# Patient Record
Sex: FEMALE | Race: WHITE | Employment: OTHER | URBAN - METROPOLITAN AREA
[De-identification: names, ages, dates, MRNs, and addresses within clinical notes are randomized per-mention and may not be internally consistent; named-entity substitution may affect disease eponyms.]

---

## 2017-01-12 DIAGNOSIS — G89.29 CHRONIC PAIN OF RIGHT ANKLE: ICD-10-CM

## 2017-01-12 DIAGNOSIS — M25.571 CHRONIC PAIN OF RIGHT ANKLE: ICD-10-CM

## 2017-01-12 DIAGNOSIS — M54.2 NECK PAIN: ICD-10-CM

## 2017-01-12 DIAGNOSIS — Z79.899 HIGH RISK MEDICATION USE: Chronic | ICD-10-CM

## 2017-01-12 RX ORDER — HYDROCODONE BITARTRATE AND ACETAMINOPHEN 10; 325 MG/1; MG/1
TABLET ORAL
Qty: 60 TABLET | Refills: 0 | Status: SHIPPED | OUTPATIENT
Start: 2017-01-12 | End: 2017-02-02 | Stop reason: SDUPTHER

## 2017-02-02 ENCOUNTER — OFFICE VISIT (OUTPATIENT)
Dept: PHYSICAL MEDICINE AND REHAB | Age: 62
End: 2017-02-02

## 2017-02-02 VITALS
SYSTOLIC BLOOD PRESSURE: 138 MMHG | DIASTOLIC BLOOD PRESSURE: 70 MMHG | HEIGHT: 68 IN | WEIGHT: 225 LBS | BODY MASS INDEX: 34.1 KG/M2

## 2017-02-02 DIAGNOSIS — M53.3 SI (SACROILIAC) PAIN: Primary | ICD-10-CM

## 2017-02-02 DIAGNOSIS — M54.2 NECK PAIN: ICD-10-CM

## 2017-02-02 DIAGNOSIS — G89.29 CHRONIC PAIN OF RIGHT ANKLE: ICD-10-CM

## 2017-02-02 DIAGNOSIS — S39.011A ABDOMINAL MUSCLE STRAIN, INITIAL ENCOUNTER: ICD-10-CM

## 2017-02-02 DIAGNOSIS — M25.571 CHRONIC PAIN OF RIGHT ANKLE: ICD-10-CM

## 2017-02-02 DIAGNOSIS — M15.8 OTHER OSTEOARTHRITIS INVOLVING MULTIPLE JOINTS: ICD-10-CM

## 2017-02-02 DIAGNOSIS — Z79.899 HIGH RISK MEDICATION USE: Chronic | ICD-10-CM

## 2017-02-02 PROBLEM — M15.9 OSTEOARTHRITIS OF MULTIPLE JOINTS: Status: ACTIVE | Noted: 2017-02-02

## 2017-02-02 PROCEDURE — 1036F TOBACCO NON-USER: CPT | Performed by: PHYSICAL MEDICINE & REHABILITATION

## 2017-02-02 PROCEDURE — 3017F COLORECTAL CA SCREEN DOC REV: CPT | Performed by: PHYSICAL MEDICINE & REHABILITATION

## 2017-02-02 PROCEDURE — G8599 NO ASA/ANTIPLAT THER USE RNG: HCPCS | Performed by: PHYSICAL MEDICINE & REHABILITATION

## 2017-02-02 PROCEDURE — G8428 CUR MEDS NOT DOCUMENT: HCPCS | Performed by: PHYSICAL MEDICINE & REHABILITATION

## 2017-02-02 PROCEDURE — 3014F SCREEN MAMMO DOC REV: CPT | Performed by: PHYSICAL MEDICINE & REHABILITATION

## 2017-02-02 PROCEDURE — G8419 CALC BMI OUT NRM PARAM NOF/U: HCPCS | Performed by: PHYSICAL MEDICINE & REHABILITATION

## 2017-02-02 PROCEDURE — G8484 FLU IMMUNIZE NO ADMIN: HCPCS | Performed by: PHYSICAL MEDICINE & REHABILITATION

## 2017-02-02 PROCEDURE — 99213 OFFICE O/P EST LOW 20 MIN: CPT | Performed by: PHYSICAL MEDICINE & REHABILITATION

## 2017-02-02 RX ORDER — HYDROCODONE BITARTRATE AND ACETAMINOPHEN 10; 325 MG/1; MG/1
TABLET ORAL
Qty: 60 TABLET | Refills: 0 | Status: SHIPPED | OUTPATIENT
Start: 2017-02-02 | End: 2017-02-02 | Stop reason: SDUPTHER

## 2017-02-02 RX ORDER — HYDROCODONE BITARTRATE AND ACETAMINOPHEN 10; 325 MG/1; MG/1
TABLET ORAL
Qty: 60 TABLET | Refills: 0 | Status: SHIPPED | OUTPATIENT
Start: 2017-02-02 | End: 2017-03-03 | Stop reason: SDUPTHER

## 2017-02-02 ASSESSMENT — ENCOUNTER SYMPTOMS
NAUSEA: 0
EYES NEGATIVE: 1
SHORTNESS OF BREATH: 0
EYE PAIN: 0
ALLERGIC/IMMUNOLOGIC NEGATIVE: 1
DIARRHEA: 0
BACK PAIN: 1
VOMITING: 0
GASTROINTESTINAL NEGATIVE: 1
RESPIRATORY NEGATIVE: 1
WHEEZING: 0
CONSTIPATION: 0

## 2017-03-03 ENCOUNTER — PROCEDURE VISIT (OUTPATIENT)
Dept: PHYSICAL MEDICINE AND REHAB | Age: 62
End: 2017-03-03

## 2017-03-03 DIAGNOSIS — Z79.899 HIGH RISK MEDICATION USE: Chronic | ICD-10-CM

## 2017-03-03 DIAGNOSIS — G89.29 CHRONIC PAIN OF RIGHT ANKLE: ICD-10-CM

## 2017-03-03 DIAGNOSIS — M25.571 CHRONIC PAIN OF RIGHT ANKLE: ICD-10-CM

## 2017-03-03 DIAGNOSIS — M54.41 ACUTE BILATERAL LOW BACK PAIN WITH RIGHT-SIDED SCIATICA: Primary | ICD-10-CM

## 2017-03-03 DIAGNOSIS — M54.2 NECK PAIN: ICD-10-CM

## 2017-03-03 DIAGNOSIS — M54.31 RIGHT SIDED SCIATICA: ICD-10-CM

## 2017-03-03 PROCEDURE — 64445 NJX AA&/STRD SCIATIC NRV IMG: CPT | Performed by: PHYSICAL MEDICINE & REHABILITATION

## 2017-03-03 PROCEDURE — 96372 THER/PROPH/DIAG INJ SC/IM: CPT | Performed by: PHYSICAL MEDICINE & REHABILITATION

## 2017-03-03 RX ORDER — METHYLPREDNISOLONE 4 MG/1
TABLET ORAL
Qty: 1 KIT | Refills: 1 | Status: SHIPPED | OUTPATIENT
Start: 2017-03-03 | End: 2017-03-09

## 2017-03-03 RX ORDER — HYDROCODONE BITARTRATE AND ACETAMINOPHEN 10; 325 MG/1; MG/1
TABLET ORAL
Qty: 90 TABLET | Refills: 0 | Status: SHIPPED | OUTPATIENT
Start: 2017-03-03 | End: 2017-04-03 | Stop reason: SDUPTHER

## 2017-03-03 RX ORDER — KETOROLAC TROMETHAMINE 30 MG/ML
30 INJECTION, SOLUTION INTRAMUSCULAR; INTRAVENOUS ONCE
Status: COMPLETED | OUTPATIENT
Start: 2017-03-03 | End: 2017-03-03

## 2017-03-03 RX ADMIN — KETOROLAC TROMETHAMINE 30 MG: 30 INJECTION, SOLUTION INTRAMUSCULAR; INTRAVENOUS at 11:46

## 2017-03-07 ENCOUNTER — HOSPITAL ENCOUNTER (OUTPATIENT)
Dept: GENERAL RADIOLOGY | Age: 62
Discharge: HOME | End: 2017-03-07
Payer: COMMERCIAL

## 2017-03-07 ENCOUNTER — TELEPHONE (OUTPATIENT)
Dept: PHYSICAL MEDICINE AND REHAB | Age: 62
End: 2017-03-07

## 2017-03-07 DIAGNOSIS — M25.551 RIGHT HIP PAIN: ICD-10-CM

## 2017-03-07 DIAGNOSIS — M25.551 RIGHT HIP PAIN: Primary | ICD-10-CM

## 2017-03-07 PROCEDURE — 73502 X-RAY EXAM HIP UNI 2-3 VIEWS: CPT

## 2017-03-08 PROBLEM — M25.551 RIGHT HIP PAIN: Status: ACTIVE | Noted: 2017-03-08

## 2017-04-03 ENCOUNTER — OFFICE VISIT (OUTPATIENT)
Dept: PHYSICAL MEDICINE AND REHAB | Age: 62
End: 2017-04-03

## 2017-04-03 VITALS
WEIGHT: 225 LBS | DIASTOLIC BLOOD PRESSURE: 56 MMHG | BODY MASS INDEX: 34.1 KG/M2 | SYSTOLIC BLOOD PRESSURE: 120 MMHG | HEIGHT: 68 IN

## 2017-04-03 DIAGNOSIS — M25.571 CHRONIC PAIN OF RIGHT ANKLE: ICD-10-CM

## 2017-04-03 DIAGNOSIS — Z79.899 HIGH RISK MEDICATION USE: ICD-10-CM

## 2017-04-03 DIAGNOSIS — M54.41 ACUTE BILATERAL LOW BACK PAIN WITH RIGHT-SIDED SCIATICA: ICD-10-CM

## 2017-04-03 DIAGNOSIS — E55.9 VITAMIN D DEFICIENCY: ICD-10-CM

## 2017-04-03 DIAGNOSIS — K63.5 COLON POLYP: ICD-10-CM

## 2017-04-03 DIAGNOSIS — M54.2 NECK PAIN: Primary | ICD-10-CM

## 2017-04-03 DIAGNOSIS — G89.29 CHRONIC PAIN OF RIGHT ANKLE: ICD-10-CM

## 2017-04-03 DIAGNOSIS — Z79.899 HIGH RISK MEDICATION USE: Chronic | ICD-10-CM

## 2017-04-03 DIAGNOSIS — M48.062 SPINAL STENOSIS OF LUMBAR REGION WITH NEUROGENIC CLAUDICATION: ICD-10-CM

## 2017-04-03 DIAGNOSIS — M53.3 SI (SACROILIAC) PAIN: ICD-10-CM

## 2017-04-03 DIAGNOSIS — M16.10 HIP ARTHRITIS: ICD-10-CM

## 2017-04-03 PROCEDURE — 1036F TOBACCO NON-USER: CPT | Performed by: PHYSICAL MEDICINE & REHABILITATION

## 2017-04-03 PROCEDURE — 99214 OFFICE O/P EST MOD 30 MIN: CPT | Performed by: PHYSICAL MEDICINE & REHABILITATION

## 2017-04-03 PROCEDURE — 3014F SCREEN MAMMO DOC REV: CPT | Performed by: PHYSICAL MEDICINE & REHABILITATION

## 2017-04-03 PROCEDURE — G8599 NO ASA/ANTIPLAT THER USE RNG: HCPCS | Performed by: PHYSICAL MEDICINE & REHABILITATION

## 2017-04-03 PROCEDURE — G8417 CALC BMI ABV UP PARAM F/U: HCPCS | Performed by: PHYSICAL MEDICINE & REHABILITATION

## 2017-04-03 PROCEDURE — 3017F COLORECTAL CA SCREEN DOC REV: CPT | Performed by: PHYSICAL MEDICINE & REHABILITATION

## 2017-04-03 PROCEDURE — G8427 DOCREV CUR MEDS BY ELIG CLIN: HCPCS | Performed by: PHYSICAL MEDICINE & REHABILITATION

## 2017-04-03 RX ORDER — HYDROCODONE BITARTRATE AND ACETAMINOPHEN 10; 325 MG/1; MG/1
TABLET ORAL
Qty: 90 TABLET | Refills: 0 | Status: SHIPPED | OUTPATIENT
Start: 2017-04-03 | End: 2017-05-08 | Stop reason: SDUPTHER

## 2017-04-03 RX ORDER — GABAPENTIN 100 MG/1
300 CAPSULE ORAL NIGHTLY
Qty: 270 CAPSULE | Refills: 1 | Status: SHIPPED | OUTPATIENT
Start: 2017-04-03 | End: 2017-11-03 | Stop reason: SDUPTHER

## 2017-04-03 ASSESSMENT — ENCOUNTER SYMPTOMS
EYES NEGATIVE: 1
BACK PAIN: 1
ALLERGIC/IMMUNOLOGIC NEGATIVE: 1
CONSTIPATION: 0
CHEST TIGHTNESS: 0
VOMITING: 0
DIARRHEA: 0
ABDOMINAL PAIN: 1
NAUSEA: 0
SHORTNESS OF BREATH: 0
WHEEZING: 0

## 2017-04-04 ENCOUNTER — OFFICE VISIT (OUTPATIENT)
Dept: GASTROENTEROLOGY | Age: 62
End: 2017-04-04

## 2017-04-04 VITALS
WEIGHT: 224 LBS | SYSTOLIC BLOOD PRESSURE: 151 MMHG | DIASTOLIC BLOOD PRESSURE: 83 MMHG | HEART RATE: 76 BPM | BODY MASS INDEX: 34.06 KG/M2

## 2017-04-04 DIAGNOSIS — Z12.11 SPECIAL SCREENING FOR MALIGNANT NEOPLASMS, COLON: ICD-10-CM

## 2017-04-04 DIAGNOSIS — Z79.82 ENCOUNTER FOR LONG-TERM (CURRENT) USE OF ASPIRIN: ICD-10-CM

## 2017-04-04 DIAGNOSIS — R10.11 RUQ ABDOMINAL PAIN: Primary | ICD-10-CM

## 2017-04-04 PROCEDURE — 3017F COLORECTAL CA SCREEN DOC REV: CPT | Performed by: INTERNAL MEDICINE

## 2017-04-04 PROCEDURE — G8599 NO ASA/ANTIPLAT THER USE RNG: HCPCS | Performed by: INTERNAL MEDICINE

## 2017-04-04 PROCEDURE — G8417 CALC BMI ABV UP PARAM F/U: HCPCS | Performed by: INTERNAL MEDICINE

## 2017-04-04 PROCEDURE — 1036F TOBACCO NON-USER: CPT | Performed by: INTERNAL MEDICINE

## 2017-04-04 PROCEDURE — 99203 OFFICE O/P NEW LOW 30 MIN: CPT | Performed by: INTERNAL MEDICINE

## 2017-04-04 PROCEDURE — G8427 DOCREV CUR MEDS BY ELIG CLIN: HCPCS | Performed by: INTERNAL MEDICINE

## 2017-04-04 PROCEDURE — 3014F SCREEN MAMMO DOC REV: CPT | Performed by: INTERNAL MEDICINE

## 2017-04-12 ENCOUNTER — HOSPITAL ENCOUNTER (OUTPATIENT)
Age: 62
Setting detail: OUTPATIENT SURGERY
Discharge: HOME | End: 2017-04-12
Attending: INTERNAL MEDICINE | Admitting: INTERNAL MEDICINE
Payer: COMMERCIAL

## 2017-04-12 ENCOUNTER — ANESTHESIA (OUTPATIENT)
Dept: ENDOSCOPY | Age: 62
End: 2017-04-12
Payer: COMMERCIAL

## 2017-04-12 ENCOUNTER — ANESTHESIA EVENT (OUTPATIENT)
Dept: ENDOSCOPY | Age: 62
End: 2017-04-12
Payer: COMMERCIAL

## 2017-04-12 VITALS
TEMPERATURE: 98.2 F | SYSTOLIC BLOOD PRESSURE: 138 MMHG | WEIGHT: 218 LBS | BODY MASS INDEX: 33.04 KG/M2 | HEIGHT: 68 IN | OXYGEN SATURATION: 96 % | RESPIRATION RATE: 18 BRPM | DIASTOLIC BLOOD PRESSURE: 64 MMHG | HEART RATE: 76 BPM

## 2017-04-12 VITALS
DIASTOLIC BLOOD PRESSURE: 59 MMHG | SYSTOLIC BLOOD PRESSURE: 126 MMHG | RESPIRATION RATE: 16 BRPM | OXYGEN SATURATION: 93 %

## 2017-04-12 PROCEDURE — 2580000003 HC RX 258: Performed by: STUDENT IN AN ORGANIZED HEALTH CARE EDUCATION/TRAINING PROGRAM

## 2017-04-12 PROCEDURE — 88305 TISSUE EXAM BY PATHOLOGIST: CPT

## 2017-04-12 PROCEDURE — 3700000001 HC ADD 15 MINUTES (ANESTHESIA): Performed by: INTERNAL MEDICINE

## 2017-04-12 PROCEDURE — 7100000010 HC PHASE II RECOVERY - FIRST 15 MIN: Performed by: INTERNAL MEDICINE

## 2017-04-12 PROCEDURE — 3700000000 HC ANESTHESIA ATTENDED CARE: Performed by: INTERNAL MEDICINE

## 2017-04-12 PROCEDURE — 3609027000 HC COLONOSCOPY: Performed by: INTERNAL MEDICINE

## 2017-04-12 PROCEDURE — 88342 IMHCHEM/IMCYTCHM 1ST ANTB: CPT

## 2017-04-12 PROCEDURE — 6360000002 HC RX W HCPCS: Performed by: NURSE ANESTHETIST, CERTIFIED REGISTERED

## 2017-04-12 PROCEDURE — 2500000003 HC RX 250 WO HCPCS: Performed by: NURSE ANESTHETIST, CERTIFIED REGISTERED

## 2017-04-12 PROCEDURE — 3609017100 HC EGD: Performed by: INTERNAL MEDICINE

## 2017-04-12 RX ORDER — LABETALOL HYDROCHLORIDE 5 MG/ML
INJECTION, SOLUTION INTRAVENOUS PRN
Status: DISCONTINUED | OUTPATIENT
Start: 2017-04-12 | End: 2017-04-12 | Stop reason: SDUPTHER

## 2017-04-12 RX ORDER — GLYCOPYRROLATE 0.2 MG/ML
INJECTION INTRAMUSCULAR; INTRAVENOUS PRN
Status: DISCONTINUED | OUTPATIENT
Start: 2017-04-12 | End: 2017-04-12 | Stop reason: SDUPTHER

## 2017-04-12 RX ORDER — PROPOFOL 10 MG/ML
INJECTION, EMULSION INTRAVENOUS PRN
Status: DISCONTINUED | OUTPATIENT
Start: 2017-04-12 | End: 2017-04-12 | Stop reason: SDUPTHER

## 2017-04-12 RX ORDER — LIDOCAINE HYDROCHLORIDE 20 MG/ML
INJECTION, SOLUTION INFILTRATION; PERINEURAL PRN
Status: DISCONTINUED | OUTPATIENT
Start: 2017-04-12 | End: 2017-04-12 | Stop reason: SDUPTHER

## 2017-04-12 RX ORDER — ONDANSETRON 2 MG/ML
4 INJECTION INTRAMUSCULAR; INTRAVENOUS
Status: DISCONTINUED | OUTPATIENT
Start: 2017-04-12 | End: 2017-04-12 | Stop reason: HOSPADM

## 2017-04-12 RX ORDER — SODIUM CHLORIDE 9 MG/ML
INJECTION, SOLUTION INTRAVENOUS CONTINUOUS
Status: DISCONTINUED | OUTPATIENT
Start: 2017-04-12 | End: 2017-04-12 | Stop reason: HOSPADM

## 2017-04-12 RX ADMIN — PROPOFOL 20 MG: 10 INJECTION, EMULSION INTRAVENOUS at 11:41

## 2017-04-12 RX ADMIN — PROPOFOL 20 MG: 10 INJECTION, EMULSION INTRAVENOUS at 11:39

## 2017-04-12 RX ADMIN — PROPOFOL 20 MG: 10 INJECTION, EMULSION INTRAVENOUS at 11:47

## 2017-04-12 RX ADMIN — PROPOFOL 20 MG: 10 INJECTION, EMULSION INTRAVENOUS at 11:46

## 2017-04-12 RX ADMIN — LIDOCAINE HYDROCHLORIDE 40 MG: 20 INJECTION, SOLUTION INFILTRATION; PERINEURAL at 11:29

## 2017-04-12 RX ADMIN — PROPOFOL 50 MG: 10 INJECTION, EMULSION INTRAVENOUS at 11:29

## 2017-04-12 RX ADMIN — PROPOFOL 20 MG: 10 INJECTION, EMULSION INTRAVENOUS at 11:48

## 2017-04-12 RX ADMIN — PROPOFOL 20 MG: 10 INJECTION, EMULSION INTRAVENOUS at 11:49

## 2017-04-12 RX ADMIN — PROPOFOL 20 MG: 10 INJECTION, EMULSION INTRAVENOUS at 11:34

## 2017-04-12 RX ADMIN — PROPOFOL 20 MG: 10 INJECTION, EMULSION INTRAVENOUS at 11:35

## 2017-04-12 RX ADMIN — PROPOFOL 20 MG: 10 INJECTION, EMULSION INTRAVENOUS at 11:33

## 2017-04-12 RX ADMIN — PROPOFOL 20 MG: 10 INJECTION, EMULSION INTRAVENOUS at 11:45

## 2017-04-12 RX ADMIN — PROPOFOL 20 MG: 10 INJECTION, EMULSION INTRAVENOUS at 11:30

## 2017-04-12 RX ADMIN — GLYCOPYRROLATE 0.4 MG: 0.2 INJECTION INTRAMUSCULAR; INTRAVENOUS at 11:46

## 2017-04-12 RX ADMIN — LABETALOL HYDROCHLORIDE 5 MG: 5 INJECTION, SOLUTION INTRAVENOUS at 11:33

## 2017-04-12 RX ADMIN — LABETALOL HYDROCHLORIDE 5 MG: 5 INJECTION, SOLUTION INTRAVENOUS at 11:28

## 2017-04-12 RX ADMIN — PROPOFOL 20 MG: 10 INJECTION, EMULSION INTRAVENOUS at 11:44

## 2017-04-12 RX ADMIN — PROPOFOL 20 MG: 10 INJECTION, EMULSION INTRAVENOUS at 11:32

## 2017-04-12 RX ADMIN — PROPOFOL 20 MG: 10 INJECTION, EMULSION INTRAVENOUS at 11:31

## 2017-04-12 RX ADMIN — PROPOFOL 20 MG: 10 INJECTION, EMULSION INTRAVENOUS at 11:40

## 2017-04-12 RX ADMIN — SODIUM CHLORIDE: 9 INJECTION, SOLUTION INTRAVENOUS at 10:33

## 2017-04-12 RX ADMIN — PROPOFOL 20 MG: 10 INJECTION, EMULSION INTRAVENOUS at 11:42

## 2017-04-12 RX ADMIN — PROPOFOL 20 MG: 10 INJECTION, EMULSION INTRAVENOUS at 11:43

## 2017-04-12 RX ADMIN — PROPOFOL 20 MG: 10 INJECTION, EMULSION INTRAVENOUS at 11:38

## 2017-04-12 RX ADMIN — PROPOFOL 20 MG: 10 INJECTION, EMULSION INTRAVENOUS at 11:37

## 2017-04-12 ASSESSMENT — PAIN SCALES - GENERAL: PAINLEVEL_OUTOF10: 0

## 2017-04-27 ENCOUNTER — OFFICE VISIT (OUTPATIENT)
Dept: GASTROENTEROLOGY | Age: 62
End: 2017-04-27

## 2017-04-27 VITALS
DIASTOLIC BLOOD PRESSURE: 85 MMHG | SYSTOLIC BLOOD PRESSURE: 153 MMHG | HEART RATE: 76 BPM | WEIGHT: 223 LBS | BODY MASS INDEX: 33.91 KG/M2

## 2017-04-27 DIAGNOSIS — R10.11 RUQ ABDOMINAL PAIN: Primary | ICD-10-CM

## 2017-04-27 DIAGNOSIS — R10.9 TRIGGER POINT OF ABDOMEN: ICD-10-CM

## 2017-04-27 PROCEDURE — G8427 DOCREV CUR MEDS BY ELIG CLIN: HCPCS | Performed by: INTERNAL MEDICINE

## 2017-04-27 PROCEDURE — G8599 NO ASA/ANTIPLAT THER USE RNG: HCPCS | Performed by: INTERNAL MEDICINE

## 2017-04-27 PROCEDURE — 3017F COLORECTAL CA SCREEN DOC REV: CPT | Performed by: INTERNAL MEDICINE

## 2017-04-27 PROCEDURE — 99213 OFFICE O/P EST LOW 20 MIN: CPT | Performed by: INTERNAL MEDICINE

## 2017-04-27 PROCEDURE — 1036F TOBACCO NON-USER: CPT | Performed by: INTERNAL MEDICINE

## 2017-04-27 PROCEDURE — 20552 NJX 1/MLT TRIGGER POINT 1/2: CPT | Performed by: INTERNAL MEDICINE

## 2017-04-27 PROCEDURE — 3014F SCREEN MAMMO DOC REV: CPT | Performed by: INTERNAL MEDICINE

## 2017-04-27 PROCEDURE — G8417 CALC BMI ABV UP PARAM F/U: HCPCS | Performed by: INTERNAL MEDICINE

## 2017-04-27 RX ORDER — LIDOCAINE HYDROCHLORIDE 10 MG/ML
5 INJECTION, SOLUTION INFILTRATION; PERINEURAL ONCE
Status: COMPLETED | OUTPATIENT
Start: 2017-04-27 | End: 2017-04-27

## 2017-04-27 RX ORDER — TRIAMCINOLONE ACETONIDE 40 MG/ML
40 INJECTION, SUSPENSION INTRA-ARTICULAR; INTRAMUSCULAR ONCE
Status: COMPLETED | OUTPATIENT
Start: 2017-04-27 | End: 2017-04-27

## 2017-04-27 RX ADMIN — LIDOCAINE HYDROCHLORIDE 5 ML: 10 INJECTION, SOLUTION INFILTRATION; PERINEURAL at 15:36

## 2017-04-27 RX ADMIN — TRIAMCINOLONE ACETONIDE 40 MG: 40 INJECTION, SUSPENSION INTRA-ARTICULAR; INTRAMUSCULAR at 15:36

## 2017-05-01 ENCOUNTER — HOSPITAL ENCOUNTER (OUTPATIENT)
Dept: CT IMAGING | Age: 62
Discharge: HOME | End: 2017-05-01
Payer: COMMERCIAL

## 2017-05-01 VITALS
RESPIRATION RATE: 16 BRPM | BODY MASS INDEX: 33.34 KG/M2 | SYSTOLIC BLOOD PRESSURE: 170 MMHG | HEIGHT: 68 IN | WEIGHT: 220 LBS | DIASTOLIC BLOOD PRESSURE: 91 MMHG | HEART RATE: 73 BPM

## 2017-05-01 DIAGNOSIS — R10.11 RUQ ABDOMINAL PAIN: ICD-10-CM

## 2017-05-01 LAB
GFR AFRICAN AMERICAN: 50
GFR NON-AFRICAN AMERICAN: 41
PERFORMED ON: ABNORMAL
POC CREATININE: 1.3 MG/DL (ref 0.6–1.2)
POC SAMPLE TYPE: ABNORMAL

## 2017-05-01 PROCEDURE — 2500000003 HC RX 250 WO HCPCS: Performed by: RADIOLOGY

## 2017-05-01 PROCEDURE — 74177 CT ABD & PELVIS W/CONTRAST: CPT

## 2017-05-01 PROCEDURE — 2580000003 HC RX 258: Performed by: RADIOLOGY

## 2017-05-01 PROCEDURE — 6360000004 HC RX CONTRAST MEDICATION: Performed by: RADIOLOGY

## 2017-05-01 RX ORDER — SODIUM CHLORIDE 0.9 % (FLUSH) 0.9 %
10 SYRINGE (ML) INJECTION
Status: COMPLETED | OUTPATIENT
Start: 2017-05-01 | End: 2017-05-01

## 2017-05-01 RX ADMIN — IOPAMIDOL 100 ML: 755 INJECTION, SOLUTION INTRAVENOUS at 09:38

## 2017-05-01 RX ADMIN — SODIUM CHLORIDE, PRESERVATIVE FREE 10 ML: 5 INJECTION INTRAVENOUS at 09:38

## 2017-05-01 RX ADMIN — BARIUM SULFATE 450 ML: 21 SUSPENSION ORAL at 09:38

## 2017-05-08 DIAGNOSIS — M54.2 NECK PAIN: ICD-10-CM

## 2017-05-08 DIAGNOSIS — M54.41 ACUTE BILATERAL LOW BACK PAIN WITH RIGHT-SIDED SCIATICA: ICD-10-CM

## 2017-05-08 DIAGNOSIS — Z79.899 HIGH RISK MEDICATION USE: Chronic | ICD-10-CM

## 2017-05-08 DIAGNOSIS — G89.29 CHRONIC PAIN OF RIGHT ANKLE: ICD-10-CM

## 2017-05-08 DIAGNOSIS — M25.571 CHRONIC PAIN OF RIGHT ANKLE: ICD-10-CM

## 2017-05-08 RX ORDER — HYDROCODONE BITARTRATE AND ACETAMINOPHEN 10; 325 MG/1; MG/1
TABLET ORAL
Qty: 90 TABLET | Refills: 0 | Status: SHIPPED | OUTPATIENT
Start: 2017-05-08 | End: 2017-06-05 | Stop reason: SDUPTHER

## 2017-05-09 ENCOUNTER — TELEPHONE (OUTPATIENT)
Dept: PHYSICAL MEDICINE AND REHAB | Age: 62
End: 2017-05-09

## 2017-05-11 ENCOUNTER — OFFICE VISIT (OUTPATIENT)
Dept: GASTROENTEROLOGY | Age: 62
End: 2017-05-11

## 2017-05-11 DIAGNOSIS — R10.11 ABDOMINAL PAIN, RIGHT UPPER QUADRANT: Primary | ICD-10-CM

## 2017-05-11 PROCEDURE — G8417 CALC BMI ABV UP PARAM F/U: HCPCS | Performed by: INTERNAL MEDICINE

## 2017-05-11 PROCEDURE — 1036F TOBACCO NON-USER: CPT | Performed by: INTERNAL MEDICINE

## 2017-05-11 PROCEDURE — 99213 OFFICE O/P EST LOW 20 MIN: CPT | Performed by: INTERNAL MEDICINE

## 2017-05-11 PROCEDURE — 3014F SCREEN MAMMO DOC REV: CPT | Performed by: INTERNAL MEDICINE

## 2017-05-11 PROCEDURE — G8599 NO ASA/ANTIPLAT THER USE RNG: HCPCS | Performed by: INTERNAL MEDICINE

## 2017-05-11 PROCEDURE — G8427 DOCREV CUR MEDS BY ELIG CLIN: HCPCS | Performed by: INTERNAL MEDICINE

## 2017-05-11 PROCEDURE — 3017F COLORECTAL CA SCREEN DOC REV: CPT | Performed by: INTERNAL MEDICINE

## 2017-05-17 VITALS
DIASTOLIC BLOOD PRESSURE: 87 MMHG | HEART RATE: 64 BPM | BODY MASS INDEX: 34.06 KG/M2 | WEIGHT: 224 LBS | SYSTOLIC BLOOD PRESSURE: 166 MMHG

## 2017-05-19 DIAGNOSIS — R10.11 RUQ ABDOMINAL PAIN: Primary | ICD-10-CM

## 2017-05-30 ENCOUNTER — HOSPITAL ENCOUNTER (OUTPATIENT)
Dept: MRI IMAGING | Age: 62
Discharge: HOME | End: 2017-05-30
Payer: COMMERCIAL

## 2017-05-30 VITALS — WEIGHT: 224 LBS | HEIGHT: 70 IN | BODY MASS INDEX: 32.07 KG/M2

## 2017-05-30 DIAGNOSIS — R10.11 RUQ ABDOMINAL PAIN: ICD-10-CM

## 2017-05-30 PROCEDURE — 74183 MRI ABD W/O CNTR FLWD CNTR: CPT

## 2017-05-30 PROCEDURE — A9579 GAD-BASE MR CONTRAST NOS,1ML: HCPCS | Performed by: RADIOLOGY

## 2017-05-30 PROCEDURE — 6360000004 HC RX CONTRAST MEDICATION: Performed by: RADIOLOGY

## 2017-05-30 RX ORDER — 0.9 % SODIUM CHLORIDE 0.9 %
30 VIAL (ML) INJECTION ONCE
Status: DISCONTINUED | OUTPATIENT
Start: 2017-05-30 | End: 2017-06-02 | Stop reason: HOSPADM

## 2017-05-30 RX ADMIN — GADOVERSETAMIDE 30 ML: 0.5 INJECTION, SOLUTION INTRAVENOUS at 09:15

## 2017-06-05 ENCOUNTER — OFFICE VISIT (OUTPATIENT)
Dept: PHYSICAL MEDICINE AND REHAB | Age: 62
End: 2017-06-05

## 2017-06-05 VITALS
BODY MASS INDEX: 33.72 KG/M2 | SYSTOLIC BLOOD PRESSURE: 126 MMHG | DIASTOLIC BLOOD PRESSURE: 60 MMHG | HEIGHT: 68 IN | WEIGHT: 222.5 LBS

## 2017-06-05 DIAGNOSIS — M25.571 CHRONIC PAIN OF RIGHT ANKLE: ICD-10-CM

## 2017-06-05 DIAGNOSIS — M15.9 PRIMARY OSTEOARTHRITIS INVOLVING MULTIPLE JOINTS: ICD-10-CM

## 2017-06-05 DIAGNOSIS — M53.3 SI (SACROILIAC) PAIN: Primary | ICD-10-CM

## 2017-06-05 DIAGNOSIS — G89.29 CHRONIC PAIN OF RIGHT ANKLE: ICD-10-CM

## 2017-06-05 DIAGNOSIS — Z79.899 HIGH RISK MEDICATION USE: Chronic | ICD-10-CM

## 2017-06-05 DIAGNOSIS — M54.2 NECK PAIN: ICD-10-CM

## 2017-06-05 DIAGNOSIS — G89.29 CHRONIC LOW BACK PAIN WITH SCIATICA, SCIATICA LATERALITY UNSPECIFIED, UNSPECIFIED BACK PAIN LATERALITY: ICD-10-CM

## 2017-06-05 DIAGNOSIS — M54.40 CHRONIC LOW BACK PAIN WITH SCIATICA, SCIATICA LATERALITY UNSPECIFIED, UNSPECIFIED BACK PAIN LATERALITY: ICD-10-CM

## 2017-06-05 DIAGNOSIS — M48.062 SPINAL STENOSIS OF LUMBAR REGION WITH NEUROGENIC CLAUDICATION: ICD-10-CM

## 2017-06-05 DIAGNOSIS — M54.41 ACUTE BILATERAL LOW BACK PAIN WITH RIGHT-SIDED SCIATICA: ICD-10-CM

## 2017-06-05 PROCEDURE — G8599 NO ASA/ANTIPLAT THER USE RNG: HCPCS | Performed by: PHYSICAL MEDICINE & REHABILITATION

## 2017-06-05 PROCEDURE — G8427 DOCREV CUR MEDS BY ELIG CLIN: HCPCS | Performed by: PHYSICAL MEDICINE & REHABILITATION

## 2017-06-05 PROCEDURE — G8417 CALC BMI ABV UP PARAM F/U: HCPCS | Performed by: PHYSICAL MEDICINE & REHABILITATION

## 2017-06-05 PROCEDURE — 1036F TOBACCO NON-USER: CPT | Performed by: PHYSICAL MEDICINE & REHABILITATION

## 2017-06-05 PROCEDURE — 3014F SCREEN MAMMO DOC REV: CPT | Performed by: PHYSICAL MEDICINE & REHABILITATION

## 2017-06-05 PROCEDURE — 3017F COLORECTAL CA SCREEN DOC REV: CPT | Performed by: PHYSICAL MEDICINE & REHABILITATION

## 2017-06-05 PROCEDURE — 99213 OFFICE O/P EST LOW 20 MIN: CPT | Performed by: PHYSICAL MEDICINE & REHABILITATION

## 2017-06-05 RX ORDER — CIPROFLOXACIN 250 MG/1
250 TABLET, FILM COATED ORAL
COMMUNITY
Start: 2017-06-01 | End: 2017-06-08

## 2017-06-05 RX ORDER — HYDROCODONE BITARTRATE AND ACETAMINOPHEN 10; 325 MG/1; MG/1
TABLET ORAL
Qty: 90 TABLET | Refills: 0 | Status: SHIPPED | OUTPATIENT
Start: 2017-06-05 | End: 2017-08-07 | Stop reason: SDUPTHER

## 2017-06-05 ASSESSMENT — ENCOUNTER SYMPTOMS
ALLERGIC/IMMUNOLOGIC NEGATIVE: 1
DIARRHEA: 0
EYES NEGATIVE: 1
NAUSEA: 0
SHORTNESS OF BREATH: 0
ABDOMINAL PAIN: 1
CHEST TIGHTNESS: 0
WHEEZING: 0
CONSTIPATION: 0
BACK PAIN: 1
VOMITING: 0

## 2017-06-06 ENCOUNTER — OFFICE VISIT (OUTPATIENT)
Dept: GASTROENTEROLOGY | Age: 62
End: 2017-06-06

## 2017-06-06 VITALS
HEART RATE: 83 BPM | BODY MASS INDEX: 33.54 KG/M2 | HEIGHT: 68 IN | DIASTOLIC BLOOD PRESSURE: 78 MMHG | SYSTOLIC BLOOD PRESSURE: 148 MMHG | WEIGHT: 221.31 LBS

## 2017-06-06 DIAGNOSIS — R93.5 ABNORMAL CT OF THE ABDOMEN: Primary | ICD-10-CM

## 2017-06-06 DIAGNOSIS — R10.11: ICD-10-CM

## 2017-06-06 DIAGNOSIS — R10.9 TRIGGER POINT OF ABDOMEN: ICD-10-CM

## 2017-06-06 PROCEDURE — 99212 OFFICE O/P EST SF 10 MIN: CPT | Performed by: INTERNAL MEDICINE

## 2017-06-06 PROCEDURE — G8417 CALC BMI ABV UP PARAM F/U: HCPCS | Performed by: INTERNAL MEDICINE

## 2017-06-06 PROCEDURE — G8598 ASA/ANTIPLAT THER USED: HCPCS | Performed by: INTERNAL MEDICINE

## 2017-06-06 PROCEDURE — 20552 NJX 1/MLT TRIGGER POINT 1/2: CPT | Performed by: INTERNAL MEDICINE

## 2017-06-06 PROCEDURE — G8427 DOCREV CUR MEDS BY ELIG CLIN: HCPCS | Performed by: INTERNAL MEDICINE

## 2017-06-06 PROCEDURE — 3014F SCREEN MAMMO DOC REV: CPT | Performed by: INTERNAL MEDICINE

## 2017-06-06 PROCEDURE — 1036F TOBACCO NON-USER: CPT | Performed by: INTERNAL MEDICINE

## 2017-06-06 PROCEDURE — 3017F COLORECTAL CA SCREEN DOC REV: CPT | Performed by: INTERNAL MEDICINE

## 2017-06-06 RX ORDER — LIDOCAINE HYDROCHLORIDE 10 MG/ML
5 INJECTION, SOLUTION INFILTRATION; PERINEURAL ONCE
Status: COMPLETED | OUTPATIENT
Start: 2017-06-06 | End: 2017-06-06

## 2017-06-06 RX ORDER — TRIAMCINOLONE ACETONIDE 40 MG/ML
40 INJECTION, SUSPENSION INTRA-ARTICULAR; INTRAMUSCULAR ONCE
Status: COMPLETED | OUTPATIENT
Start: 2017-06-06 | End: 2017-06-06

## 2017-06-06 RX ADMIN — TRIAMCINOLONE ACETONIDE 40 MG: 40 INJECTION, SUSPENSION INTRA-ARTICULAR; INTRAMUSCULAR at 15:43

## 2017-06-06 RX ADMIN — LIDOCAINE HYDROCHLORIDE 5 ML: 10 INJECTION, SOLUTION INFILTRATION; PERINEURAL at 15:42

## 2017-06-28 DIAGNOSIS — M54.2 NECK PAIN: ICD-10-CM

## 2017-06-28 DIAGNOSIS — Z79.899 HIGH RISK MEDICATION USE: Chronic | ICD-10-CM

## 2017-06-28 DIAGNOSIS — M25.571 CHRONIC PAIN OF RIGHT ANKLE: ICD-10-CM

## 2017-06-28 DIAGNOSIS — M54.41 ACUTE BILATERAL LOW BACK PAIN WITH RIGHT-SIDED SCIATICA: ICD-10-CM

## 2017-06-28 DIAGNOSIS — G89.29 CHRONIC PAIN OF RIGHT ANKLE: ICD-10-CM

## 2017-06-29 ENCOUNTER — PROCEDURE VISIT (OUTPATIENT)
Dept: PHYSICAL MEDICINE AND REHAB | Age: 62
End: 2017-06-29

## 2017-06-29 DIAGNOSIS — M79.10 MYALGIA: ICD-10-CM

## 2017-06-29 DIAGNOSIS — M79.7 FIBROMYALGIA: Primary | ICD-10-CM

## 2017-06-29 PROCEDURE — 1036F TOBACCO NON-USER: CPT | Performed by: PHYSICAL MEDICINE & REHABILITATION

## 2017-06-29 PROCEDURE — 20553 NJX 1/MLT TRIGGER POINTS 3/>: CPT | Performed by: PHYSICAL MEDICINE & REHABILITATION

## 2017-06-29 RX ORDER — HYDROCODONE BITARTRATE AND ACETAMINOPHEN 10; 325 MG/1; MG/1
TABLET ORAL
Qty: 90 TABLET | Refills: 0 | Status: SHIPPED | OUTPATIENT
Start: 2017-06-29 | End: 2017-08-07 | Stop reason: SDUPTHER

## 2017-08-07 ENCOUNTER — OFFICE VISIT (OUTPATIENT)
Dept: PHYSICAL MEDICINE AND REHAB | Age: 62
End: 2017-08-07

## 2017-08-07 VITALS
DIASTOLIC BLOOD PRESSURE: 50 MMHG | HEIGHT: 68 IN | BODY MASS INDEX: 32.13 KG/M2 | SYSTOLIC BLOOD PRESSURE: 130 MMHG | WEIGHT: 212 LBS

## 2017-08-07 DIAGNOSIS — M54.2 NECK PAIN: ICD-10-CM

## 2017-08-07 DIAGNOSIS — M79.10 MYALGIA: ICD-10-CM

## 2017-08-07 DIAGNOSIS — Z79.899 HIGH RISK MEDICATION USE: Chronic | ICD-10-CM

## 2017-08-07 DIAGNOSIS — M25.571 CHRONIC PAIN OF RIGHT ANKLE: ICD-10-CM

## 2017-08-07 DIAGNOSIS — M54.50 LOW BACK PAIN RADIATING TO RIGHT LOWER EXTREMITY: ICD-10-CM

## 2017-08-07 DIAGNOSIS — M54.41 ACUTE BILATERAL LOW BACK PAIN WITH RIGHT-SIDED SCIATICA: ICD-10-CM

## 2017-08-07 DIAGNOSIS — G89.29 CHRONIC PAIN OF RIGHT ANKLE: ICD-10-CM

## 2017-08-07 DIAGNOSIS — M16.10 HIP ARTHRITIS: Primary | ICD-10-CM

## 2017-08-07 DIAGNOSIS — Z79.899 HIGH RISK MEDICATION USE: ICD-10-CM

## 2017-08-07 DIAGNOSIS — M79.604 LOW BACK PAIN RADIATING TO RIGHT LOWER EXTREMITY: ICD-10-CM

## 2017-08-07 DIAGNOSIS — M15.9 PRIMARY OSTEOARTHRITIS INVOLVING MULTIPLE JOINTS: ICD-10-CM

## 2017-08-07 PROBLEM — M15.3 POST-TRAUMATIC OSTEOARTHRITIS OF MULTIPLE JOINTS: Status: ACTIVE | Noted: 2017-08-07

## 2017-08-07 PROBLEM — M15.3 POST-TRAUMATIC OSTEOARTHRITIS OF MULTIPLE JOINTS: Status: RESOLVED | Noted: 2017-08-07 | Resolved: 2017-08-07

## 2017-08-07 PROCEDURE — 3014F SCREEN MAMMO DOC REV: CPT | Performed by: PHYSICAL MEDICINE & REHABILITATION

## 2017-08-07 PROCEDURE — 3017F COLORECTAL CA SCREEN DOC REV: CPT | Performed by: PHYSICAL MEDICINE & REHABILITATION

## 2017-08-07 PROCEDURE — 1036F TOBACCO NON-USER: CPT | Performed by: PHYSICAL MEDICINE & REHABILITATION

## 2017-08-07 PROCEDURE — G8598 ASA/ANTIPLAT THER USED: HCPCS | Performed by: PHYSICAL MEDICINE & REHABILITATION

## 2017-08-07 PROCEDURE — G8427 DOCREV CUR MEDS BY ELIG CLIN: HCPCS | Performed by: PHYSICAL MEDICINE & REHABILITATION

## 2017-08-07 PROCEDURE — G8417 CALC BMI ABV UP PARAM F/U: HCPCS | Performed by: PHYSICAL MEDICINE & REHABILITATION

## 2017-08-07 PROCEDURE — 99214 OFFICE O/P EST MOD 30 MIN: CPT | Performed by: PHYSICAL MEDICINE & REHABILITATION

## 2017-08-07 RX ORDER — HYDROCODONE BITARTRATE AND ACETAMINOPHEN 10; 325 MG/1; MG/1
TABLET ORAL
Qty: 90 TABLET | Refills: 0 | Status: SHIPPED | OUTPATIENT
Start: 2017-08-07 | End: 2017-09-08 | Stop reason: SDUPTHER

## 2017-08-07 ASSESSMENT — ENCOUNTER SYMPTOMS
ALLERGIC/IMMUNOLOGIC NEGATIVE: 1
CONSTIPATION: 0
CHEST TIGHTNESS: 0
DIARRHEA: 0
SHORTNESS OF BREATH: 0
EYES NEGATIVE: 1
ABDOMINAL PAIN: 0
WHEEZING: 0
BACK PAIN: 1
VOMITING: 0
NAUSEA: 0

## 2017-08-08 ENCOUNTER — PROCEDURE VISIT (OUTPATIENT)
Dept: PHYSICAL MEDICINE AND REHAB | Age: 62
End: 2017-08-08

## 2017-08-08 DIAGNOSIS — M79.10 MYALGIA: ICD-10-CM

## 2017-08-08 DIAGNOSIS — M79.7 FIBROMYALGIA: Primary | ICD-10-CM

## 2017-08-08 PROCEDURE — 20553 NJX 1/MLT TRIGGER POINTS 3/>: CPT | Performed by: PHYSICAL MEDICINE & REHABILITATION

## 2017-08-08 PROCEDURE — 1036F TOBACCO NON-USER: CPT | Performed by: PHYSICAL MEDICINE & REHABILITATION

## 2017-09-08 DIAGNOSIS — G89.29 CHRONIC PAIN OF RIGHT ANKLE: ICD-10-CM

## 2017-09-08 DIAGNOSIS — M25.571 CHRONIC PAIN OF RIGHT ANKLE: ICD-10-CM

## 2017-09-08 DIAGNOSIS — M54.2 NECK PAIN: ICD-10-CM

## 2017-09-08 DIAGNOSIS — Z79.899 HIGH RISK MEDICATION USE: Chronic | ICD-10-CM

## 2017-09-08 DIAGNOSIS — M54.41 ACUTE BILATERAL LOW BACK PAIN WITH RIGHT-SIDED SCIATICA: ICD-10-CM

## 2017-09-08 RX ORDER — HYDROCODONE BITARTRATE AND ACETAMINOPHEN 10; 325 MG/1; MG/1
TABLET ORAL
Qty: 90 TABLET | Refills: 0 | Status: SHIPPED | OUTPATIENT
Start: 2017-09-08 | End: 2017-12-20 | Stop reason: SDUPTHER

## 2017-10-04 DIAGNOSIS — R93.5 ABNORMAL CT OF THE ABDOMEN: Primary | ICD-10-CM

## 2017-10-06 ENCOUNTER — OFFICE VISIT (OUTPATIENT)
Dept: PHYSICAL MEDICINE AND REHAB | Age: 62
End: 2017-10-06

## 2017-10-06 VITALS
HEIGHT: 68 IN | SYSTOLIC BLOOD PRESSURE: 130 MMHG | BODY MASS INDEX: 32.13 KG/M2 | WEIGHT: 212 LBS | DIASTOLIC BLOOD PRESSURE: 86 MMHG

## 2017-10-06 DIAGNOSIS — M79.10 MYALGIA: ICD-10-CM

## 2017-10-06 DIAGNOSIS — M53.3 SI (SACROILIAC) JOINT DYSFUNCTION: ICD-10-CM

## 2017-10-06 DIAGNOSIS — M54.41 ACUTE BILATERAL LOW BACK PAIN WITH RIGHT-SIDED SCIATICA: ICD-10-CM

## 2017-10-06 DIAGNOSIS — M54.2 NECK PAIN: ICD-10-CM

## 2017-10-06 DIAGNOSIS — G89.29 CHRONIC LOW BACK PAIN WITH SCIATICA, SCIATICA LATERALITY UNSPECIFIED, UNSPECIFIED BACK PAIN LATERALITY: ICD-10-CM

## 2017-10-06 DIAGNOSIS — M54.40 CHRONIC LOW BACK PAIN WITH SCIATICA, SCIATICA LATERALITY UNSPECIFIED, UNSPECIFIED BACK PAIN LATERALITY: ICD-10-CM

## 2017-10-06 DIAGNOSIS — M15.9 PRIMARY OSTEOARTHRITIS INVOLVING MULTIPLE JOINTS: Primary | ICD-10-CM

## 2017-10-06 DIAGNOSIS — Z79.899 HIGH RISK MEDICATION USE: Chronic | ICD-10-CM

## 2017-10-06 DIAGNOSIS — G89.29 CHRONIC PAIN OF RIGHT ANKLE: ICD-10-CM

## 2017-10-06 DIAGNOSIS — Z79.899 HIGH RISK MEDICATION USE: ICD-10-CM

## 2017-10-06 DIAGNOSIS — E55.9 VITAMIN D DEFICIENCY: ICD-10-CM

## 2017-10-06 DIAGNOSIS — M25.571 CHRONIC PAIN OF RIGHT ANKLE: ICD-10-CM

## 2017-10-06 DIAGNOSIS — M48.062 SPINAL STENOSIS OF LUMBAR REGION WITH NEUROGENIC CLAUDICATION: ICD-10-CM

## 2017-10-06 DIAGNOSIS — M19.079 OSTEOARTHRITIS OF ANKLE OR FOOT, UNSPECIFIED LATERALITY: ICD-10-CM

## 2017-10-06 PROCEDURE — G8598 ASA/ANTIPLAT THER USED: HCPCS | Performed by: PHYSICAL MEDICINE & REHABILITATION

## 2017-10-06 PROCEDURE — 3014F SCREEN MAMMO DOC REV: CPT | Performed by: PHYSICAL MEDICINE & REHABILITATION

## 2017-10-06 PROCEDURE — G8484 FLU IMMUNIZE NO ADMIN: HCPCS | Performed by: PHYSICAL MEDICINE & REHABILITATION

## 2017-10-06 PROCEDURE — G8427 DOCREV CUR MEDS BY ELIG CLIN: HCPCS | Performed by: PHYSICAL MEDICINE & REHABILITATION

## 2017-10-06 PROCEDURE — 99214 OFFICE O/P EST MOD 30 MIN: CPT | Performed by: PHYSICAL MEDICINE & REHABILITATION

## 2017-10-06 PROCEDURE — 1036F TOBACCO NON-USER: CPT | Performed by: PHYSICAL MEDICINE & REHABILITATION

## 2017-10-06 PROCEDURE — G8417 CALC BMI ABV UP PARAM F/U: HCPCS | Performed by: PHYSICAL MEDICINE & REHABILITATION

## 2017-10-06 PROCEDURE — 3017F COLORECTAL CA SCREEN DOC REV: CPT | Performed by: PHYSICAL MEDICINE & REHABILITATION

## 2017-10-06 RX ORDER — LIDOCAINE 50 MG/G
PATCH TOPICAL
Qty: 90 PATCH | Refills: 3 | Status: SHIPPED | OUTPATIENT
Start: 2017-10-06 | End: 2018-02-28

## 2017-10-06 RX ORDER — LIDOCAINE 50 MG/G
OINTMENT TOPICAL
Qty: 1 TUBE | Refills: 11 | Status: ON HOLD | OUTPATIENT
Start: 2017-10-06 | End: 2018-08-17 | Stop reason: HOSPADM

## 2017-10-06 RX ORDER — HYDROCODONE BITARTRATE AND ACETAMINOPHEN 10; 325 MG/1; MG/1
TABLET ORAL
Qty: 90 TABLET | Refills: 0 | Status: SHIPPED | OUTPATIENT
Start: 2017-10-06 | End: 2017-11-03 | Stop reason: SDUPTHER

## 2017-10-06 ASSESSMENT — ENCOUNTER SYMPTOMS
DIARRHEA: 0
BACK PAIN: 1
EYES NEGATIVE: 1
ALLERGIC/IMMUNOLOGIC NEGATIVE: 1
ABDOMINAL PAIN: 0
CONSTIPATION: 0
VOMITING: 0
NAUSEA: 0
WHEEZING: 0
SHORTNESS OF BREATH: 0
CHEST TIGHTNESS: 0

## 2017-10-06 NOTE — PROGRESS NOTES
Subjective  Yusef Green, 57 y.o. female presents today with:    Back Pain TP injection 08/08/17 gave 75% reduction in pain lasting 4-6 weeks and would like to schedule for more. Hip Pain bilateral    Leg Pain left leg    Shoulder Pain bilateral    Foot Pain left foot    Medication Refill [place], pill count today-compliant       HPI Comments:    She is still doing well with her current medications and shows no signs of abuse or overuse. Her lab test and pill counts are all okay. Back Pain   This is a chronic problem. The current episode started more than 1 year ago. The problem occurs daily. The problem is unchanged. The pain is present in the lumbar spine and gluteal. The quality of the pain is described as aching. Radiates to: bilateral legs, left leg worse, left hip. The pain is at a severity of 4/10. The pain is moderate. The pain is worse during the day. The symptoms are aggravated by bending, standing and position (Walking). Stiffness is present in the morning. Associated symptoms include leg pain, numbness and weakness. Pertinent negatives include no abdominal pain, bladder incontinence, chest pain, fever, headaches, paresis or perianal numbness. Risk factors include obesity, poor posture, menopause and lack of exercise. She has tried analgesics, walking, home exercises and heat (Sciatica block, trigger point injections, mobic, Gabapentin, Norco) for the symptoms. The treatment provided moderate relief.        Past Medical History:   Diagnosis Date    Abnormal electromyogram (EMG) 8/19/09    SENSORIMOTOR NEUROPATHY OF BLE, RIGHT WORSE THAN LEFT, SUSPICIOUS FOR RIGHT S1 RADIC    Ankle pain     Colon polyp 4/3/2017    Dermatitis     Fibromyalgia     Foot pain     Hyperlipidemia     Hypertension     Low back pain     MRSA (methicillin resistant staph aureus) culture positive 2011    Neck pain     Neuropathy ([address]     Obesity     Osteoarthritis     Shingles outbreak 2012    SI exhibits normal muscle tone. Coordination and gait normal. She displays no Babinski's sign on the right side. She displays no Babinski's sign on the left side. Skin: Skin is warm, dry and intact. No abrasion, no bruising, no ecchymosis, no laceration, no petechiae and no rash noted. Rash is not macular, not pustular and not urticarial. She is not diaphoretic. No cyanosis or erythema. No pallor. Nails show no clubbing. Psychiatric: She has a normal mood and affect. Her behavior is normal. Judgment and thought content normal. Her mood appears not anxious. Her affect is not angry, not blunt, not labile and not inappropriate. Her speech is not rapid and/or pressured, not delayed, not tangential and not slurred. She is not agitated, not aggressive, not hyperactive, not slowed, not withdrawn, not actively hallucinating and not combative. Thought content is not paranoid and not delusional. Cognition and memory are normal. Cognition and memory are not impaired. She does not express impulsivity or inappropriate judgment. She does not exhibit a depressed mood. She expresses no homicidal and no suicidal ideation. She expresses no suicidal plans and no homicidal plans. She is communicative. She exhibits normal recent memory and normal remote memory. She is attentive. Vitals reviewed. Ortho Exam   Neurologic Exam     Mental Status   Oriented to person, place, and time. Speech: not slurred   Level of consciousness: alert  Knowledge: good. Able to name object. Able to read. Able to repeat. Able to write. Normal comprehension. Cranial Nerves     CN III, IV, VI   Pupils are equal, round, and reactive to light.   Extraocular motions are normal.     Motor Exam   Muscle bulk: normal  Overall muscle tone: normal        After a thorough review and discussion of the previous medical records, patient comprehensive medical, surgical, and family and social history, Review of Systems, their OARRS, their Screener and Opioid Neuropathy ([address]     Obesity     Osteoarthritis     Shingles outbreak 2012    SI (sacroiliac) pain     Stenosis     Vitamin D deficiency            Given their medication, chronic pain and lifestyle and medications they are at risk for :    Falls, constipation, addiction  Loss of livelyhood due to severe pain, debility, weight gain and  vitamin D deficiency    The patient was educated regarding proper diet, fitness routine, and regulatory restrictions concerning pain medications. Previous notes, comprehensive past medical, surgical, family history, and diagnostics were reviewed. Patient education and councelling were provided regarding off label use,treatment options and medication and injection risks. Current and old OARRS ([place]ated Prescription Reporting System) records reviewed, all refills reviewed since last visit,  Behavioral agreement/[place]ATH[place] regulations   and Toxicology screen was reviewed with patient and is up to date. There are no current red flags. They are making good progress regarding pain relief, they are performing at a functional level regarding activities of daily living and psychological functioning, they're not having any adverse effects or side effects from the current medications, and I see no findings of aberrant drug taking her addiction related behaviors. Attestation: The Prescription Monitoring Report for this patient was reviewed today. (Susan Luna, DO)  Documentation: Possible medication side effects, risk of tolerance and/or dependence, and alternative treatments discussed., Obtaining appropriate analgesic effect of treatment., No signs of potential drug abuse or diversion identified. (Susan Luna, DO)       Patient is currently taking:       I have changed Ms. Hdez's lidocaine and HYDROcodone-acetaminophen. I am also having her start on lidocaine.  Additionally, I am having her maintain her Vitamin D3, Coenzyme Q10 (COQ10 PO), simvastatin, Date    LABAMPH Neg 07/28/2014    BARBSCNU Neg 07/28/2014    LABBENZ Neg 07/28/2014    CANSU Neg 07/28/2014    COCAIMETSCRU Neg 07/28/2014    PHENCYCLIDINESCREENURINE Neg 50/99/9747    TRICYCLIC Neg 61/56/2821    DSCOMMENT see below 07/28/2014       Lab Results   Component Value Date    CODEINE Not Detected 12/02/2016    MORPHINE Not Detected 12/02/2016    Abdirahman [place] Not Detected 12/02/2016    OXYCODONE Not Detected 12/02/2016    NOROXYCODONE Not Detected 12/02/2016    NOROXYMU Not Detected 12/02/2016    HYDRCO Present 12/02/2016    NORHYDU Present 12/02/2016    HYDROMO Not Detected 12/02/2016    Elizabeth Ye Not Detected 12/02/2016    Kathy Quiet Not Detected 12/02/2016    FENTA Not Detected 12/02/2016    NORFENT Not Detected 12/02/2016    MEPERIDINE Not Detected 12/02/2016    TAPENU Not Detected 12/02/2016    TAPOSULFUR Not Detected 12/02/2016    METHADONE Not Detected 12/02/2016    LABPROP Not Detected 12/02/2016    TRAM Not Detected 12/02/2016    AMPH Not Detected 12/02/2016    METHAMP Not Detected 12/02/2016    MDMA Not Detected 12/02/2016    ECMDA Not Detected 12/02/2016       Lab Results   Component Value Date    METPHEN Not Detected 12/02/2016    PHENTERMINE Not Detected 12/02/2016    BENZOYL Not Detected 12/02/2016    Serjio Rachna Not Detected 12/02/2016    ALPHAOHALPRA Not Detected 12/02/2016    CLONAZEPAM Not Detected 12/02/2016    Henrry Wilber Not Detected 12/02/2016    DIAZEP Not Detected 12/02/2016    ROMAIN Not Detected 12/02/2016    OXAZ Not Detected 12/02/2016    Kelli Rosa Not Detected 12/02/2016    LORAZEPAM Not Detected 12/02/2016    MIDAZOLAM Not Detected 12/02/2016    ZOLPIDEM Not Detected 12/02/2016    NAI Not Detected 12/02/2016    ETG Not Detected 12/02/2016    MARIJMET Not Detected 12/02/2016    PCP Not Detected 12/02/2016    PAINMGTDRUGP See Below 12/02/2016    EERPAINMGTPA See Note 12/02/2016    LABCREA 66.5 12/02/2016         , I discussed results with patient.   See follow-up plans for new studies. Therapies:  HEP-gentle stretching and relaxation techniques-demonstrated with patient-they are to do them twice a day. They are also advised to make the following lifestyle changes:  Goals      Exercise 3x per week (30 min per time)      SOAPP-R GOAL LESS THAN 9            09/02/16 SCORE: 8 LOW RISK   02/02/17 score 7-low risk   04/03/17 score: 6-low risk  06/05/17 score: 7-low risk  08/07/17 score: 8-low risk  10/06/17 score: 6-low risk       Weight < 150 lb (68.04 kg)           Injections or Epidurals:  Injection options were discussed-SI and TPs. Patient gave verbal consent to ordered injections. See follow-up plans for planned injections. Supplements:  Vitamin D,   Education was given on:   Dietary and Fitness             Follow up with Primary Care Physician regarding their general medical needs. They are to follow up in 2 months to review medication, efficacy of injections, pill counts, OARRS check, SOAPPR assessment, review diagnostics, to review previous and future treatment plans and assess appropriateness for continued therapy.         New Diagnostics  Orders Placed This Encounter   Procedures    [place] INJECT TRIGGER POINTS, 3 OR GREATER    [place] INJECT TRIGGER POINTS, 3 OR GREATER       Rosaline Peterson,

## 2017-10-06 NOTE — MR AVS SNAPSHOT
can cause tolerance and overdose, physical dependence, addiction, or death. Examples  · Fentanyl patch (Duragesic)  · Methadone (Dolophine)  · Morphine (Beatris)  · Oxycodone controlled-release (OxyContin)  Safety tips  To avoid taking too much (overdose): · Be safe with medicines. Take your medicines exactly as prescribed. Do not take extra doses. Even one extra dose can be dangerous. Taking too much of these medicines can cause death. · Call your doctor if you miss a dose of your medicine and aren't sure what to do. Do not double your dose. · Do not break, crush, or chew a pill. Do not cut or tear a patch. To use these medicines safely:  · Call your doctor if you think you are having a problem with your medicine. You will get more details on the specific medicines your doctor prescribes. · Do not drink alcohol or take illegal drugs. · Do not drive or operate machinery until you can think clearly. Opioids may affect your judgment and decision making. Talk with your doctor about when it is safe to drive. · Keep your medicine in a safe and secure place. Keep it away from children and pets. · Check with your doctor or pharmacist before you use any other medicines. This includes over-the-counter medicines. ¨ Make sure your doctor knows all of the medicines, vitamins, herbal products, and supplements you take. ¨ Do not take opioids with other medicines that make you sleepy or relaxed. Taking both can be dangerous. · Talk to your doctor about a naloxone rescue kit. A kit can help you, and even save your life, if you take too much of an opioid. Possible side effects  All medicines have side effects. But many people don't feel the side effects, or they are able to deal with them. You may:  · Feel confused or have a hard time thinking clearly. · Be constipated. · Feel faint, dizzy, or lightheaded. · Feel drowsy. · Feel sick to your stomach or vomit. · Have an allergic reaction. What to know about taking this medicine  · When you take an opioid regularly, your body gets used to it. This can lead to tolerance and physical dependence. These are not the same as addiction. ¨ Tolerance means that, over time, you may need to take more of the drug to keep getting the same amount of pain relief. The danger is that tolerance greatly increases your risk of overdose, breathing emergencies, and death. ¨ If you are physically dependent on an opioid, you may have withdrawal symptoms when you stop taking it. These include nausea, sweating, chills, diarrhea, and shaking. You can avoid these symptoms if you gradually stop taking the opioid over a set period of time. Your doctor can help you. ¨ Addiction is a chronic illness that makes you crave a substance, such as a drug or alcohol. When you are addicted to a substance, you have a hard time stopping yourself from using it even when you can see it causes harm. · You have a small risk of addiction when you take opioids. Your risk is greater if you have a history of substance use problems. Some people have more problems with opioids. Teenagers, older adults, people who have depression, people who have sleep apnea, and those who take high doses of medicine may have more problems with opioids. · Ask for written instructions from your doctor or pharmacist about how to safely get rid of any medicine that's left over. · Call your doctor if the dose you are taking doesn't control your pain. Where can you learn more? Go to https://ITM [email or web address].[email or web address].com. org and sign in to your Cl[employer] App account. Enter O105 in the [facility] box to learn more about \"Learning About Safe Use of Long-Acting Opioids. \"     If you do not have an account, please click on the \"Sign Up Now\" link. Current as of: February 7, 2017  Content Version: 11.3  © 4827-6673 [employer].  Care instructions adapted under Calcium Carbonate-Vit D-Min (CALCIUM 1200) 7704-7988 MG-UNIT CHEW Take by mouth    Multiple Vitamins-Minerals (MULTIVITAMIN & MINERAL PO) Take by mouth    meloxicam (MOBIC) 7.5 MG tablet Take 7.5 mg by mouth 2 times daily. nitroGLYCERIN (NITROSTAT) 0.4 MG SL tablet Place 0.4 mg under the tongue every 5 minutes as needed for Chest pain. Coenzyme Q10 (COQ10 PO) Take  by mouth daily. simvastatin (ZOCOR) 20 MG tablet Take 20 mg by mouth nightly. Cholecalciferol (VITAMIN D3) 5000 UNIT TABS Take  by mouth three times a week.         Allergies              Latex     Cephalexin     Keflex [Cephalexin] Rash         Additional Information        Basic Information     Date Of Birth Sex Race Ethnicity Preferred Language    1955 Female White Non-/Non  English      Problem List as of 10/6/2017  Date Reviewed: 6/6/2017                High risk medication use - OARRS PM&R 6/2/17, 08/04/17 OARRS PM&R, Tox screen 12/2/16 WNL for [place] PM&R, MED CONTRACT 2/2/17    Chronic low back pain with sciatica    Colon polyp    Hip arthritis    Right hip pain    Acute bilateral low back pain with right-sided sciatica    Osteoarthritis of multiple joints    Abdominal muscle strain    Pain in both feet    Primary osteoarthritis involving multiple joints    Chronic bilateral low back pain with sciatica    Snapping thumb syndrome    Osteoarthritis of carpometacarpal (CMC) joint of thumb    Low back pain with left-sided sciatica    Normal cardiac function test    Angina pectoris (HCC)    Acquired spondylolisthesis    Arthritic-like pain    Mitral valve prolapse    Mitral valve disease    Billowing mitral valve    Arthritis, degenerative    Disorder of mitral valve    Drusen of optic disc    Floater, vitreous    Cataract, nuclear sclerotic senile    Diaphragmatic hernia    Mixed hyperlipidemia    Mitral valve disorder    Screening for cardiovascular condition    Osteoarthrosis Paroxysmal supraventricular tachycardia (HCC)    Plantar fascial fibromatosis    Low back pain with sciatica    Obesity (BMI 30.0-34. 9)    SI (sacroiliac) joint dysfunction    Osteoarthrosis, ankle and foot    Arthritis of ankle or foot, degenerative    Osteoarthritis of ankle or foot    SI (sacroiliac) pain    Vitamin D deficiency    Neck pain    Ankle pain    Spinal stenosis of lumbar region with neurogenic claudication    Sensorineural hearing loss, asymmetrical    Hypertension, benign    Benign essential HTN    Benign essential hypertension      Your Goals as of 10/6/2017 at 11:02 AM              Today    8/7/17 6/5/17       Exercise    Exercise 3x per week (30 min per time)   Not on track  Not on track  Not on track       Lifestyle    SOAPP-R GOAL LESS THAN 9   On track  On track  On track    Notes    09/02/16 SCORE: 8 LOW RISK   02/02/17 score 7-low risk   04/03/17 score: 6-low risk  06/05/17 score: 7-low risk  08/07/17 score: 8-low risk  10/06/17 score: 6-low risk         Weight    Weight < 150 lb (68.04 kg)             Preventive Care        Date Due    Hepatitis C screening is recommended for all adults regardless of risk factors [place] [facility] at least once (lifetime) who have never been tested. 1955    HIV screening is recommended for all people regardless of risk factors  aged 15-65 years at least once (lifetime) who have never been HIV tested. 7/3/1968    Tetanus Combination Vaccine (1 - Tdap) 7/3/1972    Pap Smear 7/3/1974    Mammograms are recommended every 2 years for low/average risk patients aged 48 - 69, and every year for high risk patients per updated national guidelines. However these guidelines can be individualized by your provider. 7/3/2003    Zoster Vaccine 7/3/2013    Yearly Flu Vaccine (1) 9/1/2017    Cholesterol Screening 3/20/2021    Colonoscopy 4/12/2027            MyChart Signup           Our records indicate that you have declined MyChart signup.

## 2017-10-06 NOTE — PATIENT INSTRUCTIONS
Learning About Prescribed Opioid Medicine for Chronic Pain  Introduction  Opioids are medicines used to relieve moderate to severe pain. They may be used for pain that may go on for a long time, such as for cancer or arthritis. Opioids relieve pain by changing the way your body feels pain and the way you feel about pain. They don't cure a health problem. But they do help you manage the pain. Your doctor will talk with you about how they fit into your overall treatment plan. Doctors follow a strict set of rules for giving opioids to their patients who have long-term (chronic) pain. This is because these are powerful medicines. They can cause problems if they are not used correctly. They can also be misused. Getting a prescription for an opioid may seem hard to do. You may feel like your doctor doesn't trust you. Your doctor understands this. But the rules are the same for everyone. They help make sure that the medicine will be used safely. What happens before you get a prescription? Your doctor may talk with you about your pain history. He or she may ask about your family history. You may have a physical exam. You may also see a pain specialist. This helps your doctor decide if an opioid is right for you. Your doctor will also review your history of opioid use. Most states have a program that tracks prescriptions. If your state does, your doctor will check it to see if you've had prescriptions for opioids before. You may have a urine test to check for opioids in your system. If you're a woman, you may have a pregnancy test. Opioids are not safe to take if you are pregnant. When your doctor prescribes the medicine, he or she will discuss your treatment plan with you. That includes the risks and benefits of opioid medicines. Your doctor will tell you how much pain relief and improved ability to function you can expect.  The goal is to take the lowest dose that improves your pain for the shortest amount of who take high doses of medicine may have more problems with opioids. · Ask for written instructions from your doctor or pharmacist about how to safely get rid of any medicine that's left over. · Call your doctor if the dose you are taking doesn't control your pain. Where can you learn more? Go to https://chpepiceweb.[employer]. org and sign in to your [employer] account. Enter O105 in the [employer] box to learn more about \"Learning About Safe Use of Long-Acting Opioids. \"     If you do not have an account, please click on the \"Sign Up Now\" link. Current as of: February 7, 2017  Content Version: 11.3  © 7496-3371 [employer]. Care instructions adapted under license by [facility] ([facility]). If you have questions about a medical condition or this instruction, always ask your healthcare professional. [address] any warranty or liability for your use of this information.

## 2017-10-12 ENCOUNTER — PROCEDURE VISIT (OUTPATIENT)
Dept: PHYSICAL MEDICINE AND REHAB | Age: 62
End: 2017-10-12

## 2017-10-12 DIAGNOSIS — M79.10 MYALGIA: ICD-10-CM

## 2017-10-12 DIAGNOSIS — M79.7 FIBROMYALGIA: Primary | ICD-10-CM

## 2017-10-12 PROCEDURE — 20553 NJX 1/MLT TRIGGER POINTS 3/>: CPT | Performed by: PHYSICAL MEDICINE & REHABILITATION

## 2017-10-18 ENCOUNTER — OFFICE VISIT (OUTPATIENT)
Dept: PHYSICAL MEDICINE AND REHAB | Age: 62
End: 2017-10-18

## 2017-10-18 VITALS
BODY MASS INDEX: 32.37 KG/M2 | WEIGHT: 213.6 LBS | DIASTOLIC BLOOD PRESSURE: 62 MMHG | SYSTOLIC BLOOD PRESSURE: 126 MMHG | HEIGHT: 68 IN

## 2017-10-18 DIAGNOSIS — M51.36 DDD (DEGENERATIVE DISC DISEASE), LUMBAR: ICD-10-CM

## 2017-10-18 DIAGNOSIS — M15.9 PRIMARY OSTEOARTHRITIS INVOLVING MULTIPLE JOINTS: ICD-10-CM

## 2017-10-18 DIAGNOSIS — M79.604 LOW BACK PAIN RADIATING TO RIGHT LOWER EXTREMITY: ICD-10-CM

## 2017-10-18 DIAGNOSIS — Z79.899 HIGH RISK MEDICATION USE: ICD-10-CM

## 2017-10-18 DIAGNOSIS — M54.2 NECK PAIN: ICD-10-CM

## 2017-10-18 DIAGNOSIS — M54.50 LOW BACK PAIN RADIATING TO RIGHT LOWER EXTREMITY: ICD-10-CM

## 2017-10-18 DIAGNOSIS — M53.3 SI (SACROILIAC) PAIN: Primary | ICD-10-CM

## 2017-10-18 DIAGNOSIS — M54.42 ACUTE MIDLINE LOW BACK PAIN WITH LEFT-SIDED SCIATICA: ICD-10-CM

## 2017-10-18 PROCEDURE — G8598 ASA/ANTIPLAT THER USED: HCPCS | Performed by: PHYSICAL MEDICINE & REHABILITATION

## 2017-10-18 PROCEDURE — G8484 FLU IMMUNIZE NO ADMIN: HCPCS | Performed by: PHYSICAL MEDICINE & REHABILITATION

## 2017-10-18 PROCEDURE — G8428 CUR MEDS NOT DOCUMENT: HCPCS | Performed by: PHYSICAL MEDICINE & REHABILITATION

## 2017-10-18 PROCEDURE — 1036F TOBACCO NON-USER: CPT | Performed by: PHYSICAL MEDICINE & REHABILITATION

## 2017-10-18 PROCEDURE — 3017F COLORECTAL CA SCREEN DOC REV: CPT | Performed by: PHYSICAL MEDICINE & REHABILITATION

## 2017-10-18 PROCEDURE — 99213 OFFICE O/P EST LOW 20 MIN: CPT | Performed by: PHYSICAL MEDICINE & REHABILITATION

## 2017-10-18 PROCEDURE — G8417 CALC BMI ABV UP PARAM F/U: HCPCS | Performed by: PHYSICAL MEDICINE & REHABILITATION

## 2017-10-18 PROCEDURE — 3014F SCREEN MAMMO DOC REV: CPT | Performed by: PHYSICAL MEDICINE & REHABILITATION

## 2017-10-18 RX ORDER — CLOBETASOL PROPIONATE 0.05 G/100ML
SHAMPOO TOPICAL
COMMUNITY
Start: 2017-10-11 | End: 2017-10-18 | Stop reason: SDUPTHER

## 2017-10-18 RX ORDER — CLOBETASOL PROPIONATE 0.5 MG/G
AEROSOL, FOAM TOPICAL
COMMUNITY
Start: 2017-10-12 | End: 2018-07-03 | Stop reason: ALTCHOICE

## 2017-10-18 ASSESSMENT — ENCOUNTER SYMPTOMS
ABDOMINAL PAIN: 0
CONSTIPATION: 0
EYES NEGATIVE: 1
DIARRHEA: 0
VOMITING: 0
WHEEZING: 0
NAUSEA: 0
ALLERGIC/IMMUNOLOGIC NEGATIVE: 1
CHEST TIGHTNESS: 0
SHORTNESS OF BREATH: 0
BACK PAIN: 1

## 2017-10-18 NOTE — PROGRESS NOTES
Positive for dizziness, weakness and numbness. Negative for light-headedness and headaches. Hematological: Negative. Psychiatric/Behavioral: Positive for sleep disturbance. Negative for dysphoric mood. The patient is nervous/anxious. Objective    Vitals:    10/18/17 1310   BP: 126/62   Weight: 213 lb 9.6 oz (96.9 kg)   Height: 5' 8\" (1.727 m)     Pain Score: Four (with medication)     Physical Exam   Constitutional: She is oriented to person, place, and time. Vital signs are normal. She appears well-developed. Non-toxic appearance. She does not have a sickly appearance. She does not appear ill. No distress. HENT:   Head: Normocephalic and atraumatic. Right Ear: Hearing normal.   Left Ear: Hearing normal.   Nose: Nose normal.   Mouth/Throat: Oropharynx is clear and moist and mucous membranes are normal. No oral lesions. Normal dentition. No oropharyngeal exudate. No lesion   Eyes: Conjunctivae and EOM are normal. Pupils are equal, round, and reactive to light. Right eye exhibits no chemosis, no discharge and no exudate. Left eye exhibits no chemosis, no discharge and no exudate. No scleral icterus. Neck: Normal range of motion. Neck supple. No JVD present. No neck rigidity. No tracheal deviation and no edema present. No thyromegaly present. Cardiovascular: Intact distal pulses. Exam reveals no decreased pulses. Pulmonary/Chest: Effort normal. No accessory muscle usage. No apnea, no tachypnea and no bradypnea. No respiratory distress. She has no wheezes. She exhibits no tenderness. Abdominal: Soft. Bowel sounds are normal. She exhibits no distension and no mass. There is no tenderness. There is no rebound and no guarding. Pressure RUQ   Musculoskeletal: She exhibits tenderness. She exhibits no edema. Right shoulder: She exhibits decreased range of motion, tenderness and bony tenderness.         Left shoulder: She exhibits decreased range of motion, tenderness and bony tenderness. Right elbow: Normal.       Left elbow: Normal.        Right wrist: Normal.        Left wrist: Normal.        Right hip: She exhibits decreased range of motion, decreased strength and tenderness. Left hip: She exhibits decreased range of motion, decreased strength and tenderness. Right knee: Normal.        Left knee: Normal.        Right ankle: Normal. Achilles tendon normal.        Left ankle: Normal. Achilles tendon normal.        Cervical back: She exhibits decreased range of motion, tenderness and bony tenderness. Thoracic back: She exhibits decreased range of motion, tenderness and bony tenderness. Lumbar back: She exhibits decreased range of motion, tenderness, bony tenderness and pain. She exhibits no swelling, no edema, no deformity, no laceration and normal pulse. Back:         Right upper arm: Normal.        Left upper arm: Normal.        Right forearm: Normal.        Left forearm: Normal.        Arms:       Right hand: She exhibits decreased range of motion, tenderness, bony tenderness and deformity. Left hand: She exhibits decreased range of motion, tenderness, bony tenderness and deformity. Right upper leg: Normal.        Left upper leg: Normal.        Right lower leg: Normal.        Left lower leg: Normal.        Legs:       Right foot: There is decreased range of motion, tenderness and bony tenderness. Left foot: There is decreased range of motion and tenderness. Tender areas are indicated by numbered spot         Lymphadenopathy:     She has no cervical adenopathy. Neurological: She is alert and oriented to person, place, and time. She displays abnormal reflex. She displays no atrophy and no tremor. No cranial nerve deficit or sensory deficit. She exhibits normal muscle tone. Coordination and gait normal. She displays no Babinski's sign on the right side. She displays no Babinski's sign on the left side.    Skin: Skin is right lower extremity     3. Acute midline low back pain with left-sided sciatica     4. Neck pain     5. High risk medication use - 08/04/17 OARRS PM&R, 10/17/17 OARRS PM&R, Tox screen 12/2/16 WNL for [place] PM&R, MED CONTRACT 2/2/17     6. Primary osteoarthritis involving multiple joints     7. DDD (degenerative disc disease), lumbar  OR NJX DX/THER SBST INTRLMNR LMBR/SAC W/IMG GDN       I am also concerned by lifestyle and mood issues including:    Past Medical History:   Diagnosis Date    Abnormal electromyogram (EMG) 8/19/09    SENSORIMOTOR NEUROPATHY OF BLE, RIGHT WORSE THAN LEFT, SUSPICIOUS FOR RIGHT S1 RADIC    Ankle pain     Colon polyp 4/3/2017    Dermatitis     Fibromyalgia     Foot pain     Hyperlipidemia     Hypertension     Low back pain     MRSA (methicillin resistant staph aureus) culture positive 2011    Neck pain     Neuropathy ([address]     Obesity     Osteoarthritis     Shingles outbreak 2012    SI (sacroiliac) pain     Stenosis     Vitamin D deficiency            Given their medication, chronic pain and lifestyle and medications they are at risk for :    Falls, constipation, addiction  Loss of livelyhood due to severe pain, debility, weight gain and  vitamin D deficiency    The patient was educated regarding proper diet, fitness routine, and regulatory restrictions concerning pain medications. Previous notes, comprehensive past medical, surgical, family history, and diagnostics were reviewed. Patient education and councelling were provided regarding off label use,treatment options and medication and injection risks. Current and old OARRS ([place] Automated Prescription Reporting System) records reviewed, all refills reviewed since last visit,  Behavioral agreement/[place] regulations   and Toxicology screen was reviewed with patient and is up to date. There are no current red flags.    They are making good progress regarding pain relief, they are performing at a functional on:   Dietary and Fitness             Follow up with Primary Care Physician regarding their general medical needs. They are to follow up in 2 months to review medication, efficacy of injections, pill counts, OARRS check, SOAPPR assessment, review diagnostics, to review previous and future treatment plans and assess appropriateness for continued therapy.         New Diagnostics  Orders Placed This Encounter   Procedures    MS NJX DX/THER SBST INTRLMNR LMBR/SAC W/IMG GDTHAD Peterson, DO

## 2017-11-03 DIAGNOSIS — G89.29 CHRONIC PAIN OF RIGHT ANKLE: ICD-10-CM

## 2017-11-03 DIAGNOSIS — Z79.899 HIGH RISK MEDICATION USE: Chronic | ICD-10-CM

## 2017-11-03 DIAGNOSIS — M54.2 NECK PAIN: ICD-10-CM

## 2017-11-03 DIAGNOSIS — M25.571 CHRONIC PAIN OF RIGHT ANKLE: ICD-10-CM

## 2017-11-03 DIAGNOSIS — M54.41 ACUTE BILATERAL LOW BACK PAIN WITH RIGHT-SIDED SCIATICA: ICD-10-CM

## 2017-11-03 RX ORDER — GABAPENTIN 100 MG/1
300 CAPSULE ORAL NIGHTLY
Qty: 270 CAPSULE | Refills: 1 | Status: SHIPPED | OUTPATIENT
Start: 2017-11-03 | End: 2018-02-19 | Stop reason: SDUPTHER

## 2017-11-03 RX ORDER — HYDROCODONE BITARTRATE AND ACETAMINOPHEN 10; 325 MG/1; MG/1
TABLET ORAL
Qty: 90 TABLET | Refills: 0 | Status: SHIPPED | OUTPATIENT
Start: 2017-11-09 | End: 2017-11-28 | Stop reason: SDUPTHER

## 2017-11-07 ENCOUNTER — TELEPHONE (OUTPATIENT)
Dept: GASTROENTEROLOGY | Age: 62
End: 2017-11-07

## 2017-11-07 ENCOUNTER — HOSPITAL ENCOUNTER (OUTPATIENT)
Dept: INTERVENTIONAL RADIOLOGY/VASCULAR | Age: 62
Discharge: HOME | End: 2017-11-07
Payer: COMMERCIAL

## 2017-11-07 VITALS
SYSTOLIC BLOOD PRESSURE: 166 MMHG | HEART RATE: 92 BPM | DIASTOLIC BLOOD PRESSURE: 74 MMHG | OXYGEN SATURATION: 100 % | RESPIRATION RATE: 16 BRPM

## 2017-11-07 DIAGNOSIS — M51.36 DDD (DEGENERATIVE DISC DISEASE), LUMBAR: ICD-10-CM

## 2017-11-07 PROCEDURE — 6360000002 HC RX W HCPCS: Performed by: PHYSICAL MEDICINE & REHABILITATION

## 2017-11-07 PROCEDURE — 6360000004 HC RX CONTRAST MEDICATION: Performed by: PHYSICAL MEDICINE & REHABILITATION

## 2017-11-07 PROCEDURE — 62323 NJX INTERLAMINAR LMBR/SAC: CPT | Performed by: PHYSICAL MEDICINE & REHABILITATION

## 2017-11-07 PROCEDURE — 2500000003 HC RX 250 WO HCPCS: Performed by: PHYSICAL MEDICINE & REHABILITATION

## 2017-11-07 RX ORDER — METHYLPREDNISOLONE ACETATE 40 MG/ML
40 INJECTION, SUSPENSION INTRA-ARTICULAR; INTRALESIONAL; INTRAMUSCULAR; SOFT TISSUE ONCE
Status: COMPLETED | OUTPATIENT
Start: 2017-11-07 | End: 2017-11-07

## 2017-11-07 RX ORDER — LIDOCAINE HYDROCHLORIDE 5 MG/ML
50 INJECTION, SOLUTION INFILTRATION; INTRAVENOUS ONCE
Status: COMPLETED | OUTPATIENT
Start: 2017-11-07 | End: 2017-11-07

## 2017-11-07 RX ADMIN — IOPAMIDOL: 408 INJECTION, SOLUTION INTRATHECAL at 12:05

## 2017-11-07 RX ADMIN — LIDOCAINE HYDROCHLORIDE 50 ML: 5 INJECTION, SOLUTION INFILTRATION at 12:03

## 2017-11-07 RX ADMIN — METHYLPREDNISOLONE ACETATE 40 MG: 40 INJECTION, SUSPENSION INTRA-ARTICULAR; INTRALESIONAL; INTRAMUSCULAR; SOFT TISSUE at 12:05

## 2017-11-07 ASSESSMENT — PAIN DESCRIPTION - DESCRIPTORS: DESCRIPTORS: ACHING;BURNING;NUMBNESS;TINGLING

## 2017-11-07 ASSESSMENT — PAIN - FUNCTIONAL ASSESSMENT: PAIN_FUNCTIONAL_ASSESSMENT: 0-10

## 2017-11-07 ASSESSMENT — PAIN SCALES - GENERAL: PAINLEVEL_OUTOF10: 0

## 2017-11-07 NOTE — PROGRESS NOTES
Patient has history of difficulty with her balance. Plan to discharge via wheelchair for her safety. Discharge instructions reviewed with good understanding verbalized. Patient encouraged to take it easy today. Informed to exaggerate, \"march\" when climbing steps due to the heaviness that is expected in her lower legs for up to four hours. Patient was able to stated the signs of infection to watch for and informed to contact  or report to the ER if needed.

## 2017-11-07 NOTE — TELEPHONE ENCOUNTER
Patient called regarding MRI she is scheduled for next week. Patient states she is very anxious and would like something to calm her down prior to the x-ray. Per Dr. Jorge Mccurdy 1 mg to be taken 1 hour prior to the x-ray. One tablet called to pharmacy with no refills. Patient notified.

## 2017-11-07 NOTE — PROCEDURES
OPERATIVE REPORT      DATE OF PROCEDURE: 11/7/2017    PREOPERATIVE DIAGNOSIS:   lumbar DDD    POSTOPERATIVE DIAGNOSIS:   Same. OPERATIVE PROCEDURE:  Caudal epidural steroid injection with fluoroscopy and epidurogram.    PROCEDURE:  The patient taken to the special procedures department and placed in a prone position. The sacral hiatus was identified and marked and the sacral region was then prepped and draped in the usual fashion. Local anesthetic of 0.5% Xylocaine was injected to form a skin wheal and then was injected to the depth of the sacrococcygeal membrane. At this point a C-arm  film was taken to verify for correct approach of the needle. The needle was then withdrawn. A #22 gauge 1-1/2 inch needle was inserted through the coccysacrogeal membrane into the epidural space using loss of resistance technique with air. After negative aspiration was confirmed, approximately 3ml of Isovue 200 was used to verify needle tip location in the caudal epidural space. Distribution of the dye was observed and a normal appearing epidurogram was noted. The dye syringe was then removed. A mixture of 40mg of Depo-Medrol and 0.5% Lidocaine preservative free totaling 12cc was injected into the epidural space. The needle was aspirated prior to and during this injection several times to verify again that the needle was outside the intravascular or subdural regions. The needle was then withdrawn. The patient tolerated the procedure well and was taken to the post-anesthesia care unit in stable condition. The patient was instructed to call our office the following business day for follow-up instructions and to notify us immediately if they were having any difficulties after the the injection.       Ana Vaz D.O.

## 2017-11-07 NOTE — SEDATION DOCUMENTATION
Patient was brought to Special Procedures suite via wheelchair. Patient onto procedure table prone with minimal assistance. Placed on vitals monitor. Caudal site prepped with Iodine and Hibiclense, draped and numbed with lidocaine. Needle placement verifed with fluoro guidance and contrast injection. All prep and medications given by Dr Delonte Arita. Patient tolerated well. Patient will return to the CT holding room.

## 2017-11-08 RX ORDER — LORAZEPAM 1 MG/1
1 TABLET ORAL SEE ADMIN INSTRUCTIONS
Qty: 1 TABLET | Refills: 0 | Status: SHIPPED | OUTPATIENT
Start: 2017-11-08 | End: 2017-11-28 | Stop reason: ALTCHOICE

## 2017-11-14 ENCOUNTER — HOSPITAL ENCOUNTER (OUTPATIENT)
Dept: MRI IMAGING | Age: 62
Discharge: HOME | End: 2017-11-14
Payer: COMMERCIAL

## 2017-11-14 DIAGNOSIS — R93.5 ABNORMAL CT OF THE ABDOMEN: ICD-10-CM

## 2017-11-14 PROCEDURE — A9577 INJ MULTIHANCE: HCPCS | Performed by: INTERNAL MEDICINE

## 2017-11-14 PROCEDURE — 74183 MRI ABD W/O CNTR FLWD CNTR: CPT

## 2017-11-14 PROCEDURE — 6360000004 HC RX CONTRAST MEDICATION: Performed by: INTERNAL MEDICINE

## 2017-11-14 RX ORDER — SODIUM CHLORIDE 0.9 % (FLUSH) 0.9 %
30 SYRINGE (ML) INJECTION PRN
Status: DISCONTINUED | OUTPATIENT
Start: 2017-11-14 | End: 2017-11-17 | Stop reason: HOSPADM

## 2017-11-14 RX ADMIN — GADOBENATE DIMEGLUMINE 20 ML: 529 INJECTION, SOLUTION INTRAVENOUS at 10:38

## 2017-11-16 ENCOUNTER — OFFICE VISIT (OUTPATIENT)
Dept: GASTROENTEROLOGY | Age: 62
End: 2017-11-16

## 2017-11-16 VITALS
SYSTOLIC BLOOD PRESSURE: 145 MMHG | BODY MASS INDEX: 33.15 KG/M2 | DIASTOLIC BLOOD PRESSURE: 74 MMHG | WEIGHT: 218 LBS | HEART RATE: 67 BPM

## 2017-11-16 DIAGNOSIS — R10.9 TRIGGER POINT OF ABDOMEN: Primary | ICD-10-CM

## 2017-11-16 DIAGNOSIS — R10.11 RUQ PAIN: ICD-10-CM

## 2017-11-16 PROCEDURE — G8598 ASA/ANTIPLAT THER USED: HCPCS | Performed by: INTERNAL MEDICINE

## 2017-11-16 PROCEDURE — 3014F SCREEN MAMMO DOC REV: CPT | Performed by: INTERNAL MEDICINE

## 2017-11-16 PROCEDURE — 3017F COLORECTAL CA SCREEN DOC REV: CPT | Performed by: INTERNAL MEDICINE

## 2017-11-16 PROCEDURE — G8427 DOCREV CUR MEDS BY ELIG CLIN: HCPCS | Performed by: INTERNAL MEDICINE

## 2017-11-16 PROCEDURE — 20552 NJX 1/MLT TRIGGER POINT 1/2: CPT | Performed by: INTERNAL MEDICINE

## 2017-11-16 PROCEDURE — 99213 OFFICE O/P EST LOW 20 MIN: CPT | Performed by: INTERNAL MEDICINE

## 2017-11-16 PROCEDURE — G8417 CALC BMI ABV UP PARAM F/U: HCPCS | Performed by: INTERNAL MEDICINE

## 2017-11-16 PROCEDURE — 1036F TOBACCO NON-USER: CPT | Performed by: INTERNAL MEDICINE

## 2017-11-16 PROCEDURE — G8484 FLU IMMUNIZE NO ADMIN: HCPCS | Performed by: INTERNAL MEDICINE

## 2017-11-16 RX ORDER — DIAZEPAM 5 MG/1
TABLET ORAL
Qty: 2 TABLET | Refills: 0 | OUTPATIENT
Start: 2017-11-16 | End: 2017-11-28 | Stop reason: ALTCHOICE

## 2017-11-16 RX ORDER — TRIAMCINOLONE ACETONIDE 40 MG/ML
40 INJECTION, SUSPENSION INTRA-ARTICULAR; INTRAMUSCULAR ONCE
Status: COMPLETED | OUTPATIENT
Start: 2017-11-16 | End: 2017-11-16

## 2017-11-16 RX ORDER — LIDOCAINE HYDROCHLORIDE 10 MG/ML
5 INJECTION, SOLUTION INFILTRATION; PERINEURAL ONCE
Status: COMPLETED | OUTPATIENT
Start: 2017-11-16 | End: 2017-11-16

## 2017-11-16 RX ADMIN — LIDOCAINE HYDROCHLORIDE 5 ML: 10 INJECTION, SOLUTION INFILTRATION; PERINEURAL at 11:10

## 2017-11-16 RX ADMIN — TRIAMCINOLONE ACETONIDE 40 MG: 40 INJECTION, SUSPENSION INTRA-ARTICULAR; INTRAMUSCULAR at 11:11

## 2017-11-17 RX ORDER — METHYLPREDNISOLONE ACETATE 40 MG/ML
40 INJECTION, SUSPENSION INTRA-ARTICULAR; INTRALESIONAL; INTRAMUSCULAR; SOFT TISSUE ONCE
Status: CANCELLED | OUTPATIENT
Start: 2017-11-17 | End: 2017-11-17

## 2017-11-17 RX ORDER — LIDOCAINE HYDROCHLORIDE 5 MG/ML
50 INJECTION, SOLUTION INFILTRATION; INTRAVENOUS ONCE
Status: CANCELLED | OUTPATIENT
Start: 2017-11-17 | End: 2017-11-17

## 2017-11-21 ENCOUNTER — HOSPITAL ENCOUNTER (OUTPATIENT)
Dept: INTERVENTIONAL RADIOLOGY/VASCULAR | Age: 62
Discharge: HOME | End: 2017-11-21
Payer: COMMERCIAL

## 2017-11-21 VITALS
DIASTOLIC BLOOD PRESSURE: 87 MMHG | OXYGEN SATURATION: 98 % | SYSTOLIC BLOOD PRESSURE: 186 MMHG | HEART RATE: 69 BPM | RESPIRATION RATE: 16 BRPM

## 2017-11-21 DIAGNOSIS — M51.36 DDD (DEGENERATIVE DISC DISEASE), LUMBAR: ICD-10-CM

## 2017-11-21 PROCEDURE — 2500000003 HC RX 250 WO HCPCS: Performed by: PHYSICAL MEDICINE & REHABILITATION

## 2017-11-21 PROCEDURE — 62323 NJX INTERLAMINAR LMBR/SAC: CPT | Performed by: PHYSICAL MEDICINE & REHABILITATION

## 2017-11-21 PROCEDURE — 6360000004 HC RX CONTRAST MEDICATION: Performed by: PHYSICAL MEDICINE & REHABILITATION

## 2017-11-21 PROCEDURE — 6360000002 HC RX W HCPCS: Performed by: PHYSICAL MEDICINE & REHABILITATION

## 2017-11-21 RX ORDER — METHYLPREDNISOLONE ACETATE 40 MG/ML
40 INJECTION, SUSPENSION INTRA-ARTICULAR; INTRALESIONAL; INTRAMUSCULAR; SOFT TISSUE ONCE
Status: COMPLETED | OUTPATIENT
Start: 2017-11-21 | End: 2017-11-21

## 2017-11-21 RX ORDER — LIDOCAINE HYDROCHLORIDE 5 MG/ML
50 INJECTION, SOLUTION INFILTRATION; INTRAVENOUS ONCE
Status: COMPLETED | OUTPATIENT
Start: 2017-11-21 | End: 2017-11-21

## 2017-11-21 RX ADMIN — IOPAMIDOL 3 ML: 408 INJECTION, SOLUTION INTRATHECAL at 10:26

## 2017-11-21 RX ADMIN — LIDOCAINE HYDROCHLORIDE 18 ML: 5 INJECTION, SOLUTION INFILTRATION at 10:24

## 2017-11-21 RX ADMIN — METHYLPREDNISOLONE ACETATE 40 MG: 40 INJECTION, SUSPENSION INTRA-ARTICULAR; INTRALESIONAL; INTRAMUSCULAR; SOFT TISSUE at 10:26

## 2017-11-21 ASSESSMENT — PAIN SCALES - GENERAL: PAINLEVEL_OUTOF10: 4

## 2017-11-21 ASSESSMENT — PAIN DESCRIPTION - ONSET: ONSET: ON-GOING

## 2017-11-21 ASSESSMENT — PAIN DESCRIPTION - PROGRESSION: CLINICAL_PROGRESSION: NOT CHANGED

## 2017-11-21 ASSESSMENT — PAIN DESCRIPTION - PAIN TYPE: TYPE: CHRONIC PAIN

## 2017-11-21 ASSESSMENT — PAIN DESCRIPTION - LOCATION: LOCATION: BACK

## 2017-11-21 ASSESSMENT — PAIN DESCRIPTION - ORIENTATION: ORIENTATION: LOWER

## 2017-11-21 ASSESSMENT — PAIN DESCRIPTION - DESCRIPTORS: DESCRIPTORS: ACHING;DISCOMFORT;PENETRATING

## 2017-11-21 ASSESSMENT — PAIN DESCRIPTION - FREQUENCY: FREQUENCY: CONTINUOUS

## 2017-11-21 NOTE — SEDATION DOCUMENTATION
Patient was brought to Special Procedures suite via wheelchair. Patient onto procedure table prone with minimal assistance. Placed on vitals monitor. Caudal site prepped with Iodine and Hibiclense, draped and numbed with lidocaine. Needle placement verifed with fluoro guidance and contrast injection. All prep and medications given by Dr Pa Ty. Patient tolerated well. Patient will return to the CT holding room.

## 2017-11-21 NOTE — PROGRESS NOTES
Patient now in the CT holding room for observation and discharge instructions. Patient able to dress herself with minimal assistance. Complains of numbness in her coccyx region, denies pain a the caudal site. Movement and sensation intact to lower extremities. Patient reminded to take it easy today and to expect her legs to be heavy for up to four hours. Patient encouraged to watch the site for the next 48 hours for signs of infection.

## 2017-11-28 ENCOUNTER — OFFICE VISIT (OUTPATIENT)
Dept: INTERNAL MEDICINE | Age: 62
End: 2017-11-28

## 2017-11-28 VITALS
HEART RATE: 73 BPM | SYSTOLIC BLOOD PRESSURE: 136 MMHG | BODY MASS INDEX: 33.22 KG/M2 | WEIGHT: 219.2 LBS | TEMPERATURE: 97.9 F | DIASTOLIC BLOOD PRESSURE: 68 MMHG | RESPIRATION RATE: 16 BRPM | OXYGEN SATURATION: 95 % | HEIGHT: 68 IN

## 2017-11-28 DIAGNOSIS — R10.11 RIGHT UPPER QUADRANT ABDOMINAL PAIN: ICD-10-CM

## 2017-11-28 DIAGNOSIS — Z11.4 ENCOUNTER FOR SCREENING FOR HIV: ICD-10-CM

## 2017-11-28 DIAGNOSIS — M43.10 ACQUIRED SPONDYLOLISTHESIS: ICD-10-CM

## 2017-11-28 DIAGNOSIS — M15.9 PRIMARY OSTEOARTHRITIS INVOLVING MULTIPLE JOINTS: Primary | ICD-10-CM

## 2017-11-28 DIAGNOSIS — E78.2 MIXED HYPERLIPIDEMIA: ICD-10-CM

## 2017-11-28 DIAGNOSIS — Z11.59 NEED FOR HEPATITIS C SCREENING TEST: ICD-10-CM

## 2017-11-28 PROCEDURE — G8427 DOCREV CUR MEDS BY ELIG CLIN: HCPCS | Performed by: FAMILY MEDICINE

## 2017-11-28 PROCEDURE — G8598 ASA/ANTIPLAT THER USED: HCPCS | Performed by: FAMILY MEDICINE

## 2017-11-28 PROCEDURE — 3014F SCREEN MAMMO DOC REV: CPT | Performed by: FAMILY MEDICINE

## 2017-11-28 PROCEDURE — 1036F TOBACCO NON-USER: CPT | Performed by: FAMILY MEDICINE

## 2017-11-28 PROCEDURE — G8482 FLU IMMUNIZE ORDER/ADMIN: HCPCS | Performed by: FAMILY MEDICINE

## 2017-11-28 PROCEDURE — G8417 CALC BMI ABV UP PARAM F/U: HCPCS | Performed by: FAMILY MEDICINE

## 2017-11-28 PROCEDURE — 3017F COLORECTAL CA SCREEN DOC REV: CPT | Performed by: FAMILY MEDICINE

## 2017-11-28 PROCEDURE — 99204 OFFICE O/P NEW MOD 45 MIN: CPT | Performed by: FAMILY MEDICINE

## 2017-11-28 RX ORDER — ATORVASTATIN CALCIUM 10 MG/1
10 TABLET, FILM COATED ORAL NIGHTLY
Qty: 30 TABLET | Refills: 5 | Status: SHIPPED | OUTPATIENT
Start: 2017-11-28 | End: 2018-02-28 | Stop reason: SDUPTHER

## 2017-11-28 ASSESSMENT — PATIENT HEALTH QUESTIONNAIRE - PHQ9
2. FEELING DOWN, DEPRESSED OR HOPELESS: 0
1. LITTLE INTEREST OR PLEASURE IN DOING THINGS: 0
SUM OF ALL RESPONSES TO PHQ QUESTIONS 1-9: 0
SUM OF ALL RESPONSES TO PHQ9 QUESTIONS 1 & 2: 0

## 2017-11-28 NOTE — PROGRESS NOTES
Patient: Larry Catalan    YOB: 1955    Date: 11/28/17    Chief Complaint   Patient presents with   [address]     Former patient of Dr. Rafi Park. She is here today to establish care. She is scheduled to have a mammogram tomorrow at [facility].  Cerumen Impaction     Bilateral ears. Patient Active Problem List    Diagnosis Date Noted    High risk medication use - 08/04/17 OARRS PM&R, 10/17/17 OARRS PM&R, Tox screen 12/2/16 WNL for [place] PM&R, MED CONTRACT 2/2/17 04/17/2013     Priority: High    Chronic low back pain with sciatica 10/06/2017    Colon polyp 04/03/2017    Hip arthritis 04/03/2017    Right hip pain 03/08/2017     Overview Note:     03/08/17 X-ray Right Hip:  BONES INTACT.  DIFFUSE MODERATE DEGENERATIVE CHANGES NOTED      Acute bilateral low back pain with right-sided sciatica 03/03/2017    Osteoarthritis of multiple joints 02/02/2017    Abdominal muscle strain 02/02/2017    Pain in both feet 09/02/2016    Primary osteoarthritis involving multiple joints 09/02/2016    Chronic bilateral low back pain with sciatica 09/02/2016    Snapping thumb syndrome 06/27/2016    Osteoarthritis of carpometacarpal (CMC) joint of thumb 06/27/2016    Low back pain with left-sided sciatica 06/01/2016    Normal cardiac function test 03/02/2016     Overview Note:     Overview:   4/2009 Normal lexiscan perfusion with 78% EF      Angina pectoris ([address] 03/02/2016    Acquired spondylolisthesis 08/25/2015    Arthritic-like pain 02/23/2015    Mitral valve prolapse 02/23/2015     Overview Note:     Overview:   4/2009 Trivial prolapse with no significant regurgitation on echo 4/09  Overview:   4/2009 Trivial prolapse with no significant regurgitation on echo 4/09  Overview:   4/2009 Trivial prolapse with no significant regurgitation on echo 4/09      Mitral valve disease 02/23/2015     Overview Note:     Overview:   prolapse, needs abx per pt  Overview:   prolapse, needs abx per pt Low back pain with sciatica 07/28/2014    Obesity (BMI 30.0-34.9) 10/25/2013    SI (sacroiliac) joint dysfunction 08/26/2013    Osteoarthrosis, ankle and foot 06/05/2013    Arthritis of ankle or foot, degenerative 06/05/2013    Osteoarthritis of ankle or foot 06/05/2013    Shingles 12/12/2012     Overview Note:     right T 12 and r1ght L1      Low back pain radiating to right lower extremity 12/12/2012     Overview Note:     replace inactive diagnosis      SI (sacroiliac) pain     Vitamin D deficiency     Neck pain     Ankle pain     Spinal stenosis of lumbar region with neurogenic claudication     Sensorineural hearing loss, asymmetrical 02/08/2011    Hypertension, benign 04/01/2009    Benign essential HTN 04/01/2009    Benign essential hypertension 04/01/2009       Allergies   Allergen Reactions    Latex     Cephalexin     Keflex [Cephalexin] Rash       Vitals:    11/28/17 1004   BP: 136/68   Site: Left Arm   Position: Sitting   Cuff Size: Large Adult   Pulse: 73   Resp: 16   Temp: 97.9 °F (36.6 °C)   TempSrc: Oral   SpO2: 95%   Weight: 219 lb 3.2 oz (99.4 kg)   Height: 5' 8\" (1.727 m)      Body mass index is 33.33 kg/m². HPI This is a new patient to me. I have reviewed all of the past medical history, social history and family history. I have reviewed all of the information on medication, surgeries and previous testing and working diagnoses. I have reviewed the allergies and health maintenance information and correlated it into my decision making for this patient for care, diagnostics, consultations and treatment for today's visit. Patient has been seen by Dr. Kiara Turner a rheumatologist in the past.  Is been many years since she's had that and I recommended she consider getting a second opinion as the technology and help for people with these aggressive arthritides have improved. Significantly. She's been battling right upper abdominal pain and occasionally she gets some left rib pain.   We cooperative with the exam in no acute distress  HEENT: She does have a mild head tremor her eyes are clear nonicteric ears TMs intact with some cerumen but not occlusive oropharynx clear  Neck: Soft nontender no adenopathy or carotid bruits  Lungs: Clear to auscultation no wheezes rhonchi or rales  Heart: Regular rate and rhythm without murmurs rubs or gallops  Extremities: She has no rashes or edema but she has profound joint deformities of her hands. Assessment:  1. Primary osteoarthritis involving multiple joints  She didn't consider getting a second opinion on her arthritis to see if any of the newer immune modifying medications might help her. 2. Acquired spondylolisthesis     3. Mixed hyperlipidemia  Trial of Lipitor and she's to call if she can or cannot tolerate it in the next week or 2  We'll check her blood work at follow-up   4. Encounter for screening for HIV  Hiv-1,-2 W/Reflex To Hiv-1 Western Blot   5. Need for hepatitis C screening test  Hepatitis C Antibody   6. Right upper quadrant abdominal pain  Per Dr. Ho Sharma:  Current Outpatient Prescriptions   Medication Sig Dispense Refill    atorvastatin (LIPITOR) 10 MG tablet Take 1 tablet by mouth nightly 30 tablet 5    gabapentin (NEURONTIN) 100 MG capsule Take 3 capsules by mouth nightly 270 capsule 1    clobetasol (OLUX) 0.05 % foam       lidocaine (LIDODERM) 5 % Apply 1, 2 or 3 patches to Shingles painful areas; Change daily. Generic equivalent acceptable, unless otherwise noted. 90 patch 3    lidocaine (XYLOCAINE) 5 % ointment Apply topically as needed. 1 Tube 11    HYDROcodone-acetaminophen (NORCO)  MG per tablet Tke one or two every 4 -6 hours as needed for pain. MAX 3 per day. DO NOT GET NARCOTIC MEDICATIONS FROM ANY OTHER PROVIDER. [facility] Offer Date: 9/8/17 90 tablet 0    aspirin 81 MG tablet Take 81 mg by mouth daily      CRANBERRY EXTRACT PO Take by mouth      amLODIPine (NORVASC) 10 MG tablet Take

## 2017-12-05 ENCOUNTER — OFFICE VISIT (OUTPATIENT)
Dept: GASTROENTEROLOGY | Age: 62
End: 2017-12-05

## 2017-12-05 VITALS
HEART RATE: 65 BPM | DIASTOLIC BLOOD PRESSURE: 85 MMHG | WEIGHT: 213 LBS | BODY MASS INDEX: 32.39 KG/M2 | SYSTOLIC BLOOD PRESSURE: 157 MMHG

## 2017-12-05 DIAGNOSIS — R10.9 TRIGGER POINT OF ABDOMEN: Primary | ICD-10-CM

## 2017-12-05 PROCEDURE — G8598 ASA/ANTIPLAT THER USED: HCPCS | Performed by: INTERNAL MEDICINE

## 2017-12-05 PROCEDURE — 3017F COLORECTAL CA SCREEN DOC REV: CPT | Performed by: INTERNAL MEDICINE

## 2017-12-05 PROCEDURE — G8417 CALC BMI ABV UP PARAM F/U: HCPCS | Performed by: INTERNAL MEDICINE

## 2017-12-05 PROCEDURE — 99213 OFFICE O/P EST LOW 20 MIN: CPT | Performed by: INTERNAL MEDICINE

## 2017-12-05 PROCEDURE — 20552 NJX 1/MLT TRIGGER POINT 1/2: CPT | Performed by: INTERNAL MEDICINE

## 2017-12-05 PROCEDURE — G8482 FLU IMMUNIZE ORDER/ADMIN: HCPCS | Performed by: INTERNAL MEDICINE

## 2017-12-05 PROCEDURE — 1036F TOBACCO NON-USER: CPT | Performed by: INTERNAL MEDICINE

## 2017-12-05 PROCEDURE — 3014F SCREEN MAMMO DOC REV: CPT | Performed by: INTERNAL MEDICINE

## 2017-12-05 PROCEDURE — G8427 DOCREV CUR MEDS BY ELIG CLIN: HCPCS | Performed by: INTERNAL MEDICINE

## 2017-12-05 RX ORDER — LIDOCAINE HYDROCHLORIDE 10 MG/ML
5 INJECTION, SOLUTION INFILTRATION; PERINEURAL ONCE
Status: COMPLETED | OUTPATIENT
Start: 2017-12-05 | End: 2017-12-05

## 2017-12-05 RX ORDER — TRIAMCINOLONE ACETONIDE 40 MG/ML
40 INJECTION, SUSPENSION INTRA-ARTICULAR; INTRAMUSCULAR ONCE
Status: COMPLETED | OUTPATIENT
Start: 2017-12-05 | End: 2017-12-05

## 2017-12-05 RX ADMIN — TRIAMCINOLONE ACETONIDE 40 MG: 40 INJECTION, SUSPENSION INTRA-ARTICULAR; INTRAMUSCULAR at 16:26

## 2017-12-05 RX ADMIN — LIDOCAINE HYDROCHLORIDE 5 ML: 10 INJECTION, SOLUTION INFILTRATION; PERINEURAL at 16:25

## 2017-12-08 DIAGNOSIS — G89.29 CHRONIC PAIN OF RIGHT ANKLE: ICD-10-CM

## 2017-12-08 DIAGNOSIS — M54.41 ACUTE BILATERAL LOW BACK PAIN WITH RIGHT-SIDED SCIATICA: ICD-10-CM

## 2017-12-08 DIAGNOSIS — Z79.899 HIGH RISK MEDICATION USE: Chronic | ICD-10-CM

## 2017-12-08 DIAGNOSIS — M25.571 CHRONIC PAIN OF RIGHT ANKLE: ICD-10-CM

## 2017-12-08 DIAGNOSIS — M54.2 NECK PAIN: ICD-10-CM

## 2017-12-08 RX ORDER — HYDROCODONE BITARTRATE AND ACETAMINOPHEN 10; 325 MG/1; MG/1
TABLET ORAL
Qty: 90 TABLET | Refills: 0 | Status: SHIPPED | OUTPATIENT
Start: 2017-12-08 | End: 2018-01-08 | Stop reason: SDUPTHER

## 2017-12-19 ENCOUNTER — TELEPHONE (OUTPATIENT)
Dept: INTERNAL MEDICINE | Age: 62
End: 2017-12-19

## 2017-12-19 NOTE — TELEPHONE ENCOUNTER
Her recent mammogram report from November of this year is normal.  She will need a another mammogram in a year for appropriate cancer screening.

## 2017-12-20 ENCOUNTER — OFFICE VISIT (OUTPATIENT)
Dept: PHYSICAL MEDICINE AND REHAB | Age: 62
End: 2017-12-20

## 2017-12-20 VITALS
WEIGHT: 215 LBS | HEIGHT: 68 IN | DIASTOLIC BLOOD PRESSURE: 62 MMHG | SYSTOLIC BLOOD PRESSURE: 130 MMHG | BODY MASS INDEX: 32.58 KG/M2

## 2017-12-20 DIAGNOSIS — M54.2 NECK PAIN: Primary | ICD-10-CM

## 2017-12-20 DIAGNOSIS — M25.571 ACUTE RIGHT ANKLE PAIN: ICD-10-CM

## 2017-12-20 DIAGNOSIS — M48.062 SPINAL STENOSIS OF LUMBAR REGION WITH NEUROGENIC CLAUDICATION: ICD-10-CM

## 2017-12-20 DIAGNOSIS — Z79.899 HIGH RISK MEDICATION USE: ICD-10-CM

## 2017-12-20 PROCEDURE — G8598 ASA/ANTIPLAT THER USED: HCPCS | Performed by: PHYSICAL MEDICINE & REHABILITATION

## 2017-12-20 PROCEDURE — 3017F COLORECTAL CA SCREEN DOC REV: CPT | Performed by: PHYSICAL MEDICINE & REHABILITATION

## 2017-12-20 PROCEDURE — 1036F TOBACCO NON-USER: CPT | Performed by: PHYSICAL MEDICINE & REHABILITATION

## 2017-12-20 PROCEDURE — G8417 CALC BMI ABV UP PARAM F/U: HCPCS | Performed by: PHYSICAL MEDICINE & REHABILITATION

## 2017-12-20 PROCEDURE — 3014F SCREEN MAMMO DOC REV: CPT | Performed by: PHYSICAL MEDICINE & REHABILITATION

## 2017-12-20 PROCEDURE — G8482 FLU IMMUNIZE ORDER/ADMIN: HCPCS | Performed by: PHYSICAL MEDICINE & REHABILITATION

## 2017-12-20 PROCEDURE — G8427 DOCREV CUR MEDS BY ELIG CLIN: HCPCS | Performed by: PHYSICAL MEDICINE & REHABILITATION

## 2017-12-20 PROCEDURE — 99214 OFFICE O/P EST MOD 30 MIN: CPT | Performed by: PHYSICAL MEDICINE & REHABILITATION

## 2017-12-20 RX ORDER — CLOBETASOL PROPIONATE 0.05 G/100ML
SHAMPOO TOPICAL
COMMUNITY
Start: 2017-12-04 | End: 2018-07-03 | Stop reason: ALTCHOICE

## 2017-12-20 ASSESSMENT — ENCOUNTER SYMPTOMS
ABDOMINAL PAIN: 0
VOMITING: 0
ALLERGIC/IMMUNOLOGIC NEGATIVE: 1
CONSTIPATION: 0
DIARRHEA: 0
NAUSEA: 0
BACK PAIN: 1
WHEEZING: 0
EYES NEGATIVE: 1
CHEST TIGHTNESS: 0
SHORTNESS OF BREATH: 0

## 2017-12-20 NOTE — PROGRESS NOTES
Subjective  Sindy Nilsa, 57 y.o. female presents today with:       Back Pain caudal blocks 11/07/17 and 11/21/17 gave 80% reduction in pain lasting 4 weeks and still ongoing. TP injections 10/12/17 helpful and will call as needed. Neck Pain     Hip Pain bilateral    Leg Pain left leg    Shoulder Pain bilateral    Foot Pain left foot, had increasing pain 12/19/17. Has improved very little today. Discuss Medications Norco, pill count today-compliant    Blood Work tox screen needed          She is still doing well with her current medications and shows no signs of abuse or overuse. She is hoping to get trigger point prior to the end of the year. They seem to help a lot. Her foot pain is flared up quite a bit over the past 24 hours but she is icing it and lidocaine gel on adenosine feeling somewhat better. Back Pain   This is a chronic problem. The current episode started more than 1 year ago. The problem occurs daily. The problem is unchanged. The pain is present in the lumbar spine and gluteal. The quality of the pain is described as aching. Radiates to: bilateral legs, left leg worse, left hip. The pain is at a severity of 4/10. The pain is moderate. The pain is worse during the day. The symptoms are aggravated by bending, standing and position (Walking). Stiffness is present in the morning. Associated symptoms include leg pain, numbness and weakness. Pertinent negatives include no abdominal pain, bladder incontinence, chest pain, fever, headaches, paresis or perianal numbness. Risk factors include obesity, poor posture, menopause and lack of exercise. She has tried analgesics, walking, home exercises and heat (Sciatica block, trigger point injections, mobic, Gabapentin, Norco) for the symptoms. The treatment provided moderate relief. Foot Pain   This is a recurrent problem. The current episode started yesterday.  Associated symptoms include arthralgias, fatigue, joint swelling, numbness and weakness. Pertinent negatives include no abdominal pain, chest pain, fever, headaches, nausea or vomiting. The symptoms are aggravated by bending, exertion, standing and walking. She has tried oral narcotics, position changes, relaxation, ice, acetaminophen and NSAIDs for the symptoms. The treatment provided mild relief.        Past Medical History:   Diagnosis Date    Abnormal electromyogram (EMG) 8/19/09    SENSORIMOTOR NEUROPATHY OF BLE, RIGHT WORSE THAN LEFT, SUSPICIOUS FOR RIGHT S1 RADIC    Angina pectoris (HCC)     Ankle pain     Colon polyp 4/3/2017    Dermatitis     Fibromyalgia     Foot pain     Hyperlipidemia     Hypertension     Low back pain     MRSA (methicillin resistant staph aureus) culture positive 2011    Neck pain     Neuropathy ([address]     Obesity     Osteoarthritis     PSVT (paroxysmal supraventricular tachycardia) [facility] [address]     Shingles outbreak 2012    SI (sacroiliac) pain     Stenosis     Vitamin D deficiency      Past Surgical History:   Procedure Laterality Date    CARDIAC CATHETERIZATION  10/2016    CERVICAL FUSION  12/14/11    Dr Eloy Campbell with bone graft and plate    COLONOSCOPY      HAND FUSION  7/05    HYSTERECTOMY  2001    HYSTERECTOMY, TOTAL ABDOMINAL      JOINT REPLACEMENT Left     tka    MENISCECTOMY  10/31/11    left knee    OTHER SURGICAL HISTORY  10/26/09    CAUDALS BY DR Nikki Leija    [place] COLON CA SCRN NOT  [address] [place] [address] IND N/A 4/12/2017    COLONOSCOPY performed by Barrett Spencer MD at . [address] ESOPHAGOGASTRODUODENOSCOPY TRANSORAL DIAGNOSTIC N/A 4/12/2017    EGD ESOPHAGOGASTRODUODENOSCOPY performed by Barrett Spencer MD at [facility]  12/2011    Dr Eloy Campbell Lumbar and c spine    TOTAL KNEE ARTHROPLASTY  7/30/12    LEFT-DR AVELAR    UPPER GASTROINTESTINAL ENDOSCOPY  1995     Social History     Social History    Marital status:      Spouse name: N/A    Number of children: N/A    Years of education: N/A Occupational History    Retired       Social History Main Topics    Smoking status: Never Smoker    Smokeless tobacco: Never Used    Alcohol use No    Drug use: No    Sexual activity: No     Other Topics Concern    None     Social History Narrative    None     Family History   Problem Relation Age of Onset    Heart Disease Father     High Cholesterol Father     High Blood Pressure Father     Stroke Mother     High Blood Pressure Mother        Allergies   Allergen Reactions    Latex     Cephalexin     Keflex [Cephalexin] Rash       Review of Systems   Constitutional: Positive for fatigue. Negative for activity change and fever. Eyes: Negative. Respiratory: Negative for chest tightness, shortness of breath and wheezing. Cardiovascular: Positive for palpitations. Negative for chest pain and leg swelling. Gastrointestinal: Negative for abdominal pain, constipation, diarrhea, nausea and vomiting. Endocrine: Negative. Genitourinary: Negative. Negative for bladder incontinence. Musculoskeletal: Positive for arthralgias, back pain, gait problem, joint swelling and neck stiffness. Skin: Negative. Allergic/Immunologic: Negative. Neurological: Positive for dizziness, weakness and numbness. Negative for light-headedness and headaches. Hematological: Negative. Psychiatric/Behavioral: Positive for sleep disturbance. Negative for dysphoric mood. The patient is not nervous/anxious. Objective    Vitals:    12/20/17 0954   BP: 130/62   Weight: 215 lb (97.5 kg)   Height: 5' 8\" (1.727 m)     Pain Score: Five (and left foot)     Physical Exam   Constitutional: She is oriented to person, place, and time. Vital signs are normal. She appears well-developed. Non-toxic appearance. She does not have a sickly appearance. She does not appear ill. No distress. HENT:   Head: Normocephalic and atraumatic.    Right Ear: Hearing normal.   Left Ear: Hearing normal.   Nose: Nose normal.   Mouth/Throat: Oropharynx is clear and moist and mucous membranes are normal. No oral lesions. Normal dentition. No oropharyngeal exudate. No lesion   Eyes: Conjunctivae and EOM are normal. Pupils are equal, round, and reactive to light. Right eye exhibits no chemosis, no discharge and no exudate. Left eye exhibits no chemosis, no discharge and no exudate. No scleral icterus. Neck: Normal range of motion. Neck supple. No JVD present. No neck rigidity. No tracheal deviation and no edema present. No thyromegaly present. Cardiovascular: Intact distal pulses. Exam reveals no decreased pulses. Pulmonary/Chest: Effort normal. No accessory muscle usage. No apnea, no tachypnea and no bradypnea. No respiratory distress. She has no wheezes. She exhibits no tenderness. Abdominal: Soft. Bowel sounds are normal. She exhibits no distension and no mass. There is no tenderness. There is no rebound and no guarding. Pressure RUQ   Musculoskeletal: She exhibits tenderness. She exhibits no edema. Right shoulder: She exhibits decreased range of motion, tenderness and bony tenderness. Left shoulder: She exhibits decreased range of motion, tenderness and bony tenderness. Right elbow: Normal.       Left elbow: Normal.        Right wrist: Normal.        Left wrist: Normal.        Right hip: She exhibits decreased range of motion, decreased strength and tenderness. Left hip: She exhibits decreased range of motion, decreased strength and tenderness. Right knee: Normal.        Left knee: Normal.        Right ankle: Normal. Achilles tendon normal.        Left ankle: She exhibits decreased range of motion. Tenderness. Lateral malleolus and medial malleolus tenderness found. Achilles tendon normal.        Cervical back: She exhibits decreased range of motion, tenderness and bony tenderness. Thoracic back: She exhibits decreased range of motion, tenderness and bony tenderness. Lumbar back: She exhibits decreased range of motion, tenderness, bony tenderness and pain. She exhibits no swelling, no edema, no deformity, no laceration and normal pulse. Back:         Right upper arm: Normal.        Left upper arm: Normal.        Right forearm: Normal.        Left forearm: Normal.        Arms:       Right hand: She exhibits decreased range of motion, tenderness, bony tenderness and deformity. Left hand: She exhibits decreased range of motion, tenderness, bony tenderness and deformity. Right upper leg: Normal.        Left upper leg: Normal.        Right lower leg: Normal.        Left lower leg: Normal.        Legs:       Right foot: There is decreased range of motion, tenderness and bony tenderness. Left foot: There is decreased range of motion and tenderness. Tender areas are indicated by numbered spot         Lymphadenopathy:     She has no cervical adenopathy. Neurological: She is alert and oriented to person, place, and time. She displays abnormal reflex. She displays no atrophy and no tremor. No cranial nerve deficit or sensory deficit. She exhibits normal muscle tone. Coordination and gait normal. She displays no Babinski's sign on the right side. She displays no Babinski's sign on the left side. Skin: Skin is warm, dry and intact. No abrasion, no bruising, no ecchymosis, no laceration, no petechiae and no rash noted. Rash is not macular, not pustular and not urticarial. She is not diaphoretic. No cyanosis or erythema. No pallor. Nails show no clubbing. Psychiatric: Her speech is normal and behavior is normal. Judgment and thought content normal. Her mood appears not anxious. Her affect is not angry, not blunt, not labile and not inappropriate. She is not agitated, not aggressive, not hyperactive, not slowed, not withdrawn, not actively hallucinating and not combative.  Thought content is not paranoid and not delusional. Cognition and memory are normal. Cognition and memory are not impaired. She does not express impulsivity or inappropriate judgment. She expresses no homicidal and no suicidal ideation. She expresses no suicidal plans and no homicidal plans. She exhibits normal recent memory and normal remote memory. She is attentive. Vitals reviewed. Ortho Exam  Neurologic Exam     Mental Status   Oriented to person, place, and time. Speech: speech is normal     Cranial Nerves     CN III, IV, VI   Pupils are equal, round, and reactive to light. Extraocular motions are normal.         After a thorough review and discussion of the previous medical records, patient comprehensive medical, surgical, and family and social history, Review of Systems, their OARRS, their Screener and Opioid Assessment for Patients with Pain (SOAPP®-R), recent diagnostics, and symptomatic results to previous treatment, it is my impression that the patients is suffering with progressive and severe:    1. Neck pain     2. Acute right ankle pain     3. High risk medication use - 08/04/17 OARRS PM&R, 10/17/17 OARRS PM&R, Tox screen 12/2/16 WNL for [place] PM&R, MED CONTRACT 2/2/17  Urine Drug Screen   4.  Spinal stenosis of lumbar region with neurogenic claudication         I am also concerned by lifestyle and mood issues including:    Past Medical History:   Diagnosis Date    Abnormal electromyogram (EMG) 8/19/09    SENSORIMOTOR NEUROPATHY OF BLE, RIGHT WORSE THAN LEFT, SUSPICIOUS FOR RIGHT S1 RADIC    Angina pectoris (HCC)     Ankle pain     Colon polyp 4/3/2017    Dermatitis     Fibromyalgia     Foot pain     Hyperlipidemia     Hypertension     Low back pain     MRSA (methicillin resistant staph aureus) culture positive 2011    Neck pain     Neuropathy (HCC)     Obesity     Osteoarthritis     PSVT (paroxysmal supraventricular tachycardia) ([address]     Shingles outbreak 2012    SI (sacroiliac) pain     Stenosis     Vitamin D deficiency            Given their medication, medications.     -which helps with pain and function. Otherwise, continue the current pain medications that I have prescibed. Radiologic:   Old films reviewed  ,     I discussed results with patients. see Follow up plans below  For any new studies. Care Everywhere Updates:  requested and reviewed. No new issues noted.            Labs:  Previous labs reviewed     Lab Results   Component Value Date     04/20/2016    K 4.4 04/20/2016     04/20/2016    CO2 25 04/20/2016    BUN 24 04/20/2016    CREATININE 1.3 05/01/2017    CREATININE 0.85 04/20/2016    CALCIUM 9.4 03/25/2016    LABALBU 4.3 03/20/2016    BILITOT 0.4 03/20/2016    ALKPHOS 87 03/20/2016    AST 22 03/20/2016    ALT 23 03/20/2016     Lab Results   Component Value Date    WBC 7.7 03/20/2016    RBC 4.37 03/20/2016    RBC 3.80 12/07/2011    HGB 13.8 03/20/2016    HCT 42.0 03/20/2016    MCV 96.2 03/20/2016    MCH 31.6 03/20/2016    MCHC 32.9 03/20/2016    RDW 13.9 03/20/2016     03/20/2016    MPV 9.1 10/13/2014       Lab Results   Component Value Date    LABAMPH Neg 07/28/2014    BARBSCNU Neg 07/28/2014    LABBENZ Neg 07/28/2014    CANSU Neg 07/28/2014    COCAIMETSCRU Neg 07/28/2014    PHENCYCLIDINESCREENURINE Neg 51/76/1053    TRICYCLIC Neg 15/07/9884    DSCOMMENT see below 07/28/2014       Lab Results   Component Value Date    CODEINE Not Detected 12/02/2016    MORPHINE Not Detected 12/02/2016    ACETYLMORPHI Not Detected 12/02/2016    OXYCODONE Not Detected 12/02/2016    NOROXYCODONE Not Detected 12/02/2016    NOROXYMU Not Detected 12/02/2016    HYDRCO Present 12/02/2016    NORHYDU Present 12/02/2016    HYDROMO Not Detected 12/02/2016    Milas Isma Not Detected 12/02/2016    Charol Riis Not Detected 12/02/2016    FENTA Not Detected 12/02/2016    NORFENT Not Detected 12/02/2016    MEPERIDINE Not Detected 12/02/2016    TAPENU Not Detected 12/02/2016    TAPOSULFUR Not Detected 12/02/2016    METHADONE Not Detected 12/02/2016 LABPROP Not Detected 12/02/2016    TRAM Not Detected 12/02/2016    AMPH Not Detected 12/02/2016    METHAMP Not Detected 12/02/2016    MDMA Not Detected 12/02/2016    ECMDA Not Detected 12/02/2016       Lab Results   Component Value Date    METPHEN Not Detected 12/02/2016    PHENTERMINE Not Detected 12/02/2016    BENZOYL Not Detected 12/02/2016    Miracle [place]ot Not Detected 12/02/2016    ALPHAOHALPRA Not Detected 12/02/2016    CLONAZEPAM Not Detected 12/02/2016    Elmon Gauze Not Detected 12/02/2016    DIAZEP Not Detected 12/02/2016    ROMAIN Not Detected 12/02/2016    OXAZ Not Detected 12/02/2016    Caye Villagomez Not Detected 12/02/2016    LORAZEPAM Not Detected 12/02/2016    MIDAZOLAM Not Detected 12/02/2016    ZOLPIDEM Not Detected 12/02/2016    NAI Not Detected 12/02/2016    ETG Not Detected 12/02/2016    MARIJMET Not Detected 12/02/2016    PCP Not Detected 12/02/2016    PAINMGTDRUGP See Below 12/02/2016    EERPAINMGTPA See Note 12/02/2016    LABCREA 66.5 12/02/2016         , I discussed results with patient. See follow-up plans for new studies. Therapies:  HEP-gentle stretching and relaxation techniques-demonstrated with patient-they are to do them twice a day. They are also advised to make the following lifestyle changes:  Goals      Exercise 3x per week (30 min per time)      SOAPP-R GOAL LESS THAN 9            09/02/16 SCORE: 8 LOW RISK   02/02/17 score 7-low risk   04/03/17 score: 6-low risk  06/05/17 score: 7-low risk  08/07/17 score: 8-low risk  10/06/17 score: 6-low risk  12/20/17 score: 5-low risk       Weight < 150 lb (68.04 kg)           Injections or Epidurals:  Injection options were discussed. Patient gave verbal consent to ordered injections. See follow-up plans for planned injections. Supplements:  Vitamin D,   Education was given on:   Dietary and Fitness             Follow up with Primary Care Physician regarding their general medical needs.            They are to follow up in 2 months to review medication, efficacy of injections, pill counts, OARRS check, SOAPPR assessment, review diagnostics, to review previous and future treatment plans and assess appropriateness for continued therapy.         New Diagnostics  Orders Placed This Encounter   Procedures    Urine Drug Screen       Jose Mahmood, DO

## 2017-12-26 ENCOUNTER — PROCEDURE VISIT (OUTPATIENT)
Dept: PHYSICAL MEDICINE AND REHAB | Age: 62
End: 2017-12-26

## 2017-12-26 DIAGNOSIS — M79.7 FIBROMYALGIA: Primary | ICD-10-CM

## 2017-12-26 DIAGNOSIS — M79.10 MYALGIA: ICD-10-CM

## 2017-12-26 PROCEDURE — 20553 NJX 1/MLT TRIGGER POINTS 3/>: CPT | Performed by: PHYSICAL MEDICINE & REHABILITATION

## 2018-01-08 DIAGNOSIS — M54.2 NECK PAIN: ICD-10-CM

## 2018-01-08 DIAGNOSIS — M25.571 CHRONIC PAIN OF RIGHT ANKLE: ICD-10-CM

## 2018-01-08 DIAGNOSIS — Z79.899 HIGH RISK MEDICATION USE: Chronic | ICD-10-CM

## 2018-01-08 DIAGNOSIS — G89.29 CHRONIC PAIN OF RIGHT ANKLE: ICD-10-CM

## 2018-01-08 DIAGNOSIS — M54.41 ACUTE BILATERAL LOW BACK PAIN WITH RIGHT-SIDED SCIATICA: ICD-10-CM

## 2018-01-08 RX ORDER — HYDROCODONE BITARTRATE AND ACETAMINOPHEN 10; 325 MG/1; MG/1
TABLET ORAL
Qty: 90 TABLET | Refills: 0 | Status: SHIPPED | OUTPATIENT
Start: 2018-01-16 | End: 2018-02-12 | Stop reason: SDUPTHER

## 2018-02-12 DIAGNOSIS — Z79.899 HIGH RISK MEDICATION USE: Chronic | ICD-10-CM

## 2018-02-12 DIAGNOSIS — M25.571 CHRONIC PAIN OF RIGHT ANKLE: ICD-10-CM

## 2018-02-12 DIAGNOSIS — M54.41 ACUTE BILATERAL LOW BACK PAIN WITH RIGHT-SIDED SCIATICA: ICD-10-CM

## 2018-02-12 DIAGNOSIS — M54.2 NECK PAIN: ICD-10-CM

## 2018-02-12 DIAGNOSIS — G89.29 CHRONIC PAIN OF RIGHT ANKLE: ICD-10-CM

## 2018-02-12 RX ORDER — HYDROCODONE BITARTRATE AND ACETAMINOPHEN 10; 325 MG/1; MG/1
TABLET ORAL
Qty: 90 TABLET | Refills: 0 | Status: SHIPPED | OUTPATIENT
Start: 2018-02-15 | End: 2018-03-07 | Stop reason: SDUPTHER

## 2018-02-19 ENCOUNTER — OFFICE VISIT (OUTPATIENT)
Dept: PHYSICAL MEDICINE AND REHAB | Age: 63
End: 2018-02-19

## 2018-02-19 VITALS
WEIGHT: 218 LBS | SYSTOLIC BLOOD PRESSURE: 138 MMHG | BODY MASS INDEX: 33.04 KG/M2 | HEIGHT: 68 IN | DIASTOLIC BLOOD PRESSURE: 60 MMHG

## 2018-02-19 DIAGNOSIS — M79.671 PAIN IN BOTH FEET: ICD-10-CM

## 2018-02-19 DIAGNOSIS — Z79.899 HIGH RISK MEDICATION USE: ICD-10-CM

## 2018-02-19 DIAGNOSIS — M54.2 NECK PAIN: ICD-10-CM

## 2018-02-19 DIAGNOSIS — E55.9 VITAMIN D DEFICIENCY: ICD-10-CM

## 2018-02-19 DIAGNOSIS — M53.3 SI (SACROILIAC) PAIN: ICD-10-CM

## 2018-02-19 DIAGNOSIS — M79.672 PAIN IN BOTH FEET: ICD-10-CM

## 2018-02-19 DIAGNOSIS — M48.062 SPINAL STENOSIS OF LUMBAR REGION WITH NEUROGENIC CLAUDICATION: Primary | ICD-10-CM

## 2018-02-19 DIAGNOSIS — Z79.899 HIGH RISK MEDICATION USE: Chronic | ICD-10-CM

## 2018-02-19 PROCEDURE — 99213 OFFICE O/P EST LOW 20 MIN: CPT | Performed by: PHYSICAL MEDICINE & REHABILITATION

## 2018-02-19 RX ORDER — GABAPENTIN 100 MG/1
300 CAPSULE ORAL NIGHTLY
Qty: 270 CAPSULE | Refills: 1 | Status: SHIPPED | OUTPATIENT
Start: 2018-02-19 | End: 2018-04-25

## 2018-02-19 ASSESSMENT — ENCOUNTER SYMPTOMS
VOMITING: 0
ALLERGIC/IMMUNOLOGIC NEGATIVE: 1
WHEEZING: 0
EYES NEGATIVE: 1
CHEST TIGHTNESS: 0
DIARRHEA: 0
NAUSEA: 0
BACK PAIN: 1
ABDOMINAL PAIN: 0
SHORTNESS OF BREATH: 0
CONSTIPATION: 1

## 2018-02-19 NOTE — PROGRESS NOTES
Past Medical History:   Diagnosis Date    Abnormal electromyogram (EMG) 8/19/09    SENSORIMOTOR NEUROPATHY OF BLE, RIGHT WORSE THAN LEFT, SUSPICIOUS FOR RIGHT S1 RADIC    Angina pectoris (HCC)     Ankle pain     Colon polyp 4/3/2017    Dermatitis     Fibromyalgia     Foot pain     Hyperlipidemia     Hypertension     Low back pain     MRSA (methicillin resistant staph aureus) culture positive 2011    Neck pain     Neuropathy ([facility] [place] [address]     Obesity     Osteoarthritis     PSVT (paroxysmal supraventricular tachycardia) ([facility] [address]     Shingles outbreak 2012    SI (sacroiliac) pain     Stenosis     Vitamin D deficiency      Past Surgical History:   Procedure Laterality Date    CARDIAC CATHETERIZATION  10/2016    CERVICAL FUSION  12/14/11    Dr Louis Montiel with bone graft and plate    COLONOSCOPY      HAND FUSION  7/05    HYSTERECTOMY  2001    HYSTERECTOMY, TOTAL ABDOMINAL      JOINT REPLACEMENT Left     tka    MENISCECTOMY  10/31/11    left knee    OTHER SURGICAL HISTORY  10/26/09    CAUDALS BY DR Rubén Melissa    NC COLON CA SCRN NOT  [address] [place] [address] N/A 4/12/2017    COLONOSCOPY performed by Vicky Narvaez MD at . [facility] 61 ESOPHAGOGASTRODUODENOSCOPY TRANSORAL DIAGNOSTIC N/A 4/12/2017    EGD ESOPHAGOGASTRODUODENOSCOPY performed by Vicky Narvaez MD at [address] [facility]  12/2011    Dr Louis Montiel Lumbar and c spine    TOTAL KNEE ARTHROPLASTY  7/30/12    LEFT-DR AVELAR    UPPER GASTROINTESTINAL ENDOSCOPY  1995     Social History     Social History    Marital status:      Spouse name: N/A    Number of children: N/A    Years of education: N/A     Occupational History    Retired       Social History Main Topics    Smoking status: Never Smoker    Smokeless tobacco: Never Used    Alcohol use No    Drug use: No    Sexual activity: No     Other Topics Concern    None     Social History Narrative    None     Family History   Problem Relation Age of Onset    Heart Disease Father     High Cholesterol Father     High Blood Pressure Father     Stroke Mother     High Blood Pressure Mother        Allergies   Allergen Reactions    Latex     Cephalexin     Keflex [Cephalexin] Rash       Review of Systems   Constitutional: Positive for fatigue. Negative for activity change and fever. Eyes: Negative. Respiratory: Negative for chest tightness, shortness of breath and wheezing. Cardiovascular: Negative for chest pain, palpitations and leg swelling. Gastrointestinal: Positive for constipation. Negative for abdominal pain, diarrhea, nausea and vomiting. Endocrine: Negative. Genitourinary: Negative. Negative for bladder incontinence. Musculoskeletal: Positive for arthralgias, back pain, gait problem, joint swelling and neck stiffness. Skin: Negative. Allergic/Immunologic: Negative. Neurological: Positive for tremors, weakness and numbness. Negative for dizziness, light-headedness and headaches. Hematological: Negative. Psychiatric/Behavioral: Positive for sleep disturbance. Negative for dysphoric mood. The patient is not nervous/anxious. Objective    Vitals:    02/19/18 1014   BP: 138/60   Weight: 218 lb (98.9 kg)   Height: 5' 8\" (1.727 m)     Pain Score: Three     Physical Exam   Constitutional: She is oriented to person, place, and time. Vital signs are normal. She appears well-developed. Non-toxic appearance. She does not have a sickly appearance. She does not appear ill. No distress. HENT:   Head: Normocephalic and atraumatic. Right Ear: Hearing normal.   Left Ear: Hearing normal.   Nose: Nose normal.   Mouth/Throat: Oropharynx is clear and moist and mucous membranes are normal. No oral lesions. Normal dentition. No oropharyngeal exudate. No lesion   Eyes: Conjunctivae and EOM are normal. Pupils are equal, round, and reactive to light. Right eye exhibits no chemosis, no discharge and no exudate.  Left eye exhibits no supraventricular tachycardia) ([facility] [address]     Shingles outbreak 2012    SI (sacroiliac) pain     Stenosis     Vitamin D deficiency            Given their medication, chronic pain and lifestyle and medications they are at risk for :    Falls, constipation, addiction  Loss of livelyhood due to severe pain, debility, weight gain and  vitamin D deficiency    The patient was educated regarding proper diet, fitness routine, and regulatory restrictions concerning pain medications. Previous notes, comprehensive past medical, surgical, family history, and diagnostics were reviewed. Patient education and councelling were provided regarding off label use,treatment options and medication and injection risks. Current and old OARRS ([place] Automated Prescription Reporting System) records reviewed, all refills reviewed since last visit,  Behavioral agreement/KATHLEEN regulations   and Toxicology screen was reviewed with patient and is up to date. There are no current red flags. They are making good progress regarding pain relief, they are performing at a functional level regarding activities of daily living and psychological functioning, they're not having any adverse effects or side effects from the current medications, and I see no findings of aberrant drug taking her addiction related behaviors. Attestation: The Prescription Monitoring Report for this patient was reviewed today. (Roland Leonard, )  Documentation: Obtaining appropriate analgesic effect of treatment., No signs of potential drug abuse or diversion identified. (Roland Leonard, )       Patient is currently taking:       I have changed Ms. Hdez's gabapentin.  I am also having her maintain her Vitamin D3, Coenzyme Q10 (COQ10 PO), nitroGLYCERIN, meloxicam, Multiple Vitamins-Minerals (MULTIVITAMIN & MINERAL PO), amLODIPine, triamterene-hydrochlorothiazide, aspirin, CRANBERRY EXTRACT PO, lidocaine, lidocaine, clobetasol, atorvastatin, Clobetasol MEPERIDINE Not Detected 12/02/2016    TAPENU Not Detected 12/02/2016    TAPOSULFUR Not Detected 12/02/2016    METHADONE Not Detected 12/02/2016    LABPROP Not Detected 12/02/2016    TRAM Not Detected 12/02/2016    AMPH Not Detected 12/02/2016    METHAMP Not Detected 12/02/2016    MDMA Not Detected 12/02/2016    ECMDA Not Detected 12/02/2016       Lab Results   Component Value Date    METPHEN Not Detected 12/02/2016    PHENTERMINE Not Detected 12/02/2016    BENZOYL Not Detected 12/02/2016    Isaac Kohut Not Detected 12/02/2016    ALPHAOHALPRA Not Detected 12/02/2016    CLONAZEPAM Not Detected 12/02/2016    Ferrera Chihuahua Not Detected 12/02/2016    DIAZEP Not Detected 12/02/2016    ROMAIN Not Detected 12/02/2016    OXAZ Not Detected 12/02/2016    Keira Baljeet Not Detected 12/02/2016    LORAZEPAM Not Detected 12/02/2016    MIDAZOLAM Not Detected 12/02/2016    ZOLPIDEM Not Detected 12/02/2016    NAI Not Detected 12/02/2016    ETG Not Detected 12/02/2016    MARIJMET Not Detected 12/02/2016    PCP Not Detected 12/02/2016    PAINMGTDRUGP See Below 12/02/2016    EERPAINMGTPA See Note 12/02/2016    LABCREA 66.5 12/02/2016         , I discussed results with patient. See follow-up plans for new studies. Therapies:  HEP-gentle stretching and relaxation techniques-demonstrated with patient-they are to do them twice a day. They are also advised to make the following lifestyle changes:  Goals      Exercise 3x per week (30 min per time)      SOAPP-R GOAL LESS THAN 9            09/02/16 SCORE: 8 LOW RISK   02/02/17 score 7-low risk   04/03/17 score: 6-low risk  06/05/17 score: 7-low risk  08/07/17 score: 8-low risk  10/06/17 score: 6-low risk  12/20/17 score: 5-low risk  02/18/18 score: 6-low risk       Weight < 150 lb (68.04 kg)           Injections or Epidurals:  Injection options were discussed-TPs. Patient gave verbal consent to ordered injections. See follow-up plans for planned injections.     Supplements:  Vitamin D, CoQ10  Education was given on:   Dietary and Fitness             Follow up with Primary Care Physician regarding their general medical needs. They are to follow up in 2 months to review medication, efficacy of injections, pill counts, OARRS check, SOAPPR assessment, review diagnostics, to review previous and future treatment plans and assess appropriateness for continued therapy. New Diagnostics  No orders of the defined types were placed in this encounter.       Mazin Barrientos, DO

## 2018-02-28 ENCOUNTER — OFFICE VISIT (OUTPATIENT)
Dept: INTERNAL MEDICINE CLINIC | Age: 63
End: 2018-02-28
Payer: COMMERCIAL

## 2018-02-28 VITALS
TEMPERATURE: 97.7 F | DIASTOLIC BLOOD PRESSURE: 60 MMHG | WEIGHT: 220.8 LBS | SYSTOLIC BLOOD PRESSURE: 112 MMHG | HEIGHT: 68 IN | OXYGEN SATURATION: 97 % | RESPIRATION RATE: 16 BRPM | HEART RATE: 65 BPM | BODY MASS INDEX: 33.46 KG/M2

## 2018-02-28 DIAGNOSIS — M25.471 ANKLE EDEMA, BILATERAL: ICD-10-CM

## 2018-02-28 DIAGNOSIS — I10 BENIGN ESSENTIAL HYPERTENSION: Primary | ICD-10-CM

## 2018-02-28 DIAGNOSIS — E78.2 MIXED HYPERLIPIDEMIA: ICD-10-CM

## 2018-02-28 DIAGNOSIS — M25.472 ANKLE EDEMA, BILATERAL: ICD-10-CM

## 2018-02-28 PROBLEM — S39.011A ABDOMINAL MUSCLE STRAIN: Status: RESOLVED | Noted: 2017-02-02 | Resolved: 2018-02-28

## 2018-02-28 PROBLEM — M15.9 OSTEOARTHRITIS OF MULTIPLE JOINTS: Status: RESOLVED | Noted: 2017-02-02 | Resolved: 2018-02-28

## 2018-02-28 PROBLEM — M54.40 CHRONIC LOW BACK PAIN WITH SCIATICA: Status: RESOLVED | Noted: 2017-10-06 | Resolved: 2018-02-28

## 2018-02-28 PROBLEM — M54.41 ACUTE BILATERAL LOW BACK PAIN WITH RIGHT-SIDED SCIATICA: Status: RESOLVED | Noted: 2017-03-03 | Resolved: 2018-02-28

## 2018-02-28 PROBLEM — M16.10 HIP ARTHRITIS: Status: RESOLVED | Noted: 2017-04-03 | Resolved: 2018-02-28

## 2018-02-28 PROBLEM — G89.29 CHRONIC LOW BACK PAIN WITH SCIATICA: Status: RESOLVED | Noted: 2017-10-06 | Resolved: 2018-02-28

## 2018-02-28 PROBLEM — M25.551 RIGHT HIP PAIN: Status: RESOLVED | Noted: 2017-03-08 | Resolved: 2018-02-28

## 2018-02-28 PROBLEM — K63.5 COLON POLYP: Status: RESOLVED | Noted: 2017-04-03 | Resolved: 2018-02-28

## 2018-02-28 PROCEDURE — 99214 OFFICE O/P EST MOD 30 MIN: CPT | Performed by: FAMILY MEDICINE

## 2018-02-28 RX ORDER — ATORVASTATIN CALCIUM 10 MG/1
10 TABLET, FILM COATED ORAL NIGHTLY
Qty: 90 TABLET | Refills: 3 | Status: SHIPPED | OUTPATIENT
Start: 2018-02-28 | End: 2018-10-15 | Stop reason: SDUPTHER

## 2018-02-28 RX ORDER — LISINOPRIL 40 MG/1
40 TABLET ORAL DAILY
Qty: 90 TABLET | Refills: 3 | Status: SHIPPED | OUTPATIENT
Start: 2018-02-28 | End: 2018-10-10 | Stop reason: DRUGHIGH

## 2018-02-28 RX ORDER — FUROSEMIDE 20 MG/1
20 TABLET ORAL DAILY
Qty: 10 TABLET | Refills: 0 | Status: SHIPPED | OUTPATIENT
Start: 2018-02-28 | End: 2018-07-03 | Stop reason: ALTCHOICE

## 2018-02-28 NOTE — PROGRESS NOTES
Patient: Milton Shine    YOB: 1955    Date: 2/28/18    Chief Complaint   Patient presents with    Hypertension     3 month follow up    Hyperlipidemia     3 month follow up    Swelling     She complains of swelling in her feet and legs.  Medication Refill     pending       Patient Active Problem List    Diagnosis Date Noted    High risk medication use - 10/17/17 OARRS PM&R, 12/19/17 OARRS PM&R, 12/20/17 Tox screen positive for opiates, Hydrocodone PM&R, MED CONTRACT 2/2/17 04/17/2013     Priority: High    BMI 33.0-33.9,adult 02/28/2018    Primary osteoarthritis involving multiple joints 09/02/2016    Acquired spondylolisthesis 08/25/2015    Mixed hyperlipidemia 09/29/2014     Overview Note:     Overview:   Hyperlipidemia  Overview:   Hyperlipidemia  Overview:   Hyperlipidemia  Overview:   Hyperlipidemia  Overview:   Hyperlipidemia      Spinal stenosis of lumbar region with neurogenic claudication     Benign essential hypertension 04/01/2009       Allergies   Allergen Reactions    Latex     Cephalexin     Norvasc [Amlodipine Besylate] Other (See Comments)     swelling    Keflex [Cephalexin] Rash       Vitals:    02/28/18 0855   BP: 112/60   Site: Left Arm   Position: Sitting   Cuff Size: Large Adult   Pulse: 65   Resp: 16   Temp: 97.7 °F (36.5 °C)   TempSrc: Oral   SpO2: 97%   Weight: 220 lb 12.8 oz (100.2 kg)   Height: 5' 8\" (1.727 m)      Body mass index is 33.57 kg/m². HPI    She is here to follow-up on her blood pressure and her hyperlipidemia. She's actually feeling well but her amlodipine was increased and she now has profound swelling in her ankles. She has no chest pain chest pressure nausea vomiting diarrhea or blood pressures excellent. No cough or shortness of breath. Review of Systems    Constitutional: Negative for fatigue, fever and sweats. HEENT: Negative for eye discharge and vision loss. Negative for ear drainage, hearing loss and nasal drainage. Respiratory: Negative for cough, dyspnea and wheezing. Cardiovascular:  Negative for chest pain, claudication and irregular heartbeat/palpitations. Gastrointestinal: Negative for abdominal pain, nausea, constipation and diarrhea. Genitourinary: Negative for dysuria,patient had hysterectomy. Metabolic/Endocrine: Negative for cold intolerance, heat intolerance, polydipsia and polyphagia. No unintended weight loss or weight gain. Neuro/Psychiatric: Negative for gait disturbance. Negative for psychiatric symptoms. Dermatologic: Negative for pruritus and rash. Musculoskeletal: positive for bone/joint symptoms. No numbness or tingling. No loss of function. Hematology: Negative for bleeding and easy bruising. Immunology:  Negative for environmental allergies and food allergies. Physical Exam    Patient's medication, allergies, past medical, surgical, social and family histories were reviewed and updated as appropriate. PHYSICAL EXAM   General Appearance: Alert oriented pleasant cooperative in no acute distress. HEENT: Eyes clear nonicteric facial muscles symmetrical.  Hearing normal  Neck: Soft nontender no adenopathy no carotid bruits  Lungs: Clear to auscultation no wheezes rhonchi or rales  Heart: Regular rate and rhythm no murmurs rubs or gallops slight extremities: She has 1-2+ pitting edema retirement up her shins bilaterally. No calf tenderness no erythema. Assessment:  1. Benign essential hypertension  Comprehensive Metabolic Panel  Under good control however the amlodipine is causing ankle swelling so we did discontinue that and put her back on her 40 mg of lisinopril a day. She actually has no side effects from this. She's been a check her own blood pressure at home and call me if it doesn't get better, she is also getting her blood work done at the [facility] and for that information to me.   I put her on 10 days of Lasix to get rid for ankle swelling and that persists or worsens she will need to come in to be re-seen. 2. Mixed hyperlipidemia  Lipid Panel    Comprehensive Metabolic Panel   3. Ankle edema, bilateral     4. BMI 33.0-33.9,adult                 Plan:  Current Outpatient Prescriptions   Medication Sig Dispense Refill    atorvastatin (LIPITOR) 10 MG tablet Take 1 tablet by mouth nightly 90 tablet 3    lisinopril (PRINIVIL;ZESTRIL) 40 MG tablet Take 1 tablet by mouth daily 90 tablet 3    furosemide (LASIX) 20 MG tablet Take 1 tablet by mouth daily 10 tablet 0    gabapentin (NEURONTIN) 100 MG capsule Take 3 capsules by mouth nightly for 180 days. 270 capsule 1    HYDROcodone-acetaminophen (NORCO)  MG per tablet Tke one or two every 4 -6 hours as needed for pain. MAX 3 per day. DO NOT GET NARCOTIC MEDICATIONS FROM ANY OTHER PROVIDER. Jesus Mratin Date: 2/15/18 90 tablet 0    Clobetasol Propionate 0.05 % SHAM Use daily as needed      clobetasol (OLUX) 0.05 % foam       lidocaine (XYLOCAINE) 5 % ointment Apply topically as needed. 1 Tube 11    aspirin 81 MG tablet Take 81 mg by mouth daily      CRANBERRY EXTRACT PO Take by mouth      triamterene-hydrochlorothiazide (MAXZIDE-25) 37.5-25 MG per tablet Take 1 tablet by mouth daily      Multiple Vitamins-Minerals (MULTIVITAMIN & MINERAL PO) Take by mouth      meloxicam (MOBIC) 7.5 MG tablet Take 7.5 mg by mouth 2 times daily.  nitroGLYCERIN (NITROSTAT) 0.4 MG SL tablet Place 0.4 mg under the tongue every 5 minutes as needed for Chest pain.  Coenzyme Q10 (COQ10 PO) Take  by mouth daily.  Cholecalciferol (VITAMIN D3) 2000 units TABS Take  by mouth three times a week. No current facility-administered medications for this visit.       Orders Placed This Encounter   Procedures    Lipid Panel     Standing Status:   Future     Standing Expiration Date:   2/28/2019     Order Specific Question:   Is Patient Fasting?/# of Hours     Answer:   y    Comprehensive Metabolic Panel     Standing Status: Future     Standing Expiration Date:   2/28/2019       Orders Placed This Encounter   Medications    atorvastatin (LIPITOR) 10 MG tablet     Sig: Take 1 tablet by mouth nightly     Dispense:  90 tablet     Refill:  3    lisinopril (PRINIVIL;ZESTRIL) 40 MG tablet     Sig: Take 1 tablet by mouth daily     Dispense:  90 tablet     Refill:  3    furosemide (LASIX) 20 MG tablet     Sig: Take 1 tablet by mouth daily     Dispense:  10 tablet     Refill:  0             Return in about 1 year (around 2/28/2019).     Dr. John Romero      2/28/18  9:48 AM

## 2018-03-07 DIAGNOSIS — G89.29 CHRONIC PAIN OF RIGHT ANKLE: ICD-10-CM

## 2018-03-07 DIAGNOSIS — Z79.899 HIGH RISK MEDICATION USE: Chronic | ICD-10-CM

## 2018-03-07 DIAGNOSIS — M25.571 CHRONIC PAIN OF RIGHT ANKLE: ICD-10-CM

## 2018-03-07 DIAGNOSIS — M54.41 ACUTE BILATERAL LOW BACK PAIN WITH RIGHT-SIDED SCIATICA: ICD-10-CM

## 2018-03-07 DIAGNOSIS — M54.2 NECK PAIN: ICD-10-CM

## 2018-03-07 RX ORDER — HYDROCODONE BITARTRATE AND ACETAMINOPHEN 10; 325 MG/1; MG/1
TABLET ORAL
Qty: 90 TABLET | Refills: 0 | Status: SHIPPED | OUTPATIENT
Start: 2018-03-14 | End: 2018-04-13 | Stop reason: SDUPTHER

## 2018-04-13 DIAGNOSIS — M25.571 CHRONIC PAIN OF RIGHT ANKLE: ICD-10-CM

## 2018-04-13 DIAGNOSIS — M54.2 NECK PAIN: ICD-10-CM

## 2018-04-13 DIAGNOSIS — G89.29 CHRONIC PAIN OF RIGHT ANKLE: ICD-10-CM

## 2018-04-13 DIAGNOSIS — Z79.899 HIGH RISK MEDICATION USE: Chronic | ICD-10-CM

## 2018-04-13 DIAGNOSIS — M54.41 ACUTE BILATERAL LOW BACK PAIN WITH RIGHT-SIDED SCIATICA: ICD-10-CM

## 2018-04-13 RX ORDER — HYDROCODONE BITARTRATE AND ACETAMINOPHEN 10; 325 MG/1; MG/1
TABLET ORAL
Qty: 90 TABLET | Refills: 0 | Status: SHIPPED | OUTPATIENT
Start: 2018-04-13 | End: 2018-04-25 | Stop reason: DRUGHIGH

## 2018-04-13 RX ORDER — HYDROCODONE BITARTRATE AND ACETAMINOPHEN 7.5; 325 MG/1; MG/1
1 TABLET ORAL EVERY 6 HOURS PRN
Qty: 90 TABLET | Refills: 0 | Status: CANCELLED | OUTPATIENT
Start: 2018-04-13 | End: 2018-05-13

## 2018-04-25 ENCOUNTER — OFFICE VISIT (OUTPATIENT)
Dept: PHYSICAL MEDICINE AND REHAB | Age: 63
End: 2018-04-25

## 2018-04-25 VITALS
HEIGHT: 68 IN | HEART RATE: 95 BPM | DIASTOLIC BLOOD PRESSURE: 62 MMHG | BODY MASS INDEX: 32.43 KG/M2 | SYSTOLIC BLOOD PRESSURE: 138 MMHG | WEIGHT: 214 LBS

## 2018-04-25 DIAGNOSIS — Z79.899 HIGH RISK MEDICATION USE: ICD-10-CM

## 2018-04-25 DIAGNOSIS — M43.10 ACQUIRED SPONDYLOLISTHESIS: ICD-10-CM

## 2018-04-25 DIAGNOSIS — G57.93 NEUROPATHY INVOLVING BOTH LOWER EXTREMITIES: ICD-10-CM

## 2018-04-25 DIAGNOSIS — M15.9 PRIMARY OSTEOARTHRITIS INVOLVING MULTIPLE JOINTS: Primary | ICD-10-CM

## 2018-04-25 DIAGNOSIS — M48.062 SPINAL STENOSIS OF LUMBAR REGION WITH NEUROGENIC CLAUDICATION: ICD-10-CM

## 2018-04-25 PROCEDURE — 99213 OFFICE O/P EST LOW 20 MIN: CPT | Performed by: PHYSICAL MEDICINE & REHABILITATION

## 2018-04-25 RX ORDER — GABAPENTIN 300 MG/1
CAPSULE ORAL
Qty: 90 CAPSULE | Refills: 0 | Status: SHIPPED | OUTPATIENT
Start: 2018-04-25 | End: 2018-06-11 | Stop reason: SDUPTHER

## 2018-04-25 RX ORDER — HYDROCODONE BITARTRATE AND ACETAMINOPHEN 7.5; 325 MG/1; MG/1
1 TABLET ORAL EVERY 6 HOURS PRN
COMMUNITY
End: 2018-05-08 | Stop reason: SDUPTHER

## 2018-04-25 ASSESSMENT — ENCOUNTER SYMPTOMS
CONSTIPATION: 1
BACK PAIN: 1
PHOTOPHOBIA: 1
DIARRHEA: 0
NAUSEA: 0
VOMITING: 0
ALLERGIC/IMMUNOLOGIC NEGATIVE: 1
WHEEZING: 0
SHORTNESS OF BREATH: 0
CHEST TIGHTNESS: 0
ABDOMINAL PAIN: 0

## 2018-05-08 ENCOUNTER — OFFICE VISIT (OUTPATIENT)
Dept: INTERNAL MEDICINE CLINIC | Age: 63
End: 2018-05-08

## 2018-05-08 VITALS
RESPIRATION RATE: 20 BRPM | WEIGHT: 220 LBS | OXYGEN SATURATION: 98 % | HEIGHT: 68 IN | DIASTOLIC BLOOD PRESSURE: 62 MMHG | TEMPERATURE: 97.5 F | HEART RATE: 76 BPM | SYSTOLIC BLOOD PRESSURE: 102 MMHG | BODY MASS INDEX: 33.34 KG/M2

## 2018-05-08 DIAGNOSIS — Z79.899 HIGH RISK MEDICATION USE: Primary | ICD-10-CM

## 2018-05-08 DIAGNOSIS — M15.9 PRIMARY OSTEOARTHRITIS INVOLVING MULTIPLE JOINTS: ICD-10-CM

## 2018-05-08 DIAGNOSIS — R30.0 DYSURIA: ICD-10-CM

## 2018-05-08 DIAGNOSIS — M48.062 SPINAL STENOSIS OF LUMBAR REGION WITH NEUROGENIC CLAUDICATION: ICD-10-CM

## 2018-05-08 DIAGNOSIS — N30.00 ACUTE CYSTITIS WITHOUT HEMATURIA: Primary | ICD-10-CM

## 2018-05-08 DIAGNOSIS — G57.93 NEUROPATHY INVOLVING BOTH LOWER EXTREMITIES: ICD-10-CM

## 2018-05-08 LAB
BILIRUBIN, POC: NORMAL
BLOOD URINE, POC: NORMAL
CLARITY, POC: NORMAL
COLOR, POC: YELLOW
GLUCOSE URINE, POC: NORMAL
KETONES, POC: NORMAL
LEUKOCYTE EST, POC: NORMAL
NITRITE, POC: NORMAL
PH, POC: 6
PROTEIN, POC: NORMAL
SPECIFIC GRAVITY, POC: 1.01
UROBILINOGEN, POC: NORMAL

## 2018-05-08 PROCEDURE — 81002 URINALYSIS NONAUTO W/O SCOPE: CPT | Performed by: NURSE PRACTITIONER

## 2018-05-08 PROCEDURE — 99213 OFFICE O/P EST LOW 20 MIN: CPT | Performed by: NURSE PRACTITIONER

## 2018-05-08 RX ORDER — CIPROFLOXACIN 500 MG/1
500 TABLET, FILM COATED ORAL 2 TIMES DAILY
Qty: 14 TABLET | Refills: 0 | Status: SHIPPED | OUTPATIENT
Start: 2018-05-08 | End: 2018-05-15

## 2018-05-08 ASSESSMENT — ENCOUNTER SYMPTOMS
NAUSEA: 0
SHORTNESS OF BREATH: 0
WHEEZING: 0
COUGH: 0
DIARRHEA: 0
ABDOMINAL PAIN: 0
VOMITING: 0

## 2018-05-09 RX ORDER — HYDROCODONE BITARTRATE AND ACETAMINOPHEN 7.5; 325 MG/1; MG/1
1 TABLET ORAL EVERY 6 HOURS PRN
Qty: 90 TABLET | Refills: 0 | Status: SHIPPED | OUTPATIENT
Start: 2018-05-09 | End: 2018-06-05 | Stop reason: SDUPTHER

## 2018-06-05 DIAGNOSIS — G57.93 NEUROPATHY INVOLVING BOTH LOWER EXTREMITIES: ICD-10-CM

## 2018-06-05 DIAGNOSIS — M48.062 SPINAL STENOSIS OF LUMBAR REGION WITH NEUROGENIC CLAUDICATION: ICD-10-CM

## 2018-06-05 DIAGNOSIS — M15.9 PRIMARY OSTEOARTHRITIS INVOLVING MULTIPLE JOINTS: ICD-10-CM

## 2018-06-05 DIAGNOSIS — Z79.899 HIGH RISK MEDICATION USE: ICD-10-CM

## 2018-06-08 RX ORDER — HYDROCODONE BITARTRATE AND ACETAMINOPHEN 7.5; 325 MG/1; MG/1
1 TABLET ORAL
Qty: 80 TABLET | Refills: 0 | Status: SHIPPED | OUTPATIENT
Start: 2018-06-08 | End: 2018-07-10 | Stop reason: SDUPTHER

## 2018-06-11 DIAGNOSIS — M48.062 SPINAL STENOSIS OF LUMBAR REGION WITH NEUROGENIC CLAUDICATION: ICD-10-CM

## 2018-06-11 DIAGNOSIS — G57.93 NEUROPATHY INVOLVING BOTH LOWER EXTREMITIES: ICD-10-CM

## 2018-06-11 DIAGNOSIS — M43.10 ACQUIRED SPONDYLOLISTHESIS: ICD-10-CM

## 2018-06-11 RX ORDER — GABAPENTIN 300 MG/1
CAPSULE ORAL
Qty: 90 CAPSULE | Refills: 1 | Status: SHIPPED | OUTPATIENT
Start: 2018-06-11 | End: 2018-07-10 | Stop reason: SDUPTHER

## 2018-07-03 ENCOUNTER — OFFICE VISIT (OUTPATIENT)
Dept: INTERNAL MEDICINE CLINIC | Age: 63
End: 2018-07-03
Payer: MEDICARE

## 2018-07-03 VITALS
RESPIRATION RATE: 16 BRPM | HEART RATE: 78 BPM | WEIGHT: 219 LBS | BODY MASS INDEX: 33.19 KG/M2 | SYSTOLIC BLOOD PRESSURE: 108 MMHG | DIASTOLIC BLOOD PRESSURE: 50 MMHG | OXYGEN SATURATION: 98 % | HEIGHT: 68 IN | TEMPERATURE: 98.1 F

## 2018-07-03 DIAGNOSIS — N95.1 POSTMENOPAUSAL DISORDER: ICD-10-CM

## 2018-07-03 DIAGNOSIS — Z23 NEED FOR VACCINATION AGAINST STREPTOCOCCUS PNEUMONIAE: ICD-10-CM

## 2018-07-03 DIAGNOSIS — J06.9 URI, ACUTE: ICD-10-CM

## 2018-07-03 DIAGNOSIS — Z23 NEED FOR SHINGLES VACCINE: Primary | ICD-10-CM

## 2018-07-03 DIAGNOSIS — H10.33 ACUTE CONJUNCTIVITIS OF BOTH EYES, UNSPECIFIED ACUTE CONJUNCTIVITIS TYPE: ICD-10-CM

## 2018-07-03 PROCEDURE — 99213 OFFICE O/P EST LOW 20 MIN: CPT | Performed by: PHYSICIAN ASSISTANT

## 2018-07-03 PROCEDURE — 90670 PCV13 VACCINE IM: CPT | Performed by: PHYSICIAN ASSISTANT

## 2018-07-03 PROCEDURE — G8417 CALC BMI ABV UP PARAM F/U: HCPCS | Performed by: PHYSICIAN ASSISTANT

## 2018-07-03 PROCEDURE — 3017F COLORECTAL CA SCREEN DOC REV: CPT | Performed by: PHYSICIAN ASSISTANT

## 2018-07-03 PROCEDURE — 4040F PNEUMOC VAC/ADMIN/RCVD: CPT | Performed by: PHYSICIAN ASSISTANT

## 2018-07-03 PROCEDURE — 1123F ACP DISCUSS/DSCN MKR DOCD: CPT | Performed by: PHYSICIAN ASSISTANT

## 2018-07-03 PROCEDURE — G0009 ADMIN PNEUMOCOCCAL VACCINE: HCPCS | Performed by: PHYSICIAN ASSISTANT

## 2018-07-03 PROCEDURE — 1036F TOBACCO NON-USER: CPT | Performed by: PHYSICIAN ASSISTANT

## 2018-07-03 PROCEDURE — G8400 PT W/DXA NO RESULTS DOC: HCPCS | Performed by: PHYSICIAN ASSISTANT

## 2018-07-03 PROCEDURE — G8427 DOCREV CUR MEDS BY ELIG CLIN: HCPCS | Performed by: PHYSICIAN ASSISTANT

## 2018-07-03 PROCEDURE — 1090F PRES/ABSN URINE INCON ASSESS: CPT | Performed by: PHYSICIAN ASSISTANT

## 2018-07-03 PROCEDURE — G8598 ASA/ANTIPLAT THER USED: HCPCS | Performed by: PHYSICIAN ASSISTANT

## 2018-07-03 RX ORDER — TOBRAMYCIN 3 MG/ML
1 SOLUTION/ DROPS OPHTHALMIC EVERY 4 HOURS
Qty: 1 BOTTLE | Refills: 1 | Status: SHIPPED | OUTPATIENT
Start: 2018-07-03 | End: 2018-07-11 | Stop reason: ALTCHOICE

## 2018-07-03 RX ORDER — TOBRAMYCIN 3 MG/ML
1 SOLUTION/ DROPS OPHTHALMIC EVERY 4 HOURS
Qty: 1 BOTTLE | Refills: 1 | Status: SHIPPED | OUTPATIENT
Start: 2018-07-03 | End: 2018-07-03 | Stop reason: SDUPTHER

## 2018-07-03 ASSESSMENT — ENCOUNTER SYMPTOMS
COUGH: 1
SPUTUM PRODUCTION: 1
CONSTIPATION: 0
NAUSEA: 0
ABDOMINAL PAIN: 0
EYE DISCHARGE: 1
HEARTBURN: 0
BLOOD IN STOOL: 0
EYE REDNESS: 1
WHEEZING: 0
SHORTNESS OF BREATH: 0
DIARRHEA: 0
EYE PAIN: 0
BACK PAIN: 1
VOMITING: 0

## 2018-07-03 NOTE — PROGRESS NOTES
outbreak 2012    SI (sacroiliac) pain     Stenosis     Vitamin D deficiency      Past Surgical History:   Procedure Laterality Date    CARDIAC CATHETERIZATION  10/2016    CERVICAL FUSION  12/14/11    Dr Tremaine Mays with bone graft and plate    COLONOSCOPY      HAND FUSION  7/05    HYSTERECTOMY  2001    HYSTERECTOMY, TOTAL ABDOMINAL      JOINT REPLACEMENT Left     tka    MENISCECTOMY  10/31/11    left knee    OTHER SURGICAL HISTORY  10/26/09    CAUDALS BY DR Leno Weaver    [place] COLON CA SCRN NOT  [facility] N/A 4/12/2017    COLONOSCOPY performed by Nora Garrett MD at . [facility] 61 ESOPHAGOGASTRODUODENOSCOPY TRANSORAL DIAGNOSTIC N/A 4/12/2017    EGD ESOPHAGOGASTRODUODENOSCOPY performed by Nora Garrett MD at [address] [facility]  12/2011    Dr Tremaine Mays Lumbar and c spine    TOTAL KNEE ARTHROPLASTY  7/30/12    LEFT-DR AVELAR    UPPER GASTROINTESTINAL ENDOSCOPY  1995     Social History     Social History    Marital status:      Spouse name: N/A    Number of children: N/A    Years of education: N/A     Occupational History    Retired       Social History Main Topics    Smoking status: Never Smoker    Smokeless tobacco: Never Used    Alcohol use No    Drug use: No    Sexual activity: No     Other Topics Concern    Not on file     Social History Narrative    No narrative on file     Family History   Problem Relation Age of Onset    Heart Disease Father     High Cholesterol Father     High Blood Pressure Father     Stroke Mother     High Blood Pressure Mother      Allergies   Allergen Reactions    Latex     Cephalexin     Norvasc [Amlodipine Besylate] Other (See Comments)     swelling    Keflex [Cephalexin] Rash     Current Outpatient Prescriptions   Medication Sig Dispense Refill    gabapentin (NEURONTIN) 300 MG capsule Take one pill in the daytime as tolerated and two pills at night. Generic equivalent acceptable, unless otherwise noted. . 90 capsule

## 2018-07-10 DIAGNOSIS — Z79.899 HIGH RISK MEDICATION USE: ICD-10-CM

## 2018-07-10 DIAGNOSIS — G57.93 NEUROPATHY INVOLVING BOTH LOWER EXTREMITIES: ICD-10-CM

## 2018-07-10 DIAGNOSIS — M43.10 ACQUIRED SPONDYLOLISTHESIS: ICD-10-CM

## 2018-07-10 DIAGNOSIS — M48.062 SPINAL STENOSIS OF LUMBAR REGION WITH NEUROGENIC CLAUDICATION: ICD-10-CM

## 2018-07-10 DIAGNOSIS — M15.9 PRIMARY OSTEOARTHRITIS INVOLVING MULTIPLE JOINTS: ICD-10-CM

## 2018-07-10 RX ORDER — GABAPENTIN 300 MG/1
CAPSULE ORAL
Qty: 90 CAPSULE | Refills: 1 | Status: SHIPPED | OUTPATIENT
Start: 2018-07-10 | End: 2018-08-01 | Stop reason: SDUPTHER

## 2018-07-10 RX ORDER — HYDROCODONE BITARTRATE AND ACETAMINOPHEN 7.5; 325 MG/1; MG/1
1 TABLET ORAL
Qty: 80 TABLET | Refills: 0 | Status: SHIPPED | OUTPATIENT
Start: 2018-07-10 | End: 2018-07-11 | Stop reason: DRUGHIGH

## 2018-07-11 ENCOUNTER — OFFICE VISIT (OUTPATIENT)
Dept: PHYSICAL MEDICINE AND REHAB | Age: 63
End: 2018-07-11
Payer: MEDICARE

## 2018-07-11 VITALS
WEIGHT: 220 LBS | HEIGHT: 70 IN | SYSTOLIC BLOOD PRESSURE: 122 MMHG | BODY MASS INDEX: 31.5 KG/M2 | DIASTOLIC BLOOD PRESSURE: 82 MMHG

## 2018-07-11 DIAGNOSIS — Z79.899 HIGH RISK MEDICATION USE: ICD-10-CM

## 2018-07-11 DIAGNOSIS — G57.93 NEUROPATHY INVOLVING BOTH LOWER EXTREMITIES: ICD-10-CM

## 2018-07-11 DIAGNOSIS — M48.062 SPINAL STENOSIS OF LUMBAR REGION WITH NEUROGENIC CLAUDICATION: ICD-10-CM

## 2018-07-11 DIAGNOSIS — M15.9 PRIMARY OSTEOARTHRITIS INVOLVING MULTIPLE JOINTS: ICD-10-CM

## 2018-07-11 PROCEDURE — G8417 CALC BMI ABV UP PARAM F/U: HCPCS | Performed by: PHYSICAL MEDICINE & REHABILITATION

## 2018-07-11 PROCEDURE — 1101F PT FALLS ASSESS-DOCD LE1/YR: CPT | Performed by: PHYSICAL MEDICINE & REHABILITATION

## 2018-07-11 PROCEDURE — G8598 ASA/ANTIPLAT THER USED: HCPCS | Performed by: PHYSICAL MEDICINE & REHABILITATION

## 2018-07-11 PROCEDURE — 99213 OFFICE O/P EST LOW 20 MIN: CPT | Performed by: PHYSICAL MEDICINE & REHABILITATION

## 2018-07-11 PROCEDURE — 4040F PNEUMOC VAC/ADMIN/RCVD: CPT | Performed by: PHYSICAL MEDICINE & REHABILITATION

## 2018-07-11 PROCEDURE — 1123F ACP DISCUSS/DSCN MKR DOCD: CPT | Performed by: PHYSICAL MEDICINE & REHABILITATION

## 2018-07-11 PROCEDURE — G8427 DOCREV CUR MEDS BY ELIG CLIN: HCPCS | Performed by: PHYSICAL MEDICINE & REHABILITATION

## 2018-07-11 PROCEDURE — 3017F COLORECTAL CA SCREEN DOC REV: CPT | Performed by: PHYSICAL MEDICINE & REHABILITATION

## 2018-07-11 PROCEDURE — G8400 PT W/DXA NO RESULTS DOC: HCPCS | Performed by: PHYSICAL MEDICINE & REHABILITATION

## 2018-07-11 PROCEDURE — 1090F PRES/ABSN URINE INCON ASSESS: CPT | Performed by: PHYSICAL MEDICINE & REHABILITATION

## 2018-07-11 PROCEDURE — 1036F TOBACCO NON-USER: CPT | Performed by: PHYSICAL MEDICINE & REHABILITATION

## 2018-07-11 RX ORDER — HYDROCODONE BITARTRATE AND ACETAMINOPHEN 7.5; 325 MG/1; MG/1
TABLET ORAL
Qty: 75 TABLET | Refills: 0 | Status: ON HOLD | OUTPATIENT
Start: 2018-07-11 | End: 2018-08-09 | Stop reason: SDUPTHER

## 2018-07-11 RX ORDER — TOPIRAMATE 25 MG/1
TABLET ORAL
Qty: 60 TABLET | Refills: 3 | Status: ON HOLD | OUTPATIENT
Start: 2018-07-11 | End: 2018-08-17 | Stop reason: HOSPADM

## 2018-07-11 ASSESSMENT — ENCOUNTER SYMPTOMS
VOMITING: 0
DIARRHEA: 0
WHEEZING: 0
NAUSEA: 0
ABDOMINAL PAIN: 0
CHEST TIGHTNESS: 0
ALLERGIC/IMMUNOLOGIC NEGATIVE: 1
SHORTNESS OF BREATH: 0
PHOTOPHOBIA: 1
BACK PAIN: 1

## 2018-07-11 NOTE — PROGRESS NOTES
Smoking status: Never Smoker    Smokeless tobacco: Never Used    Alcohol use No    Drug use: No    Sexual activity: No     Other Topics Concern    None     Social History Narrative    None     Family History   Problem Relation Age of Onset    Heart Disease Father     High Cholesterol Father     High Blood Pressure Father     Stroke Mother     High Blood Pressure Mother        Allergies   Allergen Reactions    Latex     Cephalexin     Norvasc [Amlodipine Besylate] Other (See Comments)     swelling    Keflex [Cephalexin] Rash       Review of Systems   Constitutional: Positive for fatigue. Negative for activity change and fever. Eyes: Positive for photophobia and visual disturbance. Several occurances   Respiratory: Negative for chest tightness, shortness of breath and wheezing. Cardiovascular: Positive for leg swelling. Negative for chest pain and palpitations. Relieved at HS with lasix   Gastrointestinal: Negative for abdominal pain, diarrhea, nausea and vomiting. Endocrine: Negative. Genitourinary: Negative. Negative for bladder incontinence. Musculoskeletal: Positive for arthralgias, back pain, gait problem and joint swelling. Negative for neck stiffness. Skin: Negative. Allergic/Immunologic: Negative. Neurological: Positive for tremors. Negative for dizziness, syncope, weakness, light-headedness, numbness and headaches. Hematological: Negative. Psychiatric/Behavioral: Positive for sleep disturbance. Negative for dysphoric mood. The patient is nervous/anxious. Stress       Objective    Vitals:    07/11/18 1141   BP: 122/82   Site: Left Arm   Position: Sitting   Cuff Size: Large Adult   Weight: 220 lb (99.8 kg)   Height: 5' 9.6\" (1.768 m)     Pain Score: Four     Physical Exam   Constitutional: She is oriented to person, place, and time. Vital signs are normal. She appears well-developed. Non-toxic appearance. She does not have a sickly appearance.  She  Abnormal electromyogram (EMG) 8/19/09    SENSORIMOTOR NEUROPATHY OF BLE, RIGHT WORSE THAN LEFT, SUSPICIOUS FOR RIGHT S1 RADIC    Angina pectoris (HCC)     Ankle pain     Colon polyp 4/3/2017    Dermatitis     Fibromyalgia     Foot pain     Hyperlipidemia     Hypertension     Low back pain     MRSA (methicillin resistant staph aureus) culture positive 2011    Neck pain     Neuropathy (HCC)     Obesity     Osteoarthritis     PSVT (paroxysmal supraventricular tachycardia) ([address]     Shingles outbreak 2012    SI (sacroiliac) pain     Stenosis     Vitamin D deficiency            Given their medication, chronic pain and lifestyle and medications they are at risk for :    Falls, constipation, addiction  Loss of livelyhood due to severe pain, debility, weight gain and  vitamin D deficiency    The patient was educated regarding proper diet, fitness routine, and regulatory restrictions concerning pain medications. Previous notes, comprehensive past medical, surgical, family history, and diagnostics were reviewed. Patient education and councelling were provided regarding off label use,treatment options and medication and injection risks. Current and old [place]ARRS ([place] Automated Prescription Reporting System) records reviewed, all refills reviewed since last visit,  Behavioral agreement/KATHLEEN regulations   and Toxicology screen was reviewed with patient and is up to date. There are no current red flags. They are making good progress regarding pain relief, they are performing at a functional level regarding activities of daily living and psychological functioning, they're not having any adverse effects or side effects from the current medications, and I see no findings of aberrant drug taking her addiction related behaviors. Patient is currently taking:       I have discontinued Ms. Hdez's tobramycin. I am also having her start on HYDROcodone-acetaminophen and topiramate.

## 2018-07-16 ENCOUNTER — PROCEDURE VISIT (OUTPATIENT)
Dept: PHYSICAL MEDICINE AND REHAB | Age: 63
End: 2018-07-16
Payer: MEDICARE

## 2018-07-16 DIAGNOSIS — M79.7 FIBROMYALGIA: ICD-10-CM

## 2018-07-16 DIAGNOSIS — M79.10 MYALGIA: Primary | ICD-10-CM

## 2018-07-16 PROCEDURE — 20553 NJX 1/MLT TRIGGER POINTS 3/>: CPT | Performed by: PHYSICAL MEDICINE & REHABILITATION

## 2018-07-23 ENCOUNTER — HOSPITAL ENCOUNTER (OUTPATIENT)
Dept: WOMENS IMAGING | Age: 63
Discharge: HOME | End: 2018-07-25
Payer: MEDICARE

## 2018-07-23 DIAGNOSIS — N95.1 POSTMENOPAUSAL DISORDER: ICD-10-CM

## 2018-07-23 PROCEDURE — 77080 DXA BONE DENSITY AXIAL: CPT

## 2018-08-01 DIAGNOSIS — M43.10 ACQUIRED SPONDYLOLISTHESIS: ICD-10-CM

## 2018-08-01 DIAGNOSIS — M48.062 SPINAL STENOSIS OF LUMBAR REGION WITH NEUROGENIC CLAUDICATION: ICD-10-CM

## 2018-08-01 DIAGNOSIS — Z79.899 HIGH RISK MEDICATION USE: ICD-10-CM

## 2018-08-01 DIAGNOSIS — M15.9 PRIMARY OSTEOARTHRITIS INVOLVING MULTIPLE JOINTS: ICD-10-CM

## 2018-08-01 DIAGNOSIS — G57.93 NEUROPATHY INVOLVING BOTH LOWER EXTREMITIES: ICD-10-CM

## 2018-08-01 RX ORDER — HYDROCODONE BITARTRATE AND ACETAMINOPHEN 7.5; 325 MG/1; MG/1
TABLET ORAL
Qty: 75 TABLET | Refills: 0 | Status: CANCELLED | OUTPATIENT
Start: 2018-08-10 | End: 2018-09-09

## 2018-08-02 RX ORDER — GABAPENTIN 300 MG/1
CAPSULE ORAL
Qty: 90 CAPSULE | Refills: 1 | Status: ON HOLD | OUTPATIENT
Start: 2018-08-02 | End: 2018-08-17 | Stop reason: HOSPADM

## 2018-08-06 ENCOUNTER — OFFICE VISIT (OUTPATIENT)
Dept: FAMILY MEDICINE CLINIC | Age: 63
End: 2018-08-06
Payer: MEDICARE

## 2018-08-06 VITALS
DIASTOLIC BLOOD PRESSURE: 80 MMHG | OXYGEN SATURATION: 94 % | SYSTOLIC BLOOD PRESSURE: 122 MMHG | TEMPERATURE: 99.6 F | HEART RATE: 78 BPM | HEIGHT: 68 IN | RESPIRATION RATE: 16 BRPM

## 2018-08-06 DIAGNOSIS — M19.072 ARTHRITIS OF LEFT FOOT: ICD-10-CM

## 2018-08-06 DIAGNOSIS — M79.672 CHRONIC FOOT PAIN, LEFT: Primary | ICD-10-CM

## 2018-08-06 DIAGNOSIS — G89.29 CHRONIC FOOT PAIN, LEFT: Primary | ICD-10-CM

## 2018-08-06 PROCEDURE — 1123F ACP DISCUSS/DSCN MKR DOCD: CPT | Performed by: NURSE PRACTITIONER

## 2018-08-06 PROCEDURE — G8399 PT W/DXA RESULTS DOCUMENT: HCPCS | Performed by: NURSE PRACTITIONER

## 2018-08-06 PROCEDURE — 4040F PNEUMOC VAC/ADMIN/RCVD: CPT | Performed by: NURSE PRACTITIONER

## 2018-08-06 PROCEDURE — 1036F TOBACCO NON-USER: CPT | Performed by: NURSE PRACTITIONER

## 2018-08-06 PROCEDURE — G8427 DOCREV CUR MEDS BY ELIG CLIN: HCPCS | Performed by: NURSE PRACTITIONER

## 2018-08-06 PROCEDURE — 1101F PT FALLS ASSESS-DOCD LE1/YR: CPT | Performed by: NURSE PRACTITIONER

## 2018-08-06 PROCEDURE — G8417 CALC BMI ABV UP PARAM F/U: HCPCS | Performed by: NURSE PRACTITIONER

## 2018-08-06 PROCEDURE — 99213 OFFICE O/P EST LOW 20 MIN: CPT | Performed by: NURSE PRACTITIONER

## 2018-08-06 PROCEDURE — 3017F COLORECTAL CA SCREEN DOC REV: CPT | Performed by: NURSE PRACTITIONER

## 2018-08-06 PROCEDURE — G8598 ASA/ANTIPLAT THER USED: HCPCS | Performed by: NURSE PRACTITIONER

## 2018-08-06 PROCEDURE — 1090F PRES/ABSN URINE INCON ASSESS: CPT | Performed by: NURSE PRACTITIONER

## 2018-08-06 ASSESSMENT — ENCOUNTER SYMPTOMS
CHANGE IN BOWEL HABIT: 0
NAUSEA: 0
TROUBLE SWALLOWING: 0
SWOLLEN GLANDS: 0
COUGH: 0
SORE THROAT: 0
RHINORRHEA: 0
DIARRHEA: 0
SHORTNESS OF BREATH: 0
SINUS PRESSURE: 0
VISUAL CHANGE: 0
VOMITING: 0
ABDOMINAL PAIN: 0

## 2018-08-06 NOTE — PROGRESS NOTES
Subjective     Danielle Albany 70 y.o. female presents 8/6/18 with   Chief Complaint   Patient presents with    Foot Pain     Pt presents with left foot pain. states she did not have an injury or fall to the foot. States she does have arthritis, noticed the pain this morning and was having a hard time walking. Foot Pain   This is a chronic (Has seen Dr. Alan Dubois in the past for foot injections; Sees Dr. Erica Barron with pain management) problem. Episode onset: worsened last night into today. The problem occurs constantly. The problem has been gradually worsening. Associated symptoms include arthralgias and myalgias. Pertinent negatives include no abdominal pain, anorexia, change in bowel habit, chest pain, chills, congestion, coughing, diaphoresis, fatigue, fever, headaches, joint swelling, nausea, neck pain, numbness, rash, sore throat, swollen glands, urinary symptoms, vertigo, visual change, vomiting or weakness. The symptoms are aggravated by twisting, walking, standing and exertion. Treatments tried: Regular medications, tylenol.      Patient has a history of foot pain and neuropathy  Patient has a signed pain management contract with Dr. Justin Adams the following history:    Past Medical History:   Diagnosis Date    Abnormal electromyogram (EMG) 8/19/09    SENSORIMOTOR NEUROPATHY OF BLE, RIGHT WORSE THAN LEFT, SUSPICIOUS FOR RIGHT S1 RADIC    Angina pectoris ([address]     Ankle pain     Colon polyp 4/3/2017    Dermatitis     Fibromyalgia     Foot pain     Hyperlipidemia     Hypertension     Low back pain     MRSA (methicillin resistant staph aureus) culture positive 2011    Neck pain     Neuropathy     Obesity     Osteoarthritis     PSVT (paroxysmal supraventricular tachycardia) ([address]     Shingles outbreak 2012    SI (sacroiliac) pain     Stenosis     Vitamin D deficiency      Past Surgical History:   Procedure Laterality Date    CARDIAC CATHETERIZATION  10/2016    CERVICAL FUSION  12/14/11    Dr Denice Mitchell with bone graft and plate    COLONOSCOPY      HAND FUSION  7/05    HYSTERECTOMY  2001    HYSTERECTOMY, TOTAL ABDOMINAL      JOINT REPLACEMENT Left     tka    MENISCECTOMY  10/31/11    left knee    OTHER SURGICAL HISTORY  10/26/09    CAUDALS BY DR Ora Venegas    [place] COLON CA SCRN NOT  [address] IND N/A 4/12/2017    COLONOSCOPY performed by Eneida Brooke MD at [address] ESOPHAGOGASTRODUODENOSCOPY TRANSORAL DIAGNOSTIC N/A 4/12/2017    EGD ESOPHAGOGASTRODUODENOSCOPY performed by Eneida Brooke MD at [address]  12/2011    Dr Denice Mitchell Lumbar and c spine    TOTAL KNEE ARTHROPLASTY  7/30/12    LEFT-DR AVELAR    UPPER GASTROINTESTINAL ENDOSCOPY  1995     Family History   Problem Relation Age of Onset    Heart Disease Father     High Cholesterol Father     High Blood Pressure Father     Stroke Mother     High Blood Pressure Mother        Allergies   Allergen Reactions    Latex     Cephalexin     Norvasc [Amlodipine Besylate] Other (See Comments)     swelling    Keflex [Cephalexin] Rash       Current Outpatient Prescriptions   Medication Sig Dispense Refill    gabapentin (NEURONTIN) 300 MG capsule Take one pill in the daytime as tolerated and two pills at night. Generic equivalent acceptable, unless otherwise noted. . 90 capsule 1    HYDROcodone-acetaminophen (NORCO) 7.5-325 MG per tablet Take 1 tablet by mouth every 4-6 hours as needed for Pain (Max of 3 per day.  Do not get narcotic medications from any other provider.  Try to wean to 2 per day.) for up to 30 days. .. 75 tablet 0    topiramate (TOPAMAX) 25 MG tablet Take one or two nightly as tolerated for pain 60 tablet 3    atorvastatin (LIPITOR) 10 MG tablet Take 1 tablet by mouth nightly 90 tablet 3    lisinopril (PRINIVIL;ZESTRIL) 40 MG tablet Take 1 tablet by mouth daily 90 tablet 3    lidocaine (XYLOCAINE) 5 % ointment Apply topically as needed.  1 Tube 11    aspirin 81 exhibits no discharge. Left eye exhibits no discharge. Neck: Normal range of motion. Neck supple. No tracheal deviation present. No thyromegaly present. Cardiovascular: Normal rate, regular rhythm and normal heart sounds. Exam reveals no gallop and no friction rub. No murmur heard. Pulmonary/Chest: Effort normal and breath sounds normal. No respiratory distress. She has no wheezes. She has no rales. Musculoskeletal: She exhibits no edema. Left foot: There is decreased range of motion, bony tenderness and swelling. There is normal capillary refill. Feet:    Lymphadenopathy:     She has no cervical adenopathy. Neurological: She is alert and oriented to person, place, and time. Skin: Skin is warm and dry. No rash noted. She is not diaphoretic. Psychiatric: She has a normal mood and affect. Her behavior is normal.   Vitals reviewed. Assessment and Plan      ICD-10-CM ICD-9-CM    1. Chronic foot pain, left M79.672 729.5 Referral To Podiatry - (FELIBERTO) Kelly Nguyễn DPM    U31.86 338.29    2. Arthritis of left foot M19.072 716.97 Referral To Podiatry - (FELIBERTO) Kelly Nguyễn DPM       Orders Placed This Encounter   Procedures    Referral To Podiatry - (FELIBERTO) Kelly Nguyễn DPM     Referral Priority:   Routine     Referral Type:   Eval and Treat     Referral Reason:   Specialty Services Required     Referred to Provider:   Kelly Nguyễn DPM     Requested Specialty:   Podiatry     Number of Visits Requested:   1       Health Maintenance  None for this visit    Reviewed with the patient: current clinical status, medications, activities and diet. Patient advised to practice proper hand hygiene, rest as tolerated, and increase hydration. Patient advised to take medication as directed. Advised to eat a well balanced diet. Advised to use Tylenol/NSAIDs as needed for symptom management. Continue with prescribed pain management plan.  Patient established with [facility]

## 2018-08-06 NOTE — PATIENT INSTRUCTIONS
Patient Education        Arthritis: Care Instructions  Your Care Instructions  Arthritis, also called osteoarthritis, is a breakdown of the cartilage that cushions your joints. When the cartilage wears down, your bones rub against each other. This causes pain and stiffness. Many people have some arthritis as they age. Arthritis most often affects the joints of the spine, hands, hips, knees, or feet. You can take simple measures to protect your joints, ease your pain, and help you stay active. Follow-up care is a key part of your treatment and safety. Be sure to make and go to all appointments, and call your doctor if you are having problems. It's also a good idea to know your test results and keep a list of the medicines you take. How can you care for yourself at home? · Stay at a healthy weight. Being overweight puts extra strain on your joints. · Talk to your doctor or physical therapist about exercises that will help ease joint pain. ¨ Stretch. You may enjoy gentle forms of yoga to help keep your joints and muscles flexible. ¨ Walk instead of jog. Other types of exercise that are less stressful on the joints include riding a bicycle, swimming, chapito chi, or water exercise. ¨ Lift weights. Strong muscles help reduce stress on your joints. Stronger thigh muscles, for example, take some of the stress off of the knees and hips. Learn the right way to lift weights so you do not make joint pain worse. · Take your medicines exactly as prescribed. Call your doctor if you think you are having a problem with your medicine. · Take pain medicines exactly as directed. ¨ If the doctor gave you a prescription medicine for pain, take it as prescribed. ¨ If you are not taking a prescription pain medicine, ask your doctor if you can take an over-the-counter medicine. · Use a cane, crutch, walker, or another device if you need help to get around. These can help rest your joints.  You also can use other things to make life easier, such as a higher toilet seat and padded handles on kitchen utensils. · Do not sit in low chairs, which can make it hard to get up. · Put heat or cold on your sore joints as needed. Use whichever helps you most. You also can take turns with hot and cold packs. ¨ Apply heat 2 or 3 times a day for 20 to 30 minutes-using a heating pad, hot shower, or hot pack-to relieve pain and stiffness. ¨ Put ice or a cold pack on your sore joint for 10 to 20 minutes at a time. Put a thin cloth between the ice and your skin. When should you call for help? Call your doctor now or seek immediate medical care if:    · You have sudden swelling, warmth, or pain in any joint.     · You have joint pain and a fever or rash.     · You have such bad pain that you cannot use a joint.    Watch closely for changes in your health, and be sure to contact your doctor if:    · You have mild joint symptoms that continue even with more than 6 weeks of care at home.     · You have stomach pain or other problems with your medicine. Where can you learn more? Go to [email or web address] org and sign in to your [employer] account. Enter L803 in the Motionbox box to learn more about \"Arthritis: Care Instructions. \"     If you do not have an account, please click on the \"Sign Up Now\" link. Current as of: October 10, 2017  Content Version: 11.6  © 4304-6208 [employer]. Care instructions adapted under license by [facility] [employer] [facility] ([facility]). If you have questions about a medical condition or this instruction, always ask your healthcare professional. [place] [address] any warranty or liability for your use of this information. Patient Education        Foot Pain: Care Instructions  Your Care Instructions  Foot injuries that cause pain and swelling are fairly common. Almost all sports or home repair projects can cause a misstep that ends up as foot pain.  Normal wear and tear, especially as you get older, also can cause foot pain. Most minor foot injuries will heal on their own, and home treatment is usually all you need to do. If you have a severe injury, you may need tests and treatment. Follow-up care is a key part of your treatment and safety. Be sure to make and go to all appointments, and call your doctor if you are having problems. It's also a good idea to know your test results and keep a list of the medicines you take. How can you care for yourself at home? · Take pain medicines exactly as directed. ¨ If the doctor gave you a prescription medicine for pain, take it as prescribed. ¨ If you are not taking a prescription pain medicine, ask your doctor if you can take an over-the-counter medicine. · Rest and protect your foot. Take a break from any activity that may cause pain. · Put ice or a cold pack on your foot for 10 to 20 minutes at a time. Put a thin cloth between the ice and your skin. · Prop up the sore foot on a pillow when you ice it or anytime you sit or lie down during the next 3 days. Try to keep it above the level of your heart. This will help reduce swelling. · Your doctor may recommend that you wrap your foot with an elastic bandage. Keep your foot wrapped for as long as your doctor advises. · If your doctor recommends crutches, use them as directed. · Wear roomy footwear. · As soon as pain and swelling end, begin gentle exercises of your foot. Your doctor can tell you which exercises will help. When should you call for help? Call 911 anytime you think you may need emergency care. For example, call if:    · Your foot turns pale, white, blue, or cold.    Call your doctor now or seek immediate medical care if:    · You cannot move or stand on your foot.     · Your foot looks twisted or out of its normal position.     · Your foot is not stable when you step down.     · You have signs of infection, such as:  ¨ Increased pain, swelling, warmth, or redness.   ¨ Red streaks

## 2018-08-08 ENCOUNTER — APPOINTMENT (OUTPATIENT)
Dept: GENERAL RADIOLOGY | Age: 63
DRG: 853 | End: 2018-08-08
Payer: MEDICARE

## 2018-08-08 ENCOUNTER — HOSPITAL ENCOUNTER (INPATIENT)
Age: 63
LOS: 9 days | Discharge: NURSING, REHAB OR LONG-TERM CARE | DRG: 853 | End: 2018-08-17
Attending: INTERNAL MEDICINE | Admitting: INTERNAL MEDICINE
Payer: MEDICARE

## 2018-08-08 DIAGNOSIS — M46.46 LUMBAR DISCITIS: ICD-10-CM

## 2018-08-08 DIAGNOSIS — M79.672 LEFT FOOT PAIN: ICD-10-CM

## 2018-08-08 DIAGNOSIS — R53.1 GENERAL WEAKNESS: ICD-10-CM

## 2018-08-08 DIAGNOSIS — L03.116 CELLULITIS OF LEFT FOOT: Primary | ICD-10-CM

## 2018-08-08 PROBLEM — E87.1 HYPONATREMIA: Status: ACTIVE | Noted: 2018-08-08

## 2018-08-08 LAB
ALBUMIN SERPL-MCNC: 3.7 G/DL (ref 3.9–4.9)
ALP BLD-CCNC: 109 U/L (ref 40–130)
ALT SERPL-CCNC: 19 U/L (ref 0–33)
AMORPHOUS: NORMAL
ANION GAP SERPL CALCULATED.3IONS-SCNC: 17 MEQ/L (ref 7–13)
AST SERPL-CCNC: 30 U/L (ref 0–35)
BACTERIA: NORMAL /HPF
BASOPHILS ABSOLUTE: 0 K/UL (ref 0–0.2)
BASOPHILS ABSOLUTE: 0 K/UL (ref 0–0.2)
BASOPHILS RELATIVE PERCENT: 0.1 %
BASOPHILS RELATIVE PERCENT: 0.1 %
BILIRUB SERPL-MCNC: 0.7 MG/DL (ref 0–1.2)
BILIRUBIN URINE: NEGATIVE
BLOOD, URINE: NEGATIVE
BUN BLDV-MCNC: 33 MG/DL (ref 8–23)
CALCIUM SERPL-MCNC: 9.3 MG/DL (ref 8.6–10.2)
CASTS 2: NORMAL /LPF
CASTS: NORMAL /LPF
CHLORIDE BLD-SCNC: 96 MEQ/L (ref 98–107)
CK MB: 7.7 NG/ML (ref 0–3.8)
CLARITY: ABNORMAL
CO2: 19 MEQ/L (ref 22–29)
COLOR: YELLOW
CREAT SERPL-MCNC: 1.66 MG/DL (ref 0.5–0.9)
CREATINE KINASE-MB INDEX: 2.2 % (ref 0–3.5)
EOSINOPHILS ABSOLUTE: 0 K/UL (ref 0–0.7)
EOSINOPHILS ABSOLUTE: 0 K/UL (ref 0–0.7)
EOSINOPHILS RELATIVE PERCENT: 0 %
EOSINOPHILS RELATIVE PERCENT: 0.1 %
GFR AFRICAN AMERICAN: 37.5
GFR NON-AFRICAN AMERICAN: 31
GLOBULIN: 3.3 G/DL (ref 2.3–3.5)
GLUCOSE BLD-MCNC: 134 MG/DL (ref 74–109)
GLUCOSE URINE: NEGATIVE MG/DL
HCT VFR BLD CALC: 33 % (ref 37–47)
HCT VFR BLD CALC: 36.4 % (ref 37–47)
HEMOGLOBIN: 11 G/DL (ref 12–16)
HEMOGLOBIN: 12.3 G/DL (ref 12–16)
KETONES, URINE: NEGATIVE MG/DL
LACTIC ACID: 1.3 MMOL/L (ref 0.5–2.2)
LEUKOCYTE ESTERASE, URINE: NEGATIVE
LYMPHOCYTES ABSOLUTE: 0.3 K/UL (ref 1–4.8)
LYMPHOCYTES ABSOLUTE: 0.7 K/UL (ref 1–4.8)
LYMPHOCYTES RELATIVE PERCENT: 1.8 %
LYMPHOCYTES RELATIVE PERCENT: 4.5 %
MCH RBC QN AUTO: 32 PG (ref 27–31.3)
MCH RBC QN AUTO: 32.3 PG (ref 27–31.3)
MCHC RBC AUTO-ENTMCNC: 33.2 % (ref 33–37)
MCHC RBC AUTO-ENTMCNC: 33.8 % (ref 33–37)
MCV RBC AUTO: 95.8 FL (ref 82–100)
MCV RBC AUTO: 96.3 FL (ref 82–100)
MONOCYTES ABSOLUTE: 0.9 K/UL (ref 0.2–0.8)
MONOCYTES ABSOLUTE: 1.2 K/UL (ref 0.2–0.8)
MONOCYTES RELATIVE PERCENT: 5.4 %
MONOCYTES RELATIVE PERCENT: 6.6 %
NEUTROPHILS ABSOLUTE: 14.3 K/UL (ref 1.4–6.5)
NEUTROPHILS ABSOLUTE: 17.1 K/UL (ref 1.4–6.5)
NEUTROPHILS RELATIVE PERCENT: 90 %
NEUTROPHILS RELATIVE PERCENT: 91.4 %
NITRITE, URINE: NEGATIVE
PDW BLD-RTO: 13.5 % (ref 11.5–14.5)
PDW BLD-RTO: 13.7 % (ref 11.5–14.5)
PH UA: 5.5 (ref 5–9)
PLATELET # BLD: 123 K/UL (ref 130–400)
PLATELET # BLD: 137 K/UL (ref 130–400)
POTASSIUM SERPL-SCNC: 4.4 MEQ/L (ref 3.5–5.1)
PROTEIN UA: 30 MG/DL
RBC # BLD: 3.43 M/UL (ref 4.2–5.4)
RBC # BLD: 3.8 M/UL (ref 4.2–5.4)
RBC UA: NORMAL /HPF (ref 0–2)
SODIUM BLD-SCNC: 132 MEQ/L (ref 132–144)
SPECIFIC GRAVITY UA: 1.01 (ref 1–1.03)
TOTAL CK: 349 U/L (ref 0–170)
TOTAL PROTEIN: 7 G/DL (ref 6.4–8.1)
TROPONIN: <0.01 NG/ML (ref 0–0.01)
URIC ACID, SERUM: 8.1 MG/DL (ref 2.4–5.7)
URINE REFLEX TO CULTURE: YES
UROBILINOGEN, URINE: 1 E.U./DL
WBC # BLD: 15.9 K/UL (ref 4.8–10.8)
WBC # BLD: 18.7 K/UL (ref 4.8–10.8)
WBC UA: NORMAL /HPF (ref 0–5)

## 2018-08-08 PROCEDURE — 2580000003 HC RX 258: Performed by: INTERNAL MEDICINE

## 2018-08-08 PROCEDURE — 82550 ASSAY OF CK (CPK): CPT

## 2018-08-08 PROCEDURE — 87186 SC STD MICRODIL/AGAR DIL: CPT

## 2018-08-08 PROCEDURE — 87086 URINE CULTURE/COLONY COUNT: CPT

## 2018-08-08 PROCEDURE — 99284 EMERGENCY DEPT VISIT MOD MDM: CPT

## 2018-08-08 PROCEDURE — 73630 X-RAY EXAM OF FOOT: CPT

## 2018-08-08 PROCEDURE — 82553 CREATINE MB FRACTION: CPT

## 2018-08-08 PROCEDURE — 80053 COMPREHEN METABOLIC PANEL: CPT

## 2018-08-08 PROCEDURE — 87040 BLOOD CULTURE FOR BACTERIA: CPT

## 2018-08-08 PROCEDURE — 85025 COMPLETE CBC W/AUTO DIFF WBC: CPT

## 2018-08-08 PROCEDURE — 87147 CULTURE TYPE IMMUNOLOGIC: CPT

## 2018-08-08 PROCEDURE — 6360000002 HC RX W HCPCS: Performed by: NURSE PRACTITIONER

## 2018-08-08 PROCEDURE — 6360000002 HC RX W HCPCS: Performed by: PHYSICIAN ASSISTANT

## 2018-08-08 PROCEDURE — 87077 CULTURE AEROBIC IDENTIFY: CPT

## 2018-08-08 PROCEDURE — 6370000000 HC RX 637 (ALT 250 FOR IP): Performed by: INTERNAL MEDICINE

## 2018-08-08 PROCEDURE — 87149 DNA/RNA DIRECT PROBE: CPT

## 2018-08-08 PROCEDURE — 2580000003 HC RX 258: Performed by: NURSE PRACTITIONER

## 2018-08-08 PROCEDURE — 2580000003 HC RX 258: Performed by: PHYSICIAN ASSISTANT

## 2018-08-08 PROCEDURE — 96374 THER/PROPH/DIAG INJ IV PUSH: CPT

## 2018-08-08 PROCEDURE — 1210000000 HC MED SURG R&B

## 2018-08-08 PROCEDURE — 36415 COLL VENOUS BLD VENIPUNCTURE: CPT

## 2018-08-08 PROCEDURE — 84550 ASSAY OF BLOOD/URIC ACID: CPT

## 2018-08-08 PROCEDURE — 81001 URINALYSIS AUTO W/SCOPE: CPT

## 2018-08-08 PROCEDURE — 6370000000 HC RX 637 (ALT 250 FOR IP): Performed by: PHYSICIAN ASSISTANT

## 2018-08-08 PROCEDURE — P9612 CATHETERIZE FOR URINE SPEC: HCPCS

## 2018-08-08 PROCEDURE — 84484 ASSAY OF TROPONIN QUANT: CPT

## 2018-08-08 PROCEDURE — 83605 ASSAY OF LACTIC ACID: CPT

## 2018-08-08 RX ORDER — VANCOMYCIN HYDROCHLORIDE 1 G/200ML
1000 INJECTION, SOLUTION INTRAVENOUS EVERY 12 HOURS
Status: DISCONTINUED | OUTPATIENT
Start: 2018-08-08 | End: 2018-08-08

## 2018-08-08 RX ORDER — TOPIRAMATE 25 MG/1
25 TABLET ORAL DAILY
Status: DISCONTINUED | OUTPATIENT
Start: 2018-08-09 | End: 2018-08-13

## 2018-08-08 RX ORDER — SODIUM CHLORIDE 0.9 % (FLUSH) 0.9 %
10 SYRINGE (ML) INJECTION EVERY 12 HOURS SCHEDULED
Status: DISCONTINUED | OUTPATIENT
Start: 2018-08-08 | End: 2018-08-18 | Stop reason: HOSPADM

## 2018-08-08 RX ORDER — HYDROCODONE BITARTRATE AND ACETAMINOPHEN 7.5; 325 MG/1; MG/1
1 TABLET ORAL EVERY 6 HOURS PRN
Status: DISCONTINUED | OUTPATIENT
Start: 2018-08-08 | End: 2018-08-13

## 2018-08-08 RX ORDER — GABAPENTIN 100 MG/1
100 CAPSULE ORAL 2 TIMES DAILY
Status: DISCONTINUED | OUTPATIENT
Start: 2018-08-08 | End: 2018-08-10

## 2018-08-08 RX ORDER — KETOROLAC TROMETHAMINE 30 MG/ML
30 INJECTION, SOLUTION INTRAMUSCULAR; INTRAVENOUS ONCE
Status: COMPLETED | OUTPATIENT
Start: 2018-08-08 | End: 2018-08-08

## 2018-08-08 RX ORDER — HEPARIN SODIUM 5000 [USP'U]/ML
5000 INJECTION, SOLUTION INTRAVENOUS; SUBCUTANEOUS EVERY 12 HOURS
Status: DISCONTINUED | OUTPATIENT
Start: 2018-08-08 | End: 2018-08-18 | Stop reason: HOSPADM

## 2018-08-08 RX ORDER — SODIUM CHLORIDE 9 MG/ML
INJECTION, SOLUTION INTRAVENOUS CONTINUOUS
Status: DISCONTINUED | OUTPATIENT
Start: 2018-08-08 | End: 2018-08-10

## 2018-08-08 RX ORDER — ONDANSETRON 2 MG/ML
4 INJECTION INTRAMUSCULAR; INTRAVENOUS EVERY 6 HOURS PRN
Status: DISCONTINUED | OUTPATIENT
Start: 2018-08-08 | End: 2018-08-18 | Stop reason: HOSPADM

## 2018-08-08 RX ORDER — ATORVASTATIN CALCIUM 10 MG/1
10 TABLET, FILM COATED ORAL NIGHTLY
Status: DISCONTINUED | OUTPATIENT
Start: 2018-08-08 | End: 2018-08-18 | Stop reason: HOSPADM

## 2018-08-08 RX ORDER — ACETAMINOPHEN 500 MG
1000 TABLET ORAL ONCE
Status: COMPLETED | OUTPATIENT
Start: 2018-08-08 | End: 2018-08-08

## 2018-08-08 RX ORDER — SODIUM CHLORIDE 0.9 % (FLUSH) 0.9 %
10 SYRINGE (ML) INJECTION PRN
Status: DISCONTINUED | OUTPATIENT
Start: 2018-08-08 | End: 2018-08-18 | Stop reason: HOSPADM

## 2018-08-08 RX ORDER — ASPIRIN 81 MG/1
81 TABLET ORAL DAILY
Status: DISCONTINUED | OUTPATIENT
Start: 2018-08-09 | End: 2018-08-18 | Stop reason: HOSPADM

## 2018-08-08 RX ORDER — 0.9 % SODIUM CHLORIDE 0.9 %
1000 INTRAVENOUS SOLUTION INTRAVENOUS ONCE
Status: COMPLETED | OUTPATIENT
Start: 2018-08-08 | End: 2018-08-08

## 2018-08-08 RX ADMIN — KETOROLAC TROMETHAMINE 30 MG: 30 INJECTION, SOLUTION INTRAMUSCULAR at 15:05

## 2018-08-08 RX ADMIN — HEPARIN SODIUM 5000 UNITS: 5000 INJECTION, SOLUTION INTRAVENOUS; SUBCUTANEOUS at 22:29

## 2018-08-08 RX ADMIN — Medication 10 ML: at 22:29

## 2018-08-08 RX ADMIN — SODIUM CHLORIDE: 9 INJECTION, SOLUTION INTRAVENOUS at 22:28

## 2018-08-08 RX ADMIN — VANCOMYCIN HYDROCHLORIDE 1000 MG: 1 INJECTION, POWDER, LYOPHILIZED, FOR SOLUTION INTRAVENOUS at 17:54

## 2018-08-08 RX ADMIN — ATORVASTATIN CALCIUM 10 MG: 10 TABLET, FILM COATED ORAL at 23:14

## 2018-08-08 RX ADMIN — GABAPENTIN 100 MG: 100 CAPSULE ORAL at 23:14

## 2018-08-08 RX ADMIN — ACETAMINOPHEN 1000 MG: 500 TABLET ORAL at 17:41

## 2018-08-08 RX ADMIN — HYDROCODONE BITARTRATE AND ACETAMINOPHEN 1 TABLET: 7.5; 325 TABLET ORAL at 23:14

## 2018-08-08 RX ADMIN — SODIUM CHLORIDE 1000 ML: 9 INJECTION, SOLUTION INTRAVENOUS at 15:05

## 2018-08-08 ASSESSMENT — ENCOUNTER SYMPTOMS
SHORTNESS OF BREATH: 0
ABDOMINAL PAIN: 0
APNEA: 0
EYE PAIN: 0
COLOR CHANGE: 0
ALLERGIC/IMMUNOLOGIC NEGATIVE: 1
TROUBLE SWALLOWING: 0

## 2018-08-08 ASSESSMENT — PAIN SCALES - GENERAL
PAINLEVEL_OUTOF10: 4
PAINLEVEL_OUTOF10: 10
PAINLEVEL_OUTOF10: 6
PAINLEVEL_OUTOF10: 5

## 2018-08-08 ASSESSMENT — PAIN DESCRIPTION - LOCATION: LOCATION: FOOT

## 2018-08-08 ASSESSMENT — PAIN DESCRIPTION - ORIENTATION: ORIENTATION: RIGHT;LEFT

## 2018-08-08 NOTE — H&P
Hospital Medicine History & Physical      PCP: Cruz Ames MD    Date of Admission: 8/8/2018    Date of Service: Pt seen/examined on 8/8/2018 with c/o     Chief Complaint: bilateral  foot pain and swelling( L>R)        History Of Present Illness:      70 y.o. female who presented to [facility] ED with c/o worsening bilateral  foot pain which started about a week ago. Pt states she was unable to walk on leg and now with increased swelling and redness. Xray of the foot showed degenerative changes with advanced changes in the left foot with possibility of joint neuropathy. Uric acid also elevated @ 8.1.  Pt has a hx arthritis and gets steroid injection to feet in the past.     Past Medical History:          Diagnosis Date    Abnormal electromyogram (EMG) 8/19/09    SENSORIMOTOR NEUROPATHY OF BLE, RIGHT WORSE THAN LEFT, SUSPICIOUS FOR RIGHT S1 RADIC    Angina pectoris ([facility])     Ankle pain     Colon polyp 4/3/2017    Dermatitis     Fibromyalgia     Foot pain     Hyperlipidemia     Hypertension     Low back pain     MRSA (methicillin resistant staph aureus) culture positive 2011    Neck pain     Neuropathy     Obesity     Osteoarthritis     PSVT (paroxysmal supraventricular tachycardia) ([address]     Shingles outbreak 2012    SI (sacroiliac) pain     Stenosis     Vitamin D deficiency        Past Surgical History:          Procedure Laterality Date    CARDIAC CATHETERIZATION  10/2016    CERVICAL FUSION  12/14/11    Dr Gomez Workman with bone graft and plate    COLONOSCOPY      HAND FUSION  7/05    HYSTERECTOMY  2001    HYSTERECTOMY, TOTAL ABDOMINAL      JOINT REPLACEMENT Left     tka    MENISCECTOMY  10/31/11    left knee    OTHER SURGICAL HISTORY  10/26/09    CAUDALS BY DR Heydi Collazo    [place] COLON CA SCRN NOT  [address] IND N/A 4/12/2017    COLONOSCOPY performed by Jey Ramirez MD at [address] ESOPHAGOGASTRODUODENOSCOPY TRANSORAL DIAGNOSTIC N/A 4/12/2017    EGD ESOPHAGOGASTRODUODENOSCOPY performed by Vernetta Goldberg, MD at [address] [facility]  12/2011    Dr Des Rollins Lumbar and c spine    TOTAL KNEE ARTHROPLASTY  7/30/12    LEFT-DR AVELAR    UPPER GASTROINTESTINAL ENDOSCOPY  1995       Medications Prior to Admission:      Prior to Admission medications    Medication Sig Start Date End Date Taking? Authorizing Provider   gabapentin (NEURONTIN) 300 MG capsule Take one pill in the daytime as tolerated and two pills at night. Generic equivalent acceptable, unless otherwise noted. . 8/2/18 8/31/18 Yes Rosaline Peterson DO   HYDROcodone-acetaminophen (NORCO) 7.5-325 MG per tablet Take 1 tablet by mouth every 4-6 hours as needed for Pain (Max of 3 per day.  Do not get narcotic medications from any other provider.  Try to wean to 2 per day.) for up to 30 days. .. 7/11/18 8/11/18 Yes Rosaline Peterson DO   topiramate (TOPAMAX) 25 MG tablet Take one or two nightly as tolerated for pain 7/11/18  Yes Rosaline Peterson DO   atorvastatin (LIPITOR) 10 MG tablet Take 1 tablet by mouth nightly 2/28/18  Yes Alvina March MD   lisinopril (PRINIVIL;ZESTRIL) 40 MG tablet Take 1 tablet by mouth daily 2/28/18  Yes Alvina March MD   aspirin 81 MG tablet Take 81 mg by mouth daily   Yes Historical Provider, MD   CRANBERRY EXTRACT PO Take by mouth   Yes Historical Provider, MD   triamterene-hydrochlorothiazide (MAXZIDE-25) 37.5-25 MG per tablet Take 1 tablet by mouth daily   Yes Historical Provider, MD   Multiple Vitamins-Minerals (MULTIVITAMIN & MINERAL PO) Take by mouth   Yes Historical Provider, MD   meloxicam (MOBIC) 7.5 MG tablet Take 7.5 mg by mouth 2 times daily. Yes Historical Provider, MD   Coenzyme Q10 (COQ10 PO) Take  by mouth daily. Yes Historical Provider, MD   lidocaine (XYLOCAINE) 5 % ointment Apply topically as needed.  10/6/17   Rosaline Peterson DO   nitroGLYCERIN (NITROSTAT) 0.4 MG SL tablet Place 0.4 mg under the tongue every 5 minutes as needed for Chest Attestation Statement  I saw and evaluated the patient.   I discussed the findings and plans with nurse practitioner and agree as documented in her note     Electronically signed by Rhoda Dash MD on 8/8/18 at 9:46 PM

## 2018-08-08 NOTE — ED PROVIDER NOTES
[address] [facility] ED  eMERGENCY dEPARTMENT eNCOUnter      Pt Name: Alisia Cedeno  MRN: [id number]  [place] 1955  Date of evaluation: 8/8/2018  Provider: Lou Ramirez PA-C    CHIEF COMPLAINT       Chief Complaint   Patient presents with    Foot Pain     Bilateral foot pain with no injury. Pt has chronic pain/arthritis sees Pt Annie for 15 years. HISTORY OF PRESENT ILLNESS  (Location/Symptom, Timing/Onset, Context/Setting, Quality, Duration, Modifying Factors, Severity.)   Alisia Cedeno is a 70 y.o. female who presents to the emergency department with complaints of bilateral foot pain. Patient does admit that this is a chronic issue, and patient is in pain management for this. Patient however states that pain had worsened in severity yesterday,  Beginning with left foot and now bilaterally. Patient denies any new injuries to the area. No numbness or tingling. Patient also states generalized weakness for the past day, with difficulty ambulating due to the pain and weakness. Weakness is generalized all over and patient states she slept \"for 6 hours and I never sleep that long\"     HPI    Nursing Notes were reviewed and I agree. REVIEW OF SYSTEMS    (2-9 systems for level 4, 10 or more for level 5)     Review of Systems   Constitutional: Negative for diaphoresis and fever. HENT: Negative for hearing loss and trouble swallowing. Eyes: Negative for pain. Respiratory: Negative for apnea and shortness of breath. Cardiovascular: Negative for chest pain. Gastrointestinal: Negative for abdominal pain. Endocrine: Negative. Genitourinary: Negative for hematuria. Musculoskeletal: Positive for arthralgias and gait problem. Negative for neck pain and neck stiffness. Skin: Negative for color change. Allergic/Immunologic: Negative. Neurological: Positive for weakness. Negative for dizziness and numbness. Hematological: Negative. Psychiatric/Behavioral: Negative.     All (COQ10 PO)    Take  by mouth daily. CRANBERRY EXTRACT PO    Take by mouth    GABAPENTIN (NEURONTIN) 300 MG CAPSULE    Take one pill in the daytime as tolerated and two pills at night. Generic equivalent acceptable, unless otherwise noted. Yani Szymanskiuro HYDROCODONE-ACETAMINOPHEN (NORCO) 7.5-325 MG PER TABLET    Take 1 tablet by mouth every 4-6 hours as needed for Pain (Max of 3 per day.  Do not get narcotic medications from any other provider.  Try to wean to 2 per day.) for up to 30 days. .. LIDOCAINE (XYLOCAINE) 5 % OINTMENT    Apply topically as needed. LISINOPRIL (PRINIVIL;ZESTRIL) 40 MG TABLET    Take 1 tablet by mouth daily    MELOXICAM (MOBIC) 7.5 MG TABLET    Take 7.5 mg by mouth 2 times daily. MULTIPLE VITAMINS-MINERALS (MULTIVITAMIN & MINERAL PO)    Take by mouth    NITROGLYCERIN (NITROSTAT) 0.4 MG SL TABLET    Place 0.4 mg under the tongue every 5 minutes as needed for Chest pain.     TOPIRAMATE (TOPAMAX) 25 MG TABLET    Take one or two nightly as tolerated for pain    TRIAMTERENE-HYDROCHLOROTHIAZIDE (MAXZIDE-25) 37.5-25 MG PER TABLET    Take 1 tablet by mouth daily       ALLERGIES     Latex; Cephalexin; Norvasc [amlodipine besylate]; and Keflex [cephalexin]    FAMILY HISTORY       Family History   Problem Relation Age of Onset    Heart Disease Father     High Cholesterol Father     High Blood Pressure Father     Stroke Mother     High Blood Pressure Mother           SOCIAL HISTORY       Social History     Social History    Marital status:      Spouse name: N/A    Number of children: N/A    Years of education: N/A     Occupational History    Retired       Social History Main Topics    Smoking status: Never Smoker    Smokeless tobacco: Never Used    Alcohol use No    Drug use: No    Sexual activity: No     Other Topics Concern    None     Social History Narrative    None       SCREENINGS    Shiela Coma Scale  Eye Opening: Spontaneous  Best Verbal Response: Oriented  Best Motor Response: Obeys commands  Shiela Coma Scale Score: 15      PHYSICAL EXAM    (up to 7 for level 4, 8 or more for level 5)     ED Triage Vitals   BP Temp Temp src Pulse Resp SpO2 Height Weight   08/08/18 1419 08/08/18 1419 -- 08/08/18 1419 08/08/18 1419 08/08/18 1419 08/08/18 1429 08/08/18 1429   (!) 161/74 98.5 °F (36.9 °C)  102 18 100 % 5' 8\" (1.727 m) 216 lb (98 kg)       Physical Exam   Constitutional: She is oriented to person, place, and time. She appears well-developed and well-nourished. No distress. HENT:   Head: Normocephalic and atraumatic. Mouth/Throat: No oropharyngeal exudate. Eyes: Conjunctivae and EOM are normal. Pupils are equal, round, and reactive to light. No scleral icterus. Neck: Normal range of motion. Neck supple. No tracheal deviation present. Cardiovascular: Normal rate, normal heart sounds and intact distal pulses. Pulmonary/Chest: Effort normal and breath sounds normal. No respiratory distress. Abdominal: Soft. Bowel sounds are normal. She exhibits no distension. Musculoskeletal: Normal range of motion. Neurological: She is alert and oriented to person, place, and time. No cranial nerve deficit. She exhibits normal muscle tone. Skin: Skin is warm and dry. No rash noted. She is not diaphoretic. There is erythema. Psychiatric: She has a normal mood and affect. Her behavior is normal. Judgment and thought content normal.   Nursing note and vitals reviewed. DIAGNOSTIC RESULTS     RADIOLOGY:   Non-plain film images such as CT, Ultrasound and MRI are read by the radiologist. Plain radiographic images are visualized and preliminarily interpreted by Jorje Bose PA-C with the below findings:    No FXs    Interpretation per the Radiologist below, if available at the time of this note:    XR FOOT RIGHT (MIN 3 VIEWS)   Preliminary Result   SEVERE DEGENERATIVE CHANGES.             XR FOOT LEFT (MIN 3 VIEWS)   Preliminary Result   ADVANCED DEGENERATIVE CHANGE INVOLVING THE TARSAL BONES. POSSIBILITY OF CHANGES RELATED TO JOINT NEUROPATHY IS RAISED. LABS:  Labs Reviewed   COMPREHENSIVE METABOLIC PANEL - Abnormal; Notable for the following:        Result Value    Chloride 96 (*)     CO2 19 (*)     Anion Gap 17 (*)     Glucose 134 (*)     BUN 33 (*)     CREATININE 1.66 (*)     GFR Non- 31.0 (*)     GFR  37.5 (*)     Alb 3.7 (*)     All other components within normal limits   CBC WITH AUTO DIFFERENTIAL - Abnormal; Notable for the following:     WBC 18.7 (*)     RBC 3.80 (*)     Hematocrit 36.4 (*)     MCH 32.3 (*)     Neutrophils # 17.1 (*)     Lymphocytes # 0.3 (*)     Monocytes # 1.2 (*)     All other components within normal limits   CK - Abnormal; Notable for the following: Total  (*)     All other components within normal limits   URINE RT REFLEX TO CULTURE - Abnormal; Notable for the following:     Clarity, UA CLOUDY (*)     Protein, UA 30 (*)     All other components within normal limits   CKMB & RELATIVE PERCENT - Abnormal; Notable for the following:     CK-MB 7.7 (*)     All other components within normal limits   URIC ACID - Abnormal; Notable for the following:     Uric Acid, Serum 8.1 (*)     All other components within normal limits   CULTURE BLOOD #1   CULTURE BLOOD #2   URINE CULTURE   TROPONIN   LACTIC ACID, PLASMA   MICROSCOPIC URINALYSIS       All other labs were within normal range or not returned as of this dictation. EMERGENCY DEPARTMENT COURSE and DIFFERENTIAL DIAGNOSIS/MDM:   Vitals:    Vitals:    08/08/18 1419 08/08/18 1429 08/08/18 1555 08/08/18 1744   BP: (!) 161/74  (!) 132/46 (!) 136/59   Pulse: 102  97 89   Resp: 18 16 18   Temp: 98.5 °F (36.9 °C)      SpO2: 100%  93% 96%   Weight:  216 lb (98 kg)     Height:  5' 8\" (1.727 m)         REASSESSMENT        Patient presented to ER with foot pain bilaterally, weakness and fatigue.  Patient has a WBC of 18k as qwell as erythema circumferentially to

## 2018-08-08 NOTE — ED NOTES
Bed: 24  Expected date: 8/8/18  Expected time: 2:10 PM  Means of arrival: [facility]  Comments:  70 F - back pain     Nadya Cunha RN  08/08/18 [id number]

## 2018-08-08 NOTE — ED NOTES
Call placed to lab to follow up on urine that was sent to lab that has not resulted.       Richy Ellington RN  08/08/18 [id number]

## 2018-08-09 ENCOUNTER — APPOINTMENT (OUTPATIENT)
Dept: ULTRASOUND IMAGING | Age: 63
DRG: 853 | End: 2018-08-09
Payer: MEDICARE

## 2018-08-09 DIAGNOSIS — M15.9 PRIMARY OSTEOARTHRITIS INVOLVING MULTIPLE JOINTS: ICD-10-CM

## 2018-08-09 DIAGNOSIS — G57.93 NEUROPATHY INVOLVING BOTH LOWER EXTREMITIES: ICD-10-CM

## 2018-08-09 DIAGNOSIS — Z79.899 HIGH RISK MEDICATION USE: ICD-10-CM

## 2018-08-09 DIAGNOSIS — M48.062 SPINAL STENOSIS OF LUMBAR REGION WITH NEUROGENIC CLAUDICATION: ICD-10-CM

## 2018-08-09 LAB
ANION GAP SERPL CALCULATED.3IONS-SCNC: 13 MEQ/L (ref 7–13)
BUN BLDV-MCNC: 38 MG/DL (ref 8–23)
C-REACTIVE PROTEIN, HIGH SENSITIVITY: >300 MG/L (ref 0–5)
CALCIUM SERPL-MCNC: 8.2 MG/DL (ref 8.6–10.2)
CHLORIDE BLD-SCNC: 100 MEQ/L (ref 98–107)
CO2: 19 MEQ/L (ref 22–29)
CREAT SERPL-MCNC: 1.53 MG/DL (ref 0.5–0.9)
CREATININE URINE: 129.7 MG/DL
GFR AFRICAN AMERICAN: 41.2
GFR NON-AFRICAN AMERICAN: 34.1
GLUCOSE BLD-MCNC: 106 MG/DL (ref 74–109)
POTASSIUM REFLEX MAGNESIUM: 4.4 MEQ/L (ref 3.5–5.1)
SODIUM BLD-SCNC: 132 MEQ/L (ref 132–144)
SODIUM URINE: 23 MEQ/L

## 2018-08-09 PROCEDURE — 6370000000 HC RX 637 (ALT 250 FOR IP): Performed by: INTERNAL MEDICINE

## 2018-08-09 PROCEDURE — 1210000000 HC MED SURG R&B

## 2018-08-09 PROCEDURE — 86141 C-REACTIVE PROTEIN HS: CPT

## 2018-08-09 PROCEDURE — 36415 COLL VENOUS BLD VENIPUNCTURE: CPT

## 2018-08-09 PROCEDURE — 99222 1ST HOSP IP/OBS MODERATE 55: CPT | Performed by: INTERNAL MEDICINE

## 2018-08-09 PROCEDURE — 84145 PROCALCITONIN (PCT): CPT

## 2018-08-09 PROCEDURE — 76775 US EXAM ABDO BACK WALL LIM: CPT

## 2018-08-09 PROCEDURE — 6360000002 HC RX W HCPCS: Performed by: NURSE PRACTITIONER

## 2018-08-09 PROCEDURE — 93308 TTE F-UP OR LMTD: CPT

## 2018-08-09 PROCEDURE — 80048 BASIC METABOLIC PNL TOTAL CA: CPT

## 2018-08-09 PROCEDURE — 84300 ASSAY OF URINE SODIUM: CPT

## 2018-08-09 PROCEDURE — 2580000003 HC RX 258: Performed by: INTERNAL MEDICINE

## 2018-08-09 PROCEDURE — 2580000003 HC RX 258: Performed by: NURSE PRACTITIONER

## 2018-08-09 PROCEDURE — 82570 ASSAY OF URINE CREATININE: CPT

## 2018-08-09 RX ORDER — ACETAMINOPHEN 325 MG/1
650 TABLET ORAL EVERY 4 HOURS PRN
Status: DISCONTINUED | OUTPATIENT
Start: 2018-08-09 | End: 2018-08-13

## 2018-08-09 RX ORDER — HYDROCODONE BITARTRATE AND ACETAMINOPHEN 7.5; 325 MG/1; MG/1
TABLET ORAL
Qty: 75 TABLET | Refills: 0 | Status: SHIPPED | OUTPATIENT
Start: 2018-08-10 | End: 2018-08-17 | Stop reason: HOSPADM

## 2018-08-09 RX ORDER — ACETAMINOPHEN 650 MG/1
650 SUPPOSITORY RECTAL EVERY 4 HOURS PRN
Status: DISCONTINUED | OUTPATIENT
Start: 2018-08-09 | End: 2018-08-09

## 2018-08-09 RX ADMIN — HYDROCODONE BITARTRATE AND ACETAMINOPHEN 1 TABLET: 7.5; 325 TABLET ORAL at 18:17

## 2018-08-09 RX ADMIN — HYDROCODONE BITARTRATE AND ACETAMINOPHEN 1 TABLET: 7.5; 325 TABLET ORAL at 11:21

## 2018-08-09 RX ADMIN — ACETAMINOPHEN 650 MG: 325 TABLET ORAL at 01:20

## 2018-08-09 RX ADMIN — SODIUM CHLORIDE: 9 INJECTION, SOLUTION INTRAVENOUS at 19:29

## 2018-08-09 RX ADMIN — ATORVASTATIN CALCIUM 10 MG: 10 TABLET, FILM COATED ORAL at 20:39

## 2018-08-09 RX ADMIN — TOPIRAMATE 25 MG: 25 TABLET, FILM COATED ORAL at 09:38

## 2018-08-09 RX ADMIN — ACETAMINOPHEN 650 MG: 325 TABLET ORAL at 16:56

## 2018-08-09 RX ADMIN — GABAPENTIN 100 MG: 100 CAPSULE ORAL at 20:39

## 2018-08-09 RX ADMIN — VANCOMYCIN HYDROCHLORIDE 1500 MG: 5 INJECTION, POWDER, LYOPHILIZED, FOR SOLUTION INTRAVENOUS at 20:39

## 2018-08-09 RX ADMIN — HYDROCODONE BITARTRATE AND ACETAMINOPHEN 1 TABLET: 7.5; 325 TABLET ORAL at 05:29

## 2018-08-09 RX ADMIN — HEPARIN SODIUM 5000 UNITS: 5000 INJECTION, SOLUTION INTRAVENOUS; SUBCUTANEOUS at 20:38

## 2018-08-09 RX ADMIN — ASPIRIN 81 MG: 81 TABLET, COATED ORAL at 09:39

## 2018-08-09 RX ADMIN — Medication 10 ML: at 09:39

## 2018-08-09 RX ADMIN — HEPARIN SODIUM 5000 UNITS: 5000 INJECTION, SOLUTION INTRAVENOUS; SUBCUTANEOUS at 09:39

## 2018-08-09 RX ADMIN — GABAPENTIN 100 MG: 100 CAPSULE ORAL at 09:38

## 2018-08-09 RX ADMIN — ACETAMINOPHEN 325 MG: 325 TABLET ORAL at 11:46

## 2018-08-09 RX ADMIN — SODIUM CHLORIDE: 9 INJECTION, SOLUTION INTRAVENOUS at 09:39

## 2018-08-09 ASSESSMENT — PAIN SCALES - GENERAL
PAINLEVEL_OUTOF10: 8
PAINLEVEL_OUTOF10: 4
PAINLEVEL_OUTOF10: 9
PAINLEVEL_OUTOF10: 7
PAINLEVEL_OUTOF10: 9
PAINLEVEL_OUTOF10: 7
PAINLEVEL_OUTOF10: 9
PAINLEVEL_OUTOF10: 8
PAINLEVEL_OUTOF10: 8
PAINLEVEL_OUTOF10: 7

## 2018-08-09 ASSESSMENT — PAIN DESCRIPTION - LOCATION
LOCATION: FOOT
LOCATION: HEAD;FOOT
LOCATION: FOOT

## 2018-08-09 ASSESSMENT — PAIN DESCRIPTION - PAIN TYPE
TYPE: ACUTE PAIN

## 2018-08-09 ASSESSMENT — ENCOUNTER SYMPTOMS
VOMITING: 0
NAUSEA: 0
ABDOMINAL PAIN: 0
FACIAL SWELLING: 0
RHINORRHEA: 0
BACK PAIN: 1
PHOTOPHOBIA: 0
COUGH: 0
SHORTNESS OF BREATH: 0

## 2018-08-09 ASSESSMENT — PAIN DESCRIPTION - ORIENTATION
ORIENTATION: RIGHT;LEFT
ORIENTATION: RIGHT;LEFT

## 2018-08-09 NOTE — CONSULTS
History    Retired       Social History Main Topics    Smoking status: Never Smoker    Smokeless tobacco: Never Used    Alcohol use No    Drug use: No    Sexual activity: No     Other Topics Concern    Not on file     Social History Narrative    No narrative on file       Review of Systems  CONSTITUTIONAL: + fever, chills. HEENT: No epistaxis or tinnitus  EYES: No diplopia or blurry vision. CARDIOVASCULAR: No chest pain, dyspnea, palpitations. PULM: No dyspnea, tachypnea, wheezing  GI: + nausea. No vomiting, constipation, diarrhea. : No new urinary complaints, including dysuria, gross hematuria or pyuria. NEURO: No new balance problems, peripheral weakness/paresthesias. MUSC-SKEL: admits to pain in bilateral feet. PSY: No concerns regarding depression, anxiety or panic. INTEGUMENTARY: denies open skin lesion. OBJECTIVE:  BP (!) 185/62   Pulse 92   Temp 101.3 °F (38.5 °C)   Resp 18   Ht 5' 8\" (1.727 m)   Wt 216 lb (98 kg)   LMP  (Approximate)   SpO2 95%   BMI 32.84 kg/m²   Patient is alert and oriented x 3 in NAD. Vascular:   Palpable Dorsalis Pedis and Palpable Posterior Tibial Pulses B/L   Capillary Fill time < 3 seconds to B/L digits  Skin temperature warm to cool tibial tuberosity to the digits B/L  Hair growth absent to digits  Mild edema, no varicosities     Neurological:   Epicritic sensation intact B/L  Protective sensation via monofilament testing impaired B/L    Musculoskeletal/Orthopaedic:   Muscle strength and ROM deferred due to severity of pain at rest.  Able to actively dorsiflex and plantarflex the toes B/L  Severe tenderness to light touch B/L    Dermatological:   Skin appears well hydrated and supple with good temperature, texture, turgor. No open lesions, ulcerations, verruca appreciated B/L. Erythema to B/L feet and ankles, L > R.     LABS:   Lab Results   Component Value Date    WBC 15.9 (H) 08/08/2018    HGB 11.0 (L) 08/08/2018    HCT 33.0 (L) 08/08/2018    MCV 96.3 08/08/2018     (L) 08/08/2018     Lab Results   Component Value Date     08/09/2018    K 4.4 08/09/2018     08/09/2018    CO2 19 08/09/2018    BUN 38 08/09/2018    CREATININE 1.53 08/09/2018    GLUCOSE 106 08/09/2018    GLUCOSE 83 12/07/2011    CALCIUM 8.2 08/09/2018      Lab Results   Component Value Date    LABALBU 3.7 (L) 08/08/2018     Lab Results   Component Value Date    SEDRATE 9 02/23/2015     No results found for: CRP  Lab Results   Component Value Date    LABA1C 5.8 01/17/2012     No results found for: EAG    MICROBIOLOGY:   Type   Date  Results  Blood Culture:  8/9/18  pending      IMAGING:   XR L FOOT 8/8/18:  Radiologist impression: Advanced degenerative changes involving the tarsal bones most striking at the navicular cuneiform articulation. There is some disorganization and subchondral cystic variation across the mid tarsal row. Remaining visualized bones intact. No acute fracture. XR R FOOT 8/8/18:  Radiologist impression: Severe advanced degenerative change involving the mid foot. Findings show advancement of the change when compared to the previous radiographs. No acute fracture or dislocation. Remaining visualized bones intact.         Magali Edmonds, PGY-2  Please first page [facility] On [facility], [phone]  August 9, 2018  1:10 PM

## 2018-08-09 NOTE — PROGRESS NOTES
Admission assessment documented. A + O x 4, able to state needs. Denies any SOB/N/V/D, respirations even and unlabored. C/O 9/10 scale, medicated with prn medication with (+) results. Oriented to room, call light system, hourly rounding and bedside nursing report. Safety maintained, call light within reach.

## 2018-08-09 NOTE — PROGRESS NOTES
Hospitalist Progress Note      PCP: Binta Faria MD    Date of Admission: 8/8/2018    Chief Complaint:  Still febrile-38.5C today,stable HD    Medications:  Reviewed    Infusion Medications    sodium chloride 100 mL/hr at 08/09/18 0939     Scheduled Medications    sodium chloride flush  10 mL Intravenous 2 times per day    heparin (porcine)  5,000 Units Subcutaneous Q12H    vancomycin (VANCOCIN) intermittent dosing (placeholder)   Other RX Placeholder    vancomycin  1,500 mg Intravenous Q24H    aspirin  81 mg Oral Daily    atorvastatin  10 mg Oral Nightly    gabapentin  100 mg Oral BID    topiramate  25 mg Oral Daily     PRN Meds: acetaminophen, sodium chloride flush, magnesium hydroxide, ondansetron, HYDROcodone-acetaminophen      Intake/Output Summary (Last 24 hours) at 08/09/18 1144  Last data filed at 08/09/18 0532   Gross per 24 hour   Intake          1196.67 ml   Output             1400 ml   Net          -203.33 ml       Exam:    BP (!) 185/62   Pulse 92   Temp 101.3 °F (38.5 °C)   Resp 18   Ht 5' 8\" (1.727 m)   Wt 216 lb (98 kg)   LMP  (Approximate)   SpO2 95%   BMI 32.84 kg/m²     General appearance: No apparent distress, appears stated age and cooperative. Respiratory:  Clear to auscultation, bilaterally without Rales/Wheezes/Rhonchi. Cardiovascular: Regular rate and rhythm with normal S1/S2 without murmurs, rubs or gallops. Abdomen: Soft, non-tender, non-distended with normal bowel sounds.   Musculoskeletal: significant edema,erythema and tenderness of the left foot    Labs:   Recent Labs      08/08/18   1430  08/08/18   2113   WBC  18.7*  15.9*   HGB  12.3  11.0*   HCT  36.4*  33.0*   PLT  137  123*     Recent Labs      08/08/18   1430  08/09/18   0641   NA  132  132   K  4.4  4.4   CL  96*  100   CO2  19*  19*   BUN  33*  38*   CREATININE  1.66*  1.53*   CALCIUM  9.3  8.2*     Recent Labs      08/08/18   1430   AST  30   ALT  19   BILITOT  0.7   ALKPHOS  109     No results for input(s): INR in the last 72 hours. Recent Labs      08/08/18   1430   CKTOTAL  349*   TROPONINI  <0.010       Urinalysis:    Lab Results   Component Value Date    NITRU Negative 08/08/2018    WBCUA 0-2 08/08/2018    BACTERIA None 08/08/2018    RBCUA None seen 08/08/2018    BLOODU Negative 08/08/2018    SPECGRAV 1.014 08/08/2018    GLUCOSEU Negative 08/08/2018       Radiology:  XR FOOT RIGHT (MIN 3 VIEWS)   Final Result   SEVERE DEGENERATIVE CHANGES. XR FOOT LEFT (MIN 3 VIEWS)   Final Result   ADVANCED DEGENERATIVE CHANGE INVOLVING THE TARSAL BONES. POSSIBILITY OF CHANGES RELATED TO JOINT NEUROPATHY IS RAISED. Assessment/Plan:    Staph. Aureus bacteremia/sepsis  - in the setting of cellulitis of the left foot  - still febrile, continue IV vancomycin per ID   - repeat blood cultures and TTE ordered     Hyperuricemia  - possible acute gout   - no available options at this time due to ARI and bacteremia    Chronic back pain/opiate dependence  - continue Norco    ARI  - continue IVFs  - avoid nephrotoxins and monitor renal function   - consult nephrology     HTN  - resume antihypertensive with b/p monitoring     HLD  - resume statin              Electronically signed by Harriet Carrillo MD on 8/9/2018 at 11:44 AM

## 2018-08-09 NOTE — CONSULTS
ESOPHAGOGASTRODUODENOSCOPY performed by Hailey Farrell MD at [address]  12/2011    Dr Kita Durham Lumbar and c spine    TOTAL KNEE ARTHROPLASTY  7/30/12    LEFT-DR AVELAR    UPPER GASTROINTESTINAL ENDOSCOPY  1995       Home Medications:    No current facility-administered medications on file prior to encounter. Current Outpatient Prescriptions on File Prior to Encounter   Medication Sig Dispense Refill    gabapentin (NEURONTIN) 300 MG capsule Take one pill in the daytime as tolerated and two pills at night. Generic equivalent acceptable, unless otherwise noted. . 90 capsule 1    HYDROcodone-acetaminophen (NORCO) 7.5-325 MG per tablet Take 1 tablet by mouth every 4-6 hours as needed for Pain (Max of 3 per day.  Do not get narcotic medications from any other provider.  Try to wean to 2 per day.) for up to 30 days. .. 75 tablet 0    topiramate (TOPAMAX) 25 MG tablet Take one or two nightly as tolerated for pain 60 tablet 3    atorvastatin (LIPITOR) 10 MG tablet Take 1 tablet by mouth nightly 90 tablet 3    lisinopril (PRINIVIL;ZESTRIL) 40 MG tablet Take 1 tablet by mouth daily 90 tablet 3    lidocaine (XYLOCAINE) 5 % ointment Apply topically as needed. 1 Tube 11    aspirin 81 MG tablet Take 81 mg by mouth daily      CRANBERRY EXTRACT PO Take by mouth      triamterene-hydrochlorothiazide (MAXZIDE-25) 37.5-25 MG per tablet Take 1 tablet by mouth daily      Multiple Vitamins-Minerals (MULTIVITAMIN & MINERAL PO) Take by mouth      meloxicam (MOBIC) 7.5 MG tablet Take 7.5 mg by mouth 2 times daily.  Coenzyme Q10 (COQ10 PO) Take  by mouth daily.  Cholecalciferol (VITAMIN D3) 2000 units TABS Take by mouth daily       nitroGLYCERIN (NITROSTAT) 0.4 MG SL tablet Place 0.4 mg under the tongue every 5 minutes as needed for Chest pain.          Allergies:  Latex; Cephalexin; Norvasc [amlodipine besylate]; and Keflex [cephalexin]    Social History:    Social History

## 2018-08-09 NOTE — PLAN OF CARE
Problem: Pain:  Intervention: Opioid analgesia side-effects  Ongoing. Intervention: Assess barriers to pain control  Ongoing. Intervention: Promote participation in pain management plan  Ongoing.     Goal: Pain level will decrease  Pain level will decrease   Outcome: Ongoing    Goal: Control of acute pain  Control of acute pain   Outcome: Ongoing

## 2018-08-10 LAB
ALBUMIN SERPL-MCNC: 2.9 G/DL (ref 3.9–4.9)
ANION GAP SERPL CALCULATED.3IONS-SCNC: 14 MEQ/L (ref 7–13)
BASOPHILS ABSOLUTE: 0 K/UL (ref 0–0.2)
BASOPHILS RELATIVE PERCENT: 0.1 %
BUN BLDV-MCNC: 27 MG/DL (ref 8–23)
CALCIUM SERPL-MCNC: 8.6 MG/DL (ref 8.6–10.2)
CHLORIDE BLD-SCNC: 100 MEQ/L (ref 98–107)
CO2: 20 MEQ/L (ref 22–29)
CREAT SERPL-MCNC: 1.1 MG/DL (ref 0.5–0.9)
EOSINOPHILS ABSOLUTE: 0 K/UL (ref 0–0.7)
EOSINOPHILS RELATIVE PERCENT: 0.2 %
GFR AFRICAN AMERICAN: >60
GFR NON-AFRICAN AMERICAN: 49.8
GLUCOSE BLD-MCNC: 111 MG/DL (ref 74–109)
HCT VFR BLD CALC: 31.2 % (ref 37–47)
HEMOGLOBIN: 10.5 G/DL (ref 12–16)
LYMPHOCYTES ABSOLUTE: 0.4 K/UL (ref 1–4.8)
LYMPHOCYTES RELATIVE PERCENT: 3.2 %
MAGNESIUM: 1.7 MG/DL (ref 1.7–2.3)
MCH RBC QN AUTO: 32.3 PG (ref 27–31.3)
MCHC RBC AUTO-ENTMCNC: 33.7 % (ref 33–37)
MCV RBC AUTO: 95.8 FL (ref 82–100)
MONOCYTES ABSOLUTE: 1.1 K/UL (ref 0.2–0.8)
MONOCYTES RELATIVE PERCENT: 9.1 %
NEUTROPHILS ABSOLUTE: 10.6 K/UL (ref 1.4–6.5)
NEUTROPHILS RELATIVE PERCENT: 87.4 %
PDW BLD-RTO: 13.6 % (ref 11.5–14.5)
PHOSPHORUS: 2.4 MG/DL (ref 2.5–4.5)
PLATELET # BLD: 150 K/UL (ref 130–400)
POTASSIUM SERPL-SCNC: 4.1 MEQ/L (ref 3.5–5.1)
PROCALCITONIN: 7.7 NG/ML
RBC # BLD: 3.26 M/UL (ref 4.2–5.4)
RHEUMATOID FACTOR: 16.8 IU/ML (ref 0–14)
SODIUM BLD-SCNC: 134 MEQ/L (ref 132–144)
WBC # BLD: 12.1 K/UL (ref 4.8–10.8)

## 2018-08-10 PROCEDURE — 80069 RENAL FUNCTION PANEL: CPT

## 2018-08-10 PROCEDURE — 6360000002 HC RX W HCPCS: Performed by: NURSE PRACTITIONER

## 2018-08-10 PROCEDURE — 89060 EXAM SYNOVIAL FLUID CRYSTALS: CPT

## 2018-08-10 PROCEDURE — 86431 RHEUMATOID FACTOR QUANT: CPT

## 2018-08-10 PROCEDURE — 2580000003 HC RX 258: Performed by: NURSE PRACTITIONER

## 2018-08-10 PROCEDURE — 6370000000 HC RX 637 (ALT 250 FOR IP): Performed by: INTERNAL MEDICINE

## 2018-08-10 PROCEDURE — 99232 SBSQ HOSP IP/OBS MODERATE 35: CPT | Performed by: INTERNAL MEDICINE

## 2018-08-10 PROCEDURE — 2580000003 HC RX 258: Performed by: INTERNAL MEDICINE

## 2018-08-10 PROCEDURE — 87205 SMEAR GRAM STAIN: CPT

## 2018-08-10 PROCEDURE — 36415 COLL VENOUS BLD VENIPUNCTURE: CPT

## 2018-08-10 PROCEDURE — 87077 CULTURE AEROBIC IDENTIFY: CPT

## 2018-08-10 PROCEDURE — 1210000000 HC MED SURG R&B

## 2018-08-10 PROCEDURE — 87070 CULTURE OTHR SPECIMN AEROBIC: CPT

## 2018-08-10 PROCEDURE — 87040 BLOOD CULTURE FOR BACTERIA: CPT

## 2018-08-10 PROCEDURE — 2500000003 HC RX 250 WO HCPCS: Performed by: INTERNAL MEDICINE

## 2018-08-10 PROCEDURE — 85025 COMPLETE CBC W/AUTO DIFF WBC: CPT

## 2018-08-10 PROCEDURE — 83735 ASSAY OF MAGNESIUM: CPT

## 2018-08-10 PROCEDURE — 87186 SC STD MICRODIL/AGAR DIL: CPT

## 2018-08-10 PROCEDURE — 87147 CULTURE TYPE IMMUNOLOGIC: CPT

## 2018-08-10 RX ORDER — DIPHENHYDRAMINE HYDROCHLORIDE 50 MG/ML
25 INJECTION INTRAMUSCULAR; INTRAVENOUS EVERY 6 HOURS PRN
Status: DISCONTINUED | OUTPATIENT
Start: 2018-08-10 | End: 2018-08-18 | Stop reason: HOSPADM

## 2018-08-10 RX ORDER — GABAPENTIN 600 MG/1
600 TABLET ORAL DAILY
Status: ON HOLD | COMMUNITY
End: 2018-08-17 | Stop reason: HOSPADM

## 2018-08-10 RX ORDER — GABAPENTIN 100 MG/1
200 CAPSULE ORAL ONCE
Status: COMPLETED | OUTPATIENT
Start: 2018-08-10 | End: 2018-08-10

## 2018-08-10 RX ORDER — COLCHICINE 0.6 MG/1
0.6 TABLET ORAL ONCE
Status: COMPLETED | OUTPATIENT
Start: 2018-08-10 | End: 2018-08-10

## 2018-08-10 RX ORDER — LIDOCAINE HYDROCHLORIDE 20 MG/ML
5 INJECTION, SOLUTION INFILTRATION; PERINEURAL ONCE
Status: DISCONTINUED | OUTPATIENT
Start: 2018-08-10 | End: 2018-08-13

## 2018-08-10 RX ORDER — GABAPENTIN 300 MG/1
300 CAPSULE ORAL DAILY
Status: DISCONTINUED | OUTPATIENT
Start: 2018-08-11 | End: 2018-08-13

## 2018-08-10 RX ORDER — GABAPENTIN 300 MG/1
600 CAPSULE ORAL NIGHTLY
Status: DISCONTINUED | OUTPATIENT
Start: 2018-08-10 | End: 2018-08-18 | Stop reason: HOSPADM

## 2018-08-10 RX ADMIN — NAFCILLIN SODIUM 2 G: 2 INJECTION, POWDER, LYOPHILIZED, FOR SOLUTION INTRAMUSCULAR; INTRAVENOUS at 18:41

## 2018-08-10 RX ADMIN — GABAPENTIN 600 MG: 300 CAPSULE ORAL at 21:40

## 2018-08-10 RX ADMIN — ACETAMINOPHEN 650 MG: 325 TABLET ORAL at 10:09

## 2018-08-10 RX ADMIN — HYDROCODONE BITARTRATE AND ACETAMINOPHEN 1 TABLET: 7.5; 325 TABLET ORAL at 00:13

## 2018-08-10 RX ADMIN — HYDROCODONE BITARTRATE AND ACETAMINOPHEN 1 TABLET: 7.5; 325 TABLET ORAL at 19:55

## 2018-08-10 RX ADMIN — GABAPENTIN 200 MG: 100 CAPSULE ORAL at 15:29

## 2018-08-10 RX ADMIN — ATORVASTATIN CALCIUM 10 MG: 10 TABLET, FILM COATED ORAL at 21:40

## 2018-08-10 RX ADMIN — TOPIRAMATE 25 MG: 25 TABLET, FILM COATED ORAL at 10:09

## 2018-08-10 RX ADMIN — NAFCILLIN SODIUM 2 G: 2 INJECTION, POWDER, LYOPHILIZED, FOR SOLUTION INTRAMUSCULAR; INTRAVENOUS at 22:49

## 2018-08-10 RX ADMIN — GABAPENTIN 100 MG: 100 CAPSULE ORAL at 10:09

## 2018-08-10 RX ADMIN — Medication 10 ML: at 21:49

## 2018-08-10 RX ADMIN — HEPARIN SODIUM 5000 UNITS: 5000 INJECTION, SOLUTION INTRAVENOUS; SUBCUTANEOUS at 10:10

## 2018-08-10 RX ADMIN — HYDROCODONE BITARTRATE AND ACETAMINOPHEN 1 TABLET: 7.5; 325 TABLET ORAL at 13:19

## 2018-08-10 RX ADMIN — HEPARIN SODIUM 5000 UNITS: 5000 INJECTION, SOLUTION INTRAVENOUS; SUBCUTANEOUS at 21:40

## 2018-08-10 RX ADMIN — HYDROCODONE BITARTRATE AND ACETAMINOPHEN 1 TABLET: 7.5; 325 TABLET ORAL at 06:24

## 2018-08-10 RX ADMIN — SODIUM CHLORIDE: 9 INJECTION, SOLUTION INTRAVENOUS at 06:24

## 2018-08-10 RX ADMIN — ASPIRIN 81 MG: 81 TABLET, COATED ORAL at 10:09

## 2018-08-10 RX ADMIN — COLCHICINE 0.6 MG: 0.6 TABLET, FILM COATED ORAL at 13:19

## 2018-08-10 ASSESSMENT — PAIN SCALES - GENERAL
PAINLEVEL_OUTOF10: 5
PAINLEVEL_OUTOF10: 9
PAINLEVEL_OUTOF10: 8
PAINLEVEL_OUTOF10: 10
PAINLEVEL_OUTOF10: 10
PAINLEVEL_OUTOF10: 9

## 2018-08-10 NOTE — PROGRESS NOTES
Nephrology Progress Note    Assessment:   70y.o. year old female with history s/f HTN, HLD, spinal stenosis who was admitted for cellulitis of the foot. Currently on IV vanco. Being followed by ID. Currently bcxs positive for staph aureus. Also found to have ARI w/ Scr 1.6, nephrology consulted     1. Non-oliguric ARI: Possibly prerenal w/ FeNa 0.2 w/ presence of hyaline casts. Pt was on lisinopril, meloxicam and maxide as outpatient. Baseline Scr unknown, ~1.2-1.3 in 2017. Renal U/S wnl, Scr now 1.1 w/ fluids  2. Hyponatremia: mild, Na 132, on diuretic and NSAIDs as outpatient, improving as well  3. Metabolic acidosis: possibly 2/2 ARI, improving  4.  Hyperuricemia: can be in the setting of volume depletion, renal failure and diuretic use prior to admission, may not necessarily represent gout      Plan:   - ok to d/c fluids, encourage PO intake  - hold lisinopril for now, may be able to resume soon  - diuretics PRN as she only uses it for occasional leg swelling     Patient Active Problem List:     Spinal stenosis of lumbar region with neurogenic claudication     High risk medication use - 10/17/17 [facility] PM&R, 12/19/17 [facility] PM&R, 12/20/17 Tox screen positive for opiates, Hydrocodone PM&R, MED CONTRACT 2/2/17     Mixed hyperlipidemia     Benign essential hypertension     Acquired spondylolisthesis     Primary osteoarthritis involving multiple joints     BMI 33.0-33.9,adult     Neuropathy involving both lower extremities     Hyponatremia     Staphylococcus aureus bacteremia with sepsis ([facility] [address]     Cellulitis of left foot     Pyogenic arthritis of multiple sites [facility])      Subjective:   Admit Date: 8/8/2018    Interval History: renal function improved w/ fluid administration, c/o foot pain      Medications:   Scheduled Meds:   gabapentin  600 mg Oral Nightly    [START ON 8/11/2018] gabapentin  300 mg Oral Daily    sodium chloride flush  10 mL Intravenous 2 times per day    heparin (porcine)  5,000 Units Subcutaneous Q12H    vancomycin (VANCOCIN) intermittent dosing (placeholder)   Other RX Placeholder    vancomycin  1,500 mg Intravenous Q24H    aspirin  81 mg Oral Daily    atorvastatin  10 mg Oral Nightly    topiramate  25 mg Oral Daily     Continuous Infusions:   sodium chloride 100 mL/hr at 08/10/18 0624       CBC:   Recent Labs      08/08/18   2113  08/10/18   0638   WBC  15.9*  12.1*   HGB  11.0*  10.5*   PLT  123*  150     CMP:  Recent Labs      08/08/18   1430  08/09/18   0641  08/10/18   0638   NA  132  132  134   K  4.4  4.4  4.1   CL  96*  100  100   CO2  19*  19*  20*   BUN  33*  38*  27*   CREATININE  1.66*  1.53*  1.10*   GLUCOSE  134*  106  111*   CALCIUM  9.3  8.2*  8.6   LABGLOM  31.0*  34.1*  49.8*     Troponin:   Recent Labs      08/08/18   1430   TROPONINI  <0.010     BNP: No results for input(s): BNP in the last 72 hours. INR: No results for input(s): INR in the last 72 hours. Lipids: No results for input(s): CHOL, LDLDIRECT, TRIG, HDL, AMYLASE, LIPASE in the last 72 hours. Liver: Recent Labs      08/08/18   1430  08/10/18   0638   AST  30   --    ALT  19   --    ALKPHOS  109   --    PROT  7.0   --    LABALBU  3.7*  2.9*   BILITOT  0.7   --      Iron:  No results for input(s): IRONS, FERRITIN in the last 72 hours. Invalid input(s): LABIRONS  Urinalysis: No results for input(s): UA in the last 72 hours.     Objective:   Vitals: BP (!) 190/69   Pulse 91   Temp 98.2 °F (36.8 °C)   Resp 16   Ht 5' 8\" (1.727 m)   Wt 216 lb (98 kg)   LMP  (Approximate)   SpO2 97%   BMI 32.84 kg/m²    Wt Readings from Last 3 Encounters:   08/08/18 216 lb (98 kg)   07/11/18 220 lb (99.8 kg)   07/03/18 219 lb (99.3 kg)      24HR INTAKE/OUTPUT:    Intake/Output Summary (Last 24 hours) at 08/10/18 1048  Last data filed at 08/10/18 0526   Gross per 24 hour   Intake             3781 ml   Output             1400 ml   Net             2381 ml     General: alert, in no apparent distress  HEENT: normocephalic, atraumatic, anicteric  Neck: supple, no mass  Lungs: non-labored respirations, clear to auscultation bilaterally  Heart: regular rate and rhythm, + murmur  Abdomen: soft, non-tender, non-distended  MSK: no joint swelling or tenderness  Ext: no cyanosis, trace peripheral edema, B/L feet TTP, improving  Neuro: alert and oriented, no gross abnormalities  Psych: normal mood and affect  Skin: LT foot erythema, improving        Electronically signed by Hitesh Dunn MD on 8/10/2018 at 10:48 AM

## 2018-08-10 NOTE — CARE COORDINATION
YOLIW met with Pt and spouse, spouse has done IVs at home for himself and Pt before so he stated she would be fine to return home. Pt stated no to a SNF. Marta Patino Electronically signed by ALEXIA Crabtree on 8/10/2018 at 11:56 AM

## 2018-08-10 NOTE — PROGRESS NOTES
PODIATRIC MEDICINE AND SURGERY  CONSULT PROGRESS NOTE    Follow Up: foot pain and swelling  Staff Doctor:  Nestor Bedolla DPM    ASSESSMENT:  70 y.o. female with PMH significant for Fibromyalgia, HLD, HTN, OA, lumbar and cervical spine surgery with neuropathy, whom podiatry was consulted for foot pain and swelling. On physical exam bilateral feet are edematous and erythematous, L > R, and hypersensitive to touch. She is febrile with elevated WBC and inflammatory markers. However, there are no open lesion or acute changes on XRs that would support acute infection in either feet. XRs do demonstrate degenerative changes to the midfoot bilaterally, possible gout vs Charcot like changes. Patient does have an elevated uric acid level and gout would be more consistent with the \"red, swollen, painful\" characterization of her feet than Charcot. However, today as new fluctuant mass was noted to the left dorsal lateral midfoot with concern for underlying infection given previously mentioned leukocytosis and fever. PLAN AND RECOMMENDATIONS[de-identified]  · Patient examined and evaluated with Dr. Mark Bee today. · Previous and today's labs reviewed. RF factor ordered. · MRI ordered of left foot to evaluate new fluctuant mass and any underlying infection given painful, erythematous, edematous, warm left foot in the setting of now new fluctuant mass and previous leukocytosis, fevers, and chills. · Discussed aspiration of new left dorsal lateral midfoot fluid collection with the patient. Reviewed details of prerisks and benefits of the procedure. Patient is agreeable to aspiration, consent signed. Area was cleansed and prepped with alcohol and betadine. Then 2.5 cc of 2% lidocaine plain was injected intradermally. Then an 18 gauge needle was inserted into the fluctuant mass and approximately 2 cc of blood tinged milky fluid was aspirated. This was sent for culture as well as for crystal analysis.  Area surrounding aspiration site was then compressed, with attempts to remove any remaining fluid. This was done to patient's pain tolerance. The area was then painted with betadine and dressing with a dry sterile dressing. · Weight bearing as tolerated weight bearing to bilateral feet  · Antibiotic coverage per primary team or ID recommendations   · Pain management per primary team   · Podiatry to continue to follow while in house. INTERVAL HISTORY:  Low grade fever. WBC down trending since starting IV abx. Right foot pain, edema, and erythema improved. New fluctuant mass noted to the dorsal lateral midfoot today that was not present yesterday. RF factor 16.8. New left elbow pain, warmth, and tenderness today as well. Also complains of back pain. TTE was negative. Uric Acid 8.1. Given single dose of colchicine today. HPI: 70y.o. year old female presented with pain and swelling in her feet that began a few days ago. The pain started in her right foot, but then progress into her left foot as well, which has become more painful than the right. Hypersensitivity to the feet. No h/o trauma. Follows with Dr. Neil Juarez for pain management. She has tried PO acetaminophen, NSAIDs, and opioids in the past for similar type of pain, which has provided some relief of the pain in the past. Recently she has been taking Mobic without relief during this most recent episode. She admited to associated nausea, chills, and headache. No current facility-administered medications on file prior to encounter. Current Outpatient Prescriptions on File Prior to Encounter   Medication Sig Dispense Refill    gabapentin (NEURONTIN) 300 MG capsule Take one pill in the daytime as tolerated and two pills at night. Generic equivalent acceptable, unless otherwise noted. . (Patient taking differently: daily. Take one pill in the daytime as tolerated and two pills at night. Generic equivalent acceptable, unless otherwise noted. Trudi Saucedo ) 90 capsule 1    topiramate (TOPAMAX) 25 MG tablet Take one or two nightly as tolerated for pain 60 tablet 3    atorvastatin (LIPITOR) 10 MG tablet Take 1 tablet by mouth nightly 90 tablet 3    lisinopril (PRINIVIL;ZESTRIL) 40 MG tablet Take 1 tablet by mouth daily 90 tablet 3    lidocaine (XYLOCAINE) 5 % ointment Apply topically as needed. 1 Tube 11    aspirin 81 MG tablet Take 81 mg by mouth daily      CRANBERRY EXTRACT PO Take by mouth      triamterene-hydrochlorothiazide (MAXZIDE-25) 37.5-25 MG per tablet Take 1 tablet by mouth daily      Multiple Vitamins-Minerals (MULTIVITAMIN & MINERAL PO) Take by mouth      meloxicam (MOBIC) 7.5 MG tablet Take 7.5 mg by mouth 2 times daily.  Coenzyme Q10 (COQ10 PO) Take  by mouth daily.  Cholecalciferol (VITAMIN D3) 2000 units TABS Take by mouth daily       nitroGLYCERIN (NITROSTAT) 0.4 MG SL tablet Place 0.4 mg under the tongue every 5 minutes as needed for Chest pain. REVIEW OF SYSTEMS:  CONSTITUTIONAL: + fever, chills. CARDIOVASCULAR: No chest pain, dyspnea, palpitations. PULM: No dyspnea, tachypnea, wheezing  GI: + nausea. No vomiting, constipation, diarrhea. : No new urinary complaints, including dysuria, gross hematuria or pyuria. NEURO: No new balance problems, peripheral weakness/paresthesias. MSK: admits to pain in bilateral feet. INTEGUMENTARY: denies open skin lesion. OBJECTIVE:  BP (!) 169/64   Pulse 92   Temp 97.9 °F (36.6 °C) (Oral)   Resp 16   Ht 5' 8\" (1.727 m)   Wt 216 lb (98 kg)   LMP  (Approximate)   SpO2 97%   BMI 32.84 kg/m²   Patient is alert and oriented x 3 in NAD. Vascular:   Palpable Dorsalis Pedis and Palpable Posterior Tibial Pulses B/L   Capillary Fill time < 3 seconds to B/L digits  Skin temperature warm to cool tibial tuberosity to the digits B/L  Hair growth absent to digits  Mild edema, no varicosities   New increased warmth to the left foot today.     Neurological:   Epicritic sensation intact B/L  Protective sensation via monofilament testing impaired B/L    Musculoskeletal/Orthopaedic:   Muscle strength and ROM deferred due to severity of pain at rest.  Able to actively dorsiflex and plantarflex the toes B/L  Severe tenderness to light touch to left foot. No tenderness to light touch of right foot today. Dermatological:   Skin appears well hydrated and supple with good temperature, texture, turgor. No open lesions, ulcerations, verruca appreciated B/L. Erythema to B/L feet and ankles appears to be improving with the right foot erythema almost completely resolved. New small fluctuant mass noted to the dorsal lateral midfoot of the left foot. Fluid aspirated from the area was blood tinged, but with a milky consistency. LABS:   Lab Results   Component Value Date    WBC 12.1 (H) 08/10/2018    HGB 10.5 (L) 08/10/2018    HCT 31.2 (L) 08/10/2018    MCV 95.8 08/10/2018     08/10/2018     Lab Results   Component Value Date     08/10/2018    K 4.1 08/10/2018    K 4.4 08/09/2018     08/10/2018    CO2 20 08/10/2018    BUN 27 08/10/2018    CREATININE 1.10 08/10/2018    GLUCOSE 111 08/10/2018    GLUCOSE 83 12/07/2011    CALCIUM 8.6 08/10/2018      Lab Results   Component Value Date    LABALBU 2.9 (L) 08/10/2018     Lab Results   Component Value Date    LABA1C 5.8 01/17/2012     Recent Labs      08/10/18   1601   RF  16.8*     Component Value Date   Uric Acid, Serum 8.1   08/08/2018  4:45 PM     MICROBIOLOGY:   Type   Date  Results  Fluid Culture  8/10/18 pending  Blood Culture  8/9/18  pending  Blood Culture:  8/8/18  MSSA    Fluid crystal analysis 8/10/18 pending    IMAGING:   MRI L FOOT pending    XR L FOOT 8/8/18:  Radiologist impression: Advanced degenerative changes involving the tarsal bones most striking at the navicular cuneiform articulation. There is some disorganization and subchondral cystic variation across the mid tarsal row. Remaining visualized bones intact. No acute fracture.     XR R FOOT

## 2018-08-10 NOTE — CARE COORDINATION
PER IV CHECK , PRICE QUOTE FROM [facility]ANCO [facility]ST WOULD .97/WK. WILL SPEAK WITH PATIENT  AND PROVIDE FREEDOM OF CHOICE IF LONG TERM IV VANCO NEEDED.

## 2018-08-10 NOTE — PROGRESS NOTES
Infectious Diseases Inpatient Progress Note          HISTORY OF PRESENT ILLNESS:  Follow up MSSA sepsis, LLE cellulitis, B feet arthritis on IV Vanco, well tolerated. Patient persists to be very sick with chills. Decreased fevers, B feet pain, swelling and redness. Few cc of murky fluid was aspirated from dorsal aspect of L foot  Current Medications:     gabapentin  600 mg Oral Nightly    [START ON 8/11/2018] gabapentin  300 mg Oral Daily    lidocaine  5 mL Intradermal Once    nafcillin  2 g Intravenous Q4H    sodium chloride flush  10 mL Intravenous 2 times per day    heparin (porcine)  5,000 Units Subcutaneous Q12H    aspirin  81 mg Oral Daily    atorvastatin  10 mg Oral Nightly    topiramate  25 mg Oral Daily       Allergies:  Latex; Cephalexin; Norvasc [amlodipine besylate]; and Keflex [cephalexin]      ROS  14 system review is negative other than HPI    Physical Exam  Vitals:    08/09/18 1644 08/09/18 2313 08/10/18 0815 08/10/18 1503   BP: (!) 168/85 (!) 156/60 (!) 190/69 (!) 169/64   Pulse: 115 94 91 92   Resp: 18 16  16   Temp: 101.7 °F (38.7 °C) 99.7 °F (37.6 °C) 98.2 °F (36.8 °C) 97.9 °F (36.6 °C)   TempSrc: Oral Oral  Oral   SpO2: 97% 100% 97% 97%   Weight:       Height:         General Appearance: alert and oriented to person, place and time, well-developed and well-nourished, in no acute distress  Skin: warm and dry, no rash. Head: normocephalic and atraumatic  Eyes: anicteric sclerae  ENT: oropharynx clear and moist with normal mucous membranes.  No oral thrush  Lungs: normal respiratory effort, clear   Heart: RRR, no murmur  Abdomen: soft, no tenderness  + B leg edema  + B feet erythema, warmth and  tenderness  Resolved L Leg erythema    DATA:    Lab Results   Component Value Date    WBC 12.1 (H) 08/10/2018    HGB 10.5 (L) 08/10/2018    HCT 31.2 (L) 08/10/2018    MCV 95.8 08/10/2018     08/10/2018     Lab Results   Component Value Date    CREATININE 1.10 (H) 08/10/2018    BUN 27 (H)

## 2018-08-11 ENCOUNTER — APPOINTMENT (OUTPATIENT)
Dept: MRI IMAGING | Age: 63
DRG: 853 | End: 2018-08-11
Payer: MEDICARE

## 2018-08-11 LAB
ALBUMIN SERPL-MCNC: 2.6 G/DL (ref 3.9–4.9)
ANION GAP SERPL CALCULATED.3IONS-SCNC: 16 MEQ/L (ref 7–13)
BASOPHILS ABSOLUTE: 0 K/UL (ref 0–0.2)
BASOPHILS RELATIVE PERCENT: 0.2 %
BLOOD CULTURE, ROUTINE: ABNORMAL
BUN BLDV-MCNC: 21 MG/DL (ref 8–23)
CALCIUM SERPL-MCNC: 8.9 MG/DL (ref 8.6–10.2)
CHLORIDE BLD-SCNC: 99 MEQ/L (ref 98–107)
CO2: 20 MEQ/L (ref 22–29)
CREAT SERPL-MCNC: 0.96 MG/DL (ref 0.5–0.9)
CRYSTALS, FLUID: NORMAL
CULTURE, BLOOD 2: ABNORMAL
CULTURE, BLOOD 2: ABNORMAL
EOSINOPHILS ABSOLUTE: 0.1 K/UL (ref 0–0.7)
EOSINOPHILS RELATIVE PERCENT: 0.6 %
GFR AFRICAN AMERICAN: >60
GFR NON-AFRICAN AMERICAN: 58.3
GLUCOSE BLD-MCNC: 110 MG/DL (ref 74–109)
HCT VFR BLD CALC: 35.4 % (ref 37–47)
HEMOGLOBIN: 11.8 G/DL (ref 12–16)
LYMPHOCYTES ABSOLUTE: 0.9 K/UL (ref 1–4.8)
LYMPHOCYTES RELATIVE PERCENT: 7.1 %
MAGNESIUM: 1.8 MG/DL (ref 1.7–2.3)
MCH RBC QN AUTO: 32.1 PG (ref 27–31.3)
MCHC RBC AUTO-ENTMCNC: 33.4 % (ref 33–37)
MCV RBC AUTO: 96.1 FL (ref 82–100)
MONOCYTES ABSOLUTE: 1.4 K/UL (ref 0.2–0.8)
MONOCYTES RELATIVE PERCENT: 11 %
NEUTROPHILS ABSOLUTE: 10.2 K/UL (ref 1.4–6.5)
NEUTROPHILS RELATIVE PERCENT: 81.1 %
ORGANISM: ABNORMAL
PDW BLD-RTO: 13.9 % (ref 11.5–14.5)
PHOSPHORUS: 3.5 MG/DL (ref 2.5–4.5)
PLATELET # BLD: 176 K/UL (ref 130–400)
POTASSIUM SERPL-SCNC: 4.1 MEQ/L (ref 3.5–5.1)
RBC # BLD: 3.68 M/UL (ref 4.2–5.4)
SODIUM BLD-SCNC: 135 MEQ/L (ref 132–144)
SOURCE BODY FLUID: NORMAL
URINE CULTURE, ROUTINE: NORMAL
WBC # BLD: 12.5 K/UL (ref 4.8–10.8)

## 2018-08-11 PROCEDURE — 85025 COMPLETE CBC W/AUTO DIFF WBC: CPT

## 2018-08-11 PROCEDURE — 6360000002 HC RX W HCPCS: Performed by: NURSE PRACTITIONER

## 2018-08-11 PROCEDURE — 36415 COLL VENOUS BLD VENIPUNCTURE: CPT

## 2018-08-11 PROCEDURE — 1210000000 HC MED SURG R&B

## 2018-08-11 PROCEDURE — 73718 MRI LOWER EXTREMITY W/O DYE: CPT

## 2018-08-11 PROCEDURE — 80069 RENAL FUNCTION PANEL: CPT

## 2018-08-11 PROCEDURE — 2500000003 HC RX 250 WO HCPCS: Performed by: INTERNAL MEDICINE

## 2018-08-11 PROCEDURE — 2580000003 HC RX 258: Performed by: INTERNAL MEDICINE

## 2018-08-11 PROCEDURE — 6370000000 HC RX 637 (ALT 250 FOR IP): Performed by: INTERNAL MEDICINE

## 2018-08-11 PROCEDURE — 87040 BLOOD CULTURE FOR BACTERIA: CPT

## 2018-08-11 PROCEDURE — 2580000003 HC RX 258: Performed by: NURSE PRACTITIONER

## 2018-08-11 PROCEDURE — 99232 SBSQ HOSP IP/OBS MODERATE 35: CPT | Performed by: INTERNAL MEDICINE

## 2018-08-11 PROCEDURE — 83735 ASSAY OF MAGNESIUM: CPT

## 2018-08-11 RX ORDER — COLCHICINE 0.6 MG/1
0.6 TABLET ORAL ONCE
Status: COMPLETED | OUTPATIENT
Start: 2018-08-11 | End: 2018-08-11

## 2018-08-11 RX ADMIN — NAFCILLIN SODIUM 2 G: 2 INJECTION, POWDER, LYOPHILIZED, FOR SOLUTION INTRAMUSCULAR; INTRAVENOUS at 06:41

## 2018-08-11 RX ADMIN — HYDROCODONE BITARTRATE AND ACETAMINOPHEN 1 TABLET: 7.5; 325 TABLET ORAL at 21:36

## 2018-08-11 RX ADMIN — Medication 10 ML: at 08:55

## 2018-08-11 RX ADMIN — NAFCILLIN SODIUM 2 G: 2 INJECTION, POWDER, LYOPHILIZED, FOR SOLUTION INTRAMUSCULAR; INTRAVENOUS at 23:05

## 2018-08-11 RX ADMIN — HEPARIN SODIUM 5000 UNITS: 5000 INJECTION, SOLUTION INTRAVENOUS; SUBCUTANEOUS at 08:58

## 2018-08-11 RX ADMIN — COLCHICINE 0.6 MG: 0.6 TABLET, FILM COATED ORAL at 18:23

## 2018-08-11 RX ADMIN — NAFCILLIN SODIUM 2 G: 2 INJECTION, POWDER, LYOPHILIZED, FOR SOLUTION INTRAMUSCULAR; INTRAVENOUS at 15:23

## 2018-08-11 RX ADMIN — HYDROCODONE BITARTRATE AND ACETAMINOPHEN 1 TABLET: 7.5; 325 TABLET ORAL at 15:23

## 2018-08-11 RX ADMIN — ASPIRIN 81 MG: 81 TABLET, COATED ORAL at 08:51

## 2018-08-11 RX ADMIN — HEPARIN SODIUM 5000 UNITS: 5000 INJECTION, SOLUTION INTRAVENOUS; SUBCUTANEOUS at 21:37

## 2018-08-11 RX ADMIN — TOPIRAMATE 25 MG: 25 TABLET, FILM COATED ORAL at 08:51

## 2018-08-11 RX ADMIN — Medication 10 ML: at 15:24

## 2018-08-11 RX ADMIN — ATORVASTATIN CALCIUM 10 MG: 10 TABLET, FILM COATED ORAL at 21:36

## 2018-08-11 RX ADMIN — Medication 10 ML: at 23:05

## 2018-08-11 RX ADMIN — HYDROCODONE BITARTRATE AND ACETAMINOPHEN 1 TABLET: 7.5; 325 TABLET ORAL at 08:51

## 2018-08-11 RX ADMIN — GABAPENTIN 600 MG: 300 CAPSULE ORAL at 21:36

## 2018-08-11 RX ADMIN — NAFCILLIN SODIUM 2 G: 2 INJECTION, POWDER, LYOPHILIZED, FOR SOLUTION INTRAMUSCULAR; INTRAVENOUS at 02:46

## 2018-08-11 RX ADMIN — HYDROCODONE BITARTRATE AND ACETAMINOPHEN 1 TABLET: 7.5; 325 TABLET ORAL at 01:54

## 2018-08-11 RX ADMIN — GABAPENTIN 300 MG: 300 CAPSULE ORAL at 08:51

## 2018-08-11 RX ADMIN — ACETAMINOPHEN 650 MG: 325 TABLET ORAL at 13:13

## 2018-08-11 ASSESSMENT — PAIN SCALES - GENERAL
PAINLEVEL_OUTOF10: 10
PAINLEVEL_OUTOF10: 8
PAINLEVEL_OUTOF10: 6
PAINLEVEL_OUTOF10: 7
PAINLEVEL_OUTOF10: 8
PAINLEVEL_OUTOF10: 8
PAINLEVEL_OUTOF10: 10

## 2018-08-11 ASSESSMENT — PAIN DESCRIPTION - PAIN TYPE: TYPE: ACUTE PAIN

## 2018-08-11 ASSESSMENT — PAIN DESCRIPTION - ORIENTATION: ORIENTATION: RIGHT;LEFT

## 2018-08-11 ASSESSMENT — ENCOUNTER SYMPTOMS: GASTROINTESTINAL NEGATIVE: 1

## 2018-08-11 NOTE — CONSULTS
Lab Results   Component Value Date    WBC 12.5 08/11/2018    RBC 3.68 08/11/2018    RBC 3.80 12/07/2011    HGB 11.8 08/11/2018    HCT 35.4 08/11/2018    MCV 96.1 08/11/2018    MCH 32.1 08/11/2018    MCHC 33.4 08/11/2018    RDW 13.9 08/11/2018     08/11/2018    MPV 9.1 10/13/2014     CBC:    Lab Results   Component Value Date    WBC 12.5 08/11/2018    RBC 3.68 08/11/2018    RBC 3.80 12/07/2011    HGB 11.8 08/11/2018    HCT 35.4 08/11/2018     08/11/2018     BMP:    Lab Results   Component Value Date     08/11/2018    K 4.1 08/11/2018    K 4.4 08/09/2018    CL 99 08/11/2018    CO2 20 08/11/2018    BUN 21 08/11/2018    CREATININE 0.96 08/11/2018    CALCIUM 8.9 08/11/2018    GFRAA >60.0 08/11/2018    LABGLOM 58.3 08/11/2018    GLUCOSE 110 08/11/2018    GLUCOSE 83 12/07/2011     Blood Culture:  No components found for: [address]      Radiology: L Foot midfoot arthritis

## 2018-08-11 NOTE — PROGRESS NOTES
PODIATRIC MEDICINE AND SURGERY  CONSULT PROGRESS NOTE    Follow Up: foot pain and swelling  Staff Doctor:  Zbigniew Page DPM    ASSESSMENT:  70 y.o. female with PMH significant for Fibromyalgia, HLD, HTN, OA, lumbar and cervical spine surgery with neuropathy, whom podiatry was consulted for foot pain and swelling. On physical exam bilateral feet are edematous and erythematous, L > R, and hypersensitive to touch. She is febrile with elevated WBC and inflammatory markers. However, there are no open lesion or acute changes on XRs that would support acute infection in either feet. XRs do demonstrate degenerative changes to the midfoot bilaterally, possible gout vs Charcot like changes. Patient does have an elevated uric acid level and gout would be more consistent with the \"red, swollen, painful\" characterization of her feet than Charcot. However, with new fluctuant mass to the left dorsal lateral midfoot there may be concern for underlying infection given previously mentioned leukocytosis and fever. PLAN AND RECOMMENDATIONS[de-identified]  · Patient examined and evaluated. Patient's case discussed with Dr. Harjeet Corey. · Previous and today's labs reviewed. · Will follow MRI and cultures. · Weight bearing as tolerated weight bearing to bilateral feet  · Antibiotic coverage per primary team or ID recommendations   · Pain management per primary team   · Podiatry to continue to follow while in house. INTERVAL HISTORY:  Low grade fever. WBC down trending since starting IV abx. Right foot pain, edema, and erythema improved. Fluctuant mass to the dorsal lateral midfoot today that was not present yesterday. RF factor 16.8. New left elbow pain, warmth, and tenderness today as well. Also complains of back pain. TTE was negative. Uric Acid 8.1. Given single dose of colchicine today. No crystals observed in fluid aspiration. GPCs noted to be growing from the aspirated fluid. Prior blood cultures + staph aureus.  Today's blood  08/11/2018    K 4.1 08/11/2018    K 4.4 08/09/2018    CL 99 08/11/2018    CO2 20 08/11/2018    BUN 21 08/11/2018    CREATININE 0.96 08/11/2018    GLUCOSE 110 08/11/2018    GLUCOSE 83 12/07/2011    CALCIUM 8.9 08/11/2018      Lab Results   Component Value Date    LABALBU 2.6 (L) 08/11/2018     Lab Results   Component Value Date    LABA1C 5.8 01/17/2012     Recent Labs      08/10/18   1601   RF  16.8*     Component Value Date   Uric Acid, Serum 8.1   08/08/2018  4:45 PM     MICROBIOLOGY:   Type   Date  Results  Blood Culture  8/11/18 pending  Fluid Culture  8/10/18 GPCs  Blood Culture  8/10/18 GPCs  Blood Culture:  8/8/18  MSSA x2    Fluid crystal analysis 8/10/18 No crystals    IMAGING:   MRI L FOOT 8/11/18 - pending    XR L FOOT 8/8/18:  Radiologist impression: Advanced degenerative changes involving the tarsal bones most striking at the navicular cuneiform articulation. There is some disorganization and subchondral cystic variation across the mid tarsal row. Remaining visualized bones intact. No acute fracture. XR R FOOT 8/8/18:  Radiologist impression: Severe advanced degenerative change involving the mid foot. Findings show advancement of the change when compared to the previous radiographs. No acute fracture or dislocation. Remaining visualized bones intact.         Ashwini Alexander, PGY-2  Please first page Podiatry On Call, [phone]  August 11, 2018  4:24 PM

## 2018-08-11 NOTE — PROGRESS NOTES
Tracy Mckeon is a 70 y.o. female patient. MSSA sepsis  Left foot abscess      And pus draining from the left foot yesterday    Culture Blood #2 [[id number]] (Abnormal) Collected: 08/08/18 1644   Order Status: Completed Specimen: Blood Updated: 08/11/18 1042    Culture, Blood 2 2 out of 2 blood cultures (A)    Organism Staph aureus MSSA (A)    Culture, Blood 2 --    POSITIVE for   PBP2= Negative   Refer to previous sensitivity    Narrative:     ORDER#: [id number]                          ORDERED BYAVIS Samuel  SOURCE: Blood                              COLLECTED:  08/08/18 16:44  ANTIBIOTICS AT [facility]. :                      RECEIVED :  08/08/18 16:51   Culture Blood #1 [[id number]] (Abnormal)  Collected: 08/08/18 1644   Order Status: Completed Specimen: Blood from Blood Updated: 08/11/18 1041    Blood Culture, Routine 2 out of 2 blood cultures (A)    Organism Staph aureus DNA Detected (A)    Blood Culture, Routine --    The following organisms and resistance markers have been   tested using nucleic acid testing technology. ORGANISMS:   Staphylococcus aureus, Staphylococcus epidermidis,   Staphylococcus lugdunensis,Staphylococcus coagulase-negative,   Enterococcus faecalis, Enterococcus faecium, Listeria species,   Streptococcus pneumoniae, Streptococcus pyogenes (Gp A),   Streptococcus agalactiae (Gp B), Streptococcus anginosus gp,   Streptococcus species.    METHICILLIN RESISTANCE MARKER:   mec-A   VANCOMYCIN RESISTANCE MARKERS:   van-A   van-B  :     Organism Staph aureus MSSA (A)    Blood Culture, Routine --    POSITIVE for   PBP2= Negative        Current Facility-Administered Medications   Medication Dose Route Frequency Provider Last Rate Last Dose    gabapentin (NEURONTIN) capsule 600 mg  600 mg Oral Nightly Otilia Garber MD   600 mg at 08/10/18 2140    gabapentin (NEURONTIN) capsule 300 mg  300 mg Oral Daily Otilia Garber MD   300 mg at 08/11/18 0851    lidocaine 2 % injection 5 mL  5 mL SpO2 96 %, not currently breastfeeding. Subjective     Review of Systems   Constitutional: Negative. Cardiovascular: Negative. Gastrointestinal: Negative. Genitourinary: Negative. Musculoskeletal: Negative. Neurological: Negative. Objective:  Vital signs: (most recent): Blood pressure (!) 158/80, pulse 88, temperature 98.4 °F (36.9 °C), temperature source Oral, resp. rate 16, height 5' 8\" (1.727 m), weight 216 lb (98 kg), SpO2 96 %, not currently breastfeeding.        Obese alert  Some pain both feet  Lungs are clear to auscultation percussion  Abdomen soft nontender without mass no organomegaly  No cervical axillary epitrochlear inguinal adenopathy  Heart S1-S2 no murmur  Both feet shows some warmth and redness    Assessment & Plan  MSSA sepsis   continues on nafcillin  Left foot abscess  MRI still pending      Repeat blood cultures awaiting reading on MRI may need debridement of foot      Vero Florian MD  8/11/2018

## 2018-08-11 NOTE — PROGRESS NOTES
Hospitalist Progress Note      PCP: Binta Faria MD    Date of Admission: 8/8/2018    Chief Complaint:  No fevers overnight, stable HD, fluid from left foot was aspirated yesterday by ID    Medications:  Reviewed    Infusion Medications     Scheduled Medications    gabapentin  600 mg Oral Nightly    gabapentin  300 mg Oral Daily    lidocaine  5 mL Intradermal Once    nafcillin  2 g Intravenous Q4H    sodium chloride flush  10 mL Intravenous 2 times per day    heparin (porcine)  5,000 Units Subcutaneous Q12H    aspirin  81 mg Oral Daily    atorvastatin  10 mg Oral Nightly    topiramate  25 mg Oral Daily     PRN Meds: diphenhydrAMINE, acetaminophen, sodium chloride flush, magnesium hydroxide, ondansetron, HYDROcodone-acetaminophen      Intake/Output Summary (Last 24 hours) at 08/11/18 1302  Last data filed at 08/11/18 1139   Gross per 24 hour   Intake             2422 ml   Output             1500 ml   Net              922 ml       Exam:    BP (!) 158/80   Pulse 88   Temp 98.4 °F (36.9 °C) (Oral)   Resp 16   Ht 5' 8\" (1.727 m)   Wt 216 lb (98 kg)   LMP  (Approximate)   SpO2 96%   BMI 32.84 kg/m²     General appearance: No apparent distress, appears stated age and cooperative. Respiratory:  Clear to auscultation, bilaterally without Rales/Wheezes/Rhonchi. Cardiovascular: Regular rate and rhythm with normal S1/S2 without murmurs, rubs or gallops. Abdomen: Soft, non-tender, non-distended with normal bowel sounds.   Musculoskeletal: significant edema,erythema and tenderness of the left foot,     Labs:   Recent Labs      08/08/18   2113  08/10/18   0638  08/11/18   0655   WBC  15.9*  12.1*  12.5*   HGB  11.0*  10.5*  11.8*   HCT  33.0*  31.2*  35.4*   PLT  123*  150  176     Recent Labs      08/09/18   0641  08/10/18   0638  08/11/18   0655   NA  132  134  135   K  4.4  4.1  4.1   CL  100  100  99   CO2  19*  20*  20*   BUN  38*  27*  21   CREATININE  1.53*  1.10*  0.96*   CALCIUM  8.2*  8.6  8.9

## 2018-08-11 NOTE — PROGRESS NOTES
Nephrology Progress Note    Assessment:   70y.o. year old female with history s/f HTN, HLD, spinal stenosis who was admitted for cellulitis of the foot. Currently on IV vanco. Being followed by ID. Currently bcxs positive for staph aureus. Also found to have ARI w/ Scr 1.6, nephrology consulted     1. Non-oliguric ARI: Possibly prerenal w/ FeNa 0.2 w/ presence of hyaline casts. Pt was on lisinopril, meloxicam and maxide as outpatient. Baseline Scr unknown, ~1.2-1.3 in 2017. Renal U/S wnl, Scr now 1.1 w/ fluids down to 0.9  2. Hyponatremia: mild, Na 132, on diuretic and NSAIDs as outpatient, corrected  3.  Metabolic acidosis: possibly 2/2 ARI      Plan:   - continue to monitor serial labs  - ok to give colchicine    Patient Active Problem List:     Spinal stenosis of lumbar region with neurogenic claudication     High risk medication use - 10/17/17 [facility] PM&R, 12/19/17 [facility] PM&R, 12/20/17 Tox screen positive for opiates, Hydrocodone PM&R, MED CONTRACT 2/2/17     Mixed hyperlipidemia     Benign essential hypertension     Acquired spondylolisthesis     Primary osteoarthritis involving multiple joints     BMI 33.0-33.9,adult     Neuropathy involving both lower extremities     Hyponatremia     Staphylococcus aureus bacteremia with sepsis (HCC)     Cellulitis of left foot     Pyogenic arthritis of multiple sites [facility])      Subjective:   Admit Date: 8/8/2018    Interval History: renal function improving      Medications:   Scheduled Meds:   gabapentin  600 mg Oral Nightly    gabapentin  300 mg Oral Daily    lidocaine  5 mL Intradermal Once    nafcillin  2 g Intravenous Q4H    sodium chloride flush  10 mL Intravenous 2 times per day    heparin (porcine)  5,000 Units Subcutaneous Q12H    aspirin  81 mg Oral Daily    atorvastatin  10 mg Oral Nightly    topiramate  25 mg Oral Daily     Continuous Infusions:      CBC:   Recent Labs      08/10/18   0638  08/11/18   0655   WBC  12.1*  12.5*   HGB  10.5*  11.8*   PLT oriented, no gross abnormalities  Psych: normal mood and affect  Skin: LT foot erythema, improving        Electronically signed by Hiral Hoyt MD on 8/11/2018 at 12:12 PM

## 2018-08-12 ENCOUNTER — APPOINTMENT (OUTPATIENT)
Dept: MRI IMAGING | Age: 63
DRG: 853 | End: 2018-08-12
Payer: MEDICARE

## 2018-08-12 LAB
ALBUMIN SERPL-MCNC: 2.6 G/DL (ref 3.9–4.9)
ANION GAP SERPL CALCULATED.3IONS-SCNC: 18 MEQ/L (ref 7–13)
BASOPHILS ABSOLUTE: 0 K/UL (ref 0–0.2)
BASOPHILS RELATIVE PERCENT: 0.4 %
BETA-HYDROXYBUTYRATE: 9.8 MG/DL (ref 0.2–2.8)
BLOOD CULTURE, ROUTINE: ABNORMAL
BLOOD CULTURE, ROUTINE: ABNORMAL
BUN BLDV-MCNC: 23 MG/DL (ref 8–23)
C-REACTIVE PROTEIN, HIGH SENSITIVITY: 277.4 MG/L (ref 0–5)
CALCIUM SERPL-MCNC: 8.8 MG/DL (ref 8.6–10.2)
CHLORIDE BLD-SCNC: 98 MEQ/L (ref 98–107)
CO2: 20 MEQ/L (ref 22–29)
CREAT SERPL-MCNC: 0.86 MG/DL (ref 0.5–0.9)
EOSINOPHILS ABSOLUTE: 0.1 K/UL (ref 0–0.7)
EOSINOPHILS RELATIVE PERCENT: 0.9 %
GFR AFRICAN AMERICAN: >60
GFR NON-AFRICAN AMERICAN: >60
GLUCOSE BLD-MCNC: 98 MG/DL (ref 74–109)
HCT VFR BLD CALC: 34.4 % (ref 37–47)
HEMOGLOBIN: 11.8 G/DL (ref 12–16)
LYMPHOCYTES ABSOLUTE: 0.7 K/UL (ref 1–4.8)
LYMPHOCYTES RELATIVE PERCENT: 6.6 %
MAGNESIUM: 1.7 MG/DL (ref 1.7–2.3)
MCH RBC QN AUTO: 32.3 PG (ref 27–31.3)
MCHC RBC AUTO-ENTMCNC: 34.4 % (ref 33–37)
MCV RBC AUTO: 94 FL (ref 82–100)
MONOCYTES ABSOLUTE: 1.1 K/UL (ref 0.2–0.8)
MONOCYTES RELATIVE PERCENT: 10.5 %
NEUTROPHILS ABSOLUTE: 8.7 K/UL (ref 1.4–6.5)
NEUTROPHILS RELATIVE PERCENT: 81.6 %
ORGANISM: ABNORMAL
PDW BLD-RTO: 13.7 % (ref 11.5–14.5)
PHOSPHORUS: 4 MG/DL (ref 2.5–4.5)
PLATELET # BLD: 218 K/UL (ref 130–400)
POTASSIUM SERPL-SCNC: 3.4 MEQ/L (ref 3.5–5.1)
RBC # BLD: 3.66 M/UL (ref 4.2–5.4)
SODIUM BLD-SCNC: 136 MEQ/L (ref 132–144)
WBC # BLD: 10.7 K/UL (ref 4.8–10.8)

## 2018-08-12 PROCEDURE — 6360000002 HC RX W HCPCS: Performed by: PHYSICIAN ASSISTANT

## 2018-08-12 PROCEDURE — 72158 MRI LUMBAR SPINE W/O & W/DYE: CPT

## 2018-08-12 PROCEDURE — 6370000000 HC RX 637 (ALT 250 FOR IP): Performed by: ORTHOPAEDIC SURGERY

## 2018-08-12 PROCEDURE — 82010 KETONE BODYS QUAN: CPT

## 2018-08-12 PROCEDURE — 99232 SBSQ HOSP IP/OBS MODERATE 35: CPT | Performed by: INTERNAL MEDICINE

## 2018-08-12 PROCEDURE — 87040 BLOOD CULTURE FOR BACTERIA: CPT

## 2018-08-12 PROCEDURE — 86038 ANTINUCLEAR ANTIBODIES: CPT

## 2018-08-12 PROCEDURE — 2580000003 HC RX 258: Performed by: NURSE PRACTITIONER

## 2018-08-12 PROCEDURE — 85025 COMPLETE CBC W/AUTO DIFF WBC: CPT

## 2018-08-12 PROCEDURE — 6360000002 HC RX W HCPCS: Performed by: NURSE PRACTITIONER

## 2018-08-12 PROCEDURE — 6370000000 HC RX 637 (ALT 250 FOR IP): Performed by: INTERNAL MEDICINE

## 2018-08-12 PROCEDURE — 80069 RENAL FUNCTION PANEL: CPT

## 2018-08-12 PROCEDURE — 6360000004 HC RX CONTRAST MEDICATION: Performed by: PODIATRIST

## 2018-08-12 PROCEDURE — 72157 MRI CHEST SPINE W/O & W/DYE: CPT

## 2018-08-12 PROCEDURE — 36415 COLL VENOUS BLD VENIPUNCTURE: CPT

## 2018-08-12 PROCEDURE — A9579 GAD-BASE MR CONTRAST NOS,1ML: HCPCS | Performed by: PODIATRIST

## 2018-08-12 PROCEDURE — 86141 C-REACTIVE PROTEIN HS: CPT

## 2018-08-12 PROCEDURE — 83735 ASSAY OF MAGNESIUM: CPT

## 2018-08-12 PROCEDURE — 84145 PROCALCITONIN (PCT): CPT

## 2018-08-12 PROCEDURE — 2580000003 HC RX 258: Performed by: INTERNAL MEDICINE

## 2018-08-12 PROCEDURE — 2500000003 HC RX 250 WO HCPCS: Performed by: INTERNAL MEDICINE

## 2018-08-12 PROCEDURE — 1210000000 HC MED SURG R&B

## 2018-08-12 RX ORDER — OXYCODONE HYDROCHLORIDE AND ACETAMINOPHEN 5; 325 MG/1; MG/1
1 TABLET ORAL EVERY 4 HOURS PRN
Status: DISCONTINUED | OUTPATIENT
Start: 2018-08-12 | End: 2018-08-13

## 2018-08-12 RX ORDER — COLCHICINE 0.6 MG/1
1.2 TABLET ORAL ONCE
Status: COMPLETED | OUTPATIENT
Start: 2018-08-12 | End: 2018-08-12

## 2018-08-12 RX ORDER — LORAZEPAM 2 MG/ML
1 INJECTION INTRAMUSCULAR ONCE
Status: COMPLETED | OUTPATIENT
Start: 2018-08-12 | End: 2018-08-12

## 2018-08-12 RX ORDER — POTASSIUM CHLORIDE 20 MEQ/1
40 TABLET, EXTENDED RELEASE ORAL ONCE
Status: DISCONTINUED | OUTPATIENT
Start: 2018-08-12 | End: 2018-08-13

## 2018-08-12 RX ORDER — OXYCODONE HYDROCHLORIDE AND ACETAMINOPHEN 5; 325 MG/1; MG/1
2 TABLET ORAL EVERY 4 HOURS PRN
Status: DISCONTINUED | OUTPATIENT
Start: 2018-08-12 | End: 2018-08-13

## 2018-08-12 RX ADMIN — LORAZEPAM 1 MG: 2 INJECTION INTRAMUSCULAR; INTRAVENOUS at 13:52

## 2018-08-12 RX ADMIN — HYDROCODONE BITARTRATE AND ACETAMINOPHEN 1 TABLET: 7.5; 325 TABLET ORAL at 04:15

## 2018-08-12 RX ADMIN — OXYCODONE HYDROCHLORIDE AND ACETAMINOPHEN 2 TABLET: 5; 325 TABLET ORAL at 22:04

## 2018-08-12 RX ADMIN — Medication 10 ML: at 09:08

## 2018-08-12 RX ADMIN — OXYCODONE HYDROCHLORIDE AND ACETAMINOPHEN 2 TABLET: 5; 325 TABLET ORAL at 10:33

## 2018-08-12 RX ADMIN — GADOTERIDOL 20 ML: 279.3 INJECTION, SOLUTION INTRAVENOUS at 16:09

## 2018-08-12 RX ADMIN — GABAPENTIN 300 MG: 300 CAPSULE ORAL at 09:07

## 2018-08-12 RX ADMIN — ASPIRIN 81 MG: 81 TABLET, COATED ORAL at 09:07

## 2018-08-12 RX ADMIN — ATORVASTATIN CALCIUM 10 MG: 10 TABLET, FILM COATED ORAL at 22:04

## 2018-08-12 RX ADMIN — COLCHICINE 1.2 MG: 0.6 TABLET, FILM COATED ORAL at 10:33

## 2018-08-12 RX ADMIN — HEPARIN SODIUM 5000 UNITS: 5000 INJECTION, SOLUTION INTRAVENOUS; SUBCUTANEOUS at 22:04

## 2018-08-12 RX ADMIN — NAFCILLIN SODIUM 2 G: 2 INJECTION, POWDER, LYOPHILIZED, FOR SOLUTION INTRAMUSCULAR; INTRAVENOUS at 23:24

## 2018-08-12 RX ADMIN — OXYCODONE HYDROCHLORIDE AND ACETAMINOPHEN 2 TABLET: 5; 325 TABLET ORAL at 16:26

## 2018-08-12 RX ADMIN — NAFCILLIN SODIUM 2 G: 2 INJECTION, POWDER, LYOPHILIZED, FOR SOLUTION INTRAMUSCULAR; INTRAVENOUS at 12:18

## 2018-08-12 RX ADMIN — NAFCILLIN SODIUM 2 G: 2 INJECTION, POWDER, LYOPHILIZED, FOR SOLUTION INTRAMUSCULAR; INTRAVENOUS at 09:02

## 2018-08-12 RX ADMIN — Medication 10 ML: at 22:05

## 2018-08-12 RX ADMIN — GABAPENTIN 600 MG: 300 CAPSULE ORAL at 22:04

## 2018-08-12 RX ADMIN — TOPIRAMATE 25 MG: 25 TABLET, FILM COATED ORAL at 09:07

## 2018-08-12 RX ADMIN — NAFCILLIN SODIUM 2 G: 2 INJECTION, POWDER, LYOPHILIZED, FOR SOLUTION INTRAMUSCULAR; INTRAVENOUS at 04:22

## 2018-08-12 RX ADMIN — HEPARIN SODIUM 5000 UNITS: 5000 INJECTION, SOLUTION INTRAVENOUS; SUBCUTANEOUS at 09:07

## 2018-08-12 ASSESSMENT — PAIN SCALES - GENERAL
PAINLEVEL_OUTOF10: 7
PAINLEVEL_OUTOF10: 10
PAINLEVEL_OUTOF10: 10
PAINLEVEL_OUTOF10: 9
PAINLEVEL_OUTOF10: 10

## 2018-08-12 NOTE — FLOWSHEET NOTE
Patient resting in bed throughout the day with repositioning side to side, with complaint of a lot of back pain. Dr Edson Negrete visited and ordered Percocet 1-2 tabs q4 hour PRN. Right foot looks more swollen and red today than yesterday. Both foot 1-2 + edema. Podiatry ordered an MRI of the Thoracic and lumbar spine. Remains on Naficillin q4hr. Blood culture ordered again per Dr Gay Crisostomo . K+3.4 Xtra K-dur ordered but did not take it due to not eating because of a lot of pain. Holiday also was consulted for poss. MACIE to R/O endocarditis. And Dr Dane Curtis for pain MM.

## 2018-08-12 NOTE — PROGRESS NOTES
still acidotic, has elevated BHB levels, pt not eating well       Medications:   Scheduled Meds:   potassium chloride  40 mEq Oral Once    gabapentin  600 mg Oral Nightly    gabapentin  300 mg Oral Daily    lidocaine  5 mL Intradermal Once    nafcillin  2 g Intravenous Q4H    sodium chloride flush  10 mL Intravenous 2 times per day    heparin (porcine)  5,000 Units Subcutaneous Q12H    aspirin  81 mg Oral Daily    atorvastatin  10 mg Oral Nightly    topiramate  25 mg Oral Daily     Continuous Infusions:      CBC:   Recent Labs      08/11/18   0655  08/12/18   0641   WBC  12.5*  10.7   HGB  11.8*  11.8*   PLT  176  218     CMP:    Recent Labs      08/10/18   0638  08/11/18   0655  08/12/18   0641   NA  134  135  136   K  4.1  4.1  3.4*   CL  100  99  98   CO2  20*  20*  20*   BUN  27*  21  23   CREATININE  1.10*  0.96*  0.86   GLUCOSE  111*  110*  98   CALCIUM  8.6  8.9  8.8   LABGLOM  49.8*  58.3*  >60.0     Troponin:   No results for input(s): TROPONINI in the last 72 hours. BNP: No results for input(s): BNP in the last 72 hours. INR: No results for input(s): INR in the last 72 hours. Lipids: No results for input(s): CHOL, LDLDIRECT, TRIG, HDL, AMYLASE, LIPASE in the last 72 hours. Liver:   Recent Labs      08/12/18   0641   LABALBU  2.6*     Iron:  No results for input(s): IRONS, FERRITIN in the last 72 hours. Invalid input(s): LABIRONS  Urinalysis: No results for input(s): UA in the last 72 hours.     Objective:   Vitals: BP (!) 155/72   Pulse 88   Temp 98.6 °F (37 °C) (Oral)   Resp 20   Ht 5' 8\" (1.727 m)   Wt 216 lb (98 kg)   LMP  (Approximate)   SpO2 95%   BMI 32.84 kg/m²    Wt Readings from Last 3 Encounters:   08/08/18 216 lb (98 kg)   07/11/18 220 lb (99.8 kg)   07/03/18 219 lb (99.3 kg)      24HR INTAKE/OUTPUT:      Intake/Output Summary (Last 24 hours) at 08/12/18 1155  Last data filed at 08/12/18 1045   Gross per 24 hour   Intake             1105 ml   Output             1850 ml

## 2018-08-12 NOTE — PROGRESS NOTES
PODIATRIC MEDICINE AND SURGERY  CONSULT PROGRESS NOTE    Follow Up: foot pain and swelling  Staff Doctor:  Grace Zaidi DPM    ASSESSMENT:  70 y.o. female with PMH significant for Fibromyalgia, HLD, HTN, OA, lumbar and cervical spine surgery with neuropathy, whom podiatry was consulted for foot pain and swelling. On physical exam bilateral feet are edematous and erythematous, L > R, and hypersensitive to touch. She is febrile with elevated WBC and inflammatory markers. However, there are no open lesion or acute changes on XRs that would support acute infection in either feet. XRs do demonstrate degenerative changes to the midfoot bilaterally, possible gout vs Charcot like changes. Patient does have an elevated uric acid level and gout would be more consistent with the \"red, swollen, painful\" characterization of her feet than Charcot. However, with new fluctuant mass to the left dorsal lateral midfoot there may be concern for underlying infection given previously mentioned leukocytosis and fever. PLAN AND RECOMMENDATIONS[de-identified]  · Patient examined and evaluated. Previous and today's labs reviewed. Patient seen with Dr. Tomi Araujo and her case was discussed with Dr. Franchesca Mcclelland. Podiatry will sign off, Dr. Franchesca Mcclelland will be taking over patient's case. · Ordered lumbar and thoracic spine MRI for Dr. Franchesca Mcclelland to r/o spinal abscess given back pain and h/o prior back surgery. · Weight bearing as tolerated weight bearing to bilateral feet  · Antibiotic coverage per primary team or ID recommendations   · Pain management per primary team         INTERVAL HISTORY:  Afebrile  WBC normalized  Right foot pain, edema, and erythema improved. Fluctuant mass to the dorsal lateral midfoot. RF factor 16.8. New left elbow pain, warmth, and tenderness today as well. Also complains of back pain. TTE was negative. Uric Acid 8.1. Given single dose of colchicine today. No crystals observed in fluid aspiration.  GPCs noted to be growing from the aspirated fluid. Prior blood cultures + staph aureus. Repeat blood cultures pending. HPI: 70y.o. year old female presented with pain and swelling in her feet that began a few days ago. The pain started in her right foot, but then progress into her left foot as well, which has become more painful than the right. Hypersensitivity to the feet. No h/o trauma. Follows with Dr. Dane Curtis for pain management. She has tried PO acetaminophen, NSAIDs, and opioids in the past for similar type of pain, which has provided some relief of the pain in the past. Recently she has been taking Mobic without relief during this most recent episode. She admited to associated nausea, chills, and headache. No current facility-administered medications on file prior to encounter. Current Outpatient Prescriptions on File Prior to Encounter   Medication Sig Dispense Refill    gabapentin (NEURONTIN) 300 MG capsule Take one pill in the daytime as tolerated and two pills at night. Generic equivalent acceptable, unless otherwise noted. . (Patient taking differently: daily. Take one pill in the daytime as tolerated and two pills at night. Generic equivalent acceptable, unless otherwise noted. [facility] [place] ) 90 capsule 1    topiramate (TOPAMAX) 25 MG tablet Take one or two nightly as tolerated for pain 60 tablet 3    atorvastatin (LIPITOR) 10 MG tablet Take 1 tablet by mouth nightly 90 tablet 3    lisinopril (PRINIVIL;ZESTRIL) 40 MG tablet Take 1 tablet by mouth daily 90 tablet 3    lidocaine (XYLOCAINE) 5 % ointment Apply topically as needed. 1 Tube 11    aspirin 81 MG tablet Take 81 mg by mouth daily      CRANBERRY EXTRACT PO Take by mouth      triamterene-hydrochlorothiazide (MAXZIDE-25) 37.5-25 MG per tablet Take 1 tablet by mouth daily      Multiple Vitamins-Minerals (MULTIVITAMIN & MINERAL PO) Take by mouth      meloxicam (MOBIC) 7.5 MG tablet Take 7.5 mg by mouth 2 times daily.  Coenzyme Q10 (COQ10 PO) Take  by mouth daily. Lab Results   Component Value Date     08/12/2018    K 3.4 08/12/2018    K 4.4 08/09/2018    CL 98 08/12/2018    CO2 20 08/12/2018    BUN 23 08/12/2018    CREATININE 0.86 08/12/2018    GLUCOSE 98 08/12/2018    GLUCOSE 83 12/07/2011    CALCIUM 8.8 08/12/2018      Lab Results   Component Value Date    LABALBU 2.6 (L) 08/12/2018     Lab Results   Component Value Date    LABA1C 5.8 01/17/2012     Recent Labs      08/10/18   1601   RF  16.8*     Component Value Date   Uric Acid, Serum 8.1   08/08/2018  4:45 PM     MICROBIOLOGY:   Type   Date  Results  Blood Culture  8/11/18 pending  Fluid Culture  8/10/18 GPCs  Blood Culture  8/10/18 GPCs  Blood Culture:  8/8/18  MSSA x2    Fluid crystal analysis 8/10/18 No crystals    IMAGING:   MRI L FOOT 8/11/18 - pending    XR L FOOT 8/8/18:  Radiologist impression: Advanced degenerative changes involving the tarsal bones most striking at the navicular cuneiform articulation. There is some disorganization and subchondral cystic variation across the mid tarsal row. Remaining visualized bones intact. No acute fracture. XR R FOOT 8/8/18:  Radiologist impression: Severe advanced degenerative change involving the mid foot. Findings show advancement of the change when compared to the previous radiographs. No acute fracture or dislocation. Remaining visualized bones intact.         Cady Knox, PGY-2  Please first page Podiatry On Call, [phone]  August 12, 2018  11:34 AM

## 2018-08-12 NOTE — PROGRESS NOTES
cultures (A)    Organism Staph aureus MSSA (A)    Blood Culture, Routine --    POSITIVE for   PBP2= Negative   Refer to previous sensitivity    Narrative:     ORDER#: [id number]                          ORDERED BY: PADILLA BROWN  SOURCE: Blood                              COLLECTED:  08/10/18 06:38  ANTIBIOTICS AT [facility]. :                      RECEIVED :  08/10/18 06:59  CALL  [place]  [facility] tel. [phone],  Blood culture results called to and read back by Gordon Knight,  08/11/2018 13:24, by Miracle Rangel 197 [[id number]] Collected: 08/10/18 1707   Order Status: Sent Specimen: Body Fluid from Aspirate Updated: 08/11/18 1447    Gram Stain Result Many WBC's   Many Gram positive cocci in clusters-resembling Staph    Narrative:     ORDER#: [id number]                          ORDERED BY: Jimmy Cobos  SOURCE: Aspirate Body Fluid                COLLECTED:  08/10/18 17:07  ANTIBIOTICS AT [facility]. :                      RECEIVED :  08/10/18 17:07  Concentrate (centrifuge) body fluid add SCNFL to order   Culture Blood #2 [[id number]] (Abnormal) Collected: 08/08/18 1644   Order Status: Completed Specimen: Blood Updated: 08/11/18 1042    Culture, Blood 2 2 out of 2 blood cultures (A)    Organism Staph aureus MSSA (A)    Culture, Blood 2 --    POSITIVE for   PBP2= Negative   Refer to previous sensitivity    Narrative:     ORDER#: [id number]                          ORDERED BYAVIS Ramos  SOURCE: Blood                              COLLECTED:  08/08/18 16:44  ANTIBIOTICS AT [facility]. :                      RECEIVED :  08/08/18 16:51   Culture Blood #1 [[id number]] (Abnormal)  Collected: 08/08/18 1644   Order Status: Completed Specimen: Blood from Blood Updated: 08/11/18 1041    Blood Culture, Routine 2 out of 2 blood cultures (A)    Organism Staph aureus DNA Detected (A)    Blood Culture, Routine --    The following organisms and resistance markers have been   tested using nucleic acid testing technology.    ORGANISMS:

## 2018-08-12 NOTE — PROGRESS NOTES
Pt pm assessment complete. Pt alert and oriented. Pt denies any needs at this time. Pt has pian 8/10 in her back. Norco given to help with the pain. Pt turns in bed with a one assist. Pt lungs sound clear. Pt has active bowel sounds. Pt uses bedpan when she has to go to the bathroom. Pt bilateral feet are red and swollen. Pt alert and oriented. Will continue to monitor pt.   Electronically signed by Fauzia Grimaldo RN on 8/11/2018 at 11:29 PM

## 2018-08-12 NOTE — PROGRESS NOTES
B foot swelling   L foot aspirate + for MSSA  MRI suggestive of chronic midfoot OA/charcot type changes  PLAN  Cont IV abx  Pt is improving on abx, but we discussed possibility of needing surgery if infection persists    + MSSA bacteremia of unknown source  Rec; MRI Lumbar/thoracic spine - pt c/o back pain

## 2018-08-12 NOTE — PROGRESS NOTES
Physical Therapy Missed Treatment   Facility/Department: [facility] MED SURG [id number]    NAME: Arleen Engle    : 1955 (70 y.o.)  MRN: [id number]    Account: [de-identified]  Gender: female      []  Pt cancelled by nursing for PT due to:  []  Pt refused PT this date due to:  []  Pt unavailable at this time due to:  Still awaiting MRI results, possible osteomyelitis     Will attempt later today or tomorrow, as time allows.      Electronically signed by Lesley Nicole PT on 2018 at 1:06 PM

## 2018-08-12 NOTE — CONSULTS
extremities with full passive  motion, no pain, no deformity. Bilateral foot x-ray show no acute fracture or dislocation. Her left foot  does have some minor midfoot breakdown, little bit of reactive arthritic  change in the midfoot. Right foot x-rays have similar clinical appearance to the lesser degree of  arthritic breakdown in the midfoot but no acute findings noted. ASSESSMENT AND PLAN:  Bilateral foot pain, suspect Charcot arthropathy  versus gouty arthritis flare. I discussed with the patient clinically it  appears to be either Charcot joint or some type of inflammatory  arthropathy. However, we discussed that she does have underlying  bacteremia of uncertain cause. We discussed remote possibility of local  infection in the left or right foot which could require surgery and _____  specific treatment. We will discussed her ongoing care with infectious  disease and podiatry. She had aspiration done on 08/10/2018, results of  which are pending. We will continue to clinically follow her progress and  make further recommendations based on imaging and laboratory results and  follow up. Please see electronic medical records for full documented past medical  history, surgical history, medications, allergies, social history, family  history, review of systems, updated laboratory results, and vital signs. I  agree with the findings as well as nursing assessment.       Yajaira Quintana MD    D: 08/12/2018 17:01:37       T: 08/12/2018 19:13:18     [email or web address]  Job#: [id number]     Doc#: [id number]    CC:

## 2018-08-13 ENCOUNTER — APPOINTMENT (OUTPATIENT)
Dept: CARDIAC CATH/INVASIVE PROCEDURES | Age: 63
DRG: 853 | End: 2018-08-13
Payer: MEDICARE

## 2018-08-13 PROBLEM — M54.16 LUMBAR BACK PAIN WITH RADICULOPATHY AFFECTING RIGHT LOWER EXTREMITY: Status: ACTIVE | Noted: 2018-08-13

## 2018-08-13 PROBLEM — K68.12 PSOAS ABSCESS, RIGHT (HCC): Status: ACTIVE | Noted: 2018-08-13

## 2018-08-13 PROBLEM — M79.672 LEFT FOOT PAIN: Status: ACTIVE | Noted: 2018-08-13

## 2018-08-13 PROBLEM — M46.36: Status: ACTIVE | Noted: 2018-08-13

## 2018-08-13 PROBLEM — M46.46 DISCITIS OF LUMBAR REGION: Status: ACTIVE | Noted: 2018-08-13

## 2018-08-13 PROBLEM — M46.26 OSTEOMYELITIS OF LUMBAR VERTEBRA (HCC): Status: ACTIVE | Noted: 2018-08-13

## 2018-08-13 PROBLEM — R26.9 GAIT ABNORMALITY: Status: ACTIVE | Noted: 2018-08-13

## 2018-08-13 PROBLEM — R10.9 RIGHT FLANK PAIN: Status: ACTIVE | Noted: 2018-08-13

## 2018-08-13 LAB
ALBUMIN SERPL-MCNC: 2.5 G/DL (ref 3.9–4.9)
ANION GAP SERPL CALCULATED.3IONS-SCNC: 16 MEQ/L (ref 7–13)
BASOPHILS ABSOLUTE: 0 K/UL (ref 0–0.2)
BASOPHILS RELATIVE PERCENT: 0.2 %
BLOOD CULTURE, ROUTINE: ABNORMAL
BLOOD CULTURE, ROUTINE: ABNORMAL
BODY FLUID CULTURE, STERILE: ABNORMAL
BODY FLUID CULTURE, STERILE: ABNORMAL
BUN BLDV-MCNC: 21 MG/DL (ref 8–23)
CALCIUM SERPL-MCNC: 8.5 MG/DL (ref 8.6–10.2)
CHLORIDE BLD-SCNC: 98 MEQ/L (ref 98–107)
CO2: 24 MEQ/L (ref 22–29)
CREAT SERPL-MCNC: 0.75 MG/DL (ref 0.5–0.9)
CULTURE, BLOOD 2: ABNORMAL
CULTURE, BLOOD 2: ABNORMAL
EOSINOPHILS ABSOLUTE: 0.2 K/UL (ref 0–0.7)
EOSINOPHILS RELATIVE PERCENT: 1.9 %
GFR AFRICAN AMERICAN: >60
GFR NON-AFRICAN AMERICAN: >60
GLUCOSE BLD-MCNC: 120 MG/DL (ref 74–109)
GRAM STAIN RESULT: ABNORMAL
HCT VFR BLD CALC: 33.5 % (ref 37–47)
HEMOGLOBIN: 11.3 G/DL (ref 12–16)
LV EF: 60 %
LVEF MODALITY: NORMAL
LYMPHOCYTES ABSOLUTE: 0.7 K/UL (ref 1–4.8)
LYMPHOCYTES RELATIVE PERCENT: 6.2 %
MAGNESIUM: 1.8 MG/DL (ref 1.7–2.3)
MCH RBC QN AUTO: 31.5 PG (ref 27–31.3)
MCHC RBC AUTO-ENTMCNC: 33.7 % (ref 33–37)
MCV RBC AUTO: 93.6 FL (ref 82–100)
MONOCYTES ABSOLUTE: 1.2 K/UL (ref 0.2–0.8)
MONOCYTES RELATIVE PERCENT: 9.5 %
NEUTROPHILS ABSOLUTE: 10 K/UL (ref 1.4–6.5)
NEUTROPHILS RELATIVE PERCENT: 82.2 %
ORGANISM: ABNORMAL
PDW BLD-RTO: 13.7 % (ref 11.5–14.5)
PHOSPHORUS: 3.2 MG/DL (ref 2.5–4.5)
PLATELET # BLD: 274 K/UL (ref 130–400)
POTASSIUM SERPL-SCNC: 3 MEQ/L (ref 3.5–5.1)
PROCALCITONIN: 2.42 NG/ML
RBC # BLD: 3.57 M/UL (ref 4.2–5.4)
SALICYLATE, SERUM: <0.3 MG/DL (ref 15–30)
SODIUM BLD-SCNC: 138 MEQ/L (ref 132–144)
WBC # BLD: 12.1 K/UL (ref 4.8–10.8)

## 2018-08-13 PROCEDURE — 6360000002 HC RX W HCPCS: Performed by: INTERNAL MEDICINE

## 2018-08-13 PROCEDURE — 80069 RENAL FUNCTION PANEL: CPT

## 2018-08-13 PROCEDURE — 6370000000 HC RX 637 (ALT 250 FOR IP): Performed by: INTERNAL MEDICINE

## 2018-08-13 PROCEDURE — G8988 SELF CARE GOAL STATUS: HCPCS

## 2018-08-13 PROCEDURE — 6370000000 HC RX 637 (ALT 250 FOR IP): Performed by: ORTHOPAEDIC SURGERY

## 2018-08-13 PROCEDURE — 6370000000 HC RX 637 (ALT 250 FOR IP): Performed by: ANESTHESIOLOGY

## 2018-08-13 PROCEDURE — 87040 BLOOD CULTURE FOR BACTERIA: CPT

## 2018-08-13 PROCEDURE — 85025 COMPLETE CBC W/AUTO DIFF WBC: CPT

## 2018-08-13 PROCEDURE — 2500000003 HC RX 250 WO HCPCS: Performed by: INTERNAL MEDICINE

## 2018-08-13 PROCEDURE — 97162 PT EVAL MOD COMPLEX 30 MIN: CPT

## 2018-08-13 PROCEDURE — 2580000003 HC RX 258: Performed by: INTERNAL MEDICINE

## 2018-08-13 PROCEDURE — G8987 SELF CARE CURRENT STATUS: HCPCS

## 2018-08-13 PROCEDURE — 2580000003 HC RX 258: Performed by: NURSE PRACTITIONER

## 2018-08-13 PROCEDURE — 6370000000 HC RX 637 (ALT 250 FOR IP): Performed by: PHYSICAL MEDICINE & REHABILITATION

## 2018-08-13 PROCEDURE — 6360000002 HC RX W HCPCS: Performed by: PHYSICAL MEDICINE & REHABILITATION

## 2018-08-13 PROCEDURE — 97167 OT EVAL HIGH COMPLEX 60 MIN: CPT

## 2018-08-13 PROCEDURE — 6360000002 HC RX W HCPCS: Performed by: ANESTHESIOLOGY

## 2018-08-13 PROCEDURE — 93325 DOPPLER ECHO COLOR FLOW MAPG: CPT

## 2018-08-13 PROCEDURE — 93321 DOPPLER ECHO F-UP/LMTD STD: CPT

## 2018-08-13 PROCEDURE — 36415 COLL VENOUS BLD VENIPUNCTURE: CPT

## 2018-08-13 PROCEDURE — 99232 SBSQ HOSP IP/OBS MODERATE 35: CPT | Performed by: INTERNAL MEDICINE

## 2018-08-13 PROCEDURE — 6360000002 HC RX W HCPCS

## 2018-08-13 PROCEDURE — G0480 DRUG TEST DEF 1-7 CLASSES: HCPCS

## 2018-08-13 PROCEDURE — 97110 THERAPEUTIC EXERCISES: CPT

## 2018-08-13 PROCEDURE — 93312 ECHO TRANSESOPHAGEAL: CPT

## 2018-08-13 PROCEDURE — G8979 MOBILITY GOAL STATUS: HCPCS

## 2018-08-13 PROCEDURE — 1210000000 HC MED SURG R&B

## 2018-08-13 PROCEDURE — 2580000003 HC RX 258

## 2018-08-13 PROCEDURE — 99221 1ST HOSP IP/OBS SF/LOW 40: CPT | Performed by: PHYSICAL MEDICINE & REHABILITATION

## 2018-08-13 PROCEDURE — G8978 MOBILITY CURRENT STATUS: HCPCS

## 2018-08-13 PROCEDURE — 6360000002 HC RX W HCPCS: Performed by: NURSE PRACTITIONER

## 2018-08-13 PROCEDURE — B246ZZ4 ULTRASONOGRAPHY OF RIGHT AND LEFT HEART, TRANSESOPHAGEAL: ICD-10-PCS | Performed by: INTERNAL MEDICINE

## 2018-08-13 PROCEDURE — 83735 ASSAY OF MAGNESIUM: CPT

## 2018-08-13 RX ORDER — HYDROCODONE BITARTRATE AND ACETAMINOPHEN 10; 325 MG/1; MG/1
1 TABLET ORAL EVERY 6 HOURS PRN
Status: DISCONTINUED | OUTPATIENT
Start: 2018-08-13 | End: 2018-08-14

## 2018-08-13 RX ORDER — BACTERIOSTATIC SODIUM CHLORIDE 0.9 %
10 VIAL (ML) INJECTION ONCE
Status: DISCONTINUED | OUTPATIENT
Start: 2018-08-13 | End: 2018-08-18 | Stop reason: HOSPADM

## 2018-08-13 RX ORDER — FENTANYL CITRATE 50 UG/ML
100 INJECTION, SOLUTION INTRAMUSCULAR; INTRAVENOUS ONCE
Status: COMPLETED | OUTPATIENT
Start: 2018-08-13 | End: 2018-08-13

## 2018-08-13 RX ORDER — GABAPENTIN 300 MG/1
300 CAPSULE ORAL 3 TIMES DAILY
Status: DISCONTINUED | OUTPATIENT
Start: 2018-08-13 | End: 2018-08-18 | Stop reason: HOSPADM

## 2018-08-13 RX ORDER — ERGOCALCIFEROL 1.25 MG/1
50000 CAPSULE ORAL DAILY
Status: COMPLETED | OUTPATIENT
Start: 2018-08-13 | End: 2018-08-15

## 2018-08-13 RX ORDER — SODIUM CHLORIDE 0.9 % (FLUSH) 0.9 %
10 SYRINGE (ML) INJECTION PRN
Status: CANCELLED | OUTPATIENT
Start: 2018-08-13

## 2018-08-13 RX ORDER — LIDOCAINE 50 MG/G
3 PATCH TOPICAL DAILY
Status: DISCONTINUED | OUTPATIENT
Start: 2018-08-13 | End: 2018-08-13

## 2018-08-13 RX ORDER — LIDOCAINE 50 MG/G
3 PATCH TOPICAL DAILY
Status: DISCONTINUED | OUTPATIENT
Start: 2018-08-13 | End: 2018-08-18 | Stop reason: HOSPADM

## 2018-08-13 RX ORDER — METAXALONE 800 MG/1
800 TABLET ORAL EVERY 6 HOURS PRN
Status: DISCONTINUED | OUTPATIENT
Start: 2018-08-13 | End: 2018-08-18 | Stop reason: HOSPADM

## 2018-08-13 RX ORDER — SODIUM CHLORIDE 9 MG/ML
INJECTION, SOLUTION INTRAVENOUS CONTINUOUS
Status: CANCELLED | OUTPATIENT
Start: 2018-08-13

## 2018-08-13 RX ORDER — ACETAMINOPHEN 500 MG
500 TABLET ORAL EVERY 6 HOURS SCHEDULED
Status: DISCONTINUED | OUTPATIENT
Start: 2018-08-13 | End: 2018-08-16

## 2018-08-13 RX ORDER — ALPRAZOLAM 0.25 MG/1
0.25 TABLET ORAL ONCE
Status: CANCELLED | OUTPATIENT
Start: 2018-08-13 | End: 2018-08-13

## 2018-08-13 RX ORDER — CYANOCOBALAMIN 1000 UG/ML
1000 INJECTION INTRAMUSCULAR; SUBCUTANEOUS WEEKLY
Status: DISCONTINUED | OUTPATIENT
Start: 2018-08-13 | End: 2018-08-18 | Stop reason: HOSPADM

## 2018-08-13 RX ORDER — POTASSIUM CHLORIDE 20 MEQ/1
60 TABLET, EXTENDED RELEASE ORAL 2 TIMES DAILY
Status: DISCONTINUED | OUTPATIENT
Start: 2018-08-13 | End: 2018-08-17

## 2018-08-13 RX ORDER — MIDAZOLAM HYDROCHLORIDE 1 MG/ML
5 INJECTION INTRAMUSCULAR; INTRAVENOUS ONCE
Status: COMPLETED | OUTPATIENT
Start: 2018-08-13 | End: 2018-08-13

## 2018-08-13 RX ORDER — ANALGESIC BALM 1.74; 4.06 G/29G; G/29G
OINTMENT TOPICAL 3 TIMES DAILY
Status: DISCONTINUED | OUTPATIENT
Start: 2018-08-13 | End: 2018-08-18 | Stop reason: HOSPADM

## 2018-08-13 RX ADMIN — NAFCILLIN SODIUM 2 G: 2 INJECTION, POWDER, LYOPHILIZED, FOR SOLUTION INTRAMUSCULAR; INTRAVENOUS at 04:19

## 2018-08-13 RX ADMIN — HYDROCODONE BITARTRATE AND ACETAMINOPHEN 1 TABLET: 10; 325 TABLET ORAL at 13:55

## 2018-08-13 RX ADMIN — FENTANYL CITRATE 100 MCG: 50 INJECTION, SOLUTION INTRAMUSCULAR; INTRAVENOUS at 15:31

## 2018-08-13 RX ADMIN — MIDAZOLAM HYDROCHLORIDE 3 MG: 1 INJECTION, SOLUTION INTRAMUSCULAR; INTRAVENOUS at 15:31

## 2018-08-13 RX ADMIN — HYDROMORPHONE HYDROCHLORIDE 1 MG: 1 INJECTION, SOLUTION INTRAMUSCULAR; INTRAVENOUS; SUBCUTANEOUS at 10:40

## 2018-08-13 RX ADMIN — METAXALONE 800 MG: 800 TABLET ORAL at 10:38

## 2018-08-13 RX ADMIN — Medication 10 ML: at 21:49

## 2018-08-13 RX ADMIN — POTASSIUM CHLORIDE 60 MEQ: 20 TABLET, EXTENDED RELEASE ORAL at 21:42

## 2018-08-13 RX ADMIN — ACETAMINOPHEN 500 MG: 500 TABLET ORAL at 23:52

## 2018-08-13 RX ADMIN — HYDROMORPHONE HYDROCHLORIDE 1 MG: 1 INJECTION, SOLUTION INTRAMUSCULAR; INTRAVENOUS; SUBCUTANEOUS at 17:31

## 2018-08-13 RX ADMIN — POTASSIUM CHLORIDE 60 MEQ: 20 TABLET, EXTENDED RELEASE ORAL at 17:30

## 2018-08-13 RX ADMIN — ASPIRIN 81 MG: 81 TABLET, COATED ORAL at 10:38

## 2018-08-13 RX ADMIN — GABAPENTIN 600 MG: 300 CAPSULE ORAL at 21:42

## 2018-08-13 RX ADMIN — HYDROCODONE BITARTRATE AND ACETAMINOPHEN 1 TABLET: 10; 325 TABLET ORAL at 23:52

## 2018-08-13 RX ADMIN — HEPARIN SODIUM 5000 UNITS: 5000 INJECTION, SOLUTION INTRAVENOUS; SUBCUTANEOUS at 21:43

## 2018-08-13 RX ADMIN — ACETAMINOPHEN 500 MG: 500 TABLET ORAL at 17:31

## 2018-08-13 RX ADMIN — NAFCILLIN SODIUM 2 G: 2 INJECTION, POWDER, LYOPHILIZED, FOR SOLUTION INTRAMUSCULAR; INTRAVENOUS at 21:53

## 2018-08-13 RX ADMIN — NAFCILLIN SODIUM 2 G: 2 INJECTION, POWDER, LYOPHILIZED, FOR SOLUTION INTRAMUSCULAR; INTRAVENOUS at 13:57

## 2018-08-13 RX ADMIN — GABAPENTIN 300 MG: 300 CAPSULE ORAL at 13:54

## 2018-08-13 RX ADMIN — HEPARIN SODIUM 5000 UNITS: 5000 INJECTION, SOLUTION INTRAVENOUS; SUBCUTANEOUS at 10:40

## 2018-08-13 RX ADMIN — OXYCODONE HYDROCHLORIDE AND ACETAMINOPHEN 2 TABLET: 5; 325 TABLET ORAL at 07:37

## 2018-08-13 RX ADMIN — ATORVASTATIN CALCIUM 10 MG: 10 TABLET, FILM COATED ORAL at 21:43

## 2018-08-13 RX ADMIN — ACETAMINOPHEN 500 MG: 500 TABLET ORAL at 10:38

## 2018-08-13 RX ADMIN — Medication 10 ML: at 11:10

## 2018-08-13 RX ADMIN — METAXALONE 800 MG: 800 TABLET ORAL at 17:31

## 2018-08-13 RX ADMIN — NAFCILLIN SODIUM 2 G: 2 INJECTION, POWDER, LYOPHILIZED, FOR SOLUTION INTRAMUSCULAR; INTRAVENOUS at 17:30

## 2018-08-13 RX ADMIN — CYANOCOBALAMIN 1000 MCG: 1000 INJECTION, SOLUTION INTRAMUSCULAR; SUBCUTANEOUS at 10:40

## 2018-08-13 RX ADMIN — ERGOCALCIFEROL 50000 UNITS: 1.25 CAPSULE ORAL at 10:38

## 2018-08-13 RX ADMIN — NAFCILLIN SODIUM 2 G: 2 INJECTION, POWDER, LYOPHILIZED, FOR SOLUTION INTRAMUSCULAR; INTRAVENOUS at 10:38

## 2018-08-13 ASSESSMENT — ENCOUNTER SYMPTOMS
COUGH: 0
FACIAL SWELLING: 0
TROUBLE SWALLOWING: 0
ANAL BLEEDING: 0
COLOR CHANGE: 0
ABDOMINAL DISTENTION: 0
WHEEZING: 0
VOMITING: 0
CHEST TIGHTNESS: 0
EYE PAIN: 0
PHOTOPHOBIA: 0
EYE REDNESS: 0
CHOKING: 0
BACK PAIN: 1
CONSTIPATION: 1
SHORTNESS OF BREATH: 0
NAUSEA: 0
ABDOMINAL PAIN: 0
BLOOD IN STOOL: 0

## 2018-08-13 ASSESSMENT — PAIN SCALES - GENERAL
PAINLEVEL_OUTOF10: 6
PAINLEVEL_OUTOF10: 7
PAINLEVEL_OUTOF10: 0
PAINLEVEL_OUTOF10: 8
PAINLEVEL_OUTOF10: 10
PAINLEVEL_OUTOF10: 8
PAINLEVEL_OUTOF10: 10
PAINLEVEL_OUTOF10: 8
PAINLEVEL_OUTOF10: 10

## 2018-08-13 ASSESSMENT — PAIN DESCRIPTION - LOCATION: LOCATION: BACK;ANKLE

## 2018-08-13 ASSESSMENT — PAIN DESCRIPTION - PAIN TYPE: TYPE: ACUTE PAIN

## 2018-08-13 ASSESSMENT — PAIN DESCRIPTION - ORIENTATION: ORIENTATION: RIGHT;LEFT;LOWER

## 2018-08-13 ASSESSMENT — PAIN DESCRIPTION - DESCRIPTORS: DESCRIPTORS: CONSTANT

## 2018-08-13 NOTE — PROGRESS NOTES
JOSE Chen CNP   10 mL at 08/11/18 1524    magnesium hydroxide (MILK OF MAGNESIA) 400 MG/5ML suspension 30 mL  30 mL Oral Daily PRN JOSE Chen CNP        ondansetron (ZOFRAN) injection 4 mg  4 mg Intravenous Q6H PRN JOSE Chen CNP        heparin (porcine) injection 5,000 Units  5,000 Units Subcutaneous Q12H JOSE Cam CNP   5,000 Units at 08/13/18 1040    aspirin EC tablet 81 mg  81 mg Oral Daily Torsten Brumfield MD   81 mg at 08/13/18 1038    atorvastatin (LIPITOR) tablet 10 mg  10 mg Oral Nightly Torsten Brumfield MD   10 mg at 08/12/18 2204         OBJECTIVE:  Vital Signs:  Vitals:    08/13/18 0845   BP: (!) 192/84   Pulse: 81   Resp:    Temp: 98.1 °F (36.7 °C)   SpO2: 96%       Focal exam:      General Exam (except as mentioned above):  CONSTITUTIONAL: Awake, alert, no apparent distress  EYES:  PERRL, conjunctiva normal  ENT:  Normocephalic, atraumatic  NECK:  Supple  BACK:  Symmetric  LUNGS:  CTAB except bilateral basilar crackles. CARDIOVASCULAR:  S1S2 present  ABDOMEN:  soft, non-distended, non-tender  MUSCULOSKELETAL:  There is no redness, warmth, or swelling of the joints. NEUROLOGIC:  Alert, awake, oriented x 3. No FND  EXTREMITIES: Warm and well perfused.      LABS  Recent Labs      08/11/18   0655  08/12/18   0641  08/13/18   0644   WBC  12.5*  10.7  12.1*   RBC  3.68*  3.66*  3.57*   HGB  11.8*  11.8*  11.3*   HCT  35.4*  34.4*  33.5*   MCV  96.1  94.0  93.6   MCH  32.1*  32.3*  31.5*   MCHC  33.4  34.4  33.7   RDW  13.9  13.7  13.7   PLT  176  218  274       Recent Labs      08/11/18   0655  08/12/18   0641  08/13/18   0644   NA  135  136  138   K  4.1  3.4*  3.0*   CL  99  98  98   CO2  20*  20*  24   BUN  21  23  21   CREATININE  0.96*  0.86  0.75   GLUCOSE  110*  98  120*   CALCIUM  8.9  8.8  8.5*       Recent Labs      08/11/18   0655  08/12/18   0641  08/13/18   0644   MG  1.8  1.7  1.8           ASSESSMENT AND PLAN    Active Hospital Problems Diagnosis Date Noted    Left foot pain [M79.672] 08/13/2018    Psoas abscess, right [facility]) [K68.12] 08/13/2018    Discitis of lumbar region [M46.46] 08/13/2018    Lumbar back pain with radiculopathy affecting right lower extremity [M54.17] 08/13/2018    Infection of intervertebral disc (pyogenic), lumbar region [facility]) [M46.36] 08/13/2018    Osteomyelitis of lumbar vertebra (HCC) [M46.26] 08/13/2018    Right flank pain [R10.9] 08/13/2018    Gait abnormality [R26.9] 08/13/2018    Abscess of left foot [L02.612]     Staphylococcus aureus bacteremia with sepsis ([address] [A41.01]     Cellulitis of left foot [L03.116]     Pyogenic arthritis of multiple sites ([facility] [address] [M00.9]     Hyponatremia [E87.1] 08/08/2018     - MSSA bacteremia: repeat blood cultures positive. Will do blood cultures daily for 3 occassions as discussed with ID  Continue nafcillin  Diskitis; L2.  No indication for surgical itnervention  Right psoas abscess: pain management  For MACIE today  ARI:resolving      DVT prophylaxis: Lovenox    Ce Stanley MD  Pager : [phone]

## 2018-08-13 NOTE — CARE COORDINATION
Met with pt and  at bedside. Discussed probable need for SNF. They would like DC to [facility] first. Possibly [facility], but will discuss and then notify.  Initial referral made to [place].    Per Janeth Fields at [facility], reviewing [phone]

## 2018-08-13 NOTE — PROCEDURES
Procedure type: Transesophageal echo  Indication: Multiple positive blood cultures  Sedation: 100 µg fentanyl.   3 mg Versed  Complications: None    Preliminary results  Normal LV/RV systolic function  Normal cardiac chamber dimensions  No pericardial effusion  No right to left shunting  Normal valves, all well visualized  No vegetations  No intracardiac masses or thrombi  Normal ascending aorta  Descending aorta with mild plaque    Caralee Dam

## 2018-08-13 NOTE — CONSULTS
 Drug use: No    Sexual activity: No     Other Topics Concern    Not on file     Social History Narrative    No narrative on file     PSYCHOLOGICAL HISTORY: None    MEDICATIONS:  Prescriptions Prior to Admission: gabapentin (NEURONTIN) 600 MG tablet, Take 600 mg by mouth daily. Jamal Counter gabapentin (NEURONTIN) 300 MG capsule, Take one pill in the daytime as tolerated and two pills at night. Generic equivalent acceptable, unless otherwise noted. . (Patient taking differently: daily. Take one pill in the daytime as tolerated and two pills at night. Generic equivalent acceptable, unless otherwise noted. Jamal Counter )  topiramate (TOPAMAX) 25 MG tablet, Take one or two nightly as tolerated for pain  atorvastatin (LIPITOR) 10 MG tablet, Take 1 tablet by mouth nightly  lisinopril (PRINIVIL;ZESTRIL) 40 MG tablet, Take 1 tablet by mouth daily  lidocaine (XYLOCAINE) 5 % ointment, Apply topically as needed. aspirin 81 MG tablet, Take 81 mg by mouth daily  CRANBERRY EXTRACT PO, Take by mouth  triamterene-hydrochlorothiazide (MAXZIDE-25) 37.5-25 MG per tablet, Take 1 tablet by mouth daily  Multiple Vitamins-Minerals (MULTIVITAMIN & MINERAL PO), Take by mouth  meloxicam (MOBIC) 7.5 MG tablet, Take 7.5 mg by mouth 2 times daily. Coenzyme Q10 (COQ10 PO), Take  by mouth daily. Cholecalciferol (VITAMIN D3) 2000 units TABS, Take by mouth daily   nitroGLYCERIN (NITROSTAT) 0.4 MG SL tablet, Place 0.4 mg under the tongue every 5 minutes as needed for Chest pain. [unfilled]    ALLERGIES:  Latex; Cephalexin; Norvasc [amlodipine besylate]; and Keflex [cephalexin]    COMPLETE REVIEW OF SYSTEMS:  As noted in HPI, 12 point ROS reviewed and otherwise negative. OBJECTIVE  PHYSICAL EXAM:  BP (!) 169/59   Pulse 81   Temp 98.2 °F (36.8 °C) (Oral)   Resp 16   Ht 5' 8\" (1.727 m)   Wt 216 lb (98 kg)   LMP  (Approximate)   SpO2 94%   BMI 32.84 kg/m²   Body mass index is 32.84 kg/m².   CONSTITUTIONAL:  Alert and oriented ×3 but mild to moderate distress due to the pain  EYES:  Intact  ENT:  normocepalic, without obvious abnormality, atraumatic  NECK:  supple, symmetrical, trachea midline, skin normal and no stridor  BACK:  Limited and very painful lumbar range of motion, tenderness along the right lumbar spinal and paraspinal area  LUNGS:  no increased work of breathing  CARDIOVASCULAR:  regular rate and rhythm  ABDOMEN:  Soft nontender nondistended  MUSCULOSKELETAL:  Small joint deformity involving the right hand with an ring finger, slight joint deformity involving also the small hands joints, swelling and tenderness involving the ankle and foot both sides with severe limited range of motion  Moderate to severe tenderness on the right lumbar paraspinal area, moderate to severe tenderness on the right psychologic lesion, difficulty with walking and weightbearing on the right and left foot due to foot swelling and pain as well as right lower back. NEUROLOGIC:  Motor and sensory slightly diminished possibly due to pain on the right hip region was weak hip flexion, reflexes are intact  Mental Status Exam:  Level of Alertness:   Awake, anxious   Cranial Nerves:  cranial nerves II-XII are grossly intact  SKIN:  Noted swelling and skin redness and erythema involving both foot was worse on the left foot    DATA:   Diagnostic tests reviewed for today's visit:    All labs and imaging results reviewed.         Lab Results   Component Value Date    WBC 12.1 08/13/2018    HGB 11.3 08/13/2018    HCT 33.5 08/13/2018    MCV 93.6 08/13/2018     08/13/2018     08/13/2018    K 3.0 08/13/2018    K 4.4 08/09/2018    CL 98 08/13/2018    CO2 24 08/13/2018    BUN 21 08/13/2018    CREATININE 0.75 08/13/2018    CALCIUM 8.5 08/13/2018    PHOS 3.2 08/13/2018    ALKPHOS 109 08/08/2018    ALT 19 08/08/2018    AST 30 08/08/2018    BILITOT 0.7 08/08/2018    BILIDIR 0.0 05/14/2014    LABALBU 2.5 08/13/2018     LABS  Recent Labs      08/11/18   0655  08/12/18   0641  08/13/18   [id number] [[id number]]      Order Status: Canceled      MRI LUMBAR SPINE W [address] [[id number]] Resulted: 08/13/18 0837     Order Status: No result Updated: 08/12/18 1609     MRI THORACIC SPINE W CONTRAST [[id number]]      Order Status: Canceled      MRI LUMBAR SPINE W CONTRAST [[id number]]      Order Status: Canceled      2011 [facility] [[id number]] Collected: 08/09/18 1657     Order Status: Completed Updated: 08/09/18 1702     Narrative:       EXAMINATION: US RETROPERITONEAL LIMITED    CLINICAL HISTORY: 42-year-old with renal insufficiency. COMPARISONS: Enhanced abdominal CT 5/1/2017    FINDINGS: Renal ultrasonography was performed. The study is compromised due to patient body habitus and sonographic window. Kidneys are at the lower limits of normal in size. Right kidney measures 9.8 x 5.2 x 4.9 cm and left kidney measures 10.2 x 6 x   5.7 cm. On limited images, renal echogenicity is within normal limits. No large cortical cysts or solid masses. There are no shadowing calcifications. No ultrasound signs of hydronephrosis or perinephric fluid.     Impression:       NO RENAL ULTRASOUND ABNORMALITIES.   XR FOOT RIGHT (MIN 3 VIEWS) [[id number]] Collected: 08/08/18 1557     Order Status: Completed Updated: 08/08/18 2036     Narrative:       EXAMINATION: 3 VIEWS OF THE RIGHT FOOT    DATE AND TIME:8/8/2018 2:30 PM    CLINICAL HISTORY: Acute right foot pain  pain      COMPARISON: March 3, 2011    FINDINGS: Severe advanced degenerative change involving the mid foot. Findings show advancement of the change when compared to the previous radiographs. No acute fracture or dislocation. Remaining visualized bones intact. Tiny heel spur.        Impression:       SEVERE DEGENERATIVE CHANGES.        XR FOOT LEFT (MIN 3 VIEWS) [[id number]] Collected: 08/08/18 1555     Order Status: Completed Updated: 08/08/18 2036     Narrative:       EXAMINATION: XR FOOT LEFT (MIN 3 VIEWS)     DATE AND TIME:8/8/2018 2:30 PM    CLINICAL HISTORY: region  Would add Lidoderm patch to the right lumbar region  Will add Dilaudid 1 mg for the breakthrough pain    Continue following up was ID\    Spine surgical consultation if okay with primary team to evaluate the MRI findings on the right L2 disc infection and abscess formation.     SIGNATURE: Sherlyn Centeno MD PATIENT NAME: Hiram Pineda   DATE: August 13, 2018 MRN: [id number]   TIME: 9:16 AM PAGER/CONTACT #: [phone]

## 2018-08-13 NOTE — PROGRESS NOTES
Decreased coordination  Prognosis: Good  Discharge Recommendations: Continue to assess pending progress  History: multi comorb  Exam: 11 defcits  Assistance / Modification: Max A/Dep      Barriers to Improvement:  none      Discharge Recommendations:  Therapy, (R/S)    Six Click Score  How much help for putting on and taking off regular lower body clothing?: Total  How much help for Bathing?: A Lot  How much help for Toileting?: Total  How much help for putting on and taking off regular upper body clothing?: A Lot  How much help for taking care of personal grooming?: A Lot  How much help for eating meals?: A Little  AM-[facility] Inpatient Daily Activity Raw Score: 11  AM-PAC Inpatient ADL T-Scale Score : 29.04  ADL Inpatient CMS 0-100% Score: 70.42    Recommended DME:  [x] W/W   [] Chitra Latch   [] Rollator   [] W/C   [x] Shanna Ohm  [x] Shower Chair   []Dressing AD []  BSC  [] Other:    Plan:Times per week: 1-3x,      G-Codes:  OT G-codes  Functional Limitation: Self care  Self Care Current Status (): At least 80 percent but less than 100 percent impaired, limited or restricted  Self Care Goal Status ():  At least 1 percent but less than 20 percent impaired, limited or restricted    Time in:  10:30  Time out:  11:30  Total minutes:  30  Timed treatment minutes:  10  Tx: Pt instructed in AROM ther ex for neck and LEs      Electronically signed by:    MYRA Gaston  8/13/2018, 11:04 AM

## 2018-08-13 NOTE — CONSULTS
Physical Medicine & Rehabilitation  Consult Note      Admitting Physician: Gina Scott MD    Primary Care Provider: Eva Fam MD     Reason for Consult:  Asses rehab needs, promote physical and mental function, and decrease likelihood of re-admit to the hospital after discharge. History of Present Illness:    Pepe Alvarez is a 70 y.o. female admitted to [facility] on 8/8/2018. Patient has long-standing patient of mine for posterior R neuroma chart arthritis pain. She does well with her usual dose medication however is immunosuppressed from her rheumatoid arthritis. Unfortunately he looks sick she's developed quite a few abscesses and cellulitis. Her feet were first diagnoses being cellulitic and now she is James Alstrom of L2 osteomyelitis as well as a psoas abscess. She is in quite a bit of pain. There is some confusion about who was doing the pain consult I have seen her several years in the past and in the received a specific consult to see her however Dr. Tino Benson saw her first that is fine with me he is quite competent to manage her on the inpatient site and I will defer to his judgment while she is here. However he asked me to follow along to see if she had any rehab needs. I discussed this with the  she agrees that at this point because of her severe foot pain he should be unlikely to be able to tolerate any acute level rehab however she may be so complex as she needs it given the fact that she discuss these abscesses. I will monitor for a distance and provide emotional support and advice regarding her rehab and pain management since I know her so well. Back Pain   This is a chronic problem. The current episode started more than 1 year ago. The problem occurs constantly. The problem has been rapidly worsening since onset. The pain is present in the lumbar spine, thoracic spine and gluteal. The quality of the pain is described as aching.  The pain is at a severity of 10/10. The pain is severe. Stiffness is present in the morning. Associated symptoms include headaches, numbness and weakness. Pertinent negatives include no abdominal pain, chest pain, dysuria or fever. Risk factors include menopause, obesity, lack of exercise and sedentary lifestyle. She has tried bed rest, analgesics, heat, NSAIDs and muscle relaxant for the symptoms. The treatment provided mild relief. Foot Pain   This is a chronic problem. The current episode started more than 1 year ago. The problem occurs constantly. The problem has been unchanged. Associated symptoms include anorexia, arthralgias, chills, fatigue, headaches, myalgias, neck pain, numbness and weakness. Pertinent negatives include no abdominal pain, chest pain, congestion, coughing, diaphoresis, fever, nausea, rash or vomiting. The symptoms are aggravated by walking. She has tried acetaminophen, rest, NSAIDs, heat, ice, immobilization, position changes and relaxation for the symptoms. The treatment provided mild relief. Their inpatient work up has included:    Imaging:    Imaging and other studies reviewed and discussed with patient and staff    Xr Foot Left (min 3 Views)    Result Date: 8/8/2018  EXAMINATION: XR FOOT LEFT (MIN 3 VIEWS) DATE AND TIME:8/8/2018 2:30 PM CLINICAL HISTORY: Acute left foot pain  pain  COMPARISON:None FINDINGS: Advanced degenerative changes involving the tarsal bones most striking at the navicular cuneiform articulation. There is some disorganization and subchondral cystic variation across the mid tarsal row. Remaining visualized bones intact. Prominent heel spurs. No acute fracture. ADVANCED DEGENERATIVE CHANGE INVOLVING THE TARSAL BONES. POSSIBILITY OF CHANGES RELATED TO JOINT NEUROPATHY IS RAISED.     Xr Foot Right (min 3 Views)    Result Date: 8/8/2018  EXAMINATION: 3 VIEWS OF THE RIGHT FOOT DATE AND TIME:8/8/2018 2:30 PM CLINICAL HISTORY: Acute right foot pain  pain  COMPARISON: March Results   Component Value Date    PROTIME 10.0 03/20/2016     Lab Results   Component Value Date    INR 0.9 03/20/2016         I discussed results with patient. Current Rehabilitation Assessments:    Rehabilitation:  Physical therapy: FIMS:  Bed Mobility:      Transfers: Sit to Stand:  (NT due to intense pain),  ,      FIMS:  ,  , Assessment: Patient demonstrates decline in functional mobility needing moderate assist x 2 for bed mobility. Further inpatient PT recommended to continue to work on strength and functional mobility.       Occupational therapy: FIMS:   ,  ,             LTG:                  Speech therapy: FIMS:           Past Medical History:          Diagnosis Date    Abnormal electromyogram (EMG) 8/19/09    SENSORIMOTOR NEUROPATHY OF BLE, RIGHT WORSE THAN LEFT, SUSPICIOUS FOR RIGHT S1 RADIC    Angina pectoris (HCC)     Ankle pain     Colon polyp 4/3/2017    Dermatitis     Fibromyalgia     Foot pain     Hyperlipidemia     Hypertension     Infection of intervertebral disc (pyogenic), lumbar region ([address] 8/13/2018    Low back pain     MRSA (methicillin resistant staph aureus) culture positive 2011    Neck pain     Neuropathy     Obesity     Osteoarthritis     PSVT (paroxysmal supraventricular tachycardia) ([address]     Shingles outbreak 2012    SI (sacroiliac) pain     Stenosis     Vitamin D deficiency          Past Surgical History:          Procedure Laterality Date    CARDIAC CATHETERIZATION  10/2016    CERVICAL FUSION  12/14/11    Dr Juan Arceo with bone graft and plate    COLONOSCOPY      HAND FUSION  7/05    HYSTERECTOMY  2001    HYSTERECTOMY, TOTAL ABDOMINAL      JOINT REPLACEMENT Left     tka    MENISCECTOMY  10/31/11    left knee    OTHER SURGICAL HISTORY  10/26/09    CAUDALS BY DR Oliver Walton    ME COLON CA SCRN NOT  [address] IND N/A 4/12/2017    COLONOSCOPY performed by Talon Beverly MD at . [address] ESOPHAGOGASTRODUODENOSCOPY TRANSORAL DIAGNOSTIC N/A History    Marital status:      Spouse name: N/A    Number of children: N/A    Years of education: N/A     Occupational History    Retired       Social History Main Topics    Smoking status: Never Smoker    Smokeless tobacco: Never Used    Alcohol use No    Drug use: No    Sexual activity: No     Other Topics Concern    Not on file     Social History Narrative    No narrative on file          Family History:     Family History   Problem Relation Age of Onset    Heart Disease Father     High Cholesterol Father     High Blood Pressure Father     Stroke Mother     High Blood Pressure Mother        Review of Systems:     Review of Systems   Constitutional: Positive for activity change, chills and fatigue. Negative for appetite change, diaphoresis, fever and unexpected weight change. HENT: Negative for congestion, ear discharge, ear pain, facial swelling, hearing loss and trouble swallowing. Eyes: Negative for photophobia, pain and redness. Respiratory: Negative for cough, choking, chest tightness, shortness of breath and wheezing. Cardiovascular: Negative for chest pain, palpitations and leg swelling. Gastrointestinal: Positive for anorexia and constipation. Negative for abdominal distention, abdominal pain, anal bleeding, blood in stool, nausea and vomiting. Endocrine: Positive for cold intolerance. Genitourinary: Negative for difficulty urinating, dysuria, flank pain, frequency and urgency. Musculoskeletal: Positive for arthralgias, back pain, gait problem, myalgias and neck pain. Negative for neck stiffness. Skin: Negative for color change, pallor, rash and wound. Allergic/Immunologic: Positive for immunocompromised state. Neurological: Positive for weakness, numbness and headaches. Negative for dizziness, tremors, syncope, facial asymmetry, speech difficulty and light-headedness. Hematological: Negative for adenopathy. Does not bruise/bleed easily. Psychiatric/Behavioral: Positive for sleep disturbance. Negative for agitation, behavioral problems, confusion, decreased concentration, dysphoric mood, hallucinations, self-injury and suicidal ideas. The patient is not nervous/anxious and is not hyperactive. All other systems reviewed and are negative. Physical Exam:    BP (!) 192/84   Pulse 81   Temp 98.1 °F (36.7 °C) (Oral)   Resp 16   Ht 5' 8\" (1.727 m)   Wt 216 lb (98 kg)   LMP  (Approximate)   SpO2 96%   BMI 32.84 kg/m²      Physical Exam   Constitutional: She is oriented to person, place, and time. Vital signs are normal. She appears well-developed and well-nourished. Non-toxic appearance. She does not have a sickly appearance. She does not appear ill. No distress. HENT:   Head: Normocephalic and atraumatic. Right Ear: Hearing normal.   Left Ear: Hearing normal.   Nose: Nose normal.   Mouth/Throat: Oropharynx is clear and moist and mucous membranes are normal. No oral lesions. Normal dentition. No oropharyngeal exudate. No lesions   Eyes: Pupils are equal, round, and reactive to light. Conjunctivae and EOM are normal. Right eye exhibits no chemosis, no discharge and no exudate. Left eye exhibits no chemosis, no discharge and no exudate. No scleral icterus. Neck: Normal range of motion. Neck supple. No JVD present. No neck rigidity. No tracheal deviation and no edema present. No thyromegaly present. Cardiovascular: Intact distal pulses. Exam reveals no decreased pulses. Pulmonary/Chest: Effort normal. No accessory muscle usage. No apnea, no tachypnea and no bradypnea. No respiratory distress. She has decreased breath sounds. She has no wheezes. She has no rales. She exhibits no tenderness. Abdominal: Soft. Bowel sounds are normal. She exhibits no distension and no mass. There is no tenderness. There is no rebound and no guarding. Musculoskeletal: She exhibits tenderness. She exhibits no edema.         Right shoulder: She exhibits decreased range of motion, tenderness and bony tenderness. Left shoulder: She exhibits decreased range of motion, tenderness and bony tenderness. Right elbow: Normal.       Left elbow: Normal.        Right wrist: Normal.        Left wrist: Normal.        Right hip: Normal.        Left hip: Normal.        Right knee: She exhibits decreased range of motion, swelling and effusion. Left knee: She exhibits decreased range of motion, swelling and effusion. Right ankle: She exhibits decreased range of motion, swelling and ecchymosis. Tenderness. Lateral malleolus tenderness found. Achilles tendon normal.        Left ankle: She exhibits decreased range of motion. Tenderness. Lateral malleolus and medial malleolus tenderness found. Achilles tendon normal.        Cervical back: She exhibits bony tenderness. Thoracic back: She exhibits decreased range of motion and bony tenderness. Lumbar back: She exhibits decreased range of motion, tenderness, bony tenderness and pain. She exhibits no swelling, no edema, no deformity, no laceration and normal pulse. Right upper arm: Normal.        Left upper arm: Normal.        Right forearm: Normal.        Left forearm: Normal.        Right hand: Normal.        Left hand: Normal.        Right upper leg: Normal.        Left upper leg: Normal.        Right lower leg: She exhibits tenderness, bony tenderness and swelling. Left lower leg: She exhibits tenderness, bony tenderness and swelling. Right foot: There is decreased range of motion and tenderness. Left foot: There is decreased range of motion and tenderness. Tender areas are indicated by numbered spot         Lymphadenopathy:     She has no cervical adenopathy. She has no axillary adenopathy. Right: No inguinal adenopathy present. Left: No inguinal adenopathy present.    Neurological: She is alert and oriented to person, place, III, IV, VI   Pupils are equal, round, and reactive to light.   Extraocular motions are normal.     Motor Exam   Muscle bulk: normal  Overall muscle tone: normal    Strength   Right neck flexion: 4/5  Left neck flexion: 4/5  Right neck extension: 4/5  Left neck extension: 4/5  Right deltoid: 4/5  Left deltoid: 4/5  Right biceps: 4/5  Left biceps: 4/5  Right triceps: 4/5  Left triceps: 4/5  Right wrist flexion: 4/5  Left wrist flexion: 4/5  Right wrist extension: 4/5  Left wrist extension: 4/5  Right interossei: 4/5  Left interossei: 4/5  Right abdominals: 4/5  Left abdominals: 4/5  Right iliopsoas: 4/5  Left iliopsoas: 4/5  Right quadriceps: 4/5  Left quadriceps: 4/5  Right hamstrin/5  Left hamstrin/5  Right glutei: 4/5  Left glutei: 4/5  Right anterior tibial: 4/5  Left anterior tibial: 4/5  Right posterior tibial: 4/5  Left posterior tibial: 4/5  Right peroneal: 4/5  Left peroneal: 4/5  Right gastroc: 4/5  Left gastroc: 4/5    Sensory Exam   Right arm light touch: decreased from fingers  Left arm light touch: decreased from fingers  Right leg light touch: decreased from knee  Left leg light touch: decreased from knee            Diagnostics:    Recent Results (from the past 24 hour(s))   CBC Auto Differential    Collection Time: 18  6:44 AM   Result Value Ref Range    WBC 12.1 (H) 4.8 - 10.8 K/uL    RBC 3.57 (L) 4.20 - 5.40 M/uL    Hemoglobin 11.3 (L) 12.0 - 16.0 g/dL    Hematocrit 33.5 (L) 37.0 - 47.0 %    MCV 93.6 82.0 - 100.0 fL    MCH 31.5 (H) 27.0 - 31.3 pg    MCHC 33.7 33.0 - 37.0 %    RDW 13.7 11.5 - 14.5 %    Platelets 605 547 - 669 K/uL    Neutrophils % 82.2 %    Lymphocytes % 6.2 %    Monocytes % 9.5 %    Eosinophils % 1.9 %    Basophils % 0.2 %    Neutrophils # 10.0 (H) 1.4 - 6.5 K/uL    Lymphocytes # 0.7 (L) 1.0 - 4.8 K/uL    Monocytes # 1.2 (H) 0.2 - 0.8 K/uL    Eosinophils # 0.2 0.0 - 0.7 K/uL    Basophils # 0.0 0.0 - 0.2 K/uL   RENAL FUNCTION PANEL    Collection Time: 18  6:44 AM

## 2018-08-13 NOTE — PROGRESS NOTES
oriented, normal speech, no focal findings or movement disorder noted  Neck: Large neck. No palpable thyroid or adenopathy. Pharynx unremarkable. Medications:    acetaminophen  500 mg Oral 4 times per day    gabapentin  300 mg Oral TID    lidocaine  3 patch Transdermal Daily    vitamin D  50,000 Units Oral Daily    cyanocobalamin  1,000 mcg Intramuscular Weekly    analgesic ointment   Topical TID    potassium chloride  60 mEq Oral BID    midazolam  5 mg Intravenous Once    fentanNYL  100 mcg Intravenous Once    sodium chloride bacteriostatic  10 mL Injection Once    gabapentin  600 mg Oral Nightly    nafcillin  2 g Intravenous Q4H    sodium chloride flush  10 mL Intravenous 2 times per day    heparin (porcine)  5,000 Units Subcutaneous Q12H    aspirin  81 mg Oral Daily    atorvastatin  10 mg Oral Nightly         HYDROcodone-acetaminophen 1 tablet Q6H PRN   metaxalone 800 mg Q6H PRN   HYDROmorphone 1 mg Q4H PRN   diphenhydrAMINE 25 mg Q6H PRN   sodium chloride flush 10 mL PRN   magnesium hydroxide 30 mL Daily PRN   ondansetron 4 mg Q6H PRN       Diagnostics:    Echo: Study resolution is such that small vegetations could not be excluded and   the pulmonary valve was not well visualized.   If endocarditis is clinically suspected, needs considered for MACIE   Normal left ventricle structure and function.   Left ventricular ejection fraction is visually estimated at 60%.   Normal right ventricle structure and function.   Normal right ventricle systolic pressure.   Normal mitral valve structure and function.   Thickening of anterior leaflet   Normal aortic valve structure and function.   Normal tricuspid valve structure and function.   Mild TR   The pulmonic valve was not well visualized . Lab Data:  Component      Latest Ref Rng & Units 8/11/2018 8/11/2018           1:23 PM  1:23 PM   Blood Culture, Routine       POSITIVE for .  . . 2 out of 2 blood cultures (A)   Organism        Staph aureus MSSA (A)   Culture, Blood 2             Component      Latest Ref Rng & Units 8/10/2018 8/10/2018           6:38 AM  6:38 AM   Blood Culture, Routine       POSITIVE for . . . 1 out of 1 blood cultures (A)   Organism        Staph aureus MSSA (A)   Culture, Blood 2             Component      Latest Ref Rng & Units 8/8/2018 8/8/2018           4:44 PM  4:44 PM   Blood Culture, Routine           Organism        Staph aureus MSSA (A)   Culture, Blood 2       POSITIVE for . . . 2 out of 2 blood cultures (A)     Component      Latest Ref Rng & Units 8/8/2018           4:44 PM   Blood Culture, Routine       POSITIVE for . . .   Organism          Culture, Blood 2            Component      Latest Ref Rng & Units 8/8/2018           4:44 PM   Blood Culture, Routine       The following organisms and resistance markers have been . . .   Organism       Staph aureus MSSA (A)   Culture, Blood 2            Component      Latest Ref Rng & Units 8/8/2018           4:44 PM   Blood Culture, Routine       2 out of 2 blood cultures (A)   Organism       Staph aureus DNA Detected (A)   Culture, Blood 2              Cardiac Enzymes:  No results for input(s): CKTOTAL, CKMB, CKMBINDEX, TROPONINI in the last 72 hours.   ProBNP:   Lab Results   Component Value Date    PROBNP 630 03/20/2016       CBC:   Lab Results   Component Value Date    WBC 12.1 08/13/2018    RBC 3.57 08/13/2018    RBC 3.80 12/07/2011    HGB 11.3 08/13/2018    HCT 33.5 08/13/2018     08/13/2018       CMP:  Lab Results   Component Value Date     08/13/2018    K 3.0 08/13/2018    K 4.4 08/09/2018    CL 98 08/13/2018    CO2 24 08/13/2018    BUN 21 08/13/2018    CREATININE 0.75 08/13/2018    GFRAA >60.0 08/13/2018    LABGLOM >60.0 08/13/2018    GLUCOSE 120 08/13/2018    GLUCOSE 83 12/07/2011    CALCIUM 8.5 08/13/2018       Hepatic Function Panel:  Lab Results   Component Value Date    ALKPHOS 109 08/08/2018    ALT 19 08/08/2018    AST 30 08/08/2018    PROT 7.0 08/08/2018

## 2018-08-13 NOTE — PROGRESS NOTES
Neuromuscular Re-education, Home Exercise Program, Safety Education & Training, Patient/Caregiver Education & Training, Equipment Evaluation, Education, & procurement  Safety Devices  Type of devices: All fall risk precautions in place, Bed alarm in place, Call light within reach    G-Code:  PT G-Codes  Functional Assessment Tool Used: clinical judgement  Functional Limitation: Mobility: Walking and moving around  Mobility: Walking and Moving Around Current Status (): At least 80 percent but less than 100 percent impaired, limited or restricted  Mobility: Walking and Moving Around Goal Status (): At least 60 percent but less than 80 percent impaired, limited or restricted    Goals:  Long term goals  Long term goal 1: Patient will be minimal assist with bed mobility. Long term goal 2: Patient will sit on EOB for >/= 5 minutes with supervision without LOB. Long term goal 3: Patient will be independent with HEP.     AMPAC (6 CLICK) BASIC MOBILITY  AM-PAC Inpatient Mobility Raw Score : 8     Therapy Time:   Individual   Time In 1030   Time Out 1100   Minutes 30   Timed Code Treatment Minutes: 0 Minutes       Alfred Lewis, PT, 08/13/18 at 11:05 AM

## 2018-08-13 NOTE — PROGRESS NOTES
murmur  Abdomen: soft, no tenderness  + tenderness over lumbar spine  + B leg edema  + B feet decreased erythema, warmth and  tenderness  Resolved L Leg erythema    DATA:    Lab Results   Component Value Date    WBC 12.1 (H) 08/13/2018    HGB 11.3 (L) 08/13/2018    HCT 33.5 (L) 08/13/2018    MCV 93.6 08/13/2018     08/13/2018     Lab Results   Component Value Date    CREATININE 0.75 08/13/2018    BUN 21 08/13/2018     08/13/2018    K 3.0 (LL) 08/13/2018    CL 98 08/13/2018    CO2 24 08/13/2018       Hepatic Function Panel:   Lab Results   Component Value Date    ALKPHOS 109 08/08/2018    ALT 19 08/08/2018    AST 30 08/08/2018    PROT 7.0 08/08/2018    BILITOT 0.7 08/08/2018    BILIDIR 0.0 05/14/2014    IBILI 0.5 05/14/2014    LABALBU 2.5 08/13/2018       Microbiology:   Recent Labs      08/11/18   1323   BC  2 out of 2 blood cultures*  POSITIVE for  PBP2= Negative  Refer to previous sensitivity       Recent Labs      08/11/18   1152   BLOODCULT2  2 out of 2 blood cultures*  POSITIVE for  PBP2= Negative  Refer to previous sensitivity       No results for input(s): Rebecca Grass in the last 72 hours. IMPRESSION:    · MSSA bacteremia and sepsis.  Probable bacterial endocarditis  · L foot and leg cellulitis with abscess  · Lumabr discitis  · Psoas abscess  · B feet gouty arthritis, possible septic arthritis    Patient Active Problem List   Diagnosis    Spinal stenosis of lumbar region with neurogenic claudication    High risk medication use - 10/17/17 [facility]AR[facility] PM&R, 12/19/17 [facility] PM&R, 12/20/17 Tox screen positive for opiates, Hydrocodone PM&R, MED CONTRACT 2/2/17    Mixed hyperlipidemia    Benign essential hypertension    Acquired spondylolisthesis    Primary osteoarthritis involving multiple joints    BMI 33.0-33.9,adult    Neuropathy involving both lower extremities    Hyponatremia    Staphylococcus aureus bacteremia with sepsis (HCC)    Cellulitis of left foot    Pyogenic arthritis of multiple sites [facility])    Abscess of left foot    Left foot pain    Psoas abscess, right ([address]    Discitis of lumbar region    Lumbar back pain with radiculopathy affecting right lower extremity    Infection of intervertebral disc (pyogenic), lumbar region ([address]    Osteomyelitis of lumbar vertebra ([address]    Right flank pain    Gait abnormality       PLAN:  · repeat Blood Cx, MACIE  · IV Nafcil  · PICC once Blood Cx sterilize  · SNF referral  ·     Discussed with patient,  and RN    Yosi Lazo MD

## 2018-08-14 LAB
ALBUMIN SERPL-MCNC: 2.4 G/DL (ref 3.9–4.9)
ANA INTERPRETATION: NORMAL
ANION GAP SERPL CALCULATED.3IONS-SCNC: 14 MEQ/L (ref 7–13)
ANTI-NUCLEAR ANTIBODY (ANA): NEGATIVE
BASOPHILS ABSOLUTE: 0.1 K/UL (ref 0–0.2)
BASOPHILS RELATIVE PERCENT: 0.5 %
BUN BLDV-MCNC: 18 MG/DL (ref 8–23)
CALCIUM SERPL-MCNC: 8.6 MG/DL (ref 8.6–10.2)
CHLORIDE BLD-SCNC: 99 MEQ/L (ref 98–107)
CO2: 23 MEQ/L (ref 22–29)
CREAT SERPL-MCNC: 0.77 MG/DL (ref 0.5–0.9)
EOSINOPHILS ABSOLUTE: 0.4 K/UL (ref 0–0.7)
EOSINOPHILS RELATIVE PERCENT: 3.3 %
GFR AFRICAN AMERICAN: >60
GFR NON-AFRICAN AMERICAN: >60
GLUCOSE BLD-MCNC: 122 MG/DL (ref 74–109)
HCT VFR BLD CALC: 32.1 % (ref 37–47)
HEMOGLOBIN: 10.8 G/DL (ref 12–16)
LYMPHOCYTES ABSOLUTE: 0.9 K/UL (ref 1–4.8)
LYMPHOCYTES RELATIVE PERCENT: 7.3 %
MAGNESIUM: 1.6 MG/DL (ref 1.7–2.3)
MCH RBC QN AUTO: 31.8 PG (ref 27–31.3)
MCHC RBC AUTO-ENTMCNC: 33.8 % (ref 33–37)
MCV RBC AUTO: 94.2 FL (ref 82–100)
MONOCYTES ABSOLUTE: 1 K/UL (ref 0.2–0.8)
MONOCYTES RELATIVE PERCENT: 8 %
NEUTROPHILS ABSOLUTE: 10.4 K/UL (ref 1.4–6.5)
NEUTROPHILS RELATIVE PERCENT: 80.9 %
PDW BLD-RTO: 14 % (ref 11.5–14.5)
PHOSPHORUS: 2.9 MG/DL (ref 2.5–4.5)
PLATELET # BLD: 320 K/UL (ref 130–400)
POTASSIUM SERPL-SCNC: 4 MEQ/L (ref 3.5–5.1)
RBC # BLD: 3.4 M/UL (ref 4.2–5.4)
SODIUM BLD-SCNC: 136 MEQ/L (ref 132–144)
WBC # BLD: 12.9 K/UL (ref 4.8–10.8)

## 2018-08-14 PROCEDURE — 36415 COLL VENOUS BLD VENIPUNCTURE: CPT

## 2018-08-14 PROCEDURE — 6360000002 HC RX W HCPCS: Performed by: NURSE PRACTITIONER

## 2018-08-14 PROCEDURE — 80069 RENAL FUNCTION PANEL: CPT

## 2018-08-14 PROCEDURE — 6370000000 HC RX 637 (ALT 250 FOR IP): Performed by: PHYSICAL MEDICINE & REHABILITATION

## 2018-08-14 PROCEDURE — 85025 COMPLETE CBC W/AUTO DIFF WBC: CPT

## 2018-08-14 PROCEDURE — 6370000000 HC RX 637 (ALT 250 FOR IP): Performed by: INTERNAL MEDICINE

## 2018-08-14 PROCEDURE — 0JBR0ZZ EXCISION OF LEFT FOOT SUBCUTANEOUS TISSUE AND FASCIA, OPEN APPROACH: ICD-10-PCS | Performed by: PLASTIC SURGERY

## 2018-08-14 PROCEDURE — 2580000003 HC RX 258: Performed by: INTERNAL MEDICINE

## 2018-08-14 PROCEDURE — 99232 SBSQ HOSP IP/OBS MODERATE 35: CPT | Performed by: INTERNAL MEDICINE

## 2018-08-14 PROCEDURE — 6360000002 HC RX W HCPCS: Performed by: ANESTHESIOLOGY

## 2018-08-14 PROCEDURE — 2580000003 HC RX 258: Performed by: NURSE PRACTITIONER

## 2018-08-14 PROCEDURE — 6370000000 HC RX 637 (ALT 250 FOR IP): Performed by: ANESTHESIOLOGY

## 2018-08-14 PROCEDURE — 2500000003 HC RX 250 WO HCPCS: Performed by: INTERNAL MEDICINE

## 2018-08-14 PROCEDURE — 87493 C DIFF AMPLIFIED PROBE: CPT

## 2018-08-14 PROCEDURE — 83735 ASSAY OF MAGNESIUM: CPT

## 2018-08-14 PROCEDURE — 6360000002 HC RX W HCPCS: Performed by: INTERNAL MEDICINE

## 2018-08-14 PROCEDURE — 87040 BLOOD CULTURE FOR BACTERIA: CPT

## 2018-08-14 PROCEDURE — 1210000000 HC MED SURG R&B

## 2018-08-14 RX ORDER — HYDROCODONE BITARTRATE AND ACETAMINOPHEN 10; 325 MG/1; MG/1
1 TABLET ORAL EVERY 4 HOURS PRN
Status: DISCONTINUED | OUTPATIENT
Start: 2018-08-14 | End: 2018-08-16

## 2018-08-14 RX ORDER — L. ACIDOPHILUS/L.BULGARICUS 1MM CELL
4 TABLET ORAL 3 TIMES DAILY
Status: DISCONTINUED | OUTPATIENT
Start: 2018-08-14 | End: 2018-08-18 | Stop reason: HOSPADM

## 2018-08-14 RX ADMIN — POTASSIUM CHLORIDE 60 MEQ: 20 TABLET, EXTENDED RELEASE ORAL at 10:19

## 2018-08-14 RX ADMIN — NAFCILLIN SODIUM 2 G: 2 INJECTION, POWDER, LYOPHILIZED, FOR SOLUTION INTRAMUSCULAR; INTRAVENOUS at 10:17

## 2018-08-14 RX ADMIN — HYDROMORPHONE HYDROCHLORIDE 1 MG: 1 INJECTION, SOLUTION INTRAMUSCULAR; INTRAVENOUS; SUBCUTANEOUS at 02:15

## 2018-08-14 RX ADMIN — HYDROMORPHONE HYDROCHLORIDE 1 MG: 1 INJECTION, SOLUTION INTRAMUSCULAR; INTRAVENOUS; SUBCUTANEOUS at 21:00

## 2018-08-14 RX ADMIN — ATORVASTATIN CALCIUM 10 MG: 10 TABLET, FILM COATED ORAL at 20:48

## 2018-08-14 RX ADMIN — ERGOCALCIFEROL 50000 UNITS: 1.25 CAPSULE ORAL at 10:19

## 2018-08-14 RX ADMIN — HYDROMORPHONE HYDROCHLORIDE 1 MG: 1 INJECTION, SOLUTION INTRAMUSCULAR; INTRAVENOUS; SUBCUTANEOUS at 06:10

## 2018-08-14 RX ADMIN — NAFCILLIN SODIUM 2 G: 2 INJECTION, POWDER, LYOPHILIZED, FOR SOLUTION INTRAMUSCULAR; INTRAVENOUS at 02:16

## 2018-08-14 RX ADMIN — NAFCILLIN SODIUM 2 G: 2 INJECTION, POWDER, LYOPHILIZED, FOR SOLUTION INTRAMUSCULAR; INTRAVENOUS at 15:46

## 2018-08-14 RX ADMIN — HYDROCODONE BITARTRATE AND ACETAMINOPHEN 1 TABLET: 10; 325 TABLET ORAL at 17:48

## 2018-08-14 RX ADMIN — HYDROCODONE BITARTRATE AND ACETAMINOPHEN 1 TABLET: 10; 325 TABLET ORAL at 10:32

## 2018-08-14 RX ADMIN — NAFCILLIN SODIUM 2 G: 2 INJECTION, POWDER, LYOPHILIZED, FOR SOLUTION INTRAMUSCULAR; INTRAVENOUS at 06:10

## 2018-08-14 RX ADMIN — ACETAMINOPHEN 500 MG: 500 TABLET ORAL at 23:06

## 2018-08-14 RX ADMIN — LACTOBACILLUS TAB 4 TABLET: TAB at 13:02

## 2018-08-14 RX ADMIN — Medication 10 ML: at 10:20

## 2018-08-14 RX ADMIN — HYDROMORPHONE HYDROCHLORIDE 1 MG: 1 INJECTION, SOLUTION INTRAMUSCULAR; INTRAVENOUS; SUBCUTANEOUS at 13:18

## 2018-08-14 RX ADMIN — Medication 10 ML: at 20:49

## 2018-08-14 RX ADMIN — POTASSIUM CHLORIDE 60 MEQ: 20 TABLET, EXTENDED RELEASE ORAL at 20:47

## 2018-08-14 RX ADMIN — ACETAMINOPHEN 500 MG: 500 TABLET ORAL at 06:10

## 2018-08-14 RX ADMIN — LACTOBACILLUS TAB 4 TABLET: TAB at 20:56

## 2018-08-14 RX ADMIN — ACETAMINOPHEN 500 MG: 500 TABLET ORAL at 12:58

## 2018-08-14 RX ADMIN — ASPIRIN 81 MG: 81 TABLET, COATED ORAL at 10:19

## 2018-08-14 RX ADMIN — NAFCILLIN SODIUM 2 G: 2 INJECTION, POWDER, LYOPHILIZED, FOR SOLUTION INTRAMUSCULAR; INTRAVENOUS at 19:18

## 2018-08-14 RX ADMIN — GABAPENTIN 300 MG: 300 CAPSULE ORAL at 13:23

## 2018-08-14 RX ADMIN — GABAPENTIN 600 MG: 300 CAPSULE ORAL at 20:49

## 2018-08-14 RX ADMIN — GABAPENTIN 300 MG: 300 CAPSULE ORAL at 10:19

## 2018-08-14 RX ADMIN — ACETAMINOPHEN 500 MG: 500 TABLET ORAL at 17:21

## 2018-08-14 RX ADMIN — HEPARIN SODIUM 5000 UNITS: 5000 INJECTION, SOLUTION INTRAVENOUS; SUBCUTANEOUS at 10:13

## 2018-08-14 RX ADMIN — NAFCILLIN SODIUM 2 G: 2 INJECTION, POWDER, LYOPHILIZED, FOR SOLUTION INTRAMUSCULAR; INTRAVENOUS at 23:07

## 2018-08-14 RX ADMIN — HEPARIN SODIUM 5000 UNITS: 5000 INJECTION, SOLUTION INTRAVENOUS; SUBCUTANEOUS at 20:45

## 2018-08-14 ASSESSMENT — PAIN SCALES - GENERAL
PAINLEVEL_OUTOF10: 9
PAINLEVEL_OUTOF10: 9
PAINLEVEL_OUTOF10: 8
PAINLEVEL_OUTOF10: 10
PAINLEVEL_OUTOF10: 8
PAINLEVEL_OUTOF10: 7
PAINLEVEL_OUTOF10: 10
PAINLEVEL_OUTOF10: 10
PAINLEVEL_OUTOF10: 9
PAINLEVEL_OUTOF10: 8

## 2018-08-14 NOTE — PROGRESS NOTES
Assumed care of patient tonight. Assessment complete and meds were given, VS stable. patient medicated with dilaudid per [facility] for pain. Patient in bed with eyes closed call bell within reach will continue to monitor.  Electronically signed by Tylor White RN on 8/14/2018 at 6:26 AM

## 2018-08-14 NOTE — PROGRESS NOTES
Department of Internal Medicine  Progress Note      SUBJECTIVE: Patient complains of severe intractable back pain  No acute events overnight.        ROS:  All 12 systems reviewed and negative except mentioned in HPI and Assessment and plan    MEDICATIONS:  Current Facility-Administered Medications   Medication Dose Route Frequency Provider Last Rate Last Dose    HYDROcodone-acetaminophen (NORCO)  MG per tablet 1 tablet  1 tablet Oral Q4H PRN Monroe Quinteros MD        HYDROmorphone (DILAUDID) injection 1 mg  1 mg Intravenous Q4H PRN Monroe Hinkle MD        lactobacillus acidophilus [facility]) 4 tablet  4 tablet Oral TID Monroe Hinkle MD        acetaminophen (TYLENOL) tablet 500 mg  500 mg Oral 4 times per day Torin Siddiqui MD   500 mg at 08/14/18 0610    metaxalone (SKELAXIN) tablet 800 mg  800 mg Oral Q6H PRN Torin Siddiqui MD   800 mg at 08/13/18 1731    gabapentin (NEURONTIN) capsule 300 mg  300 mg Oral TID Torin Siddiqui MD   300 mg at 08/14/18 1019    lidocaine (LIDODERM) 5 % 3 patch  3 patch Transdermal Daily Rosaline Scullin, DO   3 patch at 08/14/18 1015    vitamin D (ERGOCALCIFEROL) capsule 50,000 Units  50,000 Units Oral Daily Rosaline Scullin, DO   50,000 Units at 08/14/18 1019    cyanocobalamin injection 1,000 mcg  1,000 mcg Intramuscular Weekly Rosaline Scullin, DO   1,000 mcg at 08/13/18 1040    analgesic ointment ointment   Topical TID Rosaline Scullin, DO        potassium chloride (KLOR-CON M) extended release tablet 60 mEq  60 mEq Oral BID Monroe Hinkle MD   60 mEq at 08/14/18 1019    sodium chloride bacteriostatic 0.9 % injection 10 mL  10 mL Injection Once Kian MD Lacho        gabapentin (NEURONTIN) capsule 600 mg  600 mg Oral Nightly Hoa Brown MD   600 mg at 08/13/18 2142    nafcillin 2 g in dextrose 5 % 100 mL IVPB (mini-bag)  2 g Intravenous Q4H Claire De MD   Stopped at 08/14/18 1211    diphenhydrAMINE (BENADRYL) injection 25 mg  25 mg Intravenous Q6H PRN Shivam BRONSON

## 2018-08-15 ENCOUNTER — APPOINTMENT (OUTPATIENT)
Dept: MRI IMAGING | Age: 63
DRG: 853 | End: 2018-08-15
Payer: MEDICARE

## 2018-08-15 LAB
ALBUMIN SERPL-MCNC: 2.5 G/DL (ref 3.9–4.9)
ANION GAP SERPL CALCULATED.3IONS-SCNC: 16 MEQ/L (ref 7–13)
BASOPHILS ABSOLUTE: 0.1 K/UL (ref 0–0.2)
BASOPHILS RELATIVE PERCENT: 0.4 %
BUN BLDV-MCNC: 14 MG/DL (ref 8–23)
CALCIUM SERPL-MCNC: 9 MG/DL (ref 8.6–10.2)
CHLORIDE BLD-SCNC: 97 MEQ/L (ref 98–107)
CLOSTRIDIUM DIFFICILE DNA AMPLIFICATION: NORMAL
CO2: 22 MEQ/L (ref 22–29)
CREAT SERPL-MCNC: 0.69 MG/DL (ref 0.5–0.9)
EOSINOPHILS ABSOLUTE: 0.3 K/UL (ref 0–0.7)
EOSINOPHILS RELATIVE PERCENT: 2.4 %
GFR AFRICAN AMERICAN: >60
GFR NON-AFRICAN AMERICAN: >60
GLUCOSE BLD-MCNC: 100 MG/DL (ref 74–109)
HCT VFR BLD CALC: 34.6 % (ref 37–47)
HEMOGLOBIN: 11.8 G/DL (ref 12–16)
LYMPHOCYTES ABSOLUTE: 1.4 K/UL (ref 1–4.8)
LYMPHOCYTES RELATIVE PERCENT: 10.4 %
MAGNESIUM: 1.5 MG/DL (ref 1.7–2.3)
MCH RBC QN AUTO: 32.1 PG (ref 27–31.3)
MCHC RBC AUTO-ENTMCNC: 34 % (ref 33–37)
MCV RBC AUTO: 94.3 FL (ref 82–100)
MONOCYTES ABSOLUTE: 1.1 K/UL (ref 0.2–0.8)
MONOCYTES RELATIVE PERCENT: 8.1 %
NEUTROPHILS ABSOLUTE: 10.5 K/UL (ref 1.4–6.5)
NEUTROPHILS RELATIVE PERCENT: 78.7 %
PDW BLD-RTO: 14.1 % (ref 11.5–14.5)
PHOSPHORUS: 3 MG/DL (ref 2.5–4.5)
PLATELET # BLD: 407 K/UL (ref 130–400)
POTASSIUM SERPL-SCNC: 4.3 MEQ/L (ref 3.5–5.1)
RBC # BLD: 3.67 M/UL (ref 4.2–5.4)
SODIUM BLD-SCNC: 135 MEQ/L (ref 132–144)
WBC # BLD: 13.3 K/UL (ref 4.8–10.8)

## 2018-08-15 PROCEDURE — 87077 CULTURE AEROBIC IDENTIFY: CPT

## 2018-08-15 PROCEDURE — 6370000000 HC RX 637 (ALT 250 FOR IP): Performed by: ANESTHESIOLOGY

## 2018-08-15 PROCEDURE — 1210000000 HC MED SURG R&B

## 2018-08-15 PROCEDURE — 6360000002 HC RX W HCPCS: Performed by: NURSE PRACTITIONER

## 2018-08-15 PROCEDURE — 36415 COLL VENOUS BLD VENIPUNCTURE: CPT

## 2018-08-15 PROCEDURE — 6370000000 HC RX 637 (ALT 250 FOR IP): Performed by: INTERNAL MEDICINE

## 2018-08-15 PROCEDURE — 6370000000 HC RX 637 (ALT 250 FOR IP): Performed by: HOSPITALIST

## 2018-08-15 PROCEDURE — 6360000002 HC RX W HCPCS: Performed by: INTERNAL MEDICINE

## 2018-08-15 PROCEDURE — 6370000000 HC RX 637 (ALT 250 FOR IP): Performed by: PHYSICAL MEDICINE & REHABILITATION

## 2018-08-15 PROCEDURE — 73718 MRI LOWER EXTREMITY W/O DYE: CPT

## 2018-08-15 PROCEDURE — 80069 RENAL FUNCTION PANEL: CPT

## 2018-08-15 PROCEDURE — 87147 CULTURE TYPE IMMUNOLOGIC: CPT

## 2018-08-15 PROCEDURE — 2500000003 HC RX 250 WO HCPCS: Performed by: INTERNAL MEDICINE

## 2018-08-15 PROCEDURE — 87040 BLOOD CULTURE FOR BACTERIA: CPT

## 2018-08-15 PROCEDURE — 87149 DNA/RNA DIRECT PROBE: CPT

## 2018-08-15 PROCEDURE — 2580000003 HC RX 258: Performed by: NURSE PRACTITIONER

## 2018-08-15 PROCEDURE — 83735 ASSAY OF MAGNESIUM: CPT

## 2018-08-15 PROCEDURE — 85025 COMPLETE CBC W/AUTO DIFF WBC: CPT

## 2018-08-15 PROCEDURE — 99232 SBSQ HOSP IP/OBS MODERATE 35: CPT | Performed by: INTERNAL MEDICINE

## 2018-08-15 PROCEDURE — 2580000003 HC RX 258: Performed by: INTERNAL MEDICINE

## 2018-08-15 RX ORDER — LISINOPRIL 20 MG/1
40 TABLET ORAL DAILY
Status: DISCONTINUED | OUTPATIENT
Start: 2018-08-15 | End: 2018-08-15

## 2018-08-15 RX ORDER — LISINOPRIL 20 MG/1
40 TABLET ORAL DAILY
Status: DISCONTINUED | OUTPATIENT
Start: 2018-08-15 | End: 2018-08-18 | Stop reason: HOSPADM

## 2018-08-15 RX ORDER — HYDRALAZINE HYDROCHLORIDE 20 MG/ML
10 INJECTION INTRAMUSCULAR; INTRAVENOUS EVERY 4 HOURS PRN
Status: DISCONTINUED | OUTPATIENT
Start: 2018-08-15 | End: 2018-08-18 | Stop reason: HOSPADM

## 2018-08-15 RX ORDER — OXYCODONE HCL 10 MG/1
10 TABLET, FILM COATED, EXTENDED RELEASE ORAL EVERY 12 HOURS SCHEDULED
Status: DISCONTINUED | OUTPATIENT
Start: 2018-08-15 | End: 2018-08-16

## 2018-08-15 RX ORDER — TRIAMTERENE AND HYDROCHLOROTHIAZIDE 37.5; 25 MG/1; MG/1
1 TABLET ORAL DAILY
Status: DISCONTINUED | OUTPATIENT
Start: 2018-08-15 | End: 2018-08-18 | Stop reason: HOSPADM

## 2018-08-15 RX ADMIN — HYDROMORPHONE HYDROCHLORIDE 1 MG: 1 INJECTION, SOLUTION INTRAMUSCULAR; INTRAVENOUS; SUBCUTANEOUS at 09:52

## 2018-08-15 RX ADMIN — HEPARIN SODIUM 5000 UNITS: 5000 INJECTION, SOLUTION INTRAVENOUS; SUBCUTANEOUS at 20:22

## 2018-08-15 RX ADMIN — NAFCILLIN SODIUM 2 G: 2 INJECTION, POWDER, LYOPHILIZED, FOR SOLUTION INTRAMUSCULAR; INTRAVENOUS at 18:07

## 2018-08-15 RX ADMIN — NAFCILLIN SODIUM 2 G: 2 INJECTION, POWDER, LYOPHILIZED, FOR SOLUTION INTRAMUSCULAR; INTRAVENOUS at 20:12

## 2018-08-15 RX ADMIN — POTASSIUM CHLORIDE 60 MEQ: 20 TABLET, EXTENDED RELEASE ORAL at 20:11

## 2018-08-15 RX ADMIN — HYDROCODONE BITARTRATE AND ACETAMINOPHEN 1 TABLET: 10; 325 TABLET ORAL at 07:28

## 2018-08-15 RX ADMIN — ACETAMINOPHEN 500 MG: 500 TABLET ORAL at 11:33

## 2018-08-15 RX ADMIN — NAFCILLIN SODIUM 2 G: 2 INJECTION, POWDER, LYOPHILIZED, FOR SOLUTION INTRAMUSCULAR; INTRAVENOUS at 14:27

## 2018-08-15 RX ADMIN — HYDROMORPHONE HYDROCHLORIDE 1 MG: 1 INJECTION, SOLUTION INTRAMUSCULAR; INTRAVENOUS; SUBCUTANEOUS at 20:32

## 2018-08-15 RX ADMIN — ANALGESIC BALM: 1.74; 4.06 OINTMENT TOPICAL at 20:12

## 2018-08-15 RX ADMIN — HYDROCODONE BITARTRATE AND ACETAMINOPHEN 1 TABLET: 10; 325 TABLET ORAL at 11:33

## 2018-08-15 RX ADMIN — NAFCILLIN SODIUM 2 G: 2 INJECTION, POWDER, LYOPHILIZED, FOR SOLUTION INTRAMUSCULAR; INTRAVENOUS at 05:10

## 2018-08-15 RX ADMIN — HYDROCODONE BITARTRATE AND ACETAMINOPHEN 1 TABLET: 10; 325 TABLET ORAL at 00:09

## 2018-08-15 RX ADMIN — HYDRALAZINE HYDROCHLORIDE 10 MG: 20 INJECTION INTRAMUSCULAR; INTRAVENOUS at 15:14

## 2018-08-15 RX ADMIN — TRIAMTERENE AND HYDROCHLOROTHIAZIDE 1 TABLET: 37.5; 25 TABLET ORAL at 11:10

## 2018-08-15 RX ADMIN — HYDROMORPHONE HYDROCHLORIDE 1 MG: 1 INJECTION, SOLUTION INTRAMUSCULAR; INTRAVENOUS; SUBCUTANEOUS at 03:21

## 2018-08-15 RX ADMIN — ERGOCALCIFEROL 50000 UNITS: 1.25 CAPSULE ORAL at 09:43

## 2018-08-15 RX ADMIN — HYDROCODONE BITARTRATE AND ACETAMINOPHEN 1 TABLET: 10; 325 TABLET ORAL at 17:02

## 2018-08-15 RX ADMIN — POTASSIUM CHLORIDE 60 MEQ: 20 TABLET, EXTENDED RELEASE ORAL at 09:43

## 2018-08-15 RX ADMIN — LACTOBACILLUS TAB 4 TABLET: TAB at 14:27

## 2018-08-15 RX ADMIN — GABAPENTIN 300 MG: 300 CAPSULE ORAL at 09:42

## 2018-08-15 RX ADMIN — ACETAMINOPHEN 500 MG: 500 TABLET ORAL at 18:07

## 2018-08-15 RX ADMIN — HYDROMORPHONE HYDROCHLORIDE 1 MG: 1 INJECTION, SOLUTION INTRAMUSCULAR; INTRAVENOUS; SUBCUTANEOUS at 14:27

## 2018-08-15 RX ADMIN — OXYCODONE HYDROCHLORIDE 10 MG: 10 TABLET, FILM COATED, EXTENDED RELEASE ORAL at 18:50

## 2018-08-15 RX ADMIN — GABAPENTIN 300 MG: 300 CAPSULE ORAL at 15:14

## 2018-08-15 RX ADMIN — Medication 10 ML: at 20:13

## 2018-08-15 RX ADMIN — LACTOBACILLUS TAB 4 TABLET: TAB at 20:11

## 2018-08-15 RX ADMIN — LISINOPRIL 40 MG: 20 TABLET ORAL at 01:44

## 2018-08-15 RX ADMIN — ACETAMINOPHEN 500 MG: 500 TABLET ORAL at 05:10

## 2018-08-15 RX ADMIN — GABAPENTIN 600 MG: 300 CAPSULE ORAL at 20:11

## 2018-08-15 RX ADMIN — LACTOBACILLUS TAB 4 TABLET: TAB at 09:42

## 2018-08-15 RX ADMIN — HYDROCODONE BITARTRATE AND ACETAMINOPHEN 1 TABLET: 10; 325 TABLET ORAL at 22:59

## 2018-08-15 RX ADMIN — ATORVASTATIN CALCIUM 10 MG: 10 TABLET, FILM COATED ORAL at 20:12

## 2018-08-15 RX ADMIN — NAFCILLIN SODIUM 2 G: 2 INJECTION, POWDER, LYOPHILIZED, FOR SOLUTION INTRAMUSCULAR; INTRAVENOUS at 09:42

## 2018-08-15 RX ADMIN — HEPARIN SODIUM 5000 UNITS: 5000 INJECTION, SOLUTION INTRAVENOUS; SUBCUTANEOUS at 11:09

## 2018-08-15 RX ADMIN — LISINOPRIL 40 MG: 20 TABLET ORAL at 09:42

## 2018-08-15 RX ADMIN — ASPIRIN 81 MG: 81 TABLET, COATED ORAL at 09:43

## 2018-08-15 ASSESSMENT — PAIN SCALES - GENERAL
PAINLEVEL_OUTOF10: 7
PAINLEVEL_OUTOF10: 9
PAINLEVEL_OUTOF10: 10
PAINLEVEL_OUTOF10: 10
PAINLEVEL_OUTOF10: 9
PAINLEVEL_OUTOF10: 10
PAINLEVEL_OUTOF10: 9
PAINLEVEL_OUTOF10: 10
PAINLEVEL_OUTOF10: 8
PAINLEVEL_OUTOF10: 6

## 2018-08-15 NOTE — PROGRESS NOTES
Pt assessment completed. Pt complaining of 10/10 pain and is being medicated with PRN and breakthrough medications. Dressing to lower foot is clean dry and intact. Pt having frequent stools. BP elevated, pt states she takes lisinopril at home, this has not been reordered. Notified Jessy Alexandra NP, restarted med. Safety precautions in place. No further needs, will continue to monitor.      Electronically signed by Germán Anna RN on 8/15/2018 at 12:42 AM

## 2018-08-15 NOTE — PROGRESS NOTES
Spoke with Jamal Black NP, start dose of lisinopril tonight.      Electronically signed by Lizbet Leong RN on 8/15/2018 at 1:37 AM

## 2018-08-15 NOTE — PROGRESS NOTES
Ok per Dr Aide Salinas to give Oxycotin at 7pm d/t pt going down for MRI about 730/8pm. Electronically signed by Aron Trujillo RN on 8/15/2018 at 5:15 PM

## 2018-08-15 NOTE — PROGRESS NOTES
Department of Internal Medicine  Progress Note      SUBJECTIVE: Patient complains of severe intractable back pain and new pain in her right midfoot  No acute events overnight.        ROS:  All 12 systems reviewed and negative except mentioned in HPI and Assessment and plan    MEDICATIONS:  Current Facility-Administered Medications   Medication Dose Route Frequency Provider Last Rate Last Dose    lisinopril (PRINIVIL;ZESTRIL) tablet 40 mg  40 mg Oral Daily JOSE Lozada - CNP   40 mg at 08/15/18 0144    triamterene-hydrochlorothiazide (MAXZIDE-25) 37.5-25 MG per tablet 1 tablet  1 tablet Oral Daily Monroe Hinkle MD        HYDROcodone-acetaminophen (NORCO)  MG per tablet 1 tablet  1 tablet Oral Q4H PRN Monroe Simms MD   1 tablet at 08/15/18 0728    HYDROmorphone (DILAUDID) injection 1 mg  1 mg Intravenous Q4H PRN Monroe Simms MD   1 mg at 08/15/18 0321    lactobacillus acidophilus [facility]) 4 tablet  4 tablet Oral TID Monroe Simms MD   4 tablet at 08/14/18 2056    acetaminophen (TYLENOL) tablet 500 mg  500 mg Oral 4 times per day Radha Jack MD   500 mg at 08/15/18 0510    metaxalone (SKELAXIN) tablet 800 mg  800 mg Oral Q6H PRN Radha Jack MD   800 mg at 08/13/18 1731    gabapentin (NEURONTIN) capsule 300 mg  300 mg Oral TID Radha Jack MD   300 mg at 08/14/18 1323    lidocaine (LIDODERM) 5 % 3 patch  3 patch Transdermal Daily Rosaline Scullin, DO   3 patch at 08/14/18 1015    vitamin D (ERGOCALCIFEROL) capsule 50,000 Units  50,000 Units Oral Daily Rosaline Scullin, DO   50,000 Units at 08/14/18 1019    cyanocobalamin injection 1,000 mcg  1,000 mcg Intramuscular Weekly Rosaline Scullin, DO   1,000 mcg at 08/13/18 1040    analgesic ointment ointment   Topical TID Rosaline Scullin, DO        potassium chloride (KLOR-CON M) extended release tablet 60 mEq  60 mEq Oral BID Monroe Simms MD   60 mEq at 08/14/18 2047    sodium chloride bacteriostatic 0.9 % injection 10 mL  10 mL Injection Once Reinaldo Hutchinson MD        gabapentin (NEURONTIN) capsule 600 mg  600 mg Oral Nightly Jolynn Torre MD   600 mg at 08/14/18 2049    nafcillin 2 g in dextrose 5 % 100 mL IVPB (mini-bag)  2 g Intravenous Q4H Kyung Beal MD   Stopped at 08/15/18 0709    diphenhydrAMINE (BENADRYL) injection 25 mg  25 mg Intravenous Q6H PRN Aisha Dakins Abbud, MD        sodium chloride flush 0.9 % injection 10 mL  10 mL Intravenous 2 times per day Mortimer Deputy, APRN - CNP   10 mL at 08/14/18 2049    sodium chloride flush 0.9 % injection 10 mL  10 mL Intravenous PRN Mortimer Deputy, APRN - CNP   10 mL at 08/11/18 1524    magnesium hydroxide (MILK OF MAGNESIA) 400 MG/5ML suspension 30 mL  30 mL Oral Daily PRN Mortimer Deputy, APRN - CNP        ondansetron (ZOFRAN) injection 4 mg  4 mg Intravenous Q6H PRN Mortimer Deputy, APRN - CNP        heparin (porcine) injection 5,000 Units  5,000 Units Subcutaneous Q12H Mortimer Deputy, APRN - CNP   5,000 Units at 08/14/18 2045    aspirin EC tablet 81 mg  81 mg Oral Daily Jolynn Torre MD   81 mg at 08/14/18 1019    atorvastatin (LIPITOR) tablet 10 mg  10 mg Oral Nightly Jolynn Torre MD   10 mg at 08/14/18 2048         OBJECTIVE:  Vital Signs:  Vitals:    08/15/18 0740   BP: (!) 178/72   Pulse: 91   Resp: 18   Temp: 98.4 °F (36.9 °C)   SpO2: 97%       General Exam (except as mentioned above):  CONSTITUTIONAL: Awake, alert, no apparent distress  EYES:  PERRL, conjunctiva normal  ENT:  Normocephalic, atraumatic  NECK:  Supple  BACK:  Symmetric  LUNGS:  CTAB except bilateral basilar crackles. CARDIOVASCULAR:  S1S2 present  ABDOMEN:  soft, non-distended, non-tender  MUSCULOSKELETAL:  There is no redness, warmth, or swelling of the joints. NEUROLOGIC:  Alert, awake, oriented x 3. No FND  EXTREMITIES: Warm and well perfused. Severe tenderness over right foot.     LABS  Recent Labs      08/13/18   0644  08/14/18   0600  08/15/18   0726   WBC  12.1*  12.9*  13.3*   RBC  3.57*  3.40*  3.67*   HGB

## 2018-08-15 NOTE — PROGRESS NOTES
Physical Therapy Missed Treatment   Facility/Department: [facility] MED SURG L541/[id number]    NAME: Li Snow  Patient Status:   : 1955 (70 y.o.)  MRN: [id number]  Account: [de-identified]  Gender: female        [] Patient Declines PT Treatment            [x] Patient Unavailable: pt has order for MRI to right foot. Will hold tx til results in. Will attempt PT Treatment again at earliest convenience.         Electronically signed by Laurier Carrel, PTA on 8/15/18 at 2:27 PM

## 2018-08-15 NOTE — PROGRESS NOTES
Infectious Diseases Inpatient Progress Note          HISTORY OF PRESENT ILLNESS:  Follow up MSSA sepsis, LLE cellulitis with MSSA L foot abscess , B feet arthritis on IV Nafcillin, well tolerated. Patient has severe low back pain. Was seen by Dr Sia Ruiz for discitis. No surgery is planned. For MACIE for high grade bacteremia. Has resolved fevers and chills. Decreased fevers, B feet pain, swelling and redness. Severe low back pain, R thigh numbness, very weak, in tears. Current Medications:     lisinopril  40 mg Oral Daily    triamterene-hydrochlorothiazide  1 tablet Oral Daily    lactobacillus acidophilus  4 tablet Oral TID    acetaminophen  500 mg Oral 4 times per day    gabapentin  300 mg Oral TID    lidocaine  3 patch Transdermal Daily    cyanocobalamin  1,000 mcg Intramuscular Weekly    analgesic ointment   Topical TID    potassium chloride  60 mEq Oral BID    sodium chloride bacteriostatic  10 mL Injection Once    gabapentin  600 mg Oral Nightly    nafcillin  2 g Intravenous Q4H    sodium chloride flush  10 mL Intravenous 2 times per day    heparin (porcine)  5,000 Units Subcutaneous Q12H    aspirin  81 mg Oral Daily    atorvastatin  10 mg Oral Nightly       Allergies:  Latex; Cephalexin; Norvasc [amlodipine besylate]; and Keflex [cephalexin]      ROS  14 system review is negative other than HPI    Physical Exam  Vitals:    08/14/18 1532 08/14/18 2305 08/15/18 0339 08/15/18 0740   BP: (!) 192/81 (!) 186/65 (!) 165/70 (!) 178/72   Pulse: 90 87 87 91   Resp:  16  18   Temp: 98.1 °F (36.7 °C) 97.7 °F (36.5 °C) 98.4 °F (36.9 °C) 98.4 °F (36.9 °C)   TempSrc:  Oral  Oral   SpO2: 95% 96% 96% 97%   Weight:       Height:         General Appearance: alert and oriented to person, place and time, well-developed and well-nourished, in no acute distress  Skin: warm and dry, no rash. Head: normocephalic and atraumatic  Eyes: anicteric sclerae  ENT: oropharynx clear and moist with normal mucous membranes.  No arthritis of multiple sites [facility])    Abscess of left foot    Left foot pain    Psoas abscess, right (HCC)    Lumbar discitis    Lumbar back pain with radiculopathy affecting right lower extremity    Infection of intervertebral disc (pyogenic), lumbar region ([address]    Osteomyelitis of lumbar vertebra ([address]    Right flank pain    Gait abnormality       PLAN:  · F/U repeat Blood Cx  · IV Nafcil  · PICC once Blood Cx sterilize  · LTAC referral  ·     Discussed with patient,  and RN    Sofy Tao MD

## 2018-08-15 NOTE — CARE COORDINATION
SPOKE WITH DR. Lennie Sheffield TODAY, PATIENT IN PAIN,STILL OBSERVING BC, WOUNDS, ABSCESS, MAY NEED TRANSFER TO [facility], WILL REASSESS ON Friday TO DETERMINE DC.

## 2018-08-15 NOTE — PROGRESS NOTES
Dressing to right foot changed by this nurse. Packed and wrapped with cling.  Electronically signed by Junior Amanda RN on 8/15/2018 at 11:22 AM

## 2018-08-16 LAB
ALBUMIN SERPL-MCNC: 2.6 G/DL (ref 3.9–4.9)
ANION GAP SERPL CALCULATED.3IONS-SCNC: 17 MEQ/L (ref 7–13)
BASOPHILS ABSOLUTE: 0 K/UL (ref 0–0.2)
BASOPHILS RELATIVE PERCENT: 0.4 %
BUN BLDV-MCNC: 14 MG/DL (ref 8–23)
CALCIUM SERPL-MCNC: 9.3 MG/DL (ref 8.6–10.2)
CHLORIDE BLD-SCNC: 97 MEQ/L (ref 98–107)
CO2: 21 MEQ/L (ref 22–29)
CREAT SERPL-MCNC: 0.61 MG/DL (ref 0.5–0.9)
EOSINOPHILS ABSOLUTE: 0.1 K/UL (ref 0–0.7)
EOSINOPHILS RELATIVE PERCENT: 1.1 %
GFR AFRICAN AMERICAN: >60
GFR NON-AFRICAN AMERICAN: >60
GLUCOSE BLD-MCNC: 103 MG/DL (ref 74–109)
HCT VFR BLD CALC: 34.9 % (ref 37–47)
HEMOGLOBIN: 11.5 G/DL (ref 12–16)
LYMPHOCYTES ABSOLUTE: 1.1 K/UL (ref 1–4.8)
LYMPHOCYTES RELATIVE PERCENT: 9.6 %
MAGNESIUM: 1.6 MG/DL (ref 1.7–2.3)
MCH RBC QN AUTO: 31.3 PG (ref 27–31.3)
MCHC RBC AUTO-ENTMCNC: 32.9 % (ref 33–37)
MCV RBC AUTO: 95.2 FL (ref 82–100)
MONOCYTES ABSOLUTE: 0.8 K/UL (ref 0.2–0.8)
MONOCYTES RELATIVE PERCENT: 7.4 %
NEUTROPHILS ABSOLUTE: 9.2 K/UL (ref 1.4–6.5)
NEUTROPHILS RELATIVE PERCENT: 81.5 %
PDW BLD-RTO: 14.1 % (ref 11.5–14.5)
PHOSPHORUS: 3.8 MG/DL (ref 2.5–4.5)
PLATELET # BLD: 419 K/UL (ref 130–400)
POTASSIUM SERPL-SCNC: 4.7 MEQ/L (ref 3.5–5.1)
RBC # BLD: 3.66 M/UL (ref 4.2–5.4)
SODIUM BLD-SCNC: 135 MEQ/L (ref 132–144)
WBC # BLD: 11.3 K/UL (ref 4.8–10.8)

## 2018-08-16 PROCEDURE — 87186 SC STD MICRODIL/AGAR DIL: CPT

## 2018-08-16 PROCEDURE — 2580000003 HC RX 258: Performed by: INTERNAL MEDICINE

## 2018-08-16 PROCEDURE — 87040 BLOOD CULTURE FOR BACTERIA: CPT

## 2018-08-16 PROCEDURE — 6360000002 HC RX W HCPCS: Performed by: INTERNAL MEDICINE

## 2018-08-16 PROCEDURE — 85025 COMPLETE CBC W/AUTO DIFF WBC: CPT

## 2018-08-16 PROCEDURE — 6370000000 HC RX 637 (ALT 250 FOR IP): Performed by: INTERNAL MEDICINE

## 2018-08-16 PROCEDURE — 2500000003 HC RX 250 WO HCPCS: Performed by: INTERNAL MEDICINE

## 2018-08-16 PROCEDURE — 36415 COLL VENOUS BLD VENIPUNCTURE: CPT

## 2018-08-16 PROCEDURE — 2580000003 HC RX 258: Performed by: NURSE PRACTITIONER

## 2018-08-16 PROCEDURE — 87147 CULTURE TYPE IMMUNOLOGIC: CPT

## 2018-08-16 PROCEDURE — 83735 ASSAY OF MAGNESIUM: CPT

## 2018-08-16 PROCEDURE — 6370000000 HC RX 637 (ALT 250 FOR IP): Performed by: PHYSICAL MEDICINE & REHABILITATION

## 2018-08-16 PROCEDURE — 6360000002 HC RX W HCPCS: Performed by: NURSE PRACTITIONER

## 2018-08-16 PROCEDURE — 80069 RENAL FUNCTION PANEL: CPT

## 2018-08-16 PROCEDURE — 87077 CULTURE AEROBIC IDENTIFY: CPT

## 2018-08-16 PROCEDURE — 87149 DNA/RNA DIRECT PROBE: CPT

## 2018-08-16 PROCEDURE — 1210000000 HC MED SURG R&B

## 2018-08-16 PROCEDURE — 6370000000 HC RX 637 (ALT 250 FOR IP): Performed by: ANESTHESIOLOGY

## 2018-08-16 PROCEDURE — 6370000000 HC RX 637 (ALT 250 FOR IP): Performed by: HOSPITALIST

## 2018-08-16 PROCEDURE — 99232 SBSQ HOSP IP/OBS MODERATE 35: CPT | Performed by: INTERNAL MEDICINE

## 2018-08-16 PROCEDURE — 6360000002 HC RX W HCPCS: Performed by: ANESTHESIOLOGY

## 2018-08-16 RX ORDER — OXYCODONE HYDROCHLORIDE 5 MG/1
5 TABLET ORAL EVERY 4 HOURS PRN
Status: DISCONTINUED | OUTPATIENT
Start: 2018-08-16 | End: 2018-08-18 | Stop reason: HOSPADM

## 2018-08-16 RX ORDER — ACETAMINOPHEN 325 MG/1
650 TABLET ORAL EVERY 6 HOURS SCHEDULED
Status: DISCONTINUED | OUTPATIENT
Start: 2018-08-16 | End: 2018-08-18 | Stop reason: HOSPADM

## 2018-08-16 RX ORDER — OXYCODONE HYDROCHLORIDE 5 MG/1
10 TABLET ORAL EVERY 4 HOURS PRN
Status: DISCONTINUED | OUTPATIENT
Start: 2018-08-16 | End: 2018-08-18 | Stop reason: HOSPADM

## 2018-08-16 RX ORDER — OXYCODONE HCL 10 MG/1
10 TABLET, FILM COATED, EXTENDED RELEASE ORAL 3 TIMES DAILY
Status: DISCONTINUED | OUTPATIENT
Start: 2018-08-16 | End: 2018-08-18 | Stop reason: HOSPADM

## 2018-08-16 RX ADMIN — LACTOBACILLUS TAB 4 TABLET: TAB at 21:56

## 2018-08-16 RX ADMIN — NAFCILLIN SODIUM 2 G: 2 INJECTION, POWDER, LYOPHILIZED, FOR SOLUTION INTRAMUSCULAR; INTRAVENOUS at 21:54

## 2018-08-16 RX ADMIN — GABAPENTIN 300 MG: 300 CAPSULE ORAL at 09:07

## 2018-08-16 RX ADMIN — OXYCODONE HYDROCHLORIDE 10 MG: 10 TABLET, FILM COATED, EXTENDED RELEASE ORAL at 21:55

## 2018-08-16 RX ADMIN — LACTOBACILLUS TAB 4 TABLET: TAB at 09:07

## 2018-08-16 RX ADMIN — NAFCILLIN SODIUM 2 G: 2 INJECTION, POWDER, LYOPHILIZED, FOR SOLUTION INTRAMUSCULAR; INTRAVENOUS at 13:04

## 2018-08-16 RX ADMIN — OXYCODONE HYDROCHLORIDE 10 MG: 5 TABLET ORAL at 18:08

## 2018-08-16 RX ADMIN — Medication 10 ML: at 21:58

## 2018-08-16 RX ADMIN — OXYCODONE HYDROCHLORIDE 10 MG: 5 TABLET ORAL at 10:22

## 2018-08-16 RX ADMIN — NAFCILLIN SODIUM 2 G: 2 INJECTION, POWDER, LYOPHILIZED, FOR SOLUTION INTRAMUSCULAR; INTRAVENOUS at 09:06

## 2018-08-16 RX ADMIN — TRIAMTERENE AND HYDROCHLOROTHIAZIDE 1 TABLET: 37.5; 25 TABLET ORAL at 09:07

## 2018-08-16 RX ADMIN — GABAPENTIN 300 MG: 300 CAPSULE ORAL at 13:03

## 2018-08-16 RX ADMIN — OXYCODONE HYDROCHLORIDE 10 MG: 10 TABLET, FILM COATED, EXTENDED RELEASE ORAL at 09:06

## 2018-08-16 RX ADMIN — HYDRALAZINE HYDROCHLORIDE 10 MG: 20 INJECTION INTRAMUSCULAR; INTRAVENOUS at 14:59

## 2018-08-16 RX ADMIN — POTASSIUM CHLORIDE 60 MEQ: 20 TABLET, EXTENDED RELEASE ORAL at 09:06

## 2018-08-16 RX ADMIN — NAFCILLIN SODIUM 2 G: 2 INJECTION, POWDER, LYOPHILIZED, FOR SOLUTION INTRAMUSCULAR; INTRAVENOUS at 05:56

## 2018-08-16 RX ADMIN — GABAPENTIN 300 MG: 300 CAPSULE ORAL at 17:41

## 2018-08-16 RX ADMIN — LISINOPRIL 40 MG: 20 TABLET ORAL at 09:07

## 2018-08-16 RX ADMIN — OXYCODONE HYDROCHLORIDE 10 MG: 5 TABLET ORAL at 22:23

## 2018-08-16 RX ADMIN — POTASSIUM CHLORIDE 60 MEQ: 20 TABLET, EXTENDED RELEASE ORAL at 21:52

## 2018-08-16 RX ADMIN — ASPIRIN 81 MG: 81 TABLET, COATED ORAL at 09:07

## 2018-08-16 RX ADMIN — ATORVASTATIN CALCIUM 10 MG: 10 TABLET, FILM COATED ORAL at 21:54

## 2018-08-16 RX ADMIN — HYDRALAZINE HYDROCHLORIDE 10 MG: 20 INJECTION INTRAMUSCULAR; INTRAVENOUS at 03:33

## 2018-08-16 RX ADMIN — GABAPENTIN 600 MG: 300 CAPSULE ORAL at 21:53

## 2018-08-16 RX ADMIN — NAFCILLIN SODIUM 2 G: 2 INJECTION, POWDER, LYOPHILIZED, FOR SOLUTION INTRAMUSCULAR; INTRAVENOUS at 17:41

## 2018-08-16 RX ADMIN — METAXALONE 800 MG: 800 TABLET ORAL at 11:11

## 2018-08-16 RX ADMIN — LACTOBACILLUS TAB 4 TABLET: TAB at 13:03

## 2018-08-16 RX ADMIN — HEPARIN SODIUM 5000 UNITS: 5000 INJECTION, SOLUTION INTRAVENOUS; SUBCUTANEOUS at 21:57

## 2018-08-16 RX ADMIN — OXYCODONE HYDROCHLORIDE 10 MG: 10 TABLET, FILM COATED, EXTENDED RELEASE ORAL at 14:29

## 2018-08-16 RX ADMIN — NAFCILLIN SODIUM 2 G: 2 INJECTION, POWDER, LYOPHILIZED, FOR SOLUTION INTRAMUSCULAR; INTRAVENOUS at 01:21

## 2018-08-16 RX ADMIN — HYDROCODONE BITARTRATE AND ACETAMINOPHEN 1 TABLET: 10; 325 TABLET ORAL at 03:33

## 2018-08-16 RX ADMIN — METAXALONE 800 MG: 800 TABLET ORAL at 01:37

## 2018-08-16 RX ADMIN — HYDROMORPHONE HYDROCHLORIDE 2 MG: 1 INJECTION, SOLUTION INTRAMUSCULAR; INTRAVENOUS; SUBCUTANEOUS at 14:29

## 2018-08-16 RX ADMIN — METAXALONE 800 MG: 800 TABLET ORAL at 22:23

## 2018-08-16 RX ADMIN — Medication 10 ML: at 09:11

## 2018-08-16 RX ADMIN — ACETAMINOPHEN 650 MG: 325 TABLET ORAL at 17:40

## 2018-08-16 RX ADMIN — HYDROCODONE BITARTRATE AND ACETAMINOPHEN 1 TABLET: 10; 325 TABLET ORAL at 07:06

## 2018-08-16 ASSESSMENT — ENCOUNTER SYMPTOMS
BACK PAIN: 1
RESPIRATORY NEGATIVE: 1
GASTROINTESTINAL NEGATIVE: 1
EYES NEGATIVE: 1

## 2018-08-16 ASSESSMENT — PAIN SCALES - GENERAL
PAINLEVEL_OUTOF10: 10
PAINLEVEL_OUTOF10: 7
PAINLEVEL_OUTOF10: 10
PAINLEVEL_OUTOF10: 9
PAINLEVEL_OUTOF10: 10
PAINLEVEL_OUTOF10: 8
PAINLEVEL_OUTOF10: 5
PAINLEVEL_OUTOF10: 9

## 2018-08-16 ASSESSMENT — PAIN DESCRIPTION - LOCATION: LOCATION: BACK;GENERALIZED

## 2018-08-16 ASSESSMENT — PAIN DESCRIPTION - PAIN TYPE: TYPE: ACUTE PAIN;CHRONIC PAIN

## 2018-08-16 NOTE — PROGRESS NOTES
Infectious Diseases Inpatient Progress Note          HISTORY OF PRESENT ILLNESS:  Follow up MSSA sepsis, LLE cellulitis with MSSA L foot abscess , B feet arthritis on IV Nafcillin, well tolerated. Patient has severe low back pain. Was seen by Dr Nichole Chavez for discitis. No surgery is planned. Has resolved fevers and chills. Decreased fevers, B feet pain, swelling and redness. Severe low back pain, R thigh numbness, very weak, in tears. Current Medications:     acetaminophen  650 mg Oral 4 times per day    lisinopril  40 mg Oral Daily    triamterene-hydrochlorothiazide  1 tablet Oral Daily    oxyCODONE  10 mg Oral 2 times per day    lactobacillus acidophilus  4 tablet Oral TID    gabapentin  300 mg Oral TID    lidocaine  3 patch Transdermal Daily    cyanocobalamin  1,000 mcg Intramuscular Weekly    analgesic ointment   Topical TID    potassium chloride  60 mEq Oral BID    sodium chloride bacteriostatic  10 mL Injection Once    gabapentin  600 mg Oral Nightly    nafcillin  2 g Intravenous Q4H    sodium chloride flush  10 mL Intravenous 2 times per day    heparin (porcine)  5,000 Units Subcutaneous Q12H    aspirin  81 mg Oral Daily    atorvastatin  10 mg Oral Nightly       Allergies:  Latex; Cephalexin; Norvasc [amlodipine besylate]; and Keflex [cephalexin]      ROS  14 system review is negative other than HPI    Physical Exam  Vitals:    08/15/18 1524 08/16/18 0323 08/16/18 0326 08/16/18 0828   BP: (!) 176/59 (!) 212/97 (!) 177/80 (!) 180/80   Pulse: 91 89  92   Resp: 18   20   Temp: 98.4 °F (36.9 °C) 98.6 °F (37 °C)  98.1 °F (36.7 °C)   TempSrc: Oral   Oral   SpO2: 98% 97%  99%   Weight:       Height:         General Appearance: alert and oriented to person, place and time, well-developed and well-nourished, in no acute distress  Skin: warm and dry, no rash. Head: normocephalic and atraumatic  Eyes: anicteric sclerae  ENT: oropharynx clear and moist with normal mucous membranes.  No oral cellulitis with abscess  · Lumbar discitis  · Psoas abscess  · B feet gouty arthritis, possible septic arthritis    Patient Active Problem List   Diagnosis    Spinal stenosis of lumbar region with neurogenic claudication    High risk medication use - 10/17/17 [facility]AR[facility] PM&R, 12/19/17 [facility] PM&R, 12/20/17 Tox screen positive for opiates, Hydrocodone PM&R, MED CONTRACT 2/2/17    Mixed hyperlipidemia    Benign essential hypertension    Acquired spondylolisthesis    Primary osteoarthritis involving multiple joints    BMI 33.0-33.9,adult    Neuropathy involving both lower extremities    Hyponatremia    Staphylococcus aureus bacteremia with sepsis (HCC)    Cellulitis of left foot    Pyogenic arthritis of multiple sites ([address]    Abscess of left foot    Left foot pain    Psoas abscess, right (HCC)    Lumbar discitis    Lumbar back pain with radiculopathy affecting right lower extremity    Infection of intervertebral disc (pyogenic), lumbar region ([address]    Osteomyelitis of lumbar vertebra ([address]    Right flank pain    Gait abnormality       PLAN:  · F/U repeat Blood Cx  · IV Nafcil for 6-8 weeks then PO antibiotics for 1 year  · PICC in am if Blood Cx remain negative in am  · LTAC referral  ·     Discussed with patient,  and RN    Minh Garcia MD

## 2018-08-16 NOTE — PLAN OF CARE
Problem: Nutrition  Goal: Optimal nutrition therapy  Outcome: Ongoing  Nutrition Problem: Inadequate oral intake  Intervention: Food and/or Nutrient Delivery: Modify current diet (Liberalize to General diet, Try Clear ONS, High Calorie ONS)  Nutritional Goals: po > 50% meals and supplements, no further significant weight loss

## 2018-08-16 NOTE — PROGRESS NOTES
Pt resting in bed. Pain managed with scheduled and prn pain medication. Dressing changed on right foot changed this afternoon. Pt tolerated well. Per Dr. Italia Clemons, possible PICC line tomorrow pending blood cultures. PRN hydralazine given today as well for elevated BP, rechecked and now wnl.  Electronically signed by Hansa Renae RN on 8/16/2018 at 7:47 PM

## 2018-08-16 NOTE — PROGRESS NOTES
Plastic Surgery Note    Afebrile, vital signs stable  Left lateral foot wound is clean  Complains of painful dressing changes  Impression: Status post debridement left lateral foot abscess  Continue current dressing changes

## 2018-08-16 NOTE — PROGRESS NOTES
PROGRESS NOTE -  PAIN MANAGEMENT     SERVICE DATE:  8/16/2018   SERVICE TIME:  9:21 AM    CHIEF COMPLAINT:  Severe lower back pain, left foot pain      SUBJECTIVE:  Ms. Dalila Mcfarland is a 70 y.o. female who presented for severe left foot and lower back pain    Patient has imaging noted for progression of infection involving the right L2 psoas abscess formation    She continued having severe pain shooting down the lower back to the right hip as well as continuing having a lot of pain in the foot    Started patient on OxyContin 10 mg every 12 hours and receiving minimal relief    Patient states  pain is persistent     PAIN  ASSESSMENT:    constant    aching, shooting, stabbing and throbbing    pain is perceived as severe (6-8 pain scale)      MEDICATIONS:    Current Facility-Administered Medications   Medication Dose Route Frequency Provider Last Rate Last Dose    oxyCODONE (ROXICODONE) immediate release tablet 5 mg  5 mg Oral Q4H PRN Princess Mclaughlin MD        Or    oxyCODONE (ROXICODONE) immediate release tablet 10 mg  10 mg Oral Q4H PRN Princess Mclaughlin MD        acetaminophen (TYLENOL) tablet 650 mg  650 mg Oral 4 times per day Princess Mclaughlin MD        lisinopril (PRINIVIL;ZESTRIL) tablet 40 mg  40 mg Oral Daily JOSE Borrego - CNP   40 mg at 08/16/18 0907    triamterene-hydrochlorothiazide (MAXZIDE-25) 37.5-25 MG per tablet 1 tablet  1 tablet Oral Daily Monroe Rueda MD   1 tablet at 08/16/18 0907    oxyCODONE (OXYCONTIN) extended release tablet 10 mg  10 mg Oral 2 times per day Neli Duque MD   10 mg at 08/16/18 0906    hydrALAZINE (APRESOLINE) injection 10 mg  10 mg Intravenous Q4H PRN Monroe Hinkle MD   10 mg at 08/16/18 0333    HYDROmorphone (DILAUDID) injection 1 mg  1 mg Intravenous Q4H PRN Monroe Hinkle MD   1 mg at 08/15/18 2032    lactobacillus acidophilus [facility]) 4 tablet  4 tablet Oral TID Neli Duque MD   4 tablet at 08/16/18 0907    metaxalone (SKELAXIN) tablet 800 mg  800 mg Oral Q6H 08/16/2018    MCV 95.2 08/16/2018     08/16/2018     08/16/2018    K 4.7 08/16/2018    K 4.4 08/09/2018    CL 97 08/16/2018    CO2 21 08/16/2018    BUN 14 08/16/2018    CREATININE 0.61 08/16/2018    CALCIUM 9.3 08/16/2018    PHOS 3.8 08/16/2018    ALKPHOS 109 08/08/2018    ALT 19 08/08/2018    AST 30 08/08/2018    BILITOT 0.7 08/08/2018    BILIDIR 0.0 05/14/2014    LABALBU 2.6 08/16/2018     LABS  Recent Labs      08/14/18   0600  08/15/18   0726  08/16/18   0702   WBC  12.9*  13.3*  11.3*   RBC  3.40*  3.67*  3.66*   HGB  10.8*  11.8*  11.5*   HCT  32.1*  34.6*  34.9*   MCV  94.2  94.3  95.2   MCH  31.8*  32.1*  31.3   MCHC  33.8  34.0  32.9*   RDW  14.0  14.1  14.1   PLT  320  407*  419*       Recent Labs      08/14/18   0600  08/15/18   0726  08/16/18   0702   NA  136  135  135   K  4.0  4.3  4.7   CL  99  97*  97*   CO2  23  22  21*   BUN  18  14  14   CREATININE  0.77  0.69  0.61   GLUCOSE  122*  100  103   CALCIUM  8.6  9.0  9.3       Recent Labs      08/14/18   0600  08/15/18   0726  08/16/18   0702   MG  1.6*  1.5*  1.6*       MRI FOOT RIGHT WO CONTRAST [[id number]] Collected: 08/16/18 0842      Order Status: Completed Updated: 08/16/18 0924     Narrative:       EXAM: MRI of the right foot without contrast    HISTORY:  Foot pain and swelling. Evaluate for abscess. COMPARISON : Right foot radiographs from August 8, 2018    TECHNIQUE: Multiplanar multisequence MRI of the right foot was performed without contrast.    FINDINGS:    Evaluation for abscess is suboptimal without intravenous contrast. There is extensive osseous destruction of the tarsal bones with associated edema and subchondral cysts. Edema is also present within the bases of the second through fifth metatarsals. Nondisplaced obliquely oriented fracture is present of the base of the fourth metatarsal extending into the tarsometatarsal joint best seen on series 7 image 19 and 20.  Nondisplaced fracture of the base of the fifth midfoot.  This could either represent inflammatory tissue or multiple   fluid collections.  Further assessment is limited by lack of intravenous   contrast.        Impression:         1.  Abnormal appearance of the midfoot as described above with   extensive bony edema, subchondral cystic change, and surrounding   inflammatory tissue versus multiple fluid collections.  Given the   distribution of findings, primary differential consideration would be   inflammatory arthropathy such as rheumatoid arthritis.  Other   differential diagnostic concerns would include multifocal   osteomyelitis/septic arthritis or Charcot arthropathy.       2.  Recommend further evaluation with contrast-enhanced MRI of the left   foot to delineate inflammatory tissue versus multiple fluid collections   surrounding the midfoot.       3.  There is a superimposed nondisplaced fracture involving the fourth   metatarsal base.        MRI THORACIC SPINE W WO CONTRAST [[id number]] Resulted: 08/12/18 1836     Order Status: Completed Updated: 08/12/18 1836     Narrative:         Patient: Eufemia Mcqueen  Time Out: 18:36  Exam(s): MRI T SPINE W/WO Contrast      EXAM:    MR Thoracic Spine Without and With Intravenous Contrast.     CLINICAL HISTORY:    None provided.     TECHNIQUE:    Magnetic resonance images of the thoracic spine without and with   intravenous contrast in multiple planes.     COMPARISON:    No relevant prior studies available.     FINDINGS:    VertebraeKela Ana  No acute fracture.    Discs/spinal canal/neural foramina:  No acute findings.  No significant   disc disease.  No spinal canal stenosis.    Spinal cord:  Unremarkable.  Normal signal.  No abnormal enhancement.    Soft tissues:  Unremarkable.        Impression:       Unremarkable MRI of the thoracic spine.        MRI LUMBAR SPINE W CONTRAST [[id number]]      Order Status: Canceled      MRI THORACIC [place] CONTRAST [[id number]]      Order Status: Canceled      MRI

## 2018-08-16 NOTE — PROGRESS NOTES
Department of Internal Medicine  Progress Note      SUBJECTIVE: Complains of pain in her back and right foot. No acute events overnight.        ROS:  All 12 systems reviewed and negative except mentioned in HPI and Assessment and plan    MEDICATIONS:  Current Facility-Administered Medications   Medication Dose Route Frequency Provider Last Rate Last Dose    oxyCODONE (ROXICODONE) immediate release tablet 5 mg  5 mg Oral Q4H PRN Barb Ram MD        Or    oxyCODONE (ROXICODONE) immediate release tablet 10 mg  10 mg Oral Q4H PRN Barb Ram MD   10 mg at 08/16/18 1022    acetaminophen (TYLENOL) tablet 650 mg  650 mg Oral 4 times per day Barb Ram MD        oxyCODONE (OXYCONTIN) extended release tablet 10 mg  10 mg Oral TID Barb Ram MD   10 mg at 08/16/18 1429    lisinopril (PRINIVIL;ZESTRIL) tablet 40 mg  40 mg Oral Daily JOSE Jurado - CNP   40 mg at 08/16/18 0907    triamterene-hydrochlorothiazide (MAXZIDE-25) 37.5-25 MG per tablet 1 tablet  1 tablet Oral Daily Monroe Cooley MD   1 tablet at 08/16/18 0907    hydrALAZINE (APRESOLINE) injection 10 mg  10 mg Intravenous Q4H PRN Monroe Cooley MD   10 mg at 08/16/18 1459    lactobacillus acidophilus [facility]) 4 tablet  4 tablet Oral TID Monroe Cooley MD   4 tablet at 08/16/18 1303    metaxalone (SKELAXIN) tablet 800 mg  800 mg Oral Q6H PRN Barb Ram MD   800 mg at 08/16/18 1111    gabapentin (NEURONTIN) capsule 300 mg  300 mg Oral TID Barb Ram MD   300 mg at 08/16/18 1303    lidocaine (LIDODERM) 5 % 3 patch  3 patch Transdermal Daily Rosaline Scullin, DO   3 patch at 08/16/18 0909    cyanocobalamin injection 1,000 mcg  1,000 mcg Intramuscular Weekly Rosaline Scullin, DO   1,000 mcg at 08/13/18 1040    analgesic ointment ointment   Topical TID Rosaline Scullin, DO        potassium chloride (KLOR-CON M) extended release tablet 60 mEq  60 mEq Oral BID Monroe Hinkle MD   60 mEq at 08/16/18 0906    sodium chloride which did not show any abscess or osteomyelitis    [facility]     Jonathan Thomas MD  Pager : [phone]

## 2018-08-17 ENCOUNTER — APPOINTMENT (OUTPATIENT)
Dept: INTERVENTIONAL RADIOLOGY/VASCULAR | Age: 63
DRG: 853 | End: 2018-08-17
Payer: MEDICARE

## 2018-08-17 VITALS
TEMPERATURE: 98.8 F | DIASTOLIC BLOOD PRESSURE: 63 MMHG | HEART RATE: 89 BPM | BODY MASS INDEX: 31.98 KG/M2 | RESPIRATION RATE: 18 BRPM | SYSTOLIC BLOOD PRESSURE: 172 MMHG | OXYGEN SATURATION: 97 % | HEIGHT: 68 IN | WEIGHT: 211 LBS

## 2018-08-17 LAB
ALBUMIN SERPL-MCNC: 2.9 G/DL (ref 3.9–4.9)
ANION GAP SERPL CALCULATED.3IONS-SCNC: 15 MEQ/L (ref 7–13)
BASOPHILS ABSOLUTE: 0 K/UL (ref 0–0.2)
BASOPHILS RELATIVE PERCENT: 0.3 %
BLOOD CULTURE, ROUTINE: NORMAL
BUN BLDV-MCNC: 18 MG/DL (ref 8–23)
CALCIUM SERPL-MCNC: 9.7 MG/DL (ref 8.6–10.2)
CHLORIDE BLD-SCNC: 95 MEQ/L (ref 98–107)
CO2: 24 MEQ/L (ref 22–29)
CREAT SERPL-MCNC: 0.92 MG/DL (ref 0.5–0.9)
CULTURE, BLOOD 2: NORMAL
EOSINOPHILS ABSOLUTE: 0.2 K/UL (ref 0–0.7)
EOSINOPHILS RELATIVE PERCENT: 1.5 %
GFR AFRICAN AMERICAN: >60
GFR NON-AFRICAN AMERICAN: >60
GLUCOSE BLD-MCNC: 109 MG/DL (ref 74–109)
HCT VFR BLD CALC: 36.6 % (ref 37–47)
HEMOGLOBIN: 12.2 G/DL (ref 12–16)
LYMPHOCYTES ABSOLUTE: 1.6 K/UL (ref 1–4.8)
LYMPHOCYTES RELATIVE PERCENT: 14.8 %
MAGNESIUM: 1.6 MG/DL (ref 1.7–2.3)
MCH RBC QN AUTO: 31.6 PG (ref 27–31.3)
MCHC RBC AUTO-ENTMCNC: 33.4 % (ref 33–37)
MCV RBC AUTO: 94.6 FL (ref 82–100)
MONOCYTES ABSOLUTE: 1 K/UL (ref 0.2–0.8)
MONOCYTES RELATIVE PERCENT: 9 %
NEUTROPHILS ABSOLUTE: 8.1 K/UL (ref 1.4–6.5)
NEUTROPHILS RELATIVE PERCENT: 74.4 %
PDW BLD-RTO: 14.1 % (ref 11.5–14.5)
PHOSPHORUS: 3.9 MG/DL (ref 2.5–4.5)
PLATELET # BLD: 541 K/UL (ref 130–400)
POTASSIUM SERPL-SCNC: 5.4 MEQ/L (ref 3.5–5.1)
RBC # BLD: 3.87 M/UL (ref 4.2–5.4)
SODIUM BLD-SCNC: 134 MEQ/L (ref 132–144)
WBC # BLD: 10.9 K/UL (ref 4.8–10.8)

## 2018-08-17 PROCEDURE — 80069 RENAL FUNCTION PANEL: CPT

## 2018-08-17 PROCEDURE — 6370000000 HC RX 637 (ALT 250 FOR IP): Performed by: INTERNAL MEDICINE

## 2018-08-17 PROCEDURE — 2500000003 HC RX 250 WO HCPCS: Performed by: INTERNAL MEDICINE

## 2018-08-17 PROCEDURE — 97535 SELF CARE MNGMENT TRAINING: CPT

## 2018-08-17 PROCEDURE — 2709999900 IR PICC WO SQ PORT/PUMP > 5 YEARS

## 2018-08-17 PROCEDURE — 2580000003 HC RX 258: Performed by: INTERNAL MEDICINE

## 2018-08-17 PROCEDURE — 83735 ASSAY OF MAGNESIUM: CPT

## 2018-08-17 PROCEDURE — 6360000002 HC RX W HCPCS: Performed by: INTERNAL MEDICINE

## 2018-08-17 PROCEDURE — 2580000003 HC RX 258: Performed by: NURSE PRACTITIONER

## 2018-08-17 PROCEDURE — 6370000000 HC RX 637 (ALT 250 FOR IP): Performed by: HOSPITALIST

## 2018-08-17 PROCEDURE — 85025 COMPLETE CBC W/AUTO DIFF WBC: CPT

## 2018-08-17 PROCEDURE — 6370000000 HC RX 637 (ALT 250 FOR IP): Performed by: PHYSICAL MEDICINE & REHABILITATION

## 2018-08-17 PROCEDURE — B5181ZA FLUOROSCOPY OF SUPERIOR VENA CAVA USING LOW OSMOLAR CONTRAST, GUIDANCE: ICD-10-PCS | Performed by: RADIOLOGY

## 2018-08-17 PROCEDURE — 02HV33Z INSERTION OF INFUSION DEVICE INTO SUPERIOR VENA CAVA, PERCUTANEOUS APPROACH: ICD-10-PCS | Performed by: RADIOLOGY

## 2018-08-17 PROCEDURE — 1210000000 HC MED SURG R&B

## 2018-08-17 PROCEDURE — 99232 SBSQ HOSP IP/OBS MODERATE 35: CPT | Performed by: INTERNAL MEDICINE

## 2018-08-17 PROCEDURE — 77001 FLUOROGUIDE FOR VEIN DEVICE: CPT

## 2018-08-17 PROCEDURE — 76937 US GUIDE VASCULAR ACCESS: CPT

## 2018-08-17 PROCEDURE — 6370000000 HC RX 637 (ALT 250 FOR IP): Performed by: ANESTHESIOLOGY

## 2018-08-17 PROCEDURE — 36569 INSJ PICC 5 YR+ W/O IMAGING: CPT

## 2018-08-17 PROCEDURE — 36415 COLL VENOUS BLD VENIPUNCTURE: CPT

## 2018-08-17 RX ORDER — SODIUM CHLORIDE 9 MG/ML
250 INJECTION, SOLUTION INTRAVENOUS ONCE
Status: COMPLETED | OUTPATIENT
Start: 2018-08-17 | End: 2018-08-17

## 2018-08-17 RX ORDER — GABAPENTIN 300 MG/1
300 CAPSULE ORAL 3 TIMES DAILY
Qty: 90 CAPSULE | Refills: 3 | Status: SHIPPED | OUTPATIENT
Start: 2018-08-17 | End: 2018-09-07

## 2018-08-17 RX ORDER — OXYCODONE HCL 10 MG/1
10 TABLET, FILM COATED, EXTENDED RELEASE ORAL 3 TIMES DAILY
Refills: 0 | Status: SHIPPED | OUTPATIENT
Start: 2018-08-17 | End: 2018-08-31

## 2018-08-17 RX ORDER — OXYCODONE HYDROCHLORIDE 10 MG/1
10 TABLET ORAL EVERY 4 HOURS PRN
Refills: 0 | Status: SHIPPED | OUTPATIENT
Start: 2018-08-17 | End: 2018-08-24

## 2018-08-17 RX ORDER — NAFCILLIN SODIUM 2 G/1
2 INJECTION, POWDER, FOR SOLUTION INTRAVENOUS EVERY 4 HOURS
DISCHARGE
Start: 2018-08-17 | End: 2018-09-07

## 2018-08-17 RX ORDER — HEPARIN SODIUM 5000 [USP'U]/ML
5000 INJECTION, SOLUTION INTRAVENOUS; SUBCUTANEOUS EVERY 12 HOURS
DISCHARGE
Start: 2018-08-17 | End: 2018-10-10 | Stop reason: ALTCHOICE

## 2018-08-17 RX ORDER — SODIUM CHLORIDE 0.9 % (FLUSH) 0.9 %
10 SYRINGE (ML) INJECTION PRN
Status: DISCONTINUED | OUTPATIENT
Start: 2018-08-17 | End: 2018-08-18 | Stop reason: HOSPADM

## 2018-08-17 RX ORDER — METAXALONE 800 MG/1
800 TABLET ORAL EVERY 6 HOURS PRN
DISCHARGE
Start: 2018-08-17 | End: 2018-08-27

## 2018-08-17 RX ORDER — MORPHINE SULFATE 2 MG/ML
2 INJECTION, SOLUTION INTRAMUSCULAR; INTRAVENOUS EVERY 4 HOURS PRN
Status: DISCONTINUED | OUTPATIENT
Start: 2018-08-17 | End: 2018-08-18 | Stop reason: HOSPADM

## 2018-08-17 RX ORDER — GABAPENTIN 300 MG/1
600 CAPSULE ORAL NIGHTLY
Qty: 90 CAPSULE | Refills: 3 | Status: SHIPPED | OUTPATIENT
Start: 2018-08-17 | End: 2018-09-07

## 2018-08-17 RX ORDER — ACETAMINOPHEN 325 MG/1
650 TABLET ORAL EVERY 6 HOURS
Qty: 120 TABLET | Refills: 3 | DISCHARGE
Start: 2018-08-17 | End: 2020-09-24

## 2018-08-17 RX ORDER — SODIUM CHLORIDE 0.9 % (FLUSH) 0.9 %
10 SYRINGE (ML) INJECTION EVERY 12 HOURS SCHEDULED
Status: DISCONTINUED | OUTPATIENT
Start: 2018-08-17 | End: 2018-08-18 | Stop reason: HOSPADM

## 2018-08-17 RX ORDER — L. ACIDOPHILUS/L.BULGARICUS 1MM CELL
4 TABLET ORAL 3 TIMES DAILY
DISCHARGE
Start: 2018-08-17 | End: 2019-01-09

## 2018-08-17 RX ORDER — OXYCODONE HYDROCHLORIDE 5 MG/1
5 TABLET ORAL EVERY 4 HOURS PRN
Refills: 0 | Status: SHIPPED | OUTPATIENT
Start: 2018-08-17 | End: 2018-08-24

## 2018-08-17 RX ORDER — LIDOCAINE HYDROCHLORIDE 20 MG/ML
5 INJECTION, SOLUTION INFILTRATION; PERINEURAL ONCE
Status: COMPLETED | OUTPATIENT
Start: 2018-08-17 | End: 2018-08-17

## 2018-08-17 RX ORDER — ANALGESIC BALM 1.74; 4.06 G/29G; G/29G
OINTMENT TOPICAL
DISCHARGE
Start: 2018-08-17 | End: 2018-10-10 | Stop reason: ALTCHOICE

## 2018-08-17 RX ORDER — LIDOCAINE 50 MG/G
3 PATCH TOPICAL DAILY
Qty: 30 PATCH | Refills: 0 | DISCHARGE
Start: 2018-08-18 | End: 2019-01-09

## 2018-08-17 RX ORDER — MORPHINE SULFATE 2 MG/ML
2 INJECTION, SOLUTION INTRAMUSCULAR; INTRAVENOUS EVERY 4 HOURS PRN
Qty: 1 ML | Refills: 0 | Status: SHIPPED | OUTPATIENT
Start: 2018-08-17 | End: 2018-08-19

## 2018-08-17 RX ADMIN — OXYCODONE HYDROCHLORIDE 10 MG: 5 TABLET ORAL at 06:48

## 2018-08-17 RX ADMIN — ACETAMINOPHEN 650 MG: 325 TABLET ORAL at 12:25

## 2018-08-17 RX ADMIN — MORPHINE SULFATE 2 MG: 2 INJECTION, SOLUTION INTRAMUSCULAR; INTRAVENOUS at 16:12

## 2018-08-17 RX ADMIN — TRIAMTERENE AND HYDROCHLOROTHIAZIDE 1 TABLET: 37.5; 25 TABLET ORAL at 08:23

## 2018-08-17 RX ADMIN — OXYCODONE HYDROCHLORIDE 10 MG: 5 TABLET ORAL at 10:51

## 2018-08-17 RX ADMIN — SODIUM CHLORIDE 250 ML: 9 INJECTION, SOLUTION INTRAVENOUS at 18:35

## 2018-08-17 RX ADMIN — NAFCILLIN SODIUM 2 G: 2 INJECTION, POWDER, LYOPHILIZED, FOR SOLUTION INTRAMUSCULAR; INTRAVENOUS at 06:10

## 2018-08-17 RX ADMIN — ACETAMINOPHEN 650 MG: 325 TABLET ORAL at 17:09

## 2018-08-17 RX ADMIN — LACTOBACILLUS TAB 4 TABLET: TAB at 13:45

## 2018-08-17 RX ADMIN — Medication 10 ML: at 08:25

## 2018-08-17 RX ADMIN — GABAPENTIN 300 MG: 300 CAPSULE ORAL at 08:24

## 2018-08-17 RX ADMIN — LIDOCAINE HYDROCHLORIDE 5 ML: 20 INJECTION, SOLUTION INFILTRATION; PERINEURAL at 18:35

## 2018-08-17 RX ADMIN — OXYCODONE HYDROCHLORIDE 10 MG: 10 TABLET, FILM COATED, EXTENDED RELEASE ORAL at 08:24

## 2018-08-17 RX ADMIN — ACETAMINOPHEN 650 MG: 325 TABLET ORAL at 00:18

## 2018-08-17 RX ADMIN — GABAPENTIN 300 MG: 300 CAPSULE ORAL at 12:25

## 2018-08-17 RX ADMIN — METAXALONE 800 MG: 800 TABLET ORAL at 13:56

## 2018-08-17 RX ADMIN — LISINOPRIL 40 MG: 20 TABLET ORAL at 08:24

## 2018-08-17 RX ADMIN — Medication 10 ML: at 21:12

## 2018-08-17 RX ADMIN — OXYCODONE HYDROCHLORIDE 10 MG: 10 TABLET, FILM COATED, EXTENDED RELEASE ORAL at 13:45

## 2018-08-17 RX ADMIN — HYDROMORPHONE HYDROCHLORIDE 0.5 MG: 1 INJECTION, SOLUTION INTRAMUSCULAR; INTRAVENOUS; SUBCUTANEOUS at 00:50

## 2018-08-17 RX ADMIN — ACETAMINOPHEN 650 MG: 325 TABLET ORAL at 06:10

## 2018-08-17 RX ADMIN — LACTOBACILLUS TAB 4 TABLET: TAB at 21:09

## 2018-08-17 RX ADMIN — Medication 10 ML: at 00:51

## 2018-08-17 RX ADMIN — Medication 10 ML: at 21:13

## 2018-08-17 RX ADMIN — ATORVASTATIN CALCIUM 10 MG: 10 TABLET, FILM COATED ORAL at 21:10

## 2018-08-17 RX ADMIN — METAXALONE 800 MG: 800 TABLET ORAL at 06:47

## 2018-08-17 RX ADMIN — NAFCILLIN SODIUM 2 G: 2 INJECTION, POWDER, LYOPHILIZED, FOR SOLUTION INTRAMUSCULAR; INTRAVENOUS at 02:25

## 2018-08-17 RX ADMIN — GABAPENTIN 300 MG: 300 CAPSULE ORAL at 17:09

## 2018-08-17 RX ADMIN — OXYCODONE HYDROCHLORIDE 10 MG: 10 TABLET, FILM COATED, EXTENDED RELEASE ORAL at 21:10

## 2018-08-17 RX ADMIN — NAFCILLIN SODIUM 2 G: 2 INJECTION, POWDER, LYOPHILIZED, FOR SOLUTION INTRAMUSCULAR; INTRAVENOUS at 19:53

## 2018-08-17 RX ADMIN — Medication 10 ML: at 02:25

## 2018-08-17 RX ADMIN — NAFCILLIN SODIUM 2 G: 2 INJECTION, POWDER, LYOPHILIZED, FOR SOLUTION INTRAMUSCULAR; INTRAVENOUS at 10:25

## 2018-08-17 RX ADMIN — NAFCILLIN SODIUM 2 G: 2 INJECTION, POWDER, LYOPHILIZED, FOR SOLUTION INTRAMUSCULAR; INTRAVENOUS at 15:04

## 2018-08-17 RX ADMIN — OXYCODONE HYDROCHLORIDE 10 MG: 5 TABLET ORAL at 02:25

## 2018-08-17 RX ADMIN — LACTOBACILLUS TAB 4 TABLET: TAB at 08:23

## 2018-08-17 RX ADMIN — OXYCODONE HYDROCHLORIDE 10 MG: 5 TABLET ORAL at 18:01

## 2018-08-17 RX ADMIN — Medication 10 ML: at 06:10

## 2018-08-17 RX ADMIN — GABAPENTIN 600 MG: 300 CAPSULE ORAL at 21:10

## 2018-08-17 ASSESSMENT — PAIN SCALES - GENERAL
PAINLEVEL_OUTOF10: 8
PAINLEVEL_OUTOF10: 10
PAINLEVEL_OUTOF10: 10
PAINLEVEL_OUTOF10: 9
PAINLEVEL_OUTOF10: 0
PAINLEVEL_OUTOF10: 10
PAINLEVEL_OUTOF10: 10
PAINLEVEL_OUTOF10: 9
PAINLEVEL_OUTOF10: 10
PAINLEVEL_OUTOF10: 10
PAINLEVEL_OUTOF10: 7
PAINLEVEL_OUTOF10: 10
PAINLEVEL_OUTOF10: 7
PAINLEVEL_OUTOF10: 10

## 2018-08-17 ASSESSMENT — PAIN DESCRIPTION - ORIENTATION
ORIENTATION: RIGHT;LEFT;LOWER
ORIENTATION: LEFT;RIGHT;LOWER

## 2018-08-17 ASSESSMENT — PAIN DESCRIPTION - LOCATION
LOCATION: BACK
LOCATION: GENERALIZED;BACK

## 2018-08-17 ASSESSMENT — PAIN DESCRIPTION - DESCRIPTORS: DESCRIPTORS: CONSTANT

## 2018-08-17 ASSESSMENT — PAIN DESCRIPTION - PAIN TYPE
TYPE: ACUTE PAIN

## 2018-08-17 NOTE — PROGRESS NOTES
Patient was encouraged to move in the bed and try to work with PT. Explained tot he patient that everyday in bed will prolong her healing. Informed patient of skin breakdown when you don't shift weight from side to side. Patient again is asking about IV pain medication and I informed her I only PO as ordered. She said she will discuss pain meds with the doctor today. She stated the medicines don't work but did not refuse the PO medications when asked if she didn't want them. Call light is with the patient.

## 2018-08-17 NOTE — PROGRESS NOTES
Perfect serve message sent to Dr. Tara Montoya for pain medication. Patient is having excruciating pain in her back and spine. Patient has had Roxicodone and Oxycodone and a muscle relaxant, along with her scheduled medications.

## 2018-08-17 NOTE — CARE COORDINATION
PATIENT SHOULD DISCHARGE TO LTAC TODAY AFTER RECEIVING A PICC LINE. PATIENT IS AWARE AND AGREEABLE. IMM INITIALED AND COPY PROVIDED. PATIENT VERBALIZED UNDERSTANDING.  Electronically signed by Aixa Carmona RN on 8/17/2018 at 1:36 PM

## 2018-08-17 NOTE — PROGRESS NOTES
normal mucous membranes. No oral thrush  Lungs: normal respiratory effort, clear   Heart: RRR, no murmur  Abdomen: soft, no tenderness  + tenderness over lumbar spine  + B leg edema  + B feet decreased erythema, warmth and  Tenderness  L foot with packing over the lateral aspect, resolved erythema and swelling  Resolved L Leg erythema    DATA:    Lab Results   Component Value Date    WBC 10.9 (H) 08/17/2018    HGB 12.2 08/17/2018    HCT 36.6 (L) 08/17/2018    MCV 94.6 08/17/2018     (H) 08/17/2018     Lab Results   Component Value Date    CREATININE 0.92 (H) 08/17/2018    BUN 18 08/17/2018     08/17/2018    K 5.4 (H) 08/17/2018    CL 95 (L) 08/17/2018    CO2 24 08/17/2018       Hepatic Function Panel:   Lab Results   Component Value Date    ALKPHOS 109 08/08/2018    ALT 19 08/08/2018    AST 30 08/08/2018    PROT 7.0 08/08/2018    BILITOT 0.7 08/08/2018    BILIDIR 0.0 05/14/2014    IBILI 0.5 05/14/2014    LABALBU 2.9 08/17/2018       Microbiology:   Recent Labs      08/16/18   0702   BC  No Growth to date. Any change in status will be called. Recent Labs      08/16/18   0702   BLOODCULT2  No Growth to date. Any change in status will be called. No results for input(s): LABURIN in the last 72 hours. Impression:     1. Destruction of the midfoot with bone marrow edema, subchondral cysts and adjacent soft tissue inflammation. Tenosynovitis of tibialis posterior, flexor hallucis longus, and peroneus longus and brevis tendons. Differential diagnosis includes rheumatoid   arthritis or Charcot arthropathy. No confluent hypointense T1 bone marrow signal at this time to suggest osteomyelitis, however early osteomyelitis cannot be excluded. 2. Nondisplaced intra-articular fractures of the base of the fourth and fifth metatarsals.     3. Diffuse subcutaneous soft tissue edema most significant along the dorsum of the foot without definitive loculated fluid collection on this noncontrast

## 2018-08-17 NOTE — PROGRESS NOTES
present  ABDOMEN:  soft, non-distended, non-tender  MUSCULOSKELETAL:  There is no redness, warmth, or swelling of the joints. NEUROLOGIC:  Alert, awake, oriented x 3. No FND  EXTREMITIES: Warm and well perfused. LABS  Recent Labs      08/15/18   0726  08/16/18   0702  08/17/18   0612   WBC  13.3*  11.3*  10.9*   RBC  3.67*  3.66*  3.87*   HGB  11.8*  11.5*  12.2   HCT  34.6*  34.9*  36.6*   MCV  94.3  95.2  94.6   MCH  32.1*  31.3  31.6*   MCHC  34.0  32.9*  33.4   RDW  14.1  14.1  14.1   PLT  407*  419*  541*       Recent Labs      08/15/18   0726  08/16/18   0702 08/17/18   0612   NA  135  135  134   K  4.3  4.7  5.4*   CL  97*  97*  95*   CO2  22  21*  24   BUN  14  14  18   CREATININE  0.69  0.61  0.92*   GLUCOSE  100  103  109   CALCIUM  9.0  9.3  9.7       Recent Labs      08/15/18   0726 08/16/18   0702  08/17/18   0612   MG  1.5*  1.6*  1.6*           ASSESSMENT AND PLAN    Active Hospital Problems    Diagnosis Date Noted    Left foot pain [M79.672] 08/13/2018    Psoas abscess, right ([facility] [K68.12] 08/13/2018    Lumbar discitis [M46.46] 08/13/2018    Lumbar back pain with radiculopathy affecting right lower extremity [M54.17] 08/13/2018    Infection of intervertebral disc (pyogenic), lumbar region [facility]) [M46.36] 08/13/2018    Osteomyelitis of lumbar vertebra ([facility] [M46.26] 08/13/2018    Right flank pain [R10.9] 08/13/2018    Gait abnormality [R26.9] 08/13/2018    Abscess of left foot [L02.612]     Staphylococcus aureus bacteremia with sepsis ([facility] [A41.01]     Cellulitis of left foot [L03.116]     Pyogenic arthritis of multiple sites ([facility] [M00.9]     Hyponatremia [E87.1] 08/08/2018     Sepsis: Ongoing . Secondary to MSSA     MSSA bacteremia: With multiple site involvement. On nafcillin. Blood cultures for 3 consecutive days is negative.   Patient to get PICC today     Patient to get IV antibiotics for 6-8 weeks followed by oral antibiotics for 1 year     Diskitis: At L2 with adjacent psoas abscess. No surgical intervention as of now as per neurosurgery     MACIE negative for any thrombus/vegetation     Patient today has severe pain in her right foot and I very tender to touch. Will discuss with  Plastics. podiatry and ortho signed off.  MRI of right foot ordered which did not show any abscess or osteomyelitis     Anticipate d/c to LT[facility] after PICC line palcement  Ce Stanley MD  Pager : [phone]

## 2018-08-17 NOTE — PROGRESS NOTES
Physical Therapy Missed Treatment   Facility/Department: [facility] MED SURG D119/E182-83    NAME: Ramos Navarro  Patient Status:   : 1955 (70 y.o.)  MRN: [id number]  Account: [de-identified]  Gender: female        [] Patient Declines PT Treatment            [x] Patient Unavailable: pt sleeping. Still waiting on  PICC line. Needs assessed for transfers. Possible transfer to [facility]    Will attempt PT Treatment again at earliest convenience.         Electronically signed by Stephanie Marcos PTA on 18 at 1:10 PM

## 2018-08-17 NOTE — PROGRESS NOTES
Patient's wound on her foot was cleansed and changed as ordered. Patient tolerated the procedure well. The packing had serosanguinous drainage. No foul odor. Wound bed is pink. Edema is surrounding the wound with redness and warmth.

## 2018-08-18 LAB — BLOOD CULTURE, ROUTINE: NORMAL

## 2018-08-18 NOTE — PROGRESS NOTES
[facility] is here to take the patient. Her family took her belongings except her phone and  which she took.  I called and updated the nurse at [facility].

## 2018-08-19 LAB
BLOOD CULTURE, ROUTINE: ABNORMAL
CULTURE, BLOOD 2: ABNORMAL
CULTURE, BLOOD 2: NORMAL
ORGANISM: ABNORMAL

## 2018-08-19 NOTE — DISCHARGE SUMMARY
Physician Discharge Summary     Patient ID:  Teodora Vickers  [id number]  70 y.o.  1955    Admit date: 8/8/2018    Discharge date and time: 8/17/2018  4:09 PM    Admitting Physician: Duane Stone MD     Discharge Physician: Earnestine Fothergill, MD    Admission Diagnoses: Hyponatremia [E87.1]    Discharge Diagnoses: Active Hospital Problems    Diagnosis Date Noted    Left foot pain [M79.672] 08/13/2018    Psoas abscess, right ([address] [K68.12] 08/13/2018    Lumbar discitis [M46.46] 08/13/2018    Lumbar back pain with radiculopathy affecting right lower extremity [M54.17] 08/13/2018    Infection of intervertebral disc (pyogenic), lumbar region [facility]) [M46.36] 08/13/2018    Osteomyelitis of lumbar vertebra ([address] [M46.26] 08/13/2018    Right flank pain [R10.9] 08/13/2018    Gait abnormality [R26.9] 08/13/2018    Abscess of left foot [L02.612]     Staphylococcus aureus bacteremia with sepsis ([address] [A41.01]     Cellulitis of left foot [L03.116]     Pyogenic arthritis of multiple sites ([address] [M00.9]     Hyponatremia [E87.1] 08/08/2018       Admission Condition: fair    Discharged Condition: good    Indication for Admission: foot pain and back pain. Hospital Course: Patient was admitted for left foot pain. She underwent I and D for left foot abscess, cultures of which were positive for MSSA. Patient also had SYLVIA bacteremia and L2 diskitis. She was started on Nafcillin and recommended 4-6 weeks of IV antibiotics. Patient also had right psoas abscess which was presumed to be due to MSSA. She had severe back pain dn right leg pain due to the same and pain managment was following. Neurosurgery did not recommend any intervention .  The patient was discharged in stable condition    Inpatient meds:    Labs:  LABS  Recent Labs      08/17/18   0612  08/18/18   0500   WBC  10.9*  10.8*   RBC  3.87*  3.44*   HGB  12.2  10.9*   HCT  36.6*  33.3*   MCV  94.6  96.8   MCH  31.6*  31.7   MCHC  33.4  32.7   RDW  14.1   -- shadowing calcifications. No ultrasound signs of hydronephrosis or perinephric fluid. NO RENAL ULTRASOUND ABNORMALITIES.         Discharge Exam:  BP (!) 172/63   Pulse 89   Temp 98.8 °F (37.1 °C) (Oral)   Resp 18   Ht 5' 8\" (1.727 m)   Wt 211 lb (95.7 kg) Comment: bedscale  LMP  (Approximate)   SpO2 97%   BMI 32.08 kg/m²     General Appearance:    Alert, cooperative, no distress, appears stated age   Head:    Normocephalic, without obvious abnormality, atraumatic   Eyes:    PERRL, conjunctiva/corneas clear, EOM's intact, fundi     benign, both eyes   Ears:    Normal TM's and external ear canals, both ears   Nose:   Nares normal, septum midline, mucosa normal, no drainage    or sinus tenderness   Throat:   Lips, mucosa, and tongue normal; teeth and gums normal   Neck:   Supple, symmetrical, trachea midline, no adenopathy;     thyroid:  no enlargement/tenderness/nodules; no carotid    bruit or JVD   Back:     Symmetric, no curvature, ROM normal, no CVA tenderness   Lungs:     Clear to auscultation bilaterally, respirations unlabored   Chest Wall:    No tenderness or deformity    Heart:    Regular rate and rhythm, S1 and S2 normal, no murmur, rub   or gallop   Breast Exam:    No tenderness, masses, or nipple abnormality   Abdomen:     Soft, non-tender, bowel sounds active all four quadrants,     no masses, no organomegaly   Genitalia:    Normal female without lesion, discharge or tenderness   Rectal:    Normal tone ;guaiac negative stool   Extremities:   Extremities normal, atraumatic, no cyanosis or edema   Pulses:   2+ and symmetric all extremities   Skin:   Skin color, texture, turgor normal, no rashes or lesions   Lymph nodes:   Cervical, supraclavicular, and axillary nodes normal   Neurologic:   CNII-XII intact, normal strength, sensation and reflexes     throughout       In process/preliminary results:  Outstanding Order Results     Date and Time Order Name Status Description    8/16/2018 0702 CULTURE nafcillin 2 g injection Inject 2,000 mg into the muscle every 4 hoursDC to SNF      nafcillin infusion Infuse 2,000 mg intravenously every 4 hours Compound per protocol, Disp-540 g, R-0NO PRINT       ! ! - Potential duplicate medications found. Please discuss with provider. CONTINUE these medications which have CHANGED    Details   !! gabapentin (NEURONTIN) 300 MG capsule Take 2 capsules by mouth nightly for 31 days. ., Disp-90 capsule, R-3Normal      !! gabapentin (NEURONTIN) 300 MG capsule Take 1 capsule by mouth 3 times daily for 31 days. ., Disp-90 capsule, R-3Normal       !! - Potential duplicate medications found. Please discuss with provider. CONTINUE these medications which have NOT CHANGED    Details   atorvastatin (LIPITOR) 10 MG tablet Take 1 tablet by mouth nightly, Disp-90 tablet, R-3Normal      lisinopril (PRINIVIL;ZESTRIL) 40 MG tablet Take 1 tablet by mouth daily, Disp-90 tablet, R-3Normal      aspirin 81 MG tablet Take 81 mg by mouth dailyHistorical Med      CRANBERRY EXTRACT PO Take by mouthHistorical Med      triamterene-hydrochlorothiazide (MAXZIDE-25) 37.5-25 MG per tablet Take 1 tablet by mouth daily      Multiple Vitamins-Minerals (MULTIVITAMIN & MINERAL PO) Take by mouth      Coenzyme Q10 (COQ10 PO) Take  by mouth daily. Cholecalciferol (VITAMIN D3) 2000 units TABS Take by mouth daily Historical Med      nitroGLYCERIN (NITROSTAT) 0.4 MG SL tablet Place 0.4 mg under the tongue every 5 minutes as needed for Chest pain.          STOP taking these medications       gabapentin (NEURONTIN) 600 MG tablet Comments:   Reason for Stopping:         HYDROcodone-acetaminophen (NORCO) 7.5-325 MG per tablet Comments:   Reason for Stopping:         topiramate (TOPAMAX) 25 MG tablet Comments:   Reason for Stopping:         lidocaine (XYLOCAINE) 5 % ointment Comments:   Reason for Stopping:         meloxicam (MOBIC) 7.5 MG tablet Comments:   Reason for Stopping:             Activity: activity as tolerated  Diet: regular diet and diabetic diet ( if indicated)    I spent more than 30 minutes talking to the patient, family and the nurse and preparing the discharge      Signed:  Avinash Dang MD  Pager : [phone]  8/19/2018  4:09 PM

## 2018-08-20 LAB — BLOOD CULTURE, ROUTINE: NORMAL

## 2018-08-21 LAB — CULTURE, BLOOD 2: NORMAL

## 2018-08-21 NOTE — PROGRESS NOTES
Physical Therapy  Facility/Department: [facility] MED SURG N118/Y633-72  Physical Therapy Discharge      NAME: Aman Quinteros    : 1955 (70 y.o.)  MRN: [id number]    Account: [de-identified]  Gender: female      Patient has been discharged from acute care hospital. DC patient from current PT program.      Electronically signed by Jagdeep Lazar PT on 18 at 4:35 PM

## 2018-09-07 LAB
BUN BLDV-MCNC: 13 MG/DL
CALCIUM SERPL-MCNC: 8.8 MG/DL
CHLORIDE BLD-SCNC: 95 MMOL/L
CO2: 28 MMOL/L
CREAT SERPL-MCNC: 1.1 MG/DL
GFR CALCULATED: NORMAL
GLUCOSE BLD-MCNC: 86 MG/DL
POTASSIUM SERPL-SCNC: 3.6 MMOL/L
SODIUM BLD-SCNC: 131 MMOL/L

## 2018-09-07 RX ORDER — OXYCODONE HYDROCHLORIDE 10 MG/1
10 TABLET ORAL EVERY 12 HOURS
COMMUNITY
End: 2018-09-14

## 2018-09-07 RX ORDER — GABAPENTIN 600 MG/1
600 TABLET ORAL NIGHTLY
COMMUNITY
End: 2018-10-10

## 2018-09-07 RX ORDER — OXYCODONE HYDROCHLORIDE AND ACETAMINOPHEN 5; 325 MG/1; MG/1
1 TABLET ORAL EVERY 6 HOURS PRN
COMMUNITY
End: 2018-10-24 | Stop reason: DRUGHIGH

## 2018-09-07 RX ORDER — POTASSIUM CHLORIDE 20 MEQ/1
40 TABLET, EXTENDED RELEASE ORAL DAILY
COMMUNITY
End: 2018-10-10 | Stop reason: SDUPTHER

## 2018-09-07 RX ORDER — TORSEMIDE 10 MG/1
10 TABLET ORAL DAILY
COMMUNITY
End: 2018-09-14

## 2018-09-07 RX ORDER — NAFCILLIN SODIUM 2 G/1
INJECTION, POWDER, FOR SOLUTION INTRAVENOUS EVERY 4 HOURS
COMMUNITY
Start: 2018-09-07 | End: 2018-10-10 | Stop reason: ALTCHOICE

## 2018-09-07 RX ORDER — CYANOCOBALAMIN 1000 UG/ML
1000 INJECTION INTRAMUSCULAR; SUBCUTANEOUS
Status: ON HOLD | COMMUNITY
End: 2019-07-11

## 2018-09-07 RX ORDER — METAXALONE 400 MG/1
TABLET ORAL EVERY 6 HOURS PRN
COMMUNITY
End: 2018-10-10 | Stop reason: ALTCHOICE

## 2018-09-07 RX ORDER — FERROUS SULFATE 325(65) MG
325 TABLET ORAL 2 TIMES DAILY
COMMUNITY
End: 2018-11-21 | Stop reason: ALTCHOICE

## 2018-09-07 RX ORDER — LORAZEPAM 1 MG/1
1 TABLET ORAL DAILY
COMMUNITY
End: 2018-10-10 | Stop reason: ALTCHOICE

## 2018-09-07 RX ORDER — GABAPENTIN 300 MG/1
300 CAPSULE ORAL 3 TIMES DAILY
COMMUNITY
End: 2018-10-04 | Stop reason: SDUPTHER

## 2018-09-07 RX ORDER — ALLOPURINOL 100 MG/1
100 TABLET ORAL DAILY
COMMUNITY
End: 2018-10-10

## 2018-09-10 ENCOUNTER — HOSPITAL ENCOUNTER (OUTPATIENT)
Dept: MRI IMAGING | Age: 63
Discharge: HOME | End: 2018-09-12
Payer: MEDICARE

## 2018-09-10 DIAGNOSIS — L02.91 ABSCESS: ICD-10-CM

## 2018-09-10 DIAGNOSIS — M46.40 DISCITIS, UNSPECIFIED SPINAL REGION: ICD-10-CM

## 2018-09-10 PROCEDURE — 6360000004 HC RX CONTRAST MEDICATION: Performed by: INTERNAL MEDICINE

## 2018-09-10 PROCEDURE — 72158 MRI LUMBAR SPINE W/O & W/DYE: CPT

## 2018-09-10 PROCEDURE — A9579 GAD-BASE MR CONTRAST NOS,1ML: HCPCS | Performed by: INTERNAL MEDICINE

## 2018-09-10 RX ORDER — SODIUM CHLORIDE 0.9 % (FLUSH) 0.9 %
10 SYRINGE (ML) INJECTION 2 TIMES DAILY
Status: DISCONTINUED | OUTPATIENT
Start: 2018-09-10 | End: 2018-09-13 | Stop reason: HOSPADM

## 2018-09-10 RX ADMIN — GADOTERIDOL 20 ML: 279.3 INJECTION, SOLUTION INTRAVENOUS at 16:07

## 2018-09-11 ENCOUNTER — OFFICE VISIT (OUTPATIENT)
Dept: GERIATRIC MEDICINE | Age: 63
End: 2018-09-11
Payer: MEDICARE

## 2018-09-11 DIAGNOSIS — R60.0 BILATERAL LEG EDEMA: ICD-10-CM

## 2018-09-11 DIAGNOSIS — M10.9 GOUT, ARTHRITIS: ICD-10-CM

## 2018-09-11 DIAGNOSIS — R78.81 BACTEREMIA DUE TO METHICILLIN SUSCEPTIBLE STAPHYLOCOCCUS AUREUS (MSSA): Primary | ICD-10-CM

## 2018-09-11 DIAGNOSIS — B95.61 BACTEREMIA DUE TO METHICILLIN SUSCEPTIBLE STAPHYLOCOCCUS AUREUS (MSSA): Primary | ICD-10-CM

## 2018-09-11 DIAGNOSIS — R53.1 WEAKNESS: ICD-10-CM

## 2018-09-11 PROCEDURE — 99306 1ST NF CARE HIGH MDM 50: CPT | Performed by: INTERNAL MEDICINE

## 2018-09-11 PROCEDURE — 3017F COLORECTAL CA SCREEN DOC REV: CPT | Performed by: INTERNAL MEDICINE

## 2018-09-11 PROCEDURE — 1123F ACP DISCUSS/DSCN MKR DOCD: CPT | Performed by: INTERNAL MEDICINE

## 2018-09-11 PROCEDURE — 1101F PT FALLS ASSESS-DOCD LE1/YR: CPT | Performed by: INTERNAL MEDICINE

## 2018-09-12 ENCOUNTER — OFFICE VISIT (OUTPATIENT)
Dept: GERIATRIC MEDICINE | Age: 63
End: 2018-09-12
Payer: MEDICARE

## 2018-09-12 DIAGNOSIS — G57.93 NEUROPATHY INVOLVING BOTH LOWER EXTREMITIES: ICD-10-CM

## 2018-09-12 DIAGNOSIS — L02.612 ABSCESS OF LEFT FOOT: ICD-10-CM

## 2018-09-12 DIAGNOSIS — M46.26 OSTEOMYELITIS OF LUMBAR VERTEBRA (HCC): ICD-10-CM

## 2018-09-12 DIAGNOSIS — R60.9 CHRONIC EDEMA: Primary | ICD-10-CM

## 2018-09-12 PROCEDURE — 99309 SBSQ NF CARE MODERATE MDM 30: CPT | Performed by: NURSE PRACTITIONER

## 2018-09-12 PROCEDURE — 1123F ACP DISCUSS/DSCN MKR DOCD: CPT | Performed by: NURSE PRACTITIONER

## 2018-09-12 PROCEDURE — 3017F COLORECTAL CA SCREEN DOC REV: CPT | Performed by: NURSE PRACTITIONER

## 2018-09-12 PROCEDURE — 1101F PT FALLS ASSESS-DOCD LE1/YR: CPT | Performed by: NURSE PRACTITIONER

## 2018-09-13 LAB
ALBUMIN SERPL-MCNC: 2.7 G/DL
ALP BLD-CCNC: 100 U/L
ALT SERPL-CCNC: 13 U/L
ANION GAP SERPL CALCULATED.3IONS-SCNC: NORMAL MMOL/L
AST SERPL-CCNC: 16 U/L
BASOPHILS ABSOLUTE: ABNORMAL /ΜL
BASOPHILS RELATIVE PERCENT: ABNORMAL %
BILIRUB SERPL-MCNC: 0.3 MG/DL (ref 0.1–1.4)
BUN BLDV-MCNC: 14 MG/DL
CALCIUM SERPL-MCNC: 8.9 MG/DL
CHLORIDE BLD-SCNC: 99 MMOL/L
CO2: 24 MMOL/L
CREAT SERPL-MCNC: 1.2 MG/DL
EOSINOPHILS ABSOLUTE: ABNORMAL /ΜL
EOSINOPHILS RELATIVE PERCENT: ABNORMAL %
GFR CALCULATED: NORMAL
GLUCOSE BLD-MCNC: 73 MG/DL
HCT VFR BLD CALC: 25.1 % (ref 36–46)
HEMOGLOBIN: 8.5 G/DL (ref 12–16)
LYMPHOCYTES ABSOLUTE: ABNORMAL /ΜL
LYMPHOCYTES RELATIVE PERCENT: ABNORMAL %
MCH RBC QN AUTO: 31.5 PG
MCHC RBC AUTO-ENTMCNC: 33.7 G/DL
MCV RBC AUTO: 93.5 FL
MONOCYTES ABSOLUTE: ABNORMAL /ΜL
MONOCYTES RELATIVE PERCENT: ABNORMAL %
NEUTROPHILS ABSOLUTE: ABNORMAL /ΜL
NEUTROPHILS RELATIVE PERCENT: ABNORMAL %
PLATELET # BLD: 285 K/ΜL
PMV BLD AUTO: 7.4 FL
POTASSIUM SERPL-SCNC: 4.2 MMOL/L
RBC # BLD: 2.68 10^6/ΜL
SODIUM BLD-SCNC: 135 MMOL/L
TOTAL PROTEIN: 5.7
WBC # BLD: 6.9 10^3/ML

## 2018-09-14 ENCOUNTER — OFFICE VISIT (OUTPATIENT)
Dept: GERIATRIC MEDICINE | Age: 63
End: 2018-09-14
Payer: MEDICARE

## 2018-09-14 DIAGNOSIS — L02.612 ABSCESS OF LEFT FOOT: ICD-10-CM

## 2018-09-14 DIAGNOSIS — M46.26 OSTEOMYELITIS OF LUMBAR VERTEBRA (HCC): Primary | ICD-10-CM

## 2018-09-14 DIAGNOSIS — G89.4 CHRONIC PAIN SYNDROME: Primary | ICD-10-CM

## 2018-09-14 DIAGNOSIS — R60.9 CHRONIC EDEMA: ICD-10-CM

## 2018-09-14 PROCEDURE — 1101F PT FALLS ASSESS-DOCD LE1/YR: CPT | Performed by: NURSE PRACTITIONER

## 2018-09-14 PROCEDURE — 99309 SBSQ NF CARE MODERATE MDM 30: CPT | Performed by: NURSE PRACTITIONER

## 2018-09-14 PROCEDURE — 1123F ACP DISCUSS/DSCN MKR DOCD: CPT | Performed by: NURSE PRACTITIONER

## 2018-09-14 PROCEDURE — 3017F COLORECTAL CA SCREEN DOC REV: CPT | Performed by: NURSE PRACTITIONER

## 2018-09-14 RX ORDER — OXYCODONE HCL 10 MG/1
10 TABLET, FILM COATED, EXTENDED RELEASE ORAL 2 TIMES DAILY
Qty: 60 TABLET | Refills: 0 | Status: SHIPPED | OUTPATIENT
Start: 2018-09-14 | End: 2018-09-17 | Stop reason: SDUPTHER

## 2018-09-14 RX ORDER — FUROSEMIDE 80 MG
80 TABLET ORAL DAILY
Qty: 60 TABLET | Refills: 3 | Status: SHIPPED | OUTPATIENT
Start: 2018-09-14 | End: 2018-09-17 | Stop reason: SDUPTHER

## 2018-09-17 ENCOUNTER — OFFICE VISIT (OUTPATIENT)
Dept: GERIATRIC MEDICINE | Age: 63
End: 2018-09-17
Payer: MEDICARE

## 2018-09-17 DIAGNOSIS — G89.4 CHRONIC PAIN SYNDROME: ICD-10-CM

## 2018-09-17 DIAGNOSIS — E87.70 HYPERVOLEMIA, UNSPECIFIED HYPERVOLEMIA TYPE: Primary | ICD-10-CM

## 2018-09-17 PROCEDURE — 1123F ACP DISCUSS/DSCN MKR DOCD: CPT | Performed by: NURSE PRACTITIONER

## 2018-09-17 PROCEDURE — 99307 SBSQ NF CARE SF MDM 10: CPT | Performed by: NURSE PRACTITIONER

## 2018-09-17 PROCEDURE — 3017F COLORECTAL CA SCREEN DOC REV: CPT | Performed by: NURSE PRACTITIONER

## 2018-09-17 PROCEDURE — 1101F PT FALLS ASSESS-DOCD LE1/YR: CPT | Performed by: NURSE PRACTITIONER

## 2018-09-17 RX ORDER — FUROSEMIDE 80 MG
80 TABLET ORAL DAILY
Qty: 60 TABLET | Refills: 3 | Status: SHIPPED | OUTPATIENT
Start: 2018-09-17 | End: 2018-10-10 | Stop reason: SDUPTHER

## 2018-09-17 RX ORDER — OXYCODONE HCL 10 MG/1
10 TABLET, FILM COATED, EXTENDED RELEASE ORAL 2 TIMES DAILY
Qty: 60 TABLET | Refills: 0 | Status: SHIPPED | OUTPATIENT
Start: 2018-09-17 | End: 2018-10-17

## 2018-09-18 LAB
BUN BLDV-MCNC: 19 MG/DL
CALCIUM SERPL-MCNC: 8.6 MG/DL
CHLORIDE BLD-SCNC: 104 MMOL/L
CO2: 21 MMOL/L
CREAT SERPL-MCNC: 1.3 MG/DL
GFR CALCULATED: NORMAL
GLUCOSE BLD-MCNC: 68 MG/DL
POTASSIUM SERPL-SCNC: 4.9 MMOL/L
SODIUM BLD-SCNC: 139 MMOL/L

## 2018-09-21 ENCOUNTER — OFFICE VISIT (OUTPATIENT)
Dept: GERIATRIC MEDICINE | Age: 63
End: 2018-09-21
Payer: MEDICARE

## 2018-09-21 DIAGNOSIS — R60.0 LOWER EXTREMITY EDEMA: Primary | ICD-10-CM

## 2018-09-21 DIAGNOSIS — M72.2 PLANTAR FASCIITIS, BILATERAL: ICD-10-CM

## 2018-09-21 PROCEDURE — 1123F ACP DISCUSS/DSCN MKR DOCD: CPT | Performed by: NURSE PRACTITIONER

## 2018-09-21 PROCEDURE — 3017F COLORECTAL CA SCREEN DOC REV: CPT | Performed by: NURSE PRACTITIONER

## 2018-09-21 PROCEDURE — 99308 SBSQ NF CARE LOW MDM 20: CPT | Performed by: NURSE PRACTITIONER

## 2018-09-21 PROCEDURE — 1101F PT FALLS ASSESS-DOCD LE1/YR: CPT | Performed by: NURSE PRACTITIONER

## 2018-09-25 LAB
BUN BLDV-MCNC: 22 MG/DL
CALCIUM SERPL-MCNC: 8.9 MG/DL
CHLORIDE BLD-SCNC: 104 MMOL/L
CO2: 23 MMOL/L
CREAT SERPL-MCNC: 1.3 MG/DL
GFR CALCULATED: NORMAL
GLUCOSE BLD-MCNC: 86 MG/DL
POTASSIUM SERPL-SCNC: 4.7 MMOL/L
SODIUM BLD-SCNC: 140 MMOL/L

## 2018-09-27 LAB
C-REACTIVE PROTEIN: 11.2
SEDIMENTATION RATE, ERYTHROCYTE: 40

## 2018-09-28 ENCOUNTER — OFFICE VISIT (OUTPATIENT)
Dept: GERIATRIC MEDICINE | Age: 63
End: 2018-09-28
Payer: MEDICARE

## 2018-09-28 DIAGNOSIS — R30.0 DYSURIA: Primary | ICD-10-CM

## 2018-09-28 PROCEDURE — 3017F COLORECTAL CA SCREEN DOC REV: CPT | Performed by: NURSE PRACTITIONER

## 2018-09-28 PROCEDURE — 1101F PT FALLS ASSESS-DOCD LE1/YR: CPT | Performed by: NURSE PRACTITIONER

## 2018-09-28 PROCEDURE — 99308 SBSQ NF CARE LOW MDM 20: CPT | Performed by: NURSE PRACTITIONER

## 2018-09-28 PROCEDURE — 1123F ACP DISCUSS/DSCN MKR DOCD: CPT | Performed by: NURSE PRACTITIONER

## 2018-09-30 LAB
BILIRUBIN, URINE: NEGATIVE
BLOOD, URINE: NEGATIVE
CLARITY: ABNORMAL
COLOR: YELLOW
GLUCOSE URINE: NEGATIVE
KETONES, URINE: NEGATIVE
LEUKOCYTE ESTERASE, URINE: ABNORMAL
NITRITE, URINE: POSITIVE
PH UA: 6 (ref 4.5–8)
PROTEIN UA: NEGATIVE
SPECIFIC GRAVITY, URINE: 1.01
UROBILINOGEN, URINE: NORMAL

## 2018-10-01 ENCOUNTER — OFFICE VISIT (OUTPATIENT)
Dept: INFECTIOUS DISEASES | Age: 63
End: 2018-10-01
Payer: MEDICARE

## 2018-10-01 VITALS
SYSTOLIC BLOOD PRESSURE: 145 MMHG | RESPIRATION RATE: 26 BRPM | DIASTOLIC BLOOD PRESSURE: 77 MMHG | HEIGHT: 68 IN | HEART RATE: 92 BPM | TEMPERATURE: 98 F | BODY MASS INDEX: 31.86 KG/M2 | WEIGHT: 210.2 LBS

## 2018-10-01 DIAGNOSIS — M46.46 LUMBAR DISCITIS: Primary | ICD-10-CM

## 2018-10-01 DIAGNOSIS — A41.01 STAPHYLOCOCCUS AUREUS BACTEREMIA WITH SEPSIS (HCC): ICD-10-CM

## 2018-10-01 DIAGNOSIS — K68.12 PSOAS ABSCESS, RIGHT (HCC): ICD-10-CM

## 2018-10-01 PROCEDURE — G8399 PT W/DXA RESULTS DOCUMENT: HCPCS | Performed by: INTERNAL MEDICINE

## 2018-10-01 PROCEDURE — G8598 ASA/ANTIPLAT THER USED: HCPCS | Performed by: INTERNAL MEDICINE

## 2018-10-01 PROCEDURE — 1090F PRES/ABSN URINE INCON ASSESS: CPT | Performed by: INTERNAL MEDICINE

## 2018-10-01 PROCEDURE — G8427 DOCREV CUR MEDS BY ELIG CLIN: HCPCS | Performed by: INTERNAL MEDICINE

## 2018-10-01 PROCEDURE — G8417 CALC BMI ABV UP PARAM F/U: HCPCS | Performed by: INTERNAL MEDICINE

## 2018-10-01 PROCEDURE — 1123F ACP DISCUSS/DSCN MKR DOCD: CPT | Performed by: INTERNAL MEDICINE

## 2018-10-01 PROCEDURE — 99213 OFFICE O/P EST LOW 20 MIN: CPT | Performed by: INTERNAL MEDICINE

## 2018-10-01 PROCEDURE — 4040F PNEUMOC VAC/ADMIN/RCVD: CPT | Performed by: INTERNAL MEDICINE

## 2018-10-01 PROCEDURE — G8484 FLU IMMUNIZE NO ADMIN: HCPCS | Performed by: INTERNAL MEDICINE

## 2018-10-01 PROCEDURE — 1101F PT FALLS ASSESS-DOCD LE1/YR: CPT | Performed by: INTERNAL MEDICINE

## 2018-10-01 PROCEDURE — 1036F TOBACCO NON-USER: CPT | Performed by: INTERNAL MEDICINE

## 2018-10-01 PROCEDURE — 3017F COLORECTAL CA SCREEN DOC REV: CPT | Performed by: INTERNAL MEDICINE

## 2018-10-01 NOTE — PROGRESS NOTES
is sitting up in the chair, in no acute distress. Lower extremity edema. There is only 2+ edema in the ankles and shin does not appear to be improved from my last visit. Lungs are clear. Heart is regular rate and rhythm. She is alert and oriented, pleasant. ASSESSMENT AND PLAN:   1. Lower extremity chronic edema is not proved. 2.  Spinal and left foot abscess, for which she is on IV antibiotics and doing fairly well. 3.  Hands and feet neuropathy and fasciitis. We will order the Percocet to be given 1 or 2 q.6 hours p.r.n. She can have a dose at 9 o'clock if she needs it for therapy. Discontinue the Bengay and Lidoderm. PT, OT, rehabilitation. We will continue to follow.           Electronically Signed By: WAYLON Devlin GNP-BC on 09/25/2018 23:36:56  ______________________________  WAYLON Devlin GNP-BC  /[id number]  D: 09/23/2018 21:21:44  T: 09/24/2018 10:49:54    cc: [facility]

## 2018-10-02 ENCOUNTER — TELEPHONE (OUTPATIENT)
Dept: INFECTIOUS DISEASES | Age: 63
End: 2018-10-02

## 2018-10-02 VITALS — DIASTOLIC BLOOD PRESSURE: 68 MMHG | SYSTOLIC BLOOD PRESSURE: 174 MMHG | TEMPERATURE: 96.2 F | HEART RATE: 82 BPM

## 2018-10-02 DIAGNOSIS — M46.26 OSTEOMYELITIS OF LUMBAR VERTEBRA (HCC): Primary | ICD-10-CM

## 2018-10-02 DIAGNOSIS — L02.612 ABSCESS OF LEFT FOOT: ICD-10-CM

## 2018-10-02 DIAGNOSIS — A41.01 STAPHYLOCOCCUS AUREUS BACTEREMIA WITH SEPSIS (HCC): ICD-10-CM

## 2018-10-02 DIAGNOSIS — M46.46 LUMBAR DISCITIS: ICD-10-CM

## 2018-10-02 DIAGNOSIS — K68.12 PSOAS ABSCESS, RIGHT (HCC): ICD-10-CM

## 2018-10-02 LAB
BUN BLDV-MCNC: 25 MG/DL
CALCIUM SERPL-MCNC: 9.5 MG/DL
CHLORIDE BLD-SCNC: 101 MMOL/L
CO2: 24 MMOL/L
CREAT SERPL-MCNC: 1.5 MG/DL
GFR CALCULATED: NORMAL
GLUCOSE BLD-MCNC: 92 MG/DL
POTASSIUM SERPL-SCNC: 4.6 MMOL/L
SODIUM BLD-SCNC: 137 MMOL/L

## 2018-10-02 RX ORDER — DOXYCYCLINE HYCLATE 100 MG
100 TABLET ORAL 2 TIMES DAILY
Qty: 60 TABLET | Refills: 5 | Status: SHIPPED | OUTPATIENT
Start: 2018-10-02 | End: 2018-11-01

## 2018-10-02 NOTE — PROGRESS NOTES
aspirin 81 mg daily, Ativan 1 mg daily p.r.n. as needed, atorvastatin 10 mg daily,  iron 325 mg daily, gabapentin 300 mg 3 times daily in the a.m., 600 mg in the p.m., lisinopril 10 mg daily, Unasyn 2 g IV every q.4hours. for 4 weeks, oxycodone 10 mg q.12hours. , Percocet 1 tablet q.6hours. as needed, torsemide 10 mg daily. FAMILY HISTORY: Positive for diabetes, coronary artery disease. SOCIAL HISTORY: The patient is a community indwelling individual. Has been independent with ADLs/IADLs. REVIEW OF SYSTEMS: The patient denies chest pain, palpitations, nausea, vomiting, emesis. Has intermittent pain in all extremities secondary to edema and confusional episodes. No change in her bowel or bladder habits. Rest of 14-point review of systems are unremarkable. PHYSICAL EXAMINATION: The patient's vital signs are stable. She is afebrile at 96.2, pulse 82. Blood pressure shows systolic high at 796/25. She is alert and oriented x3. Normocephalic and atraumatic. Pupils are equal and reactive. Oral mucosa is moist. No thrush. Neck was supple. Chest showed no crackles, no wheezing. Cardiovascular exam showed a regular rate. ABDOMEN: Soft, nontender, no involuntary guarding or rigidity. EXTREMITIES: Show +1 dorsal pedal pulse and +1 edema bilaterally to the midshin. Incisional site from the left foot, no drainage. LYMPHS: No axillary or inguinal lymphadenopathy. ASSESSMENT AND PLAN:   1. Bacteremia with MSSA. The patient is clinically stable. Completed the course of IV antibiotic therapy with Unasyn, likely require long-term IV antibiotic therapy. Monitor fever curve and monitor white count. 2.  Gouty arthritis. The patient had dramatic changes in the fingers on the left hand of the fourth and fifth finger, nursing is monitoring closely, is on anti-inflammatories, pain controlled . We will check uric acid, may need colchinine, monitor renal function. 3.  Weakness.  The patient will undergo a course of physical

## 2018-10-04 RX ORDER — GABAPENTIN 300 MG/1
CAPSULE ORAL
Qty: 90 CAPSULE | Refills: 1 | Status: SHIPPED | OUTPATIENT
Start: 2018-10-04 | End: 2018-10-10 | Stop reason: SDUPTHER

## 2018-10-09 PROBLEM — E87.1 HYPONATREMIA: Status: RESOLVED | Noted: 2018-08-08 | Resolved: 2018-10-09

## 2018-10-09 PROBLEM — R10.9 RIGHT FLANK PAIN: Status: RESOLVED | Noted: 2018-08-13 | Resolved: 2018-10-09

## 2018-10-09 PROBLEM — M79.672 LEFT FOOT PAIN: Status: RESOLVED | Noted: 2018-08-13 | Resolved: 2018-10-09

## 2018-10-10 ENCOUNTER — OFFICE VISIT (OUTPATIENT)
Dept: INTERNAL MEDICINE CLINIC | Age: 63
End: 2018-10-10
Payer: MEDICARE

## 2018-10-10 VITALS
BODY MASS INDEX: 31.2 KG/M2 | WEIGHT: 205.2 LBS | OXYGEN SATURATION: 98 % | DIASTOLIC BLOOD PRESSURE: 50 MMHG | SYSTOLIC BLOOD PRESSURE: 100 MMHG | TEMPERATURE: 98.1 F | HEART RATE: 64 BPM

## 2018-10-10 DIAGNOSIS — Z23 NEED FOR INFLUENZA VACCINATION: ICD-10-CM

## 2018-10-10 DIAGNOSIS — K68.12 PSOAS ABSCESS, RIGHT (HCC): Primary | ICD-10-CM

## 2018-10-10 DIAGNOSIS — E78.2 MIXED HYPERLIPIDEMIA: ICD-10-CM

## 2018-10-10 DIAGNOSIS — M46.36 INFECTION OF INTERVERTEBRAL DISC (PYOGENIC), LUMBAR REGION (HCC): ICD-10-CM

## 2018-10-10 DIAGNOSIS — M46.26 OSTEOMYELITIS OF LUMBAR VERTEBRA (HCC): ICD-10-CM

## 2018-10-10 DIAGNOSIS — I10 BENIGN ESSENTIAL HYPERTENSION: ICD-10-CM

## 2018-10-10 DIAGNOSIS — M46.46 LUMBAR DISCITIS: ICD-10-CM

## 2018-10-10 PROCEDURE — G8399 PT W/DXA RESULTS DOCUMENT: HCPCS | Performed by: FAMILY MEDICINE

## 2018-10-10 PROCEDURE — 1111F DSCHRG MED/CURRENT MED MERGE: CPT | Performed by: FAMILY MEDICINE

## 2018-10-10 PROCEDURE — 3017F COLORECTAL CA SCREEN DOC REV: CPT | Performed by: FAMILY MEDICINE

## 2018-10-10 PROCEDURE — 90662 IIV NO PRSV INCREASED AG IM: CPT | Performed by: FAMILY MEDICINE

## 2018-10-10 PROCEDURE — 1123F ACP DISCUSS/DSCN MKR DOCD: CPT | Performed by: FAMILY MEDICINE

## 2018-10-10 PROCEDURE — 99214 OFFICE O/P EST MOD 30 MIN: CPT | Performed by: FAMILY MEDICINE

## 2018-10-10 PROCEDURE — 1101F PT FALLS ASSESS-DOCD LE1/YR: CPT | Performed by: FAMILY MEDICINE

## 2018-10-10 PROCEDURE — G8427 DOCREV CUR MEDS BY ELIG CLIN: HCPCS | Performed by: FAMILY MEDICINE

## 2018-10-10 PROCEDURE — G0008 ADMIN INFLUENZA VIRUS VAC: HCPCS | Performed by: FAMILY MEDICINE

## 2018-10-10 PROCEDURE — G8598 ASA/ANTIPLAT THER USED: HCPCS | Performed by: FAMILY MEDICINE

## 2018-10-10 PROCEDURE — 1090F PRES/ABSN URINE INCON ASSESS: CPT | Performed by: FAMILY MEDICINE

## 2018-10-10 PROCEDURE — 1036F TOBACCO NON-USER: CPT | Performed by: FAMILY MEDICINE

## 2018-10-10 PROCEDURE — 4040F PNEUMOC VAC/ADMIN/RCVD: CPT | Performed by: FAMILY MEDICINE

## 2018-10-10 PROCEDURE — G8482 FLU IMMUNIZE ORDER/ADMIN: HCPCS | Performed by: FAMILY MEDICINE

## 2018-10-10 PROCEDURE — G8417 CALC BMI ABV UP PARAM F/U: HCPCS | Performed by: FAMILY MEDICINE

## 2018-10-10 RX ORDER — LISINOPRIL 20 MG/1
TABLET ORAL
Refills: 0 | COMMUNITY
Start: 2018-10-05 | End: 2018-10-10 | Stop reason: SDUPTHER

## 2018-10-10 RX ORDER — FUROSEMIDE 40 MG/1
40 TABLET ORAL DAILY
Qty: 30 TABLET | Refills: 5
Start: 2018-10-10 | End: 2018-11-21 | Stop reason: SDUPTHER

## 2018-10-10 RX ORDER — POTASSIUM CHLORIDE 750 MG/1
20 TABLET, EXTENDED RELEASE ORAL DAILY
Qty: 60 TABLET | Refills: 5 | Status: SHIPPED | OUTPATIENT
Start: 2018-10-10 | End: 2019-02-04 | Stop reason: SDUPTHER

## 2018-10-10 RX ORDER — TOPIRAMATE 25 MG/1
TABLET ORAL
Refills: 3 | COMMUNITY
Start: 2018-08-19 | End: 2018-10-10 | Stop reason: ALTCHOICE

## 2018-10-10 RX ORDER — GABAPENTIN 300 MG/1
CAPSULE ORAL
Qty: 90 CAPSULE | Refills: 5
Start: 2018-10-10 | End: 2019-07-30 | Stop reason: DRUGHIGH

## 2018-10-10 RX ORDER — NITROGLYCERIN 0.4 MG/1
0.4 TABLET SUBLINGUAL EVERY 5 MIN PRN
COMMUNITY
End: 2019-07-30 | Stop reason: SDUPTHER

## 2018-10-10 RX ORDER — LISINOPRIL 20 MG/1
TABLET ORAL
Qty: 30 TABLET | Refills: 5 | Status: SHIPPED | OUTPATIENT
Start: 2018-10-10 | End: 2019-02-07 | Stop reason: SDUPTHER

## 2018-10-10 ASSESSMENT — PATIENT HEALTH QUESTIONNAIRE - PHQ9
SUM OF ALL RESPONSES TO PHQ9 QUESTIONS 1 & 2: 0
SUM OF ALL RESPONSES TO PHQ QUESTIONS 1-9: 0
DEPRESSION UNABLE TO ASSESS: FUNCTIONAL CAPACITY MOTIVATION LIMITS ACCURACY
1. LITTLE INTEREST OR PLEASURE IN DOING THINGS: 0
SUM OF ALL RESPONSES TO PHQ QUESTIONS 1-9: 0
2. FEELING DOWN, DEPRESSED OR HOPELESS: 0

## 2018-10-10 NOTE — PROGRESS NOTES
least a BMP. Plan:  Current Outpatient Prescriptions   Medication Sig Dispense Refill    nitroGLYCERIN (NITROSTAT) 0.4 MG SL tablet Place 0.4 mg under the tongue every 5 minutes as needed for Chest pain up to max of 3 total doses. If no relief after 1 dose, call 911.  lisinopril (PRINIVIL;ZESTRIL) 20 MG tablet TAKE 1 TABLET AT BEDTIME 30 tablet 5    potassium chloride (KLOR-CON M) 10 MEQ extended release tablet Take 2 tablets by mouth daily 60 tablet 5    gabapentin (NEURONTIN) 300 MG capsule One in am and One in afternoon and two in pm. 90 capsule 5    furosemide (LASIX) 40 MG tablet Take 1 tablet by mouth daily 30 tablet 5    doxycycline hyclate (VIBRA-TABS) 100 MG tablet Take 1 tablet by mouth 2 times daily 60 tablet 5    oxyCODONE (OXYCONTIN) 10 MG extended release tablet Take 1 tablet by mouth 2 times daily for 30 days. Intended supply: 30 days. 60 tablet 0    cyanocobalamin 1000 MCG/ML injection Inject 1,000 mcg into the muscle every 7 days      ferrous sulfate 325 (65 Fe) MG tablet Take 325 mg by mouth 2 times daily      oxyCODONE-acetaminophen (PERCOCET) 5-325 MG per tablet Take 1 tablet by mouth every 6 hours as needed for Pain. Walt Gails lactobacillus acidophilus [facility]) Take 4 tablets by mouth 3 times daily      lidocaine (LIDODERM) 5 % Place 3 patches onto the skin daily 12 hours on, 12 hours off. 30 patch 0    acetaminophen (TYLENOL) 325 MG tablet Take 2 tablets by mouth every 6 hours (Patient taking differently: Take 650 mg by mouth every 4 hours as needed ) 120 tablet 3    atorvastatin (LIPITOR) 10 MG tablet Take 1 tablet by mouth nightly 90 tablet 3    aspirin 81 MG tablet Take 81 mg by mouth daily      Coenzyme Q10 (COQ10 PO) Take 1 capsule by mouth daily       Cholecalciferol (VITAMIN D3) 2000 units TABS Take 1 tablet by mouth daily        No current facility-administered medications for this visit.       Orders Placed This Encounter   Procedures    INFLUENZA, HIGH DOSE, 65

## 2018-10-11 ENCOUNTER — OFFICE VISIT (OUTPATIENT)
Dept: PHYSICAL MEDICINE AND REHAB | Age: 63
End: 2018-10-11
Payer: MEDICARE

## 2018-10-11 VITALS
HEIGHT: 70 IN | DIASTOLIC BLOOD PRESSURE: 62 MMHG | WEIGHT: 205 LBS | SYSTOLIC BLOOD PRESSURE: 102 MMHG | BODY MASS INDEX: 29.35 KG/M2

## 2018-10-11 DIAGNOSIS — M46.26 OSTEOMYELITIS OF LUMBAR VERTEBRA (HCC): ICD-10-CM

## 2018-10-11 DIAGNOSIS — K68.12 PSOAS ABSCESS, RIGHT (HCC): ICD-10-CM

## 2018-10-11 DIAGNOSIS — Z79.899 HIGH RISK MEDICATION USE: ICD-10-CM

## 2018-10-11 DIAGNOSIS — M46.46 LUMBAR DISCITIS: ICD-10-CM

## 2018-10-11 DIAGNOSIS — M54.16 LUMBAR BACK PAIN WITH RADICULOPATHY AFFECTING RIGHT LOWER EXTREMITY: ICD-10-CM

## 2018-10-11 DIAGNOSIS — M46.36 INFECTION OF INTERVERTEBRAL DISC (PYOGENIC), LUMBAR REGION (HCC): ICD-10-CM

## 2018-10-11 DIAGNOSIS — M79.10 MYALGIA: Primary | ICD-10-CM

## 2018-10-11 DIAGNOSIS — M15.9 PRIMARY OSTEOARTHRITIS INVOLVING MULTIPLE JOINTS: ICD-10-CM

## 2018-10-11 DIAGNOSIS — G57.93 NEUROPATHY INVOLVING BOTH LOWER EXTREMITIES: ICD-10-CM

## 2018-10-11 DIAGNOSIS — R26.9 GAIT ABNORMALITY: ICD-10-CM

## 2018-10-11 DIAGNOSIS — M48.062 SPINAL STENOSIS OF LUMBAR REGION WITH NEUROGENIC CLAUDICATION: ICD-10-CM

## 2018-10-11 PROCEDURE — G8598 ASA/ANTIPLAT THER USED: HCPCS | Performed by: PHYSICAL MEDICINE & REHABILITATION

## 2018-10-11 PROCEDURE — 1123F ACP DISCUSS/DSCN MKR DOCD: CPT | Performed by: PHYSICAL MEDICINE & REHABILITATION

## 2018-10-11 PROCEDURE — 1101F PT FALLS ASSESS-DOCD LE1/YR: CPT | Performed by: PHYSICAL MEDICINE & REHABILITATION

## 2018-10-11 PROCEDURE — 1090F PRES/ABSN URINE INCON ASSESS: CPT | Performed by: PHYSICAL MEDICINE & REHABILITATION

## 2018-10-11 PROCEDURE — 4040F PNEUMOC VAC/ADMIN/RCVD: CPT | Performed by: PHYSICAL MEDICINE & REHABILITATION

## 2018-10-11 PROCEDURE — 99215 OFFICE O/P EST HI 40 MIN: CPT | Performed by: PHYSICAL MEDICINE & REHABILITATION

## 2018-10-11 PROCEDURE — 1036F TOBACCO NON-USER: CPT | Performed by: PHYSICAL MEDICINE & REHABILITATION

## 2018-10-11 PROCEDURE — 3017F COLORECTAL CA SCREEN DOC REV: CPT | Performed by: PHYSICAL MEDICINE & REHABILITATION

## 2018-10-11 PROCEDURE — G8482 FLU IMMUNIZE ORDER/ADMIN: HCPCS | Performed by: PHYSICAL MEDICINE & REHABILITATION

## 2018-10-11 PROCEDURE — G8399 PT W/DXA RESULTS DOCUMENT: HCPCS | Performed by: PHYSICAL MEDICINE & REHABILITATION

## 2018-10-11 PROCEDURE — G8417 CALC BMI ABV UP PARAM F/U: HCPCS | Performed by: PHYSICAL MEDICINE & REHABILITATION

## 2018-10-11 PROCEDURE — G8427 DOCREV CUR MEDS BY ELIG CLIN: HCPCS | Performed by: PHYSICAL MEDICINE & REHABILITATION

## 2018-10-11 RX ORDER — OXYCODONE AND ACETAMINOPHEN 7.5; 325 MG/1; MG/1
1 TABLET ORAL EVERY 6 HOURS PRN
Qty: 90 TABLET | Refills: 0 | Status: SHIPPED | OUTPATIENT
Start: 2018-10-11 | End: 2018-11-08 | Stop reason: SDUPTHER

## 2018-10-11 RX ORDER — OXYCODONE HCL 10 MG/1
10 TABLET, FILM COATED, EXTENDED RELEASE ORAL 2 TIMES DAILY
Qty: 60 TABLET | Refills: 0 | Status: SHIPPED | OUTPATIENT
Start: 2018-10-11 | End: 2018-11-08 | Stop reason: SDUPTHER

## 2018-10-11 ASSESSMENT — ENCOUNTER SYMPTOMS
CHEST TIGHTNESS: 0
WHEEZING: 0
ALLERGIC/IMMUNOLOGIC NEGATIVE: 1
DIARRHEA: 0
VOMITING: 0
SHORTNESS OF BREATH: 0
NAUSEA: 0
BACK PAIN: 1
ABDOMINAL PAIN: 0
PHOTOPHOBIA: 1

## 2018-10-11 NOTE — PROGRESS NOTES
bending, exertion, standing and walking. She has tried oral narcotics, position changes, relaxation, ice, acetaminophen and NSAIDs for the symptoms. The treatment provided mild relief.        Past Medical History:   Diagnosis Date    Abnormal electromyogram (EMG) 8/19/09    SENSORIMOTOR NEUROPATHY OF BLE, RIGHT WORSE THAN LEFT, SUSPICIOUS FOR RIGHT S1 RADIC    Angina pectoris (HCC)     Ankle pain     Colon polyp 4/3/2017    Dermatitis     Fibromyalgia     Foot pain     Hyperlipidemia     Hypertension     Infection of intervertebral disc (pyogenic), lumbar region ([address] 8/13/2018    Low back pain     MRSA (methicillin resistant staph aureus) culture positive 2011    Neck pain     Neuropathy     Obesity     Osteoarthritis     PSVT (paroxysmal supraventricular tachycardia) ([address]     Shingles outbreak 2012    SI (sacroiliac) pain     Stenosis     Vitamin D deficiency      Past Surgical History:   Procedure Laterality Date    CARDIAC CATHETERIZATION  10/2016    CERVICAL FUSION  12/14/11    Dr Vijay Mims with bone graft and plate    COLONOSCOPY      HAND FUSION  7/05    HYSTERECTOMY  2001    HYSTERECTOMY, TOTAL ABDOMINAL      JOINT REPLACEMENT Left     tka    MENISCECTOMY  10/31/11    left knee    OTHER SURGICAL HISTORY  10/26/09    CAUDALS BY DR Lisa Ventura    [facility]A 4/12/2017    COLONOSCOPY performed by Bob Starkey MD at [address] ESOPHAGOGASTRODUODENOSCOPY TRANSORAL DIAGNOSTIC N/A 4/12/2017    EGD ESOPHAGOGASTRODUODENOSCOPY performed by Bob Starkey MD at [facility]  12/2011    Dr Vijay Mims Lumbar and c spine    TOTAL KNEE ARTHROPLASTY  7/30/12    LEFT-DR AVELAR    UPPER GASTROINTESTINAL ENDOSCOPY  1995     Social History     Social History    Marital status:      Spouse name: N/A    Number of children: N/A    Years of education: N/A     Occupational History    Retired       Social History Main Topics Lateral malleolus and medial malleolus tenderness found. Achilles tendon normal.        Cervical back: She exhibits decreased range of motion, tenderness and bony tenderness. Thoracic back: She exhibits decreased range of motion, tenderness and bony tenderness. Lumbar back: She exhibits decreased range of motion, tenderness, bony tenderness and pain. She exhibits no swelling, no edema, no deformity, no laceration and normal pulse. Back:         Right upper arm: Normal.        Left upper arm: Normal.        Right forearm: Normal.        Left forearm: Normal.        Arms:       Right hand: She exhibits decreased range of motion, tenderness, bony tenderness and deformity. Left hand: She exhibits decreased range of motion, tenderness, bony tenderness and deformity. Right upper leg: Normal.        Left upper leg: Normal.        Right lower leg: Normal.        Left lower leg: Normal.        Legs:       Right foot: There is decreased range of motion, tenderness and bony tenderness. Left foot: There is decreased range of motion and tenderness. Tender areas are indicated by numbered spot         Lymphadenopathy:     She has no cervical adenopathy. Neurological: She is alert and oriented to person, place, and time. She displays abnormal reflex. She displays no atrophy and no tremor. No cranial nerve deficit or sensory deficit. She exhibits normal muscle tone. Coordination and gait normal. She displays no Babinski's sign on the right side. She displays no Babinski's sign on the left side. Skin: Skin is warm, dry and intact. No abrasion, no bruising, no ecchymosis, no laceration, no petechiae and no rash noted. Rash is not macular, not pustular and not urticarial. She is not diaphoretic. No cyanosis or erythema. No pallor. Nails show no clubbing. Psychiatric: Her speech is normal and behavior is normal. Judgment and thought content normal. Her mood appears not anxious.  Her Neg 07/28/2014    CANSU Neg 07/28/2014    COCAIMETSCRU Neg 07/28/2014    PHENCYCLIDINESCREENURINE Neg 06/97/7317    TRICYCLIC Neg 67/23/2418    DSCOMMENT see below 07/28/2014       Lab Results   Component Value Date    CODEINE Not Detected 12/02/2016    MORPHINE Not Detected 12/02/2016    Reena Oaklyn Not Detected 12/02/2016    OXYCODONE Not Detected 12/02/2016    NOROXYCODONE Not Detected 12/02/2016    NOROXYMU Not Detected 12/02/2016    HYDRCO Present 12/02/2016    NORHYDU Present 12/02/2016    HYDROMO Not Detected 12/02/2016    Anne Marie Maria Luisa Not Detected 12/02/2016    Garima Palma Not Detected 12/02/2016    FENTA Not Detected 12/02/2016    NORFENT Not Detected 12/02/2016    MEPERIDINE Not Detected 12/02/2016    TAPENU Not Detected 12/02/2016    TAPOSULFUR Not Detected 12/02/2016    METHADONE Not Detected 12/02/2016    LABPROP Not Detected 12/02/2016    TRAM Not Detected 12/02/2016    AMPH Not Detected 12/02/2016    METHAMP Not Detected 12/02/2016    MDMA Not Detected 12/02/2016    ECMDA Not Detected 12/02/2016       Lab Results   Component Value Date    METPHEN Not Detected 12/02/2016    PHENTERMINE Not Detected 12/02/2016    BENZOYL Not Detected 12/02/2016    Urban Hint Not Detected 12/02/2016    ALPHAOHALPRA Not Detected 12/02/2016    CLONAZEPAM Not Detected 12/02/2016    Murrieta Larch Not Detected 12/02/2016    DIAZEP Not Detected 12/02/2016    ROMAIN Not Detected 12/02/2016    OXAZ Not Detected 12/02/2016    Waynetta Bamberger Not Detected 12/02/2016    LORAZEPAM Not Detected 12/02/2016    MIDAZOLAM Not Detected 12/02/2016    ZOLPIDEM Not Detected 12/02/2016    NAI Not Detected 12/02/2016    ETG Not Detected 12/02/2016    MARIJMET Not Detected 12/02/2016    PCP Not Detected 12/02/2016    PAINMGTDRUGP See Below 12/02/2016    EERPAINMGTPA See Note 12/02/2016    LABCREA 129.7 08/09/2018         , I discussed results with patient. See follow-up plans for new studies.     Therapies:  HEP-gentle stretching and relaxation

## 2018-10-15 RX ORDER — ATORVASTATIN CALCIUM 10 MG/1
10 TABLET, FILM COATED ORAL NIGHTLY
Qty: 90 TABLET | Refills: 3 | Status: SHIPPED | OUTPATIENT
Start: 2018-10-15 | End: 2019-10-02 | Stop reason: SDUPTHER

## 2018-10-16 VITALS
WEIGHT: 210.6 LBS | RESPIRATION RATE: 18 BRPM | BODY MASS INDEX: 32.02 KG/M2 | SYSTOLIC BLOOD PRESSURE: 145 MMHG | TEMPERATURE: 98.2 F | HEART RATE: 70 BPM | DIASTOLIC BLOOD PRESSURE: 68 MMHG

## 2018-10-16 NOTE — PROGRESS NOTES
Heart rate regular. Abdomen: Nondistended. Lower Extremities: Trace edema bilateral. Tubigrips are in place. ASSESSMENT AND PLAN: Lower extremity edema and bilateral fasciitis foot pain is under better control. She is losing water weight. We will continue to check her BMP 1 to 2 times a week and watch her kidney function while she is on the antibiotics and she is being followed by infectious disease.           Electronically Signed By: WAYLON Montoya GNP-BC on 10/03/2018 16:36:48  ______________________________  WAYLON Montoya GNP-BC  /[id number]  D: 09/30/2018 19:16:20  T: 10/01/2018 15:50:07    cc: [facility]

## 2018-10-22 VITALS — TEMPERATURE: 97.8 F

## 2018-10-24 ENCOUNTER — OFFICE VISIT (OUTPATIENT)
Dept: INTERNAL MEDICINE CLINIC | Age: 63
End: 2018-10-24
Payer: MEDICARE

## 2018-10-24 VITALS
DIASTOLIC BLOOD PRESSURE: 60 MMHG | HEART RATE: 87 BPM | OXYGEN SATURATION: 98 % | RESPIRATION RATE: 16 BRPM | HEIGHT: 68 IN | WEIGHT: 200.8 LBS | SYSTOLIC BLOOD PRESSURE: 122 MMHG | BODY MASS INDEX: 30.43 KG/M2 | TEMPERATURE: 97.7 F

## 2018-10-24 DIAGNOSIS — Z12.31 VISIT FOR SCREENING MAMMOGRAM: ICD-10-CM

## 2018-10-24 DIAGNOSIS — I10 BENIGN ESSENTIAL HYPERTENSION: ICD-10-CM

## 2018-10-24 DIAGNOSIS — H61.22 EXCESSIVE CERUMEN IN LEFT EAR CANAL: ICD-10-CM

## 2018-10-24 DIAGNOSIS — K68.12 PSOAS ABSCESS, RIGHT (HCC): ICD-10-CM

## 2018-10-24 DIAGNOSIS — M46.26 OSTEOMYELITIS OF LUMBAR VERTEBRA (HCC): Primary | ICD-10-CM

## 2018-10-24 PROCEDURE — G8399 PT W/DXA RESULTS DOCUMENT: HCPCS | Performed by: FAMILY MEDICINE

## 2018-10-24 PROCEDURE — G8598 ASA/ANTIPLAT THER USED: HCPCS | Performed by: FAMILY MEDICINE

## 2018-10-24 PROCEDURE — 4040F PNEUMOC VAC/ADMIN/RCVD: CPT | Performed by: FAMILY MEDICINE

## 2018-10-24 PROCEDURE — 1101F PT FALLS ASSESS-DOCD LE1/YR: CPT | Performed by: FAMILY MEDICINE

## 2018-10-24 PROCEDURE — 1036F TOBACCO NON-USER: CPT | Performed by: FAMILY MEDICINE

## 2018-10-24 PROCEDURE — 1090F PRES/ABSN URINE INCON ASSESS: CPT | Performed by: FAMILY MEDICINE

## 2018-10-24 PROCEDURE — G8417 CALC BMI ABV UP PARAM F/U: HCPCS | Performed by: FAMILY MEDICINE

## 2018-10-24 PROCEDURE — 69210 REMOVE IMPACTED EAR WAX UNI: CPT | Performed by: FAMILY MEDICINE

## 2018-10-24 PROCEDURE — 1123F ACP DISCUSS/DSCN MKR DOCD: CPT | Performed by: FAMILY MEDICINE

## 2018-10-24 PROCEDURE — 99214 OFFICE O/P EST MOD 30 MIN: CPT | Performed by: FAMILY MEDICINE

## 2018-10-24 PROCEDURE — G8427 DOCREV CUR MEDS BY ELIG CLIN: HCPCS | Performed by: FAMILY MEDICINE

## 2018-10-24 PROCEDURE — 3017F COLORECTAL CA SCREEN DOC REV: CPT | Performed by: FAMILY MEDICINE

## 2018-10-24 PROCEDURE — G8482 FLU IMMUNIZE ORDER/ADMIN: HCPCS | Performed by: FAMILY MEDICINE

## 2018-10-24 NOTE — PROGRESS NOTES
Pulse: 87   Resp: 16   Temp: 97.7 °F (36.5 °C)   TempSrc: Oral   SpO2: 98%   Weight: 200 lb 12.8 oz (91.1 kg)   Height: 5' 8\" (1.727 m)      Body mass index is 30.53 kg/m². HPI    She is here to follow-up on how she's been doing badly for osteomyelitis and she says she still profoundly fatigued to the point where she just overwhelmed. She has to time taking her medicine metastases, getting to her. She has no nausea no vomiting no fever no chills no sweats she still has a lot of pain in her foot for the abscess started and in her back. She'll be seeing Dr. Gayatri Morris tomorrow and she's been following up closely with infectious disease. She feels like her hearing is diminished in her left ear. Review of Systems    Constitutional: positive for fatigue, negative for fever and sweats. HEENT: Negative for eye discharge and vision loss. Negative for ear drainage, hearing loss and nasal drainage. Respiratory: Negative for cough, dyspnea and wheezing. Cardiovascular:  Negative for chest pain, claudication and irregular heartbeat/palpitations. Gastrointestinal: Negative for abdominal pain, nausea, constipation and diarrhea. Genitourinary: Negative for dysuria, patient had a hysterectomy. Metabolic/Endocrine: Negative for cold intolerance, heat intolerance, polydipsia and polyphagia. No unintended weight loss or weight gain. Neuro/Psychiatric: Negative for gait disturbance. Negative for psychiatric symptoms. Dermatologic: Negative for pruritus and rash. Musculoskeletal:positive for bone/joint symptoms. No numbness or tingling. No loss of function. Hematology: Negative for bleeding and easy bruising. Immunology:  Negative for environmental allergies and food allergies. Physical Exam    Patient's medication, allergies, past medical, surgical, social and family histories were reviewed and updated as appropriate.     PHYSICAL EXAM   General appearance: Alert oriented pleasant cooperative no acute

## 2018-11-01 ENCOUNTER — NURSE ONLY (OUTPATIENT)
Dept: PHYSICAL MEDICINE AND REHAB | Age: 63
End: 2018-11-01
Payer: MEDICARE

## 2018-11-01 DIAGNOSIS — M46.26 OSTEOMYELITIS OF LUMBAR VERTEBRA (HCC): ICD-10-CM

## 2018-11-01 DIAGNOSIS — M46.46 LUMBAR DISCITIS: ICD-10-CM

## 2018-11-01 DIAGNOSIS — A41.01 STAPHYLOCOCCUS AUREUS BACTEREMIA WITH SEPSIS (HCC): ICD-10-CM

## 2018-11-01 DIAGNOSIS — K68.12 PSOAS ABSCESS, RIGHT (HCC): ICD-10-CM

## 2018-11-01 DIAGNOSIS — E53.8 VITAMIN B 12 DEFICIENCY: Primary | ICD-10-CM

## 2018-11-01 LAB
ANION GAP SERPL CALCULATED.3IONS-SCNC: 17 MEQ/L (ref 7–13)
BUN BLDV-MCNC: 40 MG/DL (ref 8–23)
C-REACTIVE PROTEIN: 0.6 MG/L (ref 0–5)
CALCIUM SERPL-MCNC: 9.9 MG/DL (ref 8.6–10.2)
CHLORIDE BLD-SCNC: 103 MEQ/L (ref 98–107)
CO2: 22 MEQ/L (ref 22–29)
CREAT SERPL-MCNC: 1.38 MG/DL (ref 0.5–0.9)
GFR AFRICAN AMERICAN: 46.4
GFR NON-AFRICAN AMERICAN: 38.3
GLUCOSE BLD-MCNC: 97 MG/DL (ref 74–109)
HCT VFR BLD CALC: 37.4 % (ref 37–47)
HEMOGLOBIN: 12.5 G/DL (ref 12–16)
MCH RBC QN AUTO: 33.3 PG (ref 27–31.3)
MCHC RBC AUTO-ENTMCNC: 33.4 % (ref 33–37)
MCV RBC AUTO: 99.6 FL (ref 82–100)
PDW BLD-RTO: 14.2 % (ref 11.5–14.5)
PLATELET # BLD: 200 K/UL (ref 130–400)
POTASSIUM SERPL-SCNC: 5.1 MEQ/L (ref 3.5–5.1)
RBC # BLD: 3.76 M/UL (ref 4.2–5.4)
SODIUM BLD-SCNC: 142 MEQ/L (ref 132–144)
WBC # BLD: 6.1 K/UL (ref 4.8–10.8)

## 2018-11-01 PROCEDURE — 96372 THER/PROPH/DIAG INJ SC/IM: CPT | Performed by: PHYSICAL MEDICINE & REHABILITATION

## 2018-11-01 RX ORDER — CYANOCOBALAMIN 1000 UG/ML
1000 INJECTION INTRAMUSCULAR; SUBCUTANEOUS ONCE
Status: COMPLETED | OUTPATIENT
Start: 2018-11-01 | End: 2018-11-01

## 2018-11-01 RX ADMIN — CYANOCOBALAMIN 1000 MCG: 1000 INJECTION INTRAMUSCULAR; SUBCUTANEOUS at 09:32

## 2018-11-08 DIAGNOSIS — M46.46 LUMBAR DISCITIS: ICD-10-CM

## 2018-11-08 DIAGNOSIS — M15.9 PRIMARY OSTEOARTHRITIS INVOLVING MULTIPLE JOINTS: ICD-10-CM

## 2018-11-08 DIAGNOSIS — M48.062 SPINAL STENOSIS OF LUMBAR REGION WITH NEUROGENIC CLAUDICATION: ICD-10-CM

## 2018-11-08 DIAGNOSIS — Z79.899 HIGH RISK MEDICATION USE: ICD-10-CM

## 2018-11-08 RX ORDER — OXYCODONE HCL 10 MG/1
10 TABLET, FILM COATED, EXTENDED RELEASE ORAL 2 TIMES DAILY
Qty: 60 TABLET | Refills: 0 | Status: SHIPPED | OUTPATIENT
Start: 2018-11-10 | End: 2018-12-07 | Stop reason: SDUPTHER

## 2018-11-08 RX ORDER — OXYCODONE AND ACETAMINOPHEN 7.5; 325 MG/1; MG/1
1 TABLET ORAL EVERY 6 HOURS PRN
Qty: 90 TABLET | Refills: 0 | Status: SHIPPED | OUTPATIENT
Start: 2018-11-10 | End: 2018-12-07 | Stop reason: SDUPTHER

## 2018-11-12 ENCOUNTER — OFFICE VISIT (OUTPATIENT)
Dept: INFECTIOUS DISEASES | Age: 63
End: 2018-11-12
Payer: MEDICARE

## 2018-11-12 VITALS
BODY MASS INDEX: 30.92 KG/M2 | HEIGHT: 68 IN | HEART RATE: 88 BPM | SYSTOLIC BLOOD PRESSURE: 131 MMHG | RESPIRATION RATE: 22 BRPM | WEIGHT: 204 LBS | TEMPERATURE: 97.5 F | DIASTOLIC BLOOD PRESSURE: 64 MMHG

## 2018-11-12 DIAGNOSIS — M46.26 OSTEOMYELITIS OF LUMBAR VERTEBRA (HCC): Primary | ICD-10-CM

## 2018-11-12 DIAGNOSIS — M46.46 LUMBAR DISCITIS: ICD-10-CM

## 2018-11-12 PROCEDURE — 3017F COLORECTAL CA SCREEN DOC REV: CPT | Performed by: INTERNAL MEDICINE

## 2018-11-12 PROCEDURE — 1090F PRES/ABSN URINE INCON ASSESS: CPT | Performed by: INTERNAL MEDICINE

## 2018-11-12 PROCEDURE — G8417 CALC BMI ABV UP PARAM F/U: HCPCS | Performed by: INTERNAL MEDICINE

## 2018-11-12 PROCEDURE — 1123F ACP DISCUSS/DSCN MKR DOCD: CPT | Performed by: INTERNAL MEDICINE

## 2018-11-12 PROCEDURE — 99214 OFFICE O/P EST MOD 30 MIN: CPT | Performed by: INTERNAL MEDICINE

## 2018-11-12 PROCEDURE — 4040F PNEUMOC VAC/ADMIN/RCVD: CPT | Performed by: INTERNAL MEDICINE

## 2018-11-12 PROCEDURE — G8399 PT W/DXA RESULTS DOCUMENT: HCPCS | Performed by: INTERNAL MEDICINE

## 2018-11-12 PROCEDURE — G8482 FLU IMMUNIZE ORDER/ADMIN: HCPCS | Performed by: INTERNAL MEDICINE

## 2018-11-12 PROCEDURE — G8427 DOCREV CUR MEDS BY ELIG CLIN: HCPCS | Performed by: INTERNAL MEDICINE

## 2018-11-12 PROCEDURE — G8598 ASA/ANTIPLAT THER USED: HCPCS | Performed by: INTERNAL MEDICINE

## 2018-11-12 PROCEDURE — 1036F TOBACCO NON-USER: CPT | Performed by: INTERNAL MEDICINE

## 2018-11-12 PROCEDURE — 1101F PT FALLS ASSESS-DOCD LE1/YR: CPT | Performed by: INTERNAL MEDICINE

## 2018-11-12 RX ORDER — DOXYCYCLINE HYCLATE 100 MG
100 TABLET ORAL 2 TIMES DAILY
COMMUNITY
End: 2019-07-02 | Stop reason: SDUPTHER

## 2018-11-14 DIAGNOSIS — I10 BENIGN ESSENTIAL HYPERTENSION: ICD-10-CM

## 2018-11-14 RX ORDER — FUROSEMIDE 40 MG/1
40 TABLET ORAL DAILY
Qty: 30 TABLET | Refills: 5 | OUTPATIENT
Start: 2018-11-14

## 2018-11-16 ENCOUNTER — TELEPHONE (OUTPATIENT)
Dept: GASTROENTEROLOGY | Age: 63
End: 2018-11-16

## 2018-11-16 DIAGNOSIS — R10.11 ABDOMINAL PAIN, RIGHT UPPER QUADRANT: Primary | ICD-10-CM

## 2018-11-21 ENCOUNTER — OFFICE VISIT (OUTPATIENT)
Dept: INTERNAL MEDICINE CLINIC | Age: 63
End: 2018-11-21
Payer: MEDICARE

## 2018-11-21 VITALS
TEMPERATURE: 97.8 F | SYSTOLIC BLOOD PRESSURE: 98 MMHG | HEART RATE: 87 BPM | DIASTOLIC BLOOD PRESSURE: 54 MMHG | RESPIRATION RATE: 17 BRPM | BODY MASS INDEX: 31.38 KG/M2 | WEIGHT: 206.4 LBS | OXYGEN SATURATION: 99 %

## 2018-11-21 DIAGNOSIS — M05.79 RHEUMATOID ARTHRITIS INVOLVING MULTIPLE SITES WITH POSITIVE RHEUMATOID FACTOR (HCC): ICD-10-CM

## 2018-11-21 DIAGNOSIS — R10.11 RIGHT UPPER QUADRANT ABDOMINAL PAIN: ICD-10-CM

## 2018-11-21 DIAGNOSIS — I10 BENIGN ESSENTIAL HYPERTENSION: ICD-10-CM

## 2018-11-21 DIAGNOSIS — N39.0 URINARY TRACT INFECTION WITHOUT HEMATURIA, SITE UNSPECIFIED: Primary | ICD-10-CM

## 2018-11-21 DIAGNOSIS — M46.36 INFECTION OF INTERVERTEBRAL DISC (PYOGENIC), LUMBAR REGION (HCC): ICD-10-CM

## 2018-11-21 LAB
BILIRUBIN, POC: NORMAL
BLOOD URINE, POC: NORMAL
CLARITY, POC: CLEAR
COLOR, POC: YELLOW
GLUCOSE URINE, POC: NORMAL
KETONES, POC: NORMAL
LEUKOCYTE EST, POC: NORMAL
NITRITE, POC: NORMAL
PH, POC: 6
PROTEIN, POC: NORMAL
SPECIFIC GRAVITY, POC: 1.01
UROBILINOGEN, POC: NORMAL

## 2018-11-21 PROCEDURE — G8598 ASA/ANTIPLAT THER USED: HCPCS | Performed by: FAMILY MEDICINE

## 2018-11-21 PROCEDURE — G8482 FLU IMMUNIZE ORDER/ADMIN: HCPCS | Performed by: FAMILY MEDICINE

## 2018-11-21 PROCEDURE — 3017F COLORECTAL CA SCREEN DOC REV: CPT | Performed by: FAMILY MEDICINE

## 2018-11-21 PROCEDURE — 99214 OFFICE O/P EST MOD 30 MIN: CPT | Performed by: FAMILY MEDICINE

## 2018-11-21 PROCEDURE — G8417 CALC BMI ABV UP PARAM F/U: HCPCS | Performed by: FAMILY MEDICINE

## 2018-11-21 PROCEDURE — 1101F PT FALLS ASSESS-DOCD LE1/YR: CPT | Performed by: FAMILY MEDICINE

## 2018-11-21 PROCEDURE — G8427 DOCREV CUR MEDS BY ELIG CLIN: HCPCS | Performed by: FAMILY MEDICINE

## 2018-11-21 PROCEDURE — 1090F PRES/ABSN URINE INCON ASSESS: CPT | Performed by: FAMILY MEDICINE

## 2018-11-21 PROCEDURE — 81002 URINALYSIS NONAUTO W/O SCOPE: CPT | Performed by: FAMILY MEDICINE

## 2018-11-21 PROCEDURE — G8399 PT W/DXA RESULTS DOCUMENT: HCPCS | Performed by: FAMILY MEDICINE

## 2018-11-21 PROCEDURE — 1123F ACP DISCUSS/DSCN MKR DOCD: CPT | Performed by: FAMILY MEDICINE

## 2018-11-21 PROCEDURE — 1036F TOBACCO NON-USER: CPT | Performed by: FAMILY MEDICINE

## 2018-11-21 PROCEDURE — 4040F PNEUMOC VAC/ADMIN/RCVD: CPT | Performed by: FAMILY MEDICINE

## 2018-11-21 RX ORDER — CIPROFLOXACIN 500 MG/1
500 TABLET, FILM COATED ORAL 2 TIMES DAILY
Qty: 20 TABLET | Refills: 0 | Status: SHIPPED | OUTPATIENT
Start: 2018-11-21 | End: 2020-08-10 | Stop reason: SDUPTHER

## 2018-11-21 RX ORDER — FUROSEMIDE 20 MG/1
20 TABLET ORAL DAILY
Qty: 30 TABLET | Refills: 5 | Status: SHIPPED | OUTPATIENT
Start: 2018-11-21 | End: 2018-12-19 | Stop reason: SDUPTHER

## 2018-11-21 NOTE — PROGRESS NOTES
weight gain. Neuro/Psychiatric: Negative for gait disturbance. Negative for psychiatric symptoms. Dermatologic: Negative for pruritus and rash. Musculoskeletal: positive for bone/joint symptoms. No numbness or tingling. No loss of function. Hematology: Negative for bleeding and easy bruising. Immunology:  Negative for environmental allergies and food allergies. Physical Exam    Patient's medication, allergies, past medical, surgical, social and family histories were reviewed and updated as appropriate. PHYSICAL EXAM   General appearance: Alert oriented pleasant cooperative in no acute distress. HEENT: Eyes clear nonicteric facial muscles symmetrical.  Color good  Lungs: Clear to auscultation  Heart: Regular rate and rhythm  Extremities: She has rheumatic deformities of her hands bilaterally the left hand long finger is swollen but it's not red it is somewhat tender. Urine today in the office shows 2+ leukocytes we'll send for CNS and start her on some medication. Assessment:   Diagnosis Orders   1. Urinary tract infection without hematuria, site unspecified  POCT Urinalysis no Micro    ciprofloxacin (CIPRO) 500 MG tablet   2. Benign essential hypertension  furosemide (LASIX) 20 MG tablet  Her blood pressure is low and she's been using the Lasix for fluid or fluid is much less oriented decrease the dose to 20 mg a day we'll follow-up with her we'll check her electrolytes and consider whether not we need to discontinue it. 3. Infection of intervertebral disc (pyogenic), lumbar region ([address]     4. Right upper quadrant abdominal pain  Per Dr. Melissa Lim she's given ultrasound soon    5.  Rheumatoid arthritis involving multiple sites with positive rheumatoid factor (HCC)  She's to follow-up with either her orthopedic surgeon for joint injection or the new rheumatologist.                 Plan:  Current Outpatient Prescriptions   Medication Sig Dispense Refill    furosemide (LASIX) 20 MG tablet Take

## 2018-11-23 ENCOUNTER — HOSPITAL ENCOUNTER (OUTPATIENT)
Dept: ULTRASOUND IMAGING | Age: 63
Discharge: HOME | End: 2018-11-25
Payer: MEDICARE

## 2018-11-23 DIAGNOSIS — R10.11 ABDOMINAL PAIN, RIGHT UPPER QUADRANT: ICD-10-CM

## 2018-11-23 PROCEDURE — 76705 ECHO EXAM OF ABDOMEN: CPT

## 2018-11-24 LAB
ORGANISM: ABNORMAL
URINE CULTURE, ROUTINE: ABNORMAL
URINE CULTURE, ROUTINE: ABNORMAL

## 2018-11-27 DIAGNOSIS — M46.26 OSTEOMYELITIS OF LUMBAR VERTEBRA (HCC): ICD-10-CM

## 2018-11-27 DIAGNOSIS — G57.93 NEUROPATHY INVOLVING BOTH LOWER EXTREMITIES: Primary | ICD-10-CM

## 2018-11-29 RX ORDER — GABAPENTIN 300 MG/1
CAPSULE ORAL
Qty: 90 CAPSULE | Refills: 1 | Status: SHIPPED | OUTPATIENT
Start: 2018-11-29 | End: 2019-01-07 | Stop reason: SDUPTHER

## 2018-11-30 ENCOUNTER — TELEPHONE (OUTPATIENT)
Dept: INFECTIOUS DISEASES | Age: 63
End: 2018-11-30

## 2018-11-30 NOTE — TELEPHONE ENCOUNTER
Patient states she takes an Oral Abx DAILY As Prescribed. But Fears she may have missed taking them for 5-7 days, as the Doxy is similar is look as her other medication. Wants to Update Dr Kamryn Stevens.

## 2018-12-03 ENCOUNTER — NURSE ONLY (OUTPATIENT)
Dept: PHYSICAL MEDICINE AND REHAB | Age: 63
End: 2018-12-03
Payer: MEDICARE

## 2018-12-03 DIAGNOSIS — E53.8 VITAMIN B 12 DEFICIENCY: Primary | ICD-10-CM

## 2018-12-03 PROCEDURE — 96372 THER/PROPH/DIAG INJ SC/IM: CPT | Performed by: PHYSICAL MEDICINE & REHABILITATION

## 2018-12-03 RX ORDER — CYANOCOBALAMIN 1000 UG/ML
1000 INJECTION INTRAMUSCULAR; SUBCUTANEOUS ONCE
Status: COMPLETED | OUTPATIENT
Start: 2018-12-03 | End: 2018-12-03

## 2018-12-03 RX ADMIN — CYANOCOBALAMIN 1000 MCG: 1000 INJECTION INTRAMUSCULAR; SUBCUTANEOUS at 09:29

## 2018-12-07 DIAGNOSIS — M48.062 SPINAL STENOSIS OF LUMBAR REGION WITH NEUROGENIC CLAUDICATION: ICD-10-CM

## 2018-12-07 DIAGNOSIS — Z79.899 HIGH RISK MEDICATION USE: ICD-10-CM

## 2018-12-07 DIAGNOSIS — M15.9 PRIMARY OSTEOARTHRITIS INVOLVING MULTIPLE JOINTS: ICD-10-CM

## 2018-12-07 DIAGNOSIS — M46.46 LUMBAR DISCITIS: ICD-10-CM

## 2018-12-07 RX ORDER — OXYCODONE AND ACETAMINOPHEN 7.5; 325 MG/1; MG/1
1 TABLET ORAL EVERY 6 HOURS PRN
Qty: 90 TABLET | Refills: 0 | Status: SHIPPED | OUTPATIENT
Start: 2018-12-10 | End: 2019-01-07 | Stop reason: SDUPTHER

## 2018-12-07 RX ORDER — OXYCODONE HCL 10 MG/1
10 TABLET, FILM COATED, EXTENDED RELEASE ORAL 2 TIMES DAILY
Qty: 60 TABLET | Refills: 0 | Status: SHIPPED | OUTPATIENT
Start: 2018-12-10 | End: 2019-01-07 | Stop reason: SDUPTHER

## 2018-12-11 ENCOUNTER — OFFICE VISIT (OUTPATIENT)
Dept: PHYSICAL MEDICINE AND REHAB | Age: 63
End: 2018-12-11
Payer: MEDICARE

## 2018-12-11 VITALS
WEIGHT: 204 LBS | DIASTOLIC BLOOD PRESSURE: 62 MMHG | BODY MASS INDEX: 29.2 KG/M2 | SYSTOLIC BLOOD PRESSURE: 110 MMHG | HEIGHT: 70 IN

## 2018-12-11 DIAGNOSIS — Z79.899 HIGH RISK MEDICATION USE: ICD-10-CM

## 2018-12-11 DIAGNOSIS — M48.062 SPINAL STENOSIS OF LUMBAR REGION WITH NEUROGENIC CLAUDICATION: Primary | ICD-10-CM

## 2018-12-11 DIAGNOSIS — M46.26 OSTEOMYELITIS OF LUMBAR VERTEBRA (HCC): ICD-10-CM

## 2018-12-11 DIAGNOSIS — M54.16 LUMBAR BACK PAIN WITH RADICULOPATHY AFFECTING RIGHT LOWER EXTREMITY: ICD-10-CM

## 2018-12-11 DIAGNOSIS — M46.36 INFECTION OF INTERVERTEBRAL DISC (PYOGENIC), LUMBAR REGION (HCC): ICD-10-CM

## 2018-12-11 DIAGNOSIS — M79.10 MYALGIA: ICD-10-CM

## 2018-12-11 DIAGNOSIS — K68.12 PSOAS ABSCESS, RIGHT (HCC): ICD-10-CM

## 2018-12-11 DIAGNOSIS — G57.93 NEUROPATHY INVOLVING BOTH LOWER EXTREMITIES: ICD-10-CM

## 2018-12-11 PROCEDURE — 4040F PNEUMOC VAC/ADMIN/RCVD: CPT | Performed by: PHYSICAL MEDICINE & REHABILITATION

## 2018-12-11 PROCEDURE — 1090F PRES/ABSN URINE INCON ASSESS: CPT | Performed by: PHYSICAL MEDICINE & REHABILITATION

## 2018-12-11 PROCEDURE — 3017F COLORECTAL CA SCREEN DOC REV: CPT | Performed by: PHYSICAL MEDICINE & REHABILITATION

## 2018-12-11 PROCEDURE — G8598 ASA/ANTIPLAT THER USED: HCPCS | Performed by: PHYSICAL MEDICINE & REHABILITATION

## 2018-12-11 PROCEDURE — G8399 PT W/DXA RESULTS DOCUMENT: HCPCS | Performed by: PHYSICAL MEDICINE & REHABILITATION

## 2018-12-11 PROCEDURE — G8482 FLU IMMUNIZE ORDER/ADMIN: HCPCS | Performed by: PHYSICAL MEDICINE & REHABILITATION

## 2018-12-11 PROCEDURE — G8417 CALC BMI ABV UP PARAM F/U: HCPCS | Performed by: PHYSICAL MEDICINE & REHABILITATION

## 2018-12-11 PROCEDURE — 1036F TOBACCO NON-USER: CPT | Performed by: PHYSICAL MEDICINE & REHABILITATION

## 2018-12-11 PROCEDURE — 1123F ACP DISCUSS/DSCN MKR DOCD: CPT | Performed by: PHYSICAL MEDICINE & REHABILITATION

## 2018-12-11 PROCEDURE — 1101F PT FALLS ASSESS-DOCD LE1/YR: CPT | Performed by: PHYSICAL MEDICINE & REHABILITATION

## 2018-12-11 PROCEDURE — G8427 DOCREV CUR MEDS BY ELIG CLIN: HCPCS | Performed by: PHYSICAL MEDICINE & REHABILITATION

## 2018-12-11 PROCEDURE — 99214 OFFICE O/P EST MOD 30 MIN: CPT | Performed by: PHYSICAL MEDICINE & REHABILITATION

## 2018-12-11 ASSESSMENT — ENCOUNTER SYMPTOMS
ABDOMINAL PAIN: 0
DIARRHEA: 0
ALLERGIC/IMMUNOLOGIC NEGATIVE: 1
BACK PAIN: 1
NAUSEA: 0
SHORTNESS OF BREATH: 0
CHEST TIGHTNESS: 0
WHEEZING: 0
VOMITING: 0
PHOTOPHOBIA: 1

## 2018-12-11 NOTE — PROGRESS NOTES
Injection options were discussed. Patient gave verbal consent to ordered injections. See follow-up plans for planned injections. Supplements:  Vitamin D with increased dosing during the winter months,   Education was given on:   Dietary and Fitness--daily stretches and low carb diet             Follow up with Primary Care Physician regarding their general medical needs. They are to follow up in 2 months to review medication, efficacy of injections, pill counts, OARRS check, SOAPPR assessment, review diagnostics, to review previous and future treatment plans and assess appropriateness for continued therapy.         New Diagnostics  Orders Placed This Encounter   Procedures    FL INJECT TRIGGER POINTS, 3 OR GREATER    FL INJECT TRIGGER POINT, 1 OR 2       Rosaline Peterson DO

## 2018-12-13 ENCOUNTER — TELEPHONE (OUTPATIENT)
Dept: INFECTIOUS DISEASES | Age: 63
End: 2018-12-13

## 2018-12-13 NOTE — TELEPHONE ENCOUNTER
Request Refill of Doxy 100 mg, po. Patient was asvised as of 11-12-18 to take One/day. Last seen 11-12-18, Next F/u 2-18-19. Will send request to Dr Cecil Correa.

## 2018-12-14 NOTE — TELEPHONE ENCOUNTER
Forms completed and sent via Fax. Call to patient Home #. [id number]. Reminded of Labs to be drawn 1st of Feb, 2019 and next F/u is 2-18-19.

## 2018-12-19 ENCOUNTER — OFFICE VISIT (OUTPATIENT)
Dept: INTERNAL MEDICINE CLINIC | Age: 63
End: 2018-12-19
Payer: MEDICARE

## 2018-12-19 VITALS
WEIGHT: 204.2 LBS | DIASTOLIC BLOOD PRESSURE: 64 MMHG | SYSTOLIC BLOOD PRESSURE: 130 MMHG | RESPIRATION RATE: 16 BRPM | BODY MASS INDEX: 30.95 KG/M2 | HEIGHT: 68 IN | OXYGEN SATURATION: 95 % | HEART RATE: 65 BPM | TEMPERATURE: 98.1 F

## 2018-12-19 DIAGNOSIS — R13.19 ESOPHAGEAL DYSPHAGIA: ICD-10-CM

## 2018-12-19 DIAGNOSIS — M25.473 ANKLE SWELLING, UNSPECIFIED LATERALITY: ICD-10-CM

## 2018-12-19 DIAGNOSIS — I10 BENIGN ESSENTIAL HYPERTENSION: Primary | ICD-10-CM

## 2018-12-19 PROBLEM — M54.16 LUMBAR BACK PAIN WITH RADICULOPATHY AFFECTING RIGHT LOWER EXTREMITY: Status: RESOLVED | Noted: 2018-08-13 | Resolved: 2018-12-19

## 2018-12-19 PROCEDURE — 4040F PNEUMOC VAC/ADMIN/RCVD: CPT | Performed by: FAMILY MEDICINE

## 2018-12-19 PROCEDURE — G8482 FLU IMMUNIZE ORDER/ADMIN: HCPCS | Performed by: FAMILY MEDICINE

## 2018-12-19 PROCEDURE — G8427 DOCREV CUR MEDS BY ELIG CLIN: HCPCS | Performed by: FAMILY MEDICINE

## 2018-12-19 PROCEDURE — 3017F COLORECTAL CA SCREEN DOC REV: CPT | Performed by: FAMILY MEDICINE

## 2018-12-19 PROCEDURE — 1090F PRES/ABSN URINE INCON ASSESS: CPT | Performed by: FAMILY MEDICINE

## 2018-12-19 PROCEDURE — 1101F PT FALLS ASSESS-DOCD LE1/YR: CPT | Performed by: FAMILY MEDICINE

## 2018-12-19 PROCEDURE — G8598 ASA/ANTIPLAT THER USED: HCPCS | Performed by: FAMILY MEDICINE

## 2018-12-19 PROCEDURE — 1036F TOBACCO NON-USER: CPT | Performed by: FAMILY MEDICINE

## 2018-12-19 PROCEDURE — 99214 OFFICE O/P EST MOD 30 MIN: CPT | Performed by: FAMILY MEDICINE

## 2018-12-19 PROCEDURE — G8417 CALC BMI ABV UP PARAM F/U: HCPCS | Performed by: FAMILY MEDICINE

## 2018-12-19 PROCEDURE — G8399 PT W/DXA RESULTS DOCUMENT: HCPCS | Performed by: FAMILY MEDICINE

## 2018-12-19 PROCEDURE — 1123F ACP DISCUSS/DSCN MKR DOCD: CPT | Performed by: FAMILY MEDICINE

## 2018-12-19 RX ORDER — AMMONIUM LACTATE 12 G/100G
CREAM TOPICAL
Refills: 5 | COMMUNITY
Start: 2018-11-27 | End: 2021-03-09

## 2018-12-19 RX ORDER — FUROSEMIDE 40 MG/1
40 TABLET ORAL DAILY
Qty: 30 TABLET | Refills: 5
Start: 2018-12-19 | End: 2018-12-19 | Stop reason: SDUPTHER

## 2018-12-19 RX ORDER — LIDOCAINE 50 MG/G
OINTMENT TOPICAL
Refills: 5 | COMMUNITY
Start: 2018-11-27 | End: 2021-09-21 | Stop reason: ALTCHOICE

## 2018-12-19 RX ORDER — FUROSEMIDE 40 MG/1
40 TABLET ORAL DAILY
Qty: 90 TABLET | Refills: 3 | Status: ON HOLD | OUTPATIENT
Start: 2018-12-19 | End: 2019-07-11

## 2018-12-19 NOTE — PROGRESS NOTES
Norvasc [Amlodipine Besylate] Other (See Comments)     swelling    Keflex [Cephalexin] Rash       Vitals:    12/19/18 1327   BP: 130/64   Site: Right Upper Arm   Position: Sitting   Cuff Size: Large Adult   Pulse: 65   Resp: 16   Temp: 98.1 °F (36.7 °C)   TempSrc: Oral   SpO2: 95%   Weight: 204 lb 3.2 oz (92.6 kg)   Height: 5' 8\" (1.727 m)      Body mass index is 31.05 kg/m². HPI    She comes in today to follow-up on her blood pressure or swelling and her treatment for her infection. She's actually still on antibiotics and doing well she's developed some new symptoms one of which was we lowered her for a cemented her now her ankles are starting to swell again. She has no cough no chills no chest pain or shortness of breath. She has noticed that she's had a couple times where she feels like to discuss stuck in her chest.  Even if she swallows a liquid. Doesn't happen very often and she has to wait in the relaxants it's very uncomfortable. She had an EGD done last year was unremarkable. We spent 40 minutes of the appointment discussing her mother's death and the circumstances around it was a lot of family interaction and trauma and miss communications and is just's causes patient a lot of a lot of pain. She is doing well and has good support system in her family and she is coping with it. Review of Systems    Constitutional: Negative for fever and sweats. Positive for fatigue  HEENT: Negative for eye discharge and vision loss. Negative for ear drainage, hearing loss and nasal drainage. Respiratory: Negative for cough, dyspnea and wheezing. Cardiovascular:  Negative for chest pain, claudication and irregular heartbeat/palpitations. Gastrointestinal: Negative for abdominal pain, nausea, constipation and diarrhea. Genitourinary: Negative for dysuria, hematuria, polyuria, dysmenorrhea, menorrhagia and vaginal discharge.   Metabolic/Endocrine: Negative for cold intolerance, heat intolerance, polydipsia and polyphagia. No unintended weight loss or weight gain. Neuro/Psychiatric: Negative for gait disturbance. Negative for psychiatric symptoms. Dermatologic: Negative for pruritus and rash. Musculoskeletal: Positive for bone/joint symptoms. No numbness or tingling. No loss of function. Hematology: Negative for bleeding and easy bruising. Immunology:  Negative for environmental allergies and food allergies. Physical Exam    Patient's medication, allergies, past medical, surgical, social and family histories were reviewed and updated as appropriate. PHYSICAL EXAM   General appearance: Alert oriented pleasant cooperative in no acute distress. HEENT: Eyes clear nonicteric facial muscles symmetrical.  Color good  Neck: Soft nontender no adenopathy or masses  Lungs: Clear to auscultation no wheezes rhonchi or rales  Heart: Regular rate and rhythm without murmurs rubs or gallops  Extremities: She has some pedal edema bilaterally nonpitting no ankle tenderness neurologically intact and she has rheumatoid deformities of her hands. Assessment:   Diagnosis Orders   1. Benign essential hypertension  furosemide (LASIX) 40 MG tablet    DISCONTINUED: furosemide (LASIX) 40 MG tablet  Check lites at follow-up blood pressures adequately controlled    2. Esophageal dysphagia  She's going to try some probiotics over-the-counter and may be some active culture yogurt to see if perhaps she might have some yeast irritation in her esophagus from her antibiotics. If that doesn't work she'll try 14 days of over-the-counter Prilosec and if that doesn't work she'll let me know and we'll order some esophageal x-rays.      3. Ankle swelling, unspecified laterality  Increase Lasix as noted above                Plan:  Current Outpatient Prescriptions   Medication Sig Dispense Refill    ammonium lactate (AMLACTIN) 12 % cream APPLY TO DRY/CALLUS AREAS TWICE DAILY  5    furosemide (LASIX) 40 MG tablet Take 1 tablet by mouth daily

## 2019-01-07 DIAGNOSIS — M46.26 OSTEOMYELITIS OF LUMBAR VERTEBRA (HCC): ICD-10-CM

## 2019-01-07 DIAGNOSIS — G57.93 NEUROPATHY INVOLVING BOTH LOWER EXTREMITIES: ICD-10-CM

## 2019-01-07 DIAGNOSIS — M48.062 SPINAL STENOSIS OF LUMBAR REGION WITH NEUROGENIC CLAUDICATION: ICD-10-CM

## 2019-01-07 DIAGNOSIS — M15.9 PRIMARY OSTEOARTHRITIS INVOLVING MULTIPLE JOINTS: ICD-10-CM

## 2019-01-07 DIAGNOSIS — M46.46 LUMBAR DISCITIS: ICD-10-CM

## 2019-01-07 DIAGNOSIS — Z79.899 HIGH RISK MEDICATION USE: ICD-10-CM

## 2019-01-08 RX ORDER — OXYCODONE AND ACETAMINOPHEN 7.5; 325 MG/1; MG/1
1 TABLET ORAL EVERY 6 HOURS PRN
Qty: 90 TABLET | Refills: 0 | Status: SHIPPED | OUTPATIENT
Start: 2019-01-09 | End: 2019-01-25 | Stop reason: SDUPTHER

## 2019-01-08 RX ORDER — NALOXONE HYDROCHLORIDE 4 MG/.1ML
1 SPRAY NASAL PRN
Qty: 1 EACH | Refills: 2 | Status: SHIPPED | OUTPATIENT
Start: 2019-01-08 | End: 2019-01-09

## 2019-01-08 RX ORDER — OXYCODONE HCL 10 MG/1
10 TABLET, FILM COATED, EXTENDED RELEASE ORAL 2 TIMES DAILY
Qty: 60 TABLET | Refills: 0 | Status: SHIPPED | OUTPATIENT
Start: 2019-01-09 | End: 2019-01-25 | Stop reason: SDUPTHER

## 2019-01-08 RX ORDER — GABAPENTIN 300 MG/1
CAPSULE ORAL
Qty: 90 CAPSULE | Refills: 1 | Status: SHIPPED | OUTPATIENT
Start: 2019-01-08 | End: 2019-01-09 | Stop reason: SDUPTHER

## 2019-01-09 ENCOUNTER — OFFICE VISIT (OUTPATIENT)
Dept: INTERNAL MEDICINE CLINIC | Age: 64
End: 2019-01-09
Payer: MEDICARE

## 2019-01-09 VITALS
OXYGEN SATURATION: 98 % | RESPIRATION RATE: 16 BRPM | HEART RATE: 77 BPM | SYSTOLIC BLOOD PRESSURE: 118 MMHG | TEMPERATURE: 97.8 F | WEIGHT: 209.6 LBS | BODY MASS INDEX: 31.77 KG/M2 | HEIGHT: 68 IN | DIASTOLIC BLOOD PRESSURE: 60 MMHG

## 2019-01-09 DIAGNOSIS — I10 BENIGN ESSENTIAL HYPERTENSION: ICD-10-CM

## 2019-01-09 DIAGNOSIS — M05.79 RHEUMATOID ARTHRITIS INVOLVING MULTIPLE SITES WITH POSITIVE RHEUMATOID FACTOR (HCC): ICD-10-CM

## 2019-01-09 DIAGNOSIS — J02.9 PHARYNGITIS, UNSPECIFIED ETIOLOGY: ICD-10-CM

## 2019-01-09 DIAGNOSIS — J02.9 PHARYNGITIS, UNSPECIFIED ETIOLOGY: Primary | ICD-10-CM

## 2019-01-09 LAB — S PYO AG THROAT QL: NORMAL

## 2019-01-09 PROCEDURE — G8417 CALC BMI ABV UP PARAM F/U: HCPCS | Performed by: FAMILY MEDICINE

## 2019-01-09 PROCEDURE — G8399 PT W/DXA RESULTS DOCUMENT: HCPCS | Performed by: FAMILY MEDICINE

## 2019-01-09 PROCEDURE — 1101F PT FALLS ASSESS-DOCD LE1/YR: CPT | Performed by: FAMILY MEDICINE

## 2019-01-09 PROCEDURE — 1123F ACP DISCUSS/DSCN MKR DOCD: CPT | Performed by: FAMILY MEDICINE

## 2019-01-09 PROCEDURE — G8482 FLU IMMUNIZE ORDER/ADMIN: HCPCS | Performed by: FAMILY MEDICINE

## 2019-01-09 PROCEDURE — 99213 OFFICE O/P EST LOW 20 MIN: CPT | Performed by: FAMILY MEDICINE

## 2019-01-09 PROCEDURE — 87880 STREP A ASSAY W/OPTIC: CPT | Performed by: FAMILY MEDICINE

## 2019-01-09 PROCEDURE — 4040F PNEUMOC VAC/ADMIN/RCVD: CPT | Performed by: FAMILY MEDICINE

## 2019-01-09 PROCEDURE — 1090F PRES/ABSN URINE INCON ASSESS: CPT | Performed by: FAMILY MEDICINE

## 2019-01-09 PROCEDURE — 3017F COLORECTAL CA SCREEN DOC REV: CPT | Performed by: FAMILY MEDICINE

## 2019-01-09 PROCEDURE — 1036F TOBACCO NON-USER: CPT | Performed by: FAMILY MEDICINE

## 2019-01-09 PROCEDURE — G8427 DOCREV CUR MEDS BY ELIG CLIN: HCPCS | Performed by: FAMILY MEDICINE

## 2019-01-09 RX ORDER — AZITHROMYCIN 250 MG/1
TABLET, FILM COATED ORAL
Qty: 1 PACKET | Refills: 0 | Status: SHIPPED | OUTPATIENT
Start: 2019-01-09 | End: 2019-01-19

## 2019-01-12 LAB
ORGANISM: ABNORMAL
THROAT CULTURE: ABNORMAL
THROAT CULTURE: ABNORMAL

## 2019-01-14 RX ORDER — CLOTRIMAZOLE 10 MG/1
10 LOZENGE ORAL; TOPICAL
Qty: 50 TABLET | Refills: 0 | Status: SHIPPED | OUTPATIENT
Start: 2019-01-14 | End: 2019-01-24

## 2019-01-25 DIAGNOSIS — M46.46 LUMBAR DISCITIS: ICD-10-CM

## 2019-01-25 DIAGNOSIS — Z79.899 HIGH RISK MEDICATION USE: ICD-10-CM

## 2019-01-25 DIAGNOSIS — M15.9 PRIMARY OSTEOARTHRITIS INVOLVING MULTIPLE JOINTS: ICD-10-CM

## 2019-01-25 DIAGNOSIS — M48.062 SPINAL STENOSIS OF LUMBAR REGION WITH NEUROGENIC CLAUDICATION: ICD-10-CM

## 2019-01-25 RX ORDER — OXYCODONE AND ACETAMINOPHEN 7.5; 325 MG/1; MG/1
1 TABLET ORAL EVERY 6 HOURS PRN
Qty: 90 TABLET | Refills: 0 | Status: CANCELLED | OUTPATIENT
Start: 2019-02-07 | End: 2019-03-09

## 2019-01-25 RX ORDER — OXYCODONE HCL 10 MG/1
10 TABLET, FILM COATED, EXTENDED RELEASE ORAL 2 TIMES DAILY
Qty: 60 TABLET | Refills: 0 | Status: CANCELLED | OUTPATIENT
Start: 2019-02-07 | End: 2019-03-09

## 2019-01-26 RX ORDER — OXYCODONE HCL 10 MG/1
10 TABLET, FILM COATED, EXTENDED RELEASE ORAL 2 TIMES DAILY
Qty: 60 TABLET | Refills: 0 | Status: SHIPPED | OUTPATIENT
Start: 2019-02-07 | End: 2019-02-28 | Stop reason: SDUPTHER

## 2019-01-26 RX ORDER — OXYCODONE AND ACETAMINOPHEN 7.5; 325 MG/1; MG/1
1 TABLET ORAL EVERY 6 HOURS PRN
Qty: 90 TABLET | Refills: 0 | Status: SHIPPED | OUTPATIENT
Start: 2019-02-07 | End: 2019-02-28 | Stop reason: SDUPTHER

## 2019-01-28 DIAGNOSIS — M46.26 OSTEOMYELITIS OF LUMBAR VERTEBRA (HCC): ICD-10-CM

## 2019-01-28 DIAGNOSIS — G57.93 NEUROPATHY INVOLVING BOTH LOWER EXTREMITIES: ICD-10-CM

## 2019-01-28 DIAGNOSIS — M48.062 SPINAL STENOSIS OF LUMBAR REGION WITH NEUROGENIC CLAUDICATION: Primary | ICD-10-CM

## 2019-01-30 ENCOUNTER — OFFICE VISIT (OUTPATIENT)
Dept: INTERNAL MEDICINE CLINIC | Age: 64
End: 2019-01-30
Payer: MEDICARE

## 2019-01-30 VITALS
DIASTOLIC BLOOD PRESSURE: 60 MMHG | RESPIRATION RATE: 16 BRPM | TEMPERATURE: 97.8 F | WEIGHT: 208.6 LBS | HEART RATE: 81 BPM | BODY MASS INDEX: 31.61 KG/M2 | SYSTOLIC BLOOD PRESSURE: 104 MMHG | OXYGEN SATURATION: 98 % | HEIGHT: 68 IN

## 2019-01-30 DIAGNOSIS — I10 BENIGN ESSENTIAL HYPERTENSION: Primary | ICD-10-CM

## 2019-01-30 PROBLEM — M79.10 MYALGIA: Status: RESOLVED | Noted: 2018-10-11 | Resolved: 2019-01-30

## 2019-01-30 PROBLEM — K68.12 PSOAS ABSCESS, RIGHT (HCC): Status: RESOLVED | Noted: 2018-08-13 | Resolved: 2019-01-30

## 2019-01-30 PROBLEM — M46.26 OSTEOMYELITIS OF LUMBAR VERTEBRA (HCC): Status: RESOLVED | Noted: 2018-08-13 | Resolved: 2019-01-30

## 2019-01-30 PROBLEM — M46.36: Status: RESOLVED | Noted: 2018-08-13 | Resolved: 2019-01-30

## 2019-01-30 PROBLEM — R10.11 RIGHT UPPER QUADRANT ABDOMINAL PAIN: Status: RESOLVED | Noted: 2018-11-21 | Resolved: 2019-01-30

## 2019-01-30 PROCEDURE — 3017F COLORECTAL CA SCREEN DOC REV: CPT | Performed by: FAMILY MEDICINE

## 2019-01-30 PROCEDURE — G8417 CALC BMI ABV UP PARAM F/U: HCPCS | Performed by: FAMILY MEDICINE

## 2019-01-30 PROCEDURE — 4040F PNEUMOC VAC/ADMIN/RCVD: CPT | Performed by: FAMILY MEDICINE

## 2019-01-30 PROCEDURE — 1123F ACP DISCUSS/DSCN MKR DOCD: CPT | Performed by: FAMILY MEDICINE

## 2019-01-30 PROCEDURE — G8399 PT W/DXA RESULTS DOCUMENT: HCPCS | Performed by: FAMILY MEDICINE

## 2019-01-30 PROCEDURE — G8482 FLU IMMUNIZE ORDER/ADMIN: HCPCS | Performed by: FAMILY MEDICINE

## 2019-01-30 PROCEDURE — 99213 OFFICE O/P EST LOW 20 MIN: CPT | Performed by: FAMILY MEDICINE

## 2019-01-30 PROCEDURE — 1101F PT FALLS ASSESS-DOCD LE1/YR: CPT | Performed by: FAMILY MEDICINE

## 2019-01-30 PROCEDURE — 1090F PRES/ABSN URINE INCON ASSESS: CPT | Performed by: FAMILY MEDICINE

## 2019-01-30 PROCEDURE — 1036F TOBACCO NON-USER: CPT | Performed by: FAMILY MEDICINE

## 2019-01-30 PROCEDURE — G8427 DOCREV CUR MEDS BY ELIG CLIN: HCPCS | Performed by: FAMILY MEDICINE

## 2019-01-30 RX ORDER — CHOLECALCIFEROL (VITAMIN D3) 125 MCG
5 CAPSULE ORAL NIGHTLY
COMMUNITY

## 2019-01-30 RX ORDER — TOPIRAMATE 25 MG/1
25 TABLET ORAL NIGHTLY
COMMUNITY
End: 2019-02-28 | Stop reason: SDUPTHER

## 2019-02-01 DIAGNOSIS — M46.26 OSTEOMYELITIS OF LUMBAR VERTEBRA (HCC): ICD-10-CM

## 2019-02-01 DIAGNOSIS — I10 BENIGN ESSENTIAL HYPERTENSION: ICD-10-CM

## 2019-02-01 LAB
ANION GAP SERPL CALCULATED.3IONS-SCNC: 16 MEQ/L (ref 7–13)
BUN BLDV-MCNC: 45 MG/DL (ref 8–23)
C-REACTIVE PROTEIN: 1.6 MG/L (ref 0–5)
CALCIUM SERPL-MCNC: 10.1 MG/DL (ref 8.6–10.2)
CHLORIDE BLD-SCNC: 102 MEQ/L (ref 98–107)
CO2: 25 MEQ/L (ref 22–29)
CREAT SERPL-MCNC: 1.61 MG/DL (ref 0.5–0.9)
GFR AFRICAN AMERICAN: 38.8
GFR NON-AFRICAN AMERICAN: 32.1
GLUCOSE BLD-MCNC: 87 MG/DL (ref 74–109)
HCT VFR BLD CALC: 38.5 % (ref 37–47)
HEMOGLOBIN: 13.3 G/DL (ref 12–16)
MCH RBC QN AUTO: 32.8 PG (ref 27–31.3)
MCHC RBC AUTO-ENTMCNC: 34.6 % (ref 33–37)
MCV RBC AUTO: 94.8 FL (ref 82–100)
PDW BLD-RTO: 12.4 % (ref 11.5–14.5)
PLATELET # BLD: 199 K/UL (ref 130–400)
POTASSIUM SERPL-SCNC: 4.6 MEQ/L (ref 3.5–5.1)
RBC # BLD: 4.06 M/UL (ref 4.2–5.4)
SODIUM BLD-SCNC: 143 MEQ/L (ref 132–144)
WBC # BLD: 6.6 K/UL (ref 4.8–10.8)

## 2019-02-04 DIAGNOSIS — I10 BENIGN ESSENTIAL HYPERTENSION: ICD-10-CM

## 2019-02-04 RX ORDER — POTASSIUM CHLORIDE 750 MG/1
20 TABLET, EXTENDED RELEASE ORAL DAILY
Qty: 180 TABLET | Refills: 3 | Status: SHIPPED | OUTPATIENT
Start: 2019-02-04 | End: 2019-09-04

## 2019-02-07 DIAGNOSIS — I10 BENIGN ESSENTIAL HYPERTENSION: ICD-10-CM

## 2019-02-07 RX ORDER — LISINOPRIL 20 MG/1
TABLET ORAL
Qty: 90 TABLET | Refills: 0 | Status: SHIPPED | OUTPATIENT
Start: 2019-02-07 | End: 2019-04-30 | Stop reason: SDUPTHER

## 2019-02-25 ENCOUNTER — OFFICE VISIT (OUTPATIENT)
Dept: INFECTIOUS DISEASES | Age: 64
End: 2019-02-25
Payer: MEDICARE

## 2019-02-25 VITALS
DIASTOLIC BLOOD PRESSURE: 82 MMHG | BODY MASS INDEX: 30.58 KG/M2 | WEIGHT: 201.8 LBS | TEMPERATURE: 97.9 F | HEIGHT: 68 IN | RESPIRATION RATE: 22 BRPM | HEART RATE: 90 BPM | SYSTOLIC BLOOD PRESSURE: 145 MMHG

## 2019-02-25 DIAGNOSIS — M46.46 LUMBAR DISCITIS: Primary | ICD-10-CM

## 2019-02-25 DIAGNOSIS — A49.01 STAPH AUREUS INFECTION: ICD-10-CM

## 2019-02-25 PROCEDURE — 99213 OFFICE O/P EST LOW 20 MIN: CPT | Performed by: INTERNAL MEDICINE

## 2019-02-25 PROCEDURE — G8427 DOCREV CUR MEDS BY ELIG CLIN: HCPCS | Performed by: INTERNAL MEDICINE

## 2019-02-25 PROCEDURE — G8399 PT W/DXA RESULTS DOCUMENT: HCPCS | Performed by: INTERNAL MEDICINE

## 2019-02-25 PROCEDURE — 4040F PNEUMOC VAC/ADMIN/RCVD: CPT | Performed by: INTERNAL MEDICINE

## 2019-02-25 PROCEDURE — 1036F TOBACCO NON-USER: CPT | Performed by: INTERNAL MEDICINE

## 2019-02-25 PROCEDURE — 1101F PT FALLS ASSESS-DOCD LE1/YR: CPT | Performed by: INTERNAL MEDICINE

## 2019-02-25 PROCEDURE — 3017F COLORECTAL CA SCREEN DOC REV: CPT | Performed by: INTERNAL MEDICINE

## 2019-02-25 PROCEDURE — G8417 CALC BMI ABV UP PARAM F/U: HCPCS | Performed by: INTERNAL MEDICINE

## 2019-02-25 PROCEDURE — 1123F ACP DISCUSS/DSCN MKR DOCD: CPT | Performed by: INTERNAL MEDICINE

## 2019-02-25 PROCEDURE — 1090F PRES/ABSN URINE INCON ASSESS: CPT | Performed by: INTERNAL MEDICINE

## 2019-02-25 PROCEDURE — G8482 FLU IMMUNIZE ORDER/ADMIN: HCPCS | Performed by: INTERNAL MEDICINE

## 2019-02-25 ASSESSMENT — ENCOUNTER SYMPTOMS: BACK PAIN: 1

## 2019-02-28 ENCOUNTER — OFFICE VISIT (OUTPATIENT)
Dept: PHYSICAL MEDICINE AND REHAB | Age: 64
End: 2019-02-28
Payer: MEDICARE

## 2019-02-28 VITALS
DIASTOLIC BLOOD PRESSURE: 82 MMHG | SYSTOLIC BLOOD PRESSURE: 132 MMHG | HEIGHT: 68 IN | WEIGHT: 210 LBS | BODY MASS INDEX: 31.83 KG/M2

## 2019-02-28 DIAGNOSIS — M79.10 MYALGIA: Primary | ICD-10-CM

## 2019-02-28 DIAGNOSIS — E53.8 VITAMIN B12 DEFICIENCY: ICD-10-CM

## 2019-02-28 DIAGNOSIS — M15.9 PRIMARY OSTEOARTHRITIS INVOLVING MULTIPLE JOINTS: ICD-10-CM

## 2019-02-28 DIAGNOSIS — R53.82 CHRONIC FATIGUE: ICD-10-CM

## 2019-02-28 DIAGNOSIS — M46.46 LUMBAR DISCITIS: ICD-10-CM

## 2019-02-28 DIAGNOSIS — Z79.899 HIGH RISK MEDICATION USE: ICD-10-CM

## 2019-02-28 DIAGNOSIS — M48.062 SPINAL STENOSIS OF LUMBAR REGION WITH NEUROGENIC CLAUDICATION: ICD-10-CM

## 2019-02-28 PROCEDURE — 1101F PT FALLS ASSESS-DOCD LE1/YR: CPT | Performed by: PHYSICAL MEDICINE & REHABILITATION

## 2019-02-28 PROCEDURE — 1123F ACP DISCUSS/DSCN MKR DOCD: CPT | Performed by: PHYSICAL MEDICINE & REHABILITATION

## 2019-02-28 PROCEDURE — G8399 PT W/DXA RESULTS DOCUMENT: HCPCS | Performed by: PHYSICAL MEDICINE & REHABILITATION

## 2019-02-28 PROCEDURE — 4040F PNEUMOC VAC/ADMIN/RCVD: CPT | Performed by: PHYSICAL MEDICINE & REHABILITATION

## 2019-02-28 PROCEDURE — G8427 DOCREV CUR MEDS BY ELIG CLIN: HCPCS | Performed by: PHYSICAL MEDICINE & REHABILITATION

## 2019-02-28 PROCEDURE — G8417 CALC BMI ABV UP PARAM F/U: HCPCS | Performed by: PHYSICAL MEDICINE & REHABILITATION

## 2019-02-28 PROCEDURE — 1036F TOBACCO NON-USER: CPT | Performed by: PHYSICAL MEDICINE & REHABILITATION

## 2019-02-28 PROCEDURE — 99214 OFFICE O/P EST MOD 30 MIN: CPT | Performed by: PHYSICAL MEDICINE & REHABILITATION

## 2019-02-28 PROCEDURE — 1090F PRES/ABSN URINE INCON ASSESS: CPT | Performed by: PHYSICAL MEDICINE & REHABILITATION

## 2019-02-28 PROCEDURE — G8482 FLU IMMUNIZE ORDER/ADMIN: HCPCS | Performed by: PHYSICAL MEDICINE & REHABILITATION

## 2019-02-28 PROCEDURE — 3017F COLORECTAL CA SCREEN DOC REV: CPT | Performed by: PHYSICAL MEDICINE & REHABILITATION

## 2019-02-28 RX ORDER — CYANOCOBALAMIN 1000 UG/ML
1000 INJECTION INTRAMUSCULAR; SUBCUTANEOUS ONCE
Status: SHIPPED | OUTPATIENT
Start: 2019-02-28

## 2019-02-28 RX ORDER — TOPIRAMATE 25 MG/1
25 TABLET ORAL NIGHTLY
Qty: 60 TABLET | Refills: 1 | Status: SHIPPED | OUTPATIENT
Start: 2019-02-28 | End: 2019-06-27 | Stop reason: SDUPTHER

## 2019-02-28 RX ORDER — OXYCODONE AND ACETAMINOPHEN 7.5; 325 MG/1; MG/1
1 TABLET ORAL EVERY 6 HOURS PRN
Qty: 60 TABLET | Refills: 0 | Status: SHIPPED | OUTPATIENT
Start: 2019-02-28 | End: 2019-04-11 | Stop reason: SDUPTHER

## 2019-02-28 RX ORDER — OXYCODONE AND ACETAMINOPHEN 7.5; 325 MG/1; MG/1
1 TABLET ORAL EVERY 6 HOURS PRN
Qty: 90 TABLET | Refills: 0 | Status: SHIPPED | OUTPATIENT
Start: 2019-02-28 | End: 2019-02-28 | Stop reason: SDUPTHER

## 2019-02-28 RX ORDER — OXYCODONE HCL 10 MG/1
10 TABLET, FILM COATED, EXTENDED RELEASE ORAL 2 TIMES DAILY
Qty: 60 TABLET | Refills: 0 | Status: SHIPPED | OUTPATIENT
Start: 2019-02-28 | End: 2019-04-11 | Stop reason: SDUPTHER

## 2019-02-28 RX ORDER — OXYCODONE AND ACETAMINOPHEN 7.5; 325 MG/1; MG/1
1 TABLET ORAL EVERY 6 HOURS PRN
Qty: 75 TABLET | Refills: 0 | Status: SHIPPED | OUTPATIENT
Start: 2019-02-28 | End: 2019-02-28 | Stop reason: DRUGHIGH

## 2019-02-28 ASSESSMENT — ENCOUNTER SYMPTOMS
WHEEZING: 0
PHOTOPHOBIA: 1
CHEST TIGHTNESS: 0
DIARRHEA: 0
SHORTNESS OF BREATH: 0
VOMITING: 0
BACK PAIN: 1
ABDOMINAL PAIN: 0
NAUSEA: 0

## 2019-02-28 ASSESSMENT — PATIENT HEALTH QUESTIONNAIRE - PHQ9
SUM OF ALL RESPONSES TO PHQ9 QUESTIONS 1 & 2: 0
2. FEELING DOWN, DEPRESSED OR HOPELESS: 0
SUM OF ALL RESPONSES TO PHQ QUESTIONS 1-9: 0
SUM OF ALL RESPONSES TO PHQ QUESTIONS 1-9: 0
1. LITTLE INTEREST OR PLEASURE IN DOING THINGS: 0

## 2019-03-11 ENCOUNTER — PROCEDURE VISIT (OUTPATIENT)
Dept: PHYSICAL MEDICINE AND REHAB | Age: 64
End: 2019-03-11
Payer: MEDICARE

## 2019-03-11 DIAGNOSIS — M79.10 MYALGIA: ICD-10-CM

## 2019-03-11 DIAGNOSIS — M79.7 FIBROMYALGIA: ICD-10-CM

## 2019-03-11 DIAGNOSIS — E53.8 VITAMIN B12 DEFICIENCY: Primary | ICD-10-CM

## 2019-03-11 PROCEDURE — 20553 NJX 1/MLT TRIGGER POINTS 3/>: CPT | Performed by: PHYSICAL MEDICINE & REHABILITATION

## 2019-03-11 RX ORDER — LIDOCAINE HYDROCHLORIDE 5 MG/ML
18 INJECTION, SOLUTION INFILTRATION; INTRAVENOUS ONCE
Status: COMPLETED | OUTPATIENT
Start: 2019-03-11 | End: 2019-03-11

## 2019-03-11 RX ORDER — CYANOCOBALAMIN 1000 UG/ML
1000 INJECTION INTRAMUSCULAR; SUBCUTANEOUS ONCE
Status: COMPLETED | OUTPATIENT
Start: 2019-03-11 | End: 2019-03-11

## 2019-03-11 RX ADMIN — LIDOCAINE HYDROCHLORIDE 18 ML: 5 INJECTION, SOLUTION INFILTRATION; INTRAVENOUS at 15:01

## 2019-03-11 RX ADMIN — CYANOCOBALAMIN 1000 MCG: 1000 INJECTION INTRAMUSCULAR; SUBCUTANEOUS at 15:00

## 2019-03-18 DIAGNOSIS — I10 BENIGN ESSENTIAL HYPERTENSION: ICD-10-CM

## 2019-03-18 RX ORDER — FUROSEMIDE 20 MG/1
TABLET ORAL
Qty: 30 TABLET | Refills: 9 | Status: SHIPPED | OUTPATIENT
Start: 2019-03-18 | End: 2019-11-27

## 2019-03-25 ENCOUNTER — PROCEDURE VISIT (OUTPATIENT)
Dept: PHYSICAL MEDICINE AND REHAB | Age: 64
End: 2019-03-25
Payer: MEDICARE

## 2019-03-25 DIAGNOSIS — M54.31 SCIATICA OF RIGHT SIDE: Primary | ICD-10-CM

## 2019-03-25 PROCEDURE — 76942 ECHO GUIDE FOR BIOPSY: CPT | Performed by: PHYSICAL MEDICINE & REHABILITATION

## 2019-03-25 PROCEDURE — 64445 NJX AA&/STRD SCIATIC NRV IMG: CPT | Performed by: PHYSICAL MEDICINE & REHABILITATION

## 2019-03-27 RX ORDER — LIDOCAINE HYDROCHLORIDE 20 MG/ML
5 INJECTION, SOLUTION INFILTRATION; PERINEURAL ONCE
Status: COMPLETED | OUTPATIENT
Start: 2019-03-27 | End: 2019-03-27

## 2019-03-27 RX ORDER — LIDOCAINE HYDROCHLORIDE 10 MG/ML
8 INJECTION, SOLUTION INFILTRATION; PERINEURAL ONCE
Status: COMPLETED | OUTPATIENT
Start: 2019-03-27 | End: 2019-03-27

## 2019-03-27 RX ADMIN — LIDOCAINE HYDROCHLORIDE 5 ML: 20 INJECTION, SOLUTION INFILTRATION; PERINEURAL at 13:16

## 2019-03-27 RX ADMIN — LIDOCAINE HYDROCHLORIDE 8 ML: 10 INJECTION, SOLUTION INFILTRATION; PERINEURAL at 13:15

## 2019-03-29 DIAGNOSIS — G57.93 NEUROPATHY INVOLVING BOTH LOWER EXTREMITIES: ICD-10-CM

## 2019-03-29 DIAGNOSIS — M46.26 OSTEOMYELITIS OF LUMBAR VERTEBRA (HCC): ICD-10-CM

## 2019-04-06 RX ORDER — GABAPENTIN 300 MG/1
CAPSULE ORAL
Qty: 90 CAPSULE | Refills: 1 | Status: SHIPPED | OUTPATIENT
Start: 2019-04-06 | End: 2019-05-13 | Stop reason: SDUPTHER

## 2019-04-09 ENCOUNTER — PROCEDURE VISIT (OUTPATIENT)
Dept: PHYSICAL MEDICINE AND REHAB | Age: 64
End: 2019-04-09
Payer: MEDICARE

## 2019-04-09 DIAGNOSIS — M79.7 FIBROMYALGIA: Primary | ICD-10-CM

## 2019-04-09 PROCEDURE — 20553 NJX 1/MLT TRIGGER POINTS 3/>: CPT | Performed by: PHYSICAL MEDICINE & REHABILITATION

## 2019-04-09 RX ORDER — LIDOCAINE HYDROCHLORIDE 10 MG/ML
16 INJECTION, SOLUTION INFILTRATION; PERINEURAL ONCE
Status: COMPLETED | OUTPATIENT
Start: 2019-04-09 | End: 2019-04-09

## 2019-04-09 RX ADMIN — LIDOCAINE HYDROCHLORIDE 16 ML: 10 INJECTION, SOLUTION INFILTRATION; PERINEURAL at 12:26

## 2019-04-09 NOTE — PROGRESS NOTES
Patient was here today for TP injections of lower back. Patient placed in upright position areas marked and prepped with alcohol. Sites injected with 16cc Lidocaine administered  By provider.

## 2019-04-11 ENCOUNTER — NURSE ONLY (OUTPATIENT)
Dept: PHYSICAL MEDICINE AND REHAB | Age: 64
End: 2019-04-11

## 2019-04-11 DIAGNOSIS — M48.062 SPINAL STENOSIS OF LUMBAR REGION WITH NEUROGENIC CLAUDICATION: ICD-10-CM

## 2019-04-11 DIAGNOSIS — Z79.899 HIGH RISK MEDICATION USE: ICD-10-CM

## 2019-04-11 DIAGNOSIS — M15.9 PRIMARY OSTEOARTHRITIS INVOLVING MULTIPLE JOINTS: ICD-10-CM

## 2019-04-11 DIAGNOSIS — M46.46 LUMBAR DISCITIS: ICD-10-CM

## 2019-04-12 RX ORDER — OXYCODONE AND ACETAMINOPHEN 7.5; 325 MG/1; MG/1
1 TABLET ORAL EVERY 6 HOURS PRN
Qty: 60 TABLET | Refills: 0 | Status: SHIPPED | OUTPATIENT
Start: 2019-04-12 | End: 2019-05-09 | Stop reason: SDUPTHER

## 2019-04-12 RX ORDER — OXYCODONE HCL 10 MG/1
10 TABLET, FILM COATED, EXTENDED RELEASE ORAL 2 TIMES DAILY
Qty: 60 TABLET | Refills: 0 | Status: SHIPPED | OUTPATIENT
Start: 2019-04-12 | End: 2019-05-09 | Stop reason: SDUPTHER

## 2019-04-30 DIAGNOSIS — I10 BENIGN ESSENTIAL HYPERTENSION: ICD-10-CM

## 2019-04-30 RX ORDER — LISINOPRIL 20 MG/1
TABLET ORAL
Qty: 30 TABLET | Refills: 5 | Status: SHIPPED | OUTPATIENT
Start: 2019-04-30 | End: 2019-06-05 | Stop reason: SDUPTHER

## 2019-05-09 DIAGNOSIS — M48.062 SPINAL STENOSIS OF LUMBAR REGION WITH NEUROGENIC CLAUDICATION: ICD-10-CM

## 2019-05-09 DIAGNOSIS — Z79.899 HIGH RISK MEDICATION USE: ICD-10-CM

## 2019-05-09 DIAGNOSIS — M46.46 LUMBAR DISCITIS: ICD-10-CM

## 2019-05-09 DIAGNOSIS — M15.9 PRIMARY OSTEOARTHRITIS INVOLVING MULTIPLE JOINTS: ICD-10-CM

## 2019-05-09 RX ORDER — OXYCODONE AND ACETAMINOPHEN 7.5; 325 MG/1; MG/1
1 TABLET ORAL EVERY 6 HOURS PRN
Qty: 60 TABLET | Refills: 0 | Status: SHIPPED | OUTPATIENT
Start: 2019-05-11 | End: 2019-06-05 | Stop reason: SDUPTHER

## 2019-05-09 RX ORDER — OXYCODONE HCL 10 MG/1
10 TABLET, FILM COATED, EXTENDED RELEASE ORAL 2 TIMES DAILY
Qty: 60 TABLET | Refills: 0 | Status: SHIPPED | OUTPATIENT
Start: 2019-05-11 | End: 2019-06-05 | Stop reason: SDUPTHER

## 2019-05-11 ENCOUNTER — NURSE ONLY (OUTPATIENT)
Dept: PHYSICAL MEDICINE AND REHAB | Age: 64
End: 2019-05-11
Payer: MEDICARE

## 2019-05-11 DIAGNOSIS — E53.8 VITAMIN B12 DEFICIENCY: Primary | ICD-10-CM

## 2019-05-11 PROCEDURE — 96372 THER/PROPH/DIAG INJ SC/IM: CPT | Performed by: PHYSICAL MEDICINE & REHABILITATION

## 2019-05-11 RX ORDER — CYANOCOBALAMIN 1000 UG/ML
1000 INJECTION INTRAMUSCULAR; SUBCUTANEOUS ONCE
Status: COMPLETED | OUTPATIENT
Start: 2019-05-11 | End: 2019-05-11

## 2019-05-11 RX ADMIN — CYANOCOBALAMIN 1000 MCG: 1000 INJECTION INTRAMUSCULAR; SUBCUTANEOUS at 09:58

## 2019-05-13 ENCOUNTER — OFFICE VISIT (OUTPATIENT)
Dept: PHYSICAL MEDICINE AND REHAB | Age: 64
End: 2019-05-13
Payer: MEDICARE

## 2019-05-13 VITALS
BODY MASS INDEX: 32.43 KG/M2 | HEIGHT: 68 IN | DIASTOLIC BLOOD PRESSURE: 60 MMHG | WEIGHT: 214 LBS | SYSTOLIC BLOOD PRESSURE: 140 MMHG

## 2019-05-13 DIAGNOSIS — G57.93 NEUROPATHY INVOLVING BOTH LOWER EXTREMITIES: ICD-10-CM

## 2019-05-13 DIAGNOSIS — M48.062 SPINAL STENOSIS OF LUMBAR REGION WITH NEUROGENIC CLAUDICATION: Primary | ICD-10-CM

## 2019-05-13 DIAGNOSIS — M05.79 RHEUMATOID ARTHRITIS INVOLVING MULTIPLE SITES WITH POSITIVE RHEUMATOID FACTOR (HCC): ICD-10-CM

## 2019-05-13 DIAGNOSIS — G89.29 CHRONIC RIGHT SHOULDER PAIN: ICD-10-CM

## 2019-05-13 DIAGNOSIS — Z79.899 HIGH RISK MEDICATION USE: ICD-10-CM

## 2019-05-13 DIAGNOSIS — M25.511 CHRONIC RIGHT SHOULDER PAIN: ICD-10-CM

## 2019-05-13 DIAGNOSIS — M79.10 MYALGIA: ICD-10-CM

## 2019-05-13 DIAGNOSIS — M46.26 OSTEOMYELITIS OF LUMBAR VERTEBRA (HCC): ICD-10-CM

## 2019-05-13 DIAGNOSIS — R25.9 PARKINSONIAN FEATURES: ICD-10-CM

## 2019-05-13 DIAGNOSIS — M15.9 PRIMARY OSTEOARTHRITIS INVOLVING MULTIPLE JOINTS: ICD-10-CM

## 2019-05-13 PROBLEM — M85.80 BONE EROSION DETERMINED BY X-RAY: Status: ACTIVE | Noted: 2019-04-17

## 2019-05-13 PROBLEM — M19.90 INFLAMMATORY ARTHRITIS: Status: ACTIVE | Noted: 2019-04-20

## 2019-05-13 PROBLEM — M54.2 CERVICALGIA: Status: ACTIVE | Noted: 2019-04-17

## 2019-05-13 PROBLEM — R79.89 SERUM CREATININE RAISED: Status: ACTIVE | Noted: 2019-04-20

## 2019-05-13 PROBLEM — R76.8 ANA POSITIVE: Status: ACTIVE | Noted: 2019-04-20

## 2019-05-13 PROBLEM — M20.001 DEFORMITY OF FINGER OF RIGHT HAND: Status: ACTIVE | Noted: 2019-04-17

## 2019-05-13 PROBLEM — M54.41 CHRONIC BILATERAL LOW BACK PAIN WITH BILATERAL SCIATICA: Status: ACTIVE | Noted: 2019-04-17

## 2019-05-13 PROBLEM — M54.42 CHRONIC BILATERAL LOW BACK PAIN WITH BILATERAL SCIATICA: Status: ACTIVE | Noted: 2019-04-17

## 2019-05-13 PROBLEM — M79.642 BILATERAL HAND PAIN: Status: ACTIVE | Noted: 2019-04-17

## 2019-05-13 PROBLEM — M15.3 SECONDARY OSTEOARTHRITIS OF MULTIPLE SITES: Status: ACTIVE | Noted: 2019-04-17

## 2019-05-13 PROBLEM — M79.641 BILATERAL HAND PAIN: Status: ACTIVE | Noted: 2019-04-17

## 2019-05-13 PROBLEM — R29.818 PARKINSONIAN FEATURES: Status: ACTIVE | Noted: 2019-05-13

## 2019-05-13 PROCEDURE — G8417 CALC BMI ABV UP PARAM F/U: HCPCS | Performed by: PHYSICAL MEDICINE & REHABILITATION

## 2019-05-13 PROCEDURE — 1123F ACP DISCUSS/DSCN MKR DOCD: CPT | Performed by: PHYSICAL MEDICINE & REHABILITATION

## 2019-05-13 PROCEDURE — 4040F PNEUMOC VAC/ADMIN/RCVD: CPT | Performed by: PHYSICAL MEDICINE & REHABILITATION

## 2019-05-13 PROCEDURE — 99214 OFFICE O/P EST MOD 30 MIN: CPT | Performed by: PHYSICAL MEDICINE & REHABILITATION

## 2019-05-13 PROCEDURE — G8399 PT W/DXA RESULTS DOCUMENT: HCPCS | Performed by: PHYSICAL MEDICINE & REHABILITATION

## 2019-05-13 PROCEDURE — 3017F COLORECTAL CA SCREEN DOC REV: CPT | Performed by: PHYSICAL MEDICINE & REHABILITATION

## 2019-05-13 PROCEDURE — 1036F TOBACCO NON-USER: CPT | Performed by: PHYSICAL MEDICINE & REHABILITATION

## 2019-05-13 PROCEDURE — G8427 DOCREV CUR MEDS BY ELIG CLIN: HCPCS | Performed by: PHYSICAL MEDICINE & REHABILITATION

## 2019-05-13 PROCEDURE — 1090F PRES/ABSN URINE INCON ASSESS: CPT | Performed by: PHYSICAL MEDICINE & REHABILITATION

## 2019-05-13 RX ORDER — GABAPENTIN 300 MG/1
CAPSULE ORAL
Qty: 270 CAPSULE | Refills: 1 | Status: SHIPPED | OUTPATIENT
Start: 2019-05-15 | End: 2019-05-13 | Stop reason: SDUPTHER

## 2019-05-13 RX ORDER — GABAPENTIN 300 MG/1
CAPSULE ORAL
Qty: 90 CAPSULE | Refills: 1 | Status: SHIPPED | OUTPATIENT
Start: 2019-05-15 | End: 2019-05-13 | Stop reason: DRUGHIGH

## 2019-05-13 RX ORDER — GABAPENTIN 300 MG/1
CAPSULE ORAL
Qty: 270 CAPSULE | Refills: 1 | Status: SHIPPED | OUTPATIENT
Start: 2019-05-15 | End: 2019-07-17 | Stop reason: SDUPTHER

## 2019-05-13 ASSESSMENT — ENCOUNTER SYMPTOMS
WHEEZING: 0
VOMITING: 0
BACK PAIN: 1
CHANGE IN BOWEL HABIT: 0
CHEST TIGHTNESS: 0
PHOTOPHOBIA: 1
DIARRHEA: 0
SHORTNESS OF BREATH: 0
ABDOMINAL PAIN: 0
NAUSEA: 0
VISUAL CHANGE: 0
COUGH: 0

## 2019-05-13 NOTE — PROGRESS NOTES
Subjective  Kari Quintero, 70 y.o. female presents today with:    Back Pain TP-3-11-19 50% pain reduction X 1 wk TP-4-9-19 50% pain reduction X 1 wk     Neck Pain     Leg Pain     Shoulder Pain Right shoulder shoulder blade    Hip Pain RS-3-25-19 80 % pain reduction X 1 month    Medication Refill pill count today- pt did not bring meds          She is still doing well with her current medications and shows no signs of abuse or overuse. She is more functional on it. Recovering from discitis  Seeded from abscess in her foot. We are trying to avoid steroids and will trial TPs and then right SI with C arm when her infection clears if needed. She is having a lot of stress her son is having some mental health issues and any stress in the family out. We discussed topics and try to keep her head in a good place as well as setting boundaries with family members that are causing problems. Wants 90 day supply of Neurontin. I am concerned about her parkinsonian features and decline in her cognitive function. She seems to need a lot of reminders recently. Could be from the stress. Ex    I'm also concerned about her long-acting infection in the skin. She had abscesses and discitis last year. She cannot be in any immunosuppressants from Dr. Giuseppe Mcfarland clinic until her Dr. Ananth Blas at the [facility] infectious disease clears her from the long-term infection infections         Back Pain   This is a chronic problem. The current episode started more than 1 year ago. The problem occurs daily. The problem is unchanged. The pain is present in the lumbar spine and gluteal. The quality of the pain is described as aching. Radiates to: bilateral legs, left leg worse, left hip. The pain is at a severity of 7/10. The pain is moderate. The pain is worse during the day. The symptoms are aggravated by bending, standing and position (Walking). Stiffness is present in the morning. Associated symptoms include leg pain. Pertinent negatives include no abdominal pain, bladder incontinence, chest pain, fever, headaches, numbness, paresis, perianal numbness or weakness. Risk factors include obesity, poor posture, menopause, lack of exercise, sedentary lifestyle and history of steroid use. She has tried analgesics, walking, home exercises, heat, muscle relaxant, NSAIDs and ice (Sciatica block, trigger point injections, mobic, Gabapentin, Norco) for the symptoms. The treatment provided moderate relief. Foot Pain   This is a recurrent problem. The current episode started yesterday. The problem has been gradually worsening. Associated symptoms include arthralgias, fatigue, joint swelling and neck pain. Pertinent negatives include no abdominal pain, anorexia, change in bowel habit, chest pain, chills, congestion, coughing, diaphoresis, fever, headaches, nausea, numbness, urinary symptoms, vertigo, visual change, vomiting or weakness. The symptoms are aggravated by bending, exertion, standing, walking and twisting. She has tried oral narcotics, position changes, relaxation, ice, acetaminophen, NSAIDs, rest and heat for the symptoms. The treatment provided moderate relief.        Past Medical History:   Diagnosis Date    Abnormal electromyogram (EMG) 8/19/09    SENSORIMOTOR NEUROPATHY OF BLE, RIGHT WORSE THAN LEFT, SUSPICIOUS FOR RIGHT S1 RADIC    Angina pectoris (HCC)     Ankle pain     Colon polyp 4/3/2017    Dermatitis     Fibromyalgia     Foot pain     Hyperlipidemia     Hypertension     Infection of intervertebral disc (pyogenic), lumbar region ([address] 8/13/2018    Low back pain     MRSA (methicillin resistant staph aureus) culture positive 2011    Neck pain     Neuropathy     Obesity     Osteoarthritis     PSVT (paroxysmal supraventricular tachycardia) ([address]     Shingles outbreak 2012    SI (sacroiliac) pain     Stenosis     Vitamin D deficiency      Past Surgical History:   Procedure Laterality Date    CARDIAC CATHETERIZATION  10/2016    CERVICAL FUSION  12/14/11    Dr Craig Brown with bone graft and plate    COLONOSCOPY      HAND FUSION  7/05    HYSTERECTOMY  2001    HYSTERECTOMY, TOTAL ABDOMINAL      JOINT REPLACEMENT Left     tka    MENISCECTOMY  10/31/11    left knee    OTHER SURGICAL HISTORY  10/26/09    CAUDALS BY DR Luis Felipe Rajan    CT COLON CA SCRN NOT  [address] IND N/A 4/12/2017    COLONOSCOPY performed by Raffaele Garcia MD at . [facility] [address] ESOPHAGOGASTRODUODENOSCOPY TRANSORAL DIAGNOSTIC N/A 4/12/2017    EGD ESOPHAGOGASTRODUODENOSCOPY performed by Raffaele Garcia MD at [address]  12/2011    Dr Craig Brown Lumbar and c spine    TOTAL KNEE ARTHROPLASTY  7/30/12    LEFT-DR AVELAR    UPPER GASTROINTESTINAL ENDOSCOPY  1995     Social History     Socioeconomic History    Marital status:      Spouse name: None    Number of children: None    Years of education: None    Highest education level: None   Occupational History    Occupation: Retired    Social Needs    Financial resource strain: None    Food insecurity:     Worry: None     Inability: None    Transportation needs:     Medical: None     Non-medical: None   Tobacco Use    Smoking status: Never Smoker    Smokeless tobacco: Never Used   Substance and Sexual Activity    Alcohol use: No    Drug use: No    Sexual activity: Never   Lifestyle    Physical activity:     Days per week: None     Minutes per session: None    Stress: None   Relationships    Social connections:     Talks on phone: None     Gets together: None     Attends [employer] service: None     Active member of club or organization: None     Attends meetings of clubs or organizations: None     Relationship status: None    Intimate partner violence:     Fear of current or ex partner: None     Emotionally abused: None     Physically abused: None     Forced sexual activity: None   Other Topics Concern    None   Social History Narrative    None     Family History   Problem Relation Age of Onset    Heart Disease Father     High Cholesterol Father     High Blood Pressure Father     Stroke Mother     High Blood Pressure Mother        Allergies   Allergen Reactions    Latex     Cephalexin     Norvasc [Amlodipine Besylate] Other (See Comments)     swelling    Keflex [Cephalexin] Rash       Review of Systems   Constitutional: Positive for fatigue. Negative for activity change, chills, diaphoresis and fever. HENT: Negative for congestion. Eyes: Positive for photophobia and visual disturbance. Several occurances   Respiratory: Negative for cough, chest tightness, shortness of breath and wheezing. Cardiovascular: Positive for leg swelling. Negative for chest pain and palpitations. Relieved at HS with lasix   Gastrointestinal: Negative for abdominal pain, anorexia, change in bowel habit, diarrhea, nausea and vomiting. Endocrine: Negative. Genitourinary: Negative. Negative for bladder incontinence. Musculoskeletal: Positive for arthralgias, back pain, gait problem, joint swelling and neck pain. Negative for neck stiffness. Skin: Negative. Allergic/Immunologic: Positive for immunocompromised state. Neurological: Positive for tremors. Negative for dizziness, vertigo, syncope, weakness, light-headedness, numbness and headaches. Hematological: Negative. Psychiatric/Behavioral: Positive for sleep disturbance. Negative for dysphoric mood. The patient is nervous/anxious. Stress       Objective    Vitals:    05/13/19 1016   BP: (!) 140/60   Weight: 214 lb (97.1 kg)   Height: 5' 8\" (1.727 m)     Pain Score: SIX       Physical Exam   Constitutional: She is oriented to person, place, and time. Vital signs are normal. She appears well-developed. Non-toxic appearance. She does not have a sickly appearance. She does not appear ill. No distress. HENT:   Head: Normocephalic and atraumatic.    Right Ear: Hearing normal.   Left Ear: Hearing normal.   Nose: Nose normal.   Mouth/Throat: Oropharynx is clear and moist and mucous membranes are normal. No oral lesions. Normal dentition. No oropharyngeal exudate. No lesion   Eyes: Pupils are equal, round, and reactive to light. Conjunctivae and EOM are normal. Right eye exhibits no chemosis, no discharge and no exudate. Left eye exhibits no chemosis, no discharge and no exudate. No scleral icterus. Neck: Neck supple. No JVD present. Tracheal tenderness, spinous process tenderness and muscular tenderness present. No neck rigidity. Decreased range of motion present. No tracheal deviation and no edema present. No thyromegaly present. Cardiovascular: Intact distal pulses. Exam reveals no decreased pulses. Pulmonary/Chest: Effort normal. No accessory muscle usage. No apnea, no tachypnea and no bradypnea. No respiratory distress. She has no wheezes. She exhibits no tenderness. Abdominal: Soft. Bowel sounds are normal. She exhibits no distension and no mass. There is no tenderness. There is no rebound and no guarding. Pressure RUQ   Musculoskeletal: She exhibits tenderness. She exhibits no edema. Right shoulder: She exhibits decreased range of motion, tenderness and bony tenderness. Left shoulder: She exhibits decreased range of motion, tenderness and bony tenderness. Right elbow: Normal.       Left elbow: Normal.        Right wrist: Normal.        Left wrist: Normal.        Right hip: She exhibits decreased range of motion, decreased strength and tenderness. Left hip: She exhibits decreased range of motion, decreased strength and tenderness. Right knee: Normal.        Left knee: Normal.        Right ankle: Normal. Achilles tendon normal.        Left ankle: She exhibits decreased range of motion. Tenderness. Lateral malleolus and medial malleolus tenderness found.  Achilles tendon normal.        Cervical back: She exhibits touch: decreased from knee  Left leg light touch: decreased from knee    Gait, Coordination, and Reflexes     Gait  Gait: shufflingFlat affect       After a thorough review and discussion of the previous medical records, patient comprehensive medical, surgical, and family and social history, Review of Systems, their OARRS, their Screener and Opioid Assessment for Patients with Pain (SOAPP®-R), recent diagnostics, and symptomatic results to previous treatment, it is my impression that the patients is suffering with progressive and severe:     Diagnosis Orders   1. Spinal stenosis of lumbar region with neurogenic claudication  OK INJ,ANES AGENT,SCIATIC NERVE,SINGLE   2. Primary osteoarthritis involving multiple joints  OK INJ,ANES AGENT,SCIATIC NERVE,SINGLE    gabapentin (NEURONTIN) 300 MG capsule    DISCONTINUED: gabapentin (NEURONTIN) 300 MG capsule    DISCONTINUED: gabapentin (NEURONTIN) 300 MG capsule   3. Rheumatoid arthritis involving multiple sites with positive rheumatoid factor (HCC)  gabapentin (NEURONTIN) 300 MG capsule    DISCONTINUED: gabapentin (NEURONTIN) 300 MG capsule    DISCONTINUED: gabapentin (NEURONTIN) 300 MG capsule   4. High risk medication use - 10/17/17 OARRS PM&R, 12/19/17 OARRS PM&R, 12/20/17 Tox screen positive for opiates, Hydrocodone PM&R, MED CONTRACT 2/2/17     5. Neuropathy involving both lower extremities  gabapentin (NEURONTIN) 300 MG capsule    DISCONTINUED: gabapentin (NEURONTIN) 300 MG capsule    DISCONTINUED: gabapentin (NEURONTIN) 300 MG capsule   6. Osteomyelitis of lumbar vertebra (HCC)  gabapentin (NEURONTIN) 300 MG capsule    DISCONTINUED: gabapentin (NEURONTIN) 300 MG capsule    DISCONTINUED: gabapentin (NEURONTIN) 300 MG capsule   7. Myalgia  OK INJECT TRIGGER POINTS, 3 OR GREATER   8.  Parkinsonian features         I am also concerned by lifestyle and mood issues including:    Past Medical History:   Diagnosis Date    Abnormal electromyogram (EMG) 8/19/09    SENSORIMOTOR wean to the lowest effective dose. Other therapies for pain have not been effective including nonopiate medications. Injections and exercises are only partially effective. A Rx for Narcan was offered to help prevent accidental overdose. RX Monitoring 5/13/2019   Attestation The Prescription Monitoring Report for this patient was reviewed today. Acute Pain Prescriptions -   Chronic Pain Routine Monitoring No signs of potential drug abuse or diversion identified: otherwise, see note documentation;Obtaining appropriate analgesic effect of treatment.;Possible medication side effects, risk of tolerance/dependence & alternative treatments discussed. Chronic Pain > 50 MEDD Obtained or confirmened \"Consent of Opioid Use\" on file.;Considered consultation with a specialist.;Re-evaluated the status of the patient's underlying condition causing pain. Chronic Pain > 80 MEDD -       Attestation: The Prescription Monitoring Report for this patient was reviewed today. (Avila Lazar, DO)  Chronic Pain Routine Monitoring: No signs of potential drug abuse or diversion identified: otherwise, see note documentation, Obtaining appropriate analgesic effect of treatment. , Possible medication side effects, risk of tolerance/dependence & alternative treatments discussed. (Avila Lazar, DO)       Patient is currently taking:       I am having Rayna Levine maintain her Vitamin D3, Coenzyme Q10 (COQ10 PO), aspirin, acetaminophen, cyanocobalamin, nitroGLYCERIN, gabapentin, atorvastatin, doxycycline hyclate, lidocaine, ammonium lactate, furosemide, melatonin, potassium chloride, B-12, topiramate, furosemide, lisinopril, oxyCODONE, oxyCODONE-acetaminophen, and gabapentin. We will continue to administer cyanocobalamin.               I also recommend the following Medications:    Orders Placed This Encounter   Medications    DISCONTD: gabapentin (NEURONTIN) 300 MG capsule     Sig: TAKE 1 CAPSULE IN THE DAY AS TOLERATED AND 2 CAPSULES AT NIGHT. Dispense:  90 capsule     Refill:  1    DISCONTD: gabapentin (NEURONTIN) 300 MG capsule     Sig: TAKE 1 CAPSULE IN THE DAY AS TOLERATED AND 2 CAPSULES AT NIGHT. Dispense:  270 capsule     Refill:  1    gabapentin (NEURONTIN) 300 MG capsule     Sig: TAKE 1 CAPSULE IN THE DAY AS TOLERATED AND 2 CAPSULES AT NIGHT. Dispense:  270 capsule     Refill:  1        -which helps with pain and function. Otherwise, continue the current pain medications that I have prescibed. Radiologic:   Old films reviewed L3 to L5 fusion relatively unremarkable lumbar spine x-ray in March 2019 ,     I discussed results with patients. see Follow up plans below  For any new studies. Care Everywhere Updates:  requested and reviewed. No new issues noted. Electrodiagnostic:  Previous studies requested,     I discussed results with patient. See follow-up plans for new studies.         Labs:  Previous labs reviewed     Lab Results   Component Value Date     02/01/2019    K 4.6 02/01/2019    K 4.4 08/09/2018     02/01/2019    CO2 25 02/01/2019    BUN 45 02/01/2019    CREATININE 1.61 02/01/2019    CALCIUM 10.1 02/01/2019    LABALBU 2.7 09/13/2018    LABALBU 2.8 08/18/2018    BILITOT 0.3 09/13/2018    ALKPHOS 100 09/13/2018    AST 16 09/13/2018    ALT 13 09/13/2018     Lab Results   Component Value Date    WBC 6.6 02/01/2019    RBC 4.06 02/01/2019    RBC 2.81 09/06/2018    HGB 13.3 02/01/2019    HCT 38.5 02/01/2019    MCV 94.8 02/01/2019    MCH 32.8 02/01/2019    MCHC 34.6 02/01/2019    RDW 12.4 02/01/2019     02/01/2019    MPV 7.4 09/13/2018       Lab Results   Component Value Date    LABAMPH Neg 07/28/2014    BARBSCNU Neg 07/28/2014    LABBENZ Neg 07/28/2014    CANSU Neg 07/28/2014    COCAIMETSCRU Neg 07/28/2014    PHENCYCLIDINESCREENURINE Neg 88/80/7175    TRICYCLIC Neg 52/48/9748    DSCOMMENT see below 07/28/2014       Lab Results   Component Value Date    CODEINE RISK   02/02/17 score 7-low risk   04/03/17 score: 6-low risk  06/05/17 score: 7-low risk  08/07/17 score: 8-low risk  10/06/17 score: 6-low risk  12/20/17 score: 5-low risk  02/18/18 score: 6-low risk  0425/2018 score 5-low risk  7/11/18 score: 3 low- risk  10/11/18 score:4- low risk  2/28/19 score: 2: low risk  5/13/19 score: 2- low risk       Weight < 150 lb (68.04 kg)           Injections or Epidurals:  Injection options were discussed. Patient gave verbal consent to ordered injections. See follow-up plans for planned injections. Supplements:  Vitamin D with increased dosing during the rainy months,   Education was given on:   Dietary and Fitness--daily stretches and low carb diet-in chair Yoga when possible             Follow up with Primary Care Physician regarding their general medical needs. They are to follow up in 2 months to review medication, efficacy of injections, pill counts, OARRS check, SOAPPR assessment, review diagnostics, to review previous and future treatment plans and assess appropriateness for continued therapy.         New Diagnostics  Orders Placed This Encounter   Procedures    VT INJECT TRIGGER POINTS, 3 OR GREATER    VT INJ,ANES AGENT,SCIATIC [address], DO

## 2019-05-21 DIAGNOSIS — M46.26 OSTEOMYELITIS OF LUMBAR VERTEBRA (HCC): ICD-10-CM

## 2019-05-21 LAB
ANION GAP SERPL CALCULATED.3IONS-SCNC: 18 MEQ/L (ref 9–15)
BUN BLDV-MCNC: 43 MG/DL (ref 8–23)
CALCIUM SERPL-MCNC: 10.1 MG/DL (ref 8.5–9.9)
CHLORIDE BLD-SCNC: 104 MEQ/L (ref 95–107)
CO2: 21 MEQ/L (ref 20–31)
CREAT SERPL-MCNC: 1.46 MG/DL (ref 0.5–0.9)
GFR AFRICAN AMERICAN: 43.4
GFR NON-AFRICAN AMERICAN: 35.9
GLUCOSE BLD-MCNC: 82 MG/DL (ref 70–99)
HCT VFR BLD CALC: 40.2 % (ref 37–47)
HEMOGLOBIN: 13.8 G/DL (ref 12–16)
MCH RBC QN AUTO: 33 PG (ref 27–31.3)
MCHC RBC AUTO-ENTMCNC: 34.3 % (ref 33–37)
MCV RBC AUTO: 96.4 FL (ref 82–100)
PDW BLD-RTO: 13.4 % (ref 11.5–14.5)
PLATELET # BLD: 198 K/UL (ref 130–400)
POTASSIUM SERPL-SCNC: 5.1 MEQ/L (ref 3.4–4.9)
RBC # BLD: 4.17 M/UL (ref 4.2–5.4)
SODIUM BLD-SCNC: 143 MEQ/L (ref 135–144)
WBC # BLD: 6.7 K/UL (ref 4.8–10.8)

## 2019-05-22 ENCOUNTER — PROCEDURE VISIT (OUTPATIENT)
Dept: PHYSICAL MEDICINE AND REHAB | Age: 64
End: 2019-05-22
Payer: MEDICARE

## 2019-05-22 DIAGNOSIS — M54.32 LEFT SCIATIC NERVE PAIN: Primary | ICD-10-CM

## 2019-05-22 PROCEDURE — 64445 NJX AA&/STRD SCIATIC NRV IMG: CPT | Performed by: PHYSICAL MEDICINE & REHABILITATION

## 2019-05-22 PROCEDURE — 76942 ECHO GUIDE FOR BIOPSY: CPT | Performed by: PHYSICAL MEDICINE & REHABILITATION

## 2019-05-23 PROCEDURE — 20552 NJX 1/MLT TRIGGER POINT 1/2: CPT | Performed by: PHYSICAL MEDICINE & REHABILITATION

## 2019-05-23 RX ORDER — LIDOCAINE HYDROCHLORIDE 10 MG/ML
8 INJECTION, SOLUTION INFILTRATION; PERINEURAL ONCE
Status: COMPLETED | OUTPATIENT
Start: 2019-05-23 | End: 2019-05-23

## 2019-05-23 RX ORDER — LIDOCAINE HYDROCHLORIDE 20 MG/ML
5 INJECTION, SOLUTION INFILTRATION; PERINEURAL ONCE
Status: COMPLETED | OUTPATIENT
Start: 2019-05-23 | End: 2019-05-23

## 2019-05-23 RX ADMIN — LIDOCAINE HYDROCHLORIDE 8 ML: 10 INJECTION, SOLUTION INFILTRATION; PERINEURAL at 10:47

## 2019-05-23 RX ADMIN — LIDOCAINE HYDROCHLORIDE 5 ML: 20 INJECTION, SOLUTION INFILTRATION; PERINEURAL at 10:48

## 2019-05-23 NOTE — PROGRESS NOTES
Patient was here today for Nerve Block guided by U/S and TP injections of Left Sciatic. Patient placed in upright position areas marked and prepped with alcohol. Sites injected with 2% 5 cc Lidocaine and 1% 8 cc Lidocaine administered  By provider.

## 2019-05-31 ENCOUNTER — OFFICE VISIT (OUTPATIENT)
Dept: INFECTIOUS DISEASES | Age: 64
End: 2019-05-31
Payer: MEDICARE

## 2019-05-31 VITALS
WEIGHT: 212 LBS | TEMPERATURE: 97.5 F | BODY MASS INDEX: 32.13 KG/M2 | DIASTOLIC BLOOD PRESSURE: 85 MMHG | SYSTOLIC BLOOD PRESSURE: 138 MMHG | HEART RATE: 85 BPM | RESPIRATION RATE: 18 BRPM | HEIGHT: 68 IN

## 2019-05-31 DIAGNOSIS — A49.01 STAPH AUREUS INFECTION: ICD-10-CM

## 2019-05-31 DIAGNOSIS — M46.26 OSTEOMYELITIS OF LUMBAR VERTEBRA (HCC): Primary | ICD-10-CM

## 2019-05-31 DIAGNOSIS — R10.11 RIGHT UPPER QUADRANT ABDOMINAL PAIN: ICD-10-CM

## 2019-05-31 PROCEDURE — 3017F COLORECTAL CA SCREEN DOC REV: CPT | Performed by: INTERNAL MEDICINE

## 2019-05-31 PROCEDURE — G8417 CALC BMI ABV UP PARAM F/U: HCPCS | Performed by: INTERNAL MEDICINE

## 2019-05-31 PROCEDURE — 1090F PRES/ABSN URINE INCON ASSESS: CPT | Performed by: INTERNAL MEDICINE

## 2019-05-31 PROCEDURE — G8427 DOCREV CUR MEDS BY ELIG CLIN: HCPCS | Performed by: INTERNAL MEDICINE

## 2019-05-31 PROCEDURE — 1123F ACP DISCUSS/DSCN MKR DOCD: CPT | Performed by: INTERNAL MEDICINE

## 2019-05-31 PROCEDURE — 1036F TOBACCO NON-USER: CPT | Performed by: INTERNAL MEDICINE

## 2019-05-31 PROCEDURE — G8399 PT W/DXA RESULTS DOCUMENT: HCPCS | Performed by: INTERNAL MEDICINE

## 2019-05-31 PROCEDURE — 4040F PNEUMOC VAC/ADMIN/RCVD: CPT | Performed by: INTERNAL MEDICINE

## 2019-05-31 PROCEDURE — 99213 OFFICE O/P EST LOW 20 MIN: CPT | Performed by: INTERNAL MEDICINE

## 2019-05-31 RX ORDER — DOXYCYCLINE HYCLATE 100 MG
100 TABLET ORAL DAILY
Qty: 90 TABLET | Refills: 0 | Status: SHIPPED | OUTPATIENT
Start: 2019-05-31 | End: 2019-06-25 | Stop reason: SDUPTHER

## 2019-05-31 ASSESSMENT — ENCOUNTER SYMPTOMS
BACK PAIN: 1
ABDOMINAL PAIN: 1

## 2019-05-31 NOTE — PROGRESS NOTES
Subjective:      Patient ID: Coleman Ortega is a 70 y.o. female. HPIF/U discitis on Po Doxycycline 100 mg daily, well tolerated. Had bad hot flashes this morning. Feels better. Felt stressed out. She had to stop to go to the bathroom on her way here. Still takes a nap every day. Dr Dee Dee Bailey ordered Plaquenil. Had a rough time coming off Oxycontin, moderate to severe low back nad B feet, worse upon standing up. Review of Systems   Gastrointestinal: Positive for abdominal pain (H/O REBOLLEDO). Musculoskeletal: Positive for arthralgias and back pain. Neurological: Positive for weakness (uses a cane). Psychiatric/Behavioral: The patient is nervous/anxious. All other systems reviewed and are negative. Objective:   Physical Exam   Constitutional: No distress. HENT:   Mouth/Throat: No oropharyngeal exudate. Eyes: No scleral icterus. Neck: Normal range of motion. Neck supple. Cardiovascular: Normal rate, regular rhythm and normal heart sounds. No murmur heard. Pulmonary/Chest: Effort normal and breath sounds normal. No stridor. No respiratory distress. She has no wheezes. She has no rales. Abdominal: Soft. Bowel sounds are normal. She exhibits no distension and no mass. There is tenderness (RUQ). Lymphadenopathy:     She has no cervical adenopathy. Nursing note and vitals reviewed.     CBC normal 05/21  K=5.1, Cr=1.46  Assessment:      Lumbar discitis  MSSA  CKD stage 3  Abdominal pain, H/O REBOLLEDO      Plan:      Continue Doxycycline 100 mg daily  CBC BMP Q 3 months  Encourage to take Plaquenil ordered by Dr Dee Dee Bailey  Refer to Dr Genevieve Villanueva for REBOLLEDO and abdominal pain        Lanie Lo MD

## 2019-06-05 DIAGNOSIS — M48.062 SPINAL STENOSIS OF LUMBAR REGION WITH NEUROGENIC CLAUDICATION: ICD-10-CM

## 2019-06-05 DIAGNOSIS — Z79.899 HIGH RISK MEDICATION USE: ICD-10-CM

## 2019-06-05 DIAGNOSIS — I10 BENIGN ESSENTIAL HYPERTENSION: ICD-10-CM

## 2019-06-05 DIAGNOSIS — M15.9 PRIMARY OSTEOARTHRITIS INVOLVING MULTIPLE JOINTS: ICD-10-CM

## 2019-06-05 DIAGNOSIS — M46.46 LUMBAR DISCITIS: ICD-10-CM

## 2019-06-05 RX ORDER — LISINOPRIL 20 MG/1
TABLET ORAL
Qty: 90 TABLET | Refills: 3 | Status: SHIPPED | OUTPATIENT
Start: 2019-06-05 | End: 2019-11-27 | Stop reason: ALTCHOICE

## 2019-06-05 RX ORDER — OXYCODONE HCL 10 MG/1
10 TABLET, FILM COATED, EXTENDED RELEASE ORAL 2 TIMES DAILY
Qty: 60 TABLET | Refills: 0 | Status: SHIPPED | OUTPATIENT
Start: 2019-06-10 | End: 2019-07-08 | Stop reason: SDUPTHER

## 2019-06-05 RX ORDER — OXYCODONE AND ACETAMINOPHEN 7.5; 325 MG/1; MG/1
1 TABLET ORAL EVERY 6 HOURS PRN
Qty: 60 TABLET | Refills: 0 | Status: SHIPPED | OUTPATIENT
Start: 2019-06-11 | End: 2019-07-08 | Stop reason: SDUPTHER

## 2019-06-05 RX ORDER — LISINOPRIL 40 MG/1
40 TABLET ORAL DAILY
Qty: 90 TABLET | Refills: 3 | Status: ON HOLD | OUTPATIENT
Start: 2019-06-05 | End: 2019-07-11

## 2019-06-06 ENCOUNTER — OFFICE VISIT (OUTPATIENT)
Dept: GASTROENTEROLOGY | Age: 64
End: 2019-06-06
Payer: MEDICARE

## 2019-06-06 VITALS — HEIGHT: 68 IN | RESPIRATION RATE: 20 BRPM | BODY MASS INDEX: 32.43 KG/M2 | WEIGHT: 214 LBS

## 2019-06-06 DIAGNOSIS — K76.0 NAFLD (NONALCOHOLIC FATTY LIVER DISEASE): Primary | ICD-10-CM

## 2019-06-06 PROCEDURE — 3017F COLORECTAL CA SCREEN DOC REV: CPT | Performed by: NURSE PRACTITIONER

## 2019-06-06 PROCEDURE — G8427 DOCREV CUR MEDS BY ELIG CLIN: HCPCS | Performed by: NURSE PRACTITIONER

## 2019-06-06 PROCEDURE — G8399 PT W/DXA RESULTS DOCUMENT: HCPCS | Performed by: NURSE PRACTITIONER

## 2019-06-06 PROCEDURE — 1090F PRES/ABSN URINE INCON ASSESS: CPT | Performed by: NURSE PRACTITIONER

## 2019-06-06 PROCEDURE — G8417 CALC BMI ABV UP PARAM F/U: HCPCS | Performed by: NURSE PRACTITIONER

## 2019-06-06 PROCEDURE — 99214 OFFICE O/P EST MOD 30 MIN: CPT | Performed by: NURSE PRACTITIONER

## 2019-06-06 PROCEDURE — 1123F ACP DISCUSS/DSCN MKR DOCD: CPT | Performed by: NURSE PRACTITIONER

## 2019-06-06 PROCEDURE — 4040F PNEUMOC VAC/ADMIN/RCVD: CPT | Performed by: NURSE PRACTITIONER

## 2019-06-06 PROCEDURE — 1036F TOBACCO NON-USER: CPT | Performed by: NURSE PRACTITIONER

## 2019-06-06 NOTE — PROGRESS NOTES
height 5' 8\" (1.727 m), weight 214 lb (97.1 kg), not currently breastfeeding. Physical Exam   Constitutional: She is oriented to person, place, and time. She appears well-developed and well-nourished. No distress. HENT:   Head: Normocephalic and atraumatic. Eyes: Pupils are equal, round, and reactive to light. Conjunctivae and EOM are normal. No scleral icterus. Neck: Normal range of motion. Neck supple. Cardiovascular: Normal rate, regular rhythm and normal heart sounds. No murmur heard. Pulmonary/Chest: Effort normal and breath sounds normal. No respiratory distress. She has no wheezes. She has no rales. She exhibits no tenderness. Abdominal: Soft. Bowel sounds are normal. She exhibits no distension and no mass. There is no tenderness. There is no rebound and no guarding. Central obesity    Musculoskeletal: She exhibits deformity (bilateral hands and feet. ). Lymphadenopathy:     She has no cervical adenopathy. Neurological: She is alert and oriented to person, place, and time. Skin: Skin is warm and dry. No rash noted. She is not diaphoretic. No erythema. No pallor. Psychiatric: She has a normal mood and affect. Her behavior is normal. Judgment and thought content normal.   Vitals reviewed. Laboratory, Pathology, Radiology reviewed in detail with relevantimportant investigations summarized below:    Recent Labs     05/21/19  1143   WBC 6.7   HGB 13.8   HCT 40.2   MCV 96.4        Lab Results   Component Value Date    ALT 13 09/13/2018    AST 16 09/13/2018    ALKPHOS 100 09/13/2018    BILITOT 0.3 09/13/2018     Endoscopic investigations: colonoscopy 4/12/17- normal   EGD 4/17- Normal esophagus, multiple gastric polyps. Normal mucosa in the entire stomach. Normal examined duodenum. Assessment and Plan:  Renay Nelson 70 y.o. female patient with a longstanding history of NAFLD. Patient with complaints of RUQ pain intermittently for a few years.  Liver function test performed at the [facility] on 4/17/19- AST 22/ ALT 17, Bilirubin 0.3, , albumin 4.5, Protein 7.2. Patient with debilitating Rheumatoid arthritis. She was recently started on Plaquenil. Patient worried about liver and pain. There was mention of possible REBOLLEDO at some point. An abdominal ultrasound was performed in November, 2018 and reports hepatic steatosis. MRCP was performed in 2017 and reports probable benign enhancement of the right lobe of the liver, no incompatible with small adenoma. No alarming symptoms at this time. Will proceed with endoscopic ultrasound with liver biopsy. At lest a 5% weight loss was encouraged. Patient aware that NAFLD can progress into fibrosis/cirrhosis. - Nutrition referral was denies by the patient. Will consider Vitamin E after biopsy. - Aerobic exercise for at least 30 min a day for 5 days a week, swimming being considered the best due to lack of significant weight and load bearing on joints, prudent diet with low fat, low glycemic foods and weight reduction to mainatain BMI between 18 and 25 suggested. Patient verbalized understanding   >50% of this visit was spent on education/counseling the patient (one hour was spent)    Return in about 8 weeks (around 8/1/2019). JOSE Dye - [facility]    Please note this report has been partially produced using speech recognitionsoftware  and may cause contain errors related to that system including grammar, punctuation and spelling as well as words andphrases that may seem inappropriate. If there are questions or concerns please feel free to contact me to clarify.

## 2019-06-11 ENCOUNTER — PROCEDURE VISIT (OUTPATIENT)
Dept: PHYSICAL MEDICINE AND REHAB | Age: 64
End: 2019-06-11
Payer: MEDICARE

## 2019-06-11 DIAGNOSIS — M79.7 FIBROMYALGIA: Primary | ICD-10-CM

## 2019-06-11 PROCEDURE — 96372 THER/PROPH/DIAG INJ SC/IM: CPT | Performed by: PHYSICAL MEDICINE & REHABILITATION

## 2019-06-11 PROCEDURE — 20553 NJX 1/MLT TRIGGER POINTS 3/>: CPT | Performed by: PHYSICAL MEDICINE & REHABILITATION

## 2019-06-11 RX ORDER — LIDOCAINE HYDROCHLORIDE 10 MG/ML
16 INJECTION, SOLUTION INFILTRATION; PERINEURAL ONCE
Status: COMPLETED | OUTPATIENT
Start: 2019-06-11 | End: 2019-06-11

## 2019-06-11 RX ADMIN — LIDOCAINE HYDROCHLORIDE 16 ML: 10 INJECTION, SOLUTION INFILTRATION; PERINEURAL at 12:48

## 2019-06-25 ENCOUNTER — PROCEDURE VISIT (OUTPATIENT)
Dept: PHYSICAL MEDICINE AND REHAB | Age: 64
End: 2019-06-25
Payer: MEDICARE

## 2019-06-25 DIAGNOSIS — M89.8X1 PAIN OF RIGHT SCAPULA: Primary | ICD-10-CM

## 2019-06-25 PROCEDURE — 76942 ECHO GUIDE FOR BIOPSY: CPT | Performed by: PHYSICAL MEDICINE & REHABILITATION

## 2019-06-25 PROCEDURE — 64418 NJX AA&/STRD SPRSCAP NRV: CPT | Performed by: PHYSICAL MEDICINE & REHABILITATION

## 2019-06-27 DIAGNOSIS — Z79.899 HIGH RISK MEDICATION USE: ICD-10-CM

## 2019-06-27 DIAGNOSIS — M15.9 PRIMARY OSTEOARTHRITIS INVOLVING MULTIPLE JOINTS: ICD-10-CM

## 2019-06-27 DIAGNOSIS — M48.062 SPINAL STENOSIS OF LUMBAR REGION WITH NEUROGENIC CLAUDICATION: ICD-10-CM

## 2019-06-27 DIAGNOSIS — M46.46 LUMBAR DISCITIS: ICD-10-CM

## 2019-06-27 RX ORDER — TOPIRAMATE 25 MG/1
TABLET ORAL
Qty: 60 TABLET | Refills: 1 | Status: SHIPPED | OUTPATIENT
Start: 2019-06-27 | End: 2020-02-26

## 2019-07-02 RX ORDER — DOXYCYCLINE HYCLATE 100 MG
TABLET ORAL
Qty: 90 TABLET | Refills: 0 | Status: SHIPPED | OUTPATIENT
Start: 2019-07-02 | End: 2020-01-20

## 2019-07-03 RX ORDER — TOPIRAMATE 25 MG/1
25 TABLET ORAL NIGHTLY
Qty: 60 TABLET | Refills: 1 | Status: ON HOLD | OUTPATIENT
Start: 2019-07-03 | End: 2019-07-11 | Stop reason: SDUPTHER

## 2019-07-08 DIAGNOSIS — M15.9 PRIMARY OSTEOARTHRITIS INVOLVING MULTIPLE JOINTS: ICD-10-CM

## 2019-07-08 DIAGNOSIS — Z79.899 HIGH RISK MEDICATION USE: ICD-10-CM

## 2019-07-08 DIAGNOSIS — M46.46 LUMBAR DISCITIS: ICD-10-CM

## 2019-07-08 DIAGNOSIS — M48.062 SPINAL STENOSIS OF LUMBAR REGION WITH NEUROGENIC CLAUDICATION: ICD-10-CM

## 2019-07-08 RX ORDER — OXYCODONE AND ACETAMINOPHEN 7.5; 325 MG/1; MG/1
1 TABLET ORAL EVERY 6 HOURS PRN
Qty: 60 TABLET | Refills: 0 | Status: SHIPPED | OUTPATIENT
Start: 2019-07-10 | End: 2019-08-09 | Stop reason: SDUPTHER

## 2019-07-08 RX ORDER — OXYCODONE HCL 10 MG/1
10 TABLET, FILM COATED, EXTENDED RELEASE ORAL 2 TIMES DAILY
Qty: 60 TABLET | Refills: 0 | Status: SHIPPED | OUTPATIENT
Start: 2019-07-09 | End: 2019-08-09 | Stop reason: SDUPTHER

## 2019-07-09 DIAGNOSIS — I10 BENIGN ESSENTIAL HYPERTENSION: ICD-10-CM

## 2019-07-09 RX ORDER — LISINOPRIL 20 MG/1
TABLET ORAL
Qty: 90 TABLET | Refills: 0 | OUTPATIENT
Start: 2019-07-09

## 2019-07-11 ENCOUNTER — HOSPITAL ENCOUNTER (OUTPATIENT)
Age: 64
Setting detail: OUTPATIENT SURGERY
Discharge: HOME | End: 2019-07-11
Attending: INTERNAL MEDICINE | Admitting: INTERNAL MEDICINE
Payer: MEDICARE

## 2019-07-11 ENCOUNTER — ANESTHESIA EVENT (OUTPATIENT)
Dept: OPERATING ROOM | Age: 64
End: 2019-07-11
Payer: MEDICARE

## 2019-07-11 ENCOUNTER — ANESTHESIA (OUTPATIENT)
Dept: OPERATING ROOM | Age: 64
End: 2019-07-11
Payer: MEDICARE

## 2019-07-11 VITALS — SYSTOLIC BLOOD PRESSURE: 126 MMHG | OXYGEN SATURATION: 93 % | DIASTOLIC BLOOD PRESSURE: 58 MMHG

## 2019-07-11 VITALS
TEMPERATURE: 97.6 F | WEIGHT: 208 LBS | BODY MASS INDEX: 31.52 KG/M2 | SYSTOLIC BLOOD PRESSURE: 140 MMHG | RESPIRATION RATE: 16 BRPM | HEART RATE: 65 BPM | HEIGHT: 68 IN | DIASTOLIC BLOOD PRESSURE: 68 MMHG | OXYGEN SATURATION: 96 %

## 2019-07-11 PROCEDURE — 6360000002 HC RX W HCPCS: Performed by: INTERNAL MEDICINE

## 2019-07-11 PROCEDURE — 2720000010 HC SURG SUPPLY STERILE: Performed by: INTERNAL MEDICINE

## 2019-07-11 PROCEDURE — 43237 ENDOSCOPIC US EXAM ESOPH: CPT | Performed by: INTERNAL MEDICINE

## 2019-07-11 PROCEDURE — 88307 TISSUE EXAM BY PATHOLOGIST: CPT

## 2019-07-11 PROCEDURE — 3700000000 HC ANESTHESIA ATTENDED CARE: Performed by: INTERNAL MEDICINE

## 2019-07-11 PROCEDURE — 2709999900 HC NON-CHARGEABLE SUPPLY: Performed by: INTERNAL MEDICINE

## 2019-07-11 PROCEDURE — 2580000003 HC RX 258: Performed by: INTERNAL MEDICINE

## 2019-07-11 PROCEDURE — 47379 UNLISTED LAPS PX LIVER: CPT | Performed by: INTERNAL MEDICINE

## 2019-07-11 PROCEDURE — 7100000010 HC PHASE II RECOVERY - FIRST 15 MIN: Performed by: INTERNAL MEDICINE

## 2019-07-11 PROCEDURE — C1753 CATH, INTRAVAS ULTRASOUND: HCPCS | Performed by: INTERNAL MEDICINE

## 2019-07-11 PROCEDURE — 43239 EGD BIOPSY SINGLE/MULTIPLE: CPT | Performed by: INTERNAL MEDICINE

## 2019-07-11 PROCEDURE — 88341 IMHCHEM/IMCYTCHM EA ADD ANTB: CPT

## 2019-07-11 PROCEDURE — 88313 SPECIAL STAINS GROUP 2: CPT

## 2019-07-11 PROCEDURE — 3609018700 HC ENDOSCOPIC ULTRASOUND: Performed by: INTERNAL MEDICINE

## 2019-07-11 PROCEDURE — 7100000011 HC PHASE II RECOVERY - ADDTL 15 MIN: Performed by: INTERNAL MEDICINE

## 2019-07-11 PROCEDURE — 2580000003 HC RX 258: Performed by: NURSE ANESTHETIST, CERTIFIED REGISTERED

## 2019-07-11 PROCEDURE — 2500000003 HC RX 250 WO HCPCS: Performed by: INTERNAL MEDICINE

## 2019-07-11 PROCEDURE — 2500000003 HC RX 250 WO HCPCS: Performed by: NURSE ANESTHETIST, CERTIFIED REGISTERED

## 2019-07-11 PROCEDURE — 6360000002 HC RX W HCPCS: Performed by: NURSE ANESTHETIST, CERTIFIED REGISTERED

## 2019-07-11 PROCEDURE — 3700000001 HC ADD 15 MINUTES (ANESTHESIA): Performed by: INTERNAL MEDICINE

## 2019-07-11 PROCEDURE — 88305 TISSUE EXAM BY PATHOLOGIST: CPT

## 2019-07-11 PROCEDURE — 88342 IMHCHEM/IMCYTCHM 1ST ANTB: CPT

## 2019-07-11 RX ORDER — DIPHENHYDRAMINE HYDROCHLORIDE 50 MG/ML
12.5 INJECTION INTRAMUSCULAR; INTRAVENOUS
Status: DISCONTINUED | OUTPATIENT
Start: 2019-07-11 | End: 2019-07-11 | Stop reason: HOSPADM

## 2019-07-11 RX ORDER — LIDOCAINE HYDROCHLORIDE 10 MG/ML
1 INJECTION, SOLUTION EPIDURAL; INFILTRATION; INTRACAUDAL; PERINEURAL
Status: COMPLETED | OUTPATIENT
Start: 2019-07-11 | End: 2019-07-11

## 2019-07-11 RX ORDER — PROPOFOL 10 MG/ML
INJECTION, EMULSION INTRAVENOUS CONTINUOUS PRN
Status: DISCONTINUED | OUTPATIENT
Start: 2019-07-11 | End: 2019-07-11 | Stop reason: SDUPTHER

## 2019-07-11 RX ORDER — SODIUM CHLORIDE, SODIUM LACTATE, POTASSIUM CHLORIDE, CALCIUM CHLORIDE 600; 310; 30; 20 MG/100ML; MG/100ML; MG/100ML; MG/100ML
INJECTION, SOLUTION INTRAVENOUS CONTINUOUS PRN
Status: DISCONTINUED | OUTPATIENT
Start: 2019-07-11 | End: 2019-07-11 | Stop reason: SDUPTHER

## 2019-07-11 RX ORDER — MAGNESIUM HYDROXIDE 1200 MG/15ML
LIQUID ORAL PRN
Status: DISCONTINUED | OUTPATIENT
Start: 2019-07-11 | End: 2019-07-11 | Stop reason: ALTCHOICE

## 2019-07-11 RX ORDER — SODIUM CHLORIDE 9 MG/ML
INJECTION, SOLUTION INTRAVENOUS CONTINUOUS
Status: DISCONTINUED | OUTPATIENT
Start: 2019-07-11 | End: 2019-07-11 | Stop reason: HOSPADM

## 2019-07-11 RX ORDER — HEPARIN SODIUM (PORCINE) LOCK FLUSH IV SOLN 100 UNIT/ML 100 UNIT/ML
SOLUTION INTRAVENOUS PRN
Status: DISCONTINUED | OUTPATIENT
Start: 2019-07-11 | End: 2019-07-11 | Stop reason: ALTCHOICE

## 2019-07-11 RX ORDER — PROPOFOL 10 MG/ML
INJECTION, EMULSION INTRAVENOUS PRN
Status: DISCONTINUED | OUTPATIENT
Start: 2019-07-11 | End: 2019-07-11 | Stop reason: SDUPTHER

## 2019-07-11 RX ORDER — SODIUM CHLORIDE 0.9 % (FLUSH) 0.9 %
10 SYRINGE (ML) INJECTION PRN
Status: DISCONTINUED | OUTPATIENT
Start: 2019-07-11 | End: 2019-07-11 | Stop reason: HOSPADM

## 2019-07-11 RX ORDER — FENTANYL CITRATE 50 UG/ML
25 INJECTION, SOLUTION INTRAMUSCULAR; INTRAVENOUS EVERY 5 MIN PRN
Status: DISCONTINUED | OUTPATIENT
Start: 2019-07-11 | End: 2019-07-11 | Stop reason: HOSPADM

## 2019-07-11 RX ORDER — SODIUM CHLORIDE 0.9 % (FLUSH) 0.9 %
10 SYRINGE (ML) INJECTION EVERY 12 HOURS SCHEDULED
Status: DISCONTINUED | OUTPATIENT
Start: 2019-07-11 | End: 2019-07-11 | Stop reason: HOSPADM

## 2019-07-11 RX ORDER — LIDOCAINE HYDROCHLORIDE 20 MG/ML
INJECTION, SOLUTION EPIDURAL; INFILTRATION; INTRACAUDAL; PERINEURAL PRN
Status: DISCONTINUED | OUTPATIENT
Start: 2019-07-11 | End: 2019-07-11 | Stop reason: SDUPTHER

## 2019-07-11 RX ORDER — ONDANSETRON 2 MG/ML
4 INJECTION INTRAMUSCULAR; INTRAVENOUS
Status: DISCONTINUED | OUTPATIENT
Start: 2019-07-11 | End: 2019-07-11 | Stop reason: HOSPADM

## 2019-07-11 RX ADMIN — LIDOCAINE HYDROCHLORIDE 0.1 ML: 10 INJECTION, SOLUTION EPIDURAL; INFILTRATION; INTRACAUDAL; PERINEURAL at 07:49

## 2019-07-11 RX ADMIN — PROPOFOL 50 MG: 10 INJECTION, EMULSION INTRAVENOUS at 08:45

## 2019-07-11 RX ADMIN — PROPOFOL 50 MG: 10 INJECTION, EMULSION INTRAVENOUS at 08:55

## 2019-07-11 RX ADMIN — PROPOFOL 50 MG: 10 INJECTION, EMULSION INTRAVENOUS at 08:39

## 2019-07-11 RX ADMIN — LIDOCAINE HYDROCHLORIDE 100 MG: 20 INJECTION, SOLUTION EPIDURAL; INFILTRATION; INTRACAUDAL; PERINEURAL at 08:39

## 2019-07-11 RX ADMIN — PROPOFOL 120 MCG/KG/MIN: 10 INJECTION, EMULSION INTRAVENOUS at 08:39

## 2019-07-11 RX ADMIN — SODIUM CHLORIDE 1000 ML: 9 INJECTION, SOLUTION INTRAVENOUS at 07:51

## 2019-07-11 RX ADMIN — SODIUM CHLORIDE, POTASSIUM CHLORIDE, SODIUM LACTATE AND CALCIUM CHLORIDE: 600; 310; 30; 20 INJECTION, SOLUTION INTRAVENOUS at 08:33

## 2019-07-11 ASSESSMENT — PULMONARY FUNCTION TESTS
PIF_VALUE: 1
PIF_VALUE: 0
PIF_VALUE: 1
PIF_VALUE: 0
PIF_VALUE: 1
PIF_VALUE: 0
PIF_VALUE: 1
PIF_VALUE: 0
PIF_VALUE: 1

## 2019-07-11 ASSESSMENT — PAIN - FUNCTIONAL ASSESSMENT
PAIN_FUNCTIONAL_ASSESSMENT: PREVENTS OR INTERFERES SOME ACTIVE ACTIVITIES AND ADLS
PAIN_FUNCTIONAL_ASSESSMENT: 0-10

## 2019-07-11 ASSESSMENT — PAIN DESCRIPTION - DESCRIPTORS: DESCRIPTORS: SHARP

## 2019-07-11 NOTE — H&P
Lizzy Heart MD   Hydroxychloroquine Sulfate (PLAQUENIL PO) Take by mouth   Yes Historical Provider, MD   gabapentin (NEURONTIN) 300 MG capsule TAKE 1 CAPSULE IN THE DAY AS TOLERATED AND 2 CAPSULES AT NIGHT. 5/15/19 7/11/19 Yes Rosaline Peterson DO   furosemide (LASIX) 20 MG tablet TAKE 1 TABLET BY MOUTH EVERY DAY 3/18/19  Yes Denita Perez MD   Cyanocobalamin (B-12) 1000 MCG/ML KIT Inject 1 mL as directed every 14 days for 12 doses Inject one cc  Sub-cutaneosly every other week 2/28/19 8/2/19 Yes Rosaline Peterson DO   melatonin 5 MG TABS tablet Take 5 mg by mouth nightly   Yes Historical Provider, MD   lidocaine (XYLOCAINE) 5 % ointment APPLY TO AFFECTED AREA EVERY 12 HOURS AS NEEDED FOR PAIN 11/27/18  Yes Historical Provider, MD   atorvastatin (LIPITOR) 10 MG tablet Take 1 tablet by mouth nightly 10/15/18  Yes Denita Perez MD   acetaminophen (TYLENOL) 325 MG tablet Take 2 tablets by mouth every 6 hours  Patient taking differently: Take 650 mg by mouth every 4 hours as needed  8/17/18  Yes Kurtis Cobb MD   aspirin 81 MG tablet Take 81 mg by mouth daily   Yes Historical Provider, MD   Coenzyme Q10 (COQ10 PO) Take 1 capsule by mouth daily    Yes Historical Provider, MD   Cholecalciferol (VITAMIN D3) 2000 units TABS Take 2 tablets by mouth daily    Yes Historical Provider, MD   potassium chloride (KLOR-CON M) 10 MEQ extended release tablet Take 2 tablets by mouth daily 2/4/19   Denita Perez MD   ammonium lactate (AMLACTIN) 12 % cream APPLY TO DRY/CALLUS AREAS TWICE DAILY 11/27/18   Historical Provider, MD   nitroGLYCERIN (NITROSTAT) 0.4 MG SL tablet Place 0.4 mg under the tongue every 5 minutes as needed for Chest pain up to max of 3 total doses. If no relief after 1 dose, call 911. Historical Provider, MD   gabapentin (NEURONTIN) 300 MG capsule One in am and One in afternoon and two in pm. 10/10/18 1/30/19  Denita Perez MD       Allergies:    Allergies   Allergen Reactions    Latex Swelling     Swelling of

## 2019-07-11 NOTE — ANESTHESIA PRE PROCEDURE
Allergies   Allergen Reactions    Latex     Cephalexin     Norvasc [Amlodipine Besylate] Other (See Comments)     swelling    Keflex [Cephalexin] Rash       Problem List:    Patient Active Problem List   Diagnosis Code    Spinal stenosis of lumbar region with neurogenic claudication M48.062    High risk medication use - 10/17/17 OARRS PM&R, 12/19/17 OARRS PM&R, 12/20/17 Tox screen positive for opiates, Hydrocodone PM&R, MED CONTRACT 2/2/17 Z79.899    Mixed hyperlipidemia E78.2    Benign essential hypertension I10    Primary osteoarthritis involving multiple joints M15.0    BMI 33.0-33.9,adult Z68.33    Neuropathy involving both lower extremities G57.93    Staph aureus infection A49.01    Lumbar discitis M46.46    Osteomyelitis of lumbar vertebra (HCC) M46.26    Gait abnormality R26.9    Rheumatoid arthritis involving multiple sites with positive rheumatoid factor (HCC) M05.79    Ankle swelling M25.473    COLTON positive R76.8    Bilateral hand pain M79.641, M79.642    Bone erosion determined by x-ray M85.80    Deformity of finger of right hand M20.001    Serum creatinine raised R79.89    Chronic bilateral low back pain with bilateral sciatica M54.42, M54.41, G89.29    Cervicalgia M54.2    Inflammatory arthritis M19.90    Secondary osteoarthritis of multiple sites M15.3    Parkinsonian features R25.9       Past Medical History:        Diagnosis Date    Abnormal electromyogram (EMG) 8/19/09    SENSORIMOTOR NEUROPATHY OF BLE, RIGHT WORSE THAN LEFT, SUSPICIOUS FOR RIGHT S1 RADIC    Angina pectoris (HCC)     Ankle pain     Colon polyp 4/3/2017    Dermatitis     Fibromyalgia     Foot pain     Hyperlipidemia     Hypertension     Infection of intervertebral disc (pyogenic), lumbar region ([facility] [address] 8/13/2018    Low back pain     MRSA (methicillin resistant staph aureus) culture positive 2011    Neck pain     Neuropathy     Obesity     Osteoarthritis     PSVT (paroxysmal supraventricular Date     05/21/2019    K 5.1 05/21/2019    K 4.4 08/09/2018     05/21/2019    CO2 21 05/21/2019    BUN 43 05/21/2019    CREATININE 1.46 05/21/2019    GFRAA 43.4 05/21/2019    AGRATIO 0.8 08/18/2018    LABGLOM 35.9 05/21/2019    GLUCOSE 82 05/21/2019    GLUCOSE 88 09/06/2018    PROT 6.5 08/18/2018    CALCIUM 10.1 05/21/2019    BILITOT 0.3 09/13/2018    ALKPHOS 100 09/13/2018    AST 16 09/13/2018    ALT 13 09/13/2018       POC Tests: No results for input(s): POCGLU, POCNA, POCK, POCCL, POCBUN, POCHEMO, POCHCT in the last 72 hours. Coags:   Lab Results   Component Value Date    PROTIME 10.0 03/20/2016    INR 0.9 03/20/2016    APTT 24.7 03/20/2016       HCG (If Applicable): No results found for: PREGTESTUR, PREGSERUM, HCG, HCGQUANT     ABGs: No results found for: PHART, PO2ART, PRF9JJL, LXV3DXA, BEART, S1ISLDHQ     Type & Screen (If Applicable):  No results found for: LABABO, LABRH    Anesthesia Evaluation    Airway: Mallampati: II  TM distance: >3 FB   Neck ROM: full  Mouth opening: > = 3 FB Dental:    (+) caps      Pulmonary:Negative Pulmonary ROS                              Cardiovascular:  Exercise tolerance: good (>4 METS),   (+) hypertension: moderate, angina: no interval change, CHF: no interval change,                ROS comment: Paroxysmal SVT     Neuro/Psych:   (+) neuromuscular disease: Parkinson's disease,              ROS comment: rhematoid arthritis GI/Hepatic/Renal:   (+) morbid obesity         ROS comment: Abdominal pain. Endo/Other: Negative Endo/Other ROS   (+) : arthritis: rheumatoid. , .                 Abdominal:           Vascular: negative vascular ROS. Anesthesia Plan      MAC     ASA 3     (Soft bite block +- dental guard for temporary front teeth caps. )  Induction: intravenous. Anesthetic plan and risks discussed with patient. Use of blood products discussed with patient whom. Plan discussed with CRNA.                   TRISTIN ROME

## 2019-07-11 NOTE — OP NOTE
ENDOSCOPIC ULTRASOUND (EUS) REPORT    Patient: Nona Schwartz   : 1955    Procedure: Endoscopic ultrasonography examination with fine-needle biopsy    Date: 2019    Surgeon:  Aj Conroy MD    Preoperative Diagnosis: Abdominal pain, nonalcoholic fatty liver disease    Postoperative Diagnosis: Fatty infiltration of the pancreas and liver, normal pancreatic duct, normal common bile duct, antral erosions. Anesthesia: MAC    Indications: 75y.o. year old female presents for EUS today with central abdominal pain, history of fatty liver disease. CONSENT:  Informed consent was obtained. The anesthesia and procedure along with the risks, benefits, and alternatives were discussed by myself and the nursing staff to the satisfaction of the patient/family with ample opportunity to ask and answer questions. We discussed the risks associated with this procedure including (but not limited to) bleeding, perforation, anesthesia reaction, post-procedural pancreatitis, cholangitis, or even death. PREMEDICATIONS AND OTHER MEDICATIONS:  Provided by anesthesia service. DETAILS OF PROCEDURE:  The patient was brought to the OR unit and provided anesthesia. The patient was placed in the left lateral prone position. A Olympus gastroscope, Olympus linear echoendoscope inserted into the oropharynx and advanced under direct vision into and through the esophagus, stomach, and duodenum. An endosonoscope with 7. 5MHz processor and color doppler were used throughout the procedure. I performed real time sonographic image interpretation without the assistance of a radiologist.     FINDINGS:  EGD:  DUODENUM: bulb and descending portion appeared normal.  STOMACH: Scattered erosions with erythema noted in the antrum, otherwise the pyloric channel, body, fundus and cardia, including retroflexed views appear normal.  Biopsy was obtained from the antrum.   ESOPHAGUS: Diaphragmatic hiatus was 40 cm from incisors and GE junction (upper margin of gastric folds) was at 40 cm from incisors. Squamocolumnar junction was at 40 cm from incisors. Mucosa appeared normal.    EUS:  Pancreas: Parenchyma appeared mildly hyperechoic suggestive of fatty infiltration, no mass or cysts was noted. PD appeared normal, it measures 2.1 mm in the head of the pancreas and 1.5 mm in the body of pancreas. CBD/Gallbladder: No stones or sludge were seen in the CBD. CBD measures 3.2 mm in maximal diameter. GB contains minimal scattered sludge. Left adrenal gland: Left adrenal gland appeared normal in size and shape  Liver: Examined portion of the liver appeares mildly hyperechoic suggestive of fatty infiltration. Utilizing a 19-gauge needle liver biopsy was performed. Lymph nodes: No abnormal lymphadenopathy was noted. Patient tolerated the procedure well and was taken to the PACU in good condition.     EBL: None  Complication: None  Specimen Sent: Yes    Impression:  - Fatty infiltration of the pancreas and liver, normal pancreatic duct, normal common bile duct, antral erosions    Plan:  -Await pathology  -Follow-up in GI clinic as previously scheduled    Ally Gottlieb MD 7/11/2019

## 2019-07-11 NOTE — ANESTHESIA POSTPROCEDURE EVALUATION
Department of Anesthesiology  Postprocedure Note    Patient: Claire Morales  MRN: [id number]  YOB: 1955  Date of evaluation: 7/11/2019  Time:  9:14 AM     Procedure Summary     Date:  07/11/19 Room / Location:  [facility]    Anesthesia Start:  2402 Anesthesia Stop:  8027    Procedure:  EGD/ EUS (N/A ) Diagnosis:  (LIVER BIOPSY)    Surgeon:  Chuyita Mendoza MD Responsible Provider:  Daina Hunter MD    Anesthesia Type:  MAC ASA Status:  3          Anesthesia Type: MAC    Jesus Phase I:      Jesus Phase II:      Last vitals: Reviewed and per EMR flowsheets.        Anesthesia Post Evaluation    Patient location during evaluation: PACU  Patient participation: complete - patient participated  Level of consciousness: awake and alert  Pain score: 0  Airway patency: patent  Nausea & Vomiting: no nausea and no vomiting  Complications: no  Cardiovascular status: blood pressure returned to baseline and hemodynamically stable  Respiratory status: acceptable  Hydration status: euvolemic

## 2019-07-17 ENCOUNTER — OFFICE VISIT (OUTPATIENT)
Dept: PHYSICAL MEDICINE AND REHAB | Age: 64
End: 2019-07-17
Payer: MEDICARE

## 2019-07-17 VITALS
WEIGHT: 208 LBS | BODY MASS INDEX: 31.52 KG/M2 | SYSTOLIC BLOOD PRESSURE: 124 MMHG | HEIGHT: 68 IN | DIASTOLIC BLOOD PRESSURE: 66 MMHG

## 2019-07-17 DIAGNOSIS — M48.062 SPINAL STENOSIS OF LUMBAR REGION WITH NEUROGENIC CLAUDICATION: ICD-10-CM

## 2019-07-17 DIAGNOSIS — R26.9 GAIT ABNORMALITY: ICD-10-CM

## 2019-07-17 DIAGNOSIS — M79.642 BILATERAL HAND PAIN: ICD-10-CM

## 2019-07-17 DIAGNOSIS — M46.26 OSTEOMYELITIS OF LUMBAR VERTEBRA (HCC): ICD-10-CM

## 2019-07-17 DIAGNOSIS — R25.9 PARKINSONIAN FEATURES: ICD-10-CM

## 2019-07-17 DIAGNOSIS — R76.8 ANA POSITIVE: ICD-10-CM

## 2019-07-17 DIAGNOSIS — M79.641 BILATERAL HAND PAIN: ICD-10-CM

## 2019-07-17 DIAGNOSIS — M15.9 PRIMARY OSTEOARTHRITIS INVOLVING MULTIPLE JOINTS: Primary | ICD-10-CM

## 2019-07-17 DIAGNOSIS — M05.79 RHEUMATOID ARTHRITIS INVOLVING MULTIPLE SITES WITH POSITIVE RHEUMATOID FACTOR (HCC): ICD-10-CM

## 2019-07-17 DIAGNOSIS — A49.01 STAPH AUREUS INFECTION: ICD-10-CM

## 2019-07-17 DIAGNOSIS — Z79.899 HIGH RISK MEDICATION USE: ICD-10-CM

## 2019-07-17 DIAGNOSIS — G57.93 NEUROPATHY INVOLVING BOTH LOWER EXTREMITIES: ICD-10-CM

## 2019-07-17 DIAGNOSIS — M79.10 MYALGIA: ICD-10-CM

## 2019-07-17 DIAGNOSIS — M46.46 LUMBAR DISCITIS: ICD-10-CM

## 2019-07-17 DIAGNOSIS — M54.2 CERVICALGIA: ICD-10-CM

## 2019-07-17 PROCEDURE — 1123F ACP DISCUSS/DSCN MKR DOCD: CPT | Performed by: PHYSICAL MEDICINE & REHABILITATION

## 2019-07-17 PROCEDURE — 3017F COLORECTAL CA SCREEN DOC REV: CPT | Performed by: PHYSICAL MEDICINE & REHABILITATION

## 2019-07-17 PROCEDURE — G8399 PT W/DXA RESULTS DOCUMENT: HCPCS | Performed by: PHYSICAL MEDICINE & REHABILITATION

## 2019-07-17 PROCEDURE — G8427 DOCREV CUR MEDS BY ELIG CLIN: HCPCS | Performed by: PHYSICAL MEDICINE & REHABILITATION

## 2019-07-17 PROCEDURE — 4040F PNEUMOC VAC/ADMIN/RCVD: CPT | Performed by: PHYSICAL MEDICINE & REHABILITATION

## 2019-07-17 PROCEDURE — G8417 CALC BMI ABV UP PARAM F/U: HCPCS | Performed by: PHYSICAL MEDICINE & REHABILITATION

## 2019-07-17 PROCEDURE — 99215 OFFICE O/P EST HI 40 MIN: CPT | Performed by: PHYSICAL MEDICINE & REHABILITATION

## 2019-07-17 PROCEDURE — 1090F PRES/ABSN URINE INCON ASSESS: CPT | Performed by: PHYSICAL MEDICINE & REHABILITATION

## 2019-07-17 PROCEDURE — 1036F TOBACCO NON-USER: CPT | Performed by: PHYSICAL MEDICINE & REHABILITATION

## 2019-07-17 RX ORDER — GABAPENTIN 300 MG/1
CAPSULE ORAL
Qty: 270 CAPSULE | Refills: 1 | Status: SHIPPED | OUTPATIENT
Start: 2019-07-17 | End: 2019-11-21 | Stop reason: SDUPTHER

## 2019-07-17 ASSESSMENT — ENCOUNTER SYMPTOMS
BACK PAIN: 1
VOMITING: 0
COUGH: 0
NAUSEA: 0
WHEEZING: 0
DIARRHEA: 0
CHEST TIGHTNESS: 0
SHORTNESS OF BREATH: 0
ABDOMINAL PAIN: 0
PHOTOPHOBIA: 1

## 2019-07-17 NOTE — PROGRESS NOTES
relaxation, ice, acetaminophen, NSAIDs, rest and heat for the symptoms. The treatment provided moderate relief.        Past Medical History:   Diagnosis Date    Abnormal electromyogram (EMG) 8/19/09    SENSORIMOTOR NEUROPATHY OF BLE, RIGHT WORSE THAN LEFT, SUSPICIOUS FOR RIGHT S1 RADIC    Angina pectoris (HCC)     Ankle pain     Colon polyp 4/3/2017    Dermatitis     Fibromyalgia     Foot pain     Hyperlipidemia     Hypertension     Infection of intervertebral disc (pyogenic), lumbar region ([facility] [address] 8/13/2018    Low back pain     MRSA (methicillin resistant staph aureus) culture positive 2011    Neck pain     Neuropathy     Obesity     Osteoarthritis     PSVT (paroxysmal supraventricular tachycardia) ([facility] [address]     Shingles outbreak 2012    SI (sacroiliac) pain     Stenosis     Vitamin D deficiency      Past Surgical History:   Procedure Laterality Date    CARDIAC CATHETERIZATION  10/2016    CERVICAL FUSION  12/14/11    Dr Antonio Bangura with bone graft and plate    COLONOSCOPY      ENDOSCOPIC ULTRASOUND (LOWER) N/A 7/11/2019    EGD/ EUS performed by Chris Patel MD at [address] [place] [address]  7/05    HYSTERECTOMY  2001    HYSTERECTOMY, TOTAL ABDOMINAL      JOINT REPLACEMENT Left     tka    MENISCECTOMY  10/31/11    left knee    OTHER SURGICAL HISTORY  10/26/09    CAUDALS BY DR Parviz Moya    OK COLON CA SCRN NOT  [address] IND N/A 4/12/2017    [facility] ESOPHAGOGASTRODUODENOSCOPY TRANSORAL DIAGNOSTIC N/A 4/12/2017    EGD ESOPHAGOGASTRODUODENOSCOPY performed by Juliann Johnson MD at [facility]  12/2011    Dr Antonio Bangura Lumbar and c spine    TOTAL KNEE ARTHROPLASTY  7/30/12    LEFT-DR AVELAR    UPPER GASTROINTESTINAL ENDOSCOPY  1995     Social History     Socioeconomic History    Marital status:      Spouse name: Not on file    Number of children: Not on file    Years of education: Not on file    Highest bradypnea. No respiratory distress. She has no wheezes. She exhibits no tenderness. Abdominal: Soft. Bowel sounds are normal. She exhibits no distension and no mass. There is no tenderness. There is no rebound and no guarding. Pressure RUQ   Musculoskeletal: She exhibits tenderness. She exhibits no edema. Right shoulder: She exhibits decreased range of motion, tenderness and bony tenderness. Left shoulder: She exhibits decreased range of motion, tenderness and bony tenderness. Right elbow: Normal.       Left elbow: Normal.        Right wrist: Normal.        Left wrist: Normal.        Right hip: She exhibits decreased range of motion, decreased strength and tenderness. Left hip: She exhibits decreased range of motion, decreased strength and tenderness. Right knee: Normal.        Left knee: Normal.        Right ankle: Normal. Achilles tendon normal.        Left ankle: She exhibits decreased range of motion. Tenderness. Lateral malleolus and medial malleolus tenderness found. Achilles tendon normal.        Cervical back: She exhibits decreased range of motion, tenderness and bony tenderness. Thoracic back: She exhibits decreased range of motion, tenderness and bony tenderness. Lumbar back: She exhibits decreased range of motion, tenderness, bony tenderness and pain. She exhibits no swelling, no edema, no deformity, no laceration and normal pulse. Back:         Right upper arm: Normal.        Left upper arm: Normal.        Right forearm: Normal.        Left forearm: Normal.        Arms:       Right hand: She exhibits decreased range of motion, tenderness, bony tenderness and deformity. Left hand: She exhibits decreased range of motion, tenderness, bony tenderness and deformity.         Right upper leg: Normal.        Left upper leg: Normal.        Right lower leg: Normal.        Left lower leg: Normal.        Legs:       Right foot: There is decreased

## 2019-07-23 ENCOUNTER — TELEPHONE (OUTPATIENT)
Dept: GASTROENTEROLOGY | Age: 64
End: 2019-07-23

## 2019-07-24 NOTE — TELEPHONE ENCOUNTER
Will have Tristian call with results. Tristian,these results are per Dr. Kathryn Ahumada: Please inform patient that the biopsy from the liver shows steatohepatitis but no significant fibrosis, this is good news. At this time recommendation would be to continue working on weight reduction and increasing exercise tolerance.   She may schedule a follow-up appointment with Jinny Mathews in 3 months

## 2019-07-25 NOTE — TELEPHONE ENCOUNTER
Spoke to patient about her test results, I made patient a follow up  appt for 10/25/2019 with Magdalena Thomason

## 2019-07-29 ENCOUNTER — PROCEDURE VISIT (OUTPATIENT)
Dept: PHYSICAL MEDICINE AND REHAB | Age: 64
End: 2019-07-29
Payer: MEDICARE

## 2019-07-29 DIAGNOSIS — M48.062 SPINAL STENOSIS OF LUMBAR REGION WITH NEUROGENIC CLAUDICATION: ICD-10-CM

## 2019-07-29 DIAGNOSIS — M54.31 SCIATICA OF RIGHT SIDE: Primary | ICD-10-CM

## 2019-07-29 DIAGNOSIS — M15.9 PRIMARY OSTEOARTHRITIS INVOLVING MULTIPLE JOINTS: ICD-10-CM

## 2019-07-29 PROCEDURE — 64445 NJX AA&/STRD SCIATIC NRV IMG: CPT | Performed by: PHYSICAL MEDICINE & REHABILITATION

## 2019-07-29 PROCEDURE — 76942 ECHO GUIDE FOR BIOPSY: CPT | Performed by: PHYSICAL MEDICINE & REHABILITATION

## 2019-07-29 RX ORDER — LIDOCAINE HYDROCHLORIDE 10 MG/ML
8 INJECTION, SOLUTION INFILTRATION; PERINEURAL ONCE
Status: COMPLETED | OUTPATIENT
Start: 2019-07-29 | End: 2019-07-29

## 2019-07-29 RX ORDER — LIDOCAINE HYDROCHLORIDE 20 MG/ML
5 INJECTION, SOLUTION INFILTRATION; PERINEURAL ONCE
Status: COMPLETED | OUTPATIENT
Start: 2019-07-29 | End: 2019-07-29

## 2019-07-29 RX ADMIN — LIDOCAINE HYDROCHLORIDE 5 ML: 20 INJECTION, SOLUTION INFILTRATION; PERINEURAL at 16:00

## 2019-07-29 RX ADMIN — LIDOCAINE HYDROCHLORIDE 8 ML: 10 INJECTION, SOLUTION INFILTRATION; PERINEURAL at 16:00

## 2019-07-30 ENCOUNTER — OFFICE VISIT (OUTPATIENT)
Dept: FAMILY MEDICINE CLINIC | Age: 64
End: 2019-07-30
Payer: MEDICARE

## 2019-07-30 VITALS
RESPIRATION RATE: 16 BRPM | WEIGHT: 213 LBS | HEART RATE: 81 BPM | DIASTOLIC BLOOD PRESSURE: 70 MMHG | HEIGHT: 68 IN | BODY MASS INDEX: 32.28 KG/M2 | TEMPERATURE: 97.7 F | OXYGEN SATURATION: 98 % | SYSTOLIC BLOOD PRESSURE: 138 MMHG

## 2019-07-30 DIAGNOSIS — N18.30 CHRONIC KIDNEY DISEASE, STAGE III (MODERATE) (HCC): ICD-10-CM

## 2019-07-30 DIAGNOSIS — I20.9 ANGINA PECTORIS (HCC): ICD-10-CM

## 2019-07-30 DIAGNOSIS — Z23 NEED FOR PROPHYLACTIC VACCINATION AND INOCULATION AGAINST VARICELLA: ICD-10-CM

## 2019-07-30 DIAGNOSIS — I10 BENIGN ESSENTIAL HYPERTENSION: Primary | ICD-10-CM

## 2019-07-30 PROCEDURE — G8417 CALC BMI ABV UP PARAM F/U: HCPCS | Performed by: FAMILY MEDICINE

## 2019-07-30 PROCEDURE — G8427 DOCREV CUR MEDS BY ELIG CLIN: HCPCS | Performed by: FAMILY MEDICINE

## 2019-07-30 PROCEDURE — 3017F COLORECTAL CA SCREEN DOC REV: CPT | Performed by: FAMILY MEDICINE

## 2019-07-30 PROCEDURE — 1090F PRES/ABSN URINE INCON ASSESS: CPT | Performed by: FAMILY MEDICINE

## 2019-07-30 PROCEDURE — 1123F ACP DISCUSS/DSCN MKR DOCD: CPT | Performed by: FAMILY MEDICINE

## 2019-07-30 PROCEDURE — 1036F TOBACCO NON-USER: CPT | Performed by: FAMILY MEDICINE

## 2019-07-30 PROCEDURE — 99214 OFFICE O/P EST MOD 30 MIN: CPT | Performed by: FAMILY MEDICINE

## 2019-07-30 PROCEDURE — G8598 ASA/ANTIPLAT THER USED: HCPCS | Performed by: FAMILY MEDICINE

## 2019-07-30 PROCEDURE — 4040F PNEUMOC VAC/ADMIN/RCVD: CPT | Performed by: FAMILY MEDICINE

## 2019-07-30 PROCEDURE — G8399 PT W/DXA RESULTS DOCUMENT: HCPCS | Performed by: FAMILY MEDICINE

## 2019-07-30 RX ORDER — NITROGLYCERIN 0.4 MG/1
0.4 TABLET SUBLINGUAL EVERY 5 MIN PRN
Qty: 25 TABLET | Refills: 0 | Status: SHIPPED | OUTPATIENT
Start: 2019-07-30 | End: 2019-08-29 | Stop reason: SDUPTHER

## 2019-07-30 NOTE — PROGRESS NOTES
Patient: Tim Carreon    YOB: 1955    Date: 7/30/19    Chief Complaint   Patient presents with    Hypertension     6 month follow up.  Hyperlipidemia     6 month follow up. Patient Active Problem List    Diagnosis Date Noted    High risk medication use - 10/17/17 OARRS PM&R, 12/19/17 OARRS PM&R, 12/20/17 Tox screen positive for opiates, Hydrocodone PM&R, MED CONTRACT 2/2/17 04/17/2013     Priority: High    Angina pectoris ([address] 07/01/2019     Overview Note:     sees Dr. Brandon Armando Parkinsonian features 05/13/2019    COLTON positive 04/20/2019    Serum creatinine raised 04/20/2019    Inflammatory arthritis 04/20/2019    Bilateral hand pain 04/17/2019    Bone erosion determined by x-ray 04/17/2019    Deformity of finger of right hand 04/17/2019    Chronic bilateral low back pain with bilateral sciatica 04/17/2019    Cervicalgia 04/17/2019    Secondary osteoarthritis of multiple sites 04/17/2019    Ankle swelling 12/19/2018    Rheumatoid arthritis involving multiple sites with positive rheumatoid factor ([address]     Myalgia 10/11/2018    Lumbar discitis 08/13/2018     Overview Note:     L2 / Right       Osteomyelitis of lumbar vertebra ([address] 08/13/2018     Overview Note:     L2-L3 DISC SPACE INFECTION WITH ASSOCIATED OSTEOMYELITIS OF L2.      Gait abnormality 08/13/2018    Staph aureus infection      Overview Note:     MSSA bacteremia: With multiple site involvement. On nafcillin. Blood cultures for 3 consecutive days is negative. Patient to get PICC today     Patient to get IV antibiotics for 6-8 weeks followed by oral antibiotics for 1 year-will need PICC line     Diskitis: At L2 with adjacent psoas abscess.  No surgical intervention as of now as per neurosurgery     MACIE negative for any thrombus/vegetation     Patient today has severe pain in her right foot -MRI of right foot ordered which did not show any abscess or osteomyelitis            Neuropathy involving both lower extremities 04/25/2018    BMI 33.0-33.9,adult 02/28/2018    Primary osteoarthritis involving multiple joints 09/02/2016    Mixed hyperlipidemia 09/29/2014     Overview Note:     Overview:   Hyperlipidemia  Overview:   Hyperlipidemia  Overview:   Hyperlipidemia  Overview:   Hyperlipidemia  Overview:   Hyperlipidemia      Spinal stenosis of lumbar region with neurogenic claudication     Benign essential hypertension 04/01/2009       Allergies   Allergen Reactions    Latex Swelling     Swelling of hands with wearing latex gloves    Norvasc [Amlodipine Besylate] Other (See Comments)     swelling    Keflex [Cephalexin] Rash       Vitals:    07/30/19 0906   BP: 138/70   Site: Right Upper Arm   Position: Sitting   Cuff Size: Large Adult   Pulse: 81   Resp: 16   Temp: 97.7 °F (36.5 °C)   TempSrc: Oral   SpO2: 98%   Weight: 213 lb (96.6 kg)   Height: 5' 8\" (1.727 m)      Body mass index is 32.39 kg/m². HPI    She is here primarily to follow-up on her blood pressure and her kidney disease. Both of those are stable and she had lab work done recently in May which was scanned into the chart and reviewed. Her issue is that she is been having angina she had a very big bout of angina and Greg Sal about a month ago. She took 3 nitroglycerin to get rid of it. She was told by her cardiologist that she has small coronary arteries and that she does not have to worry about having a heart attack anytime soon. We discussed that there is no way you can know if that is true or not and that she has to err on the side of caution. I told her I would like her to consider going to the emergency room cardiac calling the cardiologist if she has crescendo angina, which we discussed at length, or angina that is happening more frequently or not responding to nitroglycerin. And she agrees to do so.  Spent 25 minutes face-to-face with the patient discussing pertinent clinical issues, assessing pertinent labs and x-rays that kidney disease, stage III (moderate) ([facility])  stable   4. Angina pectoris ([facility])  nitroGLYCERIN (NITROSTAT) 0.4 MG SL tablet               Plan:  Current Outpatient Medications   Medication Sig Dispense Refill    nitroGLYCERIN (NITROSTAT) 0.4 MG SL tablet Place 1 tablet under the tongue every 5 minutes as needed for Chest pain up to max of 3 total doses. If no relief after 1 dose, call 911. 25 tablet 0    zoster recombinant adjuvanted vaccine (SHINGRIX) 50 MCG/0.5ML SUSR injection Inject 0.5 mLs into the muscle once for 1 dose 50 MCG IM then repeat 2-6 months. 1 each 1    gabapentin (NEURONTIN) 300 MG capsule TAKE 1 CAPSULE IN THE DAY AS TOLERATED AND 2 CAPSULES AT NIGHT. 270 capsule 1    oxyCODONE (OXYCONTIN) 10 MG extended release tablet Take 1 tablet by mouth 2 times daily for 30 days. Intended supply: 30 days 60 tablet 0    oxyCODONE-acetaminophen (PERCOCET) 7.5-325 MG per tablet Take 1 tablet by mouth every 6 hours as needed for Pain for up to 30 days. Intended supply: 30 days 60 tablet 0    doxycycline hyclate (VIBRA-TABS) 100 MG tablet TAKE 1 TABLET BY MOUTH EVERY DAY 90 tablet 0    topiramate (TOPAMAX) 25 MG tablet TAKE 1 TABLET BY MOUTH EVERY DAY AT NIGHT 60 tablet 1    lisinopril (PRINIVIL;ZESTRIL) 20 MG tablet TAKE 1 TABLET BY MOUTH EVERYDAY AT BEDTIME (Patient taking differently: TAKE 1 TABLET BY MOUTH EVERY OTHER DAY  AT BEDTIME.  Alternating with 40 mg) 90 tablet 3    furosemide (LASIX) 20 MG tablet TAKE 1 TABLET BY MOUTH EVERY DAY 30 tablet 9    Cyanocobalamin (B-12) 1000 MCG/ML KIT Inject 1 mL as directed every 14 days for 12 doses Inject one cc  Sub-cutaneosly every other week 12 kit 1    potassium chloride (KLOR-CON M) 10 MEQ extended release tablet Take 2 tablets by mouth daily (Patient taking differently: Take 20 mEq by mouth One tablet every other day) 180 tablet 3    melatonin 5 MG TABS tablet Take 5 mg by mouth nightly      lidocaine (XYLOCAINE) 5 % ointment APPLY TO AFFECTED AREA EVERY 12 HOURS AS NEEDED FOR PAIN  5    ammonium lactate (AMLACTIN) 12 % cream APPLY TO DRY/CALLUS AREAS TWICE DAILY  5    atorvastatin (LIPITOR) 10 MG tablet Take 1 tablet by mouth nightly 90 tablet 3    acetaminophen (TYLENOL) 325 MG tablet Take 2 tablets by mouth every 6 hours (Patient taking differently: Take 650 mg by mouth every 4 hours as needed ) 120 tablet 3    aspirin 81 MG tablet Take 81 mg by mouth daily      Coenzyme Q10 (COQ10 PO) Take 1 capsule by mouth daily       Cholecalciferol (VITAMIN D3) 2000 units TABS Take 2 tablets by mouth daily        Current Facility-Administered Medications   Medication Dose Route Frequency Provider Last Rate Last Dose    cyanocobalamin injection 1,000 mcg  1,000 mcg Intramuscular Once Rosaline Scullin, DO         No orders of the defined types were placed in this encounter. Orders Placed This Encounter   Medications    nitroGLYCERIN (NITROSTAT) 0.4 MG SL tablet     Sig: Place 1 tablet under the tongue every 5 minutes as needed for Chest pain up to max of 3 total doses. If no relief after 1 dose, call 911. Dispense:  25 tablet     Refill:  0    zoster recombinant adjuvanted vaccine (SHINGRIX) 50 MCG/0.5ML SUSR injection     Sig: Inject 0.5 mLs into the muscle once for 1 dose 50 MCG IM then repeat 2-6 months. Dispense:  1 each     Refill:  1        Quality & Risk Score Accuracy    Visit Dx:  N18.3 - Chronic kidney disease, stage III (moderate) (HCC)  Assessment and plan:  Stable based upon symptoms and exam. Continue current treatment plan and follow up at least yearly. Last edited 07/30/19 09:20 EDT by Keyona Soria MD           Return in about 1 year (around 7/30/2020).     Dr. Keyona Soria      7/30/19  10:15 AM

## 2019-08-02 ENCOUNTER — TELEPHONE (OUTPATIENT)
Dept: INFECTIOUS DISEASES | Age: 64
End: 2019-08-02

## 2019-08-02 ENCOUNTER — OFFICE VISIT (OUTPATIENT)
Dept: FAMILY MEDICINE CLINIC | Age: 64
End: 2019-08-02
Payer: MEDICARE

## 2019-08-02 VITALS
OXYGEN SATURATION: 98 % | TEMPERATURE: 97.8 F | SYSTOLIC BLOOD PRESSURE: 150 MMHG | HEIGHT: 68 IN | HEART RATE: 99 BPM | WEIGHT: 212 LBS | BODY MASS INDEX: 32.13 KG/M2 | RESPIRATION RATE: 16 BRPM | DIASTOLIC BLOOD PRESSURE: 82 MMHG

## 2019-08-02 DIAGNOSIS — S90.852A FOREIGN BODY IN LEFT FOOT, INITIAL ENCOUNTER: Primary | ICD-10-CM

## 2019-08-02 PROCEDURE — 1123F ACP DISCUSS/DSCN MKR DOCD: CPT | Performed by: NURSE PRACTITIONER

## 2019-08-02 PROCEDURE — G8399 PT W/DXA RESULTS DOCUMENT: HCPCS | Performed by: NURSE PRACTITIONER

## 2019-08-02 PROCEDURE — 1090F PRES/ABSN URINE INCON ASSESS: CPT | Performed by: NURSE PRACTITIONER

## 2019-08-02 PROCEDURE — 99213 OFFICE O/P EST LOW 20 MIN: CPT | Performed by: NURSE PRACTITIONER

## 2019-08-02 PROCEDURE — 4040F PNEUMOC VAC/ADMIN/RCVD: CPT | Performed by: NURSE PRACTITIONER

## 2019-08-02 PROCEDURE — G8598 ASA/ANTIPLAT THER USED: HCPCS | Performed by: NURSE PRACTITIONER

## 2019-08-02 PROCEDURE — G8427 DOCREV CUR MEDS BY ELIG CLIN: HCPCS | Performed by: NURSE PRACTITIONER

## 2019-08-02 PROCEDURE — 1036F TOBACCO NON-USER: CPT | Performed by: NURSE PRACTITIONER

## 2019-08-02 PROCEDURE — 3017F COLORECTAL CA SCREEN DOC REV: CPT | Performed by: NURSE PRACTITIONER

## 2019-08-02 PROCEDURE — 10120 INC&RMVL FB SUBQ TISS SMPL: CPT | Performed by: NURSE PRACTITIONER

## 2019-08-02 PROCEDURE — G8417 CALC BMI ABV UP PARAM F/U: HCPCS | Performed by: NURSE PRACTITIONER

## 2019-08-02 RX ORDER — LIDOCAINE HYDROCHLORIDE 10 MG/ML
2 INJECTION, SOLUTION EPIDURAL; INFILTRATION; INTRACAUDAL; PERINEURAL ONCE
Status: COMPLETED | OUTPATIENT
Start: 2019-08-02 | End: 2019-08-02

## 2019-08-02 RX ADMIN — LIDOCAINE HYDROCHLORIDE 2 ML: 10 INJECTION, SOLUTION EPIDURAL; INFILTRATION; INTRACAUDAL; PERINEURAL at 12:00

## 2019-08-02 ASSESSMENT — ENCOUNTER SYMPTOMS: SHORTNESS OF BREATH: 0

## 2019-08-02 NOTE — PROGRESS NOTES
THE DAY AS TOLERATED AND 2 CAPSULES AT NIGHT. 270 capsule 1    oxyCODONE (OXYCONTIN) 10 MG extended release tablet Take 1 tablet by mouth 2 times daily for 30 days. Intended supply: 30 days 60 tablet 0    oxyCODONE-acetaminophen (PERCOCET) 7.5-325 MG per tablet Take 1 tablet by mouth every 6 hours as needed for Pain for up to 30 days. Intended supply: 30 days 60 tablet 0    doxycycline hyclate (VIBRA-TABS) 100 MG tablet TAKE 1 TABLET BY MOUTH EVERY DAY 90 tablet 0    topiramate (TOPAMAX) 25 MG tablet TAKE 1 TABLET BY MOUTH EVERY DAY AT NIGHT 60 tablet 1    lisinopril (PRINIVIL;ZESTRIL) 20 MG tablet TAKE 1 TABLET BY MOUTH EVERYDAY AT BEDTIME (Patient taking differently: TAKE 1 TABLET BY MOUTH EVERY OTHER DAY  AT BEDTIME.  Alternating with 40 mg) 90 tablet 3    furosemide (LASIX) 20 MG tablet TAKE 1 TABLET BY MOUTH EVERY DAY 30 tablet 9    Cyanocobalamin (B-12) 1000 MCG/ML KIT Inject 1 mL as directed every 14 days for 12 doses Inject one cc  Sub-cutaneosly every other week 12 kit 1    potassium chloride (KLOR-CON M) 10 MEQ extended release tablet Take 2 tablets by mouth daily (Patient taking differently: Take 20 mEq by mouth One tablet every other day) 180 tablet 3    melatonin 5 MG TABS tablet Take 5 mg by mouth nightly      lidocaine (XYLOCAINE) 5 % ointment APPLY TO AFFECTED AREA EVERY 12 HOURS AS NEEDED FOR PAIN  5    ammonium lactate (AMLACTIN) 12 % cream APPLY TO DRY/CALLUS AREAS TWICE DAILY  5    atorvastatin (LIPITOR) 10 MG tablet Take 1 tablet by mouth nightly 90 tablet 3    acetaminophen (TYLENOL) 325 MG tablet Take 2 tablets by mouth every 6 hours (Patient taking differently: Take 650 mg by mouth every 4 hours as needed ) 120 tablet 3    aspirin 81 MG tablet Take 81 mg by mouth daily      Coenzyme Q10 (COQ10 PO) Take 1 capsule by mouth daily       Cholecalciferol (VITAMIN D3) 2000 units TABS Take 2 tablets by mouth daily        Current Facility-Administered Medications on File Prior to Visit Medication Dose Route Frequency Provider Last Rate Last Dose    cyanocobalamin injection 1,000 mcg  1,000 mcg Intramuscular Once Rosaline Scullin, DO           Review of Systems   Constitutional: Negative for chills and fever. Respiratory: Negative for shortness of breath. Cardiovascular: Negative for chest pain. Skin: Positive for wound. Possible foreign body to left heel     Objective    Vitals:    08/02/19 1106 08/02/19 1113   BP: (!) 158/64 (!) 150/82   Site: Left Upper Arm Right Upper Arm   Position: Sitting Sitting   Cuff Size: Large Adult Large Adult   Pulse: 99    Resp: 16    Temp: 97.8 °F (36.6 °C)    TempSrc: Oral    SpO2: 98%    Weight: 212 lb (96.2 kg)    Height: 5' 8\" (1.727 m)        Physical Exam   Constitutional: She is oriented to person, place, and time. She appears well-developed. No distress. HENT:   Head: Normocephalic and atraumatic. Right Ear: Hearing and external ear normal.   Left Ear: Hearing and external ear normal.   Nose: Nose normal.   Pulmonary/Chest: Effort normal. No respiratory distress. Musculoskeletal:        Left foot: There is tenderness and swelling. There is normal range of motion, no bony tenderness, normal capillary refill, no crepitus and no deformity. Feet:    Neurological: She is alert and oriented to person, place, and time. Skin: Skin is warm and dry. Psychiatric: She has a normal mood and affect. Her speech is normal and behavior is normal.   Vitals reviewed. Assessment & Plan     Abiola Kent was seen today for puncture wound. Diagnoses and all orders for this visit:    Foreign body in left foot, initial encounter  -     XR CALCANEUS LEFT (MIN 2 VIEWS); Future  -     lidocaine PF 1 % injection 2 mL  -     73270 - NE REMOVE FOREIGN BODY SIMPLE      Procedures:  Patient provided verbal consent for foreign body removal from left heel.  I discussed risks of infection, bleeding, incomplete removal of foreign body with patient who verbalizes

## 2019-08-09 ENCOUNTER — PROCEDURE VISIT (OUTPATIENT)
Dept: PHYSICAL MEDICINE AND REHAB | Age: 64
End: 2019-08-09
Payer: MEDICARE

## 2019-08-09 DIAGNOSIS — Z79.899 HIGH RISK MEDICATION USE: ICD-10-CM

## 2019-08-09 DIAGNOSIS — M48.062 SPINAL STENOSIS OF LUMBAR REGION WITH NEUROGENIC CLAUDICATION: ICD-10-CM

## 2019-08-09 DIAGNOSIS — M15.9 PRIMARY OSTEOARTHRITIS INVOLVING MULTIPLE JOINTS: ICD-10-CM

## 2019-08-09 DIAGNOSIS — M46.46 LUMBAR DISCITIS: ICD-10-CM

## 2019-08-09 DIAGNOSIS — M79.10 MYALGIA: Primary | ICD-10-CM

## 2019-08-09 PROCEDURE — 20553 NJX 1/MLT TRIGGER POINTS 3/>: CPT | Performed by: PHYSICAL MEDICINE & REHABILITATION

## 2019-08-09 RX ORDER — LIDOCAINE HYDROCHLORIDE 10 MG/ML
24 INJECTION, SOLUTION INFILTRATION; PERINEURAL ONCE
Status: COMPLETED | OUTPATIENT
Start: 2019-08-09 | End: 2019-08-09

## 2019-08-09 RX ORDER — OXYCODONE HCL 10 MG/1
10 TABLET, FILM COATED, EXTENDED RELEASE ORAL 2 TIMES DAILY
Qty: 60 TABLET | Refills: 0 | Status: SHIPPED | OUTPATIENT
Start: 2019-08-09 | End: 2019-09-09 | Stop reason: SDUPTHER

## 2019-08-09 RX ORDER — OXYCODONE AND ACETAMINOPHEN 7.5; 325 MG/1; MG/1
1 TABLET ORAL EVERY 6 HOURS PRN
Qty: 60 TABLET | Refills: 0 | Status: SHIPPED | OUTPATIENT
Start: 2019-08-09 | End: 2019-09-09 | Stop reason: SDUPTHER

## 2019-08-09 RX ADMIN — LIDOCAINE HYDROCHLORIDE 24 ML: 10 INJECTION, SOLUTION INFILTRATION; PERINEURAL at 13:58

## 2019-08-09 NOTE — PROGRESS NOTES
Patient here for trigger point injections. Patient taken back to exam room, and placed on drape locking stool. Areas to be injected marked appropriately, and cleansed with alcohol. 1cc of B12 (patient supplied) injected by MA. 24cc of 1% Lidocaine to be injected by provider. B12  NDC: [id number]  B.  No.: [id number]   EXP: 9/2020

## 2019-08-29 DIAGNOSIS — I20.9 ANGINA PECTORIS (HCC): ICD-10-CM

## 2019-08-29 RX ORDER — NITROGLYCERIN 0.4 MG/1
TABLET SUBLINGUAL
Qty: 25 TABLET | Refills: 0 | Status: SHIPPED | OUTPATIENT
Start: 2019-08-29 | End: 2019-09-26 | Stop reason: SDUPTHER

## 2019-08-29 NOTE — TELEPHONE ENCOUNTER
.     Rx requested:  Requested Prescriptions     Pending Prescriptions Disp Refills    nitroGLYCERIN (NITROSTAT) 0.4 MG SL tablet [Pharmacy Med Name: NITROGLYCERIN 0.4 MG TABLET SL] 25 tablet 0     Sig: PLEASE SEE ATTACHED FOR DETAILED DIRECTIONS       Last Office Visit:   7/30/2019        Next Visit Date:  Future Appointments   Date Time Provider Stacie Carrie   9/12/2019 10:15 AM Maria De Jesus Osborne MD [facility]   9/13/2019 10:00 AM Herbert Butcher DO [address] [place] [address]   9/17/2019  3:15 PM DO Jose David Barajas [facility] EMERGENCY [facility] AT [place]   10/25/2019  8:30 AM JOSE Henderson - [facility]   12/2/2019 10:15 AM Angel Zuniga MD [id number] [facility]

## 2019-09-03 DIAGNOSIS — M46.26 OSTEOMYELITIS OF LUMBAR VERTEBRA (HCC): ICD-10-CM

## 2019-09-03 DIAGNOSIS — A49.01 STAPH AUREUS INFECTION: ICD-10-CM

## 2019-09-03 LAB
ANION GAP SERPL CALCULATED.3IONS-SCNC: 14 MEQ/L (ref 9–15)
BUN BLDV-MCNC: 35 MG/DL (ref 8–23)
CALCIUM SERPL-MCNC: 9.5 MG/DL (ref 8.5–9.9)
CHLORIDE BLD-SCNC: 102 MEQ/L (ref 95–107)
CO2: 21 MEQ/L (ref 20–31)
CREAT SERPL-MCNC: 1.26 MG/DL (ref 0.5–0.9)
GFR AFRICAN AMERICAN: 51.4
GFR NON-AFRICAN AMERICAN: 42.5
GLUCOSE BLD-MCNC: 95 MG/DL (ref 70–99)
HCT VFR BLD CALC: 41.1 % (ref 37–47)
HEMOGLOBIN: 13.2 G/DL (ref 12–16)
MCH RBC QN AUTO: 31 PG (ref 27–31.3)
MCHC RBC AUTO-ENTMCNC: 32.1 % (ref 33–37)
MCV RBC AUTO: 96.7 FL (ref 82–100)
PDW BLD-RTO: 13.4 % (ref 11.5–14.5)
PLATELET # BLD: 192 K/UL (ref 130–400)
POTASSIUM SERPL-SCNC: 6.5 MEQ/L (ref 3.4–4.9)
RBC # BLD: 4.25 M/UL (ref 4.2–5.4)
SODIUM BLD-SCNC: 137 MEQ/L (ref 135–144)
WBC # BLD: 5.5 K/UL (ref 4.8–10.8)

## 2019-09-04 ENCOUNTER — TELEPHONE (OUTPATIENT)
Dept: INFECTIOUS DISEASES | Age: 64
End: 2019-09-04

## 2019-09-04 ENCOUNTER — TELEPHONE (OUTPATIENT)
Dept: FAMILY MEDICINE CLINIC | Age: 64
End: 2019-09-04

## 2019-09-04 DIAGNOSIS — E87.5 HYPERKALEMIA: Primary | ICD-10-CM

## 2019-09-04 DIAGNOSIS — M46.26 OSTEOMYELITIS OF LUMBAR VERTEBRA (HCC): Primary | ICD-10-CM

## 2019-09-04 DIAGNOSIS — M46.26 OSTEOMYELITIS OF LUMBAR VERTEBRA (HCC): ICD-10-CM

## 2019-09-04 LAB
ANION GAP SERPL CALCULATED.3IONS-SCNC: 13 MEQ/L (ref 9–15)
BUN BLDV-MCNC: 41 MG/DL (ref 8–23)
CALCIUM SERPL-MCNC: 9.5 MG/DL (ref 8.5–9.9)
CHLORIDE BLD-SCNC: 105 MEQ/L (ref 95–107)
CO2: 22 MEQ/L (ref 20–31)
CREAT SERPL-MCNC: 1.54 MG/DL (ref 0.5–0.9)
GFR AFRICAN AMERICAN: 40.8
GFR NON-AFRICAN AMERICAN: 33.7
GLUCOSE BLD-MCNC: 102 MG/DL (ref 70–99)
POTASSIUM SERPL-SCNC: 5.3 MEQ/L (ref 3.4–4.9)
SODIUM BLD-SCNC: 140 MEQ/L (ref 135–144)

## 2019-09-04 NOTE — PROGRESS NOTES
Stop her potassium supplement at this time. Recheck her blood work in a week. She will need to follow-up in the office in the next 2 or 3 weeks on this. Stay off of the potassium until she follows up is differently from us.

## 2019-09-04 NOTE — TELEPHONE ENCOUNTER
Stop potassium supplement at this time and recheck her lab work in a week. The orders in the chart. Have her follow-up on all of this in the office in the next 2 to 3 weeks. Stay off of the potassium in the meanwhile until she hears from us or follows up.

## 2019-09-09 DIAGNOSIS — M48.062 SPINAL STENOSIS OF LUMBAR REGION WITH NEUROGENIC CLAUDICATION: ICD-10-CM

## 2019-09-09 DIAGNOSIS — M15.9 PRIMARY OSTEOARTHRITIS INVOLVING MULTIPLE JOINTS: ICD-10-CM

## 2019-09-09 DIAGNOSIS — Z79.899 HIGH RISK MEDICATION USE: ICD-10-CM

## 2019-09-09 DIAGNOSIS — M46.46 LUMBAR DISCITIS: ICD-10-CM

## 2019-09-09 RX ORDER — OXYCODONE HCL 10 MG/1
10 TABLET, FILM COATED, EXTENDED RELEASE ORAL 2 TIMES DAILY
Qty: 60 TABLET | Refills: 0 | Status: SHIPPED | OUTPATIENT
Start: 2019-09-09 | End: 2019-10-07 | Stop reason: SDUPTHER

## 2019-09-09 RX ORDER — OXYCODONE AND ACETAMINOPHEN 7.5; 325 MG/1; MG/1
1 TABLET ORAL EVERY 6 HOURS PRN
Qty: 60 TABLET | Refills: 0 | Status: SHIPPED | OUTPATIENT
Start: 2019-09-09 | End: 2019-10-07 | Stop reason: SDUPTHER

## 2019-09-13 ENCOUNTER — OFFICE VISIT (OUTPATIENT)
Dept: PHYSICAL MEDICINE AND REHAB | Age: 64
End: 2019-09-13
Payer: MEDICARE

## 2019-09-13 VITALS
WEIGHT: 212 LBS | SYSTOLIC BLOOD PRESSURE: 142 MMHG | DIASTOLIC BLOOD PRESSURE: 64 MMHG | HEIGHT: 68 IN | BODY MASS INDEX: 32.13 KG/M2

## 2019-09-13 DIAGNOSIS — M54.2 CERVICALGIA: ICD-10-CM

## 2019-09-13 DIAGNOSIS — M48.062 SPINAL STENOSIS OF LUMBAR REGION WITH NEUROGENIC CLAUDICATION: Primary | ICD-10-CM

## 2019-09-13 DIAGNOSIS — M15.9 PRIMARY OSTEOARTHRITIS INVOLVING MULTIPLE JOINTS: ICD-10-CM

## 2019-09-13 DIAGNOSIS — M79.642 BILATERAL HAND PAIN: ICD-10-CM

## 2019-09-13 DIAGNOSIS — M15.3 SECONDARY OSTEOARTHRITIS OF MULTIPLE SITES: ICD-10-CM

## 2019-09-13 DIAGNOSIS — R25.9 PARKINSONIAN FEATURES: ICD-10-CM

## 2019-09-13 DIAGNOSIS — M79.641 BILATERAL HAND PAIN: ICD-10-CM

## 2019-09-13 DIAGNOSIS — Z79.899 HIGH RISK MEDICATION USE: ICD-10-CM

## 2019-09-13 DIAGNOSIS — M79.10 MYALGIA: ICD-10-CM

## 2019-09-13 DIAGNOSIS — E87.5 HYPERKALEMIA: ICD-10-CM

## 2019-09-13 LAB
ANION GAP SERPL CALCULATED.3IONS-SCNC: 13 MEQ/L (ref 9–15)
BUN BLDV-MCNC: 48 MG/DL (ref 8–23)
CALCIUM SERPL-MCNC: 9.6 MG/DL (ref 8.5–9.9)
CHLORIDE BLD-SCNC: 105 MEQ/L (ref 95–107)
CO2: 22 MEQ/L (ref 20–31)
CREAT SERPL-MCNC: 1.66 MG/DL (ref 0.5–0.9)
GFR AFRICAN AMERICAN: 37.4
GFR NON-AFRICAN AMERICAN: 30.9
GLUCOSE BLD-MCNC: 116 MG/DL (ref 70–99)
POTASSIUM SERPL-SCNC: 5.2 MEQ/L (ref 3.4–4.9)
SODIUM BLD-SCNC: 140 MEQ/L (ref 135–144)

## 2019-09-13 PROCEDURE — 1090F PRES/ABSN URINE INCON ASSESS: CPT | Performed by: PHYSICAL MEDICINE & REHABILITATION

## 2019-09-13 PROCEDURE — 1036F TOBACCO NON-USER: CPT | Performed by: PHYSICAL MEDICINE & REHABILITATION

## 2019-09-13 PROCEDURE — 1123F ACP DISCUSS/DSCN MKR DOCD: CPT | Performed by: PHYSICAL MEDICINE & REHABILITATION

## 2019-09-13 PROCEDURE — G8598 ASA/ANTIPLAT THER USED: HCPCS | Performed by: PHYSICAL MEDICINE & REHABILITATION

## 2019-09-13 PROCEDURE — G8417 CALC BMI ABV UP PARAM F/U: HCPCS | Performed by: PHYSICAL MEDICINE & REHABILITATION

## 2019-09-13 PROCEDURE — 3017F COLORECTAL CA SCREEN DOC REV: CPT | Performed by: PHYSICAL MEDICINE & REHABILITATION

## 2019-09-13 PROCEDURE — G8427 DOCREV CUR MEDS BY ELIG CLIN: HCPCS | Performed by: PHYSICAL MEDICINE & REHABILITATION

## 2019-09-13 PROCEDURE — 99214 OFFICE O/P EST MOD 30 MIN: CPT | Performed by: PHYSICAL MEDICINE & REHABILITATION

## 2019-09-13 PROCEDURE — 4040F PNEUMOC VAC/ADMIN/RCVD: CPT | Performed by: PHYSICAL MEDICINE & REHABILITATION

## 2019-09-13 PROCEDURE — G8399 PT W/DXA RESULTS DOCUMENT: HCPCS | Performed by: PHYSICAL MEDICINE & REHABILITATION

## 2019-09-13 RX ORDER — BACLOFEN 10 MG/1
10 TABLET ORAL NIGHTLY PRN
Qty: 30 TABLET | Refills: 2 | Status: SHIPPED | OUTPATIENT
Start: 2019-09-13 | End: 2019-11-01 | Stop reason: SDUPTHER

## 2019-09-13 ASSESSMENT — ENCOUNTER SYMPTOMS
COUGH: 0
ABDOMINAL PAIN: 0
VOMITING: 0
BACK PAIN: 1
PHOTOPHOBIA: 1
NAUSEA: 0
WHEEZING: 0
DIARRHEA: 0
SHORTNESS OF BREATH: 0
CHEST TIGHTNESS: 0

## 2019-09-13 NOTE — PROGRESS NOTES
Cervical back: She exhibits decreased range of motion, tenderness and bony tenderness. Thoracic back: She exhibits decreased range of motion, tenderness and bony tenderness. Lumbar back: She exhibits decreased range of motion, tenderness, bony tenderness and pain. She exhibits no swelling, no edema, no deformity, no laceration and normal pulse. Back:         Right upper arm: Normal.        Left upper arm: Normal.        Right forearm: Normal.        Left forearm: Normal.        Arms:       Right hand: She exhibits decreased range of motion, tenderness, bony tenderness and deformity. Left hand: She exhibits decreased range of motion, tenderness, bony tenderness and deformity. Right upper leg: Normal.        Left upper leg: Normal.        Right lower leg: Normal.        Left lower leg: Normal.        Legs:       Right foot: There is decreased range of motion, tenderness and bony tenderness. Left foot: There is decreased range of motion and tenderness. Tender areas are indicated by numbered spot         Lymphadenopathy:     She has no cervical adenopathy. Neurological: She is alert and oriented to person, place, and time. She displays abnormal reflex. She displays no atrophy and no tremor. No cranial nerve deficit or sensory deficit. She exhibits normal muscle tone. Coordination and gait normal. She displays no Babinski's sign on the right side. She displays no Babinski's sign on the left side. Skin: Skin is warm, dry and intact. No abrasion, no bruising, no ecchymosis, no laceration, no petechiae and no rash noted. Rash is not macular, not pustular and not urticarial. She is not diaphoretic. No cyanosis or erythema. No pallor. Nails show no clubbing. Psychiatric: Her speech is normal and behavior is normal. Judgment and thought content normal. Her mood appears not anxious. Her affect is not angry, not blunt, not labile and not inappropriate.  She is not agitated, Medical History:   Diagnosis Date    Abnormal electromyogram (EMG) 8/19/09    SENSORIMOTOR NEUROPATHY OF BLE, RIGHT WORSE THAN LEFT, SUSPICIOUS FOR RIGHT S1 RADIC    Angina pectoris (HCC)     Angina p[facility]is [facility] 07/2019    sees Dr. Neeraj Anthony Ankle pain     Colon polyp 4/3/2017    Dermatitis     Fibromyalgia     Foot pain     Hyperlipidemia     Hypertension     Infection of intervertebral disc (pyogenic), lumbar region ([facility] 8/13/2018    Low back pain     MRSA (methicillin resistant staph aureus) culture positive 2011    Neck pain     Neuropathy     Obesity     Osteoarthritis     PSVT (paroxysmal supraventricular tachycardia) ([facility]     Shingles outbreak 2012    SI (sacroiliac) pain     Stenosis     Vitamin D deficiency            Given their medication, chronic pain and lifestyle and medications they are at risk for :    Falls, constipation, addiction  Loss of livelyhood due to severe pain, debility, weight gain and  vitamin D deficiency    The patient was educated regarding proper diet, fitness routine, and regulatory restrictions concerning pain medications. Previous notes, comprehensive past medical, surgical, family history, and diagnostics were reviewed. Patient education and councelling were provided regarding off label use,treatment options and medication and injection risks. Current and old OARRS ([place] Automated [place] Reporting System) records reviewed, all refills reviewed since last visit,  Behavioral agreement/KATHLEEN regulations   and Toxicology screen was reviewed with patient and is up to date. There are no current red flags.    They are making good progress regarding pain relief, they are performing at a functional level regarding activities of daily living, family interactions and psychological functioning, they're not having any adverse effects or side effects from the current medications, and I see no findings of aberrant drug taking or addiction

## 2019-09-24 ENCOUNTER — OFFICE VISIT (OUTPATIENT)
Dept: FAMILY MEDICINE CLINIC | Age: 64
End: 2019-09-24
Payer: MEDICARE

## 2019-09-24 ENCOUNTER — HOSPITAL ENCOUNTER (OUTPATIENT)
Dept: OCCUPATIONAL THERAPY | Age: 64
Setting detail: THERAPIES SERIES
Discharge: HOME | End: 2019-09-24
Payer: MEDICARE

## 2019-09-24 VITALS
TEMPERATURE: 97.6 F | RESPIRATION RATE: 16 BRPM | BODY MASS INDEX: 32.13 KG/M2 | DIASTOLIC BLOOD PRESSURE: 60 MMHG | SYSTOLIC BLOOD PRESSURE: 126 MMHG | WEIGHT: 212 LBS | HEIGHT: 68 IN | OXYGEN SATURATION: 98 % | HEART RATE: 76 BPM

## 2019-09-24 DIAGNOSIS — M54.41 CHRONIC BILATERAL LOW BACK PAIN WITH BILATERAL SCIATICA: ICD-10-CM

## 2019-09-24 DIAGNOSIS — R26.89 BALANCE PROBLEM: ICD-10-CM

## 2019-09-24 DIAGNOSIS — M54.42 CHRONIC BILATERAL LOW BACK PAIN WITH BILATERAL SCIATICA: ICD-10-CM

## 2019-09-24 DIAGNOSIS — Z23 NEED FOR INFLUENZA VACCINATION: ICD-10-CM

## 2019-09-24 DIAGNOSIS — R47.9 SPEECH DISORDER: ICD-10-CM

## 2019-09-24 DIAGNOSIS — G89.29 CHRONIC BILATERAL LOW BACK PAIN WITH BILATERAL SCIATICA: ICD-10-CM

## 2019-09-24 DIAGNOSIS — N18.30 CRF (CHRONIC RENAL FAILURE), STAGE 3 (MODERATE) (HCC): Primary | ICD-10-CM

## 2019-09-24 DIAGNOSIS — Z23 NEED FOR 23-POLYVALENT PNEUMOCOCCAL POLYSACCHARIDE VACCINE: ICD-10-CM

## 2019-09-24 PROCEDURE — G8399 PT W/DXA RESULTS DOCUMENT: HCPCS | Performed by: FAMILY MEDICINE

## 2019-09-24 PROCEDURE — 90662 IIV NO PRSV INCREASED AG IM: CPT | Performed by: FAMILY MEDICINE

## 2019-09-24 PROCEDURE — 1036F TOBACCO NON-USER: CPT | Performed by: FAMILY MEDICINE

## 2019-09-24 PROCEDURE — G8598 ASA/ANTIPLAT THER USED: HCPCS | Performed by: FAMILY MEDICINE

## 2019-09-24 PROCEDURE — G8417 CALC BMI ABV UP PARAM F/U: HCPCS | Performed by: FAMILY MEDICINE

## 2019-09-24 PROCEDURE — G8427 DOCREV CUR MEDS BY ELIG CLIN: HCPCS | Performed by: FAMILY MEDICINE

## 2019-09-24 PROCEDURE — 97167 OT EVAL HIGH COMPLEX 60 MIN: CPT

## 2019-09-24 PROCEDURE — G0009 ADMIN PNEUMOCOCCAL VACCINE: HCPCS | Performed by: FAMILY MEDICINE

## 2019-09-24 PROCEDURE — 3017F COLORECTAL CA SCREEN DOC REV: CPT | Performed by: FAMILY MEDICINE

## 2019-09-24 PROCEDURE — 90732 PPSV23 VACC 2 YRS+ SUBQ/IM: CPT | Performed by: FAMILY MEDICINE

## 2019-09-24 PROCEDURE — 1090F PRES/ABSN URINE INCON ASSESS: CPT | Performed by: FAMILY MEDICINE

## 2019-09-24 PROCEDURE — 99214 OFFICE O/P EST MOD 30 MIN: CPT | Performed by: FAMILY MEDICINE

## 2019-09-24 PROCEDURE — G0008 ADMIN INFLUENZA VIRUS VAC: HCPCS | Performed by: FAMILY MEDICINE

## 2019-09-24 PROCEDURE — 1123F ACP DISCUSS/DSCN MKR DOCD: CPT | Performed by: FAMILY MEDICINE

## 2019-09-24 PROCEDURE — 4040F PNEUMOC VAC/ADMIN/RCVD: CPT | Performed by: FAMILY MEDICINE

## 2019-09-24 RX ORDER — LISINOPRIL 40 MG/1
TABLET ORAL
Refills: 3 | COMMUNITY
Start: 2019-09-06 | End: 2019-11-27 | Stop reason: ALTCHOICE

## 2019-09-24 RX ORDER — LORAZEPAM 0.5 MG/1
0.5 TABLET ORAL 3 TIMES DAILY PRN
Qty: 12 TABLET | Refills: 0 | Status: SHIPPED | OUTPATIENT
Start: 2019-09-24 | End: 2019-10-24

## 2019-09-24 NOTE — PROGRESS NOTES
Patient: Eulalio Paz    YOB: 1955    Date: 9/24/19    Chief Complaint   Patient presents with    Discuss Labs     Done 9/13/2019    Flu Vaccine     She would like to get flu vaccine today.  Fall     She states she fell at home yesterday, she has alot of soreness and bruising left arm and right shoulder. Patient Active Problem List    Diagnosis Date Noted    High risk medication use - 10/17/17 OARRS PM&R, 12/19/17 OARRS PM&R, 12/20/17 Tox screen positive for opiates, Hydrocodone PM&R, MED CONTRACT 2/2/17 04/17/2013     Priority: High    Angina pectoris ([address] 07/01/2019     Overview Note:     sees Dr. Mariano Sifuentes Parkinsonian features 05/13/2019    COLTON positive 04/20/2019    Serum creatinine raised 04/20/2019    Inflammatory arthritis 04/20/2019    Bilateral hand pain 04/17/2019    Bone erosion determined by x-ray 04/17/2019    Deformity of finger of right hand 04/17/2019    Chronic bilateral low back pain with bilateral sciatica 04/17/2019    Cervicalgia 04/17/2019    Secondary osteoarthritis of multiple sites 04/17/2019    Ankle swelling 12/19/2018    Rheumatoid arthritis involving multiple sites with positive rheumatoid factor ([address]     Myalgia 10/11/2018    Lumbar discitis 08/13/2018     Overview Note:     L2 / Right       Osteomyelitis of lumbar vertebra ([address] 08/13/2018     Overview Note:     L2-L3 DISC SPACE INFECTION WITH ASSOCIATED OSTEOMYELITIS OF L2.      Gait abnormality 08/13/2018    Staph aureus infection      Overview Note:     MSSA bacteremia: With multiple site involvement. On nafcillin. Blood cultures for 3 consecutive days is negative. Patient to get PICC today     Patient to get IV antibiotics for 6-8 weeks followed by oral antibiotics for 1 year-will need PICC line     Diskitis: At L2 with adjacent psoas abscess.  No surgical intervention as of now as per neurosurgery     MACIE negative for any thrombus/vegetation     Patient today has severe pain in her right foot -MRI of right foot ordered which did not show any abscess or osteomyelitis            Neuropathy involving both lower extremities 04/25/2018    BMI 33.0-33.9,adult 02/28/2018    Primary osteoarthritis involving multiple joints 09/02/2016    Mixed hyperlipidemia 09/29/2014     Overview Note:     Overview:   Hyperlipidemia  Overview:   Hyperlipidemia  Overview:   Hyperlipidemia  Overview:   Hyperlipidemia  Overview:   Hyperlipidemia      Spinal stenosis of lumbar region with neurogenic claudication     Benign essential hypertension 04/01/2009       Allergies   Allergen Reactions    Latex Swelling     Swelling of hands with wearing latex gloves    Norvasc [Amlodipine Besylate] Other (See Comments)     swelling    Keflex [Cephalexin] Rash       Vitals:    09/24/19 1142   BP: 126/60   Site: Left Upper Arm   Position: Sitting   Cuff Size: Large Adult   Pulse: 76   Resp: 16   Temp: 97.6 °F (36.4 °C)   TempSrc: Oral   SpO2: 98%   Weight: 212 lb (96.2 kg)   Height: 5' 8\" (1.727 m)      Body mass index is 32.23 kg/m². HPI    She is here today to follow-up on her lab work. She is been battling some hyperkalemia but her kidney function has not recovered as much as I would like. She did see a nephrologist when she was in the hospital, and have her go back and check in with them again she has no cough no chest pressure no nausea vomiting or diarrhea she is due to get an MRI from the neurosurgeon will need Ativan to be able to tolerate it. She is seeing physical therapy for her back pain and her sciatica however it was recommended she have balance issues added onto the diagnosis and consider speech evaluation and treatment. Review of Systems    Constitutional: positive for fatigue, negative for fever and sweats. HEENT: Negative for eye discharge and vision loss. Negative for ear drainage, hearing loss and nasal drainage.    Respiratory: Negative for cough, dyspnea and Requested Specialty:   Nephrology     Number of Visits Requested:   1       Orders Placed This Encounter   Medications    LORazepam (ATIVAN) 0.5 MG tablet     Sig: Take 1 tablet by mouth 3 times daily as needed (testing) for up to 30 days. Dispense:  12 tablet     Refill:  0              Return in about 4 months (around 1/24/2020).     Dr. Jessica Ewing      9/24/19  12:52 PM

## 2019-09-24 NOTE — PROGRESS NOTES
[facility]ER[facility] CHRONIC PAIN EVALUATION                                                                                                                   DATE: 9/24/2019                                                                           PHYSICIAN:  Dr Riki Miller NAME: Rob Purdy  DIAGNOSIS:   spinal stenosis of lumbar with neurogenic claudication     Pt reports R sciatic pain as primary pain location    MRN: [id number]    ACCOUNT#: [de-identified]                                                     NWN:7/9/1808  (75 y.o.)         Pt had sepsis with extended hospital  And nursing home stay totaling 2 months . From [facility] to [address] [place] [address] El:  Current Outpatient Medications:     baclofen (LIORESAL) 10 MG tablet, Take 1 tablet by mouth nightly as needed (muscle spasms), Disp: 30 tablet, Rfl: 2    oxyCODONE (OXYCONTIN) 10 MG extended release tablet, Take 1 tablet by mouth 2 times daily for 30 days. Intended supply: 30 days, Disp: 60 tablet, Rfl: 0    oxyCODONE-acetaminophen (PERCOCET) 7.5-325 MG per tablet, Take 1 tablet by mouth every 6 hours as needed for Pain for up to 30 days.  Intended supply: 30 days, Disp: 60 tablet, Rfl: 0    nitroGLYCERIN (NITROSTAT) 0.4 MG SL tablet, PLEASE SEE ATTACHED FOR DETAILED DIRECTIONS, Disp: 25 tablet, Rfl: 0    gabapentin (NEURONTIN) 300 MG capsule, TAKE 1 CAPSULE IN THE DAY AS TOLERATED AND 2 CAPSULES AT NIGHT., Disp: 270 capsule, Rfl: 1    doxycycline hyclate (VIBRA-TABS) 100 MG tablet, TAKE 1 TABLET BY MOUTH EVERY DAY, Disp: 90 tablet, Rfl: 0    topiramate (TOPAMAX) 25 MG tablet, TAKE 1 TABLET BY MOUTH EVERY DAY AT NIGHT, Disp: 60 tablet, Rfl: 1    lisinopril (PRINIVIL;ZESTRIL) 20 MG tablet, TAKE 1 TABLET BY DATE OF SX OR INJURY: Pt  Was diagnosed with sepsis and admitted 8-8-19 to [facility] as primary reason for increase sciatic pain  PREVIOUS/CURRENT TREATMENT FOR SAME PROBLEM:    [x]   YES  But as an inpatient not as out patient    CHIEF COMPLAINT: Right sciatic pain per Pt. Is noted to have diminished balance , \"furniture walking\"  And Pt leaned on surfaces while walking into clinic. PERTINENT MEDICAL HISTORY:  Past Medical History:   Diagnosis Date    Abnormal electromyogram (EMG) 8/19/09    SENSORIMOTOR NEUROPATHY OF BLE, RIGHT WORSE THAN LEFT, SUSPICIOUS FOR RIGHT S1 RADIC    Angina pectoris (HCC)     Angina pectoris ([address] 07/2019    sees Dr. Nessa Packer Ankle pain     Colon polyp 4/3/2017    Dermatitis     Fibromyalgia     Foot pain     Hyperlipidemia     Hypertension     Infection of intervertebral disc (pyogenic), lumbar region ([address] 8/13/2018    Low back pain     MRSA (methicillin resistant staph aureus) culture positive 2011    Neck pain     Neuropathy     Obesity     Osteoarthritis     PSVT (paroxysmal supraventricular tachycardia) ([address]     Shingles outbreak 2012    SI (sacroiliac) pain     Stenosis     Vitamin D deficiency        SOCIAL SUPPORT: Pt lives at home with Father and 29year old son with CP and new puppy    ADL/HOBBIES/ROUTINES: 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 Pt reportedly furniture walking at home, and is using her cane in the Right hand vs left \"because its more comfortable\" R lateral trunk flexion with associated restrictions      LUMBAR/SACROILIAC ASSESSMENT: Severe B anterior pelvic tilt.  Pt cannot lay supine due to excessive hip

## 2019-09-26 ENCOUNTER — HOSPITAL ENCOUNTER (OUTPATIENT)
Dept: OCCUPATIONAL THERAPY | Age: 64
Setting detail: THERAPIES SERIES
Discharge: HOME | End: 2019-09-26
Payer: MEDICARE

## 2019-09-26 ENCOUNTER — HOSPITAL ENCOUNTER (OUTPATIENT)
Dept: MRI IMAGING | Age: 64
Discharge: HOME | End: 2019-09-28
Payer: MEDICARE

## 2019-09-26 DIAGNOSIS — M46.46 LUMBAR DISCITIS: ICD-10-CM

## 2019-09-26 DIAGNOSIS — M46.36 INFECTION OF INTERVERTEBRAL DISC (PYOGENIC), LUMBAR REGION (HCC): ICD-10-CM

## 2019-09-26 DIAGNOSIS — M54.31 SCIATICA OF RIGHT SIDE: ICD-10-CM

## 2019-09-26 DIAGNOSIS — M46.26 OSTEOMYELITIS OF LUMBAR VERTEBRA (HCC): ICD-10-CM

## 2019-09-26 DIAGNOSIS — I20.9 ANGINA PECTORIS (HCC): ICD-10-CM

## 2019-09-26 PROCEDURE — A9577 INJ MULTIHANCE: HCPCS | Performed by: NEUROLOGICAL SURGERY

## 2019-09-26 PROCEDURE — 6360000004 HC RX CONTRAST MEDICATION: Performed by: NEUROLOGICAL SURGERY

## 2019-09-26 PROCEDURE — 72158 MRI LUMBAR SPINE W/O & W/DYE: CPT

## 2019-09-26 RX ORDER — NITROGLYCERIN 0.4 MG/1
TABLET SUBLINGUAL
Qty: 25 TABLET | Refills: 0 | Status: SHIPPED | OUTPATIENT
Start: 2019-09-26 | End: 2019-10-14 | Stop reason: SDUPTHER

## 2019-09-26 RX ORDER — 0.9 % SODIUM CHLORIDE 0.9 %
10 VIAL (ML) INJECTION ONCE
Status: DISCONTINUED | OUTPATIENT
Start: 2019-09-26 | End: 2019-09-29 | Stop reason: HOSPADM

## 2019-09-26 RX ADMIN — GADOBENATE DIMEGLUMINE 15 ML: 529 INJECTION, SOLUTION INTRAVENOUS at 12:19

## 2019-10-01 ENCOUNTER — HOSPITAL ENCOUNTER (OUTPATIENT)
Dept: PHYSICAL THERAPY | Age: 64
Setting detail: THERAPIES SERIES
Discharge: HOME | End: 2019-10-01
Payer: MEDICARE

## 2019-10-01 ENCOUNTER — HOSPITAL ENCOUNTER (OUTPATIENT)
Dept: SPEECH THERAPY | Age: 64
Setting detail: THERAPIES SERIES
Discharge: HOME | End: 2019-10-01
Payer: MEDICARE

## 2019-10-01 ENCOUNTER — HOSPITAL ENCOUNTER (OUTPATIENT)
Dept: OCCUPATIONAL THERAPY | Age: 64
Setting detail: THERAPIES SERIES
Discharge: HOME | End: 2019-10-01
Payer: MEDICARE

## 2019-10-01 PROCEDURE — 92523 SPEECH SOUND LANG COMPREHEN: CPT

## 2019-10-01 PROCEDURE — 97140 MANUAL THERAPY 1/> REGIONS: CPT

## 2019-10-01 PROCEDURE — 97162 PT EVAL MOD COMPLEX 30 MIN: CPT

## 2019-10-01 ASSESSMENT — PAIN DESCRIPTION - ONSET: ONSET: AWAKENED FROM SLEEP

## 2019-10-01 ASSESSMENT — PAIN - FUNCTIONAL ASSESSMENT: PAIN_FUNCTIONAL_ASSESSMENT: PREVENTS OR INTERFERES WITH ALL ACTIVE AND SOME PASSIVE ACTIVITIES

## 2019-10-01 ASSESSMENT — PAIN DESCRIPTION - LOCATION: LOCATION: BACK

## 2019-10-01 ASSESSMENT — PAIN DESCRIPTION - PAIN TYPE: TYPE: CHRONIC PAIN

## 2019-10-01 ASSESSMENT — PAIN SCALES - GENERAL: PAINLEVEL_OUTOF10: 4

## 2019-10-01 ASSESSMENT — PAIN DESCRIPTION - DESCRIPTORS: DESCRIPTORS: ACHING;SHOOTING;SHARP

## 2019-10-01 ASSESSMENT — PAIN DESCRIPTION - PROGRESSION: CLINICAL_PROGRESSION: GRADUALLY WORSENING

## 2019-10-01 ASSESSMENT — PAIN DESCRIPTION - FREQUENCY: FREQUENCY: CONTINUOUS

## 2019-10-01 ASSESSMENT — PAIN DESCRIPTION - ORIENTATION: ORIENTATION: RIGHT;LEFT

## 2019-10-02 RX ORDER — ATORVASTATIN CALCIUM 10 MG/1
TABLET, FILM COATED ORAL
Qty: 90 TABLET | Refills: 3 | Status: SHIPPED | OUTPATIENT
Start: 2019-10-02 | End: 2019-12-20 | Stop reason: ALTCHOICE

## 2019-10-03 ENCOUNTER — TELEPHONE (OUTPATIENT)
Dept: FAMILY MEDICINE CLINIC | Age: 64
End: 2019-10-03

## 2019-10-03 ENCOUNTER — HOSPITAL ENCOUNTER (OUTPATIENT)
Dept: PHYSICAL THERAPY | Age: 64
Setting detail: THERAPIES SERIES
Discharge: HOME | End: 2019-10-03
Payer: MEDICARE

## 2019-10-03 ENCOUNTER — HOSPITAL ENCOUNTER (OUTPATIENT)
Dept: SPEECH THERAPY | Age: 64
Setting detail: THERAPIES SERIES
Discharge: HOME | End: 2019-10-03
Payer: MEDICARE

## 2019-10-03 ENCOUNTER — HOSPITAL ENCOUNTER (OUTPATIENT)
Dept: OCCUPATIONAL THERAPY | Age: 64
Setting detail: THERAPIES SERIES
Discharge: HOME | End: 2019-10-03
Payer: MEDICARE

## 2019-10-03 DIAGNOSIS — I20.9 ANGINA PECTORIS (HCC): ICD-10-CM

## 2019-10-03 PROCEDURE — 97116 GAIT TRAINING THERAPY: CPT

## 2019-10-03 PROCEDURE — 97112 NEUROMUSCULAR REEDUCATION: CPT

## 2019-10-03 PROCEDURE — 92507 TX SP LANG VOICE COMM INDIV: CPT

## 2019-10-03 PROCEDURE — 97140 MANUAL THERAPY 1/> REGIONS: CPT

## 2019-10-03 PROCEDURE — 97110 THERAPEUTIC EXERCISES: CPT

## 2019-10-03 PROCEDURE — 97127 HC SP THER IVNTJ W/FOCUS COG FUNCJ: CPT

## 2019-10-03 ASSESSMENT — PAIN DESCRIPTION - LOCATION: LOCATION: BACK;HIP;KNEE

## 2019-10-03 ASSESSMENT — PAIN SCALES - GENERAL
PAINLEVEL_OUTOF10: 4
PAINLEVEL_OUTOF10: 5

## 2019-10-03 ASSESSMENT — PAIN DESCRIPTION - PROGRESSION: CLINICAL_PROGRESSION: GRADUALLY WORSENING

## 2019-10-07 DIAGNOSIS — M48.062 SPINAL STENOSIS OF LUMBAR REGION WITH NEUROGENIC CLAUDICATION: ICD-10-CM

## 2019-10-07 DIAGNOSIS — M46.46 LUMBAR DISCITIS: ICD-10-CM

## 2019-10-07 DIAGNOSIS — M15.9 PRIMARY OSTEOARTHRITIS INVOLVING MULTIPLE JOINTS: ICD-10-CM

## 2019-10-07 DIAGNOSIS — Z79.899 HIGH RISK MEDICATION USE: ICD-10-CM

## 2019-10-07 RX ORDER — OXYCODONE AND ACETAMINOPHEN 7.5; 325 MG/1; MG/1
1 TABLET ORAL EVERY 6 HOURS PRN
Qty: 60 TABLET | Refills: 0 | Status: SHIPPED | OUTPATIENT
Start: 2019-10-08 | End: 2019-11-08 | Stop reason: SDUPTHER

## 2019-10-07 RX ORDER — OXYCODONE HCL 10 MG/1
10 TABLET, FILM COATED, EXTENDED RELEASE ORAL 2 TIMES DAILY
Qty: 60 TABLET | Refills: 0 | Status: SHIPPED | OUTPATIENT
Start: 2019-10-08 | End: 2019-11-08 | Stop reason: SDUPTHER

## 2019-10-08 ENCOUNTER — HOSPITAL ENCOUNTER (OUTPATIENT)
Dept: SPEECH THERAPY | Age: 64
Setting detail: THERAPIES SERIES
Discharge: HOME | End: 2019-10-08
Payer: MEDICARE

## 2019-10-08 ENCOUNTER — HOSPITAL ENCOUNTER (OUTPATIENT)
Dept: OCCUPATIONAL THERAPY | Age: 64
Setting detail: THERAPIES SERIES
Discharge: HOME | End: 2019-10-08
Payer: MEDICARE

## 2019-10-08 ENCOUNTER — HOSPITAL ENCOUNTER (OUTPATIENT)
Dept: PHYSICAL THERAPY | Age: 64
Setting detail: THERAPIES SERIES
Discharge: HOME | End: 2019-10-08
Payer: MEDICARE

## 2019-10-08 PROCEDURE — 97127 HC SP THER IVNTJ W/FOCUS COG FUNCJ: CPT

## 2019-10-08 PROCEDURE — 97112 NEUROMUSCULAR REEDUCATION: CPT

## 2019-10-08 PROCEDURE — 97116 GAIT TRAINING THERAPY: CPT

## 2019-10-08 PROCEDURE — 97110 THERAPEUTIC EXERCISES: CPT

## 2019-10-08 PROCEDURE — 97140 MANUAL THERAPY 1/> REGIONS: CPT

## 2019-10-08 ASSESSMENT — PAIN DESCRIPTION - LOCATION: LOCATION: FOOT;BACK;LEG

## 2019-10-08 ASSESSMENT — PAIN SCALES - GENERAL: PAINLEVEL_OUTOF10: 5

## 2019-10-08 ASSESSMENT — PAIN DESCRIPTION - PROGRESSION: CLINICAL_PROGRESSION: GRADUALLY WORSENING

## 2019-10-10 ENCOUNTER — HOSPITAL ENCOUNTER (OUTPATIENT)
Dept: PHYSICAL THERAPY | Age: 64
Setting detail: THERAPIES SERIES
Discharge: HOME | End: 2019-10-10
Payer: MEDICARE

## 2019-10-10 ENCOUNTER — HOSPITAL ENCOUNTER (OUTPATIENT)
Dept: SPEECH THERAPY | Age: 64
Setting detail: THERAPIES SERIES
Discharge: HOME | End: 2019-10-10
Payer: MEDICARE

## 2019-10-10 ENCOUNTER — HOSPITAL ENCOUNTER (OUTPATIENT)
Dept: OCCUPATIONAL THERAPY | Age: 64
Setting detail: THERAPIES SERIES
Discharge: HOME | End: 2019-10-10
Payer: MEDICARE

## 2019-10-10 PROCEDURE — 97110 THERAPEUTIC EXERCISES: CPT

## 2019-10-10 PROCEDURE — 97127 HC SP THER IVNTJ W/FOCUS COG FUNCJ: CPT

## 2019-10-10 PROCEDURE — 92507 TX SP LANG VOICE COMM INDIV: CPT

## 2019-10-10 PROCEDURE — 97116 GAIT TRAINING THERAPY: CPT

## 2019-10-10 PROCEDURE — 97112 NEUROMUSCULAR REEDUCATION: CPT

## 2019-10-10 ASSESSMENT — PAIN DESCRIPTION - PROGRESSION: CLINICAL_PROGRESSION: GRADUALLY WORSENING

## 2019-10-10 ASSESSMENT — PAIN DESCRIPTION - ORIENTATION: ORIENTATION: RIGHT;LEFT

## 2019-10-10 ASSESSMENT — PAIN SCALES - GENERAL: PAINLEVEL_OUTOF10: 3

## 2019-10-10 ASSESSMENT — PAIN DESCRIPTION - LOCATION: LOCATION: BACK;FOOT

## 2019-10-10 ASSESSMENT — PAIN DESCRIPTION - DESCRIPTORS: DESCRIPTORS: ACHING

## 2019-10-14 DIAGNOSIS — I20.9 ANGINA PECTORIS (HCC): ICD-10-CM

## 2019-10-14 RX ORDER — NITROGLYCERIN 0.4 MG/1
TABLET SUBLINGUAL
Qty: 25 TABLET | Refills: 3 | Status: SHIPPED | OUTPATIENT
Start: 2019-10-14

## 2019-10-15 ENCOUNTER — HOSPITAL ENCOUNTER (OUTPATIENT)
Dept: OCCUPATIONAL THERAPY | Age: 64
Setting detail: THERAPIES SERIES
Discharge: HOME | End: 2019-10-15
Payer: MEDICARE

## 2019-10-15 ENCOUNTER — HOSPITAL ENCOUNTER (OUTPATIENT)
Dept: SPEECH THERAPY | Age: 64
Setting detail: THERAPIES SERIES
Discharge: HOME | End: 2019-10-15
Payer: MEDICARE

## 2019-10-15 ENCOUNTER — HOSPITAL ENCOUNTER (OUTPATIENT)
Dept: PHYSICAL THERAPY | Age: 64
Setting detail: THERAPIES SERIES
Discharge: HOME | End: 2019-10-15
Payer: MEDICARE

## 2019-10-15 PROCEDURE — 97127 HC SP THER IVNTJ W/FOCUS COG FUNCJ: CPT

## 2019-10-15 PROCEDURE — 97112 NEUROMUSCULAR REEDUCATION: CPT

## 2019-10-15 PROCEDURE — 92507 TX SP LANG VOICE COMM INDIV: CPT

## 2019-10-15 PROCEDURE — 97140 MANUAL THERAPY 1/> REGIONS: CPT

## 2019-10-15 PROCEDURE — 97110 THERAPEUTIC EXERCISES: CPT

## 2019-10-15 ASSESSMENT — PAIN DESCRIPTION - DESCRIPTORS: DESCRIPTORS: ACHING

## 2019-10-15 ASSESSMENT — PAIN DESCRIPTION - ORIENTATION: ORIENTATION: LOWER

## 2019-10-15 ASSESSMENT — PAIN SCALES - GENERAL: PAINLEVEL_OUTOF10: 3

## 2019-10-15 ASSESSMENT — PAIN DESCRIPTION - LOCATION: LOCATION: BACK

## 2019-10-15 ASSESSMENT — PAIN DESCRIPTION - PROGRESSION: CLINICAL_PROGRESSION: GRADUALLY WORSENING

## 2019-10-17 ENCOUNTER — HOSPITAL ENCOUNTER (OUTPATIENT)
Dept: SPEECH THERAPY | Age: 64
Setting detail: THERAPIES SERIES
Discharge: HOME | End: 2019-10-17
Payer: MEDICARE

## 2019-10-17 ENCOUNTER — HOSPITAL ENCOUNTER (OUTPATIENT)
Dept: OCCUPATIONAL THERAPY | Age: 64
Setting detail: THERAPIES SERIES
Discharge: HOME | End: 2019-10-17
Payer: MEDICARE

## 2019-10-17 ENCOUNTER — HOSPITAL ENCOUNTER (OUTPATIENT)
Dept: PHYSICAL THERAPY | Age: 64
Setting detail: THERAPIES SERIES
Discharge: HOME | End: 2019-10-17
Payer: MEDICARE

## 2019-10-17 PROCEDURE — 97140 MANUAL THERAPY 1/> REGIONS: CPT

## 2019-10-17 PROCEDURE — 97112 NEUROMUSCULAR REEDUCATION: CPT

## 2019-10-17 PROCEDURE — 92507 TX SP LANG VOICE COMM INDIV: CPT

## 2019-10-17 PROCEDURE — 97127 HC SP THER IVNTJ W/FOCUS COG FUNCJ: CPT

## 2019-10-17 ASSESSMENT — PAIN SCALES - GENERAL: PAINLEVEL_OUTOF10: 6

## 2019-10-17 ASSESSMENT — PAIN DESCRIPTION - PROGRESSION: CLINICAL_PROGRESSION: GRADUALLY WORSENING

## 2019-10-17 ASSESSMENT — PAIN DESCRIPTION - PAIN TYPE: TYPE: CHRONIC PAIN

## 2019-10-17 ASSESSMENT — PAIN DESCRIPTION - DESCRIPTORS: DESCRIPTORS: ACHING;STABBING

## 2019-10-17 ASSESSMENT — PAIN DESCRIPTION - LOCATION: LOCATION: BACK

## 2019-10-22 ENCOUNTER — HOSPITAL ENCOUNTER (OUTPATIENT)
Dept: SPEECH THERAPY | Age: 64
Setting detail: THERAPIES SERIES
Discharge: HOME | End: 2019-10-22
Payer: MEDICARE

## 2019-10-22 ENCOUNTER — HOSPITAL ENCOUNTER (OUTPATIENT)
Dept: OCCUPATIONAL THERAPY | Age: 64
Setting detail: THERAPIES SERIES
Discharge: HOME | End: 2019-10-22
Payer: MEDICARE

## 2019-10-22 ENCOUNTER — HOSPITAL ENCOUNTER (OUTPATIENT)
Dept: PHYSICAL THERAPY | Age: 64
Setting detail: THERAPIES SERIES
Discharge: HOME | End: 2019-10-22
Payer: MEDICARE

## 2019-10-22 PROCEDURE — 97140 MANUAL THERAPY 1/> REGIONS: CPT

## 2019-10-22 PROCEDURE — 97112 NEUROMUSCULAR REEDUCATION: CPT

## 2019-10-22 PROCEDURE — 97127 HC SP THER IVNTJ W/FOCUS COG FUNCJ: CPT

## 2019-10-22 ASSESSMENT — PAIN DESCRIPTION - PROGRESSION: CLINICAL_PROGRESSION: GRADUALLY WORSENING

## 2019-10-22 ASSESSMENT — PAIN SCALES - GENERAL: PAINLEVEL_OUTOF10: 4

## 2019-10-22 ASSESSMENT — PAIN DESCRIPTION - ORIENTATION: ORIENTATION: LOWER

## 2019-10-22 ASSESSMENT — PAIN DESCRIPTION - LOCATION: LOCATION: BACK

## 2019-10-24 ENCOUNTER — HOSPITAL ENCOUNTER (OUTPATIENT)
Dept: OCCUPATIONAL THERAPY | Age: 64
Setting detail: THERAPIES SERIES
Discharge: HOME | End: 2019-10-24
Payer: MEDICARE

## 2019-10-24 ENCOUNTER — HOSPITAL ENCOUNTER (OUTPATIENT)
Dept: SPEECH THERAPY | Age: 64
Setting detail: THERAPIES SERIES
Discharge: HOME | End: 2019-10-24
Payer: MEDICARE

## 2019-10-24 ENCOUNTER — HOSPITAL ENCOUNTER (OUTPATIENT)
Dept: PHYSICAL THERAPY | Age: 64
Setting detail: THERAPIES SERIES
Discharge: HOME | End: 2019-10-24
Payer: MEDICARE

## 2019-10-24 PROCEDURE — 97127 HC SP THER IVNTJ W/FOCUS COG FUNCJ: CPT

## 2019-10-24 PROCEDURE — 92507 TX SP LANG VOICE COMM INDIV: CPT

## 2019-10-24 PROCEDURE — 97140 MANUAL THERAPY 1/> REGIONS: CPT

## 2019-10-24 PROCEDURE — 97112 NEUROMUSCULAR REEDUCATION: CPT

## 2019-10-24 PROCEDURE — 97530 THERAPEUTIC ACTIVITIES: CPT

## 2019-10-24 ASSESSMENT — PAIN DESCRIPTION - PROGRESSION: CLINICAL_PROGRESSION: GRADUALLY WORSENING

## 2019-10-24 ASSESSMENT — PAIN DESCRIPTION - LOCATION: LOCATION: BACK

## 2019-10-24 ASSESSMENT — PAIN SCALES - GENERAL: PAINLEVEL_OUTOF10: 3

## 2019-10-24 ASSESSMENT — PAIN DESCRIPTION - ORIENTATION: ORIENTATION: LOWER

## 2019-10-25 ENCOUNTER — OFFICE VISIT (OUTPATIENT)
Dept: GASTROENTEROLOGY | Age: 64
End: 2019-10-25
Payer: MEDICARE

## 2019-10-25 VITALS
SYSTOLIC BLOOD PRESSURE: 132 MMHG | WEIGHT: 213.6 LBS | BODY MASS INDEX: 32.37 KG/M2 | HEART RATE: 52 BPM | OXYGEN SATURATION: 96 % | HEIGHT: 68 IN | DIASTOLIC BLOOD PRESSURE: 88 MMHG

## 2019-10-25 DIAGNOSIS — K75.81 NASH (NONALCOHOLIC STEATOHEPATITIS): ICD-10-CM

## 2019-10-25 DIAGNOSIS — K75.81 NASH (NONALCOHOLIC STEATOHEPATITIS): Primary | ICD-10-CM

## 2019-10-25 LAB
ALBUMIN SERPL-MCNC: 4.6 G/DL (ref 3.5–4.6)
ALP BLD-CCNC: 113 U/L (ref 40–130)
ALT SERPL-CCNC: 20 U/L (ref 0–33)
AST SERPL-CCNC: 20 U/L (ref 0–35)
BILIRUB SERPL-MCNC: <0.2 MG/DL (ref 0.2–0.7)
BILIRUBIN DIRECT: <0.2 MG/DL (ref 0–0.4)
BILIRUBIN, INDIRECT: NORMAL MG/DL (ref 0–0.6)
HEPATITIS B SURFACE ANTIGEN INTERPRETATION: NORMAL
TOTAL PROTEIN: 7.3 G/DL (ref 6.3–8)

## 2019-10-25 PROCEDURE — 1123F ACP DISCUSS/DSCN MKR DOCD: CPT | Performed by: NURSE PRACTITIONER

## 2019-10-25 PROCEDURE — G8427 DOCREV CUR MEDS BY ELIG CLIN: HCPCS | Performed by: NURSE PRACTITIONER

## 2019-10-25 PROCEDURE — 1090F PRES/ABSN URINE INCON ASSESS: CPT | Performed by: NURSE PRACTITIONER

## 2019-10-25 PROCEDURE — G8399 PT W/DXA RESULTS DOCUMENT: HCPCS | Performed by: NURSE PRACTITIONER

## 2019-10-25 PROCEDURE — G8417 CALC BMI ABV UP PARAM F/U: HCPCS | Performed by: NURSE PRACTITIONER

## 2019-10-25 PROCEDURE — G8482 FLU IMMUNIZE ORDER/ADMIN: HCPCS | Performed by: NURSE PRACTITIONER

## 2019-10-25 PROCEDURE — 4040F PNEUMOC VAC/ADMIN/RCVD: CPT | Performed by: NURSE PRACTITIONER

## 2019-10-25 PROCEDURE — 3017F COLORECTAL CA SCREEN DOC REV: CPT | Performed by: NURSE PRACTITIONER

## 2019-10-25 PROCEDURE — 1036F TOBACCO NON-USER: CPT | Performed by: NURSE PRACTITIONER

## 2019-10-25 PROCEDURE — G8598 ASA/ANTIPLAT THER USED: HCPCS | Performed by: NURSE PRACTITIONER

## 2019-10-25 PROCEDURE — 99213 OFFICE O/P EST LOW 20 MIN: CPT | Performed by: NURSE PRACTITIONER

## 2019-10-28 ENCOUNTER — PROCEDURE VISIT (OUTPATIENT)
Dept: PHYSICAL MEDICINE AND REHAB | Age: 64
End: 2019-10-28
Payer: MEDICARE

## 2019-10-28 DIAGNOSIS — M79.10 MYALGIA: Primary | ICD-10-CM

## 2019-10-28 LAB — HEPATITIS BE ANTIBODY: NEGATIVE

## 2019-10-28 PROCEDURE — 20553 NJX 1/MLT TRIGGER POINTS 3/>: CPT | Performed by: PHYSICAL MEDICINE & REHABILITATION

## 2019-10-28 RX ORDER — LIDOCAINE HYDROCHLORIDE 10 MG/ML
16 INJECTION, SOLUTION INFILTRATION; PERINEURAL ONCE
Status: COMPLETED | OUTPATIENT
Start: 2019-10-28 | End: 2019-10-28

## 2019-10-28 RX ADMIN — LIDOCAINE HYDROCHLORIDE 16 ML: 10 INJECTION, SOLUTION INFILTRATION; PERINEURAL at 15:26

## 2019-10-29 ENCOUNTER — HOSPITAL ENCOUNTER (OUTPATIENT)
Dept: PHYSICAL THERAPY | Age: 64
Setting detail: THERAPIES SERIES
Discharge: HOME | End: 2019-10-29
Payer: MEDICARE

## 2019-10-29 ENCOUNTER — HOSPITAL ENCOUNTER (OUTPATIENT)
Dept: OCCUPATIONAL THERAPY | Age: 64
Setting detail: THERAPIES SERIES
Discharge: HOME | End: 2019-10-29
Payer: MEDICARE

## 2019-10-29 ENCOUNTER — HOSPITAL ENCOUNTER (OUTPATIENT)
Dept: SPEECH THERAPY | Age: 64
Setting detail: THERAPIES SERIES
Discharge: HOME | End: 2019-10-29
Payer: MEDICARE

## 2019-10-29 LAB
HCV QNT BY NAAT IU/ML: NOT DETECTED IU/ML
HCV QNT BY NAAT LOG IU/ML: NOT DETECTED LOG IU/ML
INTERPRETATION: NOT DETECTED

## 2019-10-29 PROCEDURE — 97110 THERAPEUTIC EXERCISES: CPT

## 2019-10-29 PROCEDURE — 97140 MANUAL THERAPY 1/> REGIONS: CPT

## 2019-10-29 PROCEDURE — 92507 TX SP LANG VOICE COMM INDIV: CPT

## 2019-10-29 PROCEDURE — 97112 NEUROMUSCULAR REEDUCATION: CPT

## 2019-10-29 PROCEDURE — 97127 HC SP THER IVNTJ W/FOCUS COG FUNCJ: CPT

## 2019-10-29 ASSESSMENT — PAIN SCALES - GENERAL: PAINLEVEL_OUTOF10: 3

## 2019-10-29 ASSESSMENT — PAIN DESCRIPTION - DESCRIPTORS: DESCRIPTORS: STABBING

## 2019-10-29 ASSESSMENT — PAIN DESCRIPTION - PAIN TYPE: TYPE: CHRONIC PAIN

## 2019-10-29 ASSESSMENT — PAIN DESCRIPTION - PROGRESSION: CLINICAL_PROGRESSION: GRADUALLY WORSENING

## 2019-10-29 ASSESSMENT — PAIN DESCRIPTION - LOCATION: LOCATION: BUTTOCKS

## 2019-10-29 ASSESSMENT — PAIN DESCRIPTION - ORIENTATION: ORIENTATION: RIGHT

## 2019-10-31 ENCOUNTER — APPOINTMENT (OUTPATIENT)
Dept: SPEECH THERAPY | Age: 64
End: 2019-10-31
Payer: MEDICARE

## 2019-10-31 ENCOUNTER — HOSPITAL ENCOUNTER (OUTPATIENT)
Dept: PHYSICAL THERAPY | Age: 64
Setting detail: THERAPIES SERIES
Discharge: HOME | End: 2019-10-31
Payer: MEDICARE

## 2019-10-31 ENCOUNTER — HOSPITAL ENCOUNTER (OUTPATIENT)
Dept: OCCUPATIONAL THERAPY | Age: 64
Setting detail: THERAPIES SERIES
Discharge: HOME | End: 2019-10-31
Payer: MEDICARE

## 2019-10-31 PROCEDURE — 97140 MANUAL THERAPY 1/> REGIONS: CPT

## 2019-10-31 PROCEDURE — 97112 NEUROMUSCULAR REEDUCATION: CPT

## 2019-10-31 PROCEDURE — 97110 THERAPEUTIC EXERCISES: CPT

## 2019-10-31 ASSESSMENT — PAIN SCALES - GENERAL: PAINLEVEL_OUTOF10: 4

## 2019-10-31 ASSESSMENT — PAIN DESCRIPTION - PROGRESSION: CLINICAL_PROGRESSION: GRADUALLY WORSENING

## 2019-10-31 ASSESSMENT — PAIN DESCRIPTION - ORIENTATION: ORIENTATION: RIGHT;ANTERIOR

## 2019-10-31 ASSESSMENT — PAIN DESCRIPTION - LOCATION: LOCATION: HIP

## 2019-11-01 DIAGNOSIS — M15.9 PRIMARY OSTEOARTHRITIS INVOLVING MULTIPLE JOINTS: ICD-10-CM

## 2019-11-01 DIAGNOSIS — M48.062 SPINAL STENOSIS OF LUMBAR REGION WITH NEUROGENIC CLAUDICATION: ICD-10-CM

## 2019-11-01 DIAGNOSIS — Z79.899 HIGH RISK MEDICATION USE: ICD-10-CM

## 2019-11-05 ENCOUNTER — HOSPITAL ENCOUNTER (OUTPATIENT)
Dept: PHYSICAL THERAPY | Age: 64
Setting detail: THERAPIES SERIES
Discharge: HOME | End: 2019-11-05
Payer: MEDICARE

## 2019-11-05 ENCOUNTER — HOSPITAL ENCOUNTER (OUTPATIENT)
Dept: OCCUPATIONAL THERAPY | Age: 64
Setting detail: THERAPIES SERIES
Discharge: HOME | End: 2019-11-05
Payer: MEDICARE

## 2019-11-05 PROCEDURE — 97112 NEUROMUSCULAR REEDUCATION: CPT

## 2019-11-05 PROCEDURE — 97110 THERAPEUTIC EXERCISES: CPT

## 2019-11-05 PROCEDURE — 97140 MANUAL THERAPY 1/> REGIONS: CPT

## 2019-11-06 RX ORDER — BACLOFEN 10 MG/1
10 TABLET ORAL NIGHTLY PRN
Qty: 30 TABLET | Refills: 2 | Status: SHIPPED | OUTPATIENT
Start: 2019-11-06 | End: 2020-01-06

## 2019-11-07 ENCOUNTER — HOSPITAL ENCOUNTER (OUTPATIENT)
Dept: PHYSICAL THERAPY | Age: 64
Setting detail: THERAPIES SERIES
Discharge: HOME | End: 2019-11-07
Payer: MEDICARE

## 2019-11-07 ENCOUNTER — HOSPITAL ENCOUNTER (OUTPATIENT)
Dept: OCCUPATIONAL THERAPY | Age: 64
Setting detail: THERAPIES SERIES
Discharge: HOME | End: 2019-11-07
Payer: MEDICARE

## 2019-11-07 PROCEDURE — 97110 THERAPEUTIC EXERCISES: CPT

## 2019-11-07 PROCEDURE — 97112 NEUROMUSCULAR REEDUCATION: CPT

## 2019-11-07 PROCEDURE — 97140 MANUAL THERAPY 1/> REGIONS: CPT

## 2019-11-07 ASSESSMENT — PAIN SCALES - GENERAL: PAINLEVEL_OUTOF10: 4

## 2019-11-07 ASSESSMENT — PAIN DESCRIPTION - DESCRIPTORS: DESCRIPTORS: SORE

## 2019-11-07 ASSESSMENT — PAIN DESCRIPTION - LOCATION: LOCATION: BACK

## 2019-11-07 ASSESSMENT — PAIN DESCRIPTION - ORIENTATION: ORIENTATION: LOWER

## 2019-11-08 DIAGNOSIS — M15.9 PRIMARY OSTEOARTHRITIS INVOLVING MULTIPLE JOINTS: ICD-10-CM

## 2019-11-08 DIAGNOSIS — M48.062 SPINAL STENOSIS OF LUMBAR REGION WITH NEUROGENIC CLAUDICATION: ICD-10-CM

## 2019-11-08 DIAGNOSIS — Z79.899 HIGH RISK MEDICATION USE: ICD-10-CM

## 2019-11-08 DIAGNOSIS — M46.46 LUMBAR DISCITIS: ICD-10-CM

## 2019-11-08 RX ORDER — OXYCODONE AND ACETAMINOPHEN 7.5; 325 MG/1; MG/1
1 TABLET ORAL EVERY 6 HOURS PRN
Qty: 60 TABLET | Refills: 0 | Status: SHIPPED | OUTPATIENT
Start: 2019-11-08 | End: 2019-11-21 | Stop reason: SDUPTHER

## 2019-11-08 RX ORDER — OXYCODONE HCL 10 MG/1
10 TABLET, FILM COATED, EXTENDED RELEASE ORAL 2 TIMES DAILY
Qty: 60 TABLET | Refills: 0 | Status: SHIPPED | OUTPATIENT
Start: 2019-11-08 | End: 2019-11-21 | Stop reason: SDUPTHER

## 2019-11-12 ENCOUNTER — HOSPITAL ENCOUNTER (OUTPATIENT)
Dept: PHYSICAL THERAPY | Age: 64
Setting detail: THERAPIES SERIES
Discharge: HOME | End: 2019-11-12
Payer: MEDICARE

## 2019-11-14 ENCOUNTER — HOSPITAL ENCOUNTER (OUTPATIENT)
Dept: ULTRASOUND IMAGING | Age: 64
Discharge: HOME | End: 2019-11-16
Payer: MEDICARE

## 2019-11-14 ENCOUNTER — PROCEDURE VISIT (OUTPATIENT)
Dept: PHYSICAL MEDICINE AND REHAB | Age: 64
End: 2019-11-14
Payer: MEDICARE

## 2019-11-14 ENCOUNTER — HOSPITAL ENCOUNTER (OUTPATIENT)
Dept: OCCUPATIONAL THERAPY | Age: 64
Setting detail: THERAPIES SERIES
Discharge: HOME | End: 2019-11-14
Payer: MEDICARE

## 2019-11-14 ENCOUNTER — HOSPITAL ENCOUNTER (OUTPATIENT)
Dept: PHYSICAL THERAPY | Age: 64
Setting detail: THERAPIES SERIES
Discharge: HOME | End: 2019-11-14
Payer: MEDICARE

## 2019-11-14 DIAGNOSIS — N18.30 CHRONIC KIDNEY DISEASE, STAGE III (MODERATE) (HCC): ICD-10-CM

## 2019-11-14 DIAGNOSIS — M48.062 SPINAL STENOSIS OF LUMBAR REGION WITH NEUROGENIC CLAUDICATION: Primary | ICD-10-CM

## 2019-11-14 PROCEDURE — 97140 MANUAL THERAPY 1/> REGIONS: CPT

## 2019-11-14 PROCEDURE — 97110 THERAPEUTIC EXERCISES: CPT

## 2019-11-14 PROCEDURE — 76775 US EXAM ABDO BACK WALL LIM: CPT

## 2019-11-14 PROCEDURE — 64445 NJX AA&/STRD SCIATIC NRV IMG: CPT | Performed by: PHYSICAL MEDICINE & REHABILITATION

## 2019-11-14 PROCEDURE — 76942 ECHO GUIDE FOR BIOPSY: CPT | Performed by: PHYSICAL MEDICINE & REHABILITATION

## 2019-11-14 PROCEDURE — 97112 NEUROMUSCULAR REEDUCATION: CPT

## 2019-11-14 RX ADMIN — LIDOCAINE HYDROCHLORIDE 5 ML: 20 INJECTION, SOLUTION INFILTRATION; PERINEURAL at 08:26

## 2019-11-14 ASSESSMENT — PAIN DESCRIPTION - LOCATION: LOCATION: BACK

## 2019-11-14 ASSESSMENT — PAIN SCALES - GENERAL: PAINLEVEL_OUTOF10: 3

## 2019-11-14 ASSESSMENT — PAIN DESCRIPTION - DESCRIPTORS: DESCRIPTORS: ACHING

## 2019-11-14 ASSESSMENT — PAIN DESCRIPTION - PAIN TYPE: TYPE: CHRONIC PAIN

## 2019-11-14 ASSESSMENT — PAIN DESCRIPTION - ORIENTATION: ORIENTATION: LOWER

## 2019-11-15 RX ORDER — LIDOCAINE HYDROCHLORIDE 10 MG/ML
16 INJECTION, SOLUTION INFILTRATION; PERINEURAL ONCE
Status: COMPLETED | OUTPATIENT
Start: 2019-11-15 | End: 2019-11-15

## 2019-11-15 RX ORDER — LIDOCAINE HYDROCHLORIDE 20 MG/ML
5 INJECTION, SOLUTION INFILTRATION; PERINEURAL ONCE
Status: COMPLETED | OUTPATIENT
Start: 2019-11-15 | End: 2019-11-14

## 2019-11-15 RX ADMIN — LIDOCAINE HYDROCHLORIDE 8 ML: 10 INJECTION, SOLUTION INFILTRATION; PERINEURAL at 08:26

## 2019-11-19 ENCOUNTER — APPOINTMENT (OUTPATIENT)
Dept: OCCUPATIONAL THERAPY | Age: 64
End: 2019-11-19
Payer: MEDICARE

## 2019-11-20 ENCOUNTER — HOSPITAL ENCOUNTER (OUTPATIENT)
Dept: OCCUPATIONAL THERAPY | Age: 64
Setting detail: THERAPIES SERIES
Discharge: HOME | End: 2019-11-20
Payer: MEDICARE

## 2019-11-20 ENCOUNTER — HOSPITAL ENCOUNTER (OUTPATIENT)
Dept: PHYSICAL THERAPY | Age: 64
Setting detail: THERAPIES SERIES
Discharge: HOME | End: 2019-11-20
Payer: MEDICARE

## 2019-11-20 PROCEDURE — 97530 THERAPEUTIC ACTIVITIES: CPT

## 2019-11-20 PROCEDURE — 97110 THERAPEUTIC EXERCISES: CPT

## 2019-11-20 PROCEDURE — 97140 MANUAL THERAPY 1/> REGIONS: CPT

## 2019-11-20 PROCEDURE — 97112 NEUROMUSCULAR REEDUCATION: CPT

## 2019-11-20 ASSESSMENT — PAIN SCALES - GENERAL: PAINLEVEL_OUTOF10: 2

## 2019-11-20 ASSESSMENT — PAIN DESCRIPTION - ORIENTATION: ORIENTATION: LOWER

## 2019-11-20 ASSESSMENT — PAIN DESCRIPTION - LOCATION: LOCATION: BACK

## 2019-11-20 ASSESSMENT — PAIN DESCRIPTION - DESCRIPTORS: DESCRIPTORS: ACHING

## 2019-11-21 ENCOUNTER — OFFICE VISIT (OUTPATIENT)
Dept: PHYSICAL MEDICINE AND REHAB | Age: 64
End: 2019-11-21
Payer: MEDICARE

## 2019-11-21 VITALS
HEIGHT: 68 IN | DIASTOLIC BLOOD PRESSURE: 76 MMHG | SYSTOLIC BLOOD PRESSURE: 150 MMHG | WEIGHT: 211.5 LBS | BODY MASS INDEX: 32.05 KG/M2

## 2019-11-21 DIAGNOSIS — G57.93 NEUROPATHY INVOLVING BOTH LOWER EXTREMITIES: ICD-10-CM

## 2019-11-21 DIAGNOSIS — M46.46 LUMBAR DISCITIS: ICD-10-CM

## 2019-11-21 DIAGNOSIS — M54.41 CHRONIC BILATERAL LOW BACK PAIN WITH BILATERAL SCIATICA: ICD-10-CM

## 2019-11-21 DIAGNOSIS — Z79.899 HIGH RISK MEDICATION USE: ICD-10-CM

## 2019-11-21 DIAGNOSIS — G89.29 CHRONIC BILATERAL LOW BACK PAIN WITH BILATERAL SCIATICA: ICD-10-CM

## 2019-11-21 DIAGNOSIS — M46.26 OSTEOMYELITIS OF LUMBAR VERTEBRA (HCC): ICD-10-CM

## 2019-11-21 DIAGNOSIS — M48.062 SPINAL STENOSIS OF LUMBAR REGION WITH NEUROGENIC CLAUDICATION: ICD-10-CM

## 2019-11-21 DIAGNOSIS — M15.3 SECONDARY OSTEOARTHRITIS OF MULTIPLE SITES: ICD-10-CM

## 2019-11-21 DIAGNOSIS — M05.79 RHEUMATOID ARTHRITIS INVOLVING MULTIPLE SITES WITH POSITIVE RHEUMATOID FACTOR (HCC): ICD-10-CM

## 2019-11-21 DIAGNOSIS — M79.10 MYALGIA: ICD-10-CM

## 2019-11-21 DIAGNOSIS — M54.42 CHRONIC BILATERAL LOW BACK PAIN WITH BILATERAL SCIATICA: ICD-10-CM

## 2019-11-21 DIAGNOSIS — M15.9 PRIMARY OSTEOARTHRITIS INVOLVING MULTIPLE JOINTS: Primary | ICD-10-CM

## 2019-11-21 PROCEDURE — 1123F ACP DISCUSS/DSCN MKR DOCD: CPT | Performed by: PHYSICAL MEDICINE & REHABILITATION

## 2019-11-21 PROCEDURE — G8399 PT W/DXA RESULTS DOCUMENT: HCPCS | Performed by: PHYSICAL MEDICINE & REHABILITATION

## 2019-11-21 PROCEDURE — 3017F COLORECTAL CA SCREEN DOC REV: CPT | Performed by: PHYSICAL MEDICINE & REHABILITATION

## 2019-11-21 PROCEDURE — 1036F TOBACCO NON-USER: CPT | Performed by: PHYSICAL MEDICINE & REHABILITATION

## 2019-11-21 PROCEDURE — G8427 DOCREV CUR MEDS BY ELIG CLIN: HCPCS | Performed by: PHYSICAL MEDICINE & REHABILITATION

## 2019-11-21 PROCEDURE — G8417 CALC BMI ABV UP PARAM F/U: HCPCS | Performed by: PHYSICAL MEDICINE & REHABILITATION

## 2019-11-21 PROCEDURE — 1090F PRES/ABSN URINE INCON ASSESS: CPT | Performed by: PHYSICAL MEDICINE & REHABILITATION

## 2019-11-21 PROCEDURE — G8598 ASA/ANTIPLAT THER USED: HCPCS | Performed by: PHYSICAL MEDICINE & REHABILITATION

## 2019-11-21 PROCEDURE — 99214 OFFICE O/P EST MOD 30 MIN: CPT | Performed by: PHYSICAL MEDICINE & REHABILITATION

## 2019-11-21 PROCEDURE — G8482 FLU IMMUNIZE ORDER/ADMIN: HCPCS | Performed by: PHYSICAL MEDICINE & REHABILITATION

## 2019-11-21 PROCEDURE — 4040F PNEUMOC VAC/ADMIN/RCVD: CPT | Performed by: PHYSICAL MEDICINE & REHABILITATION

## 2019-11-21 RX ORDER — OXYCODONE HCL 10 MG/1
10 TABLET, FILM COATED, EXTENDED RELEASE ORAL 2 TIMES DAILY
Qty: 60 TABLET | Refills: 0 | Status: SHIPPED | OUTPATIENT
Start: 2019-12-11 | End: 2020-01-03 | Stop reason: SDUPTHER

## 2019-11-21 RX ORDER — OXYCODONE AND ACETAMINOPHEN 7.5; 325 MG/1; MG/1
1 TABLET ORAL EVERY 6 HOURS PRN
Qty: 60 TABLET | Refills: 0 | Status: SHIPPED | OUTPATIENT
Start: 2019-12-07 | End: 2020-01-03 | Stop reason: SDUPTHER

## 2019-11-21 RX ORDER — GABAPENTIN 300 MG/1
CAPSULE ORAL
Qty: 270 CAPSULE | Refills: 1 | Status: SHIPPED | OUTPATIENT
Start: 2019-11-21 | End: 2020-05-28

## 2019-11-21 SDOH — ECONOMIC STABILITY: FOOD INSECURITY: WITHIN THE PAST 12 MONTHS, YOU WORRIED THAT YOUR FOOD WOULD RUN OUT BEFORE YOU GOT MONEY TO BUY MORE.: NEVER TRUE

## 2019-11-21 SDOH — SOCIAL STABILITY: SOCIAL NETWORK: HOW OFTEN DO YOU GET TOGETHER WITH FRIENDS OR RELATIVES?: MORE THAN THREE TIMES A WEEK

## 2019-11-21 SDOH — HEALTH STABILITY: PHYSICAL HEALTH: ON AVERAGE, HOW MANY DAYS PER WEEK DO YOU ENGAGE IN MODERATE TO STRENUOUS EXERCISE (LIKE A BRISK WALK)?: 0 DAYS

## 2019-11-21 SDOH — HEALTH STABILITY: MENTAL HEALTH
STRESS IS WHEN SOMEONE FEELS TENSE, NERVOUS, ANXIOUS, OR CAN'T SLEEP AT NIGHT BECAUSE THEIR MIND IS TROUBLED. HOW STRESSED ARE YOU?: RATHER MUCH

## 2019-11-21 SDOH — ECONOMIC STABILITY: TRANSPORTATION INSECURITY
IN THE PAST 12 MONTHS, HAS THE LACK OF TRANSPORTATION KEPT YOU FROM MEDICAL APPOINTMENTS OR FROM GETTING MEDICATIONS?: NO

## 2019-11-21 SDOH — SOCIAL STABILITY: SOCIAL NETWORK
DO YOU BELONG TO ANY CLUBS OR ORGANIZATIONS SUCH AS CHURCH GROUPS UNIONS, FRATERNAL OR ATHLETIC GROUPS, OR SCHOOL GROUPS?: NO

## 2019-11-21 SDOH — SOCIAL STABILITY: SOCIAL INSECURITY
WITHIN THE LAST YEAR, HAVE TO BEEN RAPED OR FORCED TO HAVE ANY KIND OF SEXUAL ACTIVITY BY YOUR PARTNER OR EX-PARTNER?: NO

## 2019-11-21 SDOH — SOCIAL STABILITY: SOCIAL INSECURITY: WITHIN THE LAST YEAR, HAVE YOU BEEN AFRAID OF YOUR PARTNER OR EX-PARTNER?: NO

## 2019-11-21 SDOH — ECONOMIC STABILITY: TRANSPORTATION INSECURITY
IN THE PAST 12 MONTHS, HAS LACK OF TRANSPORTATION KEPT YOU FROM MEETINGS, WORK, OR FROM GETTING THINGS NEEDED FOR DAILY LIVING?: NO

## 2019-11-21 SDOH — SOCIAL STABILITY: SOCIAL NETWORK: HOW OFTEN DO YOU ATTEND CHURCH OR RELIGIOUS SERVICES?: MORE THAN 4 TIMES PER YEAR

## 2019-11-21 SDOH — SOCIAL STABILITY: SOCIAL NETWORK
IN A TYPICAL WEEK, HOW MANY TIMES DO YOU TALK ON THE PHONE WITH FAMILY, FRIENDS, OR NEIGHBORS?: MORE THAN THREE TIMES A WEEK

## 2019-11-21 SDOH — SOCIAL STABILITY: SOCIAL NETWORK: ARE YOU MARRIED, WIDOWED, DIVORCED, SEPARATED, NEVER MARRIED, OR LIVING WITH A PARTNER?: MARRIED

## 2019-11-21 SDOH — HEALTH STABILITY: PHYSICAL HEALTH: ON AVERAGE, HOW MANY MINUTES DO YOU ENGAGE IN EXERCISE AT THIS LEVEL?: 0 MIN

## 2019-11-21 SDOH — ECONOMIC STABILITY: INCOME INSECURITY: HOW HARD IS IT FOR YOU TO PAY FOR THE VERY BASICS LIKE FOOD, HOUSING, MEDICAL CARE, AND HEATING?: NOT HARD AT ALL

## 2019-11-21 SDOH — ECONOMIC STABILITY: FOOD INSECURITY: WITHIN THE PAST 12 MONTHS, THE FOOD YOU BOUGHT JUST DIDN'T LAST AND YOU DIDN'T HAVE MONEY TO GET MORE.: NEVER TRUE

## 2019-11-21 SDOH — SOCIAL STABILITY: SOCIAL INSECURITY
WITHIN THE LAST YEAR, HAVE YOU BEEN KICKED, HIT, SLAPPED, OR OTHERWISE PHYSICALLY HURT BY YOUR PARTNER OR EX-PARTNER?: NO

## 2019-11-21 SDOH — SOCIAL STABILITY: SOCIAL NETWORK: HOW OFTEN DO YOU ATTENT MEETINGS OF THE CLUB OR ORGANIZATION YOU BELONG TO?: NEVER

## 2019-11-21 SDOH — SOCIAL STABILITY: SOCIAL INSECURITY: WITHIN THE LAST YEAR, HAVE YOU BEEN HUMILIATED OR EMOTIONALLY ABUSED IN OTHER WAYS BY YOUR PARTNER OR EX-PARTNER?: NO

## 2019-11-21 ASSESSMENT — ENCOUNTER SYMPTOMS
COUGH: 0
ABDOMINAL PAIN: 0
NAUSEA: 0
WHEEZING: 0
DIARRHEA: 0
CHEST TIGHTNESS: 0
SHORTNESS OF BREATH: 0
PHOTOPHOBIA: 1
BACK PAIN: 1
VOMITING: 0

## 2019-11-27 ENCOUNTER — OFFICE VISIT (OUTPATIENT)
Dept: FAMILY MEDICINE CLINIC | Age: 64
End: 2019-11-27
Payer: MEDICARE

## 2019-11-27 VITALS
BODY MASS INDEX: 32.58 KG/M2 | TEMPERATURE: 96.7 F | OXYGEN SATURATION: 97 % | SYSTOLIC BLOOD PRESSURE: 122 MMHG | DIASTOLIC BLOOD PRESSURE: 62 MMHG | RESPIRATION RATE: 14 BRPM | HEART RATE: 68 BPM | HEIGHT: 68 IN | WEIGHT: 215 LBS

## 2019-11-27 DIAGNOSIS — L03.031 ACUTE PARONYCHIA OF TOE, RIGHT: Primary | ICD-10-CM

## 2019-11-27 PROCEDURE — G8417 CALC BMI ABV UP PARAM F/U: HCPCS | Performed by: NURSE PRACTITIONER

## 2019-11-27 PROCEDURE — 1036F TOBACCO NON-USER: CPT | Performed by: NURSE PRACTITIONER

## 2019-11-27 PROCEDURE — 3017F COLORECTAL CA SCREEN DOC REV: CPT | Performed by: NURSE PRACTITIONER

## 2019-11-27 PROCEDURE — 4040F PNEUMOC VAC/ADMIN/RCVD: CPT | Performed by: NURSE PRACTITIONER

## 2019-11-27 PROCEDURE — G8427 DOCREV CUR MEDS BY ELIG CLIN: HCPCS | Performed by: NURSE PRACTITIONER

## 2019-11-27 PROCEDURE — G8482 FLU IMMUNIZE ORDER/ADMIN: HCPCS | Performed by: NURSE PRACTITIONER

## 2019-11-27 PROCEDURE — 1090F PRES/ABSN URINE INCON ASSESS: CPT | Performed by: NURSE PRACTITIONER

## 2019-11-27 PROCEDURE — 1123F ACP DISCUSS/DSCN MKR DOCD: CPT | Performed by: NURSE PRACTITIONER

## 2019-11-27 PROCEDURE — 99213 OFFICE O/P EST LOW 20 MIN: CPT | Performed by: NURSE PRACTITIONER

## 2019-11-27 PROCEDURE — G8399 PT W/DXA RESULTS DOCUMENT: HCPCS | Performed by: NURSE PRACTITIONER

## 2019-11-27 PROCEDURE — G8598 ASA/ANTIPLAT THER USED: HCPCS | Performed by: NURSE PRACTITIONER

## 2019-11-27 RX ORDER — SULFAMETHOXAZOLE AND TRIMETHOPRIM 800; 160 MG/1; MG/1
1 TABLET ORAL 2 TIMES DAILY
Qty: 10 TABLET | Refills: 0 | Status: SHIPPED | OUTPATIENT
Start: 2019-11-27 | End: 2019-11-30

## 2019-11-27 RX ORDER — HYDROCHLOROTHIAZIDE 25 MG/1
TABLET ORAL
COMMUNITY
Start: 2019-11-21 | End: 2021-09-21 | Stop reason: ALTCHOICE

## 2019-11-27 ASSESSMENT — ENCOUNTER SYMPTOMS: COLOR CHANGE: 1

## 2019-11-30 ENCOUNTER — OFFICE VISIT (OUTPATIENT)
Dept: FAMILY MEDICINE CLINIC | Age: 64
End: 2019-11-30
Payer: MEDICARE

## 2019-11-30 ENCOUNTER — HOSPITAL ENCOUNTER (OUTPATIENT)
Dept: GENERAL RADIOLOGY | Age: 64
Discharge: HOME | End: 2019-12-02
Payer: MEDICARE

## 2019-11-30 VITALS
DIASTOLIC BLOOD PRESSURE: 72 MMHG | WEIGHT: 210 LBS | TEMPERATURE: 97.7 F | HEIGHT: 68 IN | BODY MASS INDEX: 31.83 KG/M2 | HEART RATE: 71 BPM | OXYGEN SATURATION: 95 % | SYSTOLIC BLOOD PRESSURE: 130 MMHG | RESPIRATION RATE: 15 BRPM

## 2019-11-30 DIAGNOSIS — M79.674 GREAT TOE PAIN, RIGHT: Primary | ICD-10-CM

## 2019-11-30 DIAGNOSIS — M79.674 GREAT TOE PAIN, RIGHT: ICD-10-CM

## 2019-11-30 DIAGNOSIS — Z91.81 AT HIGH RISK FOR FALLS: ICD-10-CM

## 2019-11-30 PROCEDURE — 1123F ACP DISCUSS/DSCN MKR DOCD: CPT | Performed by: PHYSICIAN ASSISTANT

## 2019-11-30 PROCEDURE — G8598 ASA/ANTIPLAT THER USED: HCPCS | Performed by: PHYSICIAN ASSISTANT

## 2019-11-30 PROCEDURE — 73660 X-RAY EXAM OF TOE(S): CPT

## 2019-11-30 PROCEDURE — 3017F COLORECTAL CA SCREEN DOC REV: CPT | Performed by: PHYSICIAN ASSISTANT

## 2019-11-30 PROCEDURE — 1090F PRES/ABSN URINE INCON ASSESS: CPT | Performed by: PHYSICIAN ASSISTANT

## 2019-11-30 PROCEDURE — 99214 OFFICE O/P EST MOD 30 MIN: CPT | Performed by: PHYSICIAN ASSISTANT

## 2019-11-30 PROCEDURE — G8482 FLU IMMUNIZE ORDER/ADMIN: HCPCS | Performed by: PHYSICIAN ASSISTANT

## 2019-11-30 PROCEDURE — G8417 CALC BMI ABV UP PARAM F/U: HCPCS | Performed by: PHYSICIAN ASSISTANT

## 2019-11-30 PROCEDURE — G8427 DOCREV CUR MEDS BY ELIG CLIN: HCPCS | Performed by: PHYSICIAN ASSISTANT

## 2019-11-30 PROCEDURE — G8399 PT W/DXA RESULTS DOCUMENT: HCPCS | Performed by: PHYSICIAN ASSISTANT

## 2019-11-30 PROCEDURE — 4040F PNEUMOC VAC/ADMIN/RCVD: CPT | Performed by: PHYSICIAN ASSISTANT

## 2019-11-30 PROCEDURE — 1036F TOBACCO NON-USER: CPT | Performed by: PHYSICIAN ASSISTANT

## 2019-11-30 RX ORDER — GREEN TEA/HOODIA GORDONII 315-12.5MG
1 CAPSULE ORAL 2 TIMES DAILY
Qty: 60 TABLET | Refills: 0 | Status: SHIPPED | OUTPATIENT
Start: 2019-11-30 | End: 2019-12-30

## 2019-11-30 RX ORDER — CLINDAMYCIN HYDROCHLORIDE 300 MG/1
300 CAPSULE ORAL 3 TIMES DAILY
Qty: 30 CAPSULE | Refills: 0 | Status: SHIPPED | OUTPATIENT
Start: 2019-11-30 | End: 2019-12-10

## 2019-11-30 RX ORDER — FLUCONAZOLE 150 MG/1
TABLET ORAL
Qty: 2 TABLET | Refills: 0 | Status: SHIPPED | OUTPATIENT
Start: 2019-11-30 | End: 2020-01-29

## 2019-12-02 ENCOUNTER — HOSPITAL ENCOUNTER (OUTPATIENT)
Dept: PHYSICAL THERAPY | Age: 64
Setting detail: THERAPIES SERIES
Discharge: HOME | End: 2019-12-02
Payer: MEDICARE

## 2019-12-04 ENCOUNTER — HOSPITAL ENCOUNTER (OUTPATIENT)
Dept: OCCUPATIONAL THERAPY | Age: 64
Setting detail: THERAPIES SERIES
Discharge: HOME | End: 2019-12-04
Payer: MEDICARE

## 2019-12-04 ENCOUNTER — HOSPITAL ENCOUNTER (OUTPATIENT)
Dept: PHYSICAL THERAPY | Age: 64
Setting detail: THERAPIES SERIES
Discharge: HOME | End: 2019-12-04
Payer: MEDICARE

## 2019-12-04 PROCEDURE — 97110 THERAPEUTIC EXERCISES: CPT

## 2019-12-04 PROCEDURE — 97140 MANUAL THERAPY 1/> REGIONS: CPT

## 2019-12-04 PROCEDURE — 97112 NEUROMUSCULAR REEDUCATION: CPT

## 2019-12-04 ASSESSMENT — PAIN SCALES - GENERAL: PAINLEVEL_OUTOF10: 2

## 2019-12-10 ENCOUNTER — HOSPITAL ENCOUNTER (OUTPATIENT)
Dept: PHYSICAL THERAPY | Age: 64
Setting detail: THERAPIES SERIES
End: 2019-12-10
Payer: MEDICARE

## 2019-12-10 ENCOUNTER — HOSPITAL ENCOUNTER (OUTPATIENT)
Dept: OCCUPATIONAL THERAPY | Age: 64
Setting detail: THERAPIES SERIES
Discharge: HOME | End: 2019-12-10
Payer: MEDICARE

## 2019-12-10 PROCEDURE — 97140 MANUAL THERAPY 1/> REGIONS: CPT

## 2019-12-12 ENCOUNTER — HOSPITAL ENCOUNTER (OUTPATIENT)
Dept: OCCUPATIONAL THERAPY | Age: 64
Setting detail: THERAPIES SERIES
Discharge: HOME | End: 2019-12-12
Payer: MEDICARE

## 2019-12-12 ENCOUNTER — APPOINTMENT (OUTPATIENT)
Dept: PHYSICAL THERAPY | Age: 64
End: 2019-12-12
Payer: MEDICARE

## 2019-12-12 PROCEDURE — 97140 MANUAL THERAPY 1/> REGIONS: CPT

## 2019-12-12 PROCEDURE — 97530 THERAPEUTIC ACTIVITIES: CPT

## 2019-12-13 ASSESSMENT — ENCOUNTER SYMPTOMS
DIARRHEA: 0
VOMITING: 0
SHORTNESS OF BREATH: 0
ABDOMINAL PAIN: 0

## 2019-12-14 ASSESSMENT — ENCOUNTER SYMPTOMS: BACK PAIN: 1

## 2019-12-16 DIAGNOSIS — M46.26 OSTEOMYELITIS OF LUMBAR VERTEBRA (HCC): ICD-10-CM

## 2019-12-16 DIAGNOSIS — M46.26 OSTEOMYELITIS OF LUMBAR VERTEBRA (HCC): Primary | ICD-10-CM

## 2019-12-16 LAB
ANION GAP SERPL CALCULATED.3IONS-SCNC: 12 MEQ/L (ref 9–15)
BUN BLDV-MCNC: 27 MG/DL (ref 8–23)
C-REACTIVE PROTEIN: <0.3 MG/L (ref 0–5)
CALCIUM SERPL-MCNC: 9.6 MG/DL (ref 8.5–9.9)
CHLORIDE BLD-SCNC: 100 MEQ/L (ref 95–107)
CO2: 26 MEQ/L (ref 20–31)
CREAT SERPL-MCNC: 1.21 MG/DL (ref 0.5–0.9)
GFR AFRICAN AMERICAN: 53.8
GFR NON-AFRICAN AMERICAN: 44.5
GLUCOSE BLD-MCNC: 84 MG/DL (ref 70–99)
HCT VFR BLD CALC: 41.4 % (ref 37–47)
HEMOGLOBIN: 13.4 G/DL (ref 12–16)
MCH RBC QN AUTO: 30.4 PG (ref 27–31.3)
MCHC RBC AUTO-ENTMCNC: 32.3 % (ref 33–37)
MCV RBC AUTO: 94.1 FL (ref 82–100)
PDW BLD-RTO: 13.1 % (ref 11.5–14.5)
PLATELET # BLD: 209 K/UL (ref 130–400)
POTASSIUM SERPL-SCNC: 4.5 MEQ/L (ref 3.4–4.9)
RBC # BLD: 4.4 M/UL (ref 4.2–5.4)
SODIUM BLD-SCNC: 138 MEQ/L (ref 135–144)
WBC # BLD: 4.7 K/UL (ref 4.8–10.8)

## 2019-12-17 ENCOUNTER — HOSPITAL ENCOUNTER (OUTPATIENT)
Dept: OCCUPATIONAL THERAPY | Age: 64
Setting detail: THERAPIES SERIES
Discharge: HOME | End: 2019-12-17
Payer: MEDICARE

## 2019-12-17 ENCOUNTER — APPOINTMENT (OUTPATIENT)
Dept: PHYSICAL THERAPY | Age: 64
End: 2019-12-17
Payer: MEDICARE

## 2019-12-19 ENCOUNTER — APPOINTMENT (OUTPATIENT)
Dept: PHYSICAL THERAPY | Age: 64
End: 2019-12-19
Payer: MEDICARE

## 2019-12-19 ENCOUNTER — HOSPITAL ENCOUNTER (OUTPATIENT)
Dept: OCCUPATIONAL THERAPY | Age: 64
Setting detail: THERAPIES SERIES
Discharge: HOME | End: 2019-12-19
Payer: MEDICARE

## 2019-12-19 PROCEDURE — 97140 MANUAL THERAPY 1/> REGIONS: CPT

## 2019-12-20 ENCOUNTER — OFFICE VISIT (OUTPATIENT)
Dept: INFECTIOUS DISEASES | Age: 64
End: 2019-12-20
Payer: MEDICARE

## 2019-12-20 VITALS
TEMPERATURE: 97.4 F | SYSTOLIC BLOOD PRESSURE: 136 MMHG | BODY MASS INDEX: 32.43 KG/M2 | WEIGHT: 214 LBS | RESPIRATION RATE: 16 BRPM | DIASTOLIC BLOOD PRESSURE: 73 MMHG | HEART RATE: 88 BPM | HEIGHT: 68 IN

## 2019-12-20 DIAGNOSIS — M46.26 OSTEOMYELITIS OF LUMBAR VERTEBRA (HCC): Primary | ICD-10-CM

## 2019-12-20 DIAGNOSIS — A49.01 STAPH AUREUS INFECTION: ICD-10-CM

## 2019-12-20 PROCEDURE — 3017F COLORECTAL CA SCREEN DOC REV: CPT | Performed by: INTERNAL MEDICINE

## 2019-12-20 PROCEDURE — 1123F ACP DISCUSS/DSCN MKR DOCD: CPT | Performed by: INTERNAL MEDICINE

## 2019-12-20 PROCEDURE — G8482 FLU IMMUNIZE ORDER/ADMIN: HCPCS | Performed by: INTERNAL MEDICINE

## 2019-12-20 PROCEDURE — G8598 ASA/ANTIPLAT THER USED: HCPCS | Performed by: INTERNAL MEDICINE

## 2019-12-20 PROCEDURE — 1036F TOBACCO NON-USER: CPT | Performed by: INTERNAL MEDICINE

## 2019-12-20 PROCEDURE — 99213 OFFICE O/P EST LOW 20 MIN: CPT | Performed by: INTERNAL MEDICINE

## 2019-12-20 PROCEDURE — G8417 CALC BMI ABV UP PARAM F/U: HCPCS | Performed by: INTERNAL MEDICINE

## 2019-12-20 PROCEDURE — 1090F PRES/ABSN URINE INCON ASSESS: CPT | Performed by: INTERNAL MEDICINE

## 2019-12-20 PROCEDURE — G8399 PT W/DXA RESULTS DOCUMENT: HCPCS | Performed by: INTERNAL MEDICINE

## 2019-12-20 PROCEDURE — 4040F PNEUMOC VAC/ADMIN/RCVD: CPT | Performed by: INTERNAL MEDICINE

## 2019-12-20 PROCEDURE — G8427 DOCREV CUR MEDS BY ELIG CLIN: HCPCS | Performed by: INTERNAL MEDICINE

## 2019-12-20 ASSESSMENT — ENCOUNTER SYMPTOMS: BACK PAIN: 1

## 2019-12-31 ENCOUNTER — HOSPITAL ENCOUNTER (OUTPATIENT)
Dept: OCCUPATIONAL THERAPY | Age: 64
Setting detail: THERAPIES SERIES
Discharge: HOME | End: 2019-12-31
Payer: MEDICARE

## 2019-12-31 PROCEDURE — 97530 THERAPEUTIC ACTIVITIES: CPT

## 2019-12-31 PROCEDURE — 97140 MANUAL THERAPY 1/> REGIONS: CPT

## 2019-12-31 NOTE — PROGRESS NOTES
Occupational Therapy  Daily Treatment Note  Date: 2019  Patient Name: Haresh Matta  :  1955  MRN: [id number]       Subjective Pt reports  She had to sleep in awkward places and now has R hip and back pain. OT Visit Information  Progress Note Counter: 8/10  Pre Treatment Pain Screening  Pain at present: 4  Scale Used: Numeric Score   Treatment Activities:  Pt had 6 hour drive to and from her daughters home for 4 days awkward sleeping posture which contributes to her current pain level. Pt requested \"please do the releases I need them so bad today\"    Pt was noted to be painful to pressure in R quadratus lumborum, lumbo sacral area, and R piriformis. Pt was treated in L side lying positioned in elevation to elongate R side throughout treatment.                                 Transverse Plane:     [] Thoracic inlet:  [x] Respiratory diaphragm:    [] Other:          Lower Extremity     [] Transverse plane (name area):  [] Soft tissue release (name area):    [x] Leg pull in side lying         [x] Right   [] Left    [] Both   [] Foot release (name area):        Deep Release:     [] Quadriceps  [x] IT band R    [] Hamstrings   [] Supra/Inferior patella   [] Anterior/Posterior calf [] Internal/External hip release         Lumbar/Pelvis/Sacral     [x] Deep release of lumbo/sacra area:   [] Scar releases (identify area):    [] Anterior/Posterior calf:  [] Soft tissue mobility:    [] Dural tube release from pelvis:                                  [x] Pelvic floor transv.plane:    [x] Piriformis Release      [x] Right  [] Left    [] Both                     [x] Psoas Release       [x] Right  [] Left    [] Both      [x] Quadratus lumborum      [x] Right  [] Left    [] Both                     [] Other:          Pt required frequent breaks and responded well to tx with functional active trunk rotation and counter rotation in supine and sitting following releases              Assessment  Moderate tolerance and

## 2020-01-02 ENCOUNTER — HOSPITAL ENCOUNTER (OUTPATIENT)
Dept: OCCUPATIONAL THERAPY | Age: 65
Setting detail: THERAPIES SERIES
Discharge: HOME | End: 2020-01-02
Payer: MEDICARE

## 2020-01-02 PROCEDURE — 97140 MANUAL THERAPY 1/> REGIONS: CPT

## 2020-01-02 NOTE — PROGRESS NOTES
Occupational Therapy  Daily Note     Name: Gunnar Patel  : 1955  MRN: [id number]  Diagnosis: spinal stenosis of lumbar with neurogenic claudication    Visit Information:   OT Insurance Information: Medicare Guidelines  Canceled Appointment: 1  Progress Note Counter: 9/10    Date: 2020    OT Manual therapy 57 minutes for 4 unit(s)       OT Individual Minutes  Time In: 1267  Time Out: 1000  Minutes: 62    Referring Practitioner: Dr. Romelia Bolton         Comments: Right sciatica as primary pain location    Subjective: \"I hurt so much the day after the last treatment but then it got much better. \"               Pain rating:     Pre-treatment pain:310       Pain after treatment:  \"maybe a 2 or 3. \"              Transverse Plane:      []? Thoracic inlet:  [x]? Respiratory diaphragm:    []? Other:             Lower Extremity      []? Transverse plane (name area):  []? Soft tissue release (name area):    [x]? Leg pull in side lying         [x]? Right   [x]? Left    []? Both   []? Foot release (name area):          Deep Release:      []? Quadriceps  [x]? IT band R    []? Hamstrings   []? Supra/Inferior patella   []? Anterior/Posterior calf []? Internal/External hip release            Lumbar/Pelvis/Sacral      [x]? Deep release of lumbo/sacra area:   []? Scar releases (identify area):    []? Anterior/Posterior calf:  []? Soft tissue mobility:    [x]? Dural tube release from pelvis:                                  []? Pelvic floor transv.plane:    [x]? Piriformis Release      []? Right  []? Left    [x]? Both                     []? Psoas Release       []? Right  []? Left    []? Both      [x]? Quadratus lumborum      [x]? Right  []? Left    []? Both                     []? Other:                   Assessment:   Pt tolerated treatment well. Plan:   Continue POC        Goals:1-6 addressed.   Long term goals  Time Frame for Long term goals : __2___ X WEEK X __5___WEEKS OR #____10____VISITS  Long term goal 1:

## 2020-01-03 ENCOUNTER — PROCEDURE VISIT (OUTPATIENT)
Dept: PHYSICAL MEDICINE AND REHAB | Age: 65
End: 2020-01-03

## 2020-01-03 RX ORDER — OXYCODONE HCL 10 MG/1
10 TABLET, FILM COATED, EXTENDED RELEASE ORAL 2 TIMES DAILY
Qty: 60 TABLET | Refills: 0 | Status: SHIPPED | OUTPATIENT
Start: 2020-01-09 | End: 2020-01-22 | Stop reason: SDUPTHER

## 2020-01-03 RX ORDER — LIDOCAINE HYDROCHLORIDE 10 MG/ML
16 INJECTION, SOLUTION INFILTRATION; PERINEURAL ONCE
Status: COMPLETED | OUTPATIENT
Start: 2020-01-03 | End: 2020-01-03

## 2020-01-03 RX ORDER — OXYCODONE AND ACETAMINOPHEN 7.5; 325 MG/1; MG/1
1 TABLET ORAL EVERY 6 HOURS PRN
Qty: 60 TABLET | Refills: 0 | Status: SHIPPED | OUTPATIENT
Start: 2020-01-06 | End: 2020-01-22 | Stop reason: SDUPTHER

## 2020-01-03 RX ADMIN — LIDOCAINE HYDROCHLORIDE 16 ML: 10 INJECTION, SOLUTION INFILTRATION; PERINEURAL at 12:59

## 2020-01-03 NOTE — PROGRESS NOTES
Patient was here today for TP/ B-12 injections of lower back pain Patient placed in upright position areas marked and prepped with alcohol. Sites injected with 16cc 1% Lidocaine administered  By provider.

## 2020-01-06 RX ORDER — BACLOFEN 10 MG/1
10 TABLET ORAL NIGHTLY PRN
Qty: 30 TABLET | Refills: 2 | Status: SHIPPED | OUTPATIENT
Start: 2020-01-06 | End: 2020-02-05 | Stop reason: SDUPTHER

## 2020-01-08 ENCOUNTER — HOSPITAL ENCOUNTER (OUTPATIENT)
Dept: OCCUPATIONAL THERAPY | Age: 65
Setting detail: THERAPIES SERIES
Discharge: HOME | End: 2020-01-08
Payer: MEDICARE

## 2020-01-08 PROCEDURE — 97140 MANUAL THERAPY 1/> REGIONS: CPT

## 2020-01-08 PROCEDURE — 97530 THERAPEUTIC ACTIVITIES: CPT

## 2020-01-08 NOTE — PROGRESS NOTES
Occupational Therapy  DischargeTreatment Note  Date: 2020  Patient Name: Debby Rucker  :  1955  MRN: [id number]       Subjective Pt seen for last visit. Pt received trigger point and B12  On 1-3-2020  OT Visit Information  Progress Note Counter: 10/10  Pre Treatment Pain Screening  Pain at present: 2  Scale Used: Numeric Score   Treatment Activities:  Pt reports that she is completing HEP ,\"it's all in a folder and I found some others too\"  Pt was noted to have R side shortening in sitting. Which was directly treated today.               Cervical Area:    [x] Occipital Condyle                              [] Cervical hold with anterior chest release                                                                                                                       Thoracic         Transverse Plane:     [x] Thoracic inlet:  [x] Respiratory diaphragm:    [] Other:        Lower Extremity     [] Transverse plane (name area):  [] Soft tissue release (name area):    [x] Leg pull          [x] Right   [] Left    [] Both   [] Foot release (name area):        Deep Release:     [] Quadriceps  [] IT band    [] Hamstrings   [] Supra/Inferior patella   [] Anterior/Posterior calf [x] Internal/External hip release B         Lumbar/Pelvis/Sacral     [x] Deep release of lumbo/sacra area:   [] Scar releases (identify area):    [] Anterior/Posterior calf:  [] Soft tissue mobility:    [] Dural tube release from pelvis:                                  [x] Pelvic floor transv.plane:    [] Piriformis Release      [] Right  [] Left    [] Both                     [x] Psoas Release       [] Right  [] Left    [x] Both      [x] Quadratus lumborum      [x] Right  [] Left    [] Both                     [] Other:            Reviewed with Pt community resources for stress management and she has ever present concerns and family stressors that also appear to heavily contribute to pain levels      Pt states that she is very comfortable

## 2020-01-22 ENCOUNTER — OFFICE VISIT (OUTPATIENT)
Dept: PHYSICAL MEDICINE AND REHAB | Age: 65
End: 2020-01-22
Payer: MEDICARE

## 2020-01-22 VITALS
WEIGHT: 217 LBS | SYSTOLIC BLOOD PRESSURE: 156 MMHG | HEIGHT: 68 IN | DIASTOLIC BLOOD PRESSURE: 80 MMHG | BODY MASS INDEX: 32.89 KG/M2

## 2020-01-22 PROCEDURE — G8417 CALC BMI ABV UP PARAM F/U: HCPCS | Performed by: PHYSICAL MEDICINE & REHABILITATION

## 2020-01-22 PROCEDURE — 99214 OFFICE O/P EST MOD 30 MIN: CPT | Performed by: PHYSICAL MEDICINE & REHABILITATION

## 2020-01-22 PROCEDURE — 3017F COLORECTAL CA SCREEN DOC REV: CPT | Performed by: PHYSICAL MEDICINE & REHABILITATION

## 2020-01-22 PROCEDURE — G8482 FLU IMMUNIZE ORDER/ADMIN: HCPCS | Performed by: PHYSICAL MEDICINE & REHABILITATION

## 2020-01-22 PROCEDURE — 1123F ACP DISCUSS/DSCN MKR DOCD: CPT | Performed by: PHYSICAL MEDICINE & REHABILITATION

## 2020-01-22 PROCEDURE — 1090F PRES/ABSN URINE INCON ASSESS: CPT | Performed by: PHYSICAL MEDICINE & REHABILITATION

## 2020-01-22 PROCEDURE — G8399 PT W/DXA RESULTS DOCUMENT: HCPCS | Performed by: PHYSICAL MEDICINE & REHABILITATION

## 2020-01-22 PROCEDURE — G8427 DOCREV CUR MEDS BY ELIG CLIN: HCPCS | Performed by: PHYSICAL MEDICINE & REHABILITATION

## 2020-01-22 PROCEDURE — 1036F TOBACCO NON-USER: CPT | Performed by: PHYSICAL MEDICINE & REHABILITATION

## 2020-01-22 PROCEDURE — 4040F PNEUMOC VAC/ADMIN/RCVD: CPT | Performed by: PHYSICAL MEDICINE & REHABILITATION

## 2020-01-22 RX ORDER — OXYCODONE HCL 10 MG/1
10 TABLET, FILM COATED, EXTENDED RELEASE ORAL 2 TIMES DAILY
Qty: 60 TABLET | Refills: 0 | Status: CANCELLED | OUTPATIENT
Start: 2020-02-15 | End: 2020-03-16

## 2020-01-22 RX ORDER — OXYCODONE AND ACETAMINOPHEN 7.5; 325 MG/1; MG/1
1 TABLET ORAL EVERY 6 HOURS PRN
Qty: 60 TABLET | Refills: 0 | Status: CANCELLED | OUTPATIENT
Start: 2020-02-05 | End: 2020-03-06

## 2020-01-22 RX ORDER — OXYCODONE AND ACETAMINOPHEN 7.5; 325 MG/1; MG/1
1 TABLET ORAL EVERY 6 HOURS PRN
Qty: 60 TABLET | Refills: 0 | Status: SHIPPED | OUTPATIENT
Start: 2020-02-05 | End: 2020-03-06 | Stop reason: SDUPTHER

## 2020-01-22 RX ORDER — OXYCODONE HCL 10 MG/1
10 TABLET, FILM COATED, EXTENDED RELEASE ORAL 2 TIMES DAILY
Qty: 60 TABLET | Refills: 0 | Status: SHIPPED | OUTPATIENT
Start: 2020-02-15 | End: 2020-03-06 | Stop reason: SDUPTHER

## 2020-01-22 ASSESSMENT — ENCOUNTER SYMPTOMS
CHEST TIGHTNESS: 0
SHORTNESS OF BREATH: 0
NAUSEA: 0
WHEEZING: 0
COUGH: 0
VOMITING: 0
DIARRHEA: 0
BACK PAIN: 1
PHOTOPHOBIA: 1
ABDOMINAL PAIN: 0

## 2020-01-22 NOTE — PROGRESS NOTES
 High Blood Pressure Mother        Allergies   Allergen Reactions    Latex Swelling     Swelling of hands with wearing latex gloves    Norvasc [Amlodipine Besylate] Other (See Comments)     swelling    Keflex [Cephalexin] Rash       Review of Systems   Constitutional: Positive for fatigue. Negative for activity change, chills, diaphoresis and fever. HENT: Negative for congestion. Eyes: Positive for photophobia and visual disturbance. Several occurances   Respiratory: Negative for cough, chest tightness, shortness of breath and wheezing. Cardiovascular: Positive for leg swelling. Negative for chest pain and palpitations. Relieved at HS with lasix   Gastrointestinal: Negative for abdominal pain, diarrhea, nausea and vomiting. Endocrine: Negative. Genitourinary: Positive for pelvic pain. Negative for bladder incontinence. Musculoskeletal: Positive for arthralgias, back pain, gait problem, joint swelling and neck pain. Negative for neck stiffness. Skin: Negative. Allergic/Immunologic: Positive for immunocompromised state. Neurological: Positive for tremors. Negative for dizziness, syncope, weakness, light-headedness, numbness and headaches. Hematological: Negative. Psychiatric/Behavioral: Positive for sleep disturbance. Negative for dysphoric mood. The patient is nervous/anxious. Stress       Objective    There were no vitals filed for this visit. No data recorded     Physical Exam  Vitals signs reviewed. Constitutional:       General: She is not in acute distress. Appearance: She is well-developed. She is not ill-appearing, toxic-appearing or diaphoretic. Comments:         HENT:      Head: Normocephalic and atraumatic. Right Ear: Hearing normal.      Left Ear: Hearing normal.      Nose: Nose normal.      Mouth/Throat:      Mouth: No oral lesions. Dentition: Normal dentition. Pharynx: No oropharyngeal exudate.    Eyes:      General: No scleral icterus. Right eye: No discharge. Left eye: No discharge. Conjunctiva/sclera: Conjunctivae normal.      Right eye: No chemosis or exudate. Left eye: No chemosis or exudate. Pupils: Pupils are equal, round, and reactive to light. Neck:      Musculoskeletal: Neck supple. Decreased range of motion. Spinous process tenderness and muscular tenderness present. No edema or neck rigidity. Thyroid: No thyromegaly. Vascular: No JVD. Trachea: Tracheal tenderness present. No tracheal deviation. Cardiovascular:      Pulses: No decreased pulses. Pulmonary:      Effort: Pulmonary effort is normal. No tachypnea, bradypnea, accessory muscle usage or respiratory distress. Breath sounds: No wheezing. Chest:      Chest wall: No tenderness. Abdominal:      General: Bowel sounds are normal. There is no distension. Palpations: Abdomen is soft. There is no mass. Tenderness: There is no tenderness. There is no guarding or rebound. Comments: Pressure RUQ   Musculoskeletal:         General: Tenderness present. Right shoulder: She exhibits decreased range of motion, tenderness and bony tenderness. Left shoulder: She exhibits decreased range of motion, tenderness and bony tenderness. Right elbow: Normal.     Left elbow: Normal.      Right wrist: Normal.      Left wrist: Normal.      Right hip: She exhibits decreased range of motion, decreased strength and tenderness. Left hip: She exhibits decreased range of motion, decreased strength and tenderness. Right knee: Normal.      Left knee: Normal.      Right ankle: Normal. Achilles tendon normal.      Left ankle: She exhibits decreased range of motion. Tenderness. Lateral malleolus and medial malleolus tenderness found. Achilles tendon normal.      Cervical back: She exhibits decreased range of motion, tenderness and bony tenderness.       Thoracic back: She exhibits decreased range of motion, tenderness and bony tenderness. Lumbar back: She exhibits decreased range of motion, tenderness, bony tenderness and pain. She exhibits no swelling, no edema, no deformity, no laceration and normal pulse. Back:       Right upper arm: Normal.      Left upper arm: Normal.      Right forearm: Normal.      Left forearm: Normal.        Arms:       Right hand: She exhibits decreased range of motion, tenderness, bony tenderness and deformity. Left hand: She exhibits decreased range of motion, tenderness, bony tenderness and deformity. Right upper leg: Normal.      Left upper leg: Normal.      Right lower leg: Normal.      Left lower leg: Normal.        Legs:       Right foot: Decreased range of motion. Tenderness and bony tenderness present. Left foot: Decreased range of motion. Tenderness present. Comments: Tender areas are indicated by numbered spot         Lymphadenopathy:      Cervical: No cervical adenopathy. Skin:     General: Skin is warm and dry. Coloration: Skin is not pale. Findings: No abrasion, bruising, ecchymosis, erythema, laceration, petechiae or rash. Rash is not macular, pustular or urticarial.      Nails: There is no clubbing. Neurological:      Mental Status: She is alert and oriented to person, place, and time. Cranial Nerves: No cranial nerve deficit. Sensory: No sensory deficit. Motor: No tremor, atrophy or abnormal muscle tone. Coordination: Coordination normal.      Gait: Gait normal.      Deep Tendon Reflexes: Reflexes abnormal. Babinski sign absent on the right side. Babinski sign absent on the left side. Psychiatric:         Attention and Perception: She is attentive. Mood and Affect: Mood is not anxious. Affect is not labile, blunt, angry or inappropriate. Speech: Speech normal.         Behavior: Behavior normal. Behavior is not agitated, slowed, aggressive, withdrawn, hyperactive or combative. Thought Content: Thought content normal. Thought content is not paranoid or delusional. Thought content does not include homicidal or suicidal ideation. Thought content does not include homicidal or suicidal plan. Cognition and Memory: Memory is not impaired. She does not exhibit impaired recent memory or impaired remote memory. Judgment: Judgment normal. Judgment is not impulsive or inappropriate. Ortho Exam  Neurologic Exam     Mental Status   Oriented to person, place, and time. Speech: speech is normal   Level of consciousness: alert  Knowledge: good. Able to name object. Able to read. Able to repeat. Cranial Nerves     CN III, IV, VI   Pupils are equal, round, and reactive to light. Motor Exam   Muscle bulk: normal  Overall muscle tone: normalTight hip flexors. Sensory Exam   Right arm light touch: decreased from fingers  Left arm light touch: decreased from fingers  Right leg light touch: decreased from ankle  Left leg light touch: decreased from ankle        After a thorough review and discussion of the previous medical records, patient comprehensive medical, surgical, and family and social history, Review of Systems, their OARRS, their Screener and Opioid Assessment for Patients with Pain (SOAPP®-R), recent diagnostics, and symptomatic results to previous treatment, it is my impression that the patients is suffering with progressive and severe:     Diagnosis Orders   1. Rheumatoid arthritis involving multiple sites with positive rheumatoid factor ([address]     2. Myalgia  NC INJECT TRIGGER POINTS, 3 OR GREATER   3. Osteomyelitis of lumbar vertebra ([address]     4. Lumbar discitis  oxyCODONE (OXYCONTIN) 10 MG extended release tablet   5.  High risk medication use - 10/17/17 OARRS PM&R, 12/19/17 OARRS PM&R, 12/20/17 Tox screen positive for opiates, Hydrocodone PM&R, MED CONTRACT 2/2/17  oxyCODONE (OXYCONTIN) 10 MG extended release tablet    oxyCODONE-acetaminophen (PERCOCET) 7.5-325 MG per tablet   6. Primary osteoarthritis involving multiple joints  oxyCODONE (OXYCONTIN) 10 MG extended release tablet    oxyCODONE-acetaminophen (PERCOCET) 7.5-325 MG per tablet   7. Spinal stenosis of lumbar region with neurogenic claudication  oxyCODONE (OXYCONTIN) 10 MG extended release tablet    oxyCODONE-acetaminophen (PERCOCET) 7.5-325 MG per tablet       I am also concerned by lifestyle and mood issues including:    Past Medical History:   Diagnosis Date    Abnormal electromyogram (EMG) 8/19/09    SENSORIMOTOR NEUROPATHY OF BLE, RIGHT WORSE THAN LEFT, SUSPICIOUS FOR RIGHT S1 RADIC    Angina pectoris ([address]     Angina pectoris ([address] 07/2019    sees Dr. Neelam Hinds Ankle pain     Colon polyp 4/3/2017    Dermatitis     Fibromyalgia     Foot pain     Hyperlipidemia     Hypertension     Infection of intervertebral disc (pyogenic), lumbar region ([address] 8/13/2018    Low back pain     MRSA (methicillin resistant staph aureus) culture positive 2011    Neck pain     Neuropathy     Obesity     Osteoarthritis     PSVT (paroxysmal supraventricular tachycardia) ([address]     Shingles outbreak 2012    SI (sacroiliac) pain     Stenosis     Vitamin D deficiency            Given their medication, chronic pain and lifestyle and medications they are at risk for :    Falls, constipation, addiction  Loss of livelyhood due to severe pain, debility, weight gain and  vitamin D deficiency    The patient was educated regarding proper diet, fitness routine, and regulatory restrictions concerning pain medications. Previous notes, comprehensive past medical, surgical, family history, and diagnostics were reviewed. Patient education and councelling were provided regarding off label use,treatment options and medication and injection risks.     Current and old OARRS ([place] Automated Prescription Reporting System) records reviewed, all refills reviewed since last visit,  Behavioral agreement/KATHLEEN regulations   and tablet     Sig: Take 1 tablet by mouth 2 times daily for 30 days. Intended supply: 30 days     Dispense:  60 tablet     Refill:  0     Reduce doses taken as pain becomes manageable    oxyCODONE-acetaminophen (PERCOCET) 7.5-325 MG per tablet     Sig: Take 1 tablet by mouth every 6 hours as needed for Pain for up to 30 days. Intended supply: 30 days     Dispense:  60 tablet     Refill:  0     Reduce doses taken as pain becomes manageable        -which helps with pain and function. Otherwise, continue the current pain medications that I have prescibed. Radiologic:   Old films reviewed    XR TOE RIGHT (MIN 2 VIEWS) : 11/30/2019       CLINICAL HISTORY: M79.674 Great toe pain, right ICD10.       COMPARISON: Right foot radiographs 8/8/2018 and right foot MRI 8/15/2018.       TECHNIQUE: AP, lateral, and oblique radiographs of the right great toe were obtained.           FINDINGS:        There is no fracture, dislocation, radiodense foreign bodies, worrisome bone destruction, or pathologic calcifications identified.         The visualized joint spaces are intact, with degenerative changes similar to the prior studies.                   Impression       NO FRACTURE, RADIOGRAPHIC EVIDENCE OF OSTEOMYELITIS, OR SIGNIFICANT CHANGE FROM PRIOR STUDIES IDENTIFIED.     ,     I discussed results with patients. see Follow up plans below  For any new studies. Care Everywhere Updates:  requested and reviewed. No new issues noted. Electrodiagnostic:  Previous studies requested,     I discussed results with patient. See follow-up plans for new studies.         Labs:  Previous labs reviewed     Lab Results   Component Value Date     12/16/2019    K 4.5 12/16/2019    K 4.4 08/09/2018     12/16/2019    CO2 26 12/16/2019    BUN 27 12/16/2019    CREATININE 1.21 12/16/2019    CALCIUM 9.6 12/16/2019    LABALBU 4.3 11/06/2019    LABALBU 2.8 08/18/2018    BILITOT <0.2 10/25/2019    ALKPHOS 113 MIDAZOLAM Not Detected 12/02/2016    ZOLPIDEM Not Detected 12/02/2016    NAI Not Detected 12/02/2016    ETG Not Detected 12/02/2016    MARIJMET Not Detected 12/02/2016    PCP Not Detected 12/02/2016    PAINMGTDRUGP See Below 12/02/2016    EERPAINMGTPA See Note 12/02/2016    LABCREA 129.7 08/09/2018         , I discussed results with patient. See follow-up plans for new studies. Therapies:  HEP-gentle stretching and relaxation techniques-demonstrated with patient-they are to do them twice a day. They are also advised to make the following lifestyle changes:  Goals      Exercise 3x per week (30 min per time)      SOAPP-R GOAL LESS THAN 9      09/02/16 SCORE: 8 LOW RISK   02/02/17 score 7-low risk   04/03/17 score: 6-low risk  06/05/17 score: 7-low risk  08/07/17 score: 8-low risk  10/06/17 score: 6-low risk  12/20/17 score: 5-low risk  02/18/18 score: 6-low risk  0425/2018 score 5-low risk  7/11/18 score: 3 low- risk  10/11/18 score:4- low risk  2/28/19 score: 2: low risk  5/13/19 score: 2- low risk  7/17/19 score: 1- low risk   9/13/19 score: 2- low risk  11/21/19 score: 3- low risk   1/22/20 score: 2- low risk        Weight < 150 lb (68.04 kg)           Injections or Epidurals:  Injection options were discussed. Patient gave verbal consent to ordered injections. See follow-up plans for planned injections. Supplements:  Vitamin D with increased dosing during the rainy months,   Education was given on:   Dietary and Fitness--daily stretches and low carb diet-in chair Yoga when possible             Follow up with Primary Care Physician regarding their general medical needs. At least 50% of the visit was involved in the discussion of the options for treatment. We discussed exercises, medication, interventional therapies and surgery. Healthy life style is essential with patient hard work to achieve the wellness.  In addition; discussion with the patient and/or family about any of the diagnostic results, impressions and/or recommended diagnostic studies, prognosis, risks and benefits of treatment options, instructions for treatment and/or follow-up, importance of compliance with chosen treatment options, risk-factor reduction, and patient/family education. They are to follow up in 2 months to review medication, efficacy of injections, pill counts, OARRS check, SOAPPR assessment, review diagnostics, to review previous and future treatment plans and assess appropriateness for continued therapy.         New Diagnostics  Orders Placed This Encounter   Procedures    IA INJECT TRIGGER POINTS, 3 OR GREATER       Rosaline Peterson, DO

## 2020-01-29 ENCOUNTER — OFFICE VISIT (OUTPATIENT)
Dept: FAMILY MEDICINE CLINIC | Age: 65
End: 2020-01-29
Payer: MEDICARE

## 2020-01-29 VITALS
BODY MASS INDEX: 32.74 KG/M2 | RESPIRATION RATE: 16 BRPM | OXYGEN SATURATION: 99 % | DIASTOLIC BLOOD PRESSURE: 70 MMHG | SYSTOLIC BLOOD PRESSURE: 136 MMHG | HEART RATE: 81 BPM | HEIGHT: 68 IN | WEIGHT: 216 LBS | TEMPERATURE: 97.5 F

## 2020-01-29 PROBLEM — M79.10 MYALGIA: Status: RESOLVED | Noted: 2018-10-11 | Resolved: 2020-01-29

## 2020-01-29 PROBLEM — M19.90 INFLAMMATORY ARTHRITIS: Status: RESOLVED | Noted: 2019-04-20 | Resolved: 2020-01-29

## 2020-01-29 PROBLEM — M79.641 BILATERAL HAND PAIN: Status: RESOLVED | Noted: 2019-04-17 | Resolved: 2020-01-29

## 2020-01-29 PROBLEM — M46.46 LUMBAR DISCITIS: Status: RESOLVED | Noted: 2018-08-13 | Resolved: 2020-01-29

## 2020-01-29 PROBLEM — M85.80 BONE EROSION DETERMINED BY X-RAY: Status: RESOLVED | Noted: 2019-04-17 | Resolved: 2020-01-29

## 2020-01-29 PROBLEM — R29.818 PARKINSONIAN FEATURES: Status: RESOLVED | Noted: 2019-05-13 | Resolved: 2020-01-29

## 2020-01-29 PROBLEM — M46.26 OSTEOMYELITIS OF LUMBAR VERTEBRA (HCC): Status: RESOLVED | Noted: 2018-08-13 | Resolved: 2020-01-29

## 2020-01-29 PROBLEM — M15.3 SECONDARY OSTEOARTHRITIS OF MULTIPLE SITES: Status: RESOLVED | Noted: 2019-04-17 | Resolved: 2020-01-29

## 2020-01-29 PROBLEM — I20.9 ANGINA PECTORIS (HCC): Status: RESOLVED | Noted: 2019-07-01 | Resolved: 2020-01-29

## 2020-01-29 PROBLEM — R25.9 PARKINSONIAN FEATURES: Status: RESOLVED | Noted: 2019-05-13 | Resolved: 2020-01-29

## 2020-01-29 PROBLEM — M79.642 BILATERAL HAND PAIN: Status: RESOLVED | Noted: 2019-04-17 | Resolved: 2020-01-29

## 2020-01-29 PROBLEM — R79.89 SERUM CREATININE RAISED: Status: RESOLVED | Noted: 2019-04-20 | Resolved: 2020-01-29

## 2020-01-29 PROCEDURE — 1090F PRES/ABSN URINE INCON ASSESS: CPT | Performed by: FAMILY MEDICINE

## 2020-01-29 PROCEDURE — G8399 PT W/DXA RESULTS DOCUMENT: HCPCS | Performed by: FAMILY MEDICINE

## 2020-01-29 PROCEDURE — G8427 DOCREV CUR MEDS BY ELIG CLIN: HCPCS | Performed by: FAMILY MEDICINE

## 2020-01-29 PROCEDURE — G8482 FLU IMMUNIZE ORDER/ADMIN: HCPCS | Performed by: FAMILY MEDICINE

## 2020-01-29 PROCEDURE — 99213 OFFICE O/P EST LOW 20 MIN: CPT | Performed by: FAMILY MEDICINE

## 2020-01-29 PROCEDURE — G8417 CALC BMI ABV UP PARAM F/U: HCPCS | Performed by: FAMILY MEDICINE

## 2020-01-29 PROCEDURE — 1036F TOBACCO NON-USER: CPT | Performed by: FAMILY MEDICINE

## 2020-01-29 PROCEDURE — 3017F COLORECTAL CA SCREEN DOC REV: CPT | Performed by: FAMILY MEDICINE

## 2020-01-29 PROCEDURE — 1123F ACP DISCUSS/DSCN MKR DOCD: CPT | Performed by: FAMILY MEDICINE

## 2020-01-29 PROCEDURE — G8510 SCR DEP NEG, NO PLAN REQD: HCPCS | Performed by: FAMILY MEDICINE

## 2020-01-29 PROCEDURE — 4040F PNEUMOC VAC/ADMIN/RCVD: CPT | Performed by: FAMILY MEDICINE

## 2020-01-29 RX ORDER — ATORVASTATIN CALCIUM 10 MG/1
1 TABLET, FILM COATED ORAL DAILY
COMMUNITY
Start: 2019-12-29 | End: 2020-10-15

## 2020-01-29 RX ORDER — HYDROXYCHLOROQUINE SULFATE 200 MG/1
1 TABLET, FILM COATED ORAL DAILY
COMMUNITY
Start: 2020-01-23

## 2020-01-29 ASSESSMENT — PATIENT HEALTH QUESTIONNAIRE - PHQ9
1. LITTLE INTEREST OR PLEASURE IN DOING THINGS: 0
2. FEELING DOWN, DEPRESSED OR HOPELESS: 0
SUM OF ALL RESPONSES TO PHQ9 QUESTIONS 1 & 2: 0
SUM OF ALL RESPONSES TO PHQ QUESTIONS 1-9: 0
SUM OF ALL RESPONSES TO PHQ QUESTIONS 1-9: 0

## 2020-01-29 NOTE — PROGRESS NOTES
Patient: Daja Acosta    YOB: 1955    Date: 1/29/20    Chief Complaint   Patient presents with    Hypertension     4 month follow up. She states tht she is no longer taking Lisinopril her blood presure medication she will call and let me know the name of the medication.  Hyperlipidemia     4 month follow up       Patient Active Problem List    Diagnosis Date Noted    High risk medication use - 10/17/17 OARRS PM&R, 12/19/17 OARRS PM&R, 12/20/17 Tox screen positive for opiates, Hydrocodone PM&R, MED CONTRACT 2/2/17 04/17/2013     Priority: High    COLTON positive 04/20/2019    Deformity of finger of right hand 04/17/2019    Chronic bilateral low back pain with bilateral sciatica 04/17/2019    Cervicalgia 04/17/2019    Ankle swelling 12/19/2018    Rheumatoid arthritis involving multiple sites with positive rheumatoid factor [facility]     Gait abnormality 08/13/2018    Neuropathy involving both lower extremities 04/25/2018    Mixed hyperlipidemia 09/29/2014     Overview Note:     Overview:   Hyperlipidemia  Overview:   Hyperlipidemia  Overview:   Hyperlipidemia  Overview:   Hyperlipidemia  Overview:   Hyperlipidemia      Spinal stenosis of lumbar region with neurogenic claudication     Benign essential hypertension 04/01/2009       Allergies   Allergen Reactions    Latex Swelling     Swelling of hands with wearing latex gloves    Norvasc [Amlodipine Besylate] Other (See Comments)     swelling    Keflex [Cephalexin] Rash       Vitals:    01/29/20 1456   BP: 136/70   Site: Right Upper Arm   Position: Sitting   Cuff Size: Large Adult   Pulse: 81   Resp: 16   Temp: 97.5 °F (36.4 °C)   TempSrc: Oral   SpO2: 99%   Weight: 216 lb (98 kg)   Height: 5' 8\" (1.727 m)      Body mass index is 32.84 kg/m². HPI    Her main issue is that she still very stiff and sore.   She is suffering from sciatica on the right because of that she is not sleeping well but she is doing better than she was after her 1. Benign essential hypertension   stable try to get updated medication list from her follow-up   2. Visit for screening mammogram  DONI DIGITAL SCREEN W OR WO CAD BILATERAL   3. Rheumatoid arthritis involving multiple sites with positive rheumatoid factor ([facility]   per rheumatology and stable   4. Angina pectoris, unspecified ([facility]   resolved   5. CRF (chronic renal failure), stage 3 (moderate) ([facility]   per nephrology   6. Hyperkalemia   per nephrology               Plan:  Current Outpatient Medications   Medication Sig Dispense Refill    atorvastatin (LIPITOR) 10 MG tablet Take 1 tablet by mouth daily      hydroxychloroquine (PLAQUENIL) 200 MG tablet Take 1 tablet by mouth daily      [START ON 2/15/2020] oxyCODONE (OXYCONTIN) 10 MG extended release tablet Take 1 tablet by mouth 2 times daily for 30 days. Intended supply: 30 days 60 tablet 0    [START ON 2/5/2020] oxyCODONE-acetaminophen (PERCOCET) 7.5-325 MG per tablet Take 1 tablet by mouth every 6 hours as needed for Pain for up to 30 days. Intended supply: 30 days 60 tablet 0    doxycycline hyclate (VIBRA-TABS) 100 MG tablet TAKE 1 TABLET BY MOUTH EVERY DAY 90 tablet 1    baclofen (LIORESAL) 10 MG tablet TAKE 1 TABLET BY MOUTH NIGHTLY AS NEEDED (MUSCLE SPASMS) 30 tablet 2    hydrochlorothiazide (HYDRODIURIL) 25 MG tablet       gabapentin (NEURONTIN) 300 MG capsule TAKE 1 CAPSULE IN THE DAY AS TOLERATED AND 2 CAPSULES AT NIGHT.  270 capsule 1    nitroGLYCERIN (NITROSTAT) 0.4 MG SL tablet PLEASE SEE ATTACHED FOR DETAILED DIRECTIONS 25 tablet 3    topiramate (TOPAMAX) 25 MG tablet TAKE 1 TABLET BY MOUTH EVERY DAY AT NIGHT 60 tablet 1    melatonin 5 MG TABS tablet Take 5 mg by mouth nightly      lidocaine (XYLOCAINE) 5 % ointment APPLY TO AFFECTED AREA EVERY 12 HOURS AS NEEDED FOR PAIN  5    ammonium lactate (AMLACTIN) 12 % cream APPLY TO DRY/CALLUS AREAS TWICE DAILY  5    acetaminophen (TYLENOL) 325 MG tablet Take 2 tablets by mouth every 6 hours (Patient taking differently: Take 650 mg by mouth every 4 hours as needed ) 120 tablet 3    aspirin 81 MG tablet Take 81 mg by mouth daily      Coenzyme Q10 (COQ10 PO) Take 1 capsule by mouth daily       Cholecalciferol (VITAMIN D3) 2000 units TABS Take 2 tablets by mouth daily       Cyanocobalamin (B-12) 1000 MCG/ML KIT Inject 1 mL as directed every 14 days for 12 doses Inject one cc  Sub-cutaneosly every other week 12 kit 1     Current Facility-Administered Medications   Medication Dose Route Frequency Provider Last Rate Last Dose    cyanocobalamin injection 1,000 mcg  1,000 mcg Intramuscular Once Scherry Numbers, DO         Orders Placed This Encounter   Procedures    DONI DIGITAL SCREEN W OR WO CAD BILATERAL     Standing Status:   Future     Standing Expiration Date:   3/29/2021     Scheduling Instructions:      US if needed     Order Specific Question:   Reason for exam:     Answer:   routine       No orders of the defined types were placed in this encounter. Return in about 6 months (around 7/29/2020).     Dr. Cathie Son      1/29/20  3:41 PM

## 2020-02-06 RX ORDER — BACLOFEN 10 MG/1
10 TABLET ORAL NIGHTLY PRN
Qty: 30 TABLET | Refills: 2 | Status: SHIPPED | OUTPATIENT
Start: 2020-02-06 | End: 2020-03-07

## 2020-02-24 ENCOUNTER — PROCEDURE VISIT (OUTPATIENT)
Dept: PHYSICAL MEDICINE AND REHAB | Age: 65
End: 2020-02-24
Payer: MEDICARE

## 2020-02-24 PROCEDURE — 20553 NJX 1/MLT TRIGGER POINTS 3/>: CPT | Performed by: PHYSICAL MEDICINE & REHABILITATION

## 2020-02-24 RX ORDER — LIDOCAINE HYDROCHLORIDE 10 MG/ML
23 INJECTION, SOLUTION INFILTRATION; PERINEURAL ONCE
Status: COMPLETED | OUTPATIENT
Start: 2020-02-24 | End: 2020-02-24

## 2020-02-24 RX ORDER — CYANOCOBALAMIN 1000 UG/ML
1000 INJECTION INTRAMUSCULAR; SUBCUTANEOUS ONCE
Status: COMPLETED | OUTPATIENT
Start: 2020-02-24 | End: 2020-02-24

## 2020-02-24 RX ADMIN — LIDOCAINE HYDROCHLORIDE 23 ML: 10 INJECTION, SOLUTION INFILTRATION; PERINEURAL at 15:37

## 2020-02-24 RX ADMIN — CYANOCOBALAMIN 1000 MCG: 1000 INJECTION INTRAMUSCULAR; SUBCUTANEOUS at 15:36

## 2020-02-26 RX ORDER — CYANOCOBALAMIN 1000 UG/ML
INJECTION INTRAMUSCULAR; SUBCUTANEOUS
Qty: 6 ML | Refills: 3 | Status: SHIPPED | OUTPATIENT
Start: 2020-02-26 | End: 2022-08-17

## 2020-02-26 RX ORDER — TOPIRAMATE 25 MG/1
TABLET ORAL
Qty: 60 TABLET | Refills: 1 | Status: SHIPPED | OUTPATIENT
Start: 2020-02-26 | End: 2020-06-26

## 2020-03-06 RX ORDER — OXYCODONE AND ACETAMINOPHEN 7.5; 325 MG/1; MG/1
1 TABLET ORAL EVERY 6 HOURS PRN
Qty: 60 TABLET | Refills: 0 | Status: SHIPPED | OUTPATIENT
Start: 2020-03-06 | End: 2020-03-24 | Stop reason: SDUPTHER

## 2020-03-06 RX ORDER — OXYCODONE HCL 10 MG/1
10 TABLET, FILM COATED, EXTENDED RELEASE ORAL 2 TIMES DAILY
Qty: 60 TABLET | Refills: 0 | Status: SHIPPED | OUTPATIENT
Start: 2020-03-15 | End: 2020-03-24 | Stop reason: SDUPTHER

## 2020-03-12 ENCOUNTER — TELEPHONE (OUTPATIENT)
Dept: INFECTIOUS DISEASES | Age: 65
End: 2020-03-12

## 2020-03-12 NOTE — TELEPHONE ENCOUNTER
Patient called to Request a Letter of Necessity to [employer] event to assist with reimbursement. Asking Dr Sis Phillips for assistance. Patient last seen 12/20/19, next F/u is 6/19/20. Dx of OM of Lumbar Vertebra, and history of known Renal failure, stage 3. Will update and send request to Dr Sis Phillips.

## 2020-03-24 RX ORDER — OXYCODONE AND ACETAMINOPHEN 7.5; 325 MG/1; MG/1
1 TABLET ORAL EVERY 6 HOURS PRN
Qty: 60 TABLET | Refills: 0 | Status: SHIPPED | OUTPATIENT
Start: 2020-04-04 | End: 2020-05-04

## 2020-03-24 RX ORDER — OXYCODONE HCL 10 MG/1
10 TABLET, FILM COATED, EXTENDED RELEASE ORAL 2 TIMES DAILY
Qty: 60 TABLET | Refills: 0 | Status: SHIPPED | OUTPATIENT
Start: 2020-04-15 | End: 2020-05-15

## 2020-04-20 ENCOUNTER — VIRTUAL VISIT (OUTPATIENT)
Dept: PHYSICAL MEDICINE AND REHAB | Age: 65
End: 2020-04-20
Payer: MEDICARE

## 2020-04-20 VITALS — BODY MASS INDEX: 33.34 KG/M2 | WEIGHT: 220 LBS | HEIGHT: 68 IN

## 2020-04-20 PROCEDURE — 1036F TOBACCO NON-USER: CPT | Performed by: PHYSICAL MEDICINE & REHABILITATION

## 2020-04-20 PROCEDURE — 1123F ACP DISCUSS/DSCN MKR DOCD: CPT | Performed by: PHYSICAL MEDICINE & REHABILITATION

## 2020-04-20 PROCEDURE — G8417 CALC BMI ABV UP PARAM F/U: HCPCS | Performed by: PHYSICAL MEDICINE & REHABILITATION

## 2020-04-20 PROCEDURE — G8399 PT W/DXA RESULTS DOCUMENT: HCPCS | Performed by: PHYSICAL MEDICINE & REHABILITATION

## 2020-04-20 PROCEDURE — 99214 OFFICE O/P EST MOD 30 MIN: CPT | Performed by: PHYSICAL MEDICINE & REHABILITATION

## 2020-04-20 PROCEDURE — 3017F COLORECTAL CA SCREEN DOC REV: CPT | Performed by: PHYSICAL MEDICINE & REHABILITATION

## 2020-04-20 PROCEDURE — 1090F PRES/ABSN URINE INCON ASSESS: CPT | Performed by: PHYSICAL MEDICINE & REHABILITATION

## 2020-04-20 PROCEDURE — G8427 DOCREV CUR MEDS BY ELIG CLIN: HCPCS | Performed by: PHYSICAL MEDICINE & REHABILITATION

## 2020-04-20 PROCEDURE — 4040F PNEUMOC VAC/ADMIN/RCVD: CPT | Performed by: PHYSICAL MEDICINE & REHABILITATION

## 2020-04-20 RX ORDER — OXYCODONE AND ACETAMINOPHEN 7.5; 325 MG/1; MG/1
1 TABLET ORAL EVERY 6 HOURS PRN
Qty: 60 TABLET | Refills: 0 | Status: SHIPPED | OUTPATIENT
Start: 2020-05-04 | End: 2020-06-01 | Stop reason: SDUPTHER

## 2020-04-20 RX ORDER — NALOXONE HYDROCHLORIDE 4 MG/.1ML
1 SPRAY NASAL PRN
Qty: 1 EACH | Refills: 5 | Status: SHIPPED | OUTPATIENT
Start: 2020-04-20 | End: 2020-09-24

## 2020-04-20 RX ORDER — OXYCODONE HCL 10 MG/1
10 TABLET, FILM COATED, EXTENDED RELEASE ORAL 2 TIMES DAILY PRN
Qty: 30 TABLET | Refills: 0 | Status: SHIPPED | OUTPATIENT
Start: 2020-05-14 | End: 2020-06-13

## 2020-04-20 RX ORDER — BACLOFEN 10 MG/1
TABLET ORAL
Qty: 30 TABLET | Refills: 2 | Status: SHIPPED | OUTPATIENT
Start: 2020-04-20 | End: 2020-08-07 | Stop reason: SDUPTHER

## 2020-04-20 ASSESSMENT — ENCOUNTER SYMPTOMS
COUGH: 0
WHEEZING: 0
CHEST TIGHTNESS: 0
BACK PAIN: 1
DIARRHEA: 0
PHOTOPHOBIA: 1
NAUSEA: 0
VOMITING: 0
ABDOMINAL PAIN: 0
SHORTNESS OF BREATH: 0

## 2020-04-20 NOTE — PROGRESS NOTES
(Walking). Stiffness is present in the morning. Associated symptoms include leg pain and pelvic pain. Pertinent negatives include no abdominal pain, bladder incontinence, chest pain, fever, headaches, numbness, paresis, perianal numbness or weakness. Risk factors include obesity, poor posture, menopause, lack of exercise, sedentary lifestyle and history of steroid use. She has tried analgesics, walking, home exercises, heat, muscle relaxant, NSAIDs, ice and bed rest (Sciatica block, trigger point injections, mobic, Gabapentin, Norco) for the symptoms. The treatment provided mild relief. Neck Pain    Associated symptoms include leg pain and photophobia. Pertinent negatives include no chest pain, fever, headaches, numbness, paresis or weakness. Shoulder Pain    Pertinent negatives include no fever or numbness. Hip Pain    Pertinent negatives include no numbness. Leg Pain    Pertinent negatives include no numbness. Foot Pain    This is a recurrent problem. The current episode started yesterday. The problem has been gradually worsening. Pertinent negatives include no fever or numbness. The symptoms are aggravated by bending, exertion, standing, walking and twisting. She has tried oral narcotics, position changes, relaxation, ice, acetaminophen, NSAIDs, rest and heat for the symptoms. The treatment provided moderate relief.              Past Medical History:   Diagnosis Date    Abnormal electromyogram (EMG) 8/19/09    SENSORIMOTOR NEUROPATHY OF BLE, RIGHT WORSE THAN LEFT, SUSPICIOUS FOR RIGHT S1 RADIC    Angina pectoris ([address]     Angina pectoris ([address] 07/2019    sees Dr. Nora Mckay Ankle pain     Colon polyp 4/3/2017    Dermatitis     Fibromyalgia     Foot pain     Hyperlipidemia     Hypertension     Infection of intervertebral disc (pyogenic), lumbar region ([address] 8/13/2018    Low back pain     MRSA (methicillin resistant staph aureus) culture positive 2011    Neck pain     Neuropathy     Relationships    Social connections     Talks on phone: More than three times a week     Gets together: More than three times a week     Attends Hoah[employer] service: More than 4 times per year     Active member of club or organization: No     Attends meetings of clubs or organizations: Never     Relationship status:     Intimate partner violence     Fear of current or ex partner: No     Emotionally abused: No     Physically abused: No     Forced sexual activity: No   Other Topics Concern    None   Social History Narrative    Lives in [place] with her  Abdullahi Levy and son Quentin Drummond is mildly handicapped --CP right lilia with anxiety and LD. He works at [employer] and [employer]. Family History   Problem Relation Age of Onset    Heart Disease Father     High Cholesterol Father     High Blood Pressure Father     Stroke Mother     High Blood Pressure Mother        Allergies   Allergen Reactions    Latex Swelling     Swelling of hands with wearing latex gloves    Norvasc [Amlodipine Besylate] Other (See Comments)     swelling    Keflex [Cephalexin] Rash       Review of Systems   Constitutional: Positive for fatigue. Negative for activity change, chills, diaphoresis and fever. HENT: Negative for congestion. Eyes: Positive for photophobia and visual disturbance. Several occurances   Respiratory: Negative for cough, chest tightness, shortness of breath and wheezing. Cardiovascular: Positive for leg swelling. Negative for chest pain and palpitations. Relieved at HS with lasix   Gastrointestinal: Negative for abdominal pain, diarrhea, nausea and vomiting. Endocrine: Negative. Genitourinary: Positive for pelvic pain. Negative for bladder incontinence. Musculoskeletal: Positive for arthralgias, back pain, gait problem, joint swelling and neck pain. Negative for neck stiffness. Skin: Negative. Allergic/Immunologic: Positive for immunocompromised state.    Neurological: Patient education and councelling were provided regarding off label use,treatment options and medication and injection risks. Current and old OARRS ([place] Automated Prescription Reporting System) records reviewed, all refills reviewed since last visit,  Behavioral agreement/KATHLEEN regulations   and Toxicology screen was reviewed with patient and is up to date. There are no current red flags. They are making good progress regarding pain relief, they are performing at a functional level regarding activities of daily living, family interactions and psychological functioning, they're not having any adverse effects or side effects from the current medications, and I see no findings of aberrant drug taking or addiction related behaviors. The patient is aware that they have a chronic pain condition and they may require opiates dosing for life. All efforts will be made to wean to the lowest effective dose. Other therapies for pain have not been effective including nonopiate medications. Injections and exercises are only partially effective. A Rx for Narcan was offered to help prevent accidental overdose. RX Monitoring 9/13/2019   Attestation -   Acute Pain Prescriptions -   Periodic Controlled Substance Monitoring Possible medication side effects, risk of tolerance/dependence & alternative treatments discussed. ;No signs of potential drug abuse or diversion identified. ;Assessed functional status. ;Obtaining appropriate analgesic effect of treatment. Chronic Pain > 50 MEDD Re-evaluated the status of the patient's underlying condition causing pain. ;Considered consultation with a specialist.;Obtained or confirmed \"Consent for Opioid Use\" on file. Chronic Pain > 80 MEDD -               Patient is currently taking:       I am having Rayna Levine start on oxyCODONE-acetaminophen, oxyCODONE, baclofen, and naloxone.  I am also having her maintain her Vitamin D3, Coenzyme Q10 (COQ10 PO), aspirin, acetaminophen,

## 2020-04-27 ENCOUNTER — VIRTUAL VISIT (OUTPATIENT)
Dept: GASTROENTEROLOGY | Age: 65
End: 2020-04-27
Payer: MEDICARE

## 2020-04-27 PROCEDURE — 99443 PR PHYS/QHP TELEPHONE EVALUATION 21-30 MIN: CPT | Performed by: NURSE PRACTITIONER

## 2020-04-27 NOTE — PROGRESS NOTES
2020    TELEHEALTH EVALUATION -- Audio/Visual (During [facility]-77 public health emergency)    Due to [place] 19 outbreak, patient's office visit was converted to a virtual visit. Patient was contacted and agreed to proceed with a virtual visit via Telephone Visit 30 minutes. The patient  is at home during this encounter. The risks and benefits of converting to a virtual visit were discussed in light of the current infectious disease epidemic. Patient also understood that insurance coverage and co-pays are up to their individual insurance plans. HPI:  Leydi Hurtado (:  1955) has requested an audio/video evaluation for the following concern(s):  Patient with 8 pound weight gain over the past 4 months. Patient reports sedentary lifestyle, due to arthritis. Sees pain management and rheumatology. Patient reports increased sugar and processed foods over the holiday season. Patient reports she eats meals and lays down after. Patient did not follow-up with nutrition due to it not being covered by her insurance plan. Thinking about joining weight watchers. No abdominal pain, nausea or vomiting. Patient denies constipation, diarrhea, or bleeding. No chest pain, shortness of breath, or palpitations. Review of Systems   All other systems reviewed and are negative. Prior to Visit Medications    Medication Sig Taking? Authorizing Provider   oxyCODONE-acetaminophen (PERCOCET) 7.5-325 MG per tablet Take 1 tablet by mouth every 6 hours as needed for Pain for up to 30 days. Intended supply: 30 days  Rosaline Scullin, DO   oxyCODONE (OXYCONTIN) 10 MG extended release tablet Take 1 tablet by mouth 2 times daily as needed for Pain for up to 30 days.  Intended supply: 30 days  Rosaline Scullin, DO   baclofen (LIORESAL) 10 MG tablet Take 1 tablet by mouth nightly as needed (muscle spasms)  Rosaline Scullin, DO   naloxone 4 MG/0.1ML LIQD nasal spray 1 spray by Nasal route as needed for Opioid Reversal Social History     Tobacco Use    Smoking status: Never Smoker    Smokeless tobacco: Never Used   Substance Use Topics    Alcohol use: No    Drug use: No      Allergies   Allergen Reactions    Latex Swelling     Swelling of hands with wearing latex gloves    Norvasc [Amlodipine Besylate] Other (See Comments)     swelling    Keflex [Cephalexin] Rash   ,   Past Medical History:   Diagnosis Date    Abnormal electromyogram (EMG) 8/19/09    SENSORIMOTOR NEUROPATHY OF BLE, RIGHT WORSE THAN LEFT, SUSPICIOUS FOR RIGHT S1 RADIC    Angina pectoris ([facility])     Angina p[facility]is ([facility] 07/2019    sees Dr. Gaurang Camacho Ankle pain     Colon polyp 4/3/2017    Dermatitis     Fibromyalgia     Foot pain     Hyperlipidemia     Hypertension     Infection of intervertebral disc (pyogenic), lumbar region ([facility] 8/13/2018    Low back pain     MRSA (methicillin resistant staph aureus) culture positive 2011    Neck pain     Neuropathy     Obesity     Osteoarthritis     PSVT (paroxysmal supraventricular tachycardia) ([facility]     Shingles outbreak 2012    SI (sacroiliac) pain     Stenosis     Vitamin D deficiency      Due to this being a TeleHealth encounter, evaluation of the following organ systems is limited: Vitals/Constitutional/EENT/Resp/CV/GI//MS/Neuro/Skin/Heme-Lymph-Imm. ASSESSMENT/PLAN:  1. REBOLLEDO (nonalcoholic steatohepatitis)  Mention that as low as 3.5% of weight loss will be associated with improvement of steatosis. 5% with inflammation improvement. Continue control of associated medical conditions. Multiple diets have been studied but more recent data supports Mediterranean diet. Examples were provided to the patient. He has been referred previously to dietitian. Patient encouraged to avoid anything hepatotoxic, examples provided  2. Fatty pancreas  >50% of this visit was spent on education/counseling the patient. Return in about 6 months (around 10/27/2020).     An  electronic signature was

## 2020-04-28 ENCOUNTER — PROCEDURE VISIT (OUTPATIENT)
Dept: PHYSICAL MEDICINE AND REHAB | Age: 65
End: 2020-04-28
Payer: MEDICARE

## 2020-04-28 PROCEDURE — 20553 NJX 1/MLT TRIGGER POINTS 3/>: CPT | Performed by: PHYSICAL MEDICINE & REHABILITATION

## 2020-04-28 PROCEDURE — 96372 THER/PROPH/DIAG INJ SC/IM: CPT | Performed by: PHYSICAL MEDICINE & REHABILITATION

## 2020-04-28 RX ORDER — CYANOCOBALAMIN 1000 UG/ML
1000 INJECTION INTRAMUSCULAR; SUBCUTANEOUS ONCE
Status: COMPLETED | OUTPATIENT
Start: 2020-04-28 | End: 2020-04-28

## 2020-04-28 RX ORDER — LIDOCAINE HYDROCHLORIDE 10 MG/ML
23 INJECTION, SOLUTION INFILTRATION; PERINEURAL ONCE
Status: COMPLETED | OUTPATIENT
Start: 2020-04-28 | End: 2020-04-28

## 2020-04-28 RX ADMIN — LIDOCAINE HYDROCHLORIDE 23 ML: 10 INJECTION, SOLUTION INFILTRATION; PERINEURAL at 16:12

## 2020-04-28 RX ADMIN — CYANOCOBALAMIN 1000 MCG: 1000 INJECTION INTRAMUSCULAR; SUBCUTANEOUS at 16:10

## 2020-05-28 RX ORDER — GABAPENTIN 300 MG/1
CAPSULE ORAL
Qty: 270 CAPSULE | Refills: 1 | Status: SHIPPED | OUTPATIENT
Start: 2020-05-28 | End: 2020-11-24

## 2020-06-01 RX ORDER — OXYCODONE AND ACETAMINOPHEN 7.5; 325 MG/1; MG/1
1 TABLET ORAL EVERY 6 HOURS PRN
Qty: 60 TABLET | Refills: 0 | Status: SHIPPED | OUTPATIENT
Start: 2020-06-01 | End: 2020-06-26 | Stop reason: SDUPTHER

## 2020-06-08 ENCOUNTER — TELEPHONE (OUTPATIENT)
Dept: INFECTIOUS DISEASES | Age: 65
End: 2020-06-08

## 2020-06-11 DIAGNOSIS — M46.26 OSTEOMYELITIS OF LUMBAR VERTEBRA (HCC): ICD-10-CM

## 2020-06-11 DIAGNOSIS — Z79.899 HIGH RISK MEDICATION USE: ICD-10-CM

## 2020-06-11 LAB
ALBUMIN SERPL-MCNC: 4.4 G/DL (ref 3.5–4.6)
ALP BLD-CCNC: 102 U/L (ref 40–130)
ALT SERPL-CCNC: 24 U/L (ref 0–33)
AMPHETAMINE SCREEN, URINE: ABNORMAL
ANION GAP SERPL CALCULATED.3IONS-SCNC: 12 MEQ/L (ref 9–15)
AST SERPL-CCNC: 19 U/L (ref 0–35)
BARBITURATE SCREEN URINE: ABNORMAL
BENZODIAZEPINE SCREEN, URINE: ABNORMAL
BILIRUB SERPL-MCNC: 0.4 MG/DL (ref 0.2–0.7)
BUN BLDV-MCNC: 42 MG/DL (ref 8–23)
C-REACTIVE PROTEIN: <0.3 MG/L (ref 0–5)
CALCIUM SERPL-MCNC: 9.5 MG/DL (ref 8.5–9.9)
CANNABINOID SCREEN URINE: ABNORMAL
CHLORIDE BLD-SCNC: 100 MEQ/L (ref 95–107)
CO2: 23 MEQ/L (ref 20–31)
COCAINE METABOLITE SCREEN URINE: ABNORMAL
CREAT SERPL-MCNC: 1.18 MG/DL (ref 0.5–0.9)
GFR AFRICAN AMERICAN: 55.3
GFR NON-AFRICAN AMERICAN: 45.7
GLOBULIN: 2.3 G/DL (ref 2.3–3.5)
GLUCOSE BLD-MCNC: 99 MG/DL (ref 70–99)
HCT VFR BLD CALC: 42.8 % (ref 37–47)
HEMOGLOBIN: 14.2 G/DL (ref 12–16)
Lab: ABNORMAL
MCH RBC QN AUTO: 30.6 PG (ref 27–31.3)
MCHC RBC AUTO-ENTMCNC: 33.1 % (ref 33–37)
MCV RBC AUTO: 92.2 FL (ref 82–100)
METHADONE SCREEN, URINE: ABNORMAL
OPIATE SCREEN URINE: ABNORMAL
OXYCODONE URINE: POSITIVE
PDW BLD-RTO: 13.5 % (ref 11.5–14.5)
PHENCYCLIDINE SCREEN URINE: ABNORMAL
PLATELET # BLD: 195 K/UL (ref 130–400)
POTASSIUM SERPL-SCNC: 5.1 MEQ/L (ref 3.4–4.9)
PROPOXYPHENE SCREEN: ABNORMAL
RBC # BLD: 4.64 M/UL (ref 4.2–5.4)
SODIUM BLD-SCNC: 135 MEQ/L (ref 135–144)
TOTAL PROTEIN: 6.7 G/DL (ref 6.3–8)
WBC # BLD: 10.5 K/UL (ref 4.8–10.8)

## 2020-06-16 NOTE — PROGRESS NOTES
2020    TELEHEALTH EVALUATION -- Audio/Visual (During [place]-34 public health emergency)      Daylin Recio (:  1955) has requested an audio/video evaluation for the following concern(s):    Discitis    Due to the COVID-19 outbreak, patient's office visit was converted to a virtual visit. Patient was contacted and agreed to proceed with a virtual visit with me via Doxy. me with my location at the office. Patient reports that they are located at home. The risks and benefits of converting to a virtual visit were discussed in light of the current infectious disease epidemic. Services were provided through an audio/video synchronous discussion virtually to substitute for in person clinic visit. THIS virtual visit was conducted via interactive/real-time audio/video. Due to this being a TeleHealth encounter, evaluation of the following organ systems is limited: Vitals/Constitutional/EENT/Resp/CV/GI//MS/Neuro/Skin/Heme-Lymph-Imm.}    Pursuant to the emergency declaration under the [facility] waiver authority and the [employer] Act, this Virtual Visit was conducted, with patient's consent, to reduce the patient's risk of exposure to COVID-19 and provide care for the patient. Infectious Disease Progress Note       6/15/2020    Patient is a hospital followup regarding lumbar discitis on Doxycycline 100mg daily suppression for life    Hx of MSSA infection, chronic low back pain with R sciatica, CKD stage 3, + hx of REBOLLEDO  Hx of RA     Subjectively, R bad shoulder - saw ortho at [address] a steroid injection a few weeks ago     Since we cannot conduct an in-person exam, the following were addressed with the patient. I have asked the patient to assist in their exam:  Patient denies any general new issues. Patient does not observe any new skin rashes or ulcers.    Patient does not perceive any new visual

## 2020-06-19 ENCOUNTER — VIRTUAL VISIT (OUTPATIENT)
Dept: INFECTIOUS DISEASES | Age: 65
End: 2020-06-19
Payer: MEDICARE

## 2020-06-19 PROCEDURE — 3017F COLORECTAL CA SCREEN DOC REV: CPT | Performed by: INTERNAL MEDICINE

## 2020-06-19 PROCEDURE — 1090F PRES/ABSN URINE INCON ASSESS: CPT | Performed by: INTERNAL MEDICINE

## 2020-06-19 PROCEDURE — 99214 OFFICE O/P EST MOD 30 MIN: CPT | Performed by: INTERNAL MEDICINE

## 2020-06-19 PROCEDURE — G8417 CALC BMI ABV UP PARAM F/U: HCPCS | Performed by: INTERNAL MEDICINE

## 2020-06-19 PROCEDURE — 1036F TOBACCO NON-USER: CPT | Performed by: INTERNAL MEDICINE

## 2020-06-19 PROCEDURE — 1123F ACP DISCUSS/DSCN MKR DOCD: CPT | Performed by: INTERNAL MEDICINE

## 2020-06-19 PROCEDURE — G8399 PT W/DXA RESULTS DOCUMENT: HCPCS | Performed by: INTERNAL MEDICINE

## 2020-06-19 PROCEDURE — G8428 CUR MEDS NOT DOCUMENT: HCPCS | Performed by: INTERNAL MEDICINE

## 2020-06-19 PROCEDURE — 4040F PNEUMOC VAC/ADMIN/RCVD: CPT | Performed by: INTERNAL MEDICINE

## 2020-06-19 RX ORDER — DOXYCYCLINE HYCLATE 100 MG
100 TABLET ORAL 2 TIMES DAILY
Qty: 180 TABLET | Refills: 0 | Status: SHIPPED | OUTPATIENT
Start: 2020-06-19 | End: 2020-07-27

## 2020-06-26 ENCOUNTER — OFFICE VISIT (OUTPATIENT)
Dept: PHYSICAL MEDICINE AND REHAB | Age: 65
End: 2020-06-26
Payer: MEDICARE

## 2020-06-26 VITALS
BODY MASS INDEX: 32.58 KG/M2 | DIASTOLIC BLOOD PRESSURE: 62 MMHG | SYSTOLIC BLOOD PRESSURE: 132 MMHG | HEIGHT: 68 IN | WEIGHT: 215 LBS

## 2020-06-26 PROCEDURE — G8417 CALC BMI ABV UP PARAM F/U: HCPCS | Performed by: PHYSICAL MEDICINE & REHABILITATION

## 2020-06-26 PROCEDURE — G8399 PT W/DXA RESULTS DOCUMENT: HCPCS | Performed by: PHYSICAL MEDICINE & REHABILITATION

## 2020-06-26 PROCEDURE — 99215 OFFICE O/P EST HI 40 MIN: CPT | Performed by: PHYSICAL MEDICINE & REHABILITATION

## 2020-06-26 PROCEDURE — 1090F PRES/ABSN URINE INCON ASSESS: CPT | Performed by: PHYSICAL MEDICINE & REHABILITATION

## 2020-06-26 PROCEDURE — 4040F PNEUMOC VAC/ADMIN/RCVD: CPT | Performed by: PHYSICAL MEDICINE & REHABILITATION

## 2020-06-26 PROCEDURE — 1123F ACP DISCUSS/DSCN MKR DOCD: CPT | Performed by: PHYSICAL MEDICINE & REHABILITATION

## 2020-06-26 PROCEDURE — 3017F COLORECTAL CA SCREEN DOC REV: CPT | Performed by: PHYSICAL MEDICINE & REHABILITATION

## 2020-06-26 PROCEDURE — G8427 DOCREV CUR MEDS BY ELIG CLIN: HCPCS | Performed by: PHYSICAL MEDICINE & REHABILITATION

## 2020-06-26 PROCEDURE — 1036F TOBACCO NON-USER: CPT | Performed by: PHYSICAL MEDICINE & REHABILITATION

## 2020-06-26 RX ORDER — LOSARTAN POTASSIUM 50 MG/1
TABLET ORAL
COMMUNITY
Start: 2020-04-20

## 2020-06-26 RX ORDER — HYDROXYCHLOROQUINE SULFATE 200 MG/1
TABLET, FILM COATED ORAL
COMMUNITY
Start: 2020-03-30 | End: 2020-06-26

## 2020-06-26 RX ORDER — TOPIRAMATE 25 MG/1
TABLET ORAL
Qty: 60 TABLET | Refills: 1 | Status: SHIPPED | OUTPATIENT
Start: 2020-06-26 | End: 2020-10-27

## 2020-06-26 RX ORDER — OXYCODONE AND ACETAMINOPHEN 7.5; 325 MG/1; MG/1
1 TABLET ORAL EVERY 6 HOURS PRN
Qty: 60 TABLET | Refills: 0 | Status: SHIPPED | OUTPATIENT
Start: 2020-06-30 | End: 2020-07-28 | Stop reason: SDUPTHER

## 2020-06-26 ASSESSMENT — ENCOUNTER SYMPTOMS
NAUSEA: 1
COUGH: 0
VOMITING: 0
DIARRHEA: 0
CHEST TIGHTNESS: 0
WHEEZING: 0
ABDOMINAL PAIN: 1
PHOTOPHOBIA: 1
BACK PAIN: 1
SHORTNESS OF BREATH: 0

## 2020-06-26 NOTE — PROGRESS NOTES
Subjective  Kaylie Tim, 75 y.o. female presents today with:       Back Pain    Neck Pain    Shoulder Pain (right - had cortisone 6-9 Dr. Elgin Alvarez )   Hip Pain (right)   Leg Pain (right)   Foot Pain (right)   Medication Refill (pt reported that she stopped oxycontin, refill percocet )     Recent lab reviewed showed slightly elevated potassium level. She is still doing well with her current medications and shows no signs of abuse or overuse. She is more functional on it. Dr. Arpita Little has had her on Plaquenil which was not helping she was able to wean herself off of the OxyContin but continues not to take Percocet as needed. She is to avoid any NSAIDs but she will continue gabapentin as tolerated baclofen vitamin D and zinc.  She is requesting monthly trigger point   Doing well in OT--done till coronavirus is over. .  Kidneys are healing. On a low sodium diet. Back Pain   This is a chronic problem. The current episode started more than 1 year ago. The problem occurs daily. The problem is unchanged. The pain is present in the lumbar spine and gluteal. The quality of the pain is described as aching. Radiates to: bilateral legs, left leg worse, left hip. The pain is at a severity of 7/10. The pain is moderate. The pain is worse during the day. The symptoms are aggravated by bending, standing, position, sitting and twisting (Walking). Stiffness is present in the morning. Associated symptoms include abdominal pain, leg pain and pelvic pain. Pertinent negatives include no bladder incontinence, chest pain, fever, headaches, numbness, paresis, perianal numbness or weakness. Risk factors include obesity, poor posture, menopause, lack of exercise, sedentary lifestyle and history of steroid use. She has tried analgesics, walking, home exercises, heat, muscle relaxant, NSAIDs, ice and bed rest (Sciatica block, trigger point injections, mobic, Gabapentin, Norco) for the symptoms.  The treatment provided mild well-developed. She is not ill-appearing, toxic-appearing or diaphoretic. Comments:         HENT:      Head: Normocephalic and atraumatic. Right Ear: Hearing normal.      Left Ear: Hearing normal.      Nose: Nose normal.      Mouth/Throat:      Mouth: No oral lesions. Dentition: Normal dentition. Pharynx: No oropharyngeal exudate. Eyes:      General: No scleral icterus. Right eye: No discharge. Left eye: No discharge. Conjunctiva/sclera: Conjunctivae normal.      Right eye: No chemosis or exudate. Left eye: No chemosis or exudate. Pupils: Pupils are equal, round, and reactive to light. Neck:      Musculoskeletal: Neck supple. Decreased range of motion. Spinous process tenderness and muscular tenderness present. No edema or neck rigidity. Thyroid: No thyromegaly. Vascular: No JVD. Trachea: Tracheal tenderness present. No tracheal deviation. Cardiovascular:      Pulses: No decreased pulses. Pulmonary:      Effort: Pulmonary effort is normal. No tachypnea, bradypnea, accessory muscle usage or respiratory distress. Breath sounds: No wheezing. Chest:      Chest wall: No tenderness. Abdominal:      General: Bowel sounds are normal. There is no distension. Palpations: Abdomen is soft. There is no mass. Tenderness: There is no abdominal tenderness. There is no guarding or rebound. Comments: Pressure RUQ   Musculoskeletal:         General: Tenderness present. Right shoulder: She exhibits decreased range of motion, tenderness and bony tenderness. Left shoulder: She exhibits decreased range of motion, tenderness and bony tenderness. Right elbow: Normal.     Left elbow: Normal.      Right wrist: Normal.      Left wrist: Normal.      Right hip: She exhibits decreased range of motion, decreased strength and tenderness. Left hip: She exhibits decreased range of motion, decreased strength and tenderness.       Right 06/11/2020    ALKPHOS 102 06/11/2020    AST 19 06/11/2020    ALT 24 06/11/2020     Lab Results   Component Value Date    WBC 10.5 06/11/2020    RBC 4.64 06/11/2020    RBC 2.81 09/06/2018    HGB 14.2 06/11/2020    HCT 42.8 06/11/2020    MCV 92.2 06/11/2020    MCH 30.6 06/11/2020    MCHC 33.1 06/11/2020    RDW 13.5 06/11/2020     06/11/2020    MPV 7.4 09/13/2018       Lab Results   Component Value Date    LABAMPH Neg 06/11/2020    BARBSCNU Neg 06/11/2020    LABBENZ Neg 06/11/2020    CANSU Neg 06/11/2020    COCAIMETSCRU Neg 06/11/2020    PHENCYCLIDINESCREENURINE Neg 85/53/2912    TRICYCLIC Neg 05/73/1641    DSCOMMENT see below 06/11/2020       Lab Results   Component Value Date    CODEINE Not Detected 12/02/2016    MORPHINE Not Detected 12/02/2016    ACETYLMORPHI Not Detected 12/02/2016    OXYCODONE Not Detected 12/02/2016    NOROXYCODONE Not Detected 12/02/2016    NOROXYMU Not Detected 12/02/2016    HYDRCO Present 12/02/2016    NORHYDU Present 12/02/2016    HYDROMO Not Detected 12/02/2016    Clare Stalker Not Detected 12/02/2016    Jadyn Part Not Detected 12/02/2016    FENTA Not Detected 12/02/2016    NORFENT Not Detected 12/02/2016    MEPERIDINE Not Detected 12/02/2016    TAPENU Not Detected 12/02/2016    TAPOSULFUR Not Detected 12/02/2016    METHADONE Not Detected 12/02/2016    LABPROP Not Detected 12/02/2016    TRAM Not Detected 12/02/2016    AMPH Not Detected 12/02/2016    METHAMP Not Detected 12/02/2016    MDMA Not Detected 12/02/2016    ECMDA Not Detected 12/02/2016       Lab Results   Component Value Date    METPHEN Not Detected 12/02/2016    PHENTERMINE Not Detected 12/02/2016    BENZOYL Not Detected 12/02/2016    Royann Squibb Not Detected 12/02/2016    ALPHAOHALPRA Not Detected 12/02/2016    CLONAZEPAM Not Detected 12/02/2016    Devin Pander Not Detected 12/02/2016    DIAZEP Not Detected 12/02/2016    ROMAIN Not Detected 12/02/2016    OXAZ Not Detected 12/02/2016    Cyril Call Not Detected 12/02/2016    LORAZEPAM Not severity. Discussed medication dosage, usage, goals of therapy, and side effects. Available test results were reviewed -Discussed findings, impression and plan with patient. An additional 20 minutes were spent outside of the patient visit to review records. Additional time spent with the patient to discuss their questions. Additional time spent with the patient devoted to discussing treatment strategy, planning, and implementation. Patient understands above plan; questions asked and answered. Patient agrees to plan as noted above. F/U in 2-3months  At least 50% of the visit was involved in the discussion of the options for treatment. We discussed exercises, medication, interventional therapies and surgery. Healthy life style is essential with patient hard work to achieve the wellness. In addition; discussion with the patient and/or family about any of the diagnostic results, impressions and/or recommended diagnostic studies, prognosis, risks and benefits of treatment options, instructions for treatment and/or follow-up, importance of compliance with chosen treatment options, risk-factor reduction, and patient/family education. They are to follow up in 2 months to review medication, efficacy of injections, pill counts, OARRS check, SOAPPR assessment, review diagnostics, to review previous and future treatment plans and assess appropriateness for continued therapy. New Diagnostics  No orders of the defined types were placed in this encounter.       Darin Joe DO

## 2020-06-29 ENCOUNTER — VIRTUAL VISIT (OUTPATIENT)
Dept: GASTROENTEROLOGY | Age: 65
End: 2020-06-29
Payer: MEDICARE

## 2020-06-29 PROCEDURE — 99214 OFFICE O/P EST MOD 30 MIN: CPT | Performed by: NURSE PRACTITIONER

## 2020-06-29 RX ORDER — PANTOPRAZOLE SODIUM 40 MG/1
40 TABLET, DELAYED RELEASE ORAL
Qty: 30 TABLET | Refills: 1 | Status: SHIPPED | OUTPATIENT
Start: 2020-06-29 | End: 2020-07-29 | Stop reason: SDUPTHER

## 2020-06-29 NOTE — PROGRESS NOTES
2020    TELEHEALTH EVALUATION -- Audio/Visual (During GKEWR-13 public health emergency)    Due to [place] outbreak, patient's office visit was converted to a virtual visit. Patient was contacted and agreed to proceed with a virtual visit via Telephone Visit 15 minutes. The risks and benefits of converting to a virtual visit were discussed in light of the current infectious disease epidemic. Patient also understood that insurance coverage and co-pays are up to their individual insurance plans. HPI:  Daniel Yang (:  1955) has requested an audio/video evaluation for the following concern(s):  Patient with complaints of upper abdominal pain/burning over the past 2 weeks. Reports decreased appetite and food made her symptoms worse. She reports excessive belching and reflux. Takes Tums as needed with suboptimal response. She reports a 5 pound weight loss the past 2 to 4 weeks. She denies melena or hematochezia. She denies constipation, diarrhea. No nausea or vomiting. No chest pain, shortness of breath, or palpitations. Patient reports she was able to eat dinner last night without any symptoms. Endoscopic investigations: EUS 19-Endoscopic ultrasonography examination with fine-needle biopsy: Fatty infiltration of the pancreas and liver, normal pancreatic duct, normal common bile duct, antral erosions. Review of Systems   All other systems reviewed and are negative. Prior to Visit Medications    Medication Sig Taking? Authorizing Provider   topiramate (TOPAMAX) 25 MG tablet TAKE 1 TABLET BY MOUTH EVERY DAY AT NIGHT Yes Rosaline Peterson DO   oxyCODONE-acetaminophen (PERCOCET) 7.5-325 MG per tablet Take 1 tablet by mouth every 6 hours as needed for Pain for up to 30 days.  Intended supply: 30 days Yes Rosaline Peterson DO   losartan (COZAAR) 50 MG tablet TAKE 1 TABLET BY MOUTH EVERY DAY Yes Historical Provider, MD   doxycycline hyclate (VIBRA-TABS) 100 MG tablet Take 1 tablet by mouth 2 times daily Yes Leeanna Lujan DO   gabapentin (NEURONTIN) 300 MG capsule TAKE 1 CAPSULE BY MOUTH DAILY AND 2 AT NIGHT AS TOLERATED  Patient taking differently: TAKE 1 CAPSULE BY MOUTH DAILY AND 1 AT NIGHT AS TOLERATED Yes Rosaline Peterson DO   naloxone 4 MG/0.1ML LIQD nasal spray 1 spray by Nasal route as needed for Opioid Reversal Yes Rosaline Peterson DO   cyanocobalamin 1000 MCG/ML injection INJECT 1 ML AS DIRECTED EVERY 14 DAYS FOR 12 DOSES INJECT ONE CC SUB-CUTANEOSLY EVERY OTHER WEEK Yes Rosaline Peterson DO   atorvastatin (LIPITOR) 10 MG tablet Take 1 tablet by mouth daily Yes Historical Provider, MD   hydroxychloroquine (PLAQUENIL) 200 MG tablet Take 1 tablet by mouth daily Yes Historical Provider, MD   doxycycline hyclate (VIBRA-TABS) 100 MG tablet TAKE 1 TABLET BY MOUTH EVERY DAY Yes Feng Contreras MD   hydrochlorothiazide (HYDRODIURIL) 25 MG tablet  Yes Historical Provider, MD   nitroGLYCERIN (NITROSTAT) 0.4 MG SL tablet PLEASE SEE ATTACHED FOR DETAILED DIRECTIONS Yes Ryan Haddad MD   melatonin 5 MG TABS tablet Take 5 mg by mouth nightly Yes Historical Provider, MD   lidocaine (XYLOCAINE) 5 % ointment APPLY TO AFFECTED AREA EVERY 12 HOURS AS NEEDED FOR PAIN Yes Historical Provider, MD   ammonium lactate (AMLACTIN) 12 % cream APPLY TO DRY/CALLUS AREAS TWICE DAILY Yes Historical Provider, MD   acetaminophen (TYLENOL) 325 MG tablet Take 2 tablets by mouth every 6 hours  Patient taking differently: Take 650 mg by mouth every 4 hours as needed  Yes Corina Ndiaye MD   aspirin 81 MG tablet Take 81 mg by mouth daily Yes Historical Provider, MD   Coenzyme Q10 (COQ10 PO) Take 1 capsule by mouth daily  Yes Historical Provider, MD   Cholecalciferol (VITAMIN D3) 2000 units TABS Take 2 tablets by mouth daily  Yes Historical Provider, MD       Social History     Tobacco Use    Smoking status: Never Smoker    Smokeless tobacco: Never Used   Substance Use Topics    Alcohol use: No    Drug use:  No Allergies   Allergen Reactions    Latex Swelling     Swelling of hands with wearing latex gloves    Norvasc [Amlodipine Besylate] Other (See Comments)     swelling    Keflex [Cephalexin] Rash   ,   Past Medical History:   Diagnosis Date    Abnormal electromyogram (EMG) 8/19/09    SENSORIMOTOR NEUROPATHY OF BLE, RIGHT WORSE THAN LEFT, SUSPICIOUS FOR RIGHT S1 RADIC    Angina pectoris (HCC)     Angina pectoris ([address] 07/2019    sees Dr. Tanisha Grant Ankle pain     Bleeding ulcer     Colon polyp 4/3/2017    Dermatitis     Fibromyalgia     Foot pain     Hyperlipidemia     Hypertension     Infection of intervertebral disc (pyogenic), lumbar region ([address] 8/13/2018    Low back pain     MRSA (methicillin resistant staph aureus) culture positive 2011    Neck pain     Neuropathy     Obesity     Osteoarthritis     PSVT (paroxysmal supraventricular tachycardia) ([address]     Shingles outbreak 2012    SI (sacroiliac) pain     Stenosis     Vitamin D deficiency    ,   Past Surgical History:   Procedure Laterality Date    CARDIAC CATHETERIZATION  10/2016    CERVICAL FUSION  12/14/11    Dr Tamera Figueroa with bone graft and plate    COLONOSCOPY      ENDOSCOPIC ULTRASOUND (LOWER) N/A 7/11/2019    EGD/ EUS performed by Fabian Oneil MD at [facility] [facility]  7/05    HYSTERECTOMY  2001    HYSTERECTOMY, TOTAL ABDOMINAL      JOINT REPLACEMENT Left     tka    LIVER BIOPSY      MENISCECTOMY  10/31/11    left knee    OTHER SURGICAL HISTORY  10/26/09    CAUDALS BY DR Edenilson Winchester    [place] COLON CA SCRN NOT  [address]  IND N/A 4/12/2017    COLONOSCOPY performed by Kasi Soares MD at . [facility] 61 ESOPHAGOGASTRODUODENOSCOPY TRANSORAL DIAGNOSTIC N/A 4/12/2017    EGD ESOPHAGOGASTRODUODENOSCOPY performed by Kasi Soares MD at [address]  12/2011    Dr Tamera Figueroa Lumbar and c spine    TOTAL KNEE ARTHROPLASTY  7/30/12    LEFT-DR ZANOTTI    UPPER

## 2020-07-07 ENCOUNTER — PROCEDURE VISIT (OUTPATIENT)
Dept: PHYSICAL MEDICINE AND REHAB | Age: 65
End: 2020-07-07
Payer: MEDICARE

## 2020-07-07 PROCEDURE — 96372 THER/PROPH/DIAG INJ SC/IM: CPT | Performed by: PHYSICAL MEDICINE & REHABILITATION

## 2020-07-07 PROCEDURE — 20553 NJX 1/MLT TRIGGER POINTS 3/>: CPT | Performed by: PHYSICAL MEDICINE & REHABILITATION

## 2020-07-07 RX ADMIN — LIDOCAINE HYDROCHLORIDE 23 ML: 10 INJECTION, SOLUTION INFILTRATION; PERINEURAL at 09:12

## 2020-07-07 RX ADMIN — CYANOCOBALAMIN 1000 MCG: 1000 INJECTION INTRAMUSCULAR; SUBCUTANEOUS at 09:11

## 2020-07-09 RX ORDER — CYANOCOBALAMIN 1000 UG/ML
1000 INJECTION INTRAMUSCULAR; SUBCUTANEOUS ONCE
Status: COMPLETED | OUTPATIENT
Start: 2020-07-09 | End: 2020-07-07

## 2020-07-09 RX ORDER — LIDOCAINE HYDROCHLORIDE 10 MG/ML
23 INJECTION, SOLUTION INFILTRATION; PERINEURAL ONCE
Status: COMPLETED | OUTPATIENT
Start: 2020-07-09 | End: 2020-07-07

## 2020-07-27 RX ORDER — DOXYCYCLINE HYCLATE 100 MG
TABLET ORAL
Qty: 180 TABLET | Refills: 0 | Status: SHIPPED | OUTPATIENT
Start: 2020-07-27 | End: 2021-07-12

## 2020-07-28 RX ORDER — OXYCODONE AND ACETAMINOPHEN 7.5; 325 MG/1; MG/1
1 TABLET ORAL EVERY 6 HOURS PRN
Qty: 60 TABLET | Refills: 0 | Status: SHIPPED | OUTPATIENT
Start: 2020-07-29 | End: 2020-08-26 | Stop reason: SDUPTHER

## 2020-07-29 ENCOUNTER — VIRTUAL VISIT (OUTPATIENT)
Dept: GASTROENTEROLOGY | Age: 65
End: 2020-07-29
Payer: MEDICARE

## 2020-07-29 PROCEDURE — 99213 OFFICE O/P EST LOW 20 MIN: CPT | Performed by: NURSE PRACTITIONER

## 2020-07-29 RX ORDER — POLYETHYLENE GLYCOL 3350 17 G/17G
17 POWDER, FOR SOLUTION ORAL DAILY
Qty: 510 G | Refills: 5 | Status: SHIPPED | OUTPATIENT
Start: 2020-07-29 | End: 2020-09-24 | Stop reason: ALTCHOICE

## 2020-07-29 RX ORDER — PANTOPRAZOLE SODIUM 40 MG/1
40 TABLET, DELAYED RELEASE ORAL
Qty: 90 TABLET | Refills: 1 | Status: SHIPPED | OUTPATIENT
Start: 2020-07-29 | End: 2021-02-19

## 2020-07-29 NOTE — PROGRESS NOTES
MD Noni       Social History     Tobacco Use    Smoking status: Never Smoker    Smokeless tobacco: Never Used   Substance Use Topics    Alcohol use: No    Drug use: No        Allergies   Allergen Reactions    Latex Swelling     Swelling of hands with wearing latex gloves    Norvasc [Amlodipine Besylate] Other (See Comments)     swelling    Keflex [Cephalexin] Rash   ,   Past Medical History:   Diagnosis Date    Abnormal electromyogram (EMG) 8/19/09    SENSORIMOTOR NEUROPATHY OF BLE, RIGHT WORSE THAN LEFT, SUSPICIOUS FOR RIGHT S1 RADIC    Angina pectoris (HCC)     Angina pectoris ([address] 07/2019    sees Dr. Marleny Mauro Ankle pain     Bleeding ulcer     Colon polyp 4/3/2017    Dermatitis     Fibromyalgia     Foot pain     Hyperlipidemia     Hypertension     Infection of intervertebral disc (pyogenic), lumbar region ([address] 8/13/2018    Low back pain     MRSA (methicillin resistant staph aureus) culture positive 2011    Neck pain     Neuropathy     Obesity     Osteoarthritis     PSVT (paroxysmal supraventricular tachycardia) ([address]     Shingles outbreak 2012    SI (sacroiliac) pain     Stenosis     Vitamin D deficiency    ,   Past Surgical History:   Procedure Laterality Date    CARDIAC CATHETERIZATION  10/2016    CERVICAL FUSION  12/14/11    Dr Leon Chung with bone graft and plate    COLONOSCOPY      ENDOSCOPIC ULTRASOUND (LOWER) N/A 7/11/2019    EGD/ [facility]US [facility]  7/05    HYSTERECTOMY  2001    HYSTERECTOMY, TOTAL ABDOMINAL      JOINT REPLACEMENT Left     tka    LIVER BIOPSY      MENISCECTOMY  10/31/11    left knee    OTHER SURGICAL HISTORY  10/26/09    CAUDALS BY DR Ivon Santillan    [place] [facility] NOT  [address] [facility] IND N/A 4/12/2017    [facility] ESOPHAGOGASTRODUODENOSCOPY TRANSORAL DIAGNOSTIC N/A 4/12/2017    EGD ESOPHAGOGASTRODUODENOSCOPY performed by Concepcion Sandoval MD at [facility]  12/2011    Dr Radha Ahn Lumbar and c spine    TOTAL KNEE ARTHROPLASTY  7/30/12    LEFT-DR AVELAR    UPPER GASTROINTESTINAL ENDOSCOPY  1995     PHYSICAL EXAMINATION:  [x] Alert  [x] Oriented to person/place/time    [x] No apparent distress  [] Toxic appearing    [] Face flushed appearing [] Sclera clear  [] Lips are cyanotic      [x] Breathing appears normal  [] Appears tachypneic      [] Rash on visible skin    [] Cranial Nerves II-XII grossly intact    [] Motor grossly intact in visible upper extremities    [] Motor grossly intact in visible lower extremities    [x] Normal Mood  [] Anxious appearing    [] Depressed appearing  [] Confused appearing      [] Poor short term memory  [] Poor long term memory    [] OTHER:      Due to this being a TeleHealth encounter, evaluation of the following organ systems is limited: Vitals/Constitutional/EENT/Resp/CV/GI//MS/Neuro/Skin/Heme-Lymph-Imm. ASSESSMENT/PLAN:  57-year-old female patient with good response to daily PPI with upper GI symptoms. Antireflux lifestyle reiterated, examples provided. Will continue daily PPI 15 to 30 minutes before breakfast for the next 2 months. Clinical history suggestive of drug-induced constipation. Will place the patient on daily dose of MiraLAX. Titrate as needed for a goal of a soft formed daily BM. Increase dietary fiber, fluids, and activity recommended. Return in about 2 months (around 9/29/2020). An  electronic signature was used to authenticate this note.     --JOSE Marcos CNP on 7/29/2020 at 10:45 AM      Pursuant to the emergency declaration under the [facility] waiver authority and the "[employer] and [employer] Act, this Virtual  Visit was conducted, with patient's consent, to reduce the patient's risk of exposure to COVID-19 and provide continuity of care for an established patient. Services were provided through a video synchronous discussion virtually to substitute for in-person clinic visit.

## 2020-08-06 ENCOUNTER — PROCEDURE VISIT (OUTPATIENT)
Dept: PHYSICAL MEDICINE AND REHAB | Age: 65
End: 2020-08-06
Payer: MEDICARE

## 2020-08-06 PROCEDURE — 20553 NJX 1/MLT TRIGGER POINTS 3/>: CPT | Performed by: PHYSICAL MEDICINE & REHABILITATION

## 2020-08-06 PROCEDURE — 96372 THER/PROPH/DIAG INJ SC/IM: CPT | Performed by: PHYSICAL MEDICINE & REHABILITATION

## 2020-08-06 RX ORDER — CYANOCOBALAMIN 1000 UG/ML
1000 INJECTION INTRAMUSCULAR; SUBCUTANEOUS ONCE
Status: COMPLETED | OUTPATIENT
Start: 2020-08-06 | End: 2020-08-06

## 2020-08-06 RX ORDER — LIDOCAINE HYDROCHLORIDE 10 MG/ML
23 INJECTION, SOLUTION INFILTRATION; PERINEURAL ONCE
Status: COMPLETED | OUTPATIENT
Start: 2020-08-06 | End: 2020-08-06

## 2020-08-06 RX ADMIN — LIDOCAINE HYDROCHLORIDE 23 ML: 10 INJECTION, SOLUTION INFILTRATION; PERINEURAL at 12:07

## 2020-08-06 RX ADMIN — CYANOCOBALAMIN 1000 MCG: 1000 INJECTION INTRAMUSCULAR; SUBCUTANEOUS at 12:06

## 2020-08-07 ENCOUNTER — OFFICE VISIT (OUTPATIENT)
Dept: FAMILY MEDICINE CLINIC | Age: 65
End: 2020-08-07
Payer: MEDICARE

## 2020-08-07 VITALS
SYSTOLIC BLOOD PRESSURE: 122 MMHG | TEMPERATURE: 96.6 F | WEIGHT: 215 LBS | OXYGEN SATURATION: 100 % | HEART RATE: 76 BPM | DIASTOLIC BLOOD PRESSURE: 62 MMHG | BODY MASS INDEX: 32.69 KG/M2

## 2020-08-07 LAB
BILIRUBIN, POC: NORMAL
BLOOD URINE, POC: NORMAL
CLARITY, POC: NORMAL
COLOR, POC: YELLOW
GLUCOSE URINE, POC: NORMAL
KETONES, POC: NORMAL
LEUKOCYTE EST, POC: 3
NITRITE, POC: NORMAL
PH, POC: 6
PROTEIN, POC: NORMAL
SPECIFIC GRAVITY, POC: 1.02
UROBILINOGEN, POC: NORMAL

## 2020-08-07 PROCEDURE — 4040F PNEUMOC VAC/ADMIN/RCVD: CPT | Performed by: NURSE PRACTITIONER

## 2020-08-07 PROCEDURE — 81003 URINALYSIS AUTO W/O SCOPE: CPT | Performed by: NURSE PRACTITIONER

## 2020-08-07 PROCEDURE — 99213 OFFICE O/P EST LOW 20 MIN: CPT | Performed by: NURSE PRACTITIONER

## 2020-08-07 PROCEDURE — G8417 CALC BMI ABV UP PARAM F/U: HCPCS | Performed by: NURSE PRACTITIONER

## 2020-08-07 PROCEDURE — 3017F COLORECTAL CA SCREEN DOC REV: CPT | Performed by: NURSE PRACTITIONER

## 2020-08-07 PROCEDURE — G8399 PT W/DXA RESULTS DOCUMENT: HCPCS | Performed by: NURSE PRACTITIONER

## 2020-08-07 PROCEDURE — 1090F PRES/ABSN URINE INCON ASSESS: CPT | Performed by: NURSE PRACTITIONER

## 2020-08-07 PROCEDURE — 1036F TOBACCO NON-USER: CPT | Performed by: NURSE PRACTITIONER

## 2020-08-07 PROCEDURE — 1123F ACP DISCUSS/DSCN MKR DOCD: CPT | Performed by: NURSE PRACTITIONER

## 2020-08-07 PROCEDURE — G8427 DOCREV CUR MEDS BY ELIG CLIN: HCPCS | Performed by: NURSE PRACTITIONER

## 2020-08-07 RX ORDER — NITROFURANTOIN 25; 75 MG/1; MG/1
100 CAPSULE ORAL 2 TIMES DAILY
Qty: 14 CAPSULE | Refills: 0 | Status: SHIPPED | OUTPATIENT
Start: 2020-08-07 | End: 2020-08-10 | Stop reason: ALTCHOICE

## 2020-08-07 RX ORDER — BACLOFEN 10 MG/1
TABLET ORAL
Qty: 30 TABLET | Refills: 2 | Status: SHIPPED | OUTPATIENT
Start: 2020-08-07 | End: 2020-10-26

## 2020-08-07 ASSESSMENT — ENCOUNTER SYMPTOMS: SHORTNESS OF BREATH: 0

## 2020-08-07 NOTE — PROGRESS NOTES
Subjective     Eula People 77 y.o. female presents 8/7/20 with   Chief Complaint   Patient presents with    Urinary Tract Infection     Patient states she has had urine frequency, burning, and urgency complaint for one day. Admits to having UTI in the past.        Urinary Tract Infection    This is a new problem. The current episode started yesterday. There has been no fever. Associated symptoms include flank pain, frequency and urgency. Pertinent negatives include no chills or hematuria. She has tried nothing for the symptoms.        Reviewed the following history:    Past Medical History:   Diagnosis Date    Abnormal electromyogram (EMG) 8/19/09    SENSORIMOTOR NEUROPATHY OF BLE, RIGHT WORSE THAN LEFT, SUSPICIOUS FOR RIGHT S1 RADIC    Angina pectoris (HCC)     Angina pectoris ([address] 07/2019    sees Dr. Conway Adjutant Ankle pain     Bleeding ulcer     Colon polyp 4/3/2017    Dermatitis     Fibromyalgia     Foot pain     Hyperlipidemia     Hypertension     Infection of intervertebral disc (pyogenic), lumbar region ([address] 8/13/2018    Low back pain     MRSA (methicillin resistant staph aureus) culture positive 2011    Neck pain     Neuropathy     Obesity     Osteoarthritis     PSVT (paroxysmal supraventricular tachycardia) ([address]     Shingles outbreak 2012    SI (sacroiliac) pain     Stenosis     Vitamin D deficiency      Past Surgical History:   Procedure Laterality Date    CARDIAC CATHETERIZATION  10/2016    CERVICAL FUSION  12/14/11    Dr Merlinda Floro with bone graft and plate    COLONOSCOPY      ENDOSCOPIC ULTRASOUND (LOWER) N/A 7/11/2019    EGD/ EUS performed by Lisbeth Bardales MD at [facility]  7/05    HYSTERECTOMY  2001    HYSTERECTOMY, TOTAL ABDOMINAL      JOINT REPLACEMENT Left     tka    LIVER BIOPSY      MENISCECTOMY  10/31/11    left knee    OTHER SURGICAL HISTORY  10/26/09    CAUDALS BY DR Chuckie Holm    [place] COLON CA SCRN NOT  [address] IND N/A 4/12/2017    COLONOSCOPY performed by Katrein Barajas MD at . [address] ESOPHAGOGASTRODUODENOSCOPY TRANSORAL DIAGNOSTIC N/A 4/12/2017    EGD ESOPHAGOGASTRODUODENOSCOPY performed by Katerin Barajas MD at [facility]  12/2011    Dr Ricky Florez Lumbar and c spine    TOTAL KNEE ARTHROPLASTY  7/30/12    LEFT-DR AVELAR    UPPER GASTROINTESTINAL ENDOSCOPY  1995     Family History   Problem Relation Age of Onset    Heart Disease Father     High Cholesterol Father     High Blood Pressure Father     Stroke Mother     High Blood Pressure Mother        Allergies   Allergen Reactions    Latex Swelling     Swelling of hands with wearing latex gloves    Norvasc [Amlodipine Besylate] Other (See Comments)     swelling    Keflex [Cephalexin] Rash       Current Outpatient Medications on File Prior to Visit   Medication Sig Dispense Refill    baclofen (LIORESAL) 10 MG tablet Take 1 tablet by mouth nightly as needed (muscle spasms) 30 tablet 2    pantoprazole (PROTONIX) 40 MG tablet Take 1 tablet by mouth every morning (before breakfast) 90 tablet 1    oxyCODONE-acetaminophen (PERCOCET) 7.5-325 MG per tablet Take 1 tablet by mouth every 6 hours as needed for Pain for up to 30 days.  Intended supply: 30 days 60 tablet 0    doxycycline hyclate (VIBRA-TABS) 100 MG tablet TAKE 1 TABLET BY MOUTH TWICE A  tablet 0    topiramate (TOPAMAX) 25 MG tablet TAKE 1 TABLET BY MOUTH EVERY DAY AT NIGHT 60 tablet 1    losartan (COZAAR) 50 MG tablet TAKE 1 TABLET BY MOUTH EVERY DAY      gabapentin (NEURONTIN) 300 MG capsule TAKE 1 CAPSULE BY MOUTH DAILY AND 2 AT NIGHT AS TOLERATED (Patient taking differently: TAKE 1 CAPSULE BY MOUTH DAILY AND 1 AT NIGHT AS TOLERATED) 270 capsule 1    naloxone 4 MG/0.1ML LIQD nasal spray 1 spray by Nasal route as needed for Opioid Reversal 1 each 5    cyanocobalamin 1000 MCG/ML injection INJECT 1 ML AS DIRECTED EVERY 14 DAYS FOR 12 DOSES INJECT ONE CC SUB-CUTANEOSLY EVERY OTHER WEEK 6 mL 3    atorvastatin (LIPITOR) 10 MG tablet Take 1 tablet by mouth daily      hydroxychloroquine (PLAQUENIL) 200 MG tablet Take 1 tablet by mouth daily      hydrochlorothiazide (HYDRODIURIL) 25 MG tablet       nitroGLYCERIN (NITROSTAT) 0.4 MG SL tablet PLEASE SEE ATTACHED FOR DETAILED DIRECTIONS 25 tablet 3    melatonin 5 MG TABS tablet Take 5 mg by mouth nightly      lidocaine (XYLOCAINE) 5 % ointment APPLY TO AFFECTED AREA EVERY 12 HOURS AS NEEDED FOR PAIN  5    ammonium lactate (AMLACTIN) 12 % cream APPLY TO DRY/CALLUS AREAS TWICE DAILY  5    acetaminophen (TYLENOL) 325 MG tablet Take 2 tablets by mouth every 6 hours (Patient taking differently: Take 650 mg by mouth every 4 hours as needed ) 120 tablet 3    aspirin 81 MG tablet Take 81 mg by mouth daily      Coenzyme Q10 (COQ10 PO) Take 1 capsule by mouth daily       Cholecalciferol (VITAMIN D3) 2000 units TABS Take 2 tablets by mouth daily       polyethylene glycol (MIRALAX) 17 GM/SCOOP powder Take 17 g by mouth daily (Patient not taking: Reported on 8/7/2020) 510 g 5     Current Facility-Administered Medications on File Prior to Visit   Medication Dose Route Frequency Provider Last Rate Last Dose    cyanocobalamin injection 1,000 mcg  1,000 mcg Intramuscular Once Rosaline Scullin, DO           Review of Systems   Constitutional: Negative for chills and fever. Respiratory: Negative for shortness of breath. Cardiovascular: Negative for chest pain. Genitourinary: Positive for difficulty urinating, dysuria, flank pain, frequency and urgency. Negative for hematuria, pelvic pain, vaginal bleeding, vaginal discharge and vaginal pain.         Urethral pressure/pain       Objective    Vitals:    08/07/20 1132   BP: 122/62   Site: Left Upper Arm   Position: Sitting   Cuff Size: Large Adult   Pulse: 76   Temp: 96.6 °F (35.9 °C)   TempSrc: Temporal   SpO2: 100%   Weight: 215 lb (97.5 kg)       Physical Exam  Vitals signs Expresses understanding and desires to proceed with treatment plan. The patient was reminded that if an antibiotic has been prescribed the predicted course is improvement to cure with no persistent issues. Take antibiotics as directed. If any problems occur, an appointment should be made or ER visit if severe. Because of the risk with ANY antibiotic of C. Difficile colitis if persistent diarrhea or abdominal pain or any concerning symptoms, we should be notified. To reduce this risk, a probiotic pill, yogurt or other preparations containing active cultures should be ingested daily -particularly while on the antibiotic. If any persistent symptoms of illness, follow up appointment should be made in a timely fashion with a physician. Discussed signs and symptoms which require immediate follow-up in ED/call to 911. Understanding verbalized. I have reviewed and updated the electronic medical record.     JOSE Mantilla - CNP

## 2020-08-09 RX ORDER — PHENAZOPYRIDINE HYDROCHLORIDE 200 MG/1
200 TABLET, FILM COATED ORAL 3 TIMES DAILY PRN
Qty: 30 TABLET | Refills: 0 | Status: SHIPPED | OUTPATIENT
Start: 2020-08-09 | End: 2020-09-24 | Stop reason: ALTCHOICE

## 2020-08-09 RX ORDER — SULFAMETHOXAZOLE AND TRIMETHOPRIM 800; 160 MG/1; MG/1
1 TABLET ORAL 2 TIMES DAILY
Qty: 10 TABLET | Refills: 0 | Status: SHIPPED | OUTPATIENT
Start: 2020-08-09 | End: 2020-08-10 | Stop reason: ALTCHOICE

## 2020-08-10 LAB
ORGANISM: ABNORMAL
URINE CULTURE, ROUTINE: ABNORMAL

## 2020-08-10 RX ORDER — CIPROFLOXACIN 500 MG/1
500 TABLET, FILM COATED ORAL 2 TIMES DAILY
Qty: 14 TABLET | Refills: 0 | Status: SHIPPED | OUTPATIENT
Start: 2020-08-10 | End: 2020-08-17

## 2020-08-10 NOTE — PROGRESS NOTES
Urine culture positive for Klebsiella with resistance to bactrim. Sent Cipro to patient's pharmacy. Notified patient of the result and treatment plan.

## 2020-08-26 RX ORDER — OXYCODONE AND ACETAMINOPHEN 7.5; 325 MG/1; MG/1
1 TABLET ORAL EVERY 6 HOURS PRN
Qty: 60 TABLET | Refills: 0 | Status: SHIPPED | OUTPATIENT
Start: 2020-08-27 | End: 2020-09-17 | Stop reason: SDUPTHER

## 2020-09-08 ENCOUNTER — PROCEDURE VISIT (OUTPATIENT)
Dept: PHYSICAL MEDICINE AND REHAB | Age: 65
End: 2020-09-08
Payer: MEDICARE

## 2020-09-08 PROCEDURE — 96372 THER/PROPH/DIAG INJ SC/IM: CPT | Performed by: PHYSICAL MEDICINE & REHABILITATION

## 2020-09-08 PROCEDURE — 20553 NJX 1/MLT TRIGGER POINTS 3/>: CPT | Performed by: PHYSICAL MEDICINE & REHABILITATION

## 2020-09-08 RX ORDER — CYANOCOBALAMIN 1000 UG/ML
1000 INJECTION INTRAMUSCULAR; SUBCUTANEOUS ONCE
Status: COMPLETED | OUTPATIENT
Start: 2020-09-08 | End: 2020-09-08

## 2020-09-08 RX ORDER — LIDOCAINE HYDROCHLORIDE 10 MG/ML
23 INJECTION, SOLUTION INFILTRATION; PERINEURAL ONCE
Status: COMPLETED | OUTPATIENT
Start: 2020-09-08 | End: 2020-09-08

## 2020-09-08 RX ADMIN — CYANOCOBALAMIN 1000 MCG: 1000 INJECTION INTRAMUSCULAR; SUBCUTANEOUS at 15:48

## 2020-09-08 RX ADMIN — LIDOCAINE HYDROCHLORIDE 23 ML: 10 INJECTION, SOLUTION INFILTRATION; PERINEURAL at 15:49

## 2020-09-15 ENCOUNTER — TELEPHONE (OUTPATIENT)
Dept: INFECTIOUS DISEASES | Age: 65
End: 2020-09-15

## 2020-09-15 NOTE — TELEPHONE ENCOUNTER
Patient called and states she has her 6mo F/u appt with Dr Lety Clayton. (Dec 2020) Request lab orders, as she was advised to have them Q-4 months. Found last set of labs completed in June, 2020. Last V/v was 6-19-20 with I.D. Orders placed for CBC, BMP, CRP, as per Dr Lety Clayton.

## 2020-09-17 ENCOUNTER — VIRTUAL VISIT (OUTPATIENT)
Dept: PHYSICAL MEDICINE AND REHAB | Age: 65
End: 2020-09-17
Payer: MEDICARE

## 2020-09-17 PROCEDURE — 1123F ACP DISCUSS/DSCN MKR DOCD: CPT | Performed by: PHYSICAL MEDICINE & REHABILITATION

## 2020-09-17 PROCEDURE — 1090F PRES/ABSN URINE INCON ASSESS: CPT | Performed by: PHYSICAL MEDICINE & REHABILITATION

## 2020-09-17 PROCEDURE — 3017F COLORECTAL CA SCREEN DOC REV: CPT | Performed by: PHYSICAL MEDICINE & REHABILITATION

## 2020-09-17 PROCEDURE — G8427 DOCREV CUR MEDS BY ELIG CLIN: HCPCS | Performed by: PHYSICAL MEDICINE & REHABILITATION

## 2020-09-17 PROCEDURE — G8399 PT W/DXA RESULTS DOCUMENT: HCPCS | Performed by: PHYSICAL MEDICINE & REHABILITATION

## 2020-09-17 PROCEDURE — G8417 CALC BMI ABV UP PARAM F/U: HCPCS | Performed by: PHYSICAL MEDICINE & REHABILITATION

## 2020-09-17 PROCEDURE — 4040F PNEUMOC VAC/ADMIN/RCVD: CPT | Performed by: PHYSICAL MEDICINE & REHABILITATION

## 2020-09-17 PROCEDURE — 99214 OFFICE O/P EST MOD 30 MIN: CPT | Performed by: PHYSICAL MEDICINE & REHABILITATION

## 2020-09-17 PROCEDURE — 1036F TOBACCO NON-USER: CPT | Performed by: PHYSICAL MEDICINE & REHABILITATION

## 2020-09-17 RX ORDER — OXYCODONE AND ACETAMINOPHEN 7.5; 325 MG/1; MG/1
1 TABLET ORAL EVERY 6 HOURS PRN
Qty: 60 TABLET | Refills: 0 | Status: SHIPPED | OUTPATIENT
Start: 2020-09-25 | End: 2020-10-27 | Stop reason: SDUPTHER

## 2020-09-17 ASSESSMENT — ENCOUNTER SYMPTOMS
VOMITING: 0
SHORTNESS OF BREATH: 0
CHEST TIGHTNESS: 0
ABDOMINAL PAIN: 1
PHOTOPHOBIA: 1
DIARRHEA: 0
BACK PAIN: 1
COUGH: 0
NAUSEA: 1
WHEEZING: 0

## 2020-09-17 NOTE — PROGRESS NOTES
is present in the morning. Associated symptoms include abdominal pain, leg pain and pelvic pain. Pertinent negatives include no bladder incontinence, chest pain, fever, headaches, numbness, paresis, perianal numbness or weakness. Risk factors include obesity, poor posture, menopause, lack of exercise, sedentary lifestyle and history of steroid use. She has tried analgesics, walking, home exercises, heat, muscle relaxant, NSAIDs, ice and bed rest (Sciatica block, trigger point injections, mobic, Gabapentin, Norco) for the symptoms. The treatment provided mild relief. Neck Pain    Associated symptoms include leg pain and photophobia. Pertinent negatives include no chest pain, fever, headaches, numbness, paresis or weakness. Shoulder Pain    Pertinent negatives include no fever or numbness. Hip Pain    Pertinent negatives include no numbness. Leg Pain    Pertinent negatives include no numbness. Foot Pain    This is a recurrent problem. The current episode started yesterday. The problem has been gradually worsening. Pertinent negatives include no fever or numbness. The symptoms are aggravated by bending, exertion, standing, walking and twisting. She has tried oral narcotics, position changes, relaxation, ice, acetaminophen, NSAIDs, rest and heat for the symptoms. The treatment provided moderate relief.              Past Medical History:   Diagnosis Date    Abnormal electromyogram (EMG) 8/19/09    SENSORIMOTOR NEUROPATHY OF BLE, RIGHT WORSE THAN LEFT, SUSPICIOUS FOR RIGHT S1 RADIC    Angina pectoris (HCC)     Angina pectoris ([facility] [address] 07/2019    sees Dr. Beather Closs Ankle pain     Bleeding ulcer     Colon polyp 4/3/2017    Dermatitis     Fibromyalgia     Foot pain     Hyperlipidemia     Hypertension     Infection of intervertebral disc (pyogenic), lumbar region ([address] 8/13/2018    Low back pain     MRSA (methicillin resistant staph aureus) culture positive 2011    Neck pain     Neuropathy     Obesity     Osteoarthritis     PSVT (paroxysmal supraventricular tachycardia) ([facility] [address]     Shingles outbreak 2012    SI (sacroiliac) pain     Stenosis     Vitamin D deficiency        Past Surgical History:   Procedure Laterality Date    CARDIAC CATHETERIZATION  10/2016    CERVICAL FUSION  12/14/11    Dr Gris Quevedo with bone graft and plate    COLONOSCOPY      ENDOSCOPIC ULTRASOUND (LOWER) N/A 7/11/2019    [facility] [address] [facility] [facility]  [facility]    HYSTERECTOMY  2001    HYSTERECTOMY, TOTAL ABDOMINAL      JOINT REPLACEMENT Left     tka    LIVER BIOPSY      MENISCECTOMY  10/31/11    left knee    OTHER SURGICAL HISTORY  10/26/09    CAUDALS BY DR Bony Samuel    NE COLON CA SCRN NOT  [address] IND N/A 4/12/2017    COLONOSCOPY performed by Lukasz Castro MD at [address] ESOPHAGOGASTRODUODENOSCOPY TRANSORAL DIAGNOSTIC N/A 4/12/2017    EGD ESOPHAGOGASTRODUODENOSCOPY performed by Lukasz Castro MD at [address]  12/2011    Dr Gris Quevedo Lumbar and c spine    TOTAL KNEE ARTHROPLASTY  7/30/12    LEFT-DR AVELAR    UPPER GASTROINTESTINAL ENDOSCOPY  1995       Social History     Socioeconomic History    Marital status:      Spouse name: Leo Browne Number of children: 2    Years of education: 15    Highest education level: Associate degree: occupational, technical, or vocational program   Occupational History    Occupation: Retired    Social Needs    Financial resource strain: Not hard at all   [place] insecurity     Worry: Never true     Inability: Never true    Transportation needs     Medical: No     Non-medical: No   Tobacco Use    Smoking status: Never Smoker    Smokeless tobacco: Never Used   Substance and Sexual Activity    Alcohol use: No    Drug use: No    Sexual activity: Never   Lifestyle    Physical activity     Days per week: 0 days     Minutes per session: 0 min    Stress: Rather much Relationships    Social connections     Talks on phone: More than three times a week     Gets together: More than three times a week     Attends Buddhist service: More than 4 times per year     Active member of club or organization: No     Attends meetings of clubs or organizations: Never     Relationship status:     Intimate partner violence     Fear of current or ex partner: No     Emotionally abused: No     Physically abused: No     Forced sexual activity: No   Other Topics Concern    None   Social History Narrative    Lives in [place] with her  Elena Galvez and son Norberto Pandya is mildly handicapped --CP right lilia with anxiety and LD. He works at [employer] and [employer]. Family History   Problem Relation Age of Onset    Heart Disease Father     High Cholesterol Father     High Blood Pressure Father     Stroke Mother     High Blood Pressure Mother        Allergies   Allergen Reactions    Latex Swelling     Swelling of hands with wearing latex gloves    Norvasc [Amlodipine Besylate] Other (See Comments)     swelling    Keflex [Cephalexin] Rash       Review of Systems   Constitutional: Positive for fatigue. Negative for activity change, chills, diaphoresis and fever. HENT: Positive for ear pain. Negative for congestion. Eyes: Positive for photophobia and visual disturbance. Several occurances   Respiratory: Negative for cough, chest tightness, shortness of breath and wheezing. Cardiovascular: Positive for leg swelling. Negative for chest pain and palpitations. Relieved at HS with lasix   Gastrointestinal: Positive for abdominal pain and nausea. Negative for diarrhea and vomiting. Endocrine: Negative. Genitourinary: Positive for frequency and pelvic pain. Negative for bladder incontinence. Musculoskeletal: Positive for arthralgias, back pain, gait problem, joint swelling and neck pain. Negative for neck stiffness. Skin: Negative.     Allergic/Immunologic: Positive for immunocompromised state. Neurological: Positive for tremors. Negative for dizziness, syncope, weakness, light-headedness, numbness and headaches. Hematological: Negative. Psychiatric/Behavioral: Positive for sleep disturbance. Negative for dysphoric mood. The patient is nervous/anxious. Stress       Objective    There were no vitals filed for this visit. No data recorded            PHYSICAL EXAMINATION:  [ INSTRUCTIONS:  \"[x]\" Indicates a positive item  \"[]\" Indicates a negative item  -- DELETE ALL ITEMS NOT EXAMINED]    [x] Alert  [x] Oriented to person/place/time      [x] No apparent distress  [x] Not toxic appearing    [x] Face complexion normal appearing [x] Sclera clear  [x] Lips are not cyanotic      [x] Breathing appears normal  [] Appears tachypneic      [] Rash on visible skin    [x] Cranial Nerves II-XII grossly intact    [x] Motor grossly intact in visible upper extremities, including stiff but intact range of motion in cervical, upper extremity and thoracic  [x] Motor grossly intact in visible lower extremities, including normal range of motion in the hips and knees for stiffness in the lumbar spine    [x] Normal Mood  [x] Not anxious appearing    [x] Not depressed appearing  [x] Not confused appearing      [x]  Good short term memory  [x]  Good long term memory    [x]  Able to follow 2-3 step commands    Due to this being a TeleHealth encounter, evaluation of the following organ systems is limited: Vitals/Constitutional/EENT/Resp/CV/GI//MS/Neuro/Skin/Heme-Lymph-Imm.     ASSESSMENT/PLAN:     After a thorough review and discussion of the previous medical records, patient comprehensive medical, surgical, and family and social history, Review of Systems, their OARRS, their Screener and Opioid Assessment for Patients with Pain (SOAPP®-R), recent diagnostics, and symptomatic results to previous treatment, it is my impression that the patients is suffering with progressive and severe:     Diagnosis Orders   1. Rheumatoid arthritis involving multiple sites with positive rheumatoid factor ([facility] [address]  [address] [facility]    oxyCODONE-acetaminophen (PERCOCET) 7.5-325 MG per tablet   2. Neuropathy involving both lower extremities  [facility] [place]   3. Spinal stenosis of lumbar region with neurogenic claudication  [address] [facility]   4. High risk medication use - 10/17/17 OARRS PM&R, 12/19/17 OARRS PM&R, 12/20/17 Tox screen positive for opiates, Hydrocodone PM&R, MED CONTRACT 2/2/17  [facility] [place]    oxyCODONE-acetaminophen (PERCOCET) 7.5-325 MG per tablet   5. Cervicalgia  [facility] [place]    oxyCODONE-acetaminophen (PERCOCET) 7.5-325 MG per tablet   6. Chronic bilateral low back pain with bilateral sciatica  [place] INJECTION AA&/STRD SCIATIC NERVE   7. COLTON positive     8. Osteomyelitis of lumbar vertebra ([address]     9.  Primary osteoarthritis involving multiple joints         I am also concerned by lifestyle and mood issues including:    Past Medical History:   Diagnosis Date    Abnormal electromyogram (EMG) 8/19/09    SENSORIMOTOR NEUROPATHY OF BLE, RIGHT WORSE THAN LEFT, SUSPICIOUS FOR RIGHT S1 RADIC    Angina pectoris (HCC)     Angina pectoris ([address] 07/2019    sees Dr. Radha Henry Ankle pain     Bleeding ulcer     Colon polyp 4/3/2017    Dermatitis     Fibromyalgia     Foot pain     Hyperlipidemia     Hypertension     Infection of intervertebral disc (pyogenic), lumbar region ([address] 8/13/2018    Low back pain     MRSA (methicillin resistant staph aureus) culture positive 2011    Neck pain     Neuropathy     Obesity     Osteoarthritis     PSVT (paroxysmal supraventricular tachycardia) ([address]     Shingles outbreak 2012    SI (sacroiliac) pain     Stenosis     Vitamin D deficiency            Given their medication, chronic pain and lifestyle and medications they are at risk for :    Falls, constipation, addiction  Loss of livelyhood due to severe pain, debility, weight gain and  vitamin D deficiency    The patient was educated regarding proper diet, fitness routine, and regulatory restrictions concerning pain medications. Previous notes, comprehensive past medical, surgical, family history, and diagnostics were reviewed. Patient education and councelling were provided regarding off label use,treatment options and medication and injection risks. Overall greater than 25 minutes spent in direct and indirect patient care. Current and old OARRS ([place] Automated Prescription Reporting System) records reviewed, all refills reviewed since last visit,  Behavioral agreement/KATH[place] regulations   and Toxicology screen was reviewed with patient and is up to date. There are no current red flags. They are making good progress regarding pain relief, they are performing at a functional level regarding activities of daily living, family interactions and psychological functioning, they're not having any adverse effects or side effects from the current medications, and I see no findings of aberrant drug taking or addiction related behaviors. The patient is aware that they have a chronic pain condition and they may require opiates dosing for life. All efforts will be made to wean to the lowest effective dose. Other therapies for pain have not been effective including nonopiate medications. Injections and exercises are only partially effective. A Rx for Narcan was offered to help prevent accidental overdose. RX Monitoring 9/13/2019   Attestation -   Acute Pain Prescriptions -   Periodic Controlled Substance Monitoring Possible medication side effects, risk of tolerance/dependence & alternative treatments discussed. ;No signs of potential drug abuse or diversion identified. ;Assessed functional status. ;Obtaining appropriate analgesic effect of treatment. only T11-T12: No evidence of posterior disc contour abnormality, spinal canal stenosis, neural foraminal narrowing or spinal nerve root abutment/impingement. .         T12-L1: No evidence of posterior disc contour abnormality, spinal canal stenosis, neural foraminal narrowing or spinal nerve root abutment/impingement. .         L1-L2: Moderately large circumferential disc bulge with moderate bilateral facet osteoarthropathy. Thecal sac measures approximately 0.83 cm anterior posterior dimension. Moderate to moderately severe left and moderate right neural foraminal narrowing. Exiting spinal nerve is not well evaluated or visualized.         L2-L3: Moderately large circumferential disc bulge with moderate bilateral facet osteoarthropathy. Thecal sac measures approximately 0.78 cm anterior posterior dimension with mild crowding of cauda equina nerve roots. Moderate left and right neural    foraminal narrowing. No definitive abutment or impingement of exiting spinal nerve roots.         L3-L4: Right hemilaminectomy noted. No spinal canal stenosis. Posterior spinal fixation at L3-L4. Moderate left neural foramen are unremarkable without abutment or impingement exiting spinal nerve roots.         L4-L5: Right hemilaminectomy. Posterior spinal fixation L4-L5. Mild left neural foramen are patent without evidence of abutment or impingement. No spinal canal stenosis.         L5-S1: Moderately large circumferential disc bulge. Moderate bilateral facet osteoarthropathy. Moderate left and right neural foraminal narrowing without abutment or impingement of exiting bilateral nerve roots. No tianna spinal canal stenosis.         Postcontrast imaging demonstrates no evidence of an enhancing mass involving the kidneys.  Small left renal cyst. There is enhancement of the L1 vertebral body posteriorly with corresponding hypointense T1 signal, enhancement in the paraspinal soft    tissues, adjacent to the facet joints, and in the right aspect of the epidural space at the level of L1-L2 disc. Enhancement of the L2 vertebral body also noted. Findings have diminished since previous study at the L2 vertebral body but have worsened at    the L1 vertebral body. The L1-L2 intervertebral disc demonstrates faint residual enhancement but is also diminished when compared with the previous exam.              Impression    1. Enhancement at L1 vertebral body posteriorly is noted compared with the previous study concerning for a new foci of osteomyelitis. Enhancement at the L1-L2 intervertebral disc and L2 vertebral body is diminished since previous exam but continues to    remain persistent consistent with improving, yet persistent, osteomyelitis/discitis. Enhancement in the epidural spinal canal is again noted consistent with infection in the epidural space. No discrete fluid collection. 2. Other degenerative changes as detailed above L1-S1, with mild to moderate thecal sac narrowing at L1-L2 and L2-L3, with suspected mild crowding of cauda equina nerve roots at the L2-L3 level. 3. Small left renal cyst         I discussed results with patients. see Follow up plans below  For any new studies. Care Everywhere Updates:  requested and reviewed. IMPRESSION:    THE LOWEST T-SCORE IS -0.9  IN THE RIGHT HIP    1) DIAGNOSIS (based on BMD alone):  NORMAL BONE DENSITY    The lowest T-score is used for diagnosis/impression  Z-scores will be reported if <-1.0 or if > or = to+3.0    Note that regardless of bone mineral density measurement, a patient may   be clinically diagnosed with osteoporosis if they [facility] had a prior   fragility fracture. Caution: Medical conditions other than osteoporosis may cause low bone   density, such as osteomalacia or renal osteodystrophy.   Clinical   correlation is necessary.        2) FRACTURE RISK (based on BMD alone): NOT INCREASED    Caution: Fracture risk may be increased independent of BMD in patients   with corticosteroid use, age greater than 72 years, or a history of prior   fragility fracture. RECOMMENDATIONS:  Consider x-ray of the thoracic(with swimmers views) and lumbar spine to   further evaluate for vertebral compression fractures, given patient's   reported height loss. No new issues noted. Per Ortho:    Plan: Treatment options were discussed with the patient. She has significant glenohumeral osteoarthritis but continues with oral antibiotics secondary to significant lumbar discitis and multiple other abscesses. Discussed significant risk potentially of infection. At this point she is going to continue with the conservative treatment. She would like to discuss with her infectious disease physician her thoughts regarding risks of surgery. Electrodiagnostic:  Previous studies requested,     I discussed results with patient. See follow-up plans for new studies.         Labs:  Previous labs reviewed     Lab Results   Component Value Date     06/11/2020    K 5.1 06/11/2020    K 4.4 08/09/2018     06/11/2020    CO2 23 06/11/2020    BUN 42 06/11/2020    CREATININE 1.18 06/11/2020    CALCIUM 9.5 06/11/2020    LABALBU 4.4 06/11/2020    LABALBU 2.8 08/18/2018    BILITOT 0.4 06/11/2020    ALKPHOS 102 06/11/2020    AST 19 06/11/2020    ALT 24 06/11/2020     Lab Results   Component Value Date    WBC 10.5 06/11/2020    RBC 4.64 06/11/2020    RBC 2.81 09/06/2018    HGB 14.2 06/11/2020    HCT 42.8 06/11/2020    MCV 92.2 06/11/2020    MCH 30.6 06/11/2020    MCHC 33.1 06/11/2020    RDW 13.5 06/11/2020     06/11/2020    MPV 7.4 09/13/2018       Lab Results   Component Value Date    LABAMPH Neg 06/11/2020    BARBSCNU Neg 06/11/2020    LABBENZ Neg 06/11/2020    CANSU Neg 06/11/2020    COCAIMETSCRU Neg 06/11/2020    PHENCYCLIDINESCREENURINE Neg 83/65/4354    TRICYCLIC Neg 69/03/1415    DSCOMMENT see below 06/11/2020       Lab Results   Component Value Date    CODEINE Not Detected 12/02/2016    MORPHINE Not Detected 12/02/2016    ACETYLMORPHI Not Detected 12/02/2016    OXYCODONE Not Detected 12/02/2016    NOROXYCODONE Not Detected 12/02/2016    NOROXYMU Not Detected 12/02/2016    HYDRCO Present 12/02/2016    NORHYDU Present 12/02/2016    HYDROMO Not Detected 12/02/2016    BUPREN Not Detected 12/02/2016    NORBUPRNOR Not Detected 12/02/2016    FENTA Not Detected 12/02/2016    NORFENT Not Detected 12/02/2016    MEPERIDINE Not Detected 12/02/2016    TAPENU Not Detected 12/02/2016    TAPOSULFUR Not Detected 12/02/2016    METHADONE Not Detected 12/02/2016    LABPROP Not Detected 12/02/2016    TRAM Not Detected 12/02/2016    AMPH Not Detected 12/02/2016    METHAMP Not Detected 12/02/2016    MDMA Not Detected 12/02/2016    ECMDA Not Detected 12/02/2016       Lab Results   Component Value Date    METPHEN Not Detected 12/02/2016    PHENTERMINE Not Detected 12/02/2016    BENZOYL Not Detected 12/02/2016    Dorethia Saris Not Detected 12/02/2016    ALPHAOHALPRA Not Detected 12/02/2016    CLONAZEPAM Not Detected 12/02/2016    Cathyann Govern Not Detected 12/02/2016    DIAZEP Not Detected 12/02/2016    ROMAIN Not Detected 12/02/2016    OXAZ Not Detected 12/02/2016    Na Broccoli Not Detected 12/02/2016    LORAZEPAM Not Detected 12/02/2016    MIDAZOLAM Not Detected 12/02/2016    ZOLPIDEM Not Detected 12/02/2016    NAI Not Detected 12/02/2016    ETG Not Detected 12/02/2016    MARIJMET Not Detected 12/02/2016    PCP Not Detected 12/02/2016    PAINMGTDRUGP See Below 12/02/2016    EERPAINMGTPA See Note 12/02/2016    LABCREA 129.7 08/09/2018         , I discussed results with patient. See follow-up plans for new studies. Therapies:  HEP-gentle stretching and relaxation techniques-demonstrated with patient-they are to do them twice a day.   They are also advised to make the following lifestyle changes:  Goals      Exercise 3x per week (30 min per time)      SOAPP-R GOAL LESS THAN 9      09/02/16 SCORE: 8 LOW RISK   02/02/17 score 7-low risk   04/03/17 score: 6-low risk  06/05/17 score: 7-low risk  08/07/17 score: 8-low risk  10/06/17 score: 6-low risk  12/20/17 score: 5-low risk  02/18/18 score: 6-low risk  0425/2018 score 5-low risk  7/11/18 score: 3 low- risk  10/11/18 score:4- low risk  2/28/19 score: 2: low risk  5/13/19 score: 2- low risk  7/17/19 score: 1- low risk   9/13/19 score: 2- low risk  11/21/19 score: 3- low risk   1/22/20 score: 2- low risk   6- score 3 - low risk        Weight < 150 lb (68.04 kg)           Injections or Epidurals:  Injection options were discussed. Avoid cortisone--TPs only Sciatics only DT infection risk. Monthly trigger point injections add vitamin B12 when able. Patient gave verbal consent to ordered injections. See follow-up plans for planned injections. Supplements:  Vitamin D with increased dosing during the rainy months, add co-Q10 for heart health. Education was given on:   Dietary and Fitness--daily stretches and low carb diet-in chair Yoga when possible       add low K diet. Follow up with Primary Care Physician regarding their general medical needs. Stressed the importance of following up with PCP and specialists for his/her chronic diseases, health, CV, and cancer screening and continued care. Will follow disease activity/progression and adjust therapeutic regimen to disease activity and severity. Discussed medication dosage, usage, goals of therapy, and side effects. Available test results were reviewed -Discussed findings, impression and plan with patient. An additional 10 minutes were spent outside of the patient visit to review records. Additional time spent with the patient to discuss their questions. Additional time spent with the patient devoted to discussing treatment strategy, planning, and implementation generalized musculoskeletal health plan. Patient understands above plan; questions asked and answered. Patient agrees to plan as noted above. F/U in 2-3months  At least 50% of the visit was involved in the discussion of the options for treatment. We discussed exercises, medication, interventional therapies and surgery. Healthy life style is essential with patient hard work to achieve the wellness. In addition; discussion with the patient and/or family about any of the diagnostic results, impressions and/or recommended diagnostic studies, prognosis, risks and benefits of treatment options, instructions for treatment and/or follow-up, importance of compliance with chosen treatment options, risk-factor reduction, and patient/family education. They are to follow up in 2 months to review medication, efficacy of injections, pill counts, OARRS check, SOAPPR assessment, review diagnostics, to review previous and future treatment plans and assess appropriateness for continued therapy. New Diagnostics  Orders Placed This Encounter   Procedures    [facility] [place]    [place] INJECTION AA&/STRD SCIATIC NERVE       Follow-up in 2-1/2 to 3 months for guarding the efficacy of current plan and future treatment. An  electronic signature was used to authenticate this note. -Dr Chanel Shin, DO       With MA assistance from:   Martine Yuen MA     19}    Pursuant to the emergency declaration under the [facility] waiver authority and the [employer] Act, this Virtual  Visit was conducted, with patient's consent, to reduce the patient's risk of exposure to COVID-19 and provide continuity of care for an established patient. Services were provided through a video synchronous discussion virtually to substitute for in-person clinic visit.

## 2020-09-24 ENCOUNTER — OFFICE VISIT (OUTPATIENT)
Dept: FAMILY MEDICINE CLINIC | Age: 65
End: 2020-09-24
Payer: MEDICARE

## 2020-09-24 VITALS
HEIGHT: 68 IN | TEMPERATURE: 97.5 F | DIASTOLIC BLOOD PRESSURE: 68 MMHG | OXYGEN SATURATION: 96 % | HEART RATE: 84 BPM | RESPIRATION RATE: 16 BRPM | BODY MASS INDEX: 34.56 KG/M2 | WEIGHT: 228 LBS | SYSTOLIC BLOOD PRESSURE: 136 MMHG

## 2020-09-24 VITALS
SYSTOLIC BLOOD PRESSURE: 136 MMHG | DIASTOLIC BLOOD PRESSURE: 68 MMHG | WEIGHT: 221 LBS | TEMPERATURE: 97.5 F | RESPIRATION RATE: 16 BRPM | OXYGEN SATURATION: 96 % | HEIGHT: 68 IN | HEART RATE: 84 BPM | BODY MASS INDEX: 33.49 KG/M2

## 2020-09-24 PROCEDURE — G8417 CALC BMI ABV UP PARAM F/U: HCPCS | Performed by: FAMILY MEDICINE

## 2020-09-24 PROCEDURE — G0438 PPPS, INITIAL VISIT: HCPCS | Performed by: FAMILY MEDICINE

## 2020-09-24 PROCEDURE — 1123F ACP DISCUSS/DSCN MKR DOCD: CPT | Performed by: FAMILY MEDICINE

## 2020-09-24 PROCEDURE — 4040F PNEUMOC VAC/ADMIN/RCVD: CPT | Performed by: FAMILY MEDICINE

## 2020-09-24 PROCEDURE — 1036F TOBACCO NON-USER: CPT | Performed by: FAMILY MEDICINE

## 2020-09-24 PROCEDURE — 99214 OFFICE O/P EST MOD 30 MIN: CPT | Performed by: FAMILY MEDICINE

## 2020-09-24 PROCEDURE — 3017F COLORECTAL CA SCREEN DOC REV: CPT | Performed by: FAMILY MEDICINE

## 2020-09-24 PROCEDURE — 1090F PRES/ABSN URINE INCON ASSESS: CPT | Performed by: FAMILY MEDICINE

## 2020-09-24 PROCEDURE — G8399 PT W/DXA RESULTS DOCUMENT: HCPCS | Performed by: FAMILY MEDICINE

## 2020-09-24 PROCEDURE — G8427 DOCREV CUR MEDS BY ELIG CLIN: HCPCS | Performed by: FAMILY MEDICINE

## 2020-09-24 RX ORDER — ZOSTER VACCINE RECOMBINANT, ADJUVANTED 50 MCG/0.5
0.5 KIT INTRAMUSCULAR SEE ADMIN INSTRUCTIONS
Qty: 0.5 ML | Refills: 0 | Status: SHIPPED | OUTPATIENT
Start: 2020-09-24 | End: 2021-03-23

## 2020-09-24 ASSESSMENT — PATIENT HEALTH QUESTIONNAIRE - PHQ9
SUM OF ALL RESPONSES TO PHQ9 QUESTIONS 1 & 2: 0
SUM OF ALL RESPONSES TO PHQ QUESTIONS 1-9: 0
2. FEELING DOWN, DEPRESSED OR HOPELESS: 0
SUM OF ALL RESPONSES TO PHQ9 QUESTIONS 1 & 2: 0
SUM OF ALL RESPONSES TO PHQ QUESTIONS 1-9: 0
1. LITTLE INTEREST OR PLEASURE IN DOING THINGS: 0
2. FEELING DOWN, DEPRESSED OR HOPELESS: 0
SUM OF ALL RESPONSES TO PHQ QUESTIONS 1-9: 0
1. LITTLE INTEREST OR PLEASURE IN DOING THINGS: 0
SUM OF ALL RESPONSES TO PHQ QUESTIONS 1-9: 0

## 2020-09-24 NOTE — PROGRESS NOTES
Medicare Annual Wellness Visit  Name: Tank Anderson Date: 2020   MRN: [id number] Sex: Female   Age: 77 y.o. Ethnicity: Non-/Non    : 1955 Race: Trav English is here for Medicare AWV (Here for Medicare AWV)    Screenings for behavioral, psychosocial and functional/safety risks, and cognitive dysfunction are all negative except as indicated below. These results, as well as other patient data from the [email or web address] form, are documented in Flowsheets linked to this Encounter. Allergies   Allergen Reactions    Latex Swelling     Swelling of hands with wearing latex gloves    Norvasc [Amlodipine Besylate] Other (See Comments)     swelling    Keflex [Cephalexin] Rash         Prior to Visit Medications    Medication Sig Taking? Authorizing Provider   gabapentin (NEURONTIN) 300 MG capsule TAKE 1 CAPSULE BY MOUTH DAILY AND 2 AT NIGHT AS TOLERATED  Patient taking differently: TAKE 1 CAPSULE BY MOUTH DAILY AND 1 AT NIGHT AS TOLERATED Yes Rosaline Peterson DO   zoster recombinant adjuvanted vaccine (SHINGRIX) 50 MCG/0.5ML SUSR injection Inject 0.5 mLs into the muscle See Admin Instructions 1 dose now and repeat in 2-6 months  Edy Cannon MD   oxyCODONE-acetaminophen (PERCOCET) 7.5-325 MG per tablet Take 1 tablet by mouth every 6 hours as needed for Pain for up to 30 days.  Intended supply: 30 days  Rosaline Peterson DO   pantoprazole (PROTONIX) 40 MG tablet Take 1 tablet by mouth every morning (before breakfast)  Kae Cobb APRN - CNP   doxycycline hyclate (VIBRA-TABS) 100 MG tablet TAKE 1 TABLET BY MOUTH TWICE A DAY  Leeanna Lujan DO   topiramate (TOPAMAX) 25 MG tablet TAKE 1 TABLET BY MOUTH EVERY DAY AT NIGHT  Rosaline Peterson DO   losartan (COZAAR) 50 MG tablet TAKE 1 TABLET BY MOUTH EVERY DAY  Historical Provider, MD   cyanocobalamin 1000 MCG/ML injection INJECT 1 ML AS DIRECTED EVERY 14 DAYS FOR 12 DOSES INJECT ONE CC SUB-CUTANEOSLY EVERY OTHER WEEK  Rosaline Peterson DO   atorvastatin (LIPITOR) 10 MG tablet Take 1 tablet by mouth daily  Historical Provider, MD   hydroxychloroquine (PLAQUENIL) 200 MG tablet Take 1 tablet by mouth daily  Historical Provider, MD   hydrochlorothiazide (HYDRODIURIL) 25 MG tablet   Historical Provider, MD   nitroGLYCERIN (NITROSTAT) 0.4 MG SL tablet PLEASE SEE ATTACHED FOR DETAILED DIRECTIONS  Vignesh Hilton MD   melatonin 5 MG TABS tablet Take 5 mg by mouth nightly  Historical Provider, MD   lidocaine (XYLOCAINE) 5 % ointment APPLY TO AFFECTED AREA EVERY 12 HOURS AS NEEDED FOR PAIN  Historical Provider, MD   ammonium lactate (AMLACTIN) 12 % cream APPLY TO DRY/CALLUS AREAS TWICE DAILY  Historical Provider, MD   aspirin 81 MG tablet Take 81 mg by mouth daily  Historical Provider, MD   Coenzyme Q10 (COQ10 PO) Take 1 capsule by mouth daily   Historical Provider, MD   Cholecalciferol (VITAMIN D3) 2000 units TABS Take 2 tablets by mouth daily   Historical Provider, MD         Past Medical History:   Diagnosis Date    Abnormal electromyogram (EMG) 8/19/09    SENSORIMOTOR NEUROPATHY OF BLE, RIGHT WORSE THAN LEFT, SUSPICIOUS FOR RIGHT S1 RADIC    Angina pectoris ([address]     Angina pectoris ([address] 07/2019    sees Dr. Sonja Alvarez Ankle pain     Bleeding ulcer     Colon polyp 4/3/2017    Dermatitis     Fibromyalgia     Foot pain     Hyperlipidemia     Hypertension     Infection of intervertebral disc (pyogenic), lumbar region ([address] 8/13/2018    Low back pain     MRSA (methicillin resistant staph aureus) culture positive 2011    Neck pain     Neuropathy     Obesity     Osteoarthritis     PSVT (paroxysmal supraventricular tachycardia) ([address]     Shingles outbreak 2012    SI (sacroiliac) pain     Stenosis     Vitamin D deficiency        Past Surgical History:   Procedure Laterality Date    CARDIAC CATHETERIZATION  10/2016    CERVICAL FUSION  12/14/11    Dr Gustavo Rush with bone graft and plate    COLONOSCOPY      ENDOSCOPIC ULTRASOUND (LOWER) N/A 7/11/2019    EGD/ EUS performed by Ho Burger MD at [facility]  7/05    HYSTERECTOMY  2001    HYSTERECTOMY, TOTAL ABDOMINAL      JOINT REPLACEMENT Left     tka    LIVER BIOPSY      MENISCECTOMY  10/31/11    left knee    OTHER SURGICAL HISTORY  10/26/09    CAUDALS BY DR Sydney Butt    VA COLON CA SCRN NOT  [address] IND N/A 4/12/2017    [facility] ESOPHAGOGASTRODUODENOSCOPY TRANSORAL DIAGNOSTIC N/A 4/12/2017    EGD ESOPHAGOGASTRODUODENOSCOPY performed by Marisol Quevedo MD at [address]  12/2011    Dr Jasiel Vivar Lumbar and c spine    TOTAL KNEE ARTHROPLASTY  7/30/12    LEFT-DR AVELAR    UPPER GASTROINTESTINAL ENDOSCOPY  1995         Family History   Problem Relation Age of Onset    Heart Disease Father     High Cholesterol Father     High Blood Pressure Father     Stroke Mother     High Blood Pressure Mother     High Blood Pressure Brother        CareTeam (Including outside providers/suppliers regularly involved in providing care):   Patient Care Team:  Jonathan Smith MD as PCP - General (Family Medicine)  Jonathan Smith MD as PCP - [facility] Empaneled Provider  Sarah Arita DO as Consulting Physician (Physical Medicine and Rehab)  Gopi Smith MD (Infectious Diseases)    Wt Readings from Last 3 Encounters:   09/24/20 228 lb (103.4 kg)   09/24/20 221 lb (100.2 kg)   08/07/20 215 lb (97.5 kg)     Vitals:    09/24/20 1038   BP: 136/68   Site: Left Upper Arm   Position: Sitting   Cuff Size: Large Adult   Pulse: 84   Resp: 16   Temp: 97.5 °F (36.4 °C)   TempSrc: Oral   SpO2: 96%   Weight: 228 lb (103.4 kg)   Height: 5' 8\" (1.727 m)     Body mass index is 34.67 kg/m². Based upon direct observation of the patient, evaluation of cognition reveals recent and remote memory intact.     Patient's complete Health Risk Assessment and screening values have been reviewed and are found in [facility]. The following problems were reviewed today and where indicated follow up appointments were made and/or referrals ordered. Positive Risk Factor Screenings with Interventions:     Health Habits/Nutrition:  Health Habits/Nutrition  Do you exercise for at least 20 minutes 2-3 times per week?: (!) No(due to arthritis)  Have you lost any weight without trying in the past 3 months?: No  Do you eat fewer than 2 meals per day?: (!) Yes  Have you seen a dentist within the past year?: Yes  Body mass index is 34.67 kg/m².   Health Habits/Nutrition Interventions:  · Inadequate physical activity:  patient is not ready to increase his/her physical activity level at this time, due to physical constraints    Hearing/Vision:  No exam data present  Hearing/Vision  Do you or your family notice any trouble with your hearing?: (!) Yes(has hearing aids)  Do you have difficulty driving, watching TV, or doing any of your daily activities because of your eyesight?: No  Have you had an eye exam within the past year?: Yes  Hearing/Vision Interventions:  · Hearing concerns:  patient declines any further evaluation/treatment for hearing issues Has hearing aids    Personalized Preventive Plan   Current Health Maintenance Status  Immunization History   Administered Date(s) Administered    Hepatitis A Adult (Havrix, Vaqta) 08/24/2009, 03/03/2010    Hepatitis B Ped/Adol (Engerix-B, Recombivax HB) 02/01/1989, 03/01/1989, 10/04/1989    Influenza A (P7M9-07) Vaccine PF IM 10/21/2009    Influenza Vaccine, unspecified formulation 09/23/2015, 09/12/2016, 09/11/2017    Influenza Virus Vaccine 09/12/2016, 09/11/2017    Influenza, High Dose (Fluzone 65 yrs and older) 10/10/2018, 09/24/2019    Pneumococcal Conjugate 13-valent ([id number]) 07/03/2018    Pneumococcal Polysaccharide ([id number]) 11/25/2014, 09/24/2019    Td vaccine (adult) 04/16/1998    Tdap (Boostrix, Adacel) 08/14/2008, 11/25/2014, 11/28/2014  Typhoid Live, Oral (Vivotif) 08/24/2009    Zoster Live (Zostavax) 09/01/2012, 09/03/2013        Health Maintenance   Topic Date Due    A1C test (Diabetic or Prediabetic)  01/17/2013    Shingles Vaccine (2 of 3) 10/29/2013    Lipid screen  03/20/2017    Annual Wellness Visit (AWV)  05/29/2019    Flu vaccine (1) 09/01/2020    Breast cancer screen  12/03/2020    Statin Therapy  01/29/2021    Potassium monitoring  06/11/2021    Creatinine monitoring  06/11/2021    DTaP/Tdap/Td vaccine (4 - Td) 11/28/2024    Colon cancer screen colonoscopy  04/12/2027    DEXA (modify frequency per FRAX score)  Completed    Pneumococcal 65+ years Vaccine  Completed    Hepatitis C screen  Completed    Hepatitis A vaccine  Aged Out    Hepatitis B vaccine  Aged Out    Hib vaccine  Aged Out    Meningococcal (ACWY) vaccine  Aged Out     Recommendations for [employer] Due: see orders and patient instructions/AVS.  . Recommended screening schedule for the next 5-10 years is provided to the patient in written form: see Patient Instructions/AVS.    Zachary BLAKE LPN, [id number]/[id number], performed the documented evaluation under the direct supervision of the attending physician.

## 2020-09-24 NOTE — PROGRESS NOTES
Patient: Marily Short    YOB: 1955    Date: 9/24/20    Chief Complaint   Patient presents with    Leg Pain     She complains of both lower legs redness and pain for 3 months. Patient Active Problem List    Diagnosis Date Noted    High risk medication use - 10/17/17 OARRS PM&R, 12/19/17 OARRS PM&R, 12/20/17 Tox screen positive for opiates, Hydrocodone PM&R, MED CONTRACT 2/2/17 04/17/2013     Priority: High    COLTON positive 04/20/2019    Deformity of finger of right hand 04/17/2019    Chronic bilateral low back pain with bilateral sciatica 04/17/2019    Cervicalgia 04/17/2019    Ankle swelling 12/19/2018    Rheumatoid arthritis involving multiple sites with positive rheumatoid factor ([facility]     Gait abnormality 08/13/2018    Neuropathy involving both lower extremities 04/25/2018    Mixed hyperlipidemia 09/29/2014     Overview Note:     Overview:   Hyperlipidemia  Overview:   Hyperlipidemia  Overview:   Hyperlipidemia  Overview:   Hyperlipidemia  Overview:   Hyperlipidemia      Spinal stenosis of lumbar region with neurogenic claudication     Benign essential hypertension 04/01/2009       Allergies   Allergen Reactions    Latex Swelling     Swelling of hands with wearing latex gloves    Norvasc [Amlodipine Besylate] Other (See Comments)     swelling    Keflex [Cephalexin] Rash       Vitals:    09/24/20 1019   BP: 136/68   Site: Right Upper Arm   Position: Sitting   Cuff Size: Large Adult   Pulse: 84   Resp: 16   Temp: 97.5 °F (36.4 °C)   TempSrc: Oral   SpO2: 96%   Weight: 221 lb (100.2 kg)   Height: 5' 8\" (1.727 m)      Body mass index is 33.6 kg/m².   PHQ Scores 9/24/2020 9/24/2020 1/29/2020 2/28/2019 10/10/2018 11/28/2017   PHQ2 Score 0 0 0 0 0 0   PHQ9 Score 0 0 0 0 0 0     Interpretation of Total Score Depression Severity: 1-4 = Minimal depression, 5-9 = Mild depression, 10-14 = Moderate depression, 15-19 = Moderately severe depression, 20-27 = Severe depression    HPI    She is following up today on her blood pressure her lipids her rheumatoid arthritis and she is developed a red rash on her lower legs over the last 3 months. She is been doing a lot of work on her home and other homes as she has been moving in various family members have been moving and has been quite an issue. She is also had increasing pain in her right shoulder and she was told by the [facility] [employer] [facility] clinic she should have it replaced however she is a high risk of osteomyelitis and she prefer not to have any surgery should be risking having that issue again. She has some swelling in her legs she has no numbness or tingling she works with Dr. Ema Loo on her pain and she is to be sent to the myofascial release people of physical therapy to help her with this pain. She is not had physical therapy for her shoulder. Review of Systems    Constitutional: positive for fatigue, negative for fever and sweats. HEENT: Negative for eye discharge and vision loss. Negative for ear drainage, hearing loss and nasal drainage. Respiratory: Negative for cough, dyspnea and wheezing. Cardiovascular:  Negative for chest pain, claudication and irregular heartbeat/palpitations. Gastrointestinal: Negative for abdominal pain, nausea, constipation and diarrhea. Genitourinary: Negative for dysuria, patient had a hysterectomy. Metabolic/Endocrine: Negative for cold intolerance, heat intolerance, polydipsia and polyphagia. No unintended weight loss or weight gain. Neuro/Psychiatric: Negative for gait disturbance. Negative for psychiatric symptoms. Dermatologic: Negative for pruritus and rash. Musculoskeletal: positive for bone/joint symptoms. No numbness or tingling. No loss of function. Hematology: Negative for bleeding and easy bruising. Immunology:  Negative for environmental allergies and food allergies.     Physical Exam    Patient's medication, allergies, past medical, surgical, social and family histories were reviewed and updated as appropriate. PHYSICAL EXAM   General appearance: Alert oriented pleasant cooperative no acute distress. Lungs: Clear to auscultation without wheezes rhonchi or rales  Heart: Regular rate and rhythm without murmurs rubs or gallops  Extremities: Right shoulder she is got tenderness palpation along the posterior muscles and decreased range of motion of the shoulder. She has loss of contour of the muscles of that shoulder as well. Legs she has bilateral petechial rash on her legs worse on the right than the left with 1+ pitting edema worse on the right than the left no calf tenderness no erythema          Assessment:   Diagnosis Orders   1. Mixed hyperlipidemia  Lipid, Fasting    Comprehensive Metabolic Panel   2. Benign essential hypertension  Comprehensive Metabolic Panel stable   3. Rheumatoid arthritis involving multiple sites with positive rheumatoid factor ([facility]     4. Petechial rash  CBC Auto Differential stay hydrated and wear compression hose   5. Arthritis of right shoulder region  Ambulatory referral to Physical Therapy               Plan:  Current Outpatient Medications   Medication Sig Dispense Refill    zoster recombinant adjuvanted vaccine (SHINGRIX) 50 MCG/0.5ML SUSR injection Inject 0.5 mLs into the muscle See Admin Instructions 1 dose now and repeat in 2-6 months 0.5 mL 0    [START ON 9/25/2020] oxyCODONE-acetaminophen (PERCOCET) 7.5-325 MG per tablet Take 1 tablet by mouth every 6 hours as needed for Pain for up to 30 days.  Intended supply: 30 days 60 tablet 0    pantoprazole (PROTONIX) 40 MG tablet Take 1 tablet by mouth every morning (before breakfast) 90 tablet 1    doxycycline hyclate (VIBRA-TABS) 100 MG tablet TAKE 1 TABLET BY MOUTH TWICE A  tablet 0    topiramate (TOPAMAX) 25 MG tablet TAKE 1 TABLET BY MOUTH EVERY DAY AT NIGHT 60 tablet 1    losartan (COZAAR) 50 MG tablet TAKE 1 TABLET BY MOUTH EVERY DAY      gabapentin (NEURONTIN) 300 MG capsule TAKE 1 CAPSULE BY MOUTH DAILY AND 2 AT NIGHT AS TOLERATED (Patient taking differently: TAKE 1 CAPSULE BY MOUTH DAILY AND 1 AT NIGHT AS TOLERATED) 270 capsule 1    cyanocobalamin 1000 MCG/ML injection INJECT 1 ML AS DIRECTED EVERY 14 DAYS FOR 12 DOSES INJECT ONE CC SUB-CUTANEOSLY EVERY OTHER WEEK 6 mL 3    atorvastatin (LIPITOR) 10 MG tablet Take 1 tablet by mouth daily      hydroxychloroquine (PLAQUENIL) 200 MG tablet Take 1 tablet by mouth daily      hydrochlorothiazide (HYDRODIURIL) 25 MG tablet       nitroGLYCERIN (NITROSTAT) 0.4 MG SL tablet PLEASE SEE ATTACHED FOR DETAILED DIRECTIONS 25 tablet 3    melatonin 5 MG TABS tablet Take 5 mg by mouth nightly      lidocaine (XYLOCAINE) 5 % ointment APPLY TO AFFECTED AREA EVERY 12 HOURS AS NEEDED FOR PAIN  5    ammonium lactate (AMLACTIN) 12 % cream APPLY TO DRY/CALLUS AREAS TWICE DAILY  5    aspirin 81 MG tablet Take 81 mg by mouth daily      Coenzyme Q10 (COQ10 PO) Take 1 capsule by mouth daily       Cholecalciferol (VITAMIN D3) 2000 units TABS Take 2 tablets by mouth daily        Current Facility-Administered Medications   Medication Dose Route Frequency Provider Last Rate Last Dose    cyanocobalamin injection 1,000 mcg  1,000 mcg Intramuscular Once Rosaline Scullin, DO         Orders Placed This Encounter   Procedures    Lipid, Fasting     Standing Status:   Future     Standing Expiration Date:   9/24/2021    CBC Auto Differential     Standing Status:   Future     Standing Expiration Date:   9/24/2021    Comprehensive Metabolic Panel     Standing Status:   Future     Standing Expiration Date:   9/24/2021    Ambulatory referral to Physical Therapy     Referral Priority:   Routine     Referral Type:   Eval and Treat     Referral Reason:   Specialty Services Required     Requested Specialty:   Physical Therapy     Number of Visits Requested:   1       Orders Placed This Encounter   Medications    zoster recombinant adjuvanted vaccine [facility]) 50 MCG/0.5ML SUSR injection     Sig: Inject 0.5 mLs into the muscle See Admin Instructions 1 dose now and repeat in 2-6 months     Dispense:  0.5 mL     Refill:  0              Return in about 6 months (around 3/24/2021).     Dr. Dayan Schultz      9/24/20  11:26 AM

## 2020-09-24 NOTE — PATIENT INSTRUCTIONS
Personalized Preventive Plan for Matt De Leon - 9/24/2020  Medicare offers a range of preventive health benefits. Some of the tests and screenings are paid in full while other may be subject to a deductible, co-insurance, and/or copay. Some of these benefits include a comprehensive review of your medical history including lifestyle, illnesses that may run in your family, and various assessments and screenings as appropriate. After reviewing your medical record and screening and assessments performed today your provider may have ordered immunizations, labs, imaging, and/or referrals for you. A list of these orders (if applicable) as well as your Preventive Care list are included within your After Visit Summary for your review. Other Preventive Recommendations:    · A preventive eye exam performed by an eye specialist is recommended every 1-2 years to screen for glaucoma; cataracts, macular degeneration, and other eye disorders. · A preventive dental visit is recommended every 6 months. · Try to get at least 150 minutes of exercise per week or 10,000 steps per day on a pedometer . · Order or download the FREE \"Exercise & Physical Activity: Your Everyday Guide\" from [employer]. Call 9-724.186.9419 or search [employer] online. · You need 3065-7792 mg of calcium and 4321-9953 IU of vitamin D per day. It is possible to meet your calcium requirement with diet alone, but a vitamin D supplement is usually necessary to meet this goal.  · When exposed to the sun, use a sunscreen that protects against both UVA and UVB radiation with an SPF of 30 or greater. Reapply every 2 to 3 hours or after sweating, drying off with a towel, or swimming. · Always wear a seat belt when traveling in a car. Always wear a helmet when riding a bicycle or motorcycle. Heart-Healthy Diet   Sodium, Fat, and Cholesterol Controlled Diet       What Is a Heart Healthy Diet?    A heart-healthy diet is one that limits sodium , certain types of fat , and cholesterol . This type of diet is recommended for:   People with any form of cardiovascular disease (eg, coronary heart disease , peripheral vascular disease , previous heart attack , previous stroke )   People with risk factors for cardiovascular disease, such as high blood pressure , high cholesterol , or diabetes   Anyone who wants to lower their risk of developing cardiovascular disease   Sodium    Sodium is a mineral found in many foods. In general, most people consume much more sodium than they need. Diets high in sodium can increase blood pressure and lead to edema (water retention). On a heart-healthy diet, you should consume no more than 2,300 mg (milligrams) of sodium per dayabout the amount in one teaspoon of table salt. The foods highest in sodium include table salt (about 50% sodium), processed foods, convenience foods, and preserved foods. Cholesterol    Cholesterol is a fat-like, waxy substance in your blood. Our bodies make some cholesterol. It is also found in animal products, with the highest amounts in fatty meat, egg yolks, whole milk, cheese, shellfish, and organ meats. On a heart-healthy diet, you should limit your cholesterol intake to less than 200 mg per day. It is normal and important to have some cholesterol in your bloodstream. But too much cholesterol can cause plaque to build up within your arteries, which can eventually lead to a heart attack or stroke. The two types of cholesterol that are most commonly referred to are:   Low-density lipoprotein (LDL) cholesterol  Also known as bad cholesterol, this is the cholesterol that tends to build up along your arteries. Bad cholesterol levels are increased by eating fats that are saturated or hydrogenated. Optimal level of this cholesterol is less than 100. Over 130 starts to get risky for heart disease.    High-density lipoprotein (HDL) cholesterol  Also known as good cholesterol, this type of cholesterol actually carries cholesterol away from your arteries and may, therefore, help lower your risk of having a heart attack. You want this level to be high (ideally greater than 60). It is a risk to have a level less than 40. You can raise this good cholesterol by eating olive oil, canola oil, avocados, or nuts. Exercise raises this level, too. Fat    Fat is calorie dense and packs a lot of calories into a small amount of food. Even though fats should be limited due to their high calorie content, not all fats are bad. In fact, some fats are quite healthful. Fat can be broken down into four main types. The good-for-you fats are:   Monounsaturated fat  found in oils such as olive and canola, avocados, and nuts and natural nut butters; can decrease cholesterol levels, while keeping levels of HDL cholesterol high   Polyunsaturated fat  found in oils such as safflower, sunflower, soybean, corn, and sesame; can decrease total cholesterol and LDL cholesterol   Omega-3 fatty acids  particularly those found in fatty fish (such as salmon, trout, tuna, mackerel, herring, and sardines); can decrease risk of arrhythmias, decrease triglyceride levels, and slightly lower blood pressure   The fats that you want to limit are:   Saturated fat  found in animal products, many fast foods, and a few vegetables; increases total blood cholesterol, including LDL levels   Animal fats that are saturated include: butter, lard, whole-milk dairy products, meat fat, and poultry skin   Vegetable fats that are saturated include: hydrogenated shortening, palm oil, coconut oil, cocoa butter   Hydrogenated or trans fat  found in margarine and vegetable shortening, most shelf stable snack foods, and fried foods; increases LDL and decreases HDL     It is generally recommended that you limit your total fat for the day to less than 30% of your total calories.  If you follow an 1800-calorie heart healthy diet, for example, this would mean 60 grams of fat or less per day. Saturated fat and trans fat in your diet raises your blood cholesterol the most, much more than dietary cholesterol does. For this reason, on a heart-healthy diet, less than 7% of your calories should come from saturated fat and ideally 0% from trans fat. On an 1800-calorie diet, this translates into less than 14 grams of saturated fat per day, leaving 46 grams of fat to come from mono- and polyunsaturated fats.    Food Choices on a Heart Healthy Diet   Food Category   Foods Recommended   Foods to Avoid   Grains   Breads and rolls without salted tops Most dry and cooked cereals Unsalted crackers and breadsticks Low-sodium or homemade breadcrumbs or stuffing All rice and pastas   Breads, rolls, and crackers with salted tops High-fat baked goods (eg, muffins, donuts, pastries) Quick breads, self-rising flour, and biscuit mixes Regular bread crumbs Instant hot cereals Commercially prepared rice, pasta, or stuffing mixes   Vegetables   Most fresh, frozen, and low-sodium canned vegetables Low-sodium and salt-free vegetable juices Canned vegetables if unsalted or rinsed   Regular canned vegetables and juices, including sauerkraut and pickled vegetables Frozen vegetables with sauces Commercially prepared potato and vegetable mixes   Fruits   Most fresh, frozen, and canned fruits All fruit juices   Fruits processed with salt or sodium   Milk   Nonfat or low-fat (1%) milk Nonfat or low-fat yogurt Cottage cheese, low-fat ricotta, cheeses labeled as low-fat and low-sodium   Whole milk Reduced-fat (2%) milk Malted and chocolate milk Full fat yogurt Most cheeses (unless low-fat and low salt) Buttermilk (no more than 1 cup per week)   Meats and Beans   Lean cuts of fresh or frozen beef, veal, lamb, or pork (look for the word loin) Fresh or frozen poultry without the skin Fresh or frozen fish and some shellfish Egg whites and egg substitutes (Limit whole eggs to three per week) Tofu Nuts or seeds (unsalted, dry-roasted), low-sodium peanut butter Dried peas, beans, and lentils   Any smoked, cured, salted, or canned meat, fish, or poultry (including manning, chipped beef, cold cuts, hot dogs, sausages, sardines, and anchovies) Poultry skins Breaded and/or fried fish or meats Canned peas, beans, and lentils Salted nuts   Fats and Oils   Olive oil and canola oil Low-sodium, low-fat salad dressings and mayonnaise   Butter, margarine, coconut and palm oils, manning fat   Snacks, Sweets, and Condiments   Low-sodium or unsalted versions of broths, soups, soy sauce, and condiments Pepper, herbs, and spices; vinegar, lemon, or lime juice Low-fat frozen desserts (yogurt, sherbet, fruit bars) Sugar, cocoa powder, honey, syrup, jam, and preserves Low-fat, trans-fat free cookies, cakes, and pies Alex and animal crackers, fig bars, gagandeep snaps   High-fat desserts Broth, soups, gravies, and sauces, made from instant mixes or other high-sodium ingredients Salted snack foods Canned olives Meat tenderizers, seasoning salt, and most flavored vinegars   Beverages   Low-sodium carbonated beverages Tea and coffee in moderation Soy milk   Commercially softened water   Suggestions   Make whole grains, fruits, and vegetables the base of your diet. Choose heart-healthy fats such as canola, olive, and flaxseed oil, and foods high in heart-healthy fats, such as nuts, seeds, soybeans, tofu, and fish. Eat fish at least twice per week; the fish highest in omega-3 fatty acids and lowest in mercury include salmon, herring, mackerel, sardines, and canned chunk light tuna. If you eat fish less than twice per week or have high triglycerides, talk to your doctor about taking fish oil supplements. Read food labels.    For products low in fat and cholesterol, look for fat free, low-fat, cholesterol free, saturated fat free, and trans fat freeAlso scan the Nutrition Facts Label, which lists saturated fat, trans fat, and cholesterol amounts. For products low in sodium, look for sodium free, very low sodium, low sodium, no added salt, and unsalted   Skip the salt when cooking or at the table; if food needs more flavor, get creative and try out different herbs and spices. Garlic and onion also add substantial flavor to foods. Trim any visible fat off meat and poultry before cooking, and drain the fat off after alfonso. Use cooking methods that require little or no added fat, such as grilling, boiling, baking, poaching, broiling, roasting, steaming, stir-frying, and sauting. Avoid fast food and convenience food. They tend to be high in saturated and trans fat and have a lot of added salt. Talk to a registered dietitian for individualized diet advice. Last Reviewed: March 2011 Riley Freeman MS, MPH, RD   Updated: 3/29/2011   ·     Keep Your Memory Nava Calamity       Many factors can affect your ability to remembera hectic lifestyle, aging, stress, chronic disease, and certain medicines. But, there are steps you can take to sharpen your mind and help preserve your memory. Challenge Your Brain   Regularly challenging your mind may help keeps it in top shape. Good mental exercises include:   Crossword puzzlesUse a dictionary if you need it; you will learn more that way. Brainteasers Try some! Crafts, such as wood working and sewing   Hobbies, such as gardening and building model airplanes   SocializingVisit old friends or join groups to meet new ones. Reading   Learning a new language   Taking a class, whether it be art history or chapito chi   TravelingExperience the food, history, and culture of your destination   Learning to use a computer   Going to museums, the theater, or thought-provoking movies   Changing things in your daily life, such as reversing your pattern in the grocery store or brushing your teeth using your nondominant hand   Use Memory Aids   There is no need to remember every detail on your own.  These relaxation. Choose activities that calm you down, and make it routine. Manage Chronic Conditions    Side effects of high blood pressure , diabetes, and heart disease can interfere with mental function. Many of the lifestyle steps discussed here can help manage these conditions. Strive to eat a healthy diet, exercise regularly, get stress under control, and follow your doctor's advice for your condition. Minimize Medications    Talk to your doctor about the medicines that you take. Some may be unnecessary. Also, healthy lifestyle habits may lower the need for certain drugs. Last Reviewed: April 2010 Enrrique Muro MD   Updated: 4/13/2010   ·     [address]       As we get older, changes in balance, gait, strength, vision, hearing, and cognition make even the most youthful senior more prone to accidents. Falls are one of the leading health risks for older people. This increased risk of falling is related to:   Aging process (eg, decreased muscle strength, slowed reflexes)   Higher incidence of chronic health problems (eg, arthritis, diabetes) that may limit mobility, agility or sensory awareness   Side effects of medicine (eg, dizziness, blurred vision)especially medicines like prescription pain medicines and drugs used to treat mental health conditions   Depending on the brittleness of your bones, the consequences of a fall can be serious and long lasting. Home Life   Research by the Association of [employer]ging [facility]) shows that some home accidents among older adults can be prevented by making simple lifestyle changes and basic modifications and repairs to the home environment. Here are some lifestyle changes that experts recommend:   Have your hearing and vision checked regularly. Be sure to wear prescription glasses that are right for you. Speak to your doctor or pharmacist about the possible side effects of your medicines. A number of medicines can cause dizziness.    If you have problems with wood floors can be particularly slippery. Stairs should always have handrails and be carpeted or fitted with a non-skid tread. Is your telephone easily reachable. Is the cord safely tucked away? Room by Room   According to the [employer], bathrooms and gregg are the two most potentially hazardous rooms in your home. In the Kitchen    Be sure your stove is in proper working order and always make sure burners and the oven are off before you go out or go to sleep. Keep pots on the back burners, turn handles away from the front of the stove, and keep stove clean and free of grease build-up. Kitchen ventilation systems and range exhausts should be working properly. Keep flammable objects such as towels and pot holders away from the cooking area except when in use. Make sure kitchen curtains are tied back. Move cords and appliances away from the sink and hot surfaces. If extension cords are needed, install wiring guides so they do not hang over the sink, range, or working areas. Look for coffee pots, kettles and toaster ovens with automatic shut-offs. Keep a mop handy in the kitchen so you can wipe up spills instantly. You should also have a small fire extinguisher. Arrange your kitchen with frequently used items on lower shelves to avoid the need to stand on a stepstool to reach them. Make sure countertops are well-lit to avoid injuries while cutting and preparing food. In the Bathroom    Use a non-slip mat or decals in the tub and shower, since wet, soapy tile or porcelain surfaces are extremely slippery. Make sure bathroom rugs are non-skid or tape them firmly to the floor. Bathtubs should have at least one, preferably two, grab bars, firmly attached to structural supports in the wall. (Do not use built-in soap holders or glass shower doors as grab bars.)    Tub seats fitted with non-slip material on the legs allow you to wash sitting down.  For people with limited mobility, bathtub transfer benches allow you to slide safely into the tub. Raised toilet seats and toilet safety rails are helpful for those with knee or hip problems. In the [facility]    Make sure you use a nightlight and that the area around your bed is clear of potential obstacles. Be careful with electric blankets and never go to sleep with a heating pad, which can cause serious burns even if on a low setting. Use fire-resistant mattress covers and pillows, and NEVER smoke in bed. Keep a phone next to the bed that is programmed to dial 911 at the push of a button. If you have a chronic condition, you may want to sign on with an automatic call-in service. Typically the system includes a small pendant that connects directly to an emergency medical voice-response system. You should also make arrangements to stay in contact with someonefriend, neighbor, family memberon a regular schedule. Fire Prevention   According to the [employer] (Smoke [employer]arms for Every) [address] [place] [address], senior citizens are one of the two highest risk groups for death and serious injuries due to residential fires. When cooking, wear short-sleeved items, never a bulky long-sleeved robe. The [facility]'s Safety Checklist for Older Consumers emphasizes the importance of checking basements, garages, workshops and storage areas for fire hazards, such as volatile liquids, piles of old rags or clothing and overloaded circuits. Never smoke in bed or when lying down on a couch or recliner chair. Small portable electric or kerosene heaters are responsible for many home fires and should be used cautiously if at all. If you do use one, be sure to keep them away from flammable materials. In case of fire, make sure you have a pre-established emergency exit plan. Have a professional check your fireplace and other fuel-burning appliances yearly.     Helping Hands   Baby boomers entering the to years will continue to see 8127-3150 IU of vitamin D per day. It is possible to meet your calcium requirement with diet alone, but a vitamin D supplement is usually necessary to meet this goal.  When exposed to the sun, use a sunscreen that protects against both UVA and UVB radiation with an SPF of 30 or greater. Reapply every 2 to 3 hours or after sweating, drying off with a towel, or swimming. Always wear a seat belt when traveling in a car. Always wear a helmet when riding a bicycle or motorcycle.

## 2020-10-08 ENCOUNTER — HOSPITAL ENCOUNTER (OUTPATIENT)
Dept: OCCUPATIONAL THERAPY | Age: 65
Setting detail: THERAPIES SERIES
Discharge: HOME | End: 2020-10-08
Payer: MEDICARE

## 2020-10-08 PROCEDURE — 97166 OT EVAL MOD COMPLEX 45 MIN: CPT

## 2020-10-08 ASSESSMENT — PAIN SCALES - QUEBEC BACK PAIN DISABILITY SCALE
QUEBEC DISABILITY INDEX: 40-59%
PUT ON SOCKS OR PANYHOSE: 0
GET OUT OF BED: 5
SIT IN A CHAIR FOR SEVERAL HOURS: 2
CARRY TWO BAGS OF GROCERIES: 2
CLIMB ONE FLIGHT OF STAIRS: 2
STAND UP FOR 20 TO 30 MINUTES: 4
MOVE A CHAIR: 2
WALK A FEW BLOCKS OR 300 TO 400M: 5
WALK SEVERAL KILOMETERS  OR MILES: 5
RIDE IN A CAR: 1
LIFT AND CARRY A HEAVY SUITCASE: 2
TOTAL SCORE: 50
TAKE FOOD OUT OF THE REFRIGERATOR: 0
PULL OR PUSH HEAVY DOORS: 1
TURN OVER IN BED: 2
SLEEP THROUGH THE NIGHT: 4
THROW A BALL: 1
QUEBEC CMS MODIFIER: CK
REACH UP TO HIGH SHELVES: 4
MAKE YOUR BED: 1
RUN ONE BLOCK OR 100M: 5
BEND OVER TO CLEAN THE BATHTUB: 2

## 2020-10-08 NOTE — PROGRESS NOTES
[facility] CHRONIC PAIN EVALUATION                                                                                                                   DATE: 10/8/2020         OT Individual Minutes  Time In: 1103  Time Out: 1200  Minutes: 62             OT Eval mod complexity 57 minutes for 1 unit, CPT 23554                                                         PHYSICIAN:   Dr cSott Goss DATE:9/17/2020 noted on referral                                                        \"With myofascial release 2-3 X weekly for 6-8 weeks\"                                          PATIENT NAME: Rayna Alegre  DIAGNOSIS:  Rheumatoid arthritis involving multiple sites    MRN: [id number]    ACCOUNT#: [de-identified]                                                     TZC:8/9/5624  (76 y.o.)       VISITS ALLOWED/INSURANCE/CERTIFICATION INFORMATION: Medicare - no visit limitation       PERTINENT MEDICAL HISTORY:  Patient Active Problem List   Diagnosis    Spinal stenosis of lumbar region with neurogenic claudication    High risk medication use - 10/17/17 OARRS PM&R, 12/19/17 OARRS PM&R, 12/20/17 Tox screen positive for opiates, Hydrocodone PM&R, MED CONTRACT 2/2/17    Mixed hyperlipidemia    Benign essential hypertension    Neuropathy involving both lower extremities    Gait abnormality    Rheumatoid arthritis involving multiple sites with positive rheumatoid factor (HCC)    Ankle swelling    COLTON positive    Deformity of finger of right hand    Chronic bilateral low back pain with bilateral sciatica    Cervicalgia     Past Medical History:   Diagnosis Date    Abnormal electromyogram (EMG) 8/19/09    SENSORIMOTOR NEUROPATHY OF BLE, RIGHT WORSE THAN LEFT, SUSPICIOUS FOR RIGHT S1 RADIC    Angina pectoris ([address]     Angina pectoris ([address] 07/2019    sees Dr. Piyush Murphy    Ankle pain     Bleeding ulcer  Colon polyp 4/3/2017    Dermatitis     Fibromyalgia     Foot pain     Hyperlipidemia     Hypertension     Infection of intervertebral disc (pyogenic), lumbar region ([facility] 8/13/2018    Low back pain     MRSA (methicillin resistant staph aureus) culture positive 2011    Neck pain     Neuropathy     Obesity     Osteoarthritis     PSVT (paroxysmal supraventricular tachycardia) ([facility]     Shingles outbreak 2012    SI (sacroiliac) pain     Stenosis     Vitamin D deficiency         Past Surgical History:   Procedure Laterality Date    CARDIAC CATHETERIZATION  10/2016    CERVICAL FUSION  12/14/11    Dr Yoko Loo with bone graft and plate    COLONOSCOPY      ENDOSCOPIC ULTRASOUND (LOWER) N/A 7/11/2019    EGD/ EUS performed by Lizzy Martin MD at [facility]  7/05    HYSTERECTOMY  2001    HYSTERECTOMY, TOTAL ABDOMINAL      JOINT REPLACEMENT Left     tka    LIVER BIOPSY      MENISCECTOMY  10/31/11    left knee    OTHER SURGICAL HISTORY  10/26/09    CAUDALS BY DR Akbar Noonan    DE COLON CA SCRN NOT  [facility] N/A 4/12/2017    COLONOSCOPY performed by Lizzy Ambrose MD at [facility] ESOPHAGOGASTRODUODENOSCOPY TRANSORAL DIAGNOSTIC N/A 4/12/2017    EGD ESOPHAGOGASTRODUODENOSCOPY performed by Lizzy Ambrose MD at [facility]  12/2011    Dr Yoko Loo Lumbar and c spine    TOTAL KNEE ARTHROPLASTY  7/30/12    LEFT-DR AVELAR    UPPER GASTROINTESTINAL ENDOSCOPY  1995     Allergies   Allergen Reactions    Latex Swelling     Swelling of hands with wearing latex gloves    Norvasc [Amlodipine Besylate] Other (See Comments)     swelling    Keflex [Cephalexin] Rash               DIAGNOSTIC IMAGING: Patient has not recent imaging per her report                 MEDICATIONS:  Current Outpatient Medications:     zoster recombinant adjuvanted vaccine (SHINGRIX) 50 MCG/0.5ML SUSR injection, Inject 0.5 mLs into the muscle See Admin Instructions 1 dose now and repeat in 2-6 months, Disp: 0.5 mL, Rfl: 0    oxyCODONE-acetaminophen (PERCOCET) 7.5-325 MG per tablet, Take 1 tablet by mouth every 6 hours as needed for Pain for up to 30 days.  Intended supply: 30 days, Disp: 60 tablet, Rfl: 0    pantoprazole (PROTONIX) 40 MG tablet, Take 1 tablet by mouth every morning (before breakfast), Disp: 90 tablet, Rfl: 1    doxycycline hyclate (VIBRA-TABS) 100 MG tablet, TAKE 1 TABLET BY MOUTH TWICE A DAY, Disp: 180 tablet, Rfl: 0    topiramate (TOPAMAX) 25 MG tablet, TAKE 1 TABLET BY MOUTH EVERY DAY AT NIGHT, Disp: 60 tablet, Rfl: 1    losartan (COZAAR) 50 MG tablet, TAKE 1 TABLET BY MOUTH EVERY DAY, Disp: , Rfl:     gabapentin (NEURONTIN) 300 MG capsule, TAKE 1 CAPSULE BY MOUTH DAILY AND 2 AT NIGHT AS TOLERATED (Patient taking differently: TAKE 1 CAPSULE BY MOUTH DAILY AND 1 AT NIGHT AS TOLERATED), Disp: 270 capsule, Rfl: 1    cyanocobalamin 1000 MCG/ML injection, INJECT 1 ML AS DIRECTED EVERY 14 DAYS FOR 12 DOSES INJECT ONE CC SUB-CUTANEOSLY EVERY OTHER WEEK, Disp: 6 mL, Rfl: 3    atorvastatin (LIPITOR) 10 MG tablet, Take 1 tablet by mouth daily, Disp: , Rfl:     hydroxychloroquine (PLAQUENIL) 200 MG tablet, Take 1 tablet by mouth daily, Disp: , Rfl:     hydrochlorothiazide (HYDRODIURIL) 25 MG tablet, , Disp: , Rfl:     nitroGLYCERIN (NITROSTAT) 0.4 MG SL tablet, PLEASE SEE ATTACHED FOR DETAILED DIRECTIONS, Disp: 25 tablet, Rfl: 3    melatonin 5 MG TABS tablet, Take 5 mg by mouth nightly, Disp: , Rfl:     lidocaine (XYLOCAINE) 5 % ointment, APPLY TO AFFECTED AREA EVERY 12 HOURS AS NEEDED FOR PAIN, Disp: , Rfl: 5    ammonium lactate (AMLACTIN) 12 % cream, APPLY TO DRY/CALLUS AREAS TWICE DAILY, Disp: , Rfl: 5    aspirin 81 MG tablet, Take 81 mg by mouth daily, Disp: , Rfl:     Coenzyme Q10 (COQ10 PO), Take 1 capsule by mouth daily , Disp: , Rfl:     Cholecalciferol (VITAMIN D3) 2000 units TABS, Take 2 tablets by mouth daily , Disp: , Rfl: Current Facility-Administered Medications:     cyanocobalamin injection 1,000 mcg, 1,000 mcg, Intramuscular, Once, Rosaline Scmaeganin, DO    PRECAUTIONS: Latex allergy         ENGLISH PRIMARY LANGUAGE: Yes    TRANSFER OF PT CARE REQUIRED: yes, Transfer care to Toi Feliz OTR                                                         HAND DOMINANCE: right    PRIOR LEVEL OF FUNCTION:  [x] Patient performing at independent level for ADL and IADL activities:    [] Patient receiving assistance for ADL and IADL activities:    [] Other:    Comments: Patient reported that she does most homemaking tasks with extra time needed                                       DATE OF SURGERY OR INJURY: on going with increase since 2018    PREVIOUS/CURRENT TREATMENT FOR SAME PROBLEM:    [x]   YES   [] NO    SUBJECTIVE:  \"I need a shoulder replacement but because I have osteomyelitis so no one will do it\"    CHIEF COMPLAINT: Patient reported that she has pain in her low back and sciatica    PATIENT GOALS FOR THERAPY: Patient would like to be able to sleep in a bed. She owns a sleep number mattress and cannot sleep in the bed because she cannot get comfortable. She reported that if she sleeps on her right side her shoudler and sciatica \"kill her\". If she lays on her left side her right leg goes numb all the way down. She tried a pillow between her knees and that did not work. She is able to lay on the bed or a couch to do stretches. Patient now sleeps in a recliner but is not tolerating sleeping in the chair any longer. She reported that she is kristi if she gets 6 hours of sleep at one time. SOCIAL SUPPORT: spouse     Patient is moving to a ranch style home in December and then is looking to build a ranch level home next spring.   Pt lives: spouse and special needs son   Home:  two level with flight stairs going up and handrail going up - bedroom is on the first floor  Entrance: 2 stairs into the house,   Bathroom: walk in shower   DME: shower chair, cane  and 3:1 commode     ADL/HOBBIES/ROUTINES: Patient has recently been going through her  mother's house and getting rid of items to get the house ready to move into    PAIN SCALE:       4/10                        LOCATION:  Low back and sciatica     END OF SESSION PAIN: 5/10    Patient reported that the more she sits the more pain she has. Patient reported that when she stands up it will decrease initially and then increase    Comments: Pt reports lowest pain 0/10 during rest, and highest 9/10 during activities - the more active that she is she notices the pain goes up. Patient does short amount of times on her feet and then she sits down. Patient is getting a sciatic nerve block 10/19/2020 and has had it previously but reported that it does not last long. Patient is also scheduled for trigger point injections. OBJECTIVE:    POSTURAL EXAM/COMPENSATION: Patient is noted to have shortened trunk on the right side with her shoulder being lower on that side. Her left scapula wings mildly and her right scapula is tight to the body. She keeps her weight posterior while in stand. Patient appeared wobbly in stand but this improved when she was encouraged to separate her feet to shoulder width. Patient exhibits a forward head position with a soft prominence noted posterior neck. UPPER EXTREMITY STRENGTH AND RANGE OF MOTION: Patient is noted to have arthritic changes in her hands with ulnar drift noted in right ulnar digits. Patient has had multiple surgeries on her hands with a tendon repair on her left thumb and multiple joint fusions    LUMBAR/SACROILIAC ASSESSMENT: Patient was noted to have lordosis with anterior pelvic tilt.     LEG LENGTH COMPARISON: to be assessed at a later time    NEUROLOGICAL ASSESSMENT: to be assessed at a later time    SOFT TISSUE ASSESSMENT/MOBILITY: Patient has tightness of the tissue in her back throughout     SCAR/LOCATION/MOBILITY: none observed on eval    EDUCATION/BARRIERS TO LEARNING:     Barriers:none   Education on this date: patient was educated in the role of OT. Patient has had OT in the past and is aware of the techniques used      Strength Norms in Pounds      Current          65-69                       Male                 Right           86      Left           66                       Female                 Right 20          48      Left 38          43        Quintana Pinch Norms in Pounds      Current          65-71                       Male                 Right           16.0      Left           15.0                       Female                 Right 3          10.0      Left 4.5          9.0        Lateral Pinch Norms in Pounds      Current          65-69                       Male                 Right           18.5      Left           17.0                       Female                 Right 6.5          11.0      Left 8          10.0        Norms listed are 50th percentile for each age group listed. FALLS: see falls risk assessment form    COMMENTS/CLINICAL IMPRESSIONS:  Patient is a 77year old female with multiple medical issues with pain in her back and right lower extremity. Patient is noted to have fascial restrictions and muscle tightness that is contributing to patient's pain. Patient's posture and shortened trunk is impacting her ability to stand upright and maintain her balance         The [place] Back Pain Disability Scale  The [place] Back Pain Disability Scale  Get out of bed: Unable to do  Sleep through the night: Very difficult  Turn over in bed: Somewhat difficult  Ride in a car: Minimally difficult  Stand up for 20-30 minutes: Very difficult  Sit in a chair for several hours: Somewhat difficult  Climb one flight of stairs: Somewhat difficult  Walk a few blocks (300-400 m):  Unable to do  Walk several kilometers - miles: Unable to do  Reach up to high shelves: Very difficult  Throw a ball: Minimally difficult  Run one report less sleep disturbances from pain to increase quality of sleep. LTG 5 : Patient will decrease fascial restrictions in trunk  to decrease pain during functional activities. LTG 6 : Patient will decrease fascial restrictions in back and right hip to decrease pain and increase ROM during functional activities. LTG right lower extremity : Patient will report decreased frequency of numbness/tingling in right lower extremity. PROBLEMS:  [x]  PAIN [x]   IMPAIRED SKIN/TISSUE MOBILITY              []  EDEMA []    IMPAIRED SENSATION   []  IMPAIRED ROM []   DECREASED STRENGTH   []  IMPAIRED COORDINATION /         DEXTERITY []  DECREASED USE ___UE FOR         ADL/WORK ROLE PERFORMANCE   [x]  DECREASED KNOWLEDGE HEP      [x]  IMPAIRED FLEXIBILITY                 []  OTHER:           TREATMENT PLAN:  [x]  Re-evaluation                        [x]  Pain Mgmt. Tech. [x]  PROM/stretching/AAROM/AROM []  Coordination/Dexterity retraining   [x]  Instruction written HEP          []  Scar Mgmt.                                []  Desensitization [x]  Modalities         PRN   [x]  MFR techniques                    []  ADL Retraining   []  Strengthening/Graded therapeutic activity []  Edema mgmt. Tech   [x]  Instruction/application of        Energy conservation, work        Simplification, joint protection,               Body mechanics      []  Work Simulation Activity    [x] HUERTA able to work with pt   [x] D/C plan: Will assess pt after established visits to determine need for continued therapy.               []  Other:                                                                                                                                                                                             OUTPATIENT FALLS RISK ASSESSMENT                          Age: 0-59 = 0          60-69= 1            > 70= 2 History of Falls:   0  Falls  last 6 mo = 0    1  fall  Last  6 mo = 1   1-3 falls last 6 mo = 2 Medical History:   Parkinsons, CVA,HTN, vertigo, >4 meds, use of assistive device (1pt.for each)  Mental Status:  A & O x 3 = 0  Disoriented to person, place, or time = 2     High Risk for Falls: >8  Intermediate Risk for Falls: 4-8   Low Risk for Falls: <4    [x]  INITIAL ASSESSMENT:                                                      Date: 10/8/2020                                                        Age:   1                                                         Falls: 1                                                         PMH:  3                                                         Mental: 0                                                      Total:  5                                                       *Patient 4 or younger []   Vestibular []    Signature: MYRA Gamez, 10/8/2020, 12:09 PM                                                        * All pediatric patients (age 3 or younger) and vestibular patients will be considered high risk for falls- scoring does not need to be completed - treat as high risk. Interventions     Low Risk: Monitor for changes, reassess every three months. Intermediate Risk: Supervision for most activities, direct contact with new activities or equipment, reassess monthly. High Risk: Stand by assitance at all times, reassess monthly.        THERAPY TIME INDIVIDUAL                                       TIME IN 1103                                      TIME OUT 1200                           TOTAL MINUTES 57                                  Electronically signed by:  MYRA Gamez                    Date: 10/8/2020, 12:09 PM        I HEREBY AGREE TO THIS PLAN OF CARE AS MEDICALLY NECESSARY:     Physician signature                                                               Date

## 2020-10-13 ENCOUNTER — HOSPITAL ENCOUNTER (OUTPATIENT)
Dept: OCCUPATIONAL THERAPY | Age: 65
Setting detail: THERAPIES SERIES
Discharge: HOME | End: 2020-10-13
Payer: MEDICARE

## 2020-10-13 PROCEDURE — 97530 THERAPEUTIC ACTIVITIES: CPT

## 2020-10-13 PROCEDURE — 97140 MANUAL THERAPY 1/> REGIONS: CPT

## 2020-10-13 NOTE — PROGRESS NOTES
Occupational Therapy  Daily Treatment Note  Date: 10/13/2020  Patient Name: Maida Centeno  :  1955  MRN: [id number]       Subjective Pt reports she is off prescription medication  OT Visit Information  Progress Note Counter:   Pre Treatment Pain Screening  Pain at present: 6  Scale Used: Numeric Score   Treatment Activities:  Pt was treated directly with MFR ( myofascial release) to address R sciatica. Also discussed the future design of her new home to incorporate needed accessible features as they design floor plan:  Lower extremity: right leg pull , transverse plane: R quadriceps and deep release right hamstrings , right IT band  and right Internal/external hip release   Lumbar/Pelvis/Sacral: right and bilateral  quadratus lumborum , pelvic floor transverse plane and bilateral  psoas release          Pt was also instructed in HEP starting with R LE elongation in supine 2-3 times a day                 Assessment  Pt has had many sessions of OT over the years to address this recurring sciatica. Pt has eliminated some stretches and activity that were previously helpful and Pt was again reminded of them today.  Pt is sleeping in a recliner ( was also doing this the last sessions she was seen and was warned that this was not supportive of reduction of sciatica )                Plan POC as noted                            Goals As per POC       Therapy Time   Individual Concurrent Group Co-treatment   Time In  900         Time Out  1000         Minutes  60              Electronically signed by MYRA Jorgensen on 10/13/2020 at 5:34 PM    MADELINE Jorgensen/KELSI

## 2020-10-15 RX ORDER — ATORVASTATIN CALCIUM 10 MG/1
TABLET, FILM COATED ORAL
Qty: 90 TABLET | Refills: 3 | Status: SHIPPED | OUTPATIENT
Start: 2020-10-15 | End: 2021-10-15 | Stop reason: SDUPTHER

## 2020-10-16 ENCOUNTER — HOSPITAL ENCOUNTER (OUTPATIENT)
Dept: OCCUPATIONAL THERAPY | Age: 65
Setting detail: THERAPIES SERIES
Discharge: HOME | End: 2020-10-16
Payer: MEDICARE

## 2020-10-16 DIAGNOSIS — M05.79 RHEUMATOID ARTHRITIS INVOLVING MULTIPLE SITES WITH POSITIVE RHEUMATOID FACTOR (HCC): ICD-10-CM

## 2020-10-16 DIAGNOSIS — I10 BENIGN ESSENTIAL HYPERTENSION: ICD-10-CM

## 2020-10-16 DIAGNOSIS — A49.01 STAPH AUREUS INFECTION: ICD-10-CM

## 2020-10-16 DIAGNOSIS — E78.2 MIXED HYPERLIPIDEMIA: ICD-10-CM

## 2020-10-16 DIAGNOSIS — M46.26 OSTEOMYELITIS OF LUMBAR VERTEBRA (HCC): ICD-10-CM

## 2020-10-16 DIAGNOSIS — N18.30 STAGE 3 CHRONIC KIDNEY DISEASE (HCC): ICD-10-CM

## 2020-10-16 DIAGNOSIS — R23.3 PETECHIAL RASH: ICD-10-CM

## 2020-10-16 LAB
ALBUMIN SERPL-MCNC: 4.3 G/DL (ref 3.5–4.6)
ALP BLD-CCNC: 101 U/L (ref 40–130)
ALT SERPL-CCNC: 21 U/L (ref 0–33)
ANION GAP SERPL CALCULATED.3IONS-SCNC: 12 MEQ/L (ref 9–15)
AST SERPL-CCNC: 20 U/L (ref 0–35)
BASOPHILS ABSOLUTE: 0 K/UL (ref 0–0.2)
BASOPHILS RELATIVE PERCENT: 0.9 %
BILIRUB SERPL-MCNC: 0.3 MG/DL (ref 0.2–0.7)
BUN BLDV-MCNC: 27 MG/DL (ref 8–23)
C-REACTIVE PROTEIN: <0.3 MG/L (ref 0–5)
CALCIUM SERPL-MCNC: 9.4 MG/DL (ref 8.5–9.9)
CHLORIDE BLD-SCNC: 102 MEQ/L (ref 95–107)
CHOLESTEROL, FASTING: 176 MG/DL (ref 0–199)
CO2: 24 MEQ/L (ref 20–31)
CREAT SERPL-MCNC: 1.32 MG/DL (ref 0.5–0.9)
EOSINOPHILS ABSOLUTE: 0.2 K/UL (ref 0–0.7)
EOSINOPHILS RELATIVE PERCENT: 3.7 %
GFR AFRICAN AMERICAN: 48.5
GFR NON-AFRICAN AMERICAN: 40.1
GLOBULIN: 2.4 G/DL (ref 2.3–3.5)
GLUCOSE BLD-MCNC: 100 MG/DL (ref 70–99)
HCT VFR BLD CALC: 39.3 % (ref 37–47)
HDLC SERPL-MCNC: 107 MG/DL (ref 40–59)
HEMOGLOBIN: 13 G/DL (ref 12–16)
LDL CHOLESTEROL CALCULATED: 59 MG/DL (ref 0–129)
LYMPHOCYTES ABSOLUTE: 1 K/UL (ref 1–4.8)
LYMPHOCYTES RELATIVE PERCENT: 22.5 %
MCH RBC QN AUTO: 31 PG (ref 27–31.3)
MCHC RBC AUTO-ENTMCNC: 33.1 % (ref 33–37)
MCV RBC AUTO: 93.8 FL (ref 82–100)
MONOCYTES ABSOLUTE: 0.5 K/UL (ref 0.2–0.8)
MONOCYTES RELATIVE PERCENT: 12.1 %
NEUTROPHILS ABSOLUTE: 2.8 K/UL (ref 1.4–6.5)
NEUTROPHILS RELATIVE PERCENT: 60.8 %
PDW BLD-RTO: 13.7 % (ref 11.5–14.5)
PLATELET # BLD: 209 K/UL (ref 130–400)
POTASSIUM SERPL-SCNC: 4.7 MEQ/L (ref 3.4–4.9)
RBC # BLD: 4.19 M/UL (ref 4.2–5.4)
SODIUM BLD-SCNC: 138 MEQ/L (ref 135–144)
TOTAL PROTEIN: 6.7 G/DL (ref 6.3–8)
TRIGLYCERIDE, FASTING: 51 MG/DL (ref 0–150)
WBC # BLD: 4.5 K/UL (ref 4.8–10.8)

## 2020-10-16 PROCEDURE — 97140 MANUAL THERAPY 1/> REGIONS: CPT

## 2020-10-16 PROCEDURE — 97530 THERAPEUTIC ACTIVITIES: CPT

## 2020-10-16 NOTE — PROGRESS NOTES
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 Reviewed ECWS to assist and prevent further pain or possible injury while she is doing this work. Pt was noted to be standing with R side shortening and R shoulder elevation. Pt was treated with MFR and and AROM following. Corrected previous HEP activity as Pt was actively pushing her heel into the surface  Vs elongating her leg as originally instructed. A seated R trunk elongation was added and Pt was able to repeat demonstrate this properly. Pt to use this multiple times per day. Pt notes she is developing a holding pattern of tightly crossing her arms even when seated in her recliner. Reminded Pt that these types of holding patterns are often emotionally /stress based  And to be expected with have to settle 2 homes to sell them. MFR as noted below:  Upper extremity: deep releases Other: scapula releases and mobilization and transverse plane right shoulder   Lower extremity: right leg pull , transverse plane: pelvic floor and deep release right Internal/external hip release   Lumbar/Pelvis/Sacral: deep release of lumbo/sacral area, right piriformis release , right quadratus lumborum  and right psoas release             Assessment Pt was noted to more upright and midline and Pt was aware of the shift toward midline.      Post Treatment Pain Screening  Pain at present: 3         Plan Continue with POC                            Goals As per POC       Therapy Time   Individual Concurrent Group Co-treatment   Time In  900         Time Out [address]              Electronically signed by MYRA Montero on 10/16/2020 at 10:46 AM    MADELINE Montero/KELSI

## 2020-10-19 ENCOUNTER — PROCEDURE VISIT (OUTPATIENT)
Dept: PHYSICAL MEDICINE AND REHAB | Age: 65
End: 2020-10-19
Payer: MEDICARE

## 2020-10-19 PROCEDURE — 76942 ECHO GUIDE FOR BIOPSY: CPT | Performed by: PHYSICAL MEDICINE & REHABILITATION

## 2020-10-19 PROCEDURE — 64445 NJX AA&/STRD SCIATIC NRV IMG: CPT | Performed by: PHYSICAL MEDICINE & REHABILITATION

## 2020-10-19 RX ORDER — LIDOCAINE HYDROCHLORIDE 10 MG/ML
8 INJECTION, SOLUTION INFILTRATION; PERINEURAL ONCE
Status: COMPLETED | OUTPATIENT
Start: 2020-10-19 | End: 2020-10-19

## 2020-10-19 RX ORDER — LIDOCAINE HYDROCHLORIDE 20 MG/ML
5 INJECTION, SOLUTION INFILTRATION; PERINEURAL ONCE
Status: COMPLETED | OUTPATIENT
Start: 2020-10-19 | End: 2020-10-19

## 2020-10-19 RX ADMIN — LIDOCAINE HYDROCHLORIDE 5 ML: 20 INJECTION, SOLUTION INFILTRATION; PERINEURAL at 15:37

## 2020-10-19 RX ADMIN — LIDOCAINE HYDROCHLORIDE 8 ML: 10 INJECTION, SOLUTION INFILTRATION; PERINEURAL at 15:35

## 2020-10-20 ENCOUNTER — HOSPITAL ENCOUNTER (OUTPATIENT)
Dept: OCCUPATIONAL THERAPY | Age: 65
Setting detail: THERAPIES SERIES
Discharge: HOME | End: 2020-10-20
Payer: MEDICARE

## 2020-10-20 PROCEDURE — 97140 MANUAL THERAPY 1/> REGIONS: CPT

## 2020-10-20 PROCEDURE — 97530 THERAPEUTIC ACTIVITIES: CPT

## 2020-10-23 ENCOUNTER — HOSPITAL ENCOUNTER (OUTPATIENT)
Dept: OCCUPATIONAL THERAPY | Age: 65
Setting detail: THERAPIES SERIES
Discharge: HOME | End: 2020-10-23
Payer: MEDICARE

## 2020-10-23 PROCEDURE — 97140 MANUAL THERAPY 1/> REGIONS: CPT

## 2020-10-23 PROCEDURE — 97112 NEUROMUSCULAR REEDUCATION: CPT

## 2020-10-23 ASSESSMENT — PATIENT HEALTH QUESTIONNAIRE - PHQ9
SUM OF ALL RESPONSES TO PHQ QUESTIONS 1-9: 0
1. LITTLE INTEREST OR PLEASURE IN DOING THINGS: 0
2. FEELING DOWN, DEPRESSED OR HOPELESS: 0
SUM OF ALL RESPONSES TO PHQ QUESTIONS 1-9: 0
SUM OF ALL RESPONSES TO PHQ9 QUESTIONS 1 & 2: 0
SUM OF ALL RESPONSES TO PHQ QUESTIONS 1-9: 0

## 2020-10-23 NOTE — PROGRESS NOTES
Occupational Therapy  Daily Treatment Note  Date: 10/23/2020  Patient Name: Armen Fierro  :  1955  MRN: [id number]       Subjective Pt report that Items she is doing for HEP \"really help\"     Pre Treatment Pain Screening  Pain at present: 3  Scale Used: Numeric Score  Intervention List: Patient able to continue with treatment   Treatment Activities:  Pt noted to have asymmetrical  gluteal musculature with R side being elevated as compared to the L.  Pt was seen for direct tx with MFR as noted below and Followed with supine and seated activities to further dissociate upper and power trunk  And reduce postural holding patterns preventing R fareed mobility       Thoracic: right pectoralis major , right pectoralis minor , bilateral  thoracic paraspinals , bilateral  lateral obliques  and transverse plane respiratory diaphragm    Upper extremity: transverse plane right shoulder   Lower extremity: right leg pull   Lumbar/Pelvis/Sacral: deep release of lumbo/sacral area, bilateral  piriformis release , right quadratus lumborum , pelvic floor transverse plane and bilateral  psoas release        Assessment Pt tolerated tx well and noted she was \"walking faster\" as she left clinic     Post Treatment Pain Screening  Pain at present: 2         Plan Cont with POC                                    Goals As per POC       Therapy Time   Individual Concurrent Group Co-treatment   Time In  900         Time Out  1000         Minutes  60           Electronically signed by MADELINE Montero/L on 10/23/2020 at [address] MADELINE Sousa/KELSI

## 2020-10-27 ENCOUNTER — HOSPITAL ENCOUNTER (OUTPATIENT)
Dept: OCCUPATIONAL THERAPY | Age: 65
Setting detail: THERAPIES SERIES
Discharge: HOME | End: 2020-10-27
Payer: MEDICARE

## 2020-10-27 PROCEDURE — 97140 MANUAL THERAPY 1/> REGIONS: CPT

## 2020-10-27 PROCEDURE — 97530 THERAPEUTIC ACTIVITIES: CPT

## 2020-10-27 RX ORDER — OXYCODONE AND ACETAMINOPHEN 7.5; 325 MG/1; MG/1
1 TABLET ORAL EVERY 6 HOURS PRN
Qty: 60 TABLET | Refills: 0 | Status: SHIPPED | OUTPATIENT
Start: 2020-10-27 | End: 2020-11-24 | Stop reason: SDUPTHER

## 2020-10-27 RX ORDER — TOPIRAMATE 25 MG/1
TABLET ORAL
Qty: 60 TABLET | Refills: 1 | Status: SHIPPED | OUTPATIENT
Start: 2020-10-27 | End: 2021-02-17 | Stop reason: SDUPTHER

## 2020-10-27 RX ORDER — BACLOFEN 10 MG/1
TABLET ORAL
Qty: 90 TABLET | Refills: 2 | Status: SHIPPED | OUTPATIENT
Start: 2020-10-27 | End: 2021-07-22 | Stop reason: SDUPTHER

## 2020-10-30 ENCOUNTER — HOSPITAL ENCOUNTER (OUTPATIENT)
Dept: OCCUPATIONAL THERAPY | Age: 65
Setting detail: THERAPIES SERIES
Discharge: HOME | End: 2020-10-30
Payer: MEDICARE

## 2020-10-30 PROCEDURE — 97140 MANUAL THERAPY 1/> REGIONS: CPT

## 2020-10-30 PROCEDURE — 97112 NEUROMUSCULAR REEDUCATION: CPT

## 2020-11-02 ENCOUNTER — PROCEDURE VISIT (OUTPATIENT)
Dept: PHYSICAL MEDICINE AND REHAB | Age: 65
End: 2020-11-02
Payer: MEDICARE

## 2020-11-02 PROCEDURE — 20553 NJX 1/MLT TRIGGER POINTS 3/>: CPT | Performed by: PHYSICAL MEDICINE & REHABILITATION

## 2020-11-02 PROCEDURE — 96372 THER/PROPH/DIAG INJ SC/IM: CPT | Performed by: PHYSICAL MEDICINE & REHABILITATION

## 2020-11-02 RX ADMIN — CYANOCOBALAMIN 1000 MCG: 1000 INJECTION INTRAMUSCULAR; SUBCUTANEOUS at 08:27

## 2020-11-02 RX ADMIN — LIDOCAINE HYDROCHLORIDE 15 ML: 10 INJECTION, SOLUTION INFILTRATION; PERINEURAL at 08:28

## 2020-11-02 NOTE — PROGRESS NOTES
Patient here for trigger point injections. Patient taken back to exam room, and placed on drape locking stool. Areas to be injected marked appropriately, and cleansed with alcohol. 15cc of 1% Lidocaine, and 1cc of B12 to be injected by provider.

## 2020-11-03 ENCOUNTER — HOSPITAL ENCOUNTER (OUTPATIENT)
Dept: OCCUPATIONAL THERAPY | Age: 65
Setting detail: THERAPIES SERIES
Discharge: HOME | End: 2020-11-03
Payer: MEDICARE

## 2020-11-03 PROCEDURE — 97140 MANUAL THERAPY 1/> REGIONS: CPT

## 2020-11-03 RX ORDER — LIDOCAINE HYDROCHLORIDE 10 MG/ML
15 INJECTION, SOLUTION INFILTRATION; PERINEURAL ONCE
Status: COMPLETED | OUTPATIENT
Start: 2020-11-03 | End: 2020-11-02

## 2020-11-03 RX ORDER — CYANOCOBALAMIN 1000 UG/ML
1000 INJECTION INTRAMUSCULAR; SUBCUTANEOUS ONCE
Status: COMPLETED | OUTPATIENT
Start: 2020-11-03 | End: 2020-11-02

## 2020-11-03 NOTE — PROGRESS NOTES
Occupational Therapy  Daily Note     Name: Mariola Vinson  : 1955  MRN: [id number]     Visit Information:   Progress Note Counter:     Date: 11/3/2020  OT Manual therapy 56 minutes for 4 unit(s), CPT 62608     OT Individual Minutes  Time In: 0900  Time Out: 8382  Minutes: 56    Subjective:  \"I didn't sleep well last night. \"  Pt stated had trigger point injections yesterday. Pain rating:   Pre-treatment pain:    10, lower back, \"stabbing\"     Action for pain:   Patient reports pain is at acceptable level for treatment. Pain after treatment:      3/10, pt stated some soreness in R shoulder that was not relived during session (near end of session after pt lying on side). Focus of treatment was on the following:   decreasing fascial restrictions and decreasing pain     Objective:    Treatment Activity:     MFR/Manual: Pt seated for RUE work, side lying or supine on mat table for other MFR techniques    Upper extremity: R shoulder bear claw to scapular region and transverse plane to arm (pt stated arm hurting after side lying). Pt reported needing surgery for shoulder, but reported \"no one\" will do the surgery. Thoracic paraspinals with cross hand technique   Lower extremity: right and left  leg pull   Lumbar/Pelvis/Sacral: soft tissue mobility: cross hand technique lower back, bear claw to left and right glueteal region, cross hand over right and left hip,    Compression to right and left hip      Discussed previous HEP: yes, Pt verbally confirmed compliant with HEP's    Comments: Pt verbalized using meghana for relaxation in past, and sometime uses it currentlyu. Pt stated primary goal is to get back into bed. Pt reported pulling on handle with RUE over the weekend which increased pain in R shoulder and arm during treatment (not when asked about pain at beginning of session). Pain increased during side lying to R shoulder. Assessment:   Pt tolerated treatment well.  Pt reported decreased pain after OT treatment.     Plan:   Continue POC    Goals: Per established [address], OTR/L   11/3/2020  12:37 PM

## 2020-11-06 ENCOUNTER — HOSPITAL ENCOUNTER (OUTPATIENT)
Dept: OCCUPATIONAL THERAPY | Age: 65
Setting detail: THERAPIES SERIES
Discharge: HOME | End: 2020-11-06
Payer: MEDICARE

## 2020-11-06 PROCEDURE — 97140 MANUAL THERAPY 1/> REGIONS: CPT

## 2020-11-06 PROCEDURE — 97530 THERAPEUTIC ACTIVITIES: CPT

## 2020-11-06 NOTE — PROGRESS NOTES
Occupational Therapy  Daily Treatment Note  Date: 2020  Patient Name: Ramses Jorge  :  1955  MRN: [id number]       Subjective Pt does not have cane today, continues to report \"wall walking\" in house where she dose't use cane  OT Visit Information  Progress Note Counter:   Pre Treatment Pain Screening  Pain at present: 5  Scale Used: Numeric Score  Intervention List: Patient able to continue with treatment   Treatment Activities:  R shoulder and back and feet. Pt reports using ladder outside to clean a window and repetitive climbing up and down ladder is hurting her feet. Reminded Pt that with her challenges that ladder use on uneven surfaces is not safe, and is likely why she has R shoulder pain. Re-educated in ECWS, and Pt was treated directly with MFR. Also explored her need for shoe orthotics to support feed . Made recommendations for OTC orthotics as Pt stated that she previously had a hinged ankle braces that she could not tolerate and that were not helpful. Thoracic: right thoracic paraspinals , right lateral obliques  and transverse plane thoracic inlet and respiratory diaphragm    Upper extremity: right arm pull   Lower extremity: deep release right IT band   Lumbar/Pelvis/Sacral: deep release of lumbo/sacral area, right piriformis release , right quadratus lumborum  and right psoas release    R scapular releases and mobilization   Followed with active R trunk elongation activities.  Pt reports doing HEP              Assessment  Pt was noted to be more upright and had decrease pain with sitting and standing     Post Treatment Pain Screening  Pain at present: 2         Plan Pt to pursue OTC orthotics for foor comfort due to severe arthritic changes  And continue POC                            Goals As per POC       Therapy Time   Individual Concurrent Group Co-treatment   Time In  900         Time Out  1000         Minutes  60              Electronically signed by MADELNIE Almaguer/KELSI on 11/6/2020 at 5:09 PM    Franklyn Najjar, OTR/KELSI

## 2020-11-13 ENCOUNTER — HOSPITAL ENCOUNTER (OUTPATIENT)
Dept: OCCUPATIONAL THERAPY | Age: 65
Setting detail: THERAPIES SERIES
Discharge: HOME | End: 2020-11-13
Payer: MEDICARE

## 2020-11-13 PROCEDURE — 97530 THERAPEUTIC ACTIVITIES: CPT

## 2020-11-13 PROCEDURE — 97140 MANUAL THERAPY 1/> REGIONS: CPT

## 2020-11-13 NOTE — PROGRESS NOTES
Occupational Therapy  Daily Treatment Note  Date: 2020  Patient Name: Aidan Armando  :  1955  MRN: [id number]       Subjective Pt reports her R sciatic nerve based pain  OT Visit Information  Progress Note Counter:   Pre Treatment Pain Screening  Pain at present: 5  Scale Used: Numeric Score  Intervention List: Patient able to continue with treatment   Treatment Activities:  Pt was treated with the focus on R sciatica and R IT band pain  Pt was treated with foam roller while in sidelying as Pt had difficulty tolerating direct releases to the entire R hip and IT band area. Pt was then treated more directly as noted below:    Thoracic: right lateral obliques  and transverse plane thoracic inlet and respiratory diaphragm    Lower extremity: right leg pull   Lumbar/Pelvis/Sacral: deep release of lumbo/sacral area, right piriformis release , bilateral  quadratus lumborum , pelvic floor transverse plane and right psoas release          Followed with active trunk rotation in side lying and supine as well as sitting. All AROM was difficult for Pt to fully achieve, but se reported\"it's better than it was yesterday\"              Assessment  Pt was reminded to complete HEP at the start and the end of her day     Post Treatment Pain Screening  Pain at present: 2         Plan cont with POC and focus on further development of lumbo pelvic dynamic stability.                                Goals As per POC       Therapy Time   Individual Concurrent Group Co-treatment   Time In  232         Time Out  1005         Minutes  60              Electronically signed by MADELINE Rocha/L on 2020 at 4:42 PM    MADELINE Rocha/KELSI

## 2020-11-17 ENCOUNTER — HOSPITAL ENCOUNTER (OUTPATIENT)
Dept: OCCUPATIONAL THERAPY | Age: 65
Setting detail: THERAPIES SERIES
Discharge: HOME | End: 2020-11-17
Payer: MEDICARE

## 2020-11-17 PROCEDURE — 97140 MANUAL THERAPY 1/> REGIONS: CPT

## 2020-11-17 PROCEDURE — 97530 THERAPEUTIC ACTIVITIES: CPT

## 2020-11-20 ENCOUNTER — HOSPITAL ENCOUNTER (OUTPATIENT)
Dept: OCCUPATIONAL THERAPY | Age: 65
Setting detail: THERAPIES SERIES
Discharge: HOME | End: 2020-11-20
Payer: MEDICARE

## 2020-11-20 PROCEDURE — 97530 THERAPEUTIC ACTIVITIES: CPT

## 2020-11-20 PROCEDURE — 97140 MANUAL THERAPY 1/> REGIONS: CPT

## 2020-11-20 NOTE — PROGRESS NOTES
Occupational Therapy  Daily Treatment Note  Date: 2020  Patient Name: Desean Chandler  :  1955  MRN: [id number]       Subjective Pt reports that her primary pain level is in the middle of the R gluteus kaitlynn  OT Visit Information  Progress Note Counter:   Pre Treatment Pain Screening  Pain at present: 6  Scale Used: Numeric Score  Intervention List: Patient able to continue with treatment   Treatment Activities:  Pt was treated with MFR as noted below       Thoracic: bilateral  thoracic paraspinals , right lateral obliques  and transverse plane respiratory diaphragm    Lower extremity: right leg pull   Lumbar/Pelvis/Sacral: deep release of lumbo/sacral area B quadratus lumborum, R piriformis, R proximal IT band        Followed with active elongation of R LE and seated trunk rotation                Assessment  Pt notes that she was \"moving faster for sure\" in addition to reduced pain level.  Pt tolerated tx well     Post Treatment Pain Screening  Pain at present: 4         Plan Continue and complete POC                              Goals As per POC       Therapy Time   Individual Concurrent Group Co-treatment   Time In  4582         Time Out  1200         Minutes  55              Electronically signed by Burgess Moritz, OTR/L on 2020 at 1:02 PM    Burgess Moritz, OTR/KELSI

## 2020-11-24 RX ORDER — OXYCODONE AND ACETAMINOPHEN 7.5; 325 MG/1; MG/1
1 TABLET ORAL EVERY 6 HOURS PRN
Qty: 60 TABLET | Refills: 0 | Status: SHIPPED | OUTPATIENT
Start: 2020-11-25 | End: 2020-12-15 | Stop reason: SDUPTHER

## 2020-11-24 RX ORDER — GABAPENTIN 300 MG/1
CAPSULE ORAL
Qty: 270 CAPSULE | Refills: 1 | Status: SHIPPED | OUTPATIENT
Start: 2020-11-24 | End: 2021-05-25 | Stop reason: SDUPTHER

## 2020-12-01 ENCOUNTER — HOSPITAL ENCOUNTER (OUTPATIENT)
Dept: OCCUPATIONAL THERAPY | Age: 65
Setting detail: THERAPIES SERIES
Discharge: HOME | End: 2020-12-01
Payer: MEDICARE

## 2020-12-01 NOTE — PROGRESS NOTES
Therapy                            Cancellation/No-show Note    Date: 2020  Patient Name: Nathaniel Rojo    : 1955  (77 y.o.)     MRN: [id number]    Account #: [de-identified]            Comments:  Inclement weather this date    For today's appointment patient:  [x]  Cancelled  []  Rescheduled appointment  []  No-show   []  Called pt to remind of next appointment     Reason given by patient:  [x]  Patient ill  []  Conflicting appointment  []  No transportation    []  Conflict with work  []  No reason given  []  Other:      [] Pt has future appointments scheduled, no follow up needed  [] Pt requests to be on hold.     Reason:   If > 2 weeks please discuss with therapist.  [] Therapist to call pt for follow up     Signature: Electronically signed by MYRA Liz on 20 at 11:29 AM EST

## 2020-12-02 ENCOUNTER — PROCEDURE VISIT (OUTPATIENT)
Dept: PHYSICAL MEDICINE AND REHAB | Age: 65
End: 2020-12-02
Payer: MEDICARE

## 2020-12-02 PROCEDURE — 20553 NJX 1/MLT TRIGGER POINTS 3/>: CPT | Performed by: PHYSICAL MEDICINE & REHABILITATION

## 2020-12-02 RX ORDER — LIDOCAINE HYDROCHLORIDE 10 MG/ML
16 INJECTION, SOLUTION INFILTRATION; PERINEURAL ONCE
Status: COMPLETED | OUTPATIENT
Start: 2020-12-02 | End: 2020-12-02

## 2020-12-02 RX ADMIN — LIDOCAINE HYDROCHLORIDE 16 ML: 10 INJECTION, SOLUTION INFILTRATION; PERINEURAL at 11:45

## 2020-12-04 ENCOUNTER — HOSPITAL ENCOUNTER (OUTPATIENT)
Dept: OCCUPATIONAL THERAPY | Age: 65
Setting detail: THERAPIES SERIES
Discharge: HOME | End: 2020-12-04
Payer: MEDICARE

## 2020-12-04 PROCEDURE — 97140 MANUAL THERAPY 1/> REGIONS: CPT

## 2020-12-09 ENCOUNTER — HOSPITAL ENCOUNTER (OUTPATIENT)
Dept: OCCUPATIONAL THERAPY | Age: 65
Setting detail: THERAPIES SERIES
Discharge: HOME | End: 2020-12-09
Payer: MEDICARE

## 2020-12-09 PROCEDURE — 97140 MANUAL THERAPY 1/> REGIONS: CPT

## 2020-12-09 NOTE — PROGRESS NOTES
Occupational Therapy  Daily Note     Name: Shana Otero  : 1955  MRN: [id number]     Visit Information:   Progress Note Counter:     Date: 2020  OT Manual therapy 60 minutes for 4 unit(s), CPT 12948     OT Individual Minutes  Time In: 0900  Time Out: 1000  Minutes: 60    Subjective:  \"I almost went to the ER this weekend. \"      Pain rating:   Pre-treatment pain:    6/10, burning pain R hip and leg , lower back     Action for pain:   Patient reports pain is at acceptable level for treatment. Pain after treatment:      10 , \"I feel more loose\"         Focus of treatment was on the following:   decreasing fascial restrictions and decreasing pain     Objective:    Treatment Activity:      MFR/Manual: pt supine and side lying on mat table   Lower extremity: right leg pull , soft tissue release: R hip and gluteal regions  , transverse plane: left proximal and distal leg and deep release right quadriceps , right IT band , right supra/inferior patella  and right Internal/external hip release   Lumbar/Pelvis/Sacral: deep release of lumbo/sacral area, pelvic floor transverse plane and right psoas release     Pt stated has not been trying to using breathing techniques, but has attempted to use guided imagery and background noise apps. Re-educated pt on breathing techniques. Pt able to return demo x 3, stated does feel better afterward. Pt stated she just moved into new home. Pt reported having difficulty sleeping d/t increase pain and has increased stress lately. Comments: Pt reported last session was painful, but she did not inform therapist during session. Pt reported pain was worse than when she initially started OT treatment after last session and was considering cancelling all future appointments. Assessment:   Pt tolerated treatment well. Pt reported decreased  pain after OT treatment.     Plan:   Continue POC    Goals: Per established POC    Mikael Britton OTR/L   2020 12:37 PM

## 2020-12-11 ENCOUNTER — HOSPITAL ENCOUNTER (OUTPATIENT)
Dept: OCCUPATIONAL THERAPY | Age: 65
Setting detail: THERAPIES SERIES
Discharge: HOME | End: 2020-12-11
Payer: MEDICARE

## 2020-12-11 PROCEDURE — 97530 THERAPEUTIC ACTIVITIES: CPT

## 2020-12-11 PROCEDURE — 97112 NEUROMUSCULAR REEDUCATION: CPT

## 2020-12-11 PROCEDURE — 97140 MANUAL THERAPY 1/> REGIONS: CPT

## 2020-12-11 NOTE — PROGRESS NOTES
Occupational Therapy  Daily Treatment Note  Date: 2020  Patient Name: Susy Mancini  :  1955  MRN: [id number]       Subjective Pt restated her adverse response from treatment 2 sessions ago. Pt was educated in possible responses to manual therapy and that she also chose not to relay to treating therapist that she was hurting with the level of input during treatment.  Pt reported that she \"thought I felt better when I got up and the a couple hours later the pain was so bad\" Pt was educated in the difference between [facility] and [facility] and the recoil response that is common with [facility]  OT Visit Information  Progress Note Counter:   Pre Treatment Pain Screening  Pain at present: 5  Scale Used: Numeric Score  Intervention List: Patient able to continue with treatment   Treatment Activities:  Pt was treated using MFR with the following releases:    Thoracic: right thoracic paraspinals , right lateral obliques  and transverse plane respiratory diaphragm    Lower extremity: right leg pull , transverse plane: lumbo sacral and IT band releases and deep release right hamstrings  and right Internal/external hip release   Lumbar/Pelvis/Sacral: deep release of lumbo/sacral area, right piriformis release , right quadratus lumborum  and right psoas release  and ischial tuberosity releases on the R         Followed with seated trunk rotations bilateral             Assessment Pt had improvement with pain level and was reminded of needed adherence to HEP to stretch R side     Post Treatment Pain Screening  Pain at present: 4         Plan Complete remaining visits and review HEP and D/C at the completion of the POC     Goals As per POC       Therapy Time   Individual Concurrent Group Co-treatment   Time In  900         Time Out  1000         Minutes  60              Electronically signed by MYRA Blanco on 2020 at 10:22 AM    Mahoney [facility], OTR/L

## 2020-12-14 ENCOUNTER — VIRTUAL VISIT (OUTPATIENT)
Dept: INFECTIOUS DISEASES | Age: 65
End: 2020-12-14
Payer: MEDICARE

## 2020-12-14 ENCOUNTER — TELEPHONE (OUTPATIENT)
Dept: INFECTIOUS DISEASES | Age: 65
End: 2020-12-14

## 2020-12-14 PROCEDURE — 99214 OFFICE O/P EST MOD 30 MIN: CPT | Performed by: INTERNAL MEDICINE

## 2020-12-14 PROCEDURE — G8399 PT W/DXA RESULTS DOCUMENT: HCPCS | Performed by: INTERNAL MEDICINE

## 2020-12-14 PROCEDURE — G8417 CALC BMI ABV UP PARAM F/U: HCPCS | Performed by: INTERNAL MEDICINE

## 2020-12-14 PROCEDURE — G8484 FLU IMMUNIZE NO ADMIN: HCPCS | Performed by: INTERNAL MEDICINE

## 2020-12-14 PROCEDURE — 1123F ACP DISCUSS/DSCN MKR DOCD: CPT | Performed by: INTERNAL MEDICINE

## 2020-12-14 PROCEDURE — 1090F PRES/ABSN URINE INCON ASSESS: CPT | Performed by: INTERNAL MEDICINE

## 2020-12-14 PROCEDURE — 3017F COLORECTAL CA SCREEN DOC REV: CPT | Performed by: INTERNAL MEDICINE

## 2020-12-14 PROCEDURE — 1036F TOBACCO NON-USER: CPT | Performed by: INTERNAL MEDICINE

## 2020-12-14 PROCEDURE — G8428 CUR MEDS NOT DOCUMENT: HCPCS | Performed by: INTERNAL MEDICINE

## 2020-12-14 PROCEDURE — 4040F PNEUMOC VAC/ADMIN/RCVD: CPT | Performed by: INTERNAL MEDICINE

## 2020-12-14 ASSESSMENT — ENCOUNTER SYMPTOMS
COLOR CHANGE: 1
BACK PAIN: 1

## 2020-12-14 NOTE — PROGRESS NOTES
Subjective:      Patient ID: Nasrin Tracy is a 77 y.o. female. HPI  Patient was informed that this is a billable visit. I am in my office, patient is at home/NH. [employer]. me/ [employer] was used for communication and exam. Chart was reviewed and labs/ X Rays were discussed with the patient. our practice is making every effort to adhere to current recommendations, including social distancing. For the health and safety of our patients, and to prevent unnecessary exposure, we are currently scheduling telephone appointments. Patient was informed that these appointments are official appointments and will be billed through patient's insurance . Patient confirms this and agreed to the televised visit. Time spend in review of chart and on the IPAD using Doxy. me was 15   minutes. F/U lumbar discitis, MRSA. Had a cold x 2 weeks. Runny nose. Teeth hurt. Had mild cough. Moved and has been out and about. Going to myofascial release. Had worsening with sciatic nerve exacerbation 1 week ago when she had a new therapist.   On Doxycycline 100 mg daily, well tolerated. Needs TSR. Has severe R shoulder pain with R fingers pain. Review of Systems   Cardiovascular: Positive for leg swelling. Musculoskeletal: Positive for back pain. Skin: Positive for color change. Neurological: Positive for numbness. All other systems reviewed and are negative. Objective:   Physical Exam  Constitutional:       General: She is not in acute distress. HENT:      Head: Normocephalic. Nose: No congestion. Eyes:      General: No scleral icterus. Pulmonary:      Effort: Pulmonary effort is normal. No respiratory distress. Abdominal:      General: There is no distension. Musculoskeletal:         General: No swelling. Right lower leg: Edema present. Left lower leg: Edema (trace) present. Skin:     Coloration: Skin is not jaundiced. Findings: No erythema or rash.    Neurological:      Mental Status: She is alert and oriented to person, place, and time.    Psychiatric:         Mood and Affect: Mood normal.         Behavior: Behavior normal.       CRP=<0.3  Cr=1.32  WBC=4.5  Assessment:      Lumbar discitis  MSSA  Acute respiratory infection  Chronic low back pain with R sciatica  CKD stage 3          Plan:      COVID 19 PCR  Patient is at high risk for infection if she gets her TSR  Continue PO Doxycycline 100 mg daily for life  CBC BMP Q 3 months        Angélica Lane MD

## 2020-12-15 ENCOUNTER — HOSPITAL ENCOUNTER (OUTPATIENT)
Dept: OCCUPATIONAL THERAPY | Age: 65
Setting detail: THERAPIES SERIES
Discharge: HOME | End: 2020-12-15
Payer: MEDICARE

## 2020-12-15 ENCOUNTER — VIRTUAL VISIT (OUTPATIENT)
Dept: PHYSICAL MEDICINE AND REHAB | Age: 65
End: 2020-12-15
Payer: MEDICARE

## 2020-12-15 PROCEDURE — 97140 MANUAL THERAPY 1/> REGIONS: CPT

## 2020-12-15 PROCEDURE — 3017F COLORECTAL CA SCREEN DOC REV: CPT | Performed by: PHYSICAL MEDICINE & REHABILITATION

## 2020-12-15 PROCEDURE — G8484 FLU IMMUNIZE NO ADMIN: HCPCS | Performed by: PHYSICAL MEDICINE & REHABILITATION

## 2020-12-15 PROCEDURE — 4040F PNEUMOC VAC/ADMIN/RCVD: CPT | Performed by: PHYSICAL MEDICINE & REHABILITATION

## 2020-12-15 PROCEDURE — 1123F ACP DISCUSS/DSCN MKR DOCD: CPT | Performed by: PHYSICAL MEDICINE & REHABILITATION

## 2020-12-15 PROCEDURE — 99214 OFFICE O/P EST MOD 30 MIN: CPT | Performed by: PHYSICAL MEDICINE & REHABILITATION

## 2020-12-15 PROCEDURE — 1036F TOBACCO NON-USER: CPT | Performed by: PHYSICAL MEDICINE & REHABILITATION

## 2020-12-15 PROCEDURE — 1090F PRES/ABSN URINE INCON ASSESS: CPT | Performed by: PHYSICAL MEDICINE & REHABILITATION

## 2020-12-15 PROCEDURE — 97530 THERAPEUTIC ACTIVITIES: CPT

## 2020-12-15 PROCEDURE — G8417 CALC BMI ABV UP PARAM F/U: HCPCS | Performed by: PHYSICAL MEDICINE & REHABILITATION

## 2020-12-15 PROCEDURE — G8399 PT W/DXA RESULTS DOCUMENT: HCPCS | Performed by: PHYSICAL MEDICINE & REHABILITATION

## 2020-12-15 PROCEDURE — G8427 DOCREV CUR MEDS BY ELIG CLIN: HCPCS | Performed by: PHYSICAL MEDICINE & REHABILITATION

## 2020-12-15 RX ORDER — OXYCODONE AND ACETAMINOPHEN 7.5; 325 MG/1; MG/1
1 TABLET ORAL EVERY 6 HOURS PRN
Qty: 90 TABLET | Refills: 0 | Status: SHIPPED | OUTPATIENT
Start: 2020-12-27 | End: 2021-01-25 | Stop reason: SDUPTHER

## 2020-12-15 RX ORDER — OXYCODONE AND ACETAMINOPHEN 7.5; 325 MG/1; MG/1
1 TABLET ORAL EVERY 6 HOURS PRN
Qty: 60 TABLET | Refills: 0 | Status: SHIPPED | OUTPATIENT
Start: 2020-12-27 | End: 2020-12-15

## 2020-12-15 ASSESSMENT — ENCOUNTER SYMPTOMS
ABDOMINAL PAIN: 1
BACK PAIN: 1
PHOTOPHOBIA: 1

## 2020-12-15 NOTE — PROGRESS NOTES
Occupational Therapy  Daily Treatment Note  Date: 12/15/2020  Patient Name: Davy   :  1955  MRN: [id number]       Subjective Reviewed with Pt all parts of her HEP as she was only able to recall previous PT exercises vs current and recommended exercises . OT Visit Information  Progress Note Counter: 15/16  Pre Treatment Pain Screening  Pain at present: 6  Intervention List: Patient able to continue with treatment  Comments / Details: increased standing , no chairs to sit on while signing papers on house   Treatment Activities:  Pt was re instructed in R sided stretching, with R LE elongation and holding elongation for 2-3 min and also doing the same on the L, Pt will also be working on alternate LE straightening to allow for elongation of B hip flexors. Pt was reminded of the use of the tennis ball for self Accupressure and to incorporate reverse clam shells and scheduling stretching time daily to allow for 2-3 min stretches vs multiple repetitions.   Pt was treated in supine and side lying to address the following restrictions:  Thoracic: right lateral obliques  and transverse plane respiratory diaphragm    Lower extremity: right leg pull  and deep release right quadriceps , right IT band  and right Internal/external hip release   Lumbar/Pelvis/Sacral: deep release of lumbo/sacral area, right piriformis release , right quadratus lumborum , pelvic floor transverse plane and bilateral  psoas release             Followed with redirection to working stretches in during the day vs waiting for a high pain level as evening approaches                    Assessment Pt required redirection and re education regarding HEP      Post Treatment Pain Screening  Pain at present: 3         Plan See PT and complete POC and D/C          Goals As per POC       Therapy Time   Individual Concurrent Group Co-treatment   Time In  902         Time Out  1005         Minutes  63               Electronically signed by Lucas Dominguez Roland, OTR/L on 12/15/2020 at 12:09 PM  Don Weathers, OTR/L

## 2020-12-15 NOTE — PROGRESS NOTES
TELEHEALTH EVALUATION -- Audio/Visual (During AEHKK-29 public health emergency)    Due to COVID 19 outbreak, patient's office visit was converted to a virtual visit. Patient was contacted and agreed to proceed with a virtual visit via  [email or web address] me   The risks and benefits of converting to a virtual visit were discussed in light of the current infectious disease epidemic. Patient also understood that insurance coverage and co-pays are up to their individual insurance plans. Subjective       This patient has requested an audio/video evaluation for the following concern(s):    HPI:     Back Pain (Trigger point injections 11/2/20 with 65% reduction in pain lasting 1 month & 12/2/20 with 50% reduction in pain lasting 1 day)   Neck Pain   Shoulder Pain (Right)   Hip Pain (Right)   Leg Pain (Right. Right sciatic injection 10/19/20 with 75% reduction in pain lasting 3 weeks.)   Foot Pain (Right)   Pain (6- Without medication (Buttocks/Leg))           She is still doing well with her current medications and shows no signs of abuse or overuse. She is more functional on it. Dr. Juan Ulloa has had her on Plaquenil which was not helping she was able to wean herself off of the OxyContin but continues not to take Percocet as needed. She is to avoid any NSAIDs but she will continue gabapentin as tolerated baclofen vitamin D and zinc.  She is requesting monthly trigger point -sciatics as needed. She is now with [facility] and likes the care quite a bit. Back Pain  This is a chronic problem. The current episode started more than 1 year ago. The problem occurs daily. The problem is unchanged. The pain is present in the lumbar spine and gluteal. The quality of the pain is described as aching. Radiates to: bilateral legs, left leg worse, left hip. The pain is at a severity of 5/10. The pain is moderate. The pain is worse during the day.  The symptoms are aggravated by bending, standing, position, sitting and twisting (Walking). Stiffness is present in the morning. Associated symptoms include abdominal pain, leg pain and pelvic pain. Pertinent negatives include no bladder incontinence, chest pain, fever, headaches, numbness, paresis, perianal numbness or weakness. Risk factors include obesity, poor posture, menopause, lack of exercise, sedentary lifestyle and history of steroid use. She has tried analgesics, walking, home exercises, heat, muscle relaxant, NSAIDs, ice and bed rest (Sciatica block, trigger point injections, mobic, Gabapentin, Norco) for the symptoms. The treatment provided mild relief. Neck Pain   Associated symptoms include leg pain and photophobia. Pertinent negatives include no chest pain, fever, headaches, numbness, paresis or weakness.              Past Medical History:   Diagnosis Date    Abnormal electromyogram (EMG) 8/19/09    SENSORIMOTOR NEUROPATHY OF BLE, RIGHT WORSE THAN LEFT, SUSPICIOUS FOR RIGHT S1 RADIC    Angina pectoris (HCC)     Angina pectoris ([facility] [address] 07/2019    sees Dr. Mary Garcia Ankle pain     Bleeding ulcer     Colon polyp 4/3/2017    Dermatitis     Fibromyalgia     Foot pain     Hyperlipidemia     Hypertension     Infection of intervertebral disc (pyogenic), lumbar region ([address] 8/13/2018    Low back pain     MRSA (methicillin resistant staph aureus) culture positive 2011    Neck pain     Neuropathy     Obesity     Osteoarthritis     PSVT (paroxysmal supraventricular tachycardia) ([address]     Shingles outbreak 2012    SI (sacroiliac) pain     Stenosis     Vitamin D deficiency        Past Surgical History:   Procedure Laterality Date    CARDIAC CATHETERIZATION  10/2016    CERVICAL FUSION  12/14/11    Dr Dominga Gordon with bone graft and plate    COLONOSCOPY      ENDOSCOPIC ULTRASOUND (LOWER) N/A 7/11/2019    EGD/ EUS performed by Daylin Rubin MD at [facility]  7/05    HYSTERECTOMY  2001    HYSTERECTOMY, TOTAL ABDOMINAL      JOINT REPLACEMENT Left     tka    LIVER BIOPSY      MENISCECTOMY  10/31/11    left knee    OTHER SURGICAL HISTORY  10/26/09    CAUDALS BY DR Perkins Flow    [place] COLON CA SCRN NOT  [address] IND N/A 4/12/2017    COLONOSCOPY performed by Dakota Clayton MD at [address] ESOPHAGOGASTRODUODENOSCOPY TRANSORAL DIAGNOSTIC N/A 4/12/2017    EGD ESOPHAGOGASTRODUODENOSCOPY performed by Dakota Clayton MD at [facility]  12/2011    Dr El Alpha Lumbar and c spine    TOTAL KNEE ARTHROPLASTY  7/30/12    LEFT-DR AVELAR    UPPER GASTROINTESTINAL ENDOSCOPY  1995       Social History     Socioeconomic History    Marital status:      Spouse name: Kirby Weaver Number of children: 2    Years of education: 15    Highest education level: Associate degree: occupational, technical, or vocational program   Occupational History    Occupation: Retired    Social Needs    Financial resource strain: Not hard at all   Zee-Irasema insecurity     Worry: Never true     Inability: Never true   [employer] needs     Medical: No     Non-medical: No   Tobacco Use    Smoking status: Never Smoker    Smokeless tobacco: Never Used   Substance and Sexual Activity    Alcohol use: No    Drug use: No    Sexual activity: Not Currently   Lifestyle    Physical activity     Days per week: 0 days     Minutes per session: 0 min    Stress: Rather much   Relationships    Social connections     Talks on phone: More than three times a week     Gets together: More than three times a week     Attends Anabaptism service: More than 4 times per year     Active member of club or organization: No     Attends meetings of clubs or organizations: Never     Relationship status:     Intimate partner violence     Fear of current or ex partner: No     Emotionally abused: No     Physically abused: No     Forced sexual activity: No   Other Topics Concern    None   Social History Narrative    Lives in [place] with her  Bill and son Phi Rangel is mildly handicapped --CP right lilia with anxiety and LD. He works at [employer]. Family History   Problem Relation Age of Onset    Heart Disease Father     High Cholesterol Father     High Blood Pressure Father     Stroke Mother     High Blood Pressure Mother     High Blood Pressure Brother        Allergies   Allergen Reactions    Latex Swelling     Swelling of hands with wearing latex gloves    Norvasc [Amlodipine Besylate] Other (See Comments)     swelling    Keflex [Cephalexin] Rash       Review of Systems   Constitutional: Negative for fever. Eyes: Positive for photophobia. Cardiovascular: Negative for chest pain. Gastrointestinal: Positive for abdominal pain. Genitourinary: Positive for pelvic pain. Negative for bladder incontinence. Musculoskeletal: Positive for back pain and neck pain. Neurological: Negative for weakness, numbness and headaches. Objective    There were no vitals filed for this visit.     No data recorded            PHYSICAL EXAMINATION:  [ INSTRUCTIONS:  \"[x]\" Indicates a positive item  \"[]\" Indicates a negative item  -- DELETE ALL ITEMS NOT EXAMINED]    [x] Alert  [x] Oriented to person/place/time      [x] No apparent distress  [x] Not toxic appearing    [x] Face complexion normal appearing [x] Sclera clear  [x] Lips are not cyanotic      [x] Breathing appears normal  [] Appears tachypneic      [] Rash on visible skin    [x] Cranial Nerves II-XII grossly intact    [x] Motor grossly intact in visible upper extremities, including stiff but intact range of motion in cervical, upper extremity and thoracic  [x] Motor grossly intact in visible lower extremities, including normal range of motion in the hips and knees for stiffness in the lumbar spine    [x] Normal Mood  [x] Not anxious appearing    [x] Not depressed appearing  [x] Not confused appearing      [x]  Good short term memory  [x]  Good long term memory    [x]  Able to Ankle pain     Bleeding ulcer     Colon polyp 4/3/2017    Dermatitis     Fibromyalgia     Foot pain     Hyperlipidemia     Hypertension     Infection of intervertebral disc (pyogenic), lumbar region ([facility] [address] 8/13/2018    Low back pain     MRSA (methicillin resistant staph aureus) culture positive 2011    Neck pain     Neuropathy     Obesity     Osteoarthritis     PSVT (paroxysmal supraventricular tachycardia) ([facility] [address]     Shingles outbreak 2012    SI (sacroiliac) pain     Stenosis     Vitamin D deficiency            Given their medication, chronic pain and lifestyle and medications they are at risk for :    Falls, constipation, addiction  Loss of livelyhood due to severe pain, debility, weight gain and  vitamin D deficiency    The patient was educated regarding proper diet, fitness routine, and regulatory restrictions concerning pain medications. Previous notes, comprehensive past medical, surgical, family history, and diagnostics were reviewed. Patient education and councelling were provided regarding off label use,treatment options and medication and injection risks. Overall greater than 25 minutes spent in direct and indirect patient care. Current and old OARRS ([place] Automated Prescription Reporting System) records reviewed, all refills reviewed since last visit,  Behavioral agreement/KATHLEEN regulations   and Toxicology screen was reviewed with patient and is up to date. There are no current red flags. They are making good progress regarding pain relief, they are performing at a functional level regarding activities of daily living, family interactions and psychological functioning, they're not having any adverse effects or side effects from the current medications, and I see no findings of aberrant drug taking or addiction related behaviors. The patient is aware that they have a chronic pain condition and they may require opiates dosing for life.   All efforts will be made to wean to the lowest effective dose. Other therapies for pain have not been effective including nonopiate medications. Injections and exercises are only partially effective. A Rx for Narcan was offered to help prevent accidental overdose. RX Monitoring 9/13/2019   Attestation -   Acute Pain Prescriptions -   Periodic Controlled Substance Monitoring Possible medication side effects, risk of tolerance/dependence & alternative treatments discussed. ;No signs of potential drug abuse or diversion identified. ;Assessed functional status. ;Obtaining appropriate analgesic effect of treatment. Chronic Pain > 50 MEDD Re-evaluated the status of the patient's underlying condition causing pain. ;Considered consultation with a specialist.;Obtained or confirmed \"Consent for Opioid Use\" on file. Chronic Pain > 80 MEDD -               Patient is currently taking:       I am having Rayna Levine maintain her Vitamin D3, Coenzyme Q10 (COQ10 PO), aspirin, lidocaine, ammonium lactate, melatonin, nitroGLYCERIN, hydroCHLOROthiazide, hydroxychloroquine, cyanocobalamin, losartan, doxycycline hyclate, pantoprazole, Shingrix, atorvastatin, topiramate, baclofen, gabapentin, and oxyCODONE-acetaminophen. We will continue to administer cyanocobalamin. I also recommend the following Medications:    Orders Placed This Encounter   Medications    DISCONTD: oxyCODONE-acetaminophen (PERCOCET) 7.5-325 MG per tablet     Sig: Take 1 tablet by mouth every 6 hours as needed for Pain for up to 30 days. Intended supply: 30 days     Dispense:  60 tablet     Refill:  0     Reduce doses taken as pain becomes manageable    oxyCODONE-acetaminophen (PERCOCET) 7.5-325 MG per tablet     Sig: Take 1 tablet by mouth every 6 hours as needed for Pain for up to 30 days. Intended supply: 30 days     Dispense:  90 tablet     Refill:  0     Reduce doses taken as pain becomes manageable        -which helps with pain and function.     Otherwise, continue the current pain medications that I have prescibed. Radiologic:   Old films reviewed,     I discussed results with patients. see Follow up plans below  For any new studies. Care Everywhere Updates:  requested and reviewed. No new issues noted. Electrodiagnostic:  Previous studies requested,     I discussed results with patient. See follow-up plans for new studies.         Labs:  Previous labs reviewed     Lab Results   Component Value Date     10/16/2020    K 4.7 10/16/2020    K 4.4 08/09/2018     10/16/2020    CO2 24 10/16/2020    BUN 27 10/16/2020    CREATININE 1.32 10/16/2020    CALCIUM 9.4 10/16/2020    LABALBU 4.3 10/16/2020    LABALBU 2.8 08/18/2018    BILITOT 0.3 10/16/2020    ALKPHOS 101 10/16/2020    AST 20 10/16/2020    ALT 21 10/16/2020     Lab Results   Component Value Date    WBC 4.5 10/16/2020    RBC 4.19 10/16/2020    RBC 2.81 09/06/2018    HGB 13.0 10/16/2020    HCT 39.3 10/16/2020    MCV 93.8 10/16/2020    MCH 31.0 10/16/2020    MCHC 33.1 10/16/2020    RDW 13.7 10/16/2020     10/16/2020    MPV 7.4 09/13/2018       Lab Results   Component Value Date    LABAMPH Neg 06/11/2020    BARBSCNU Neg 06/11/2020    LABBENZ Neg 06/11/2020    CANSU Neg 06/11/2020    COCAIMETSCRU Neg 06/11/2020    PHENCYCLIDINESCREENURINE Neg 34/96/3051    TRICYCLIC Neg 91/99/4384    DSCOMMENT see below 06/11/2020       Lab Results   Component Value Date    CODEINE Not Detected 12/02/2016    MORPHINE Not Detected 12/02/2016    ACETYLMORPHI Not Detected 12/02/2016    OXYCODONE Not Detected 12/02/2016    NOROXYCODONE Not Detected 12/02/2016    NOROXYMU Not Detected 12/02/2016    HYDRCO Present 12/02/2016    NORHYDU Present 12/02/2016    HYDROMO Not Detected 12/02/2016    Quyen Schaeffer Not Detected 12/02/2016    Ndiaye Horsfall Not Detected 12/02/2016    FENTA Not Detected 12/02/2016    NORFENT Not Detected 12/02/2016    MEPERIDINE Not Detected 12/02/2016    TAPENU Not Detected 12/02/2016 Neville Wilfrido Not Detected 12/02/2016    METHADONE Not Detected 12/02/2016    LABPROP Not Detected 12/02/2016    TRAM Not Detected 12/02/2016    AMPH Not Detected 12/02/2016    METHAMP Not Detected 12/02/2016    MDMA Not Detected 12/02/2016    ECMDA Not Detected 12/02/2016       Lab Results   Component Value Date    METPHEN Not Detected 12/02/2016    PHENTERMINE Not Detected 12/02/2016    BENZOYL Not Detected 12/02/2016    Shivani Hockey Not Detected 12/02/2016    ALPHAOHALPRA Not Detected 12/02/2016    CLONAZEPAM Not Detected 12/02/2016    Henrene Budds Not Detected 12/02/2016    DIAZEP Not Detected 12/02/2016    ROMAIN Not Detected 12/02/2016    OXAZ Not Detected 12/02/2016    Aaron Heady Not Detected 12/02/2016    LORAZEPAM Not Detected 12/02/2016    MIDAZOLAM Not Detected 12/02/2016    ZOLPIDEM Not Detected 12/02/2016    NAI Not Detected 12/02/2016    ETG Not Detected 12/02/2016    MARIJMET Not Detected 12/02/2016    PCP Not Detected 12/02/2016    PAINMGTDRUGP See Below 12/02/2016    EERPAINMGTPA See Note 12/02/2016    LABCREA 129.7 08/09/2018         , I discussed results with patient. See follow-up plans for new studies. Therapies:  HEP-gentle stretching and relaxation techniques-demonstrated with patient-they are to do them twice a day.   They are also advised to make the following lifestyle changes:  Goals      Exercise 3x per week (30 min per time)      SOAPP-R GOAL LESS THAN 9      09/02/16 SCORE: 8 LOW RISK   02/02/17 score 7-low risk   04/03/17 score: 6-low risk  06/05/17 score: 7-low risk  08/07/17 score: 8-low risk  10/06/17 score: 6-low risk  12/20/17 score: 5-low risk  02/18/18 score: 6-low risk  0425/2018 score 5-low risk  7/11/18 score: 3 low- risk  10/11/18 score:4- low risk  2/28/19 score: 2: low risk  5/13/19 score: 2- low risk  7/17/19 score: 1- low risk   9/13/19 score: 2- low risk  11/21/19 score: 3- low risk   1/22/20 score: 2- low risk   6- score 3 - low risk        Weight < 150 lb (68.04 kg) Injections or Epidurals:  Injection options were discussed. Monthly trigger point injections add vitamin B12 when able. Patient gave verbal consent to ordered injections. See follow-up plans for planned injections. Supplements:  Vitamin D with increased dosing during the rainy months, add co-Q10 for heart health. Education was given on:   Dietary and Fitness--daily stretches and low carb diet-in chair Yoga when possible             Follow up with Primary Care Physician regarding their general medical needs. Stressed the importance of following up with PCP and specialists for his/her chronic diseases, health, CV, and cancer screening and continued care. Will follow disease activity/progression and adjust therapeutic regimen to disease activity and severity. Discussed medication dosage, usage, goals of therapy, and side effects. Available test results were reviewed -Discussed findings, impression and plan with patient. An additional 10 minutes were spent outside of the patient visit to review records. Additional time spent with the patient to discuss their questions. Additional time spent with the patient devoted to discussing treatment strategy, planning, and implementation generalized musculoskeletal health plan. Patient understands above plan; questions asked and answered. Patient agrees to plan as noted above. F/U in 2-3months  At least 50% of the visit was involved in the discussion of the options for treatment. We discussed exercises, medication, interventional therapies and surgery. Healthy life style is essential with patient hard work to achieve the wellness.  In addition; discussion with the patient and/or family about any of the diagnostic results, impressions and/or recommended diagnostic studies, prognosis, risks and benefits of treatment options, instructions for treatment and/or follow-up, importance of compliance with chosen treatment options, risk-factor reduction, and

## 2020-12-18 ENCOUNTER — HOSPITAL ENCOUNTER (OUTPATIENT)
Dept: OCCUPATIONAL THERAPY | Age: 65
Setting detail: THERAPIES SERIES
Discharge: HOME | End: 2020-12-18
Payer: MEDICARE

## 2020-12-18 PROCEDURE — 97140 MANUAL THERAPY 1/> REGIONS: CPT

## 2020-12-18 PROCEDURE — 97530 THERAPEUTIC ACTIVITIES: CPT

## 2020-12-18 ASSESSMENT — PAIN SCALES - QUEBEC BACK PAIN DISABILITY SCALE
TAKE FOOD OUT OF THE REFRIGERATOR: 1
PULL OR PUSH HEAVY DOORS: 3
RIDE IN A CAR: 1
PUT ON SOCKS OR PANYHOSE: 2
CARRY TWO BAGS OF GROCERIES: 5
TOTAL SCORE: 64
WALK SEVERAL KILOMETERS  OR MILES: 5
CLIMB ONE FLIGHT OF STAIRS: 3
MOVE A CHAIR: 5
THROW A BALL: 1
SLEEP THROUGH THE NIGHT: 4
TURN OVER IN BED: 3
SIT IN A CHAIR FOR SEVERAL HOURS: 2
BEND OVER TO CLEAN THE BATHTUB: 4
WALK A FEW BLOCKS OR 300 TO 400M: 5
GET OUT OF BED: 2
STAND UP FOR 20 TO 30 MINUTES: 4
QUEBEC DISABILITY INDEX: 60-79%
LIFT AND CARRY A HEAVY SUITCASE: 4
QUEBEC CMS MODIFIER: CL
REACH UP TO HIGH SHELVES: 3
MAKE YOUR BED: 2
RUN ONE BLOCK OR 100M: 5

## 2020-12-18 NOTE — PROGRESS NOTES
Occupational Therapy  DischargeTreatment Note  Date: 2020  Patient Name: Tsering Ackerman  :  1955  MRN: [id number]       Subjective   OT Visit Information  Progress Note Counter:   Pre Treatment Pain Screening  Pain at present: 5  Scale Used: Numeric Score  Intervention List: Patient able to continue with treatment   Treatment Activities:      The [place] Back Pain Disability Scale     The [place] Back Pain Disability Scale  Get out of bed: Somewhat difficult  Sleep through the night: Very difficult  Turn over in bed: Fairly difficult  Ride in a car: Minimally difficult  Stand up for 20-30 minutes: Very difficult  Sit in a chair for several hours: Somewhat difficult  Climb one flight of stairs: Fairly difficult  Walk a few blocks (300-400 m): Unable to do  Walk several kilometers - miles: Unable to do  Reach up to high shelves: Fairly difficult  Throw a ball: Minimally difficult  Run one block (about 100 m): Unable to do  Take food out of the refrigerator: Minimally difficult  Make your bed: Somewhat difficult  Put on socks (pantyhose): Somewhat difficult  Bend over to clean the bathtub: Very difficult  Move a chair: Unable to do  Pull or push heavy doors: Fairly difficult  Carry two bags of groceries: Unable to do  Lift and carry a heavy suitcase: Very difficult  Dee Deeshana Total Score: 64  [facility] Disability Index: 60-79%  [place] CMS Modifier: CL      While Pt scored higher than evaluation Pt reports that she was \"more honest with myself today\" that on evaluation about her real abilities.              Reviewed with Pt all aspects of HEP and ECWS and went on to treat the requested area of R hip and IT band       Craniosacral release: side lying craniosacral release   Lower extremity: right leg pull , transverse plane: lumbar paraspinal release and deep release right hamstrings  and right Internal/external hip release   Lumbar/Pelvis/Sacral: deep release of lumbo/sacral area, right piriformis release , right quadratus lumborum  and pelvic floor transverse plane             Assessment Pt has been actively remodeling 2 homes throughout the time of the POC. This has limited the ability of treatment to be effective.  Pt has an extensive HEP               Plan Pt is D/C                    Goals No further goals at this time       Therapy Time   Individual Concurrent Group Co-treatment   Time In  906         Time Out  1006         Minutes  60              Electronically signed by MYRA Desai on 12/18/2020 at 5:06 PM    MYRA Desai  Electronically signed by MYRA Desai on 12/18/2020 at 5:06 PM

## 2020-12-28 ENCOUNTER — PROCEDURE VISIT (OUTPATIENT)
Dept: PHYSICAL MEDICINE AND REHAB | Age: 65
End: 2020-12-28
Payer: MEDICARE

## 2020-12-28 PROCEDURE — 76942 ECHO GUIDE FOR BIOPSY: CPT | Performed by: PHYSICAL MEDICINE & REHABILITATION

## 2020-12-28 PROCEDURE — 64445 NJX AA&/STRD SCIATIC NRV IMG: CPT | Performed by: PHYSICAL MEDICINE & REHABILITATION

## 2020-12-28 PROCEDURE — 96372 THER/PROPH/DIAG INJ SC/IM: CPT | Performed by: PHYSICAL MEDICINE & REHABILITATION

## 2020-12-28 RX ORDER — LIDOCAINE HYDROCHLORIDE 10 MG/ML
15 INJECTION, SOLUTION INFILTRATION; PERINEURAL ONCE
Status: COMPLETED | OUTPATIENT
Start: 2020-12-28 | End: 2020-12-28

## 2020-12-28 RX ORDER — CYANOCOBALAMIN 1000 UG/ML
1000 INJECTION INTRAMUSCULAR; SUBCUTANEOUS ONCE
Status: COMPLETED | OUTPATIENT
Start: 2020-12-28 | End: 2020-12-28

## 2020-12-28 RX ORDER — LIDOCAINE HYDROCHLORIDE 20 MG/ML
5 INJECTION, SOLUTION INFILTRATION; PERINEURAL ONCE
Status: COMPLETED | OUTPATIENT
Start: 2020-12-28 | End: 2020-12-28

## 2020-12-28 RX ADMIN — LIDOCAINE HYDROCHLORIDE 5 ML: 20 INJECTION, SOLUTION INFILTRATION; PERINEURAL at 10:36

## 2020-12-28 RX ADMIN — LIDOCAINE HYDROCHLORIDE 15 ML: 10 INJECTION, SOLUTION INFILTRATION; PERINEURAL at 10:35

## 2020-12-28 RX ADMIN — CYANOCOBALAMIN 1000 MCG: 1000 INJECTION INTRAMUSCULAR; SUBCUTANEOUS at 10:35

## 2020-12-28 NOTE — PROGRESS NOTES
Patient here for right sciatic injection with U/S. Patient taken back to exam room, and placed on drape locking stool. Areas to be injected marked appropriately, and cleansed with alcohol. 15cc of 1% Lidocaine, 5cc of 2% Lidocaine, and 1cc of B12 to be injected by provider.

## 2021-01-05 ENCOUNTER — OFFICE VISIT (OUTPATIENT)
Dept: FAMILY MEDICINE CLINIC | Age: 66
End: 2021-01-05
Payer: MEDICARE

## 2021-01-05 DIAGNOSIS — H66.003 NON-RECURRENT ACUTE SUPPURATIVE OTITIS MEDIA OF BOTH EARS WITHOUT SPONTANEOUS RUPTURE OF TYMPANIC MEMBRANES: Primary | ICD-10-CM

## 2021-01-05 PROCEDURE — 99441 PR PHYS/QHP TELEPHONE EVALUATION 5-10 MIN: CPT | Performed by: PHYSICIAN ASSISTANT

## 2021-01-05 RX ORDER — AMOXICILLIN AND CLAVULANATE POTASSIUM 875; 125 MG/1; MG/1
1 TABLET, FILM COATED ORAL 2 TIMES DAILY
Qty: 20 TABLET | Refills: 0 | Status: SHIPPED | OUTPATIENT
Start: 2021-01-05 | End: 2021-01-15

## 2021-01-05 ASSESSMENT — ENCOUNTER SYMPTOMS
BACK PAIN: 0
CHEST TIGHTNESS: 0
ABDOMINAL PAIN: 0
SHORTNESS OF BREATH: 0
SINUS PRESSURE: 1
SINUS PAIN: 1
TROUBLE SWALLOWING: 0
COUGH: 0
DIARRHEA: 0
RHINORRHEA: 1
VOMITING: 0

## 2021-01-05 ASSESSMENT — PATIENT HEALTH QUESTIONNAIRE - PHQ9
2. FEELING DOWN, DEPRESSED OR HOPELESS: NOT AT ALL
SUM OF ALL RESPONSES TO PHQ9 QUESTIONS 1 & 2: 0
1. LITTLE INTEREST OR PLEASURE IN DOING THINGS: NOT AT ALL

## 2021-01-05 NOTE — PROGRESS NOTES
TELEHEALTH EVALUATION -- Audio/Visual (During  public health emergency)    -   Eulalio Ashley is a 77 y.o. female being evaluated by a Virtual Visit (video visit) encounter to address concerns as mentioned above. A caregiver was present when appropriate. Due to this being a TeleHealth encounter (During  public health emergency), evaluation of the following organ systems was limited: Vitals/Constitutional/EENT/Resp/CV/GI//MS/Neuro/Skin/Heme-Lymph-Imm. Pursuant to the emergency declaration under the [address] [place] [address], [address] [place] [address] authority and the [employer] and [employer] Act, this Virtual Visit was conducted with patient's (and/or legal guardian's) consent, to reduce the patient's risk of exposure to COVID-19 and provide necessary medical care. The patient (and/or legal guardian) has also been advised to contact this office for worsening conditions or problems, and seek emergency medical treatment and/or call 911 if deemed necessary. Patient was contacted and agreed to proceed with a virtual visit via Telephone Visit  The risks and benefits of converting to a virtual visit were discussed in light of the current infectious disease epidemic. Patient also understood that insurance coverage and co-pays are up to their individual insurance plans. Patient was located at their home. Provider was located at their office. 2021  Eulalio Ashley (:  1955) has requested an audio/video evaluation for the following concern(s):    HPI  77year old female who complains of having bilateral hearing loss and ear aches. She also complains of head aches at night. She complains of occasional pain over the mastoid bones. Review of Systems   Constitutional: Negative for activity change, appetite change, chills, fever and unexpected weight change. HENT: Positive for ear pain (bilateral), rhinorrhea, sinus pressure and sinus pain. Negative for drooling, nosebleeds and trouble swallowing. Respiratory: Negative for cough, chest tightness and shortness of breath. Cardiovascular: Negative for chest pain and leg swelling. Gastrointestinal: Negative for abdominal pain, diarrhea and vomiting. Endocrine: Negative for polydipsia and polyphagia. Genitourinary: Negative for dysuria, flank pain and frequency. Musculoskeletal: Negative for back pain and myalgias. Skin: Negative for pallor and rash. Neurological: Negative for syncope, weakness and headaches. Hematological: Does not bruise/bleed easily. Psychiatric/Behavioral: Negative for agitation, behavioral problems and confusion. All other systems reviewed and are negative. Prior to Visit Medications    Medication Sig Taking? Authorizing Provider   amoxicillin-clavulanate (AUGMENTIN) 875-125 MG per tablet Take 1 tablet by mouth 2 times daily for 10 days Yes UDAY Pena   oxyCODONE-acetaminophen (PERCOCET) 7.5-325 MG per tablet Take 1 tablet by mouth every 6 hours as needed for Pain for up to 30 days.  Intended supply: 30 days  Rosaline Franklinin, DO   gabapentin (NEURONTIN) 300 MG capsule TAKE 1 CAPSULE BY MOUTH DAILY AND 2 AT NIGHT AS TOLERATED  Rosaline Scullin, DO   topiramate (TOPAMAX) 25 MG tablet TAKE 1 TABLET BY MOUTH EVERY DAY AT NIGHT  Rosalineher Franklinin, DO   baclofen (LIORESAL) 10 MG tablet TAKE 1 TABLET BY MOUTH NIGHTLY AS NEEDED (MUSCLE SPASMS)  Rosaline Franklinin, DO   atorvastatin (LIPITOR) 10 MG tablet TAKE 1 TABLET BY MOUTH EVERY DAY AT NIGHT  Claudean Rosales, MD   zoster recombinant adjuvanted vaccine (SHINGRIX) 50 MCG/0.5ML SUSR injection Inject 0.5 mLs into the muscle See Admin Instructions 1 dose now and repeat in 2-6 months  Claudean Rosales, MD pantoprazole (PROTONIX) 40 MG tablet Take 1 tablet by mouth every morning (before breakfast)  Blaze Lund APRN - CNP   doxycycline hyclate (VIBRA-TABS) 100 MG tablet TAKE 1 TABLET BY MOUTH TWICE A DAY  Leeanna Lujan DO   losartan (COZAAR) 50 MG tablet TAKE 1 TABLET BY MOUTH EVERY DAY  Historical Provider, MD   cyanocobalamin 1000 MCG/ML injection INJECT 1 ML AS DIRECTED EVERY 14 DAYS FOR 12 DOSES INJECT ONE CC SUB-CUTANEOSLY EVERY OTHER WEEK  Rosaline Peterson DO   hydroxychloroquine (PLAQUENIL) 200 MG tablet Take 1 tablet by mouth daily  Historical Provider, MD   hydrochlorothiazide (HYDRODIURIL) 25 MG tablet   Historical Provider, MD   nitroGLYCERIN (NITROSTAT) 0.4 MG SL tablet PLEASE SEE ATTACHED FOR DETAILED DIRECTIONS  Garima Hardin MD   melatonin 5 MG TABS tablet Take 5 mg by mouth nightly  Historical Provider, MD   lidocaine (XYLOCAINE) 5 % ointment APPLY TO AFFECTED AREA EVERY 12 HOURS AS NEEDED FOR PAIN  Historical Provider, MD   ammonium lactate (AMLACTIN) 12 % cream APPLY TO DRY/CALLUS AREAS TWICE DAILY  Historical Provider, MD   aspirin 81 MG tablet Take 81 mg by mouth daily  Historical Provider, MD   Coenzyme Q10 (COQ10 PO) Take 1 capsule by mouth daily   Historical Provider, MD   Cholecalciferol (VITAMIN D3) 2000 units TABS Take 2 tablets by mouth daily   Historical Provider, MD       Past Medical History:   Diagnosis Date    Abnormal electromyogram (EMG) 8/19/09    SENSORIMOTOR NEUROPATHY OF BLE, RIGHT WORSE THAN LEFT, SUSPICIOUS FOR RIGHT S1 RADIC    Angina pectoris (HCC)     Angina pectoris ([facility] [address] 07/2019    sees Dr. Juani Montgomery    Ankle pain     Bleeding ulcer     Colon polyp 4/3/2017    Dermatitis     Fibromyalgia     Foot pain     Hyperlipidemia     Hypertension     Infection of intervertebral disc (pyogenic), lumbar region ([facility] [address] 8/13/2018    Low back pain     MRSA (methicillin resistant staph aureus) culture positive 2011    Neck pain     Neuropathy     Obesity  Osteoarthritis     PSVT (paroxysmal supraventricular tachycardia) ([facility] [address]     Shingles outbreak 2012    SI (sacroiliac) pain     Stenosis     Vitamin D deficiency      Past Surgical History:   Procedure Laterality Date    CARDIAC CATHETERIZATION  10/2016    CERVICAL FUSION  12/14/11    Dr Patrick Larsen with bone graft and plate    COLONOSCOPY      ENDOSCOPIC ULTRASOUND (LOWER) N/A 7/11/2019    EGD/ EUS performed by Gloria Houston MD at [facility]  7/05    HYSTERECTOMY  2001    HYSTERECTOMY, TOTAL ABDOMINAL      JOINT REPLACEMENT Left     tka    LIVER BIOPSY      MENISCECTOMY  10/31/11    left knee    OTHER SURGICAL HISTORY  10/26/09    CAUDALS BY DR Katie Linares    MD COLON CA SCRN NOT  [address] IND N/A 4/12/2017    [facility] ESOPHAGOGASTRODUODENOSCOPY TRANSORAL DIAGNOSTIC N/A 4/12/2017    EGD ESOPHAGOGASTRODUODENOSCOPY performed by Hilda Hinson MD at [address]  12/2011    Dr Patrick Larsen Lumbar and c spine    TOTAL KNEE ARTHROPLASTY  7/30/12    LEFT-DR AVELAR    UPPER GASTROINTESTINAL ENDOSCOPY  1995     Social History     Socioeconomic History    Marital status:      Spouse name: Carl Saucedo Number of children: 2    Years of education: 15    Highest education level: Associate degree: occupational, technical, or vocational program   Occupational History    Occupation: Retired    Social Needs    Financial resource strain: Not hard at all   [place] insecurity     Worry: Never true     Inability: Never true    Transportation needs     Medical: No     Non-medical: No   Tobacco Use    Smoking status: Never Smoker    Smokeless tobacco: Never Used   Substance and Sexual Activity    Alcohol use: No    Drug use: No    Sexual activity: Not Currently   Lifestyle    Physical activity     Days per week: 0 days     Minutes per session: 0 min    Stress: Rather much   Relationships  Social connections     Talks on phone: More than three times a week     Gets together: More than three times a week     Attends Hol[employer] service: More than 4 times per year     Active member of club or organization: No     Attends meetings of clubs or organizations: Never     Relationship status:     Intimate partner violence     Fear of current or ex partner: No     Emotionally abused: No     Physically abused: No     Forced sexual activity: No   Other Topics Concern    Not on file   Social History Narrative    Lives in [place] with her  Shelley Denis and son Tonia Tucker is mildly handicapped --CP right lilia with anxiety and LD. He works at [employer]. Family History   Problem Relation Age of Onset    Heart Disease Father     High Cholesterol Father     High Blood Pressure Father     Stroke Mother     High Blood Pressure Mother     High Blood Pressure Brother      Allergies   Allergen Reactions    Latex Swelling     Swelling of hands with wearing latex gloves    Norvasc [Amlodipine Besylate] Other (See Comments)     swelling    Keflex [Cephalexin] Rash       PMH, Surgical Hx, Family Hx, and Social Hx reviewed and updated. Health Maintenance reviewed. PHYSICAL EXAMINATION:  \"[x]\" Indicates a positive item  \"[]\" Indicates a negative item    Vital Signs: (As obtained by patient/caregiver or practitioner observation)    Blood pressure-  Heart rate-    Respiratory rate-    Temperature-  Pulse oximetry-     Constitutional: [x] Appears well-developed and well-nourished [x] No apparent distress      [] Abnormal-   Mental status  [x] Alert and awake  [x] Oriented to person/place/time [x]Able to follow commands      Eyes:  EOM    []  Normal  [] Abnormal-  Sclera  [x]  Normal  [] Abnormal -         Discharge [x]  None visible  [] Abnormal -    HENT:   [x] Normocephalic, atraumatic.   [] Abnormal   [x] Mouth/Throat: Mucous membranes are moist.     External Ears [x] Normal  [] Abnormal- Neck: [x] No visualized mass     Pulmonary/Chest: [x] Respiratory effort normal.  [x] No visualized signs of difficulty breathing or respiratory distress        [] Abnormal-      Musculoskeletal:   [x] Normal gait with no signs of ataxia         [x] Normal range of motion of neck        [] Abnormal-       Neurological:       [x] No Facial Asymmetry (Cranial nerve 7 motor function) (limited exam to video visit)          [x] No gaze palsy        [] Abnormal-         Skin:        [x] No significant exanthematous lesions or discoloration noted on facial skin         [] Abnormal-            Psychiatric:       [x] Normal Affect [x] No Hallucinations        [] Abnormal-     Other pertinent observable physical exam findings-   Results for orders placed or performed in visit on 10/16/20   C-Reactive Protein   Result Value Ref Range    CRP <0.3 0.0 - 5.0 mg/L       ASSESSMENT/PLAN:  Assessment & Plan   Diagnoses and all orders for this visit:    Non-recurrent acute suppurative otitis media of both ears without spontaneous rupture of tympanic membranes    Other orders  -     amoxicillin-clavulanate (AUGMENTIN) 875-125 MG per tablet; Take 1 tablet by mouth 2 times daily for 10 days      No orders of the defined types were placed in this encounter. Orders Placed This Encounter   Medications    amoxicillin-clavulanate (AUGMENTIN) 875-125 MG per tablet     Sig: Take 1 tablet by mouth 2 times daily for 10 days     Dispense:  20 tablet     Refill:  0     There are no discontinued medications. Return if symptoms worsen or fail to improve. Reviewed with the patient: current clinical status, medications, activities and diet. Side effects, adverse effects of the medication prescribed today, as well as treatment plan/ rationale and result expectations have been discussed with the patient who expresses understanding and desires to proceed. Close follow up to evaluate treatment results and for coordination of care. I have reviewed the patient's medical history in detail and updated the computerized patient record. Patient identification was verified at the start of the visit: Yes    Total time spent on this encounter: Not billed by time      --UDAY Carver on 1/5/2021 at 12:23 PM    An electronic signature was used to authenticate this note.

## 2021-01-19 DIAGNOSIS — M46.26 OSTEOMYELITIS OF LUMBAR VERTEBRA (HCC): ICD-10-CM

## 2021-01-19 LAB
ANION GAP SERPL CALCULATED.3IONS-SCNC: 13 MEQ/L (ref 9–15)
BUN BLDV-MCNC: 30 MG/DL (ref 8–23)
CALCIUM SERPL-MCNC: 10 MG/DL (ref 8.5–9.9)
CHLORIDE BLD-SCNC: 101 MEQ/L (ref 95–107)
CO2: 26 MEQ/L (ref 20–31)
CREAT SERPL-MCNC: 1.3 MG/DL (ref 0.5–0.9)
GFR AFRICAN AMERICAN: 49.4
GFR NON-AFRICAN AMERICAN: 40.8
GLUCOSE BLD-MCNC: 87 MG/DL (ref 70–99)
HCT VFR BLD CALC: 40.8 % (ref 37–47)
HEMOGLOBIN: 13.4 G/DL (ref 12–16)
MCH RBC QN AUTO: 30.6 PG (ref 27–31.3)
MCHC RBC AUTO-ENTMCNC: 32.9 % (ref 33–37)
MCV RBC AUTO: 93 FL (ref 82–100)
PDW BLD-RTO: 13.6 % (ref 11.5–14.5)
PLATELET # BLD: 196 K/UL (ref 130–400)
POTASSIUM SERPL-SCNC: 4.3 MEQ/L (ref 3.4–4.9)
RBC # BLD: 4.38 M/UL (ref 4.2–5.4)
SODIUM BLD-SCNC: 140 MEQ/L (ref 135–144)
WBC # BLD: 6.2 K/UL (ref 4.8–10.8)

## 2021-01-25 DIAGNOSIS — Z79.899 HIGH RISK MEDICATION USE: ICD-10-CM

## 2021-01-25 DIAGNOSIS — M54.2 CERVICALGIA: ICD-10-CM

## 2021-01-25 DIAGNOSIS — M05.79 RHEUMATOID ARTHRITIS INVOLVING MULTIPLE SITES WITH POSITIVE RHEUMATOID FACTOR (HCC): ICD-10-CM

## 2021-01-25 RX ORDER — OXYCODONE AND ACETAMINOPHEN 7.5; 325 MG/1; MG/1
1 TABLET ORAL EVERY 6 HOURS PRN
Qty: 90 TABLET | Refills: 0 | Status: SHIPPED | OUTPATIENT
Start: 2021-01-25 | End: 2021-02-26 | Stop reason: SDUPTHER

## 2021-01-28 ENCOUNTER — VIRTUAL VISIT (OUTPATIENT)
Dept: GASTROENTEROLOGY | Age: 66
End: 2021-01-28
Payer: MEDICARE

## 2021-01-28 DIAGNOSIS — K21.9 GASTROESOPHAGEAL REFLUX DISEASE WITHOUT ESOPHAGITIS: ICD-10-CM

## 2021-01-28 DIAGNOSIS — R14.0 BLOATING: ICD-10-CM

## 2021-01-28 DIAGNOSIS — R14.3 EXCESSIVE FLATUS: ICD-10-CM

## 2021-01-28 DIAGNOSIS — R14.2 BELCHING: Primary | ICD-10-CM

## 2021-01-28 PROCEDURE — 99442 PR PHYS/QHP TELEPHONE EVALUATION 11-20 MIN: CPT | Performed by: INTERNAL MEDICINE

## 2021-01-28 NOTE — PROGRESS NOTES
oxyCODONE-acetaminophen (PERCOCET) 7.5-325 MG per tablet Take 1 tablet by mouth every 6 hours as needed for Pain for up to 30 days.  Intended supply: 30 days Yes Rosaline Peterson DO   gabapentin (NEURONTIN) 300 MG capsule TAKE 1 CAPSULE BY MOUTH DAILY AND 2 AT NIGHT AS TOLERATED Yes Rosaline Peterson DO   topiramate (TOPAMAX) 25 MG tablet TAKE 1 TABLET BY MOUTH EVERY DAY AT NIGHT Yes Rosaline Peterson DO   baclofen (LIORESAL) 10 MG tablet TAKE 1 TABLET BY MOUTH NIGHTLY AS NEEDED (MUSCLE SPASMS) Yes Rosaline Peterson DO   atorvastatin (LIPITOR) 10 MG tablet TAKE 1 TABLET BY MOUTH EVERY DAY AT NIGHT Yes Matt Howard MD   zoster recombinant adjuvanted vaccine (SHINGRIX) 50 MCG/0.5ML SUSR injection Inject 0.5 mLs into the muscle See Admin Instructions 1 dose now and repeat in 2-6 months Yes Matt Howard MD   pantoprazole (PROTONIX) 40 MG tablet Take 1 tablet by mouth every morning (before breakfast) Yes JOSE Freeman - CNP   doxycycline hyclate (VIBRA-TABS) 100 MG tablet TAKE 1 TABLET BY MOUTH TWICE A DAY Yes Leeanna Lujan DO   losartan (COZAAR) 50 MG tablet TAKE 1 TABLET BY MOUTH EVERY DAY Yes Historical Provider, MD   cyanocobalamin 1000 MCG/ML injection INJECT 1 ML AS DIRECTED EVERY 14 DAYS FOR 12 DOSES INJECT ONE CC SUB-CUTANEOSLY EVERY OTHER WEEK Yes Rosaline Peterson DO   hydroxychloroquine (PLAQUENIL) 200 MG tablet Take 1 tablet by mouth daily Yes Historical Provider, MD   hydrochlorothiazide (HYDRODIURIL) 25 MG tablet  Yes Historical Provider, MD   nitroGLYCERIN (NITROSTAT) 0.4 MG SL tablet PLEASE SEE ATTACHED FOR DETAILED DIRECTIONS Yes Matt Howard MD   melatonin 5 MG TABS tablet Take 5 mg by mouth nightly Yes Historical Provider, MD   lidocaine (XYLOCAINE) 5 % ointment APPLY TO AFFECTED AREA EVERY 12 HOURS AS NEEDED FOR PAIN Yes Historical Provider, MD   ammonium lactate (AMLACTIN) 12 % cream APPLY TO DRY/CALLUS AREAS TWICE DAILY Yes Historical Provider, MD aspirin 81 MG tablet Take 81 mg by mouth daily Yes Historical Provider, MD   Coenzyme Q10 (COQ10 PO) Take 1 capsule by mouth daily  Yes Historical Provider, MD   Cholecalciferol (VITAMIN D3) 2000 units TABS Take 2 tablets by mouth daily  Yes Historical Provider, MD     Social History     Tobacco Use    Smoking status: Never Smoker    Smokeless tobacco: Never Used   Substance Use Topics    Alcohol use: No    Drug use: No      PMH, PSH, FH and allergies reviewed and updated    PHYSICAL EXAMINATION:  [ INSTRUCTIONS:  \"[x]\" Indicates a positive item  \"[]\" Indicates a negative item  -- DELETE ALL ITEMS NOT EXAMINED]  [x] Alert  [x] Oriented to person/place/time    [x] No apparent distress    [x] Normal Mood    Due to this being a TeleHealth encounter, evaluation of the following organ systems is limited: Vitals/Constitutional/EENT/Resp/CV/GI//MS/Neuro/Skin/Heme-Lymph-Imm. ASSESSMENT/PLAN:    77 y.o. female with patient had a good response to Protonix with recurrence of GI symptoms after a course of antibiotics with respect to her reflux symptoms. 1. Belching  2. Bloating  3. Excessive flatus  4. Gastroesophageal reflux disease without esophagitis    - Antireflux measures  - Continue Protonix at this time, plan to reduce to 20 mg once daily  - Trial of FD guard or gasex  - Dietary changes    Return in about 6 weeks (around 3/11/2021). An electronic signature was used to authenticate this note. --Natalia Rojas MD on 1/28/2021 at 2:04 PM  Gastroenterology  [address] [place] [address] Sq to the emergency declaration under the [facility] waiver authority and the Coronavirus Preparedness and [employer] Act, this Virtual Visit was conducted, with patient's consent, to reduce the patient's risk of exposure to COVID-19 and provide continuity of care for an established patient. Services were provided through a video synchronous discussion virtually to substitute for in-person clinic visit. Please note this report has been partially produced using speech recognition software and may cause contain errors related to thatsystem including grammar, punctuation and spelling as well as words and phrases that may seem inappropriate. If there are questions or concerns please feel free to contact me to clarify.

## 2021-02-09 ENCOUNTER — NURSE TRIAGE (OUTPATIENT)
Dept: OTHER | Facility: CLINIC | Age: 66
End: 2021-02-09

## 2021-02-09 ENCOUNTER — HOSPITAL ENCOUNTER (EMERGENCY)
Age: 66
Discharge: HOME | End: 2021-02-09
Attending: STUDENT IN AN ORGANIZED HEALTH CARE EDUCATION/TRAINING PROGRAM
Payer: MEDICARE

## 2021-02-09 ENCOUNTER — APPOINTMENT (OUTPATIENT)
Dept: GENERAL RADIOLOGY | Age: 66
End: 2021-02-09
Payer: MEDICARE

## 2021-02-09 VITALS
DIASTOLIC BLOOD PRESSURE: 78 MMHG | RESPIRATION RATE: 18 BRPM | WEIGHT: 225 LBS | SYSTOLIC BLOOD PRESSURE: 165 MMHG | HEART RATE: 89 BPM | TEMPERATURE: 97.8 F | BODY MASS INDEX: 34.1 KG/M2 | OXYGEN SATURATION: 99 % | HEIGHT: 68 IN

## 2021-02-09 DIAGNOSIS — I16.0 HYPERTENSIVE URGENCY: ICD-10-CM

## 2021-02-09 DIAGNOSIS — R00.2 PALPITATIONS: Primary | ICD-10-CM

## 2021-02-09 LAB
ALBUMIN SERPL-MCNC: 4.3 G/DL (ref 3.5–4.6)
ALP BLD-CCNC: 117 U/L (ref 40–130)
ALT SERPL-CCNC: 26 U/L (ref 0–33)
ANION GAP SERPL CALCULATED.3IONS-SCNC: 13 MEQ/L (ref 9–15)
APTT: 27.4 SEC (ref 24.4–36.8)
AST SERPL-CCNC: 27 U/L (ref 0–35)
BASOPHILS ABSOLUTE: 0 K/UL (ref 0–0.2)
BASOPHILS RELATIVE PERCENT: 0.7 %
BILIRUB SERPL-MCNC: 0.4 MG/DL (ref 0.2–0.7)
BUN BLDV-MCNC: 28 MG/DL (ref 8–23)
C-REACTIVE PROTEIN, HIGH SENSITIVITY: 0.3 MG/L (ref 0–5)
CALCIUM SERPL-MCNC: 9.7 MG/DL (ref 8.5–9.9)
CHLORIDE BLD-SCNC: 103 MEQ/L (ref 95–107)
CO2: 22 MEQ/L (ref 20–31)
CREAT SERPL-MCNC: 1.38 MG/DL (ref 0.5–0.9)
EKG ATRIAL RATE: 102 BPM
EKG P AXIS: 45 DEGREES
EKG P-R INTERVAL: 172 MS
EKG Q-T INTERVAL: 370 MS
EKG QRS DURATION: 116 MS
EKG QTC CALCULATION (BAZETT): 482 MS
EKG R AXIS: -46 DEGREES
EKG T AXIS: 27 DEGREES
EKG VENTRICULAR RATE: 102 BPM
EOSINOPHILS ABSOLUTE: 0.1 K/UL (ref 0–0.7)
EOSINOPHILS RELATIVE PERCENT: 1.5 %
GFR AFRICAN AMERICAN: 46.1
GFR NON-AFRICAN AMERICAN: 38.1
GLOBULIN: 3 G/DL (ref 2.3–3.5)
GLUCOSE BLD-MCNC: 106 MG/DL (ref 70–99)
HCT VFR BLD CALC: 42.4 % (ref 37–47)
HEMOGLOBIN: 14.2 G/DL (ref 12–16)
INR BLD: 1
LYMPHOCYTES ABSOLUTE: 1 K/UL (ref 1–4.8)
LYMPHOCYTES RELATIVE PERCENT: 15.6 %
MAGNESIUM: 1.8 MG/DL (ref 1.7–2.4)
MCH RBC QN AUTO: 30.9 PG (ref 27–31.3)
MCHC RBC AUTO-ENTMCNC: 33.4 % (ref 33–37)
MCV RBC AUTO: 92.3 FL (ref 82–100)
MONOCYTES ABSOLUTE: 0.5 K/UL (ref 0.2–0.8)
MONOCYTES RELATIVE PERCENT: 7 %
NEUTROPHILS ABSOLUTE: 5 K/UL (ref 1.4–6.5)
NEUTROPHILS RELATIVE PERCENT: 75.2 %
PDW BLD-RTO: 13.6 % (ref 11.5–14.5)
PLATELET # BLD: 190 K/UL (ref 130–400)
POTASSIUM SERPL-SCNC: 4.5 MEQ/L (ref 3.4–4.9)
PRO-BNP: 758 PG/ML
PROTHROMBIN TIME: 13 SEC (ref 12.3–14.9)
RBC # BLD: 4.59 M/UL (ref 4.2–5.4)
SODIUM BLD-SCNC: 138 MEQ/L (ref 135–144)
T3 FREE: 3 PG/ML (ref 2–4.4)
T4 FREE: 1.19 NG/DL (ref 0.84–1.68)
TOTAL CK: 97 U/L (ref 0–170)
TOTAL PROTEIN: 7.3 G/DL (ref 6.3–8)
TROPONIN: <0.01 NG/ML (ref 0–0.01)
TSH SERPL DL<=0.05 MIU/L-ACNC: 2.92 UIU/ML (ref 0.44–3.86)
WBC # BLD: 6.6 K/UL (ref 4.8–10.8)

## 2021-02-09 PROCEDURE — 71045 X-RAY EXAM CHEST 1 VIEW: CPT

## 2021-02-09 PROCEDURE — 84484 ASSAY OF TROPONIN QUANT: CPT

## 2021-02-09 PROCEDURE — 85610 PROTHROMBIN TIME: CPT

## 2021-02-09 PROCEDURE — 99284 EMERGENCY DEPT VISIT MOD MDM: CPT

## 2021-02-09 PROCEDURE — 86141 C-REACTIVE PROTEIN HS: CPT

## 2021-02-09 PROCEDURE — 83735 ASSAY OF MAGNESIUM: CPT

## 2021-02-09 PROCEDURE — 85025 COMPLETE CBC W/AUTO DIFF WBC: CPT

## 2021-02-09 PROCEDURE — 83880 ASSAY OF NATRIURETIC PEPTIDE: CPT

## 2021-02-09 PROCEDURE — 6370000000 HC RX 637 (ALT 250 FOR IP): Performed by: EMERGENCY MEDICINE

## 2021-02-09 PROCEDURE — 80053 COMPREHEN METABOLIC PANEL: CPT

## 2021-02-09 PROCEDURE — 84439 ASSAY OF FREE THYROXINE: CPT

## 2021-02-09 PROCEDURE — 85730 THROMBOPLASTIN TIME PARTIAL: CPT

## 2021-02-09 PROCEDURE — 36415 COLL VENOUS BLD VENIPUNCTURE: CPT

## 2021-02-09 PROCEDURE — 84481 FREE ASSAY (FT-3): CPT

## 2021-02-09 PROCEDURE — 84443 ASSAY THYROID STIM HORMONE: CPT

## 2021-02-09 PROCEDURE — 82550 ASSAY OF CK (CPK): CPT

## 2021-02-09 PROCEDURE — 93005 ELECTROCARDIOGRAM TRACING: CPT | Performed by: STUDENT IN AN ORGANIZED HEALTH CARE EDUCATION/TRAINING PROGRAM

## 2021-02-09 RX ORDER — OXYCODONE HYDROCHLORIDE AND ACETAMINOPHEN 5; 325 MG/1; MG/1
1 TABLET ORAL ONCE
Status: COMPLETED | OUTPATIENT
Start: 2021-02-09 | End: 2021-02-09

## 2021-02-09 RX ADMIN — OXYCODONE HYDROCHLORIDE AND ACETAMINOPHEN 1 TABLET: 5; 325 TABLET ORAL at 20:11

## 2021-02-09 ASSESSMENT — ENCOUNTER SYMPTOMS
ABDOMINAL PAIN: 0
TROUBLE SWALLOWING: 0
COUGH: 0
BACK PAIN: 0
SINUS PRESSURE: 0
CHEST TIGHTNESS: 0
SHORTNESS OF BREATH: 0
VOMITING: 0
DIARRHEA: 0

## 2021-02-09 ASSESSMENT — PAIN SCALES - GENERAL: PAINLEVEL_OUTOF10: 6

## 2021-02-09 NOTE — TELEPHONE ENCOUNTER
Patient reports experiencing heart palpitations. Patient states she has a history of SVT in the past. Patient reports fluttering in chest. Patient reports this has been off and on today. Patient states she has an appointment with PCP but thought she should call to get in sooner. Patient denies any chest pain or shortness of breath. Patient checking BP and HR again 201/104 Pulse is 90. Reason for Disposition   Heart beating very rapidly (e.g., > 140 / minute) and present now (Exception: during exercise)    Answer Assessment - Initial Assessment Questions  1. DESCRIPTION: \"Please describe your heart rate or heart beat that you are having\" (e.g., fast/slow, regular/irregular, skipped or extra beats, \"palpitations\")      Palpitations and feeling like heart is skipping. 2. ONSET: \"When did it start? \" (Minutes, hours or days)       Been going on for awhile but seems to be more today than normal.    3. DURATION: \"How long does it last\" (e.g., seconds, minutes, hours)      Momentarily     4. PATTERN \"Does it come and go, or has it been constant since it started? \"  \"Does it get worse with exertion? \"   \"Are you feeling it now? \"      Comes and goes. 5. TAP: \"Using your hand, can you tap out what you are feeling on a chair or table in front of you, so that I can hear? \" (Note: not all patients can do this)        N/A    6. HEART RATE: \"Can you tell me your heart rate? \" \"How many beats in 15 seconds? \"  (Note: not all patients can do this)        HR-94 and BP-202/109    7. RECURRENT SYMPTOM: \"Have you ever had this before? \" If so, ask: \"When was the last time? \" and \"What happened that time? \"       Yes been a little bit ago. 8. CAUSE: \"What do you think is causing the palpitations? \"      SVT history but unsure. 9. CARDIAC HISTORY: \"Do you have any history of heart disease? \" (e.g., heart attack, angina, bypass surgery, angioplasty, arrhythmia)       Yes. 10. OTHER SYMPTOMS: \"Do you have any other symptoms? \"

## 2021-02-09 NOTE — ED PROVIDER NOTES
[address] [facility] ED  eMERGENCY dEPARTMENT eNCOUnter      Pt Name: Rosie Ray  MRN: [id number]  [place] 1955  Date of evaluation: 2/9/2021  Provider: Brenda Rodarte DO    CHIEF COMPLAINT       Chief Complaint   Patient presents with    Hypertension     and palpitations         HISTORY OF PRESENT ILLNESS   (Location/Symptom, Timing/Onset,Context/Setting, Quality, Duration, Modifying Factors, Severity)  Note limiting factors. Rosie Ray is a 77 y.o. female who presents to the emergency department with c/o elevated blood pressure today and heart palpitations. Patient denies CP, or SOB. Patient denies fever, chills or cough. Patient also denies N/V/D. Patient states she talk to her primary care provider who advised her with her high blood pressure to go to the ER to get evaluated. The history is provided by the patient. NursingNotes were reviewed. REVIEW OF SYSTEMS    (2-9 systems for level 4, 10 or more for level 5)     Review of Systems   Constitutional: Negative for activity change, appetite change, chills, fever and unexpected weight change. HENT: Negative for drooling, ear pain, nosebleeds, sinus pressure and trouble swallowing. Respiratory: Negative for cough, chest tightness and shortness of breath. Cardiovascular: Positive for palpitations. Negative for chest pain and leg swelling. Gastrointestinal: Negative for abdominal pain, diarrhea and vomiting. Endocrine: Negative for polydipsia and polyphagia. Genitourinary: Negative for dysuria, flank pain and frequency. Musculoskeletal: Negative for back pain and myalgias. Skin: Negative for pallor and rash. Neurological: Negative for syncope, weakness and headaches. Hematological: Does not bruise/bleed easily. All other systems reviewed and are negative. Except as noted above the remainder of the review of systems was reviewed and negative.        PAST MEDICAL HISTORY     Past Medical mg by mouth daily    ATORVASTATIN (LIPITOR) 10 MG TABLET    TAKE 1 TABLET BY MOUTH EVERY DAY AT NIGHT    BACLOFEN (LIORESAL) 10 MG TABLET    TAKE 1 TABLET BY MOUTH NIGHTLY AS NEEDED (MUSCLE SPASMS)    CHOLECALCIFEROL (VITAMIN D3) 2000 UNITS TABS    Take 2 tablets by mouth daily     COENZYME Q10 (COQ10 PO)    Take 1 capsule by mouth daily     CYANOCOBALAMIN 1000 MCG/ML INJECTION    INJECT 1 ML AS DIRECTED EVERY 14 DAYS FOR 12 DOSES INJECT ONE CC SUB-CUTANEOSLY EVERY OTHER WEEK    DOXYCYCLINE HYCLATE (VIBRA-TABS) 100 MG TABLET    TAKE 1 TABLET BY MOUTH TWICE A DAY    GABAPENTIN (NEURONTIN) 300 MG CAPSULE    TAKE 1 CAPSULE BY MOUTH DAILY AND 2 AT NIGHT AS TOLERATED    HYDROCHLOROTHIAZIDE (HYDRODIURIL) 25 MG TABLET        HYDROXYCHLOROQUINE (PLAQUENIL) 200 MG TABLET    Take 1 tablet by mouth daily    LIDOCAINE (XYLOCAINE) 5 % OINTMENT    APPLY TO AFFECTED AREA EVERY 12 HOURS AS NEEDED FOR PAIN    LOSARTAN (COZAAR) 50 MG TABLET    TAKE 1 TABLET BY MOUTH EVERY DAY    MELATONIN 5 MG TABS TABLET    Take 5 mg by mouth nightly    NITROGLYCERIN (NITROSTAT) 0.4 MG SL TABLET    PLEASE SEE ATTACHED FOR DETAILED DIRECTIONS    OXYCODONE-ACETAMINOPHEN (PERCOCET) 7.5-325 MG PER TABLET    Take 1 tablet by mouth every 6 hours as needed for Pain for up to 30 days.  Intended supply: 30 days    PANTOPRAZOLE (PROTONIX) 40 MG TABLET    Take 1 tablet by mouth every morning (before breakfast)    TOPIRAMATE (TOPAMAX) 25 MG TABLET    TAKE 1 TABLET BY MOUTH EVERY DAY AT NIGHT    ZOSTER RECOMBINANT ADJUVANTED VACCINE (SHINGRIX) 50 MCG/0.5ML SUSR INJECTION    Inject 0.5 mLs into the muscle See Admin Instructions 1 dose now and repeat in 2-6 months       ALLERGIES     Latex, Norvasc [amlodipine besylate], and Keflex [cephalexin]    FAMILY HISTORY       Family History   Problem Relation Age of Onset    Heart Disease Father     High Cholesterol Father     High Blood Pressure Father     Stroke Mother     High Blood Pressure Mother     High Blood Pressure Brother           SOCIAL HISTORY       Social History     Socioeconomic History    Marital status:      Spouse name: Carl Saucedo Number of children: 2    Years of education: 15    Highest education level: Associate degree: occupational, technical, or vocational program   Occupational History    Occupation: Retired    Social Needs    Financial resource strain: Not hard at all   Zee-Irasema insecurity     Worry: Never true     Inability: Never true   [employer] needs     Medical: No     Non-medical: No   Tobacco Use    Smoking status: Never Smoker    Smokeless tobacco: Never Used   Substance and Sexual Activity    Alcohol use: No    Drug use: No    Sexual activity: Not Currently   Lifestyle    Physical activity     Days per week: 0 days     Minutes per session: 0 min    Stress: Rather much   Relationships    Social connections     Talks on phone: More than three times a week     Gets together: More than three times a week     Attends Jew service: More than 4 times per year     Active member of club or organization: No     Attends meetings of clubs or organizations: Never     Relationship status:     Intimate partner violence     Fear of current or ex partner: No     Emotionally abused: No     Physically abused: No     Forced sexual activity: No   Other Topics Concern    None   Social History Narrative    Lives in [place] with her  Jessie Rawls and son Monty Verma is mildly handicapped --CP right lilia with anxiety and LD. He works at [employer] and [employer].        SCREENINGS    [place] Coma Scale  Eye Opening: Spontaneous  Best Verbal Response: Oriented  Best Motor Response: Obeys commands  [place] Coma Scale Score: 15 @FLOW([id number])@      PHYSICAL EXAM    (up to 7 for level 4, 8 or more for level 5)     ED Triage Vitals [02/09/21 1722]   BP Temp Temp Source Pulse Resp SpO2 Height Weight   (!) 183/109 97.8 °F (36.6 °C) Oral 105 20 98 % 5' 8\" (1.727 m) 225 lb (102.1 kg) Physical Exam  Vitals signs and nursing note reviewed. Constitutional:       General: She is awake. She is not in acute distress. Appearance: Normal appearance. She is well-developed and normal weight. She is not ill-appearing, toxic-appearing or diaphoretic. Comments: No photophobia. No phonophobia. HENT:      Head: Normocephalic and atraumatic. No Shea's sign. Right Ear: External ear normal.      Left Ear: External ear normal.      Nose: Nose normal. No congestion or rhinorrhea. Mouth/Throat:      Mouth: Mucous membranes are moist.      Pharynx: Oropharynx is clear. No oropharyngeal exudate or posterior oropharyngeal erythema. Eyes:      General: No scleral icterus. Right eye: No foreign body or discharge. Left eye: No discharge. Extraocular Movements: Extraocular movements intact. Conjunctiva/sclera: Conjunctivae normal.      Left eye: No exudate. Pupils: Pupils are equal, round, and reactive to light. Neck:      Musculoskeletal: Normal range of motion and neck supple. No neck rigidity. Vascular: No JVD. Trachea: No tracheal deviation. Comments: No meningismus. Cardiovascular:      Rate and Rhythm: Normal rate. Rhythm regularly irregular. Frequent extrasystoles are present. Pulses: Normal pulses. Radial pulses are 2+ on the right side and 2+ on the left side. Heart sounds: Normal heart sounds. Heart sounds not distant. No murmur. No systolic murmur. No diastolic murmur. No friction rub. No gallop. No S3 or S4 sounds. Pulmonary:      Effort: Pulmonary effort is normal. No respiratory distress. Breath sounds: Normal breath sounds. No stridor. No wheezing, rhonchi or rales. Chest:      Chest wall: No tenderness. Abdominal:      General: Abdomen is flat. Bowel sounds are normal. There is no distension. Palpations: Abdomen is soft. There is no mass. Tenderness: There is no abdominal tenderness. There is no right CVA tenderness, left CVA tenderness, guarding or rebound. Hernia: No hernia is present. Musculoskeletal: Normal range of motion. General: No swelling, tenderness, deformity or signs of injury. Right lower leg: No edema. Left lower leg: No edema. Lymphadenopathy:      Head:      Right side of head: No submental adenopathy. Left side of head: No submental adenopathy. Skin:     General: Skin is warm and dry. Capillary Refill: Capillary refill takes less than 2 seconds. Coloration: Skin is not jaundiced or pale. Findings: No bruising, erythema, lesion or rash. Neurological:      General: No focal deficit present. Mental Status: She is alert and oriented to person, place, and time. Mental status is at baseline. Cranial Nerves: No cranial nerve deficit. Sensory: No sensory deficit. Motor: No weakness. Coordination: Coordination normal.      Deep Tendon Reflexes: Reflexes are normal and symmetric. Psychiatric:         Mood and Affect: Mood normal.         Behavior: Behavior normal. Behavior is cooperative. Thought Content: Thought content normal.         Judgment: Judgment normal.         DIAGNOSTIC RESULTS     EKG: All EKG's are interpreted by the Emergency Department Physician who either signs or Co-signsthis chart in the absence of a cardiologist.    EKG: Sinus tachycardia at 102 bpm.  RSR pattern and the inferior leads as well as the anterior leads consistent with a right bundle branch block. The QT interval is 370 ms. There is no PVCs. RADIOLOGY:   Non-plain filmimages such as CT, Ultrasound and MRI are read by the radiologist. Plain radiographic images are visualized and preliminarily interpreted by the emergency physician with the below findings:    Chest x-ray: No infiltrate, no pleural effusion, no pneumothorax, no free air.     Interpretation per the Radiologist below, if available at the time ofthis note:    XR CHEST PORTABLE   Final Result            ED BEDSIDE ULTRASOUND:   Performed by ED Physician - none    LABS:  Labs Reviewed   COMPREHENSIVE METABOLIC PANEL - Abnormal; Notable for the following components:       Result Value    Glucose 106 (*)     BUN 28 (*)     CREATININE 1.38 (*)     GFR Non- 38.1 (*)     GFR  46.1 (*)     All other components within normal limits   APTT   BRAIN NATRIURETIC PEPTIDE   CBC WITH AUTO DIFFERENTIAL   CK   HIGH SENSITIVITY CRP   MAGNESIUM   PROTIME-INR   TROPONIN   TSH WITHOUT REFLEX   T4, FREE   T3, FREE       All other labs were within normal range or not returned as of this dictation. EMERGENCY DEPARTMENT COURSE and DIFFERENTIAL DIAGNOSIS/MDM:   Vitals:    Vitals:    02/09/21 1722 02/09/21 1852   BP: (!) 183/109 (!) 170/86   Pulse: 105 105   Resp: 20 18   Temp: 97.8 °F (36.6 °C)    TempSrc: Oral    SpO2: 98% 99%   Weight: 225 lb (102.1 kg)    Height: 5' 8\" (1.727 m)        Dr. Shavonne Glasgow assumed care of the patient at 7pm.  Patient's laboratories demonstrate a white count of only 6.6 and a hemoglobin of 14.2 not anemic no leukocytosis to indicate infection liver profile is normal glucose is normal 106 electrolytes are all normal the BNP is slightly elevated at 758 chest x-ray however is clear and the patient's troponin is less than 0.010. Patient blood pressure is 682 systolic over 88 she can go home she does not want to stay and I think at the most she had palpitations causing some hypertensive symptoms because she was fixated on taking a cuff at home and having it taken. At this point she did not take her blood pressure medicine she will take her losartan when she gets home and follow-up with her cardiologist she had a heart cath 3 years ago which was clean. MDM    CRITICAL CARE TIME   Total Critical Care time was 0 minutes, excluding separately reportableprocedures.   There was a high probability of clinicallysignificant/life threatening deterioration in the patient's condition which required my urgent intervention. CONSULTS:  None    PROCEDURES:  Unless otherwise noted below, none     Procedures    FINAL IMPRESSION      1. Palpitations    2.  Hypertensive urgency          DISPOSITION/PLAN   DISPOSITION Decision To Discharge 02/09/2021 07:46:53 PM      PATIENT REFERRED TO:  Mary Jean MD  [id number] [address]  [phone]    In 3 days        DISCHARGE MEDICATIONS:  New Prescriptions    No medications on file          (Please note that portions of this note were completed with a voice recognition program.  Efforts were made to edit the dictations but occasionally words are mis-transcribed.)    Alise Jimenez DO (electronically signed)  Attending Emergency Physician          Alise Jimenez DO  02/09/21 1949

## 2021-02-10 PROCEDURE — 93010 ELECTROCARDIOGRAM REPORT: CPT | Performed by: INTERNAL MEDICINE

## 2021-02-15 ENCOUNTER — PROCEDURE VISIT (OUTPATIENT)
Dept: PHYSICAL MEDICINE AND REHAB | Age: 66
End: 2021-02-15
Payer: MEDICARE

## 2021-02-15 DIAGNOSIS — M79.10 MYALGIA: ICD-10-CM

## 2021-02-15 PROCEDURE — 96372 THER/PROPH/DIAG INJ SC/IM: CPT | Performed by: PHYSICAL MEDICINE & REHABILITATION

## 2021-02-15 PROCEDURE — 20553 NJX 1/MLT TRIGGER POINTS 3/>: CPT | Performed by: PHYSICAL MEDICINE & REHABILITATION

## 2021-02-15 RX ORDER — CYANOCOBALAMIN 1000 UG/ML
1000 INJECTION INTRAMUSCULAR; SUBCUTANEOUS ONCE
Status: COMPLETED | OUTPATIENT
Start: 2021-02-15 | End: 2021-02-15

## 2021-02-15 RX ORDER — LIDOCAINE HYDROCHLORIDE 10 MG/ML
23 INJECTION, SOLUTION INFILTRATION; PERINEURAL ONCE
Status: COMPLETED | OUTPATIENT
Start: 2021-02-15 | End: 2021-02-15

## 2021-02-15 RX ADMIN — CYANOCOBALAMIN 1000 MCG: 1000 INJECTION INTRAMUSCULAR; SUBCUTANEOUS at 12:06

## 2021-02-15 RX ADMIN — LIDOCAINE HYDROCHLORIDE 23 ML: 10 INJECTION, SOLUTION INFILTRATION; PERINEURAL at 12:06

## 2021-02-17 DIAGNOSIS — Z79.899 HIGH RISK MEDICATION USE: ICD-10-CM

## 2021-02-17 DIAGNOSIS — M48.062 SPINAL STENOSIS OF LUMBAR REGION WITH NEUROGENIC CLAUDICATION: ICD-10-CM

## 2021-02-17 DIAGNOSIS — M46.46 LUMBAR DISCITIS: ICD-10-CM

## 2021-02-17 DIAGNOSIS — M15.9 PRIMARY OSTEOARTHRITIS INVOLVING MULTIPLE JOINTS: ICD-10-CM

## 2021-02-18 RX ORDER — TOPIRAMATE 25 MG/1
TABLET ORAL
Qty: 60 TABLET | Refills: 1 | Status: SHIPPED | OUTPATIENT
Start: 2021-02-18 | End: 2021-06-14

## 2021-02-19 RX ORDER — PANTOPRAZOLE SODIUM 40 MG/1
TABLET, DELAYED RELEASE ORAL
Qty: 90 TABLET | Refills: 1 | Status: SHIPPED | OUTPATIENT
Start: 2021-02-19 | End: 2021-03-11

## 2021-02-26 DIAGNOSIS — Z79.899 HIGH RISK MEDICATION USE: ICD-10-CM

## 2021-02-26 DIAGNOSIS — M05.79 RHEUMATOID ARTHRITIS INVOLVING MULTIPLE SITES WITH POSITIVE RHEUMATOID FACTOR (HCC): ICD-10-CM

## 2021-02-26 DIAGNOSIS — M54.2 CERVICALGIA: ICD-10-CM

## 2021-02-26 RX ORDER — OXYCODONE AND ACETAMINOPHEN 7.5; 325 MG/1; MG/1
1 TABLET ORAL EVERY 6 HOURS PRN
Qty: 90 TABLET | Refills: 0 | Status: SHIPPED | OUTPATIENT
Start: 2021-02-26 | End: 2021-03-23 | Stop reason: SDUPTHER

## 2021-03-01 ENCOUNTER — PROCEDURE VISIT (OUTPATIENT)
Dept: PHYSICAL MEDICINE AND REHAB | Age: 66
End: 2021-03-01
Payer: MEDICARE

## 2021-03-01 DIAGNOSIS — M54.31 SCIATICA OF RIGHT SIDE: Primary | ICD-10-CM

## 2021-03-01 PROCEDURE — 64445 NJX AA&/STRD SCIATIC NRV IMG: CPT | Performed by: PHYSICAL MEDICINE & REHABILITATION

## 2021-03-01 PROCEDURE — 76942 ECHO GUIDE FOR BIOPSY: CPT | Performed by: PHYSICAL MEDICINE & REHABILITATION

## 2021-03-01 RX ORDER — LIDOCAINE HYDROCHLORIDE 10 MG/ML
16 INJECTION, SOLUTION INFILTRATION; PERINEURAL ONCE
Status: COMPLETED | OUTPATIENT
Start: 2021-03-01 | End: 2021-03-01

## 2021-03-01 RX ORDER — LIDOCAINE HYDROCHLORIDE 20 MG/ML
5 INJECTION, SOLUTION INFILTRATION; PERINEURAL ONCE
Status: COMPLETED | OUTPATIENT
Start: 2021-03-01 | End: 2021-03-01

## 2021-03-01 RX ADMIN — LIDOCAINE HYDROCHLORIDE 5 ML: 20 INJECTION, SOLUTION INFILTRATION; PERINEURAL at 12:10

## 2021-03-01 RX ADMIN — LIDOCAINE HYDROCHLORIDE 16 ML: 10 INJECTION, SOLUTION INFILTRATION; PERINEURAL at 12:10

## 2021-03-01 NOTE — PROGRESS NOTES
Patient here for right sciatic injection with U/S. Patient taken back to exam room, and placed on drape locking stool. Areas to be injected marked appropriately, and cleansed with alcohol. 16cc of 1% Lidocaine, and 5cc of 2% Lidocaine to be injected by provider.

## 2021-03-09 ENCOUNTER — OFFICE VISIT (OUTPATIENT)
Dept: FAMILY MEDICINE CLINIC | Age: 66
End: 2021-03-09
Payer: MEDICARE

## 2021-03-09 VITALS
WEIGHT: 226 LBS | BODY MASS INDEX: 34.25 KG/M2 | OXYGEN SATURATION: 97 % | DIASTOLIC BLOOD PRESSURE: 80 MMHG | RESPIRATION RATE: 16 BRPM | SYSTOLIC BLOOD PRESSURE: 150 MMHG | TEMPERATURE: 97.9 F | HEIGHT: 68 IN | HEART RATE: 94 BPM

## 2021-03-09 DIAGNOSIS — L30.9 DERMATITIS: Primary | ICD-10-CM

## 2021-03-09 DIAGNOSIS — Z12.31 VISIT FOR SCREENING MAMMOGRAM: ICD-10-CM

## 2021-03-09 DIAGNOSIS — J33.9 NASAL POLYPS: ICD-10-CM

## 2021-03-09 PROCEDURE — 3017F COLORECTAL CA SCREEN DOC REV: CPT | Performed by: FAMILY MEDICINE

## 2021-03-09 PROCEDURE — G8417 CALC BMI ABV UP PARAM F/U: HCPCS | Performed by: FAMILY MEDICINE

## 2021-03-09 PROCEDURE — 1090F PRES/ABSN URINE INCON ASSESS: CPT | Performed by: FAMILY MEDICINE

## 2021-03-09 PROCEDURE — G8484 FLU IMMUNIZE NO ADMIN: HCPCS | Performed by: FAMILY MEDICINE

## 2021-03-09 PROCEDURE — 4040F PNEUMOC VAC/ADMIN/RCVD: CPT | Performed by: FAMILY MEDICINE

## 2021-03-09 PROCEDURE — G8427 DOCREV CUR MEDS BY ELIG CLIN: HCPCS | Performed by: FAMILY MEDICINE

## 2021-03-09 PROCEDURE — 1036F TOBACCO NON-USER: CPT | Performed by: FAMILY MEDICINE

## 2021-03-09 PROCEDURE — G8399 PT W/DXA RESULTS DOCUMENT: HCPCS | Performed by: FAMILY MEDICINE

## 2021-03-09 PROCEDURE — 1123F ACP DISCUSS/DSCN MKR DOCD: CPT | Performed by: FAMILY MEDICINE

## 2021-03-09 PROCEDURE — 99214 OFFICE O/P EST MOD 30 MIN: CPT | Performed by: FAMILY MEDICINE

## 2021-03-09 RX ORDER — LORATADINE 10 MG/1
10 TABLET ORAL DAILY PRN
Qty: 30 TABLET | Refills: 0 | Status: SHIPPED | OUTPATIENT
Start: 2021-03-09 | End: 2021-04-01 | Stop reason: SDUPTHER

## 2021-03-09 RX ORDER — AZITHROMYCIN 250 MG/1
TABLET, FILM COATED ORAL
Qty: 1 PACKET | Refills: 0 | Status: SHIPPED | OUTPATIENT
Start: 2021-03-09 | End: 2021-03-19

## 2021-03-09 ASSESSMENT — PATIENT HEALTH QUESTIONNAIRE - PHQ9
2. FEELING DOWN, DEPRESSED OR HOPELESS: 1
SUM OF ALL RESPONSES TO PHQ QUESTIONS 1-9: 2
1. LITTLE INTEREST OR PLEASURE IN DOING THINGS: 1
SUM OF ALL RESPONSES TO PHQ QUESTIONS 1-9: 2

## 2021-03-09 NOTE — PROGRESS NOTES
Patient: Adam Carr (: 1955) is a 77 y.o. female,Established patient, here for evaluation of the following chief complaint(s):  Chief Complaint   Patient presents with    Hypertension     6 month follow up. She is due for a mammogram.     Hyperlipidemia     6 month follow up    Rash     She complains of rash on stomach. Date: 3/9/21    Allergies   Allergen Reactions    Latex Swelling     Swelling of hands with wearing latex gloves    Norvasc [Amlodipine Besylate] Other (See Comments)     swelling    Keflex [Cephalexin] Rash       Vitals:    21 1325 21 1333   BP: (!) 148/70 (!) 150/80   Site: Right Upper Arm Right Upper Arm   Position: Sitting Sitting   Cuff Size: Large Adult Large Adult   Pulse: 94    Resp: 16    Temp: 97.9 °F (36.6 °C)    TempSrc: Oral    SpO2: 97%    Weight: 226 lb (102.5 kg)    Height: 5' 8\" (1.727 m)       Body mass index is 34.36 kg/m². PHQ Scores 3/9/2021 10/23/2020 2020 2020 2020 2019 10/10/2018   PHQ2 Score 2 0 0 0 0 0 0   PHQ9 Score 2 0 0 0 0 0 0     Interpretation of Total Score Depression Severity: 1-4 = Minimal depression, 5-9 = Mild depression, 10-14 = Moderate depression, 15-19 = Moderately severe depression, 20-27 = Severe depression    HPI    She comes in today with a rash under her abdominal fold. She is gained some weight as she is been depressed over the last few months. She had to consolidate households in her family she is moved to a smaller house which has been a stressor for her and she thinks it may have contributed to it. She is try to keep the area clean and dry but is really not improving.'s been there for couple weeks. Slightly itchy. Also her ears have been feeling full and that they will pop she wears hearing aids she does have a sore throat but she has some pressure on the right side of her face she has been told she has nasal polyps that should have been removed but she did want to do it at the time. Review of Systems    Constitutional: Negative for fatigue, fever and sweats. HEENT: Negative for eye discharge and vision loss. Negative for ear drainage, hearing loss and nasal drainage. Respiratory: Negative for cough, dyspnea and wheezing. Cardiovascular:  Negative for chest pain, claudication and irregular heartbeat/palpitations. Gastrointestinal: positive for abdominal pain, negative for nausea, constipation and diarrhea. Genitourinary: Negative for dysuria,patient had a hysterectomy. Metabolic/Endocrine: Negative for cold intolerance, heat intolerance, polydipsia and polyphagia. No unintended weight loss or weight gain. Neuro/Psychiatric: Negative for gait disturbance. Negative for psychiatric symptoms. Dermatologic: Negative for pruritus and positive for rash. Musculoskeletal: positive for bone/joint symptoms. No numbness or tingling. No loss of function. Hematology: Negative for bleeding and easy bruising. Immunology:  Negative for environmental allergies and food allergies. Physical Exam    Patient's medication, allergies, past medical, surgical, social and family histories were reviewed and updated as appropriate. PHYSICAL EXAM     General: Alert oriented pleasant cooperative no acute distress. HEENT: Eyes clear nonicteric, ears TMs intact right TM slightly erythematous some wax on the left but not occlusive oropharynx hyperemic without exudates induration or ulceration  Neck: Soft nontender no adenopathy or masses  Lungs: Clear to auscultation without wheezes rhonchi or rales  Heart: Regular rate and rhythm without murmurs rubs or gallops  Abdomen: She has a large abdominal fold and underneath it is an erythematous rash with satellite lesions present particularly dampened there is no odor and no skin breakdown              Assessment:   Diagnosis Orders   1. Dermatitis  nystatin-triamcinolone (MYCOLOG II) 347295-1.1 UNIT/GM-% cream   2.  Nasal polyps  loratadine (CLARITIN) 10 MG tablet PLEASE SEE ATTACHED FOR DETAILED DIRECTIONS 25 tablet 3    melatonin 5 MG TABS tablet Take 5 mg by mouth nightly      lidocaine (XYLOCAINE) 5 % ointment APPLY TO AFFECTED AREA EVERY 12 HOURS AS NEEDED FOR PAIN  5    aspirin 81 MG tablet Take 81 mg by mouth daily      Coenzyme Q10 (COQ10 PO) Take 1 capsule by mouth daily       Cholecalciferol (VITAMIN D3) 2000 units TABS Take 2 tablets by mouth daily       zoster recombinant adjuvanted vaccine (SHINGRIX) 50 MCG/0.5ML SUSR injection Inject 0.5 mLs into the muscle See Admin Instructions 1 dose now and repeat in 2-6 months 0.5 mL 0     Current Facility-Administered Medications   Medication Dose Route Frequency Provider Last Rate Last Admin    cyanocobalamin injection 1,000 mcg  1,000 mcg Intramuscular Once Genevieve Ward, DO         Orders Placed This Encounter   Procedures    DONI DIGITAL SCREEN W OR WO CAD BILATERAL     Standing Status:   Future     Standing Expiration Date:   5/9/2022     Scheduling Instructions:      US if needed     Order Specific Question:   Reason for exam:     Answer:   routine    JOHN - Solitario Hoffman MD, Otolaryngology, [facility]     Referral Priority:   Routine     Referral Type:   Eval and Treat     Referral Reason:   Specialty Services Required     Referred to Provider:   Keli Pillai MD     Requested Specialty:   Otolaryngology     Number of Visits Requested:   1       Orders Placed This Encounter   Medications    nystatin-triamcinolone (MYCOLOG II) 873890-4.1 UNIT/GM-% cream     Sig: Apply topically TID     Dispense:  60 g     Refill:  1    loratadine (CLARITIN) 10 MG tablet     Sig: Take 1 tablet by mouth daily as needed (congestion)     Dispense:  30 tablet     Refill:  0    azithromycin (ZITHROMAX Z-LINDA) 250 MG tablet     Sig: Complete entire pack as directed. Dispense:  1 packet     Refill:  0              Return in about 6 months (around 9/9/2021) for dr. No Morris.     An electronic signature was used to authenticate this note.   Dr. Carmelita Silverman      3/9/21  2:14 PM

## 2021-03-11 ENCOUNTER — VIRTUAL VISIT (OUTPATIENT)
Dept: GASTROENTEROLOGY | Age: 66
End: 2021-03-11
Payer: MEDICARE

## 2021-03-11 DIAGNOSIS — R14.2 BELCHING: Primary | ICD-10-CM

## 2021-03-11 DIAGNOSIS — R10.30 LOWER ABDOMINAL PAIN: ICD-10-CM

## 2021-03-11 DIAGNOSIS — R14.3 EXCESSIVE FLATUS: ICD-10-CM

## 2021-03-11 DIAGNOSIS — K21.9 GASTROESOPHAGEAL REFLUX DISEASE WITHOUT ESOPHAGITIS: ICD-10-CM

## 2021-03-11 DIAGNOSIS — R14.0 BLOATING: ICD-10-CM

## 2021-03-11 PROCEDURE — G8417 CALC BMI ABV UP PARAM F/U: HCPCS | Performed by: INTERNAL MEDICINE

## 2021-03-11 PROCEDURE — G8399 PT W/DXA RESULTS DOCUMENT: HCPCS | Performed by: INTERNAL MEDICINE

## 2021-03-11 PROCEDURE — G8427 DOCREV CUR MEDS BY ELIG CLIN: HCPCS | Performed by: INTERNAL MEDICINE

## 2021-03-11 PROCEDURE — 3017F COLORECTAL CA SCREEN DOC REV: CPT | Performed by: INTERNAL MEDICINE

## 2021-03-11 PROCEDURE — 1090F PRES/ABSN URINE INCON ASSESS: CPT | Performed by: INTERNAL MEDICINE

## 2021-03-11 PROCEDURE — 4040F PNEUMOC VAC/ADMIN/RCVD: CPT | Performed by: INTERNAL MEDICINE

## 2021-03-11 PROCEDURE — G8484 FLU IMMUNIZE NO ADMIN: HCPCS | Performed by: INTERNAL MEDICINE

## 2021-03-11 PROCEDURE — 1123F ACP DISCUSS/DSCN MKR DOCD: CPT | Performed by: INTERNAL MEDICINE

## 2021-03-11 PROCEDURE — 99213 OFFICE O/P EST LOW 20 MIN: CPT | Performed by: INTERNAL MEDICINE

## 2021-03-11 PROCEDURE — 1036F TOBACCO NON-USER: CPT | Performed by: INTERNAL MEDICINE

## 2021-03-11 RX ORDER — LACTOBACILLUS RHAMNOSUS GG 10B CELL
1 CAPSULE ORAL DAILY
Qty: 30 CAPSULE | Refills: 1 | Status: SHIPPED | OUTPATIENT
Start: 2021-03-11 | End: 2021-09-21 | Stop reason: ALTCHOICE

## 2021-03-11 RX ORDER — PANTOPRAZOLE SODIUM 20 MG/1
20 TABLET, DELAYED RELEASE ORAL DAILY
Qty: 30 TABLET | Refills: 3 | Status: SHIPPED | OUTPATIENT
Start: 2021-03-11 | End: 2021-06-30

## 2021-03-11 NOTE — PROGRESS NOTES
3/11/2021    TELEHEALTH EVALUATION -- Audio/Visual (During UWQIB-99 public health emergency)    Due to [place] 19 outbreak, patient's office visit was converted to a virtual visit. Patient was contacted and agreed to proceed with a virtual visit via [employer]. me  The risks and benefits of converting to a virtual visit were discussed in light of the current infectious disease epidemic. Patient also understood that insurance coverage and co-pays are up to their individual insurance plans. Chief Complaint   Patient presents with   [facility] Follow-up     Text [phone], patient is still having some excess gas. FDGard didn't help, she has been using gas-x. Stomach is being bothered by azithromycin for some ear issues.  Bloated     HPI:  Patient Location: Home  Provider location: Office    Kalanijamila Carson (:  1955) for follow-up, last clinic visit 2021 with excessive belching bloating flatulence    Interval history: Patient is back on another course of antibiotics for ear infections, currently having 1-2 soft bowel movements daily. With respect to excessive bloating and flatulence symptoms these have responded somewhat to Gas-X but had no response to FD guard. HPI at 2021 visit summarized below:   Recurrence of belching burping and mild reflux symptoms. Patient last seen in GI clinic on 2020 with reflux symptoms. Patient was started on Protonix which has significantly improved symptoms. Patient recently took a course of Augmentin for bilateral ear infection, after which patient started to ha recurrence of her previous GI symptoms. Patient denies any blood in the stool. Patient denies any weight loss. Patient reports taking a over-the-counter probiotic and a cup of yogurt every night.   Otherwise bowel movements unchanged     Previous GI work up/Endoscopic investigations:   EGD/EUS: EUS 19-Endoscopic ultrasonography examination with fine-needle biopsy: Fatty infiltration of the pancreas and liver, normal pancreatic duct, normal common bile duct, antral erosions.    Colonoscopy 2017- Normal     Review of Systems   All other systems reviewed and are negative. Prior to Visit Medications    Medication Sig Taking? Authorizing Provider   pantoprazole (PROTONIX) 20 MG tablet Take 1 tablet by mouth daily Yes Mamta De La Cruz MD   lactobacillus (CULTURELLE) capsule Take 1 capsule by mouth daily Yes Mamta De La Cruz MD   nystatin-triamcinolone [facility] II) 210809-3.1 UNIT/GM-% cream Apply topically TID Yes Laurel Zuniga MD   loratadine (CLARITIN) 10 MG tablet Take 1 tablet by mouth daily as needed (congestion) Yes Laurel Zuniga MD   azithromycin (ZITHROMAX Z-LINDA) 250 MG tablet Complete entire pack as directed. Yes Laurel Zuniga MD   oxyCODONE-acetaminophen (PERCOCET) 7.5-325 MG per tablet Take 1 tablet by mouth every 6 hours as needed for Pain for up to 30 days.  Intended supply: 30 days Yes Rosaline Peterson DO   topiramate (TOPAMAX) 25 MG tablet TAKE 1 TABLET BY MOUTH EVERY DAY AT NIGHT Yes Rosaline Peterson DO   baclofen (LIORESAL) 10 MG tablet TAKE 1 TABLET BY MOUTH NIGHTLY AS NEEDED (MUSCLE SPASMS) Yes Rosaline Peterson DO   atorvastatin (LIPITOR) 10 MG tablet TAKE 1 TABLET BY MOUTH EVERY DAY AT NIGHT Yes Laurel Zuniga MD   zoster recombinant adjuvanted vaccine (SHINGRIX) 50 MCG/0.5ML SUSR injection Inject 0.5 mLs into the muscle See Admin Instructions 1 dose now and repeat in 2-6 months Yes Laurel Zuniga MD   doxycycline hyclate (VIBRA-TABS) 100 MG tablet TAKE 1 TABLET BY MOUTH TWICE A DAY Yes Leeanna Lujan DO   losartan (COZAAR) 50 MG tablet TAKE 1 TABLET BY MOUTH EVERY DAY Yes Historical Provider, MD   cyanocobalamin 1000 MCG/ML injection INJECT 1 ML AS DIRECTED EVERY 14 DAYS FOR 12 DOSES INJECT ONE CC SUB-CUTANEOSLY EVERY OTHER WEEK Yes Rosaline Peterson DO   hydroxychloroquine (PLAQUENIL) 200 MG tablet Take 1 tablet by mouth daily Yes Historical Provider, MD   hydrochlorothiazide (HYDRODIURIL) 25 MG tablet  Yes Historical Provider, MD   nitroGLYCERIN (NITROSTAT) 0.4 MG SL tablet PLEASE SEE ATTACHED FOR DETAILED DIRECTIONS Yes Ayanna Bolton MD   melatonin 5 MG TABS tablet Take 5 mg by mouth nightly Yes Historical Provider, MD   lidocaine (XYLOCAINE) 5 % ointment APPLY TO AFFECTED AREA EVERY 12 HOURS AS NEEDED FOR PAIN Yes Historical Provider, MD   aspirin 81 MG tablet Take 81 mg by mouth daily Yes Historical Provider, MD   Coenzyme Q10 (COQ10 PO) Take 1 capsule by mouth daily  Yes Historical Provider, MD   Cholecalciferol (VITAMIN D3) 2000 units TABS Take 2 tablets by mouth daily  Yes Historical Provider, MD   gabapentin (NEURONTIN) 300 MG capsule TAKE 1 CAPSULE BY MOUTH DAILY AND 2 AT NIGHT AS TOLERATED  Rosaline Scullin, DO     Social History     Tobacco Use    Smoking status: Never Smoker    Smokeless tobacco: Never Used   Substance Use Topics    Alcohol use: No    Drug use: No      PMH, PSH, FH and allergies reviewed and updated    PHYSICAL EXAMINATION:  [ INSTRUCTIONS:  \"[x]\" Indicates a positive item  \"[]\" Indicates a negative item  -- DELETE ALL ITEMS NOT EXAMINED]  [x] Alert  [x] Oriented to person/place/time    [x] No apparent distress     [x] Normal Mood  [] Anxious appearing    [] Depressed appearing   Due to this being a TeleHealth encounter, evaluation of the following organ systems is limited: Vitals/Constitutional/EENT/Resp/CV/GI//MS/Neuro/Skin/Heme-Lymph-Imm. ASSESSMENT/PLAN:    77 y.o. female with history of bloating and excessive flatulence, belching burping. History of reflux on PPI therapy with good symptom control    1. Belching  2. Bloating  3. Excessive flatus  - lactobacillus (CULTURELLE) capsule; Take 1 capsule by mouth daily  Dispense: 30 capsule; Refill: 1  4. Gastroesophageal reflux disease without esophagitis  - pantoprazole (PROTONIX) 20 MG tablet; Take 1 tablet by mouth daily  Dispense: 30 tablet; Refill: 3    5.  Lower abdominal pain  -Observe clinical course, dietary advice discussed    Return if symptoms worsen or fail to improve. An electronic signature was used to authenticate this note. --Tasneem Juárez MD on 3/11/2021 at 5:32 PM  Gastroenterology  [address] to the emergency declaration under the [facility] waiver authority and the Coronavirus Preparedness and [employer] Act, this Virtual Visit was conducted, with patient's consent, to reduce the patient's risk of exposure to COVID-19 and provide continuity of care for an established patient. Services were provided through a video synchronous discussion virtually to substitute for in-person clinic visit. Please note this report has been partially produced using speech recognition software and may cause contain errors related to thatsystem including grammar, punctuation and spelling as well as words and phrases that may seem inappropriate. If there are questions or concerns please feel free to contact me to clarify.

## 2021-03-23 ENCOUNTER — VIRTUAL VISIT (OUTPATIENT)
Dept: PHYSICAL MEDICINE AND REHAB | Age: 66
End: 2021-03-23
Payer: MEDICARE

## 2021-03-23 DIAGNOSIS — M54.41 CHRONIC BILATERAL LOW BACK PAIN WITH BILATERAL SCIATICA: ICD-10-CM

## 2021-03-23 DIAGNOSIS — R26.9 GAIT ABNORMALITY: ICD-10-CM

## 2021-03-23 DIAGNOSIS — G89.29 CHRONIC BILATERAL LOW BACK PAIN WITH BILATERAL SCIATICA: ICD-10-CM

## 2021-03-23 DIAGNOSIS — M54.42 CHRONIC BILATERAL LOW BACK PAIN WITH BILATERAL SCIATICA: ICD-10-CM

## 2021-03-23 DIAGNOSIS — G57.93 NEUROPATHY INVOLVING BOTH LOWER EXTREMITIES: ICD-10-CM

## 2021-03-23 DIAGNOSIS — Z79.899 HIGH RISK MEDICATION USE: ICD-10-CM

## 2021-03-23 DIAGNOSIS — M05.79 RHEUMATOID ARTHRITIS INVOLVING MULTIPLE SITES WITH POSITIVE RHEUMATOID FACTOR (HCC): Primary | ICD-10-CM

## 2021-03-23 DIAGNOSIS — M54.2 CERVICALGIA: ICD-10-CM

## 2021-03-23 PROCEDURE — 4040F PNEUMOC VAC/ADMIN/RCVD: CPT | Performed by: PHYSICAL MEDICINE & REHABILITATION

## 2021-03-23 PROCEDURE — G8484 FLU IMMUNIZE NO ADMIN: HCPCS | Performed by: PHYSICAL MEDICINE & REHABILITATION

## 2021-03-23 PROCEDURE — 1090F PRES/ABSN URINE INCON ASSESS: CPT | Performed by: PHYSICAL MEDICINE & REHABILITATION

## 2021-03-23 PROCEDURE — 99214 OFFICE O/P EST MOD 30 MIN: CPT | Performed by: PHYSICAL MEDICINE & REHABILITATION

## 2021-03-23 PROCEDURE — 1036F TOBACCO NON-USER: CPT | Performed by: PHYSICAL MEDICINE & REHABILITATION

## 2021-03-23 PROCEDURE — G8427 DOCREV CUR MEDS BY ELIG CLIN: HCPCS | Performed by: PHYSICAL MEDICINE & REHABILITATION

## 2021-03-23 PROCEDURE — G8417 CALC BMI ABV UP PARAM F/U: HCPCS | Performed by: PHYSICAL MEDICINE & REHABILITATION

## 2021-03-23 PROCEDURE — G8399 PT W/DXA RESULTS DOCUMENT: HCPCS | Performed by: PHYSICAL MEDICINE & REHABILITATION

## 2021-03-23 PROCEDURE — 3017F COLORECTAL CA SCREEN DOC REV: CPT | Performed by: PHYSICAL MEDICINE & REHABILITATION

## 2021-03-23 PROCEDURE — 1123F ACP DISCUSS/DSCN MKR DOCD: CPT | Performed by: PHYSICAL MEDICINE & REHABILITATION

## 2021-03-23 RX ORDER — OXYCODONE AND ACETAMINOPHEN 7.5; 325 MG/1; MG/1
1 TABLET ORAL EVERY 6 HOURS PRN
Qty: 90 TABLET | Refills: 0 | Status: SHIPPED | OUTPATIENT
Start: 2021-03-27 | End: 2021-04-29 | Stop reason: SDUPTHER

## 2021-03-23 RX ORDER — SIMETHICONE 80 MG
80 TABLET,CHEWABLE ORAL EVERY 6 HOURS PRN
COMMUNITY
End: 2021-09-21 | Stop reason: ALTCHOICE

## 2021-03-23 ASSESSMENT — ENCOUNTER SYMPTOMS
PHOTOPHOBIA: 1
BACK PAIN: 1
SORE THROAT: 0
BLOOD IN STOOL: 0
EYE PAIN: 0
VOMITING: 0
STRIDOR: 0
SHORTNESS OF BREATH: 1
COUGH: 0
NAUSEA: 0
EYE REDNESS: 0
WHEEZING: 0
DIARRHEA: 0
ABDOMINAL PAIN: 1
CONSTIPATION: 1

## 2021-03-23 NOTE — PROGRESS NOTES
TELEHEALTH EVALUATION -- Audio/Visual (During JTUCA-71 public health emergency)    Due to COVID 19 outbreak, patient's office visit was converted to a virtual visit. Patient was contacted and agreed to proceed with a virtual visit via  Doxy. me   The risks and benefits of converting to a virtual visit were discussed in light of the current infectious disease epidemic. Patient also understood that insurance coverage and co-pays are up to their individual insurance plans. Subjective       This patient has requested an audio/video evaluation for the following concern(s):    HPI:     Back Pain (Trigger point injections 2/15/21 with 35% reduction in pain lasting 3 weeks)   Neck Pain   Shoulder Pain (Right)   Hip Pain (Right)   Leg Pain (Right. Right sciatic injection 12/28/20 with 75% reduction in pain lasting 3 weeks & 3/1/21 with 50% reduction in pain lasting 2 weeks.)   Foot Pain (Right)   Medication Refill (Percocet, Gabapentin, Baclofen, Zinc, Vit D refill today)   Pain (4- Without medication (Back/Right Leg)           She is still doing well with her current medications and shows no signs of abuse or overuse. She is more functional on it. Does not need Neurontin refills. Dr. Li Wong has had her on Plaquenil which was not helping she was able to wean herself off of the OxyContin but continues not to take Percocet as needed. She is to avoid any NSAIDs but she will continue gabapentin as tolerated baclofen vitamin D and zinc.  She is requesting monthly trigger point -sciatics as needed. She is now with [facility] and likes the care quite a bit. Back Pain  This is a chronic problem. The current episode started more than 1 year ago. The problem occurs daily. The problem is unchanged. The pain is present in the lumbar spine and gluteal. The quality of the pain is described as aching. Radiates to: bilateral legs, left leg worse, left hip. The pain is at a severity of 6/10. The pain is moderate. The pain is worse during the day. The symptoms are aggravated by bending, standing, position, sitting and twisting (Walking). Stiffness is present in the morning. Associated symptoms include abdominal pain, leg pain and pelvic pain. Pertinent negatives include no bladder incontinence, chest pain, dysuria, fever, headaches, numbness, paresis, perianal numbness or weakness. Risk factors include obesity, poor posture, menopause, lack of exercise, sedentary lifestyle and history of steroid use. She has tried analgesics, walking, home exercises, heat, muscle relaxant, NSAIDs, ice and bed rest (Sciatica block, trigger point injections, mobic, Gabapentin, Norco) for the symptoms. The treatment provided moderate relief. Neck Pain   This is a chronic problem. The problem occurs constantly. The problem has been waxing and waning. The pain is at a severity of 8/10. The pain is mild. The symptoms are aggravated by position and bending. Stiffness is present in the morning. Associated symptoms include leg pain and photophobia. Pertinent negatives include no chest pain, fever, headaches, numbness, paresis or weakness. She has tried heat, acetaminophen, chiropractic manipulation, ice, muscle relaxants, neck support, NSAIDs and oral narcotics for the symptoms. The treatment provided mild relief. Foot Pain   The pain is present in the neck, back and left shoulder. This is a chronic problem. The current episode started more than 1 year ago. The problem occurs constantly. The problem has been gradually worsening. The pain is at a severity of 8/10. Associated symptoms include joint locking, joint swelling and a limited range of motion. Pertinent negatives include no fever or numbness. The symptoms are aggravated by contact, heat and standing. She has tried cold, NSAIDS, OTC ointments, OTC pain meds, chondroitin, heat, movement, acetaminophen, oral narcotics and rest for the symptoms. The treatment provided moderate relief.  Family history includes rheumatoid arthritis. Her past medical history is significant for osteoarthritis.              Past Medical History:   Diagnosis Date    Abnormal electromyogram (EMG) 8/19/09    SENSORIMOTOR NEUROPATHY OF BLE, RIGHT WORSE THAN LEFT, SUSPICIOUS FOR RIGHT S1 RADIC    Angina pectoris (HCC)     Angina pectoris ([address] 07/2019    sees Dr. Chantel Meadows Ankle pain     Bleeding ulcer     Colon polyp 4/3/2017    Dermatitis     Fibromyalgia     Foot pain     Hyperlipidemia     Hypertension     Infection of intervertebral disc (pyogenic), lumbar region ([address] 8/13/2018    Low back pain     MRSA (methicillin resistant staph aureus) culture positive 2011    Neck pain     Neuropathy     Obesity     Osteoarthritis     PSVT (paroxysmal supraventricular tachycardia) ([address]     Shingles outbreak 2012    SI (sacroiliac) pain     Stenosis     Vitamin D deficiency        Past Surgical History:   Procedure Laterality Date    CARDIAC CATHETERIZATION  10/2016    CERVICAL FUSION  12/14/11    Dr Amelia Blanc with bone graft and plate    COLONOSCOPY      ENDOSCOPIC ULTRASOUND (LOWER) N/A 7/11/2019    EGD/ EUS performed by Les Hernández MD at [facility]  7/05    HYSTERECTOMY  2001    HYSTERECTOMY, TOTAL ABDOMINAL      JOINT REPLACEMENT Left     tka    LIVER BIOPSY      MENISCECTOMY  10/31/11    left knee    OTHER SURGICAL HISTORY  10/26/09    CAUDALS BY DR Elenita Thomas    ME COLON CA SCRN NOT  [address] [place] [address] N/A 4/12/2017    COLONOSCOPY performed by Ashley Davila MD at . [facility] ESOPHAGOGASTRODUODENOSCOPY TRANSORAL DIAGNOSTIC N/A 4/12/2017    EGD ESOPHAGOGASTRODUODENOSCOPY performed by Ashley Davila MD at [facility]  12/2011    Dr Amelia Blanc Lumbar and c spine    TOTAL KNEE ARTHROPLASTY  7/30/12    LEFT-DR AVELAR    UPPER GASTROINTESTINAL ENDOSCOPY  1995       Social History     Socioeconomic History    Marital status:  Spouse name: Shaylee Park Number of children: 2    Years of education: 15    Highest education level: Associate degree: occupational, technical, or vocational program   Occupational History    Occupation: Retired    Social Needs    Financial resource strain: Not hard at all   Roxana-Irasema insecurity     Worry: Never true     Inability: Never true   [employer] needs     Medical: No     Non-medical: No   Tobacco Use    Smoking status: Never Smoker    Smokeless tobacco: Never Used   Substance and Sexual Activity    Alcohol use: No    Drug use: No    Sexual activity: Not Currently   Lifestyle    Physical activity     Days per week: 0 days     Minutes per session: 0 min    Stress: Rather much   Relationships    Social connections     Talks on phone: More than three times a week     Gets together: More than three times a week     Attends Rastafari service: More than 4 times per year     Active member of club or organization: No     Attends meetings of clubs or organizations: Never     Relationship status:     Intimate partner violence     Fear of current or ex partner: No     Emotionally abused: No     Physically abused: No     Forced sexual activity: No   Other Topics Concern    None   Social History Narrative    Lives in [place] with her  Giuseppe Carpenter and son Rahat Apodaca is mildly handicapped --CP right lilia with anxiety and LD. He works at [employer] and [employer]. Family History   Problem Relation Age of Onset    Heart Disease Father     High Cholesterol Father     High Blood Pressure Father     Stroke Mother     High Blood Pressure Mother     High Blood Pressure Brother        Allergies   Allergen Reactions    Latex Swelling     Swelling of hands with wearing latex gloves    Norvasc [Amlodipine Besylate] Other (See Comments)     swelling    Keflex [Cephalexin] Rash       Review of Systems   Constitutional: Positive for activity change and fatigue.  Negative for chills, diaphoresis and fever.   HENT: Negative for congestion, ear discharge, ear pain, hearing loss, nosebleeds, sore throat and tinnitus. Eyes: Positive for photophobia. Negative for pain and redness. Respiratory: Positive for shortness of breath. Negative for cough, wheezing and stridor. Shortness of breath on exertion   Cardiovascular: Negative for chest pain, palpitations and leg swelling. Gastrointestinal: Positive for abdominal pain and constipation. Negative for blood in stool, diarrhea, nausea and vomiting. Endocrine: Negative for polydipsia. Genitourinary: Positive for pelvic pain. Negative for bladder incontinence, dysuria, flank pain, frequency, hematuria and urgency. Musculoskeletal: Positive for back pain, gait problem, myalgias and neck pain. Skin: Negative for rash. Allergic/Immunologic: Positive for immunocompromised state. Negative for environmental allergies. Neurological: Negative for dizziness, tremors, seizures, weakness, numbness and headaches. Hematological: Does not bruise/bleed easily. Psychiatric/Behavioral: Negative for hallucinations and suicidal ideas. The patient is not nervous/anxious. Objective    There were no vitals filed for this visit.     No data recorded            PHYSICAL EXAMINATION:  [ INSTRUCTIONS:  \"[x]\" Indicates a positive item  \"[]\" Indicates a negative item  -- DELETE ALL ITEMS NOT EXAMINED]    [x] Alert  [x] Oriented to person/place/time      [x] No apparent distress  [x] Not toxic appearing    [x] Face complexion normal appearing [x] Sclera clear  [x] Lips are not cyanotic      [x] Breathing appears normal  [] Appears tachypneic      [] Rash on visible skin    [x] Cranial Nerves II-XII grossly intact    [x] Motor grossly intact in visible upper extremities, including stiff but intact range of motion in cervical, upper extremity and thoracic  [x] Motor grossly intact in visible lower extremities, including normal range of motion in the hips and knees for stiffness in the lumbar spine    [x] Normal Mood  [x] Not anxious appearing    [x] Not depressed appearing  [x] Not confused appearing      [x]  Good short term memory  [x]  Good long term memory    [x]  Able to follow 2-3 step commands    Due to this being a TeleHealth encounter, evaluation of the following organ systems is limited: Vitals/Constitutional/EENT/Resp/CV/GI//MS/Neuro/Skin/Heme-Lymph-Imm. ASSESSMENT/PLAN:     After a thorough review and discussion of the previous medical records, patient comprehensive medical, surgical, and family and social history, Review of Systems, their OARRS, their Screener and Opioid Assessment for Patients with Pain (SOAPP®-R), recent diagnostics, and symptomatic results to previous treatment, it is my impression that the patients is suffering with progressive and severe:     Diagnosis Orders   1. Rheumatoid arthritis involving multiple sites with positive rheumatoid factor (HCC)  oxyCODONE-acetaminophen (PERCOCET) 7.5-325 MG per tablet   2. Neuropathy involving both lower extremities     3. Gait abnormality     4. Chronic bilateral low back pain with bilateral sciatica     5. Cervicalgia  oxyCODONE-acetaminophen (PERCOCET) 7.5-325 MG per tablet   6.  High risk medication use - 10/17/17 OARRS PM&R, 12/19/17 OARRS PM&R, 12/20/17 Tox screen positive for opiates, Hydrocodone PM&R, MED CONTRACT 2/2/17  oxyCODONE-acetaminophen (PERCOCET) 7.5-325 MG per tablet       I am also concerned by lifestyle and mood issues including:    Past Medical History:   Diagnosis Date    Abnormal electromyogram (EMG) 8/19/09    SENSORIMOTOR NEUROPATHY OF BLE, RIGHT WORSE THAN LEFT, SUSPICIOUS FOR RIGHT S1 RADIC    Angina pectoris ([address]     Angina pectoris ([address] 07/2019    sees Dr. Sunil Mcgee Ankle pain     Bleeding ulcer     Colon polyp 4/3/2017    Dermatitis     Fibromyalgia     Foot pain     Hyperlipidemia     Hypertension     Infection of intervertebral disc (pyogenic), lumbar partially effective. A Rx for Narcan was offered to help prevent accidental overdose. RX Monitoring 9/13/2019   Attestation -   Acute Pain Prescriptions -   Periodic Controlled Substance Monitoring Possible medication side effects, risk of tolerance/dependence & alternative treatments discussed. ;No signs of potential drug abuse or diversion identified. ;Assessed functional status. ;Obtaining appropriate analgesic effect of treatment. Chronic Pain > 50 MEDD Re-evaluated the status of the patient's underlying condition causing pain. ;Considered consultation with a specialist.;Obtained or confirmed \"Consent for Opioid Use\" on file. Chronic Pain > 80 MEDD -               Patient is currently taking:       I am having Rayna Levine maintain her Vitamin D3, Coenzyme Q10 (COQ10 PO), aspirin, lidocaine, melatonin, nitroGLYCERIN, hydroCHLOROthiazide, hydroxychloroquine, cyanocobalamin, losartan, doxycycline hyclate, Shingrix, atorvastatin, baclofen, gabapentin, topiramate, nystatin-triamcinolone, loratadine, pantoprazole, lactobacillus, simethicone, and oxyCODONE-acetaminophen. We will continue to administer cyanocobalamin. I also recommend the following Medications:    Orders Placed This Encounter   Medications    oxyCODONE-acetaminophen (PERCOCET) 7.5-325 MG per tablet     Sig: Take 1 tablet by mouth every 6 hours as needed for Pain for up to 30 days. Intended supply: 30 days     Dispense:  90 tablet     Refill:  0     Reduce doses taken as pain becomes manageable     Wean to 2 1/2 per day as tolerated.       -which helps with pain and function. Otherwise, continue the current pain medications that I have prescibed. Radiologic:   Old films reviewed-  XR CHEST PORTABLE       History: Palpitations.  High blood pressure.       Technique: AP portable view of the chest obtained.       Comparison: Chest x-rays from March 20, 2016       Findings:       The cardiomediastinal silhouette is within normal limits.  No pneumothorax, pleural effusion, or consolidation.  No acute osseous abnormality. Degenerative changes of both shoulders.           Impression       No radiographic evidence of acute intrathoracic process.     ,     I discussed results with patients. see Follow up plans below  For any new studies. Care Everywhere Updates:  requested and reviewed. No new issues noted. Electrodiagnostic:  Previous studies requested,     I discussed results with patient. See follow-up plans for new studies. Additional history obtained from independent sourcing including patient's family and caregivers.     Labs:  Previous labs reviewed     Lab Results   Component Value Date     02/09/2021    K 4.5 02/09/2021    K 4.4 08/09/2018     02/09/2021    CO2 22 02/09/2021    BUN 28 02/09/2021    CREATININE 1.38 02/09/2021    CALCIUM 9.7 02/09/2021    LABALBU 4.3 02/09/2021    LABALBU 2.8 08/18/2018    BILITOT 0.4 02/09/2021    ALKPHOS 117 02/09/2021    AST 27 02/09/2021    ALT 26 02/09/2021     Lab Results   Component Value Date    WBC 6.6 02/09/2021    RBC 4.59 02/09/2021    RBC 2.81 09/06/2018    HGB 14.2 02/09/2021    HCT 42.4 02/09/2021    MCV 92.3 02/09/2021    MCH 30.9 02/09/2021    MCHC 33.4 02/09/2021    RDW 13.6 02/09/2021     02/09/2021    MPV 7.4 09/13/2018       Lab Results   Component Value Date    LABAMPH Neg 06/11/2020    BARBSCNU Neg 06/11/2020    LABBENZ Neg 06/11/2020    CANSU Neg 06/11/2020    COCAIMETSCRU Neg 06/11/2020    PHENCYCLIDINESCREENURINE Neg 32/77/4072    TRICYCLIC Neg 12/32/1502    DSCOMMENT see below 06/11/2020       Lab Results   Component Value Date    CODEINE Not Detected 12/02/2016    MORPHINE Not Detected 12/02/2016    ACETYLMORPHI Not Detected 12/02/2016    OXYCODONE Not Detected 12/02/2016    NOROXYCODONE Not Detected 12/02/2016    NOROXYMU Not Detected 12/02/2016    HYDRCO Present 12/02/2016    NORHYDU Present 12/02/2016    HYDROMO Not Detected 12/02/2016    BUPREN Not Detected 12/02/2016    NORBUPRNOR Not Detected 12/02/2016    FENTA Not Detected 12/02/2016    NORFENT Not Detected 12/02/2016    MEPERIDINE Not Detected 12/02/2016    TAPENU Not Detected 12/02/2016    TAPOSULFUR Not Detected 12/02/2016    METHADONE Not Detected 12/02/2016    LABPROP Not Detected 12/02/2016    TRAM Not Detected 12/02/2016    AMPH Not Detected 12/02/2016    METHAMP Not Detected 12/02/2016    MDMA Not Detected 12/02/2016    ECMDA Not Detected 12/02/2016       Lab Results   Component Value Date    METPHEN Not Detected 12/02/2016    PHENTERMINE Not Detected 12/02/2016    BENZOYL Not Detected 12/02/2016    Fredrica [place] Not Detected 12/02/2016    ALPHAOHALPRA Not Detected 12/02/2016    CLONAZEPAM Not Detected 12/02/2016    Joel Neighbors Not Detected 12/02/2016    DIAZEP Not Detected 12/02/2016    ROMAIN Not Detected 12/02/2016    OXAZ Not Detected 12/02/2016    Carlen Fey Not Detected 12/02/2016    LORAZEPAM Not Detected 12/02/2016    MIDAZOLAM Not Detected 12/02/2016    ZOLPIDEM Not Detected 12/02/2016    NAI Not Detected 12/02/2016    ETG Not Detected 12/02/2016    MARIJMET Not Detected 12/02/2016    PCP Not Detected 12/02/2016    PAINMGTDRUGP See Below 12/02/2016    EERPAINMGTPA See Note 12/02/2016    LABCREA 129.7 08/09/2018         , I discussed results with patient. See follow-up plans for new studies. Therapies:  HEP-gentle stretching and relaxation techniques-demonstrated with patient-they are to do them twice a day.   They are also advised to make the following lifestyle changes:  Goals      Exercise 3x per week (30 min per time)      SOAPP-R GOAL LESS THAN 9      09/02/16 SCORE: 8 LOW RISK   02/02/17 score 7-low risk   04/03/17 score: 6-low risk  06/05/17 score: 7-low risk  08/07/17 score: 8-low risk  10/06/17 score: 6-low risk  12/20/17 score: 5-low risk  02/18/18 score: 6-low risk  0425/2018 score 5-low risk  7/11/18 score: 3 low- risk  10/11/18 score:4- low risk 2/28/19 score: 2: low risk  5/13/19 score: 2- low risk  7/17/19 score: 1- low risk   9/13/19 score: 2- low risk  11/21/19 score: 3- low risk   1/22/20 score: 2- low risk   6- score 3 - low risk        Weight < 150 lb (68.04 kg)           Injections or Epidurals:  Injection options were discussed. Monthly sciatic blocks and  trigger point injections add vitamin B12 when able. Patient gave verbal consent to ordered injections. See follow-up plans for planned injections. Supplements:  Vitamin D with increased dosing during the rainy months, add co-Q10 for heart health. Education was given on:   Dietary and Fitness--daily stretches and low carb diet-in chair Yoga when possible      Note controlled medication/opiate drug therapy requires intensive monitoring for toxicity and addiction. Follow up with Primary Care Physician regarding their general medical needs. Stressed the importance of following up with PCP and specialists for his/her chronic diseases, health, CV, and cancer screening and continued care. Will follow disease activity/progression and adjust therapeutic regimen to disease activity and severity. Discussed medication dosage, usage, goals of therapy, and side effects. Available test results were reviewed -Discussed findings, impression and plan with patient. An additional 5 minutes were spent outside of the patient visit to review records. Additional time spent with the patient to discuss their questions. Additional time spent with the patient devoted to discussing treatment strategy, planning, and implementation generalized musculoskeletal health plan. Patient understands above plan; questions asked and answered. Patient agrees to plan as noted above. F/U in 2-3months  At least 50% of the visit was involved in the discussion of the options for treatment. We discussed exercises, medication, interventional therapies and surgery.  Healthy life style is essential with patient hard work to achieve the wellness. In addition; discussion with the patient and/or family about any of the diagnostic results, impressions and/or recommended diagnostic studies, prognosis, risks and benefits of treatment options, instructions for treatment and/or follow-up, importance of compliance with chosen treatment options, risk-factor reduction, and patient/family education. They are to follow up in 2-2 1/2 months to review medication, efficacy of injections, pill counts, OARRS check, SOAPPR assessment, review diagnostics, to review previous and future treatment plans and assess appropriateness for continued therapy. New Diagnostics  No orders of the defined types were placed in this encounter. Follow-up in 2-1/2 to 3 months for guarding the efficacy of current plan and future treatment. An  electronic signature was used to authenticate this note. -Dr Cameron Brannon,        With MA assistance from:   Fidel Drummond MA     19}    Pursuant to the emergency declaration under the [facility], [facility] waiver authority and the [employer] Act, this Virtual  Visit was conducted, with patient's consent, to reduce the patient's risk of exposure to COVID-19 and provide continuity of care for an established patient. Services were provided through a video synchronous discussion virtually to substitute for in-person clinic visit.

## 2021-03-31 DIAGNOSIS — J33.9 NASAL POLYPS: ICD-10-CM

## 2021-04-02 RX ORDER — LORATADINE 10 MG/1
10 TABLET ORAL DAILY PRN
Qty: 30 TABLET | Refills: 0 | Status: SHIPPED | OUTPATIENT
Start: 2021-04-02 | End: 2021-06-27

## 2021-04-22 ENCOUNTER — PROCEDURE VISIT (OUTPATIENT)
Dept: PHYSICAL MEDICINE AND REHAB | Age: 66
End: 2021-04-22
Payer: MEDICARE

## 2021-04-22 ENCOUNTER — TELEPHONE (OUTPATIENT)
Dept: PHYSICAL MEDICINE AND REHAB | Age: 66
End: 2021-04-22

## 2021-04-22 DIAGNOSIS — M54.31 SCIATICA OF RIGHT SIDE: Primary | ICD-10-CM

## 2021-04-22 DIAGNOSIS — G57.93 NEUROPATHY INVOLVING BOTH LOWER EXTREMITIES: ICD-10-CM

## 2021-04-22 DIAGNOSIS — M05.79 RHEUMATOID ARTHRITIS INVOLVING MULTIPLE SITES WITH POSITIVE RHEUMATOID FACTOR (HCC): ICD-10-CM

## 2021-04-22 PROCEDURE — 76942 ECHO GUIDE FOR BIOPSY: CPT | Performed by: PHYSICAL MEDICINE & REHABILITATION

## 2021-04-22 PROCEDURE — 96372 THER/PROPH/DIAG INJ SC/IM: CPT | Performed by: PHYSICAL MEDICINE & REHABILITATION

## 2021-04-22 PROCEDURE — 64445 NJX AA&/STRD SCIATIC NRV IMG: CPT | Performed by: PHYSICAL MEDICINE & REHABILITATION

## 2021-04-22 RX ORDER — LIDOCAINE HYDROCHLORIDE 20 MG/ML
5 INJECTION, SOLUTION INFILTRATION; PERINEURAL ONCE
Status: COMPLETED | OUTPATIENT
Start: 2021-04-22 | End: 2021-04-22

## 2021-04-22 RX ORDER — CYANOCOBALAMIN 1000 UG/ML
1000 INJECTION INTRAMUSCULAR; SUBCUTANEOUS ONCE
Status: COMPLETED | OUTPATIENT
Start: 2021-04-22 | End: 2021-04-22

## 2021-04-22 RX ORDER — LIDOCAINE HYDROCHLORIDE 10 MG/ML
15 INJECTION, SOLUTION INFILTRATION; PERINEURAL ONCE
Status: COMPLETED | OUTPATIENT
Start: 2021-04-22 | End: 2021-04-22

## 2021-04-22 RX ADMIN — CYANOCOBALAMIN 1000 MCG: 1000 INJECTION INTRAMUSCULAR; SUBCUTANEOUS at 16:56

## 2021-04-22 RX ADMIN — LIDOCAINE HYDROCHLORIDE 5 ML: 20 INJECTION, SOLUTION INFILTRATION; PERINEURAL at 16:57

## 2021-04-22 RX ADMIN — LIDOCAINE HYDROCHLORIDE 15 ML: 10 INJECTION, SOLUTION INFILTRATION; PERINEURAL at 16:57

## 2021-04-22 NOTE — PROGRESS NOTES
Patient here for right sciatic injection with U/S. Patient taken back to exam room, and placed on drape locking stool. Areas to be injected marked appropriately, and cleansed with alcohol. 15cc of 1% Lidocaine, 5cc of 2% Lidocaine, 1cc of B12 to be injected by provider.

## 2021-04-22 NOTE — TELEPHONE ENCOUNTER
Patient in the office today for injections. Dr Taras Thomason put her on a new blood pressure medicine and its making her feel really bad. She called his office and was told that the Cartizem could react with her Percocet and that's what it making her feel bad. She is upset because she states her pain medication has been working great for her and she doesn't want it change. Do you have any suggestions for her regarding this? Please advise at [phone].

## 2021-04-29 DIAGNOSIS — M54.2 CERVICALGIA: ICD-10-CM

## 2021-04-29 DIAGNOSIS — M05.79 RHEUMATOID ARTHRITIS INVOLVING MULTIPLE SITES WITH POSITIVE RHEUMATOID FACTOR (HCC): ICD-10-CM

## 2021-04-29 DIAGNOSIS — Z79.899 HIGH RISK MEDICATION USE: ICD-10-CM

## 2021-04-29 RX ORDER — OXYCODONE AND ACETAMINOPHEN 7.5; 325 MG/1; MG/1
1 TABLET ORAL EVERY 6 HOURS PRN
Qty: 90 TABLET | Refills: 0 | Status: SHIPPED | OUTPATIENT
Start: 2021-04-29 | End: 2021-06-08 | Stop reason: SDUPTHER

## 2021-05-10 ENCOUNTER — PROCEDURE VISIT (OUTPATIENT)
Dept: PHYSICAL MEDICINE AND REHAB | Age: 66
End: 2021-05-10
Payer: MEDICARE

## 2021-05-10 DIAGNOSIS — M79.10 MYALGIA: ICD-10-CM

## 2021-05-10 PROCEDURE — 20553 NJX 1/MLT TRIGGER POINTS 3/>: CPT | Performed by: PHYSICAL MEDICINE & REHABILITATION

## 2021-05-10 PROCEDURE — 96372 THER/PROPH/DIAG INJ SC/IM: CPT | Performed by: PHYSICAL MEDICINE & REHABILITATION

## 2021-05-10 RX ORDER — LIDOCAINE HYDROCHLORIDE 10 MG/ML
23 INJECTION, SOLUTION INFILTRATION; PERINEURAL ONCE
Status: COMPLETED | OUTPATIENT
Start: 2021-05-10 | End: 2021-05-10

## 2021-05-10 RX ORDER — CYANOCOBALAMIN 1000 UG/ML
1000 INJECTION INTRAMUSCULAR; SUBCUTANEOUS ONCE
Status: COMPLETED | OUTPATIENT
Start: 2021-05-10 | End: 2021-05-10

## 2021-05-10 RX ADMIN — LIDOCAINE HYDROCHLORIDE 23 ML: 10 INJECTION, SOLUTION INFILTRATION; PERINEURAL at 16:34

## 2021-05-10 RX ADMIN — CYANOCOBALAMIN 1000 MCG: 1000 INJECTION INTRAMUSCULAR; SUBCUTANEOUS at 16:33

## 2021-05-25 DIAGNOSIS — M15.9 PRIMARY OSTEOARTHRITIS INVOLVING MULTIPLE JOINTS: ICD-10-CM

## 2021-05-25 DIAGNOSIS — M05.79 RHEUMATOID ARTHRITIS INVOLVING MULTIPLE SITES WITH POSITIVE RHEUMATOID FACTOR (HCC): ICD-10-CM

## 2021-05-25 DIAGNOSIS — M46.26 OSTEOMYELITIS OF LUMBAR VERTEBRA (HCC): ICD-10-CM

## 2021-05-25 DIAGNOSIS — G57.93 NEUROPATHY INVOLVING BOTH LOWER EXTREMITIES: ICD-10-CM

## 2021-05-26 RX ORDER — GABAPENTIN 300 MG/1
CAPSULE ORAL
Qty: 270 CAPSULE | Refills: 1 | Status: SHIPPED | OUTPATIENT
Start: 2021-05-26 | End: 2022-06-08 | Stop reason: SDUPTHER

## 2021-06-04 ENCOUNTER — TELEPHONE (OUTPATIENT)
Dept: INFECTIOUS DISEASES | Age: 66
End: 2021-06-04

## 2021-06-04 DIAGNOSIS — M46.26 OSTEOMYELITIS OF LUMBAR VERTEBRA (HCC): ICD-10-CM

## 2021-06-04 DIAGNOSIS — M46.26 OSTEOMYELITIS OF LUMBAR VERTEBRA (HCC): Primary | ICD-10-CM

## 2021-06-04 LAB
ANION GAP SERPL CALCULATED.3IONS-SCNC: 14 MEQ/L (ref 9–15)
BUN BLDV-MCNC: 25 MG/DL (ref 8–23)
CALCIUM SERPL-MCNC: 9.9 MG/DL (ref 8.5–9.9)
CHLORIDE BLD-SCNC: 95 MEQ/L (ref 95–107)
CO2: 25 MEQ/L (ref 20–31)
CREAT SERPL-MCNC: 1.36 MG/DL (ref 0.5–0.9)
GFR AFRICAN AMERICAN: 46.8
GFR NON-AFRICAN AMERICAN: 38.7
GLUCOSE BLD-MCNC: 92 MG/DL (ref 70–99)
HCT VFR BLD CALC: 40.1 % (ref 37–47)
HEMOGLOBIN: 13.7 G/DL (ref 12–16)
MCH RBC QN AUTO: 31 PG (ref 27–31.3)
MCHC RBC AUTO-ENTMCNC: 34.1 % (ref 33–37)
MCV RBC AUTO: 91.1 FL (ref 82–100)
PDW BLD-RTO: 12.8 % (ref 11.5–14.5)
PLATELET # BLD: 200 K/UL (ref 130–400)
POTASSIUM SERPL-SCNC: 3.7 MEQ/L (ref 3.4–4.9)
RBC # BLD: 4.41 M/UL (ref 4.2–5.4)
SODIUM BLD-SCNC: 134 MEQ/L (ref 135–144)
WBC # BLD: 5.5 K/UL (ref 4.8–10.8)

## 2021-06-04 NOTE — TELEPHONE ENCOUNTER
Patient called to inquire if Lab Orders are in place by Dr Paulina Do. F/u is 6-14-21. Found no New Orders at this time, per Dr Latosha Gutierrez last note on 12-14/20 Labs to be done are: CBC, BMP, Q 3 Months. Will place in Epic as patient will go this morning.

## 2021-06-07 DIAGNOSIS — M54.2 CERVICALGIA: ICD-10-CM

## 2021-06-07 DIAGNOSIS — M05.79 RHEUMATOID ARTHRITIS INVOLVING MULTIPLE SITES WITH POSITIVE RHEUMATOID FACTOR (HCC): ICD-10-CM

## 2021-06-07 DIAGNOSIS — Z79.899 HIGH RISK MEDICATION USE: ICD-10-CM

## 2021-06-08 RX ORDER — OXYCODONE AND ACETAMINOPHEN 7.5; 325 MG/1; MG/1
1 TABLET ORAL EVERY 6 HOURS PRN
Qty: 90 TABLET | Refills: 0 | Status: SHIPPED | OUTPATIENT
Start: 2021-06-08 | End: 2021-06-23 | Stop reason: DRUGHIGH

## 2021-06-13 DIAGNOSIS — M46.46 LUMBAR DISCITIS: ICD-10-CM

## 2021-06-13 DIAGNOSIS — M48.062 SPINAL STENOSIS OF LUMBAR REGION WITH NEUROGENIC CLAUDICATION: ICD-10-CM

## 2021-06-13 DIAGNOSIS — M15.9 PRIMARY OSTEOARTHRITIS INVOLVING MULTIPLE JOINTS: ICD-10-CM

## 2021-06-13 DIAGNOSIS — Z79.899 HIGH RISK MEDICATION USE: ICD-10-CM

## 2021-06-14 RX ORDER — TOPIRAMATE 25 MG/1
TABLET ORAL
Qty: 60 TABLET | Refills: 1 | Status: SHIPPED | OUTPATIENT
Start: 2021-06-14 | End: 2021-08-06

## 2021-06-15 ENCOUNTER — PROCEDURE VISIT (OUTPATIENT)
Dept: PHYSICAL MEDICINE AND REHAB | Age: 66
End: 2021-06-15
Payer: MEDICARE

## 2021-06-15 DIAGNOSIS — M79.10 MYALGIA: Primary | ICD-10-CM

## 2021-06-15 PROCEDURE — 20553 NJX 1/MLT TRIGGER POINTS 3/>: CPT | Performed by: PHYSICAL MEDICINE & REHABILITATION

## 2021-06-15 PROCEDURE — 96372 THER/PROPH/DIAG INJ SC/IM: CPT | Performed by: PHYSICAL MEDICINE & REHABILITATION

## 2021-06-15 RX ORDER — LIDOCAINE HYDROCHLORIDE 10 MG/ML
23 INJECTION, SOLUTION INFILTRATION; PERINEURAL ONCE
Status: COMPLETED | OUTPATIENT
Start: 2021-06-15 | End: 2021-06-15

## 2021-06-15 RX ORDER — CYANOCOBALAMIN 1000 UG/ML
1000 INJECTION INTRAMUSCULAR; SUBCUTANEOUS ONCE
Status: COMPLETED | OUTPATIENT
Start: 2021-06-15 | End: 2021-06-15

## 2021-06-15 RX ADMIN — CYANOCOBALAMIN 1000 MCG: 1000 INJECTION INTRAMUSCULAR; SUBCUTANEOUS at 12:43

## 2021-06-15 RX ADMIN — LIDOCAINE HYDROCHLORIDE 23 ML: 10 INJECTION, SOLUTION INFILTRATION; PERINEURAL at 12:44

## 2021-06-22 ENCOUNTER — VIRTUAL VISIT (OUTPATIENT)
Dept: INFECTIOUS DISEASES | Age: 66
End: 2021-06-22
Payer: MEDICARE

## 2021-06-22 DIAGNOSIS — A49.01 STAPH AUREUS INFECTION: ICD-10-CM

## 2021-06-22 DIAGNOSIS — M46.26 OSTEOMYELITIS OF LUMBAR VERTEBRA (HCC): Primary | ICD-10-CM

## 2021-06-22 DIAGNOSIS — N18.30 STAGE 3 CHRONIC KIDNEY DISEASE, UNSPECIFIED WHETHER STAGE 3A OR 3B CKD (HCC): ICD-10-CM

## 2021-06-22 PROCEDURE — 4040F PNEUMOC VAC/ADMIN/RCVD: CPT | Performed by: INTERNAL MEDICINE

## 2021-06-22 PROCEDURE — 1123F ACP DISCUSS/DSCN MKR DOCD: CPT | Performed by: INTERNAL MEDICINE

## 2021-06-22 PROCEDURE — G8417 CALC BMI ABV UP PARAM F/U: HCPCS | Performed by: INTERNAL MEDICINE

## 2021-06-22 PROCEDURE — 1090F PRES/ABSN URINE INCON ASSESS: CPT | Performed by: INTERNAL MEDICINE

## 2021-06-22 PROCEDURE — G8399 PT W/DXA RESULTS DOCUMENT: HCPCS | Performed by: INTERNAL MEDICINE

## 2021-06-22 PROCEDURE — 99213 OFFICE O/P EST LOW 20 MIN: CPT | Performed by: INTERNAL MEDICINE

## 2021-06-22 PROCEDURE — 1036F TOBACCO NON-USER: CPT | Performed by: INTERNAL MEDICINE

## 2021-06-22 PROCEDURE — 3017F COLORECTAL CA SCREEN DOC REV: CPT | Performed by: INTERNAL MEDICINE

## 2021-06-22 PROCEDURE — G8428 CUR MEDS NOT DOCUMENT: HCPCS | Performed by: INTERNAL MEDICINE

## 2021-06-22 ASSESSMENT — ENCOUNTER SYMPTOMS: BACK PAIN: 1

## 2021-06-22 NOTE — PROGRESS NOTES
Talon Ellis (:  1955) is a 77 y.o. female,Established patient, here for evaluation of the following chief complaint(s): No chief complaint on file. ASSESSMENT/PLAN:  Lumbar discitis  MSSA  Chronic low back pain with R sciatica  CKD stage 3    Continue PO Doxycycline 100 mg daily for chronic suppressive Rx  May go for TSR  CBC BMP Q 3 months  F/U in 6 months  SUBJECTIVE/OBJECTIVE:  HPI   F/U discitis. Had issues with HTN and palpitation, was sent to ER. Dr Sammy Moise evaluated patient, started on Diltiazem. On Doxycycline 100 mg daily, well tolerated. Has bad and good days with back pain. On Oxycodone. Has constipation. Miralax PRN  Has trigger points monthly  Has a skin tag that needs removed  Review of Systems   Musculoskeletal: Positive for back pain. Psychiatric/Behavioral: Positive for dysphoric mood. All other systems reviewed and are negative.       Patient-Reported Vitals 3/23/2021   Patient-Reported Weight 226#   Patient-Reported Height 5' 8\"        Physical Exam    [INSTRUCTIONS:  \"[x]\" Indicates a positive item  \"[]\" Indicates a negative item  -- DELETE ALL ITEMS NOT EXAMINED]    Constitutional: [x] Appears well-developed and well-nourished [x] No apparent distress      [] Abnormal -     Mental status: [x] Alert and awake  [x] Oriented to person/place/time [x] Able to follow commands    [] Abnormal -     Eyes:   EOM    [x]  Normal    [] Abnormal -   Sclera  [x]  Normal    [] Abnormal -          Discharge [x]  None visible   [] Abnormal -     HENT: [x] Normocephalic, atraumatic  [] Abnormal -   [x] Mouth/Throat: Mucous membranes are moist    External Ears [x] Normal  [] Abnormal -    Neck: [x] No visualized mass [] Abnormal -     Pulmonary/Chest: [x] Respiratory effort normal   [x] No visualized signs of difficulty breathing or respiratory distress        [] Abnormal -      Musculoskeletal:   [x] Normal gait with no signs of ataxia         [x] Normal range of motion of neck

## 2021-06-23 ENCOUNTER — OFFICE VISIT (OUTPATIENT)
Dept: PHYSICAL MEDICINE AND REHAB | Age: 66
End: 2021-06-23
Payer: MEDICARE

## 2021-06-23 VITALS
SYSTOLIC BLOOD PRESSURE: 156 MMHG | DIASTOLIC BLOOD PRESSURE: 74 MMHG | WEIGHT: 226 LBS | HEIGHT: 68 IN | BODY MASS INDEX: 34.25 KG/M2

## 2021-06-23 DIAGNOSIS — R76.8 ANA POSITIVE: ICD-10-CM

## 2021-06-23 DIAGNOSIS — Z79.899 HIGH RISK MEDICATION USE: ICD-10-CM

## 2021-06-23 DIAGNOSIS — G57.93 NEUROPATHY INVOLVING BOTH LOWER EXTREMITIES: ICD-10-CM

## 2021-06-23 DIAGNOSIS — M05.79 RHEUMATOID ARTHRITIS INVOLVING MULTIPLE SITES WITH POSITIVE RHEUMATOID FACTOR (HCC): ICD-10-CM

## 2021-06-23 DIAGNOSIS — G89.29 CHRONIC BILATERAL LOW BACK PAIN WITH BILATERAL SCIATICA: ICD-10-CM

## 2021-06-23 DIAGNOSIS — M48.062 SPINAL STENOSIS OF LUMBAR REGION WITH NEUROGENIC CLAUDICATION: ICD-10-CM

## 2021-06-23 DIAGNOSIS — M54.2 CERVICALGIA: Primary | ICD-10-CM

## 2021-06-23 DIAGNOSIS — M15.9 PRIMARY OSTEOARTHRITIS INVOLVING MULTIPLE JOINTS: ICD-10-CM

## 2021-06-23 DIAGNOSIS — M54.41 CHRONIC BILATERAL LOW BACK PAIN WITH BILATERAL SCIATICA: ICD-10-CM

## 2021-06-23 DIAGNOSIS — M46.26 OSTEOMYELITIS OF LUMBAR VERTEBRA (HCC): ICD-10-CM

## 2021-06-23 DIAGNOSIS — M54.42 CHRONIC BILATERAL LOW BACK PAIN WITH BILATERAL SCIATICA: ICD-10-CM

## 2021-06-23 DIAGNOSIS — M79.10 MYALGIA: ICD-10-CM

## 2021-06-23 PROCEDURE — 99214 OFFICE O/P EST MOD 30 MIN: CPT | Performed by: PHYSICAL MEDICINE & REHABILITATION

## 2021-06-23 PROCEDURE — G8427 DOCREV CUR MEDS BY ELIG CLIN: HCPCS | Performed by: PHYSICAL MEDICINE & REHABILITATION

## 2021-06-23 PROCEDURE — G8399 PT W/DXA RESULTS DOCUMENT: HCPCS | Performed by: PHYSICAL MEDICINE & REHABILITATION

## 2021-06-23 PROCEDURE — 1036F TOBACCO NON-USER: CPT | Performed by: PHYSICAL MEDICINE & REHABILITATION

## 2021-06-23 PROCEDURE — G8417 CALC BMI ABV UP PARAM F/U: HCPCS | Performed by: PHYSICAL MEDICINE & REHABILITATION

## 2021-06-23 PROCEDURE — 1090F PRES/ABSN URINE INCON ASSESS: CPT | Performed by: PHYSICAL MEDICINE & REHABILITATION

## 2021-06-23 PROCEDURE — 4040F PNEUMOC VAC/ADMIN/RCVD: CPT | Performed by: PHYSICAL MEDICINE & REHABILITATION

## 2021-06-23 PROCEDURE — 1123F ACP DISCUSS/DSCN MKR DOCD: CPT | Performed by: PHYSICAL MEDICINE & REHABILITATION

## 2021-06-23 PROCEDURE — 3017F COLORECTAL CA SCREEN DOC REV: CPT | Performed by: PHYSICAL MEDICINE & REHABILITATION

## 2021-06-23 RX ORDER — OXYCODONE AND ACETAMINOPHEN 7.5; 325 MG/1; MG/1
1 TABLET ORAL EVERY 6 HOURS PRN
Qty: 70 TABLET | Refills: 0 | Status: SHIPPED | OUTPATIENT
Start: 2021-06-23 | End: 2021-07-23

## 2021-06-23 ASSESSMENT — ENCOUNTER SYMPTOMS
VOMITING: 0
NAUSEA: 0
EYE REDNESS: 0
SORE THROAT: 0
COUGH: 0
WHEEZING: 0
DIARRHEA: 0
CONSTIPATION: 1
STRIDOR: 0
PHOTOPHOBIA: 1
ABDOMINAL PAIN: 1
BLOOD IN STOOL: 0
SHORTNESS OF BREATH: 1
BACK PAIN: 1
EYE PAIN: 0

## 2021-06-23 NOTE — PROGRESS NOTES
Subjective  Cristiano Quezada, 77 y.o. female presents today with:     Back Pain (Trigger point injections 5/10/21 & 6/15/21 with 35% reduction in pain lasting 2-3 weeks)   Neck Pain   Shoulder Pain (Right)   Hip Pain (Right)   Leg Pain (Right. Right sciatic 4/22/21 with 50% reduction in pain lasting 2-3 weeks.)   Foot Pain (Right)   Medication Refill (Percocet, Gabapentin, Baclofen, Zinc, Vit D refill & pill count)       She is still doing well with her current medications and shows no signs of abuse or overuse. She is more functional on it. Does not need Neurontin refills. She is to avoid any NSAIDs but she will continue gabapentin as tolerated baclofen vitamin D and zinc.  She is requesting monthly trigger point -sciatics as needed. Cardiac meds adjusted recently H made a big difference. Recent sciatic block did not help very much but she would like to try it again because she seem to be over doing it physically when she had it done and did not think it was given a fair chance. She is due for a tox screen she can get it done anytime over the next 2 months. Back Pain  This is a chronic problem. The current episode started more than 1 year ago. The problem occurs daily. The problem is unchanged. The pain is present in the lumbar spine and gluteal. The quality of the pain is described as aching. Radiates to: bilateral legs, left leg worse, left hip. The pain is at a severity of 5/10. The pain is moderate. The pain is worse during the day. The symptoms are aggravated by bending, standing, position, sitting and twisting (Walking). Stiffness is present in the morning. Associated symptoms include abdominal pain, leg pain and pelvic pain. Pertinent negatives include no bladder incontinence, chest pain, dysuria, fever, headaches, numbness, paresis, perianal numbness or weakness. Risk factors include obesity, poor posture, menopause, lack of exercise, sedentary lifestyle and history of steroid use.  She has tried analgesics, walking, home exercises, heat, muscle relaxant, NSAIDs, ice and bed rest (Sciatica block, trigger point injections, mobic, Gabapentin, Norco) for the symptoms. The treatment provided significant relief. Neck Pain   This is a chronic problem. The problem occurs constantly. The problem has been waxing and waning. The pain is at a severity of 5/10. The pain is mild. The symptoms are aggravated by position and bending. Stiffness is present in the morning. Associated symptoms include leg pain and photophobia. Pertinent negatives include no chest pain, fever, headaches, numbness, paresis or weakness. She has tried heat, acetaminophen, chiropractic manipulation, ice, muscle relaxants, neck support, NSAIDs and oral narcotics for the symptoms. The treatment provided moderate relief. Foot Pain   The pain is present in the neck, back and left shoulder. This is a chronic problem. The current episode started more than 1 year ago. The problem occurs constantly. The problem has been gradually worsening. The pain is at a severity of 7/10. Associated symptoms include joint locking, joint swelling and a limited range of motion. Pertinent negatives include no fever or numbness. The symptoms are aggravated by contact, heat and standing. She has tried cold, NSAIDS, OTC ointments, OTC pain meds, chondroitin, heat, movement, acetaminophen, oral narcotics and rest for the symptoms. The treatment provided moderate relief. Family history includes rheumatoid arthritis. Her past medical history is significant for osteoarthritis. There is no history of diabetes.        Past Medical History:   Diagnosis Date    Abnormal electromyogram (EMG) 8/19/09    SENSORIMOTOR NEUROPATHY OF BLE, RIGHT WORSE THAN LEFT, SUSPICIOUS FOR RIGHT S1 RADIC    Angina pectoris (HCC)     Angina pectoris ([facility] [place] [address] 07/2019    sees Dr. Denise Carvajal    Ankle pain     Bleeding ulcer     Colon polyp 4/3/2017    Dermatitis     Fibromyalgia     Foot pain Social History Narrative    Lives in [place] with her  Cindy Murray and son Carla Liang is mildly handicapped --CP right lilia with anxiety and LD. He works at [employer] and [employer]. Social Determinants of Health     Financial Resource Strain:     Difficulty of Paying Living Expenses:    Food Insecurity:     Worried About Running Out of Food in the Last Year:     [address] in the Last Year:    Transportation Needs:     Lack of Transportation (Medical):  Lack of Transportation (Non-Medical):    Physical Activity:     Days of Exercise per Week:     Minutes of Exercise per Session:    Stress:     Feeling of Stress :    Social Connections:     Frequency of Communication with Friends and Family:     Frequency of Social Gatherings with Friends and Family:     Attends Congregation Services:     Active Member of Clubs or Organizations:     Attends Club or Organization Meetings:     Marital Status:    Intimate Partner Violence:     Fear of Current or Ex-Partner:     Emotionally Abused:     Physically Abused:     Sexually Abused:      Family History   Problem Relation Age of Onset    Heart Disease Father     High Cholesterol Father     High Blood Pressure Father     Stroke Mother     High Blood Pressure Mother     High Blood Pressure Brother        Allergies   Allergen Reactions    Latex Swelling     Swelling of hands with wearing latex gloves    Norvasc [Amlodipine Besylate] Other (See Comments)     swelling    Keflex [Cephalexin] Rash       Review of Systems   Constitutional: Positive for activity change and fatigue. Negative for chills, diaphoresis and fever. HENT: Negative for congestion, ear discharge, ear pain, hearing loss, nosebleeds, sore throat and tinnitus. Eyes: Positive for photophobia. Negative for pain and redness. Respiratory: Positive for shortness of breath. Negative for cough, wheezing and stridor.          Shortness of breath on exertion   Cardiovascular: Negative for chest pain, palpitations and leg swelling. Gastrointestinal: Positive for abdominal pain and constipation. Negative for blood in stool, diarrhea, nausea and vomiting. Endocrine: Negative for polydipsia. Genitourinary: Positive for pelvic pain. Negative for bladder incontinence, dysuria, flank pain, frequency, hematuria and urgency. Musculoskeletal: Positive for back pain, gait problem, myalgias and neck pain. Skin: Negative for rash. Allergic/Immunologic: Positive for immunocompromised state. Negative for environmental allergies. Neurological: Negative for dizziness, tremors, seizures, weakness, numbness and headaches. Hematological: Does not bruise/bleed easily. Psychiatric/Behavioral: Negative for hallucinations and suicidal ideas. The patient is not nervous/anxious. Objective    Vitals:    06/23/21 1422 06/23/21 1432   BP: (!) 156/74 (!) 156/74   Site: Left Upper Arm Left Upper Arm   Position: Sitting Sitting   Cuff Size: Large Adult Large Adult   Weight: 226 lb (102.5 kg)    Height: 5' 8\" (1.727 m)      Pain Score: Four (With medication)       Physical Exam  Vitals reviewed. Constitutional:       General: She is not in acute distress. Appearance: She is well-developed. She is not ill-appearing, toxic-appearing or diaphoretic. Comments:         HENT:      Head: Normocephalic and atraumatic. Right Ear: Hearing normal.      Left Ear: Hearing normal.      Nose: Nose normal.      Mouth/Throat:      Mouth: No oral lesions. Dentition: Normal dentition. Pharynx: No oropharyngeal exudate. Eyes:      General: No scleral icterus. Right eye: No discharge. Left eye: No discharge. Conjunctiva/sclera: Conjunctivae normal.      Right eye: No chemosis or exudate. Left eye: No chemosis or exudate. Pupils: Pupils are equal, round, and reactive to light. Neck:      Thyroid: No thyromegaly. Vascular: No JVD.       Trachea: Tracheal tenderness Normal.      Right Achilles Tendon: Normal.      Left ankle: Tenderness present over the lateral malleolus and medial malleolus. Decreased range of motion. Left Achilles Tendon: Normal.      Right foot: Decreased range of motion. Tenderness and bony tenderness present. Left foot: Decreased range of motion. Tenderness present. Legs:       Comments: Tender areas are indicated by numbered spot         Lymphadenopathy:      Cervical: No cervical adenopathy. Skin:     General: Skin is warm and dry. Coloration: Skin is not pale. Findings: No abrasion, bruising, ecchymosis, erythema, laceration, petechiae or rash. Rash is not macular, pustular or urticarial.      Nails: There is no clubbing. Neurological:      Mental Status: She is alert and oriented to person, place, and time. Cranial Nerves: No cranial nerve deficit. Sensory: No sensory deficit. Motor: No tremor, atrophy or abnormal muscle tone. Coordination: Coordination normal.      Gait: Gait normal.      Deep Tendon Reflexes: Reflexes abnormal. Babinski sign absent on the right side. Babinski sign absent on the left side. Psychiatric:         Attention and Perception: She is attentive. Mood and Affect: Mood is not anxious. Affect is not labile, blunt, angry or inappropriate. Speech: Speech normal.         Behavior: Behavior normal. Behavior is not agitated, slowed, aggressive, withdrawn, hyperactive or combative. Thought Content: Thought content normal. Thought content is not paranoid or delusional. Thought content does not include homicidal or suicidal ideation. Thought content does not include homicidal or suicidal plan. Cognition and Memory: Memory is not impaired. She does not exhibit impaired recent memory or impaired remote memory. Judgment: Judgment normal. Judgment is not impulsive or inappropriate.        Ortho Exam  Neurologic Exam     Mental Status   Oriented to person, place, and time. Speech: speech is normal     Cranial Nerves     CN III, IV, VI   Pupils are equal, round, and reactive to light. After a thorough review and discussion of the previous medical records, patient comprehensive medical, surgical, and family and social history, Review of Systems, their OARRS, their Screener and Opioid Assessment for Patients with Pain (SOAPP®-R), recent diagnostics, and symptomatic results to previous treatment, it is my impression that the patients is suffering with progressive and severe:     Diagnosis Orders   1. Cervicalgia  Ambulatory referral to Occupational Therapy    oxyCODONE-acetaminophen (PERCOCET) 7.5-325 MG per tablet   2. Chronic bilateral low back pain with bilateral sciatica  Ambulatory referral to Occupational Therapy   3. COLTON positive     4. Rheumatoid arthritis involving multiple sites with positive rheumatoid factor ([facility] [address]  Ambulatory referral to Occupational Therapy    oxyCODONE-acetaminophen (PERCOCET) 7.5-325 MG per tablet   5. Neuropathy involving both lower extremities  Ambulatory referral to Occupational Therapy   6. Spinal stenosis of lumbar region with neurogenic claudication  AR INJECTION AA&/STRD SCIATIC NERVE    Ambulatory referral to Occupational Therapy   7. High risk medication use - 10/17/17 OARRS PM&R, 12/19/17 OARRS PM&R, 12/20/17 Tox screen positive for opiates, Hydrocodone PM&R, MED CONTRACT 2/2/17  Urine Drug Screen    oxyCODONE-acetaminophen (PERCOCET) 7.5-325 MG per tablet   8. Osteomyelitis of lumbar vertebra ([facility] [address]     9. Primary osteoarthritis involving multiple joints     10.  Myalgia  AR INJECT TRIGGER POINTS, 3 OR GREATER    AR INJECT TRIGGER POINTS, 3 OR GREATER    Ambulatory referral to Occupational Therapy       I am also concerned by lifestyle and mood issues including:    Past Medical History:   Diagnosis Date    Abnormal electromyogram (EMG) 8/19/09    SENSORIMOTOR NEUROPATHY OF BLE, RIGHT WORSE THAN LEFT, SUSPICIOUS FOR aware that they have a chronic pain condition and they may require opiates dosing for life. All efforts will be made to wean to the lowest effective dose. Other therapies for pain have not been effective including nonopiate medications. Injections and exercises are only partially effective. A Rx for Narcan was offered to help prevent accidental overdose. RX Monitoring 9/13/2019   Attestation -   Acute Pain Prescriptions -   Periodic Controlled Substance Monitoring Possible medication side effects, risk of tolerance/dependence & alternative treatments discussed. ;No signs of potential drug abuse or diversion identified. ;Assessed functional status. ;Obtaining appropriate analgesic effect of treatment. Chronic Pain > 50 MEDD Re-evaluated the status of the patient's underlying condition causing pain. ;Considered consultation with a specialist.;Obtained or confirmed \"Consent for Opioid Use\" on file. Chronic Pain > 80 MEDD -               Patient is currently taking:       I am having Rayna Levine maintain her Vitamin D3, Coenzyme Q10 (COQ10 PO), aspirin, lidocaine, melatonin, nitroGLYCERIN, hydroCHLOROthiazide, hydroxychloroquine, cyanocobalamin, losartan, doxycycline hyclate, atorvastatin, baclofen, nystatin-triamcinolone, pantoprazole, lactobacillus, simethicone, loratadine, gabapentin, topiramate, and oxyCODONE-acetaminophen. We will continue to administer cyanocobalamin. I also recommend the following Medications:    Orders Placed This Encounter   Medications    oxyCODONE-acetaminophen (PERCOCET) 7.5-325 MG per tablet     Sig: Take 1 tablet by mouth every 6 hours as needed for Pain for up to 30 days. Intended supply: 30 days     Dispense:  70 tablet     Refill:  0     Reduce doses taken as pain becomes manageable        -which helps with pain and function. Otherwise, continue the current pain medications that I have prescibed.       Radiologic:   Old  unique films results reviewed -  Exam: XR CHEST PORTABLE       History: Palpitations. High blood pressure.       Technique: AP portable view of the chest obtained.       Comparison: Chest x-rays from March 20, 2016       Findings:       The cardiomediastinal silhouette is within normal limits.  No pneumothorax, pleural effusion, or consolidation.  No acute osseous abnormality. Degenerative changes of both shoulders.           Impression       No radiographic evidence of acute intrathoracic process.           ,     I discussed results with patients. see Follow up plans below  For any new studies. Unique source and Care Everywhere Updates:  prior external notes requested and reviewed. BUN 7 - 21 mg/dL 33High           Creatinine 0.58 - 0.96 mg/dL 1. 38High          No new issues noted. Unique History obtained by caregiver/independent historian-and verified with SOAPPR. Electrodiagnostic:  Previous studies requested,     I discussed results with patient. See follow-up plans for new studies.         Labs:  Previous labs reviewed     Lab Results   Component Value Date     06/04/2021    K 3.7 06/04/2021    K 4.4 08/09/2018    CL 95 06/04/2021    CO2 25 06/04/2021    BUN 25 06/04/2021    CREATININE 1.36 06/04/2021    CALCIUM 9.9 06/04/2021    LABALBU 4.3 02/09/2021    LABALBU 2.8 08/18/2018    BILITOT 0.4 02/09/2021    ALKPHOS 117 02/09/2021    AST 27 02/09/2021    ALT 26 02/09/2021     Lab Results   Component Value Date    WBC 5.5 06/04/2021    RBC 4.41 06/04/2021    RBC 2.81 09/06/2018    HGB 13.7 06/04/2021    HCT 40.1 06/04/2021    MCV 91.1 06/04/2021    MCH 31.0 06/04/2021    MCHC 34.1 06/04/2021    RDW 12.8 06/04/2021     06/04/2021    MPV 7.4 09/13/2018       Lab Results   Component Value Date    LABAMPH Neg 06/11/2020    BARBSCNU Neg 06/11/2020    LABBENZ Neg 06/11/2020    CANSU Neg 06/11/2020    COCAIMETSCRU Neg 06/11/2020    PHENCYCLIDINESCREENURINE Neg 68/87/4951    TRICYCLIC Neg 14/02/1202 DSCOMMENT see below 06/11/2020       Lab Results   Component Value Date    CODEINE Not Detected 12/02/2016    MORPHINE Not Detected 12/02/2016    ACETYLMORPHI Not Detected 12/02/2016    OXYCODONE Not Detected 12/02/2016    NOROXYCODONE Not Detected 12/02/2016    NOROXYMU Not Detected 12/02/2016    HYDRCO Present 12/02/2016    NORHYDU Present 12/02/2016    HYDROMO Not Detected 12/02/2016    Maryln Baptise Not Detected 12/02/2016    Sonny Newtona Not Detected 12/02/2016    FENTA Not Detected 12/02/2016    NORFENT Not Detected 12/02/2016    MEPERIDINE Not Detected 12/02/2016    TAPENU Not Detected 12/02/2016    TAPOSULFUR Not Detected 12/02/2016    METHADONE Not Detected 12/02/2016    LABPROP Not Detected 12/02/2016    TRAM Not Detected 12/02/2016    AMPH Not Detected 12/02/2016    METHAMP Not Detected 12/02/2016    MDMA Not Detected 12/02/2016    ECMDA Not Detected 12/02/2016       Lab Results   Component Value Date    METPHEN Not Detected 12/02/2016    PHENTERMINE Not Detected 12/02/2016    BENZOYL Not Detected 12/02/2016    Candida Limerick Not Detected 12/02/2016    ALPHAOHALPRA Not Detected 12/02/2016    CLONAZEPAM Not Detected 12/02/2016    Redell Pair Not Detected 12/02/2016    DIAZEP Not Detected 12/02/2016    ROMAIN Not Detected 12/02/2016    OXAZ Not Detected 12/02/2016    Nicci Bump Not Detected 12/02/2016    LORAZEPAM Not Detected 12/02/2016    MIDAZOLAM Not Detected 12/02/2016    ZOLPIDEM Not Detected 12/02/2016    NAI Not Detected 12/02/2016    ETG Not Detected 12/02/2016    MARIJMET Not Detected 12/02/2016    PCP Not Detected 12/02/2016    PAINMGTDRUGP See Below 12/02/2016    EERPAINMGTPA See Note 12/02/2016    LABCREA 129.7 08/09/2018         , I discussed results with patient. See follow-up plans for new studies. Therapies:  HEP-gentle stretching and relaxation techniques-demonstrated with patient-they are to do them twice a day.   They are also advised to make the following lifestyle changes:  Goals      Exercise 3x per week (30 min per time)      SOAPP-R GOAL LESS THAN 9      09/02/16 SCORE: 8 LOW RISK   02/02/17 score 7-low risk   04/03/17 score: 6-low risk  06/05/17 score: 7-low risk  08/07/17 score: 8-low risk  10/06/17 score: 6-low risk  12/20/17 score: 5-low risk  02/18/18 score: 6-low risk  0425/2018 score 5-low risk  7/11/18 score: 3 low- risk  10/11/18 score:4- low risk  2/28/19 score: 2: low risk  5/13/19 score: 2- low risk  7/17/19 score: 1- low risk   9/13/19 score: 2- low risk  11/21/19 score: 3- low risk   1/22/20 score: 2- low risk   6- score 3 - low risk   6/23/21 score: 7- low risk       Weight < 150 lb (68.04 kg)           Injections or Epidurals:  Injection options were discussed. Patient gave verbal consent to ordered injections. See follow-up plans for planned injections. Supplements:  Vitamin D with increased dosing during the rainy months,   Education was given on:   Dietary and Fitness--daily stretches and low carb diet-in chair Yoga when possible             Follow up with Primary Care Physician regarding their general medical needs. Stressed the importance of following up with PCP and specialists for his/her chronic diseases, health, CV, and cancer screening and continued care. Will follow disease activity/progression and adjust therapeutic regimen to disease activity and severity. Discussed medication dosage, usage, goals of therapy, and side effects. Available test results were reviewed -Discussed findings, impression and plan with patient. An additional  4 minutes were spent outside of the patient visit to review records. Additional time spent with the patient to discuss their questions. Additional time spent with the patient devoted to discussing treatment strategy, planning, and implementation. Patient understands above plan; questions asked and answered. Patient agrees to plan as noted above.         At least 50% of the visit was involved in the discussion of the options

## 2021-06-27 DIAGNOSIS — J33.9 NASAL POLYPS: ICD-10-CM

## 2021-06-27 RX ORDER — LORATADINE 10 MG/1
10 TABLET ORAL DAILY PRN
Qty: 90 TABLET | Refills: 1 | Status: SHIPPED | OUTPATIENT
Start: 2021-06-27 | End: 2021-09-21 | Stop reason: ALTCHOICE

## 2021-06-27 NOTE — TELEPHONE ENCOUNTER
Rx requested:  Requested Prescriptions     Pending Prescriptions Disp Refills    loratadine (CLARITIN) 10 MG tablet [Pharmacy Med Name: LORATADINE 10 MG TABLET] 90 tablet      Sig: TAKE 1 TABLET BY MOUTH DAILY AS NEEDED (CONGESTION)       Last Office Visit:   3/9/2021        Next Visit Date:  Future Appointments   Date Time Provider Stacie Butler   7/6/2021 10:30 AM Lou Galicia DO [address] [place] [address]   7/27/2021 11:15 AM Lou Galicia DO [facility] EM[facility]GENC[facility] [place]   8/24/2021 11:15 AM Lou Galicia DO [facility] AT [place]   9/9/2021  9:00 AM Danny Pierce MD [facility]   9/15/2021 10:15 AM Lou Galicia DO [facility] [place]   12/13/2021 10:15 AM Melonie Ji MD [address]

## 2021-06-30 DIAGNOSIS — K21.9 GASTROESOPHAGEAL REFLUX DISEASE WITHOUT ESOPHAGITIS: ICD-10-CM

## 2021-06-30 RX ORDER — PANTOPRAZOLE SODIUM 20 MG/1
TABLET, DELAYED RELEASE ORAL
Qty: 90 TABLET | Refills: 1 | Status: SHIPPED | OUTPATIENT
Start: 2021-06-30 | End: 2021-09-21 | Stop reason: ALTCHOICE

## 2021-07-06 ENCOUNTER — PROCEDURE VISIT (OUTPATIENT)
Dept: PHYSICAL MEDICINE AND REHAB | Age: 66
End: 2021-07-06
Payer: MEDICARE

## 2021-07-06 DIAGNOSIS — M48.062 SPINAL STENOSIS OF LUMBAR REGION WITH NEUROGENIC CLAUDICATION: ICD-10-CM

## 2021-07-06 DIAGNOSIS — Z79.899 HIGH RISK MEDICATION USE: ICD-10-CM

## 2021-07-06 DIAGNOSIS — M05.79 RHEUMATOID ARTHRITIS INVOLVING MULTIPLE SITES WITH POSITIVE RHEUMATOID FACTOR (HCC): ICD-10-CM

## 2021-07-06 DIAGNOSIS — M54.31 SCIATICA OF RIGHT SIDE: Primary | ICD-10-CM

## 2021-07-06 DIAGNOSIS — M54.2 CERVICALGIA: ICD-10-CM

## 2021-07-06 PROCEDURE — 96372 THER/PROPH/DIAG INJ SC/IM: CPT | Performed by: PHYSICAL MEDICINE & REHABILITATION

## 2021-07-06 PROCEDURE — 64445 NJX AA&/STRD SCIATIC NRV IMG: CPT | Performed by: PHYSICAL MEDICINE & REHABILITATION

## 2021-07-06 RX ORDER — LIDOCAINE HYDROCHLORIDE 10 MG/ML
6 INJECTION, SOLUTION INFILTRATION; PERINEURAL ONCE
Status: COMPLETED | OUTPATIENT
Start: 2021-07-06 | End: 2021-07-06

## 2021-07-06 RX ORDER — LIDOCAINE HYDROCHLORIDE 20 MG/ML
5 INJECTION, SOLUTION INFILTRATION; PERINEURAL ONCE
Status: COMPLETED | OUTPATIENT
Start: 2021-07-06 | End: 2021-07-06

## 2021-07-06 RX ORDER — CYANOCOBALAMIN 1000 UG/ML
1000 INJECTION INTRAMUSCULAR; SUBCUTANEOUS ONCE
Status: COMPLETED | OUTPATIENT
Start: 2021-07-06 | End: 2021-07-06

## 2021-07-06 RX ADMIN — LIDOCAINE HYDROCHLORIDE 6 ML: 10 INJECTION, SOLUTION INFILTRATION; PERINEURAL at 14:57

## 2021-07-06 RX ADMIN — LIDOCAINE HYDROCHLORIDE 5 ML: 20 INJECTION, SOLUTION INFILTRATION; PERINEURAL at 14:57

## 2021-07-06 RX ADMIN — CYANOCOBALAMIN 1000 MCG: 1000 INJECTION INTRAMUSCULAR; SUBCUTANEOUS at 14:56

## 2021-07-06 NOTE — PROGRESS NOTES
Patient here for right sciatic injection. Patient taken back to exam room, and placed on drape locking stool. Areas to be injected marked appropriately, and cleansed with alcohol. 6cc of 1% Lidocaine, 5cc of 2% Lidocaine, 2cc of Kenalog, and 1cc of B12 to be injected by provider.

## 2021-07-07 ENCOUNTER — HOSPITAL ENCOUNTER (OUTPATIENT)
Dept: OCCUPATIONAL THERAPY | Age: 66
Setting detail: THERAPIES SERIES
Discharge: HOME | End: 2021-07-07
Payer: MEDICARE

## 2021-07-07 PROCEDURE — 97166 OT EVAL MOD COMPLEX 45 MIN: CPT

## 2021-07-07 ASSESSMENT — PAIN SCALES - QUEBEC BACK PAIN DISABILITY SCALE
SLEEP THROUGH THE NIGHT: 5
MAKE YOUR BED: 1
PULL OR PUSH HEAVY DOORS: 2
WALK SEVERAL KILOMETERS  OR MILES: 5
REACH UP TO HIGH SHELVES: 2
SIT IN A CHAIR FOR SEVERAL HOURS: 2
TURN OVER IN BED: 3
THROW A BALL: 3
WALK A FEW BLOCKS OR 300 TO 400M: 5
RIDE IN A CAR: 2
BEND OVER TO CLEAN THE BATHTUB: 3
PUT ON SOCKS OR PANYHOSE: 0
QUEBEC CMS MODIFIER: CK
STAND UP FOR 20 TO 30 MINUTES: 5
CARRY TWO BAGS OF GROCERIES: 2
TAKE FOOD OUT OF THE REFRIGERATOR: 0
LIFT AND CARRY A HEAVY SUITCASE: 3
RUN ONE BLOCK OR 100M: 5
CLIMB ONE FLIGHT OF STAIRS: 4
TOTAL SCORE: 54
QUEBEC DISABILITY INDEX: 40-59%
MOVE A CHAIR: 2
GET OUT OF BED: 0

## 2021-07-07 NOTE — PROGRESS NOTES
Rheumatoid arthritis involving multiple sites with positive rheumatoid factor (HCC)    Ankle swelling    COLTON positive    Deformity of finger of right hand    Chronic bilateral low back pain with bilateral sciatica    Cervicalgia     Past Medical History:   Diagnosis Date    Abnormal electromyogram (EMG) 8/19/09    SENSORIMOTOR NEUROPATHY OF BLE, RIGHT WORSE THAN LEFT, SUSPICIOUS FOR RIGHT S1 RADIC    Angina pectoris ([address]     Angina pectoris ([address] 07/2019    sees Dr. Sully Nieto Ankle pain     Bleeding ulcer     Colon polyp 4/3/2017    Dermatitis     Fibromyalgia     Foot pain     Hyperlipidemia     Hypertension     Infection of intervertebral disc (pyogenic), lumbar region ([address] 8/13/2018    Low back pain     MRSA (methicillin resistant staph aureus) culture positive 2011    Neck pain     Neuropathy     Obesity     Osteoarthritis     PSVT (paroxysmal supraventricular tachycardia) ([address]     Shingles outbreak 2012    SI (sacroiliac) pain     Stenosis     Vitamin D deficiency         Past Surgical History:   Procedure Laterality Date    CARDIAC CATHETERIZATION  10/2016    CERVICAL FUSION  12/14/11    Dr Samreen Cuevas with bone graft and plate    COLONOSCOPY      ENDOSCOPIC ULTRASOUND (LOWER) N/A 7/11/2019    EGD/ EUS performed by Coral Horn MD at [facility]  7/05    HYSTERECTOMY  2001    HYSTERECTOMY, TOTAL ABDOMINAL      JOINT REPLACEMENT Left     tka    LIVER BIOPSY      MENISCECTOMY  10/31/11    left knee    OTHER SURGICAL HISTORY  10/26/09    CAUDALS BY DR Luis Manuel Cruz    NE COLON CA SCRN NOT  [address]  IND N/A 4/12/2017    [facility] ESOPHAGOGASTRODUODENOSCOPY TRANSORAL DIAGNOSTIC N/A 4/12/2017    EGD ESOPHAGOGASTRODUODENOSCOPY performed by Katerine Maher MD at [facility]  12/2011    Dr Samreen Cuevas Lumbar and c spine    TOTAL KNEE ARTHROPLASTY  7/30/12 LEFT-DR AVELAR    UPPER GASTROINTESTINAL ENDOSCOPY  1995     Allergies   Allergen Reactions    Latex Swelling     Swelling of hands with wearing latex gloves    Norvasc [Amlodipine Besylate] Other (See Comments)     swelling    Keflex [Cephalexin] Rash               DIAGNOSTIC IMAGING: N/A                 MEDICATIONS:  Current Outpatient Medications:     pantoprazole (PROTONIX) 20 MG tablet, TAKE 1 TABLET BY MOUTH EVERY DAY, Disp: 90 tablet, Rfl: 1    loratadine (CLARITIN) 10 MG tablet, TAKE 1 TABLET BY MOUTH DAILY AS NEEDED (CONGESTION), Disp: 90 tablet, Rfl: 1    oxyCODONE-acetaminophen (PERCOCET) 7.5-325 MG per tablet, Take 1 tablet by mouth every 6 hours as needed for Pain for up to 30 days.  Intended supply: 30 days, Disp: 70 tablet, Rfl: 0    topiramate (TOPAMAX) 25 MG tablet, TAKE 1 TABLET BY MOUTH EVERY DAY AT NIGHT, Disp: 60 tablet, Rfl: 1    gabapentin (NEURONTIN) 300 MG capsule, TAKE 1 CAPSULE BY MOUTH DAILY AND 2 AT NIGHT AS TOLERATED, Disp: 270 capsule, Rfl: 1    simethicone (MYLICON) 80 MG chewable tablet, Take 80 mg by mouth every 6 hours as needed for Flatulence, Disp: , Rfl:     lactobacillus (CULTURELLE) capsule, Take 1 capsule by mouth daily, Disp: 30 capsule, Rfl: 1    nystatin-triamcinolone (MYCOLOG II) 085235-9.1 UNIT/GM-% cream, Apply topically TID, Disp: 60 g, Rfl: 1    baclofen (LIORESAL) 10 MG tablet, TAKE 1 TABLET BY MOUTH NIGHTLY AS NEEDED (MUSCLE SPASMS), Disp: 90 tablet, Rfl: 2    atorvastatin (LIPITOR) 10 MG tablet, TAKE 1 TABLET BY MOUTH EVERY DAY AT NIGHT, Disp: 90 tablet, Rfl: 3    doxycycline hyclate (VIBRA-TABS) 100 MG tablet, TAKE 1 TABLET BY MOUTH TWICE A DAY, Disp: 180 tablet, Rfl: 0    losartan (COZAAR) 50 MG tablet, TAKE 1 TABLET BY MOUTH EVERY DAY, Disp: , Rfl:     cyanocobalamin 1000 MCG/ML injection, INJECT 1 ML AS DIRECTED EVERY 14 DAYS FOR 12 DOSES INJECT ONE CC SUB-CUTANEOSLY EVERY OTHER WEEK, Disp: 6 mL, Rfl: 3    hydroxychloroquine (PLAQUENIL) 200 MG tablet, Take 1 tablet by mouth daily, Disp: , Rfl:     hydrochlorothiazide (HYDRODIURIL) 25 MG tablet, , Disp: , Rfl:     nitroGLYCERIN (NITROSTAT) 0.4 MG SL tablet, PLEASE SEE ATTACHED FOR DETAILED DIRECTIONS, Disp: 25 tablet, Rfl: 3    melatonin 5 MG TABS tablet, Take 5 mg by mouth nightly, Disp: , Rfl:     lidocaine (XYLOCAINE) 5 % ointment, APPLY TO AFFECTED AREA EVERY 12 HOURS AS NEEDED FOR PAIN, Disp: , Rfl: 5    aspirin 81 MG tablet, Take 81 mg by mouth daily, Disp: , Rfl:     Coenzyme Q10 (COQ10 PO), Take 1 capsule by mouth daily , Disp: , Rfl:     Cholecalciferol (VITAMIN D3) 2000 units TABS, Take 2 tablets by mouth daily , Disp: , Rfl:     Current Facility-Administered Medications:     cyanocobalamin injection 1,000 mcg, 1,000 mcg, Intramuscular, Once, Rosaline Scullin, DO    PRECAUTIONS: None per patient report         ENGLISH PRIMARY LANGUAGE: Yes    TRANSFER OF PT CARE REQUIRED: no                                                         HAND DOMINANCE: right    PRIOR LEVEL OF FUNCTION:  [x] Patient performing at independent level for ADL and IADL activities:    [] Patient receiving assistance for ADL and IADL activities:    [] Other:    Comments:                                        ONSET DATE OF SURGERY OR INJURY: ~ 3 years ago    PREVIOUS/CURRENT TREATMENT FOR SAME PROBLEM:    [x]   YES   [] NO   Pt d/c from OT 12/18/2020 for similar symptoms of pain. Pt stated tries to complete previous HEP, but forgets what some of the exercises are. Pt stated she would try to write HEP down during past sessions. Pt stated ok to email HEP:  [email or web address]     SUBJECTIVE:    CHIEF COMPLAINT: Pt reporting sciatic pain, numbness going down R>L leg. Pt stated had R sciatic nerve block on 7/6/2021. PATIENT GOALS FOR THERAPY: \"To sleep in bed again. \" Pt stated waking every 3 hours from pain. Pt stated the longer she sits, the more numb R side becomes.  Pt stated sometimes sitting helps relieve symptoms at times. SOCIAL SUPPORT:       Pt lives: spouse and adult son  Home:  single level house, 10 steps down to basement with one handrail(s). Rarely goes to basement. Entrance: 2 stairs into the house, with 1 hand rail   Bathroom: walk in shower , grab bars in shower , shower chair  and hand held shower   DME: cane , long handle sponge for back during bathing     ADL/HOBBIES/ROUTINES: pt driving, retired dental hygienist, Used to like to read, sow, clean, but stated has difficulty concentrating and memory after having sepsis. Pt stated now watches TV. PAIN SCALE: 5/10                              LOCATION: R hip below waist.     Comments: Pt reports lowest pain 0-1/10 during rest, and highest 10/10 during activities. Pt stated taking pain medications. Pain worse when waling, standing, sleeping, sitting for long amounts of time. Pt stated some relief with medications, showers, stretches. Pt stated considering R shoulder replacement. OBJECTIVE: Pt with tremors throughout body. POSTURAL EXAM/COMPENSATION: posterior pelvic tilt while seated, ER BLE during ambulation with cane     UPPER EXTREMITY STRENGTH AND RANGE OF MOTION: Pt with deformities to R digits and some deviations to L digits, but able to oppose all digits to thumb. Limited R shoulder IR without increased effort or pain. Strength WFL BUE's. Pt with mild report of pain in R shoulder during MMT. R shoulder lower than left. LUMBAR/SACROILIAC ASSESSMENT: anterior pelvic tilt standing     LEG LENGTH COMPARISON: no observed discrepancy     NEUROLOGICAL ASSESSMENT: Pt stated has \"neuropathy\" in B feet, and R hand 5th digit and R arm \"getting more numbness\". SOFT TISSUE ASSESSMENT/MOBILITY: mild soft tissue restrictions in lower back    SCAR/LOCATION/MOBILITY: Pt reported scars in cervical and lumbar spine from past sx, hysterectomy, and knee replacement.      EDUCATION/BARRIERS TO LEARNING:     Barriers:pt reported decreased memory   Education on this date: OT role and POC     FALLS: see falls risk assessment form    COMMENTS/CLINICAL IMPRESSIONS:      [facility] Back Pain Disability Scale  [facility] Back Pain Disability Scale  Get out of bed: Not difficult at all  Sleep through the night: Unable to do  Turn over in bed: Fairly difficult  Ride in a car: Somewhat difficult  Stand up for 20-30 minutes: Unable to do  Sit in a chair for several hours: Somewhat difficult  Climb one flight of stairs: Very difficult  Walk a few blocks (300-400 m): Unable to do  Walk several kilometers - miles: Unable to do  Reach up to high shelves: Somewhat difficult  Throw a ball: Fairly difficult  Run one block (about 100 m): Unable to do  Take food out of the refrigerator: Not difficult at all  Make your bed: Minimally difficult  Put on socks (pantyhose): Not difficult at all  [facility] over to clean the bathtub: Fairly difficult  Move a chair: Somewhat difficult  Pull or push heavy doors: Somewhat difficult  Carry two bags of groceries: Somewhat difficult  Lift and carry a heavy suitcase: Fairly difficult  [place] Total Score: 54  [place] Disability Index: 40-59%  [place] CMS Modifier: CK    Rehabilitation Potential:    [] Excellent [] Good  [x] Fair  [] Poor      TX PLAN:  2 X WEEK X 4 WEEKS. Complexity:       []  Low Complexity  ¨ History: Brief history including review of medical records relating to the problem  ¨ Exam: 1-3 performance Deficits  ¨ Assistance/Modification: No assistance or modifications required to perform tasks. No comorbities affecting occupational performance  [x]  Medium Complexity  ¨ History: Expanded review of medical records and additional review of physical, cognitive, or psychosocial history related to current functional performance  ¨ Exam: 3-5 performance deficits  ¨ Assistance/Modification: Min/mod assistance or modifications required to perform tasks. May have comorbidities that affect occupational performance.   [] High Complexity  ¨ History: Extensive review of medical records and additional review of physical, cognitive, or psychosocial history related to current functional performance. ¨ Exam: 5 or more performance deficits  ¨ Assistance/Modification: Significant assistance or modifications required to perform tasks. Have comorbidities that affect occupational performance. LONG TERM GOALS:    Long term goals  Time Frame for Long term goals : 2x/wk 4 weeks  Long term goal 1: Patient will report pain 5-6/10 or less during functional activities. Long term goal 2: Patient/caregiver will be IND with all recommended HEP, adaptive techniques, and/or adaptive strategies. Long term goal 3: Patient will report less sleep disturbances from pain to increase quality of sleep. Long term goal 4: Patient will decrease fascial restrictions in lower back to decrease pain during functional activities. PROBLEMS:  [x]  PAIN [x]   IMPAIRED SKIN/TISSUE MOBILITY              []  EDEMA []    IMPAIRED SENSATION   []  IMPAIRED ROM []   DECREASED STRENGTH   []  IMPAIRED COORDINATION /         DEXTERITY []  DECREASED USE ___UE FOR         ADL/WORK ROLE PERFORMANCE   [x]  DECREASED KNOWLEDGE HEP      []  IMPAIRED FLEXIBILITY                 []  OTHER:           TREATMENT PLAN:  [x]  Re-evaluation                        [x]  Pain Mgmt. Tech. [x]  PROM/stretching/AAROM/AROM []  Coordination/Dexterity retraining   [x]  Instruction written HEP          []  Scar Mgmt.                                []  Desensitization [x]  Modalities   [x]  MFR techniques                    []  ADL Retraining   []  Strengthening/Graded therapeutic activity []  Edema mgmt. Tech   [x]  Instruction/application of        Energy conservation, work        Simplification, joint protection,               Body mechanics      []  Work Simulation Activity    [x] HUERTA able to work with pt   [x] D/C plan:  Will assess pt after established visits to

## 2021-07-09 ENCOUNTER — HOSPITAL ENCOUNTER (OUTPATIENT)
Dept: OCCUPATIONAL THERAPY | Age: 66
Setting detail: THERAPIES SERIES
Discharge: HOME | End: 2021-07-09
Payer: MEDICARE

## 2021-07-09 DIAGNOSIS — M54.42 CHRONIC BILATERAL LOW BACK PAIN WITH BILATERAL SCIATICA: Primary | ICD-10-CM

## 2021-07-09 DIAGNOSIS — M54.41 CHRONIC BILATERAL LOW BACK PAIN WITH BILATERAL SCIATICA: Primary | ICD-10-CM

## 2021-07-09 DIAGNOSIS — M54.2 CERVICALGIA: ICD-10-CM

## 2021-07-09 DIAGNOSIS — G89.29 CHRONIC BILATERAL LOW BACK PAIN WITH BILATERAL SCIATICA: Primary | ICD-10-CM

## 2021-07-09 DIAGNOSIS — Z79.899 HIGH RISK MEDICATION USE: ICD-10-CM

## 2021-07-09 DIAGNOSIS — M05.79 RHEUMATOID ARTHRITIS INVOLVING MULTIPLE SITES WITH POSITIVE RHEUMATOID FACTOR (HCC): ICD-10-CM

## 2021-07-09 RX ORDER — OXYCODONE AND ACETAMINOPHEN 7.5; 325 MG/1; MG/1
1 TABLET ORAL EVERY 6 HOURS PRN
Qty: 70 TABLET | Refills: 0 | Status: SHIPPED | OUTPATIENT
Start: 2021-07-09 | End: 2021-08-12 | Stop reason: SDUPTHER

## 2021-07-09 NOTE — PROGRESS NOTES
Therapy                            Cancellation/No-show Note    Date: 2021  Patient Name: Cynthia Garcia    : 1955  (5 y.o.)     MRN: [id number]    Account #: [de-identified]       Canceled Appointment: 1    Comments: For today's appointment patient:  [x]  Cancelled  []  Rescheduled appointment  []  No-show   []  Called pt to remind of next appointment     Reason given by patient:  []  Patient ill  []  Conflicting appointment  []  No transportation    []  Conflict with work  []  No reason given  [x]  Other: pt fell and hurt knee       [x] Pt has future appointments scheduled, no follow up needed  [] Pt requests to be on hold.     Reason:   If > 2 weeks please discuss with therapist.  [] Therapist to call pt for follow up     Signature: MADELINE Chaparro/KELSI 2021 8:53 AM

## 2021-07-13 ENCOUNTER — HOSPITAL ENCOUNTER (OUTPATIENT)
Dept: OCCUPATIONAL THERAPY | Age: 66
Setting detail: THERAPIES SERIES
Discharge: HOME | End: 2021-07-13
Payer: MEDICARE

## 2021-07-13 PROCEDURE — 97530 THERAPEUTIC ACTIVITIES: CPT

## 2021-07-13 PROCEDURE — 97140 MANUAL THERAPY 1/> REGIONS: CPT

## 2021-07-13 NOTE — PROGRESS NOTES
Occupational Therapy  Daily Note     Name: Chris Chester  : 1955  MRN: [id number]  Diagnosis: M54.2 Chronic B low back pain, (M54.42 ,M54.41, G89.29,  RA involving multiple sites with positive rheumatoid factor, M05.79 neuropathy BLE, G57.93 spinal stenosis lumbar region with neurogenic claudication, M79.1 Myalgia    Visit Information:   OT Insurance Information: Medicare/United [employer]  Total # of Visits Approved:  (BMN)  Total # of Visits to Date: 2  Canceled Appointment: 1  Progress Note Counter:     Date: 2021  OT Manual therapy 25 minutes for 2 unit(s), CPT 60730  OT Therapeutic activities 32 minutes for 2 unit(s), CPT 84921       OT Individual Minutes  Time In: 4728  Time Out: 1200  Minutes: 62    Referring Practitioner: Dr. Kelley Andersen        Comments: Pt stated ok to email HEP: [email or web address]     Subjective:  Pt reported had fall last week and tripped over sliding door track on L knee. Pt denied hitting head. Reported did not go to hospital.        Pain rating:   Pre-treatment pain:    6/10, L knee > L hip/leg and R hip and leg     Action for pain:   Patient reports pain is at acceptable level for treatment. Pain after treatment:      4/10 Right hip/leg, 6/10 L hip/leg         Focus of treatment was on the following:   decreasing fascial restrictions, decreasing pain and education/training     Objective:    Treatment Activity:     Education/HEP:  Access Code: [email or web address]  URL: [email or web address]  Date: 2021  Prepared by: Virginia Garcia    Exercises  Supine Lower Trunk Rotation - 1 x daily - 7 x weekly - 3 sets - 10 reps, pt demo 5 reps to L and R side without report of pain, but stated BLE \"cramping\".    Child's Pose Stretch - 1 x daily - 7 x weekly - 3 sets - 10 reps, no demo d/t pt reported pain in knee   Child's Pose with Sidebending - 1 x daily - 7 x weekly - 3 sets - 10 reps, no demo d/t pain in knee   Seated Lumbar Flexion Stretch - 1 x daily - 7 x weekly - 3 sets - 10 reps, mild pain in R shoulder felt when completing 5 reps demo  Seated Thoracic Lumbar Extension - 1 x daily - 7 x weekly - 3 sets - 10 reps, \"muscle cramp\" in R scapular area- pt stated only able to \"concentrate on the pain\" when she feels it. Modified to keep hands on stomach. Pt stated feels much better. Seated Sidebending - 1 x daily - 7 x weekly - 3 sets - 10 reps  Standing Thoracic Spine Stretch - 1 x daily - 7 x weekly - 3 sets - 10 reps    MFR/Manual:   Pt treated prone on mat table and side lying on mat table  Thoracic: right and left  thoracic paraspinals   Lower extremity: right leg pull  and deep release right Internal/external hip release   Lumbar/Pelvis/Sacral: soft tissue mobility: gluteal regions     Assessment:   Pt tolerated treatment well. Pt reported decreased  pain after OT treatment. Plan:   Continue POC, next session provide hip abductor stretches    Goals:     Long term goals  Time Frame for Long term goals : 2x/wk 4 weeks  Long term goal 1: Patient will report pain 5-6/10 or less during functional activities. Long term goal 2: Patient/caregiver will be IND with all recommended HEP, adaptive techniques, and/or adaptive strategies. Long term goal 3: Patient will report less sleep disturbances from pain to increase quality of sleep. Long term goal 4: Patient will decrease fascial restrictions in lower back to decrease pain during functional activities.     Cathleen Valladares OTR/L   7/13/2021  12:55 PM

## 2021-07-16 ENCOUNTER — HOSPITAL ENCOUNTER (OUTPATIENT)
Dept: OCCUPATIONAL THERAPY | Age: 66
Setting detail: THERAPIES SERIES
Discharge: HOME | End: 2021-07-16
Payer: MEDICARE

## 2021-07-16 PROCEDURE — 97140 MANUAL THERAPY 1/> REGIONS: CPT

## 2021-07-16 NOTE — PROGRESS NOTES
Occupational Therapy  Daily Note     Name: Georgia Rudd  : 1955  MRN: [id number]  Diagnosis: M54.2 Chronic B low back pain, (M54.42 ,M54.41, G89.29,  RA involving multiple sites with positive rheumatoid factor, M05.79 neuropathy BLE, G57.93 spinal stenosis lumbar region with neurogenic claudication, M79.1 Myalgia    Visit Information:   OT Insurance Information: Medicare/United [employer]  Total # of Visits Approved:  (BMN)  Total # of Visits to Date: 3  Canceled Appointment: 1  Progress Note Counter:     Date: 2021  OT Manual therapy 57 minutes for 4 unit(s), CPT 66277       OT Individual Minutes  Time In: 1101  Time Out: 8263  Minutes: 62    Referring Practitioner: Dr. Jama English:  \"I did not sleep well last night. \"      Pain rating:   Pre-treatment pain:    5/10, lower back R side    Action for pain:   Patient reports pain is at acceptable level for treatment. Pain after treatment:      4/10, pt stated felt pain when standing (that was not there before) in R posterior hip, however there was less burning after manual techniques in RLE anterior and lateral side during standing and for remainder of MFR treatment to RLE      Focus of treatment was on the following:   decreasing fascial restrictions, decreasing pain and education/training     Objective:    Treatment Activity:       MFR/Manual:   Pt treated supine on mat table and side lying on mat table  Thoracic: right and left  thoracic paraspinals   Lower extremity: right leg pull , soft tissue release: R quads , transverse plane: R hip and RLE and deep release right quadriceps , right hamstrings , right supra/inferior patella  and right Internal/external hip release   Lumbar/Pelvis/Sacral: deep release of lumbo/sacral area    Discussed previous HEP: yes, Pt verbally confirmed compliant with HEP's. Pt reported logging in to [employer] exercises.      Educated on visual imagery and relaxation methods to decrease stress and decrease attention to pain during MFR. Pt verbalized understanding. Assessment:   Pt tolerated treatment fair. Pt reported slight decrease in pain after OT treatment. Plan:   Continue POC    Goals:     Long term goals  Time Frame for Long term goals : 2x/wk 4 weeks  Long term goal 1: Patient will report pain 5-6/10 or less during functional activities. Long term goal 2: Patient/caregiver will be IND with all recommended HEP, adaptive techniques, and/or adaptive strategies. Long term goal 3: Patient will report less sleep disturbances from pain to increase quality of sleep. Long term goal 4: Patient will decrease fascial restrictions in lower back to decrease pain during functional activities.     MADELINE Watters/KELSI   7/16/2021  12:13 PM

## 2021-07-20 ENCOUNTER — HOSPITAL ENCOUNTER (OUTPATIENT)
Dept: OCCUPATIONAL THERAPY | Age: 66
Setting detail: THERAPIES SERIES
Discharge: HOME | End: 2021-07-20
Payer: MEDICARE

## 2021-07-20 PROCEDURE — 97140 MANUAL THERAPY 1/> REGIONS: CPT

## 2021-07-20 NOTE — PROGRESS NOTES
Occupational Therapy  Daily Note     Name: Kip Matthews  : 1955  MRN: [id number]  Diagnosis: M54.2 Chronic B low back pain, (M54.42 ,M54.41, G89.29,  RA involving multiple sites with positive rheumatoid factor, M05.79 neuropathy BLE, G57.93 spinal stenosis lumbar region with neurogenic claudication, M79.1 Myalgia    Visit Information:   OT Insurance Information: Medicare/United Healthcare  Total # of Visits Approved:  (BMN)  Total # of Visits to Date: 4  Canceled Appointment: 1  Progress Note Counter: 3/8    Date: 2021    OT Manual therapy 56 minutes for 4 unit(s), CPT 76966       OT Individual Minutes  Time In: 1055  Time Out: 1030  Minutes: 64    Referring Practitioner: Dr. Cuevas Mole:  \"\"I did some lifting yesterday. It was not as bad as I though it would be. \"        Pain rating:   Pre-treatment pain:    10, low back     Action for pain:   Patient reports pain is at acceptable level for treatment. Pain after treatment:      3/10 low back      Focus of treatment was on the following:   decreasing fascial restrictions and decreasing pain     Objective:    Treatment Activity:     MFR/Manual:   Pt treated supine on mat table and side lying on mat table  Thoracic: right and left  thoracic paraspinals  and transverse plane respiratory diaphragm    Lower extremity: right and left  leg pull , transverse plane: right and left LE's and deep release right and left  quadriceps , right and left  hamstrings  and right and left  Internal/external hip release   Lumbar/Pelvis/Sacral: deep release of lumbo/sacral area and pelvic floor transverse plane      Discussed previous HEP: yes, Pt verbally confirmed compliant with HEP's . Pt reported the chair exercises increase pain in back. Assessment:   Pt tolerated treatment well. Pt reported slight decrease pain after OT treatment.     Plan:   Continue POC    Goals:     Long term goals  Time Frame for Long term goals : 2x/wk 4 weeks  Long

## 2021-07-22 RX ORDER — BACLOFEN 10 MG/1
TABLET ORAL
Qty: 90 TABLET | Refills: 2 | Status: SHIPPED | OUTPATIENT
Start: 2021-07-22 | End: 2022-04-11

## 2021-07-23 ENCOUNTER — HOSPITAL ENCOUNTER (OUTPATIENT)
Dept: OCCUPATIONAL THERAPY | Age: 66
Setting detail: THERAPIES SERIES
Discharge: HOME | End: 2021-07-23
Payer: MEDICARE

## 2021-07-23 PROCEDURE — 97530 THERAPEUTIC ACTIVITIES: CPT

## 2021-07-23 PROCEDURE — 97140 MANUAL THERAPY 1/> REGIONS: CPT

## 2021-07-23 NOTE — PROGRESS NOTES
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 Sarah Yanez       Subjective:  \"I fell again yesterday in the garage. \" Pt stated tripped over vacuum  in garage. Pt reported fell on B knees and on R elbow. Pt stated did not go to hospital. Pt denied hitting head or LOC. Session started when pt arrived 3 min late. Pain rating:   Pre-treatment pain:    5/10, B knees, lower back, RUE and R hip. Pt with red trey on R elbow. Action for pain:   Patient reports pain is at acceptable level for treatment. Pain after treatment:      unchanged, pain mainly reported on R hip          Focus of treatment was on the following:   decreasing fascial restrictions, decreasing pain, education/training and therapuetic activities   Objective:    Treatment Activity:     10 reps seated trunk flexion. Pt reported not painful when completing. Pt able to touch floor. Modified child pose seated in chair (pt stated pain in B knees) and using wash cloth to stretch across table. Pt with knees bent with feet under chair while flexing trunk on table. Pt completed 10 reps and holding 3-5 seconds. Pt then completed 10 reps for lateral flexion to L and to R. Pt stated \"almost sharp\" pain when completing lateral movements.  R shoulder was \" a little sharp\" during lateral flexion near end, but pt wanted to complete. Pt stated will normally shorten L side when seated during activities. Discussed trying to lengthen L side at home to complete stretch. Pt completed 10 reps for trunk rotation to L and R using washcloth. Pt stated feels tight on L side, but \"not really feeling a stretch\" on R.    MFR/Manual:   Pt treated seated on chair  Lumbar/Pelvis/Sacral: deep release of lumbo/sacral area, cross hand technique to lumbar spine    Pt reported sleeping about 5-6 hours per night at [facility]. Pt stated sleeps about 2-3 hours in bed, and goes to recliner and then not be sleepy, often. Reported wakes from pain and unable to open medicated lotion and needs to wake  to open. Pt stated able to get more sleep while in recliner, about 4-5 hours if falling asleep directly in recliner. Discussed previous HEP: yes, Pt verbally confirmed compliant with HEP's. Comments: Pt stated has logged into online HEP about 3 times with last log in \"sometime last week. \" Pt stated had written HEP down and verbalized completing. Pt with only one recorded log in on 7/15/2021. Link was initially sent on 7/13/2021. Pt later spontaneously stated that lack of log ins are not indicative of completing HEP's. Pt stated not able to complete HEP that require pt to be on knees (child pose). Able to modify during session. Assessment:   Pt tolerated treatment fair. Pt with no sharp pain during stretching activities, but reported \"a tug\" on R hip. Pt stated feels good stretch on L side during session. No change in reported pain. Plan:   Continue POC    Goals:     Long term goals  Time Frame for Long term goals : 2x/wk 4 weeks  Long term goal 1: Patient will report pain 5-6/10 or less during functional activities. Long term goal 2: Patient/caregiver will be IND with all recommended HEP, adaptive techniques, and/or adaptive strategies.   Long term goal 3: Patient will report less sleep disturbances from pain to increase quality of sleep. Long term goal 4: Patient will decrease fascial restrictions in lower back to decrease pain during functional activities.     Mercedes Sood OTR/L   7/23/2021  12:13 PM

## 2021-07-27 ENCOUNTER — PROCEDURE VISIT (OUTPATIENT)
Dept: PHYSICAL MEDICINE AND REHAB | Age: 66
End: 2021-07-27
Payer: MEDICARE

## 2021-07-27 DIAGNOSIS — M79.10 MYALGIA: ICD-10-CM

## 2021-07-27 PROCEDURE — 20553 NJX 1/MLT TRIGGER POINTS 3/>: CPT | Performed by: PHYSICAL MEDICINE & REHABILITATION

## 2021-07-27 RX ORDER — LIDOCAINE HYDROCHLORIDE 10 MG/ML
23 INJECTION, SOLUTION INFILTRATION; PERINEURAL ONCE
Status: COMPLETED | OUTPATIENT
Start: 2021-07-27 | End: 2021-07-27

## 2021-07-27 RX ORDER — CYANOCOBALAMIN 1000 UG/ML
1000 INJECTION INTRAMUSCULAR; SUBCUTANEOUS ONCE
Status: COMPLETED | OUTPATIENT
Start: 2021-07-27 | End: 2021-07-27

## 2021-07-27 RX ADMIN — CYANOCOBALAMIN 1000 MCG: 1000 INJECTION INTRAMUSCULAR; SUBCUTANEOUS at 16:02

## 2021-07-27 RX ADMIN — LIDOCAINE HYDROCHLORIDE 23 ML: 10 INJECTION, SOLUTION INFILTRATION; PERINEURAL at 16:03

## 2021-07-28 ENCOUNTER — HOSPITAL ENCOUNTER (OUTPATIENT)
Dept: OCCUPATIONAL THERAPY | Age: 66
Setting detail: THERAPIES SERIES
Discharge: HOME | End: 2021-07-28
Payer: MEDICARE

## 2021-07-28 PROCEDURE — 97140 MANUAL THERAPY 1/> REGIONS: CPT

## 2021-07-28 NOTE — PROGRESS NOTES
Occupational Therapy  Daily Note     Name: Se Springer  : 1955  MRN: [id number]  Diagnosis: M54.2 Chronic B low back pain, (M54.42 ,M54.41, G89.29,  RA involving multiple sites with positive rheumatoid factor, M05.79 neuropathy BLE, G57.93 spinal stenosis lumbar region with neurogenic claudication, M79.1 Myalgia    Visit Information:   OT Insurance Information: Medicare/[employer]  Total # of Visits Approved:  (BMN)  Total # of Visits to Date: 6  Canceled Appointment: 1  Progress Note Counter:     Date: 2021  OT Manual therapy 59 minutes for 4 unit(s), CPT 64722     OT Individual Minutes  Time In: 1401  Time Out: 1500  Minutes: 61    Referring Practitioner: Dr. Casillas Diss       Subjective:  \"I had trigger point injections yesterday. \" (lower back, B sides)       Pain rating:   Pre-treatment pain:    6/10 lower back     Action for pain:   Patient reports pain is at acceptable level for treatment. Pain after treatment:      5/10 lower back, pt stated prior to hip extension stretches pain was about \"3 or so, but went back up. \"          Focus of treatment was on the following:   decreasing fascial restrictions and decreasing pain     Objective:    Treatment Activity:     Use of wedge and pillow on mat table. Pt stated felt \"a little better\" than lying on mat table alone. MFR/Manual:   Pt treated supine on mat table and side lying on mat table  Thoracic: right and left  thoracic paraspinals  and transverse plane respiratory diaphragm    Lower extremity: transverse plane: BLE's and deep release right and left  quadriceps , right and left  hamstrings , right and left  IT band  and right and left  Internal/external hip release   Lumbar/Pelvis/Sacral: pelvic floor transverse plane and right and left  psoas release      Educated on hip extension/flexion exercises. Pt completed 10 reps each LE. Pt reported burning sensation.  Pt at end of session pain in L posterior hip, but did not report during manual techniques (pt with previous education to inform therapist about pain during session and to say \"stop\" to stop). Assessment:   Pt tolerated treatment fair. Pt reported slight decreased pain after OT treatment. Plan:   Continue POC    Goals:     Long term goals  Time Frame for Long term goals : 2x/wk 4 weeks  Long term goal 1: Patient will report pain 5-6/10 or less during functional activities. Long term goal 2: Patient/caregiver will be IND with all recommended HEP, adaptive techniques, and/or adaptive strategies. Long term goal 3: Patient will report less sleep disturbances from pain to increase quality of sleep. Long term goal 4: Patient will decrease fascial restrictions in lower back to decrease pain during functional activities.     MADELINE Block/L   7/28/2021  3:20 PM

## 2021-07-30 ENCOUNTER — HOSPITAL ENCOUNTER (OUTPATIENT)
Dept: OCCUPATIONAL THERAPY | Age: 66
Setting detail: THERAPIES SERIES
Discharge: HOME | End: 2021-07-30
Payer: MEDICARE

## 2021-07-30 NOTE — PROGRESS NOTES
adaptive strategies. Long term goal 3: Patient will report less sleep disturbances from pain to increase quality of sleep. Long term goal 4: Patient will decrease fascial restrictions in lower back to decrease pain during functional activities.     MADELINE Block/L   7/30/2021  12:32 PM

## 2021-08-03 ENCOUNTER — APPOINTMENT (OUTPATIENT)
Dept: OCCUPATIONAL THERAPY | Age: 66
End: 2021-08-03
Payer: MEDICARE

## 2021-08-05 ENCOUNTER — HOSPITAL ENCOUNTER (OUTPATIENT)
Dept: OCCUPATIONAL THERAPY | Age: 66
Setting detail: THERAPIES SERIES
Discharge: HOME | End: 2021-08-05
Payer: MEDICARE

## 2021-08-05 PROCEDURE — 97530 THERAPEUTIC ACTIVITIES: CPT

## 2021-08-05 PROCEDURE — 97140 MANUAL THERAPY 1/> REGIONS: CPT

## 2021-08-05 NOTE — PROGRESS NOTES
Occupational Therapy  Daily Treatment Note  Date: 2021  Patient Name: Cynthia Garcia  :  1955  MRN: [id number]       Subjective Pt was here after transferring from previous therapist schedule. OT Visit Information  Progress Note Counter:   Pre Treatment Pain Screening  Pain at present: 6  Scale Used: Numeric Score  Intervention List: Patient able to continue with treatment  Comments / Details: Pt tried to sleep in bed and pain ins increased today   Treatment Activities:  Chart was review and Pt has had 2 falls since starting therapy  One reported 21 and 1 reported 21. Pt reports that these were \"accidents\" Reminded Pt that when she falls that she is essentially displacing her whole lower back and hips thus perpetuating her LBP. She reported that her trigger point injections \"didn't do much\"  Pt continues to have ongoing life stressors including not liking the home she lives in presently while they consider building a new home. Pt's son will be getting   In November but is presently living with his parents which is also increasing stress levels. Discussed at length the effect of long term stress and inflammation as Pt has RA and has had systemic involvement. Reviewed use of relaxation and sleep APPS. To assist her in sleep hygiene. Pt reports she has tried numerous mattresses including mobility beds and cannot tolerate them for more than 2-3 hours. . Pt continues to sleep in recliner which she has done for 1+years. Pt reports stretches no longer reduce her pain or assist her mobility.    Pt did receive MFR today with multiple attempts to perform releases listed below:  Lower extremity: right and bilateral  leg pull , transverse plane: R and L fareed and deep release bilateral  quadriceps  and bilateral  Internal/external hip release   Lumbar/Pelvis/Sacral: deep release of lumbo/sacral area, bilateral  piriformis release , bilateral  quadratus lumborum , pelvic floor transverse plane and bilateral  psoas release    Pt has minimal tolerance of supine and this appears to have not improved from previous therapy encounter earlier this year. Pt was facilitated with rebounding as Pt was extremely guarded and \"stiff\" and traditional attempts at techniques were unsuccessful. Pt responded initially to rebounding but guarding and hold patterns returned blocking all attempt to increase mobility again decreased. Pt was instructed in self rebounding in attempts to increase low back and pelvic mobility.              Assessment Pt is not tolerant of direct manual treatment this date     Post Treatment Pain Screening  Pain at present: 6         Plan will complete current tx plan and determine if extending tx plan is appropriate               Goals As per POC       Therapy Time   Individual Concurrent Group Co-treatment   Time In  1100         Time Out  1200         Minutes  60              Electronically signed by Tigist Santillan OTR/L on 8/5/2021 at [address] [place] [address] Trevin OTR/L

## 2021-08-06 ENCOUNTER — APPOINTMENT (OUTPATIENT)
Dept: OCCUPATIONAL THERAPY | Age: 66
End: 2021-08-06
Payer: MEDICARE

## 2021-08-06 DIAGNOSIS — M46.46 LUMBAR DISCITIS: ICD-10-CM

## 2021-08-06 DIAGNOSIS — M15.9 PRIMARY OSTEOARTHRITIS INVOLVING MULTIPLE JOINTS: ICD-10-CM

## 2021-08-06 DIAGNOSIS — M48.062 SPINAL STENOSIS OF LUMBAR REGION WITH NEUROGENIC CLAUDICATION: ICD-10-CM

## 2021-08-06 DIAGNOSIS — Z79.899 HIGH RISK MEDICATION USE: ICD-10-CM

## 2021-08-06 RX ORDER — TOPIRAMATE 25 MG/1
TABLET ORAL
Qty: 60 TABLET | Refills: 1 | Status: SHIPPED | OUTPATIENT
Start: 2021-08-06 | End: 2021-10-15

## 2021-08-10 ENCOUNTER — HOSPITAL ENCOUNTER (OUTPATIENT)
Dept: OCCUPATIONAL THERAPY | Age: 66
Setting detail: THERAPIES SERIES
Discharge: HOME | End: 2021-08-10
Payer: MEDICARE

## 2021-08-10 PROCEDURE — 97530 THERAPEUTIC ACTIVITIES: CPT

## 2021-08-10 PROCEDURE — 97140 MANUAL THERAPY 1/> REGIONS: CPT

## 2021-08-10 NOTE — PROGRESS NOTES
Occupational Therapy  Daily Treatment Note  Date: 8/10/2021  Patient Name: Preez Del Rosario  :  1955  MRN: [id number]       Subjective Pt reports that she is \"walking on her arches\" But podiatrist has not ever made orthotics  OT Visit Information  Progress Note Counter:   Pre Treatment Pain Screening  Pain at present: 4  Scale Used: Numeric Score  Comments / Details: Pt reports ongoing nausea and painful arches ongoing   Treatment Activities:  Recommended Pt consult her podiatrist regarding orthotics vs Burning callouses from the bottom of her feet causing pain and inflammation as they shed over a 2-3 week period. Pt stated that she believes that the rebounding to reduce postural holding pattern  And \"made me looser\" and made her more aware of the fact that she was indeed holding and guarding her body. Pt was treated with positional and direct releases as we reviewed her ADD stretches for facilitation of lumbopelvic disassociation and reduce posterior hip and back pain. Followed with both quick and slow rebounding focusing on her lower trunk and pelvis in supine, and side lying to the L and R . Pt was then instructed in self tx using the approach and these positions in addition to the self rebounding she started in standing following last session. Pt spoke though  out session of her gastric issues and that due to kidney and liver issues she is on a \"bland diet\" and can take recommended protonics for a very limited time due to her decreased kidney function. Reviewed with basic education of the effect of RA and her full body inflammation and how that can affect her organ systems. Encouraged Pt to discuss all of her inflammatory symptoms with her new GI doctor. Reviewed with Pt  Her intermittent progress and agreed to add 4 visits to her current POC.  Focus will be on achieving awareness and supplying tools  To reduce extensive and long term postural holding patterns as these as preventing the full success of any direct tx methods. Pt in agreement to extend by 4 visits only. Assessment Pt reports feeling \"sore but looser\" following session.  Pt voiced understanding of additions to HEP     Post Treatment Pain Screening  Pain at present: 3         Plan  Add 4 visits to tx plan following completion of last visit                    Goals As per POC       Therapy Time   Individual Concurrent Group Co-treatment   Time In  900         Time Out  1000         Minutes  60           Electronically signed by Olive Robbins OTR/L on 8/10/2021 at 11:36 AM       Olive Robbins OTR/L

## 2021-08-12 ENCOUNTER — HOSPITAL ENCOUNTER (OUTPATIENT)
Dept: OCCUPATIONAL THERAPY | Age: 66
Setting detail: THERAPIES SERIES
Discharge: HOME | End: 2021-08-12
Payer: MEDICARE

## 2021-08-12 DIAGNOSIS — M54.2 CERVICALGIA: ICD-10-CM

## 2021-08-12 DIAGNOSIS — M54.42 CHRONIC BILATERAL LOW BACK PAIN WITH BILATERAL SCIATICA: ICD-10-CM

## 2021-08-12 DIAGNOSIS — M54.41 CHRONIC BILATERAL LOW BACK PAIN WITH BILATERAL SCIATICA: ICD-10-CM

## 2021-08-12 DIAGNOSIS — Z79.899 HIGH RISK MEDICATION USE: ICD-10-CM

## 2021-08-12 DIAGNOSIS — G89.29 CHRONIC BILATERAL LOW BACK PAIN WITH BILATERAL SCIATICA: ICD-10-CM

## 2021-08-12 DIAGNOSIS — M05.79 RHEUMATOID ARTHRITIS INVOLVING MULTIPLE SITES WITH POSITIVE RHEUMATOID FACTOR (HCC): ICD-10-CM

## 2021-08-12 PROCEDURE — 97140 MANUAL THERAPY 1/> REGIONS: CPT

## 2021-08-12 PROCEDURE — 97530 THERAPEUTIC ACTIVITIES: CPT

## 2021-08-12 RX ORDER — OXYCODONE AND ACETAMINOPHEN 7.5; 325 MG/1; MG/1
1 TABLET ORAL EVERY 6 HOURS PRN
Qty: 70 TABLET | Refills: 0 | Status: SHIPPED | OUTPATIENT
Start: 2021-08-12 | End: 2021-09-08 | Stop reason: SDUPTHER

## 2021-08-24 ENCOUNTER — PROCEDURE VISIT (OUTPATIENT)
Dept: PHYSICAL MEDICINE AND REHAB | Age: 66
End: 2021-08-24
Payer: MEDICARE

## 2021-08-24 ENCOUNTER — HOSPITAL ENCOUNTER (OUTPATIENT)
Dept: OCCUPATIONAL THERAPY | Age: 66
Setting detail: THERAPIES SERIES
Discharge: HOME | End: 2021-08-24
Payer: MEDICARE

## 2021-08-24 DIAGNOSIS — M79.10 MYALGIA: ICD-10-CM

## 2021-08-24 PROCEDURE — 97140 MANUAL THERAPY 1/> REGIONS: CPT

## 2021-08-24 PROCEDURE — 96372 THER/PROPH/DIAG INJ SC/IM: CPT | Performed by: PHYSICAL MEDICINE & REHABILITATION

## 2021-08-24 PROCEDURE — 97530 THERAPEUTIC ACTIVITIES: CPT

## 2021-08-24 PROCEDURE — 20553 NJX 1/MLT TRIGGER POINTS 3/>: CPT | Performed by: PHYSICAL MEDICINE & REHABILITATION

## 2021-08-24 RX ORDER — CYANOCOBALAMIN 1000 UG/ML
1000 INJECTION INTRAMUSCULAR; SUBCUTANEOUS ONCE
Status: COMPLETED | OUTPATIENT
Start: 2021-08-24 | End: 2021-08-24

## 2021-08-24 RX ORDER — LIDOCAINE HYDROCHLORIDE 10 MG/ML
23 INJECTION, SOLUTION INFILTRATION; PERINEURAL ONCE
Status: COMPLETED | OUTPATIENT
Start: 2021-08-24 | End: 2021-08-24

## 2021-08-24 RX ADMIN — CYANOCOBALAMIN 1000 MCG: 1000 INJECTION INTRAMUSCULAR; SUBCUTANEOUS at 11:31

## 2021-08-24 RX ADMIN — LIDOCAINE HYDROCHLORIDE 23 ML: 10 INJECTION, SOLUTION INFILTRATION; PERINEURAL at 11:32

## 2021-08-24 NOTE — PROGRESS NOTES
Occupational Therapy  Daily Treatment Note  Date: 2021  Patient Name: Megan Patterson  :  1955  MRN: [id number]       Subjective Pt reports she is seeing a new GI doctor and the  Location of dysfunctional gut system and current pain processes  OT Visit Information  Progress Note Counter:  extended visit  Pre Treatment Pain Screening  Pain at present: 5  Scale Used: Numeric Score  Intervention List: Patient able to continue with treatment   Treatment Activities:  Pt continues to present with a very complex medical picture. Pt has extensive GI and infectious disease processes that require multiple medications which interfere with her ability to eat. Pt reports that she was \"sick for 4 days\" last week and location of her pain during that time and now also correlate with the organ systems involved as Pt reported lateral LBP on L and R into B posterior hips. Pt was treated with MFR and there was problem solving with energy conservation reminders for daily activities including consistent use of shower chair due to \"I just can't stand that long without pain\"  Lower extremity: left  leg pull  and transverse plane: B fareed  Lumbar/Pelvis/Sacral: deep release of lumbo/sacral area, bilateral  piriformis release , bilateral  quadratus lumborum , pelvic floor transverse plane and bilateral  psoas release    Followed with trunk rotation bilaterally and self directed mobility               Assessment Pt tolerated tx well.  Pt noted she was standing more upright post tx     Post Treatment Pain Screening  Pain at present: 4         Plan complete remaining 3 visits                              Goals AS per POC       Therapy Time   Individual Concurrent Group Co-treatment   Time In  903         Time Out  1003         Minutes  60            Electronically signed by MYRA Wagner on 2021 at 3:29 PM       MYRA Wagner

## 2021-08-26 ENCOUNTER — HOSPITAL ENCOUNTER (OUTPATIENT)
Dept: OCCUPATIONAL THERAPY | Age: 66
Setting detail: THERAPIES SERIES
Discharge: HOME | End: 2021-08-26
Payer: MEDICARE

## 2021-08-26 PROCEDURE — 97140 MANUAL THERAPY 1/> REGIONS: CPT

## 2021-08-26 PROCEDURE — 97530 THERAPEUTIC ACTIVITIES: CPT

## 2021-08-26 NOTE — PROGRESS NOTES
Occupational Therapy  Daily Treatment Note  Date: 2021  Patient Name: Mariola Vinson  :  1955  MRN: [id number]       Subjective Pt reports using shower chair successfully  And feels better today  OT Visit Information  Progress Note Counter: 2/4 of extended POC  Pre Treatment Pain Screening  Pain at present: 4  Scale Used: Numeric Score  Intervention List: Patient able to continue with treatment   Treatment Activities:     Pt reports she got 5 hours of sleep last night and feels good about it today. Pt also had her injections 2 days ago. Pt was treated with MFR as noted below and further recommendations were given for additional ECWS. Pt has been given recommendations in the past but today states \"im actually going to start doing this struff\"  Lower extremity: bilateral  leg pull , transverse plane: L and R fareed and deep release bilateral  quadriceps , bilateral  hamstrings , bilateral  IT band  and bilateral  Internal/external hip release   Lumbar/Pelvis/Sacral: deep release of lumbo/sacral area, bilateral  piriformis release , bilateral  quadratus lumborum , pelvic floor transverse plane and bilateral  psoas release          Pt was able to perform elongation activity of L and R LE with verbal cues for timing following direct MFR               Assessment Pt stood easily and remarked that she felt \"pretty good\" Pt noted to have improved affect following tx in the last 2 visits and more effcient gait pattern with more upright stance. Post Treatment Pain Screening  Pain at present: 3         Plan Complete remaining 2 extension visits and D/C as Pt has many medical concerns and testing that she will need time and space to implement. Pt in agreement with D/C following  Completion of the remaining 2 visits.           Goals As per POC       Therapy Time   Individual Concurrent Group Co-treatment   Time In  1000         Time Out  1100         Minutes  60              Electronically signed by Uri Shaikh OTR/L on 8/26/2021 at 2:15 PM    Denzel Cancer, OTR/L

## 2021-08-31 ENCOUNTER — HOSPITAL ENCOUNTER (OUTPATIENT)
Dept: OCCUPATIONAL THERAPY | Age: 66
Setting detail: THERAPIES SERIES
Discharge: HOME | End: 2021-08-31
Payer: MEDICARE

## 2021-08-31 NOTE — PROGRESS NOTES
Therapy                            Cancellation/No-show Note    Date: 2021  Patient Name: Dominga Wilburn    : 1955  (89 y.o.)     MRN: [id number]    Account #: [de-identified]            Comments: For today's appointment patient:  [x]  Cancelled  []  Rescheduled appointment  []  No-show   []  Called pt to remind of next appointment     Reason given by patient:  []  Patient ill  []  Conflicting appointment  []  No transportation    []  Conflict with work  []  No reason given  [x]  Other:  Therapist out this date    [x] Pt has future appointments scheduled, no follow up needed  [] Pt requests to be on hold.     Reason:   If > 2 weeks please discuss with therapist.  [] Therapist to call pt for follow up     Signature: MYRA Molina 2021 11:34 AM

## 2021-09-02 ENCOUNTER — APPOINTMENT (OUTPATIENT)
Dept: OCCUPATIONAL THERAPY | Age: 66
End: 2021-09-02
Payer: MEDICARE

## 2021-09-08 DIAGNOSIS — M05.79 RHEUMATOID ARTHRITIS INVOLVING MULTIPLE SITES WITH POSITIVE RHEUMATOID FACTOR (HCC): ICD-10-CM

## 2021-09-08 DIAGNOSIS — G89.29 CHRONIC BILATERAL LOW BACK PAIN WITH BILATERAL SCIATICA: ICD-10-CM

## 2021-09-08 DIAGNOSIS — M54.42 CHRONIC BILATERAL LOW BACK PAIN WITH BILATERAL SCIATICA: ICD-10-CM

## 2021-09-08 DIAGNOSIS — Z79.899 HIGH RISK MEDICATION USE: ICD-10-CM

## 2021-09-08 DIAGNOSIS — M54.2 CERVICALGIA: ICD-10-CM

## 2021-09-08 DIAGNOSIS — M54.41 CHRONIC BILATERAL LOW BACK PAIN WITH BILATERAL SCIATICA: ICD-10-CM

## 2021-09-08 RX ORDER — OXYCODONE AND ACETAMINOPHEN 7.5; 325 MG/1; MG/1
1 TABLET ORAL EVERY 6 HOURS PRN
Qty: 70 TABLET | Refills: 0 | Status: SHIPPED | OUTPATIENT
Start: 2021-09-10 | End: 2021-10-12 | Stop reason: SDUPTHER

## 2021-09-09 ENCOUNTER — APPOINTMENT (OUTPATIENT)
Dept: OCCUPATIONAL THERAPY | Age: 66
End: 2021-09-09
Payer: MEDICARE

## 2021-09-15 ENCOUNTER — HOSPITAL ENCOUNTER (OUTPATIENT)
Dept: OCCUPATIONAL THERAPY | Age: 66
Setting detail: THERAPIES SERIES
Discharge: HOME | End: 2021-09-15
Payer: MEDICARE

## 2021-09-15 ENCOUNTER — OFFICE VISIT (OUTPATIENT)
Dept: PHYSICAL MEDICINE AND REHAB | Age: 66
End: 2021-09-15
Payer: MEDICARE

## 2021-09-15 VITALS
HEIGHT: 68 IN | DIASTOLIC BLOOD PRESSURE: 98 MMHG | BODY MASS INDEX: 34.25 KG/M2 | WEIGHT: 226 LBS | SYSTOLIC BLOOD PRESSURE: 178 MMHG

## 2021-09-15 DIAGNOSIS — M54.2 CERVICALGIA: ICD-10-CM

## 2021-09-15 DIAGNOSIS — M54.42 CHRONIC BILATERAL LOW BACK PAIN WITH BILATERAL SCIATICA: Primary | ICD-10-CM

## 2021-09-15 DIAGNOSIS — F41.9 ANXIETY: Primary | ICD-10-CM

## 2021-09-15 DIAGNOSIS — M54.41 CHRONIC BILATERAL LOW BACK PAIN WITH BILATERAL SCIATICA: Primary | ICD-10-CM

## 2021-09-15 DIAGNOSIS — R76.8 ANA POSITIVE: ICD-10-CM

## 2021-09-15 DIAGNOSIS — M54.17 LUMBOSACRAL RADICULITIS: ICD-10-CM

## 2021-09-15 DIAGNOSIS — M48.062 SPINAL STENOSIS OF LUMBAR REGION WITH NEUROGENIC CLAUDICATION: ICD-10-CM

## 2021-09-15 DIAGNOSIS — R26.9 GAIT ABNORMALITY: ICD-10-CM

## 2021-09-15 DIAGNOSIS — G89.29 CHRONIC BILATERAL LOW BACK PAIN WITH BILATERAL SCIATICA: Primary | ICD-10-CM

## 2021-09-15 DIAGNOSIS — M79.10 MYALGIA: ICD-10-CM

## 2021-09-15 DIAGNOSIS — M05.79 RHEUMATOID ARTHRITIS INVOLVING MULTIPLE SITES WITH POSITIVE RHEUMATOID FACTOR (HCC): ICD-10-CM

## 2021-09-15 DIAGNOSIS — Z79.899 HIGH RISK MEDICATION USE: ICD-10-CM

## 2021-09-15 PROCEDURE — 97140 MANUAL THERAPY 1/> REGIONS: CPT

## 2021-09-15 PROCEDURE — 1123F ACP DISCUSS/DSCN MKR DOCD: CPT | Performed by: PHYSICAL MEDICINE & REHABILITATION

## 2021-09-15 PROCEDURE — G8399 PT W/DXA RESULTS DOCUMENT: HCPCS | Performed by: PHYSICAL MEDICINE & REHABILITATION

## 2021-09-15 PROCEDURE — G8417 CALC BMI ABV UP PARAM F/U: HCPCS | Performed by: PHYSICAL MEDICINE & REHABILITATION

## 2021-09-15 PROCEDURE — 99214 OFFICE O/P EST MOD 30 MIN: CPT | Performed by: PHYSICAL MEDICINE & REHABILITATION

## 2021-09-15 PROCEDURE — 1090F PRES/ABSN URINE INCON ASSESS: CPT | Performed by: PHYSICAL MEDICINE & REHABILITATION

## 2021-09-15 PROCEDURE — G8427 DOCREV CUR MEDS BY ELIG CLIN: HCPCS | Performed by: PHYSICAL MEDICINE & REHABILITATION

## 2021-09-15 PROCEDURE — 3017F COLORECTAL CA SCREEN DOC REV: CPT | Performed by: PHYSICAL MEDICINE & REHABILITATION

## 2021-09-15 PROCEDURE — 4040F PNEUMOC VAC/ADMIN/RCVD: CPT | Performed by: PHYSICAL MEDICINE & REHABILITATION

## 2021-09-15 PROCEDURE — 97530 THERAPEUTIC ACTIVITIES: CPT

## 2021-09-15 PROCEDURE — 1036F TOBACCO NON-USER: CPT | Performed by: PHYSICAL MEDICINE & REHABILITATION

## 2021-09-15 RX ORDER — DIAZEPAM 5 MG/1
TABLET ORAL
Qty: 4 TABLET | Refills: 0 | Status: SHIPPED | OUTPATIENT
Start: 2021-10-08 | End: 2021-10-13

## 2021-09-15 ASSESSMENT — ENCOUNTER SYMPTOMS
NAUSEA: 0
COUGH: 0
PHOTOPHOBIA: 1
ABDOMINAL PAIN: 1
WHEEZING: 0
DIARRHEA: 0
BACK PAIN: 1
SORE THROAT: 0
CONSTIPATION: 1
SHORTNESS OF BREATH: 1
EYE REDNESS: 0
EYE PAIN: 0
VOMITING: 0
STRIDOR: 0
BLOOD IN STOOL: 0

## 2021-09-15 NOTE — PROGRESS NOTES
Subjective  Virgen Abad, 74 y.o. female presents today with:     Back Pain (Trigger point injections 7/27/21 & 8/24/21 with % reduction in pain lasting )   Neck Pain   Shoulder Pain (Right)   Hip Pain (Right)   Leg Pain (Right. Right sciatic 7/6/21 with % reduction in pain lasting)   Foot Pain (Right)   Medication Refill (Percocet, Gabapentin, Baclofen, Zinc, Vit D refill & pill count today)       She is still doing well with her current medications and shows no signs of abuse or overuse. She is more functional on it. Does not need Neurontin refills. She is to avoid any NSAIDs but she will continue gabapentin as tolerated baclofen vitamin D and zinc.  She is requesting monthly trigger point -sciatics as needed. Cardiac meds adjusted recently H made a big difference. Recent sciatic block did not help very much but she would like to try it again because she seem to be over doing it physically when she had it done and did not think it was given a fair chance. She is due for a tox screen she can get it done anytime over the next 2 months. Back Pain  This is a chronic problem. The current episode started more than 1 year ago. The problem occurs daily. The problem is unchanged. The pain is present in the lumbar spine and gluteal. The quality of the pain is described as aching. Radiates to: bilateral legs, left leg worse, left hip. The pain is at a severity of 5/10. The pain is moderate. The pain is worse during the day. The symptoms are aggravated by bending, standing, position, sitting and twisting (Walking). Stiffness is present in the morning. Associated symptoms include abdominal pain, leg pain and pelvic pain. Pertinent negatives include no bladder incontinence, chest pain, dysuria, fever, headaches, numbness, paresis, perianal numbness or weakness. Risk factors include obesity, poor posture, menopause, lack of exercise, sedentary lifestyle and history of steroid use.  She has tried analgesics, walking, home exercises, heat, muscle relaxant, NSAIDs, ice and bed rest (Sciatica block, trigger point injections, mobic, Gabapentin, Norco) for the symptoms. The treatment provided significant relief. Neck Pain   This is a chronic problem. The problem occurs constantly. The problem has been waxing and waning. The pain is at a severity of 5/10. The pain is mild. The symptoms are aggravated by position and bending. Stiffness is present in the morning. Associated symptoms include leg pain and photophobia. Pertinent negatives include no chest pain, fever, headaches, numbness, paresis or weakness. She has tried heat, acetaminophen, chiropractic manipulation, ice, muscle relaxants, neck support, NSAIDs and oral narcotics for the symptoms. The treatment provided moderate relief. Foot Pain   The pain is present in the neck, back and left shoulder. This is a chronic problem. The current episode started more than 1 year ago. The problem occurs constantly. The problem has been gradually worsening. The pain is at a severity of 7/10. Associated symptoms include joint locking, joint swelling and a limited range of motion. Pertinent negatives include no fever or numbness. The symptoms are aggravated by contact, heat and standing. She has tried cold, NSAIDS, OTC ointments, OTC pain meds, chondroitin, heat, movement, acetaminophen, oral narcotics and rest for the symptoms. The treatment provided moderate relief. Family history includes rheumatoid arthritis. Her past medical history is significant for osteoarthritis. There is no history of diabetes.        Past Medical History:   Diagnosis Date    Abnormal electromyogram (EMG) 8/19/09    SENSORIMOTOR NEUROPATHY OF BLE, RIGHT WORSE THAN LEFT, SUSPICIOUS FOR RIGHT S1 RADIC    Angina pectoris (HCC)     Angina pectoris ([facility] [address] 07/2019    sees Dr. Jacqueline Jeffrey    Ankle pain     Bleeding ulcer     Colon polyp 4/3/2017    Dermatitis     Fibromyalgia     Foot pain     Hyperlipidemia     Hypertension     Infection of intervertebral disc (pyogenic), lumbar region ([facility] [address] 8/13/2018    Low back pain     MRSA (methicillin resistant staph aureus) culture positive 2011    Neck pain     Neuropathy     Obesity     Osteoarthritis     PSVT (paroxysmal supraventricular tachycardia) ([facility] [address]     Shingles outbreak 2012    SI (sacroiliac) pain     Stenosis     Vitamin D deficiency      Past Surgical History:   Procedure Laterality Date    CARDIAC CATHETERIZATION  10/2016    CERVICAL FUSION  12/14/11    Dr Arabella Perez with bone graft and plate    COLONOSCOPY      ENDOSCOPIC ULTRASOUND (LOWER) N/A 7/11/2019    EGD/ EUS performed by Jenaro Ferreira [facility]  7/05    HYSTERECTOMY  2001    HYSTERECTOMY, TOTAL ABDOMINAL      JOINT REPLACEMENT Left     tka    LIVER BIOPSY      MENISCECTOMY  10/31/11    left knee    OTHER SURGICAL HISTORY  10/26/09    CAUDALS BY DR Janice Arzate    ME COLON CA SCRN NOT  [address] [place] [address] N/A 4/12/2017    COLONOSCOPY performed by Caroline Smith MD at . [facility] ESOPHAGOGASTRODUODENOSCOPY TRANSORAL DIAGNOSTIC N/A 4/12/2017    EGD ESOPHAGOGASTRODUODENOSCOPY performed by Caroline Smith MD at [address]  12/2011    Dr Arabella Perez Lumbar and c spine    TOTAL KNEE ARTHROPLASTY  7/30/12    LEFT-DR AVELAR    UPPER GASTROINTESTINAL ENDOSCOPY  1995     Social History     Socioeconomic History    Marital status:      Spouse name: Velasquez Davis Number of children: 2    Years of education: 15    Highest education level: Associate degree: occupational, technical, or vocational program   Occupational History    Occupation: Retired    Tobacco Use    Smoking status: Never Smoker    Smokeless tobacco: Never Used   Vaping Use    Vaping Use: Never used   Substance and Sexual Activity    Alcohol use: No    Drug use: No    Sexual activity: Not Currently   Other Topics Concern    None Social History Narrative    Lives in [place] with her  Kirt Lyle and son Kitty Collado is mildly handicapped --CP right lilia with anxiety and LD. He works at [employer]. Social Determinants of Health     Financial Resource Strain:     Difficulty of Paying Living Expenses:    Food Insecurity:     Worried About Running Out of Food in the Last Year:     [address] in the Last Year:    Transportation Needs:     Lack of Transportation (Medical):  Lack of Transportation (Non-Medical):    Physical Activity:     Days of Exercise per Week:     Minutes of Exercise per Session:    Stress:     Feeling of Stress :    Social Connections:     Frequency of Communication with Friends and Family:     Frequency of Social Gatherings with Friends and Family:     Attends [employer] Services:     Active Member of Clubs or Organizations:     Attends Club or Organization Meetings:     Marital Status:    Intimate Partner Violence:     Fear of Current or Ex-Partner:     Emotionally Abused:     Physically Abused:     Sexually Abused:      Family History   Problem Relation Age of Onset    Heart Disease Father     High Cholesterol Father     High Blood Pressure Father     Stroke Mother     High Blood Pressure Mother     High Blood Pressure Brother        Allergies   Allergen Reactions    Latex Swelling     Swelling of hands with wearing latex gloves    Norvasc [Amlodipine Besylate] Other (See Comments)     swelling    Keflex [Cephalexin] Rash       Review of Systems   Constitutional: Positive for activity change and fatigue. Negative for chills, diaphoresis and fever. HENT: Negative for congestion, ear discharge, ear pain, hearing loss, nosebleeds, sore throat and tinnitus. Eyes: Positive for photophobia. Negative for pain and redness. Respiratory: Positive for shortness of breath. Negative for cough, wheezing and stridor.          Shortness of breath on exertion   Cardiovascular: Negative tenderness present. No tracheal deviation. Cardiovascular:      Pulses: No decreased pulses. Pulmonary:      Effort: Pulmonary effort is normal. No tachypnea, bradypnea, accessory muscle usage or respiratory distress. Breath sounds: No wheezing. Chest:      Chest wall: No tenderness. Abdominal:      General: Bowel sounds are normal. There is no distension. Palpations: Abdomen is soft. There is no mass. Tenderness: There is no abdominal tenderness. There is no guarding or rebound. Comments: Pressure RUQ   Musculoskeletal:         General: Tenderness present. Right shoulder: Tenderness and bony tenderness present. Decreased range of motion. Left shoulder: Tenderness and bony tenderness present. Decreased range of motion. Right upper arm: Normal.      Left upper arm: Normal.      Right elbow: Normal.      Left elbow: Normal.      Right forearm: Normal.      Left forearm: Normal.      Right wrist: Normal.      Left wrist: Normal.      Right hand: Deformity, tenderness and bony tenderness present. Decreased range of motion. Left hand: Deformity, tenderness and bony tenderness present. Decreased range of motion. Arms:       Cervical back: Neck supple. Tenderness and bony tenderness present. No edema or rigidity. Spinous process tenderness and muscular tenderness present. Decreased range of motion. Thoracic back: Tenderness and bony tenderness present. Decreased range of motion. Lumbar back: Tenderness and bony tenderness present. No swelling, edema, deformity or lacerations. Decreased range of motion. Back:       Right hip: Tenderness present. Decreased range of motion. Decreased strength. Left hip: Tenderness present. Decreased range of motion. Decreased strength.       Right upper leg: Normal.      Left upper leg: Normal.      Right knee: Normal.      Left knee: Normal.      Right lower leg: Normal.      Left lower leg: Normal.      Right ankle: Normal.      Right Achilles Tendon: Normal.      Left ankle: Tenderness present over the lateral malleolus and medial malleolus. Decreased range of motion. Left Achilles Tendon: Normal.      Right foot: Decreased range of motion. Tenderness and bony tenderness present. Left foot: Decreased range of motion. Tenderness present. Legs:       Comments: Tender areas are indicated by numbered spot         Lymphadenopathy:      Cervical: No cervical adenopathy. Skin:     General: Skin is warm and dry. Coloration: Skin is not pale. Findings: No abrasion, bruising, ecchymosis, erythema, laceration, petechiae or rash. Rash is not macular, pustular or urticarial.      Nails: There is no clubbing. Neurological:      Mental Status: She is alert and oriented to person, place, and time. Cranial Nerves: No cranial nerve deficit. Sensory: No sensory deficit. Motor: No tremor, atrophy or abnormal muscle tone. Coordination: Coordination normal. Finger-Nose-Finger Test abnormal and Romberg Test abnormal.      Gait: Gait normal.      Deep Tendon Reflexes: Reflexes abnormal. Babinski sign absent on the right side. Babinski sign absent on the left side. Psychiatric:         Attention and Perception: She is attentive. Mood and Affect: Mood is not anxious. Affect is not labile, blunt, angry or inappropriate. Speech: Speech normal.         Behavior: Behavior normal. Behavior is not agitated, slowed, aggressive, withdrawn, hyperactive or combative. Thought Content: Thought content normal. Thought content is not paranoid or delusional. Thought content does not include homicidal or suicidal ideation. Thought content does not include homicidal or suicidal plan. Cognition and Memory: Memory is not impaired. She does not exhibit impaired recent memory or impaired remote memory. Judgment: Judgment normal. Judgment is not impulsive or inappropriate. Ortho Exam  Neurologic Exam     Mental Status   Oriented to person, place, and time. Speech: speech is normal   Level of consciousness: alert  Knowledge: good. Able to name object. Able to read. Cranial Nerves     CN III, IV, VI   Pupils are equal, round, and reactive to light. Motor Exam   Muscle bulk: normal  Overall muscle tone: normal    Gait, Coordination, and Reflexes     Gait  Gait: wide-based    Coordination   Romberg: positive  Finger to nose coordination: abnormal      After a thorough review and discussion of the previous medical records, patient comprehensive medical, surgical, and family and social history, Review of Systems, their OARRS, their Screener and Opioid Assessment for Patients with Pain (SOAPP®-R), recent diagnostics, and symptomatic results to previous treatment, it is my impression that the patients is suffering with progressive and severe:     Diagnosis Orders   1. Chronic bilateral low back pain with bilateral sciatica  MD INJECTION AA&/STRD SCIATIC NERVE    MD NJX DX/THER SBST INTRLMNR LMBR/SAC W/IMG GDN   2. Cervicalgia     3. COLTON positive     4. Rheumatoid arthritis involving multiple sites with positive rheumatoid factor ([facility] [address]     5. High risk medication use - 10/17/17 OARRS PM&R, 12/19/17 OARRS PM&R, 12/20/17 Tox screen positive for opiates, Hydrocodone PM&R, MED CONTRACT 2/2/17     6. Myalgia  MD INJECT TRIGGER POINTS, 3 OR GREATER   7. Spinal stenosis of lumbar region with neurogenic claudication     8. Gait abnormality     9.  Lumbosacral radiculitis  MD INJECTION AA&/STRD SCIATIC NERVE    MD NJX DX/THER SBST INTRLMNR LMBR/SAC W/IMG GDN       I am also concerned by lifestyle and mood issues including:    Past Medical History:   Diagnosis Date    Abnormal electromyogram (EMG) 8/19/09    SENSORIMOTOR NEUROPATHY OF BLE, RIGHT WORSE THAN LEFT, SUSPICIOUS FOR RIGHT S1 RADIC    Angina pectoris (HCC)     Angina pectoris ([facility] [address] 07/2019    sees Dr. Antonette Butts 1955       Age:  64 years       Gender: Female       Order Date: 9/26/2019 10:15 AM.        Exam: MRI LUMBAR SPINE W WO CONTRAST       Number of Views: 044        Indication:  Lumbar discitis; osteomyelitis of lumbar vertebra. Infection of intervertebral disc, pyogenic. Sciatica right side.       Contrast dosage: MultiHance, 15 mL, IV       Comparison: MRI lumbar spine 9/10/2018 and 8/12/2018.       Findings:        16 demonstrates edema. L1 and L2 vertebral bodies. No ligamentous injury identified. Conus medullaris terminates at approximately the L1-L2 motion segment level. Visualized kidneys are unremarkable. Mild cortical thinning.       Sagittal imaging only T11-T12: No evidence of posterior disc contour abnormality, spinal canal stenosis, neural foraminal narrowing or spinal nerve root abutment/impingement. .       T12-L1: No evidence of posterior disc contour abnormality, spinal canal stenosis, neural foraminal narrowing or spinal nerve root abutment/impingement. .       L1-L2: Moderately large circumferential disc bulge with moderate bilateral facet osteoarthropathy. Thecal sac measures approximately 0.83 cm anterior posterior dimension. Moderate to moderately severe left and moderate right neural foraminal narrowing. Exiting spinal nerve is not well evaluated or visualized.       L2-L3: Moderately large circumferential disc bulge with moderate bilateral facet osteoarthropathy. Thecal sac measures approximately 0.78 cm anterior posterior dimension with mild crowding of cauda equina nerve roots. Moderate left and right neural    foraminal narrowing. No definitive abutment or impingement of exiting spinal nerve roots.       L3-L4: Right hemilaminectomy noted. No spinal canal stenosis. Posterior spinal fixation at L3-L4. Moderate left neural foramen are unremarkable without abutment or impingement exiting spinal nerve roots.       L4-L5: Right hemilaminectomy. Posterior spinal fixation L4-L5.  Mild left neural foramen are patent without evidence of abutment or impingement. No spinal canal stenosis.       L5-S1: Moderately large circumferential disc bulge. Moderate bilateral facet osteoarthropathy. Moderate left and right neural foraminal narrowing without abutment or impingement of exiting bilateral nerve roots. No tianna spinal canal stenosis.       Postcontrast imaging demonstrates no evidence of an enhancing mass involving the kidneys. Small left renal cyst. There is enhancement of the L1 vertebral body posteriorly with corresponding hypointense T1 signal, enhancement in the paraspinal soft    tissues, adjacent to the facet joints, and in the right aspect of the epidural space at the level of L1-L2 disc. Enhancement of the L2 vertebral body also noted. Findings have diminished since previous study at the L2 vertebral body but have worsened at    the L1 vertebral body. The L1-L2 intervertebral disc demonstrates faint residual enhancement but is also diminished when compared with the previous exam.           Impression   1. Enhancement at L1 vertebral body posteriorly is noted compared with the previous study concerning for a new foci of osteomyelitis. Enhancement at the L1-L2 intervertebral disc and L2 vertebral body is diminished since previous exam but continues to    remain persistent consistent with improving, yet persistent, osteomyelitis/discitis. Enhancement in the epidural spinal canal is again noted consistent with infection in the epidural space. No discrete fluid collection. 2. Other degenerative changes as detailed above L1-S1, with mild to moderate thecal sac narrowing at L1-L2 and L2-L3, with suspected mild crowding of cauda equina nerve roots at the L2-L3 level. 3. Small left renal cyst     ,     I discussed results with patients. see Follow up plans below  For any new studies. Unique source and Care Everywhere Updates:  prior external notes requested and reviewed.     No new issues noted.      Unique History obtained by caregiver/independent historian-and verified with SOAPPR. Electrodiagnostic:  Previous studies requested,     I discussed results with patient. See follow-up plans for new studies.         Labs:  Previous labs reviewed     Lab Results   Component Value Date     06/04/2021    K 3.7 06/04/2021    K 4.4 08/09/2018    CL 95 06/04/2021    CO2 25 06/04/2021    BUN 25 06/04/2021    CREATININE 1.36 06/04/2021    CALCIUM 9.9 06/04/2021    LABALBU 4.3 02/09/2021    LABALBU 2.8 08/18/2018    BILITOT 0.4 02/09/2021    ALKPHOS 117 02/09/2021    AST 27 02/09/2021    ALT 26 02/09/2021     Lab Results   Component Value Date    WBC 5.5 06/04/2021    RBC 4.41 06/04/2021    RBC 2.81 09/06/2018    HGB 13.7 06/04/2021    HCT 40.1 06/04/2021    MCV 91.1 06/04/2021    MCH 31.0 06/04/2021    MCHC 34.1 06/04/2021    RDW 12.8 06/04/2021     06/04/2021    MPV 7.4 09/13/2018       Lab Results   Component Value Date    LABAMPH Neg 06/30/2021    BARBSCNU Neg 06/30/2021    LABBENZ Neg 06/30/2021    CANSU Neg 06/30/2021    COCAIMETSCRU Neg 06/30/2021    PHENCYCLIDINESCREENURINE Neg 44/77/7841    TRICYCLIC Neg 70/85/1946    DSCOMMENT see below 06/30/2021       Lab Results   Component Value Date    CODEINE Not Detected 12/02/2016    MORPHINE Not Detected 12/02/2016    ACETYLMORPHI Not Detected 12/02/2016    OXYCODONE Not Detected 12/02/2016    NOROXYCODONE Not Detected 12/02/2016    NOROXYMU Not Detected 12/02/2016    HYDRCO Present 12/02/2016    NORHYDU Present 12/02/2016    HYDROMO Not Detected 12/02/2016    Tressa Lias Not Detected 12/02/2016    Byron Christen Not Detected 12/02/2016    FENTA Not Detected 12/02/2016    NORFENT Not Detected 12/02/2016    MEPERIDINE Not Detected 12/02/2016    TAPENU Not Detected 12/02/2016    TAPOSULFUR Not Detected 12/02/2016    METHADONE Not Detected 12/02/2016    LABPROP Not Detected 12/02/2016    TRAM Not Detected 12/02/2016    AMPH Not Detected 12/02/2016 METHAMP Not Detected 12/02/2016    MDMA Not Detected 12/02/2016    ECMDA Not Detected 12/02/2016       Lab Results   Component Value Date    METPHEN Not Detected 12/02/2016    PHENTERMINE Not Detected 12/02/2016    BENZOYL Not Detected 12/02/2016    Mundo Hoffles Not Detected 12/02/2016    ALPHAOHALPRA Not Detected 12/02/2016    CLONAZEPAM Not Detected 12/02/2016    Yessi Weeks Not Detected 12/02/2016    DIAZEP Not Detected 12/02/2016    ROMAIN Not Detected 12/02/2016    OXAZ Not Detected 12/02/2016    Huattila Betters Not Detected 12/02/2016    LORAZEPAM Not Detected 12/02/2016    MIDAZOLAM Not Detected 12/02/2016    ZOLPIDEM Not Detected 12/02/2016    NAI Not Detected 12/02/2016    ETG Not Detected 12/02/2016    MARIJMET Not Detected 12/02/2016    PCP Not Detected 12/02/2016    PAINMGTDRUGP See Below 12/02/2016    EERPAINMGTPA See Note 12/02/2016    LABCREA 129.7 08/09/2018         , I discussed results with patient. See follow-up plans for new studies. Therapies:  HEP-gentle stretching and relaxation techniques-demonstrated with patient-they are to do them twice a day. They are also advised to make the following lifestyle changes:  Goals      Exercise 3x per week (30 min per time)      SOAPP-R GOAL LESS THAN 9      09/02/16 SCORE: 8 LOW RISK   02/02/17 score 7-low risk   04/03/17 score: 6-low risk  06/05/17 score: 7-low risk  08/07/17 score: 8-low risk  10/06/17 score: 6-low risk  12/20/17 score: 5-low risk  02/18/18 score: 6-low risk  0425/2018 score 5-low risk  7/11/18 score: 3 low- risk  10/11/18 score:4- low risk  2/28/19 score: 2: low risk  5/13/19 score: 2- low risk  7/17/19 score: 1- low risk   9/13/19 score: 2- low risk  11/21/19 score: 3- low risk   1/22/20 score: 2- low risk   6- score 3 - low risk   6/23/21 score: 7- low risk  9/15/21 score: 4- low risk       Weight < 150 lb (68.04 kg)           Injections or Epidurals:  Injection options were discussed.   After a complete review of the medical record,OARRS, a comprehensive physical exam, and discussion with the patient I have determined that the patient would benefit from a series of 2 caudal epidural steroid injections using a C-arm and contrast as appropriate. The risks and benefits were thoroughly explained to the patient orally and in writing. Pt gave verbal and writen consent. The patient was given the option of taking PO Valium 5-10 mg prior to the procedure. They were told that if they chose to take the Valium they should not drive while under it's affects. The patient is to call us as needed and the day following each procedure to report any concerns or problems. They will follow up with me approximately one month after the last block to evaluate the success of the injections and discuss need for further treatment. I am hoping to improve their low back pain by 60-80% for at least 6 months, improve their overall function, and minimize their reliance on oral medications. Patient gave verbal consent to ordered injections. See follow-up plans for planned injections. Supplements:  Vitamin D with increased dosing during the rainy months,   Education was given on:   Dietary and Fitness--daily stretches and low carb diet-in chair Yoga when possible             Follow up with Primary Care Physician regarding their general medical needs. Neuro re essential tremors. --Dr Jassi Cannon. Stressed the importance of following up with PCP and specialists for his/her chronic diseases, health, CV, and cancer screening and continued care. Will follow disease activity/progression and adjust therapeutic regimen to disease activity and severity. Discussed medication dosage, usage, goals of therapy, and side effects. Available test results were reviewed -Discussed findings, impression and plan with patient. An additional 6 minutes were spent outside of the patient visit to review records.   Additional time spent with the patient to discuss their questions. Additional time spent with the patient devoted to discussing treatment strategy, planning, and implementation. Patient understands above plan; questions asked and answered. Patient agrees to plan as noted above. At least 50% of the visit was involved in the discussion of the options for treatment. We discussed exercises, medication, interventional therapies and surgery. Healthy life style is essential with patient hard work to achieve the wellness. In addition; discussion with the patient and/or family about patient's functional status any of the diagnostic results, impressions and/or recommended diagnostic studies, prognosis, risks and benefits of treatment options, instructions for treatment and/or follow-up, importance of compliance with chosen treatment options, risk-factor reduction, and patient/family education. They are to follow up in 2 1/2 -3 months to review medication, efficacy of injections, pill counts, OARRS check, high risk med review re intensive monitoring for toxicity and addiction, SOAPPR assessment, review diagnostics, to review previous and future treatment plans and assess appropriateness for continued therapy.         New Diagnostics  Orders Placed This Encounter   Procedures    MA INJECTION AA&/STRD SCIATIC NERVE    MA INJECT TRIGGER POINTS, 3 OR GREATER    MA NJX DX/THER SBST INTRLMNR LMBR/SAC W/IMG GDN       Rosaline Peterson,

## 2021-09-15 NOTE — PROGRESS NOTES
Occupational Therapy  Daily Treatment Note  Date: 2021  Patient Name: Krystle Casper  :  1955  MRN: [id number]       Subjective Pt has had colonoscopy and endoscopy which did not reveal a diagnosis Pt was treated directly   OT Visit Information  Progress Note Counter: 3/  Pre Treatment Pain Screening  Pain at present: 4  Scale Used: Numeric Score  Intervention List: Patient able to continue with treatment   Treatment Activities:  Pt continues to have Burning pain of the GI track and is exploring D/C of Doxycycline as Her GI testing is [facility]. . Pt saw Dr Trina Polo today and they plan to do caudal spinal injections . Pt states she has had these before and were successful. Discussed the effects of stress and inflammation on the body with all her conditions are all inflammatory based. Pt was treated directly to address ongoing L hip and piriformis pain. Pt was treated with  Rebounding in side lying and the following releases:Lower extremity: right leg pull , transverse plane: R fareed and deep release right quadriceps , right IT band  and right Internal/external hip release   Lumbar/Pelvis/Sacral: right piriformis release , right quadratus lumborum  and right psoas release   R LE active elongation             Followed releases with neuromuscular re-education and active R LE elongation with facilitation of therapist and when Pt could repeat demonstrate Pt was instructed to include this as part of her HEP. Elongation to be repeated 5-7 times up to 30 seconds each rep. Pt states understanding                    Assessment  Pt has not been seen since 21 and her pain levels have not increased nor decreased. Pt's recent and tragic loss of her dog is certainly contributing to her feelings of depression and anxiety. Pt reports she has started an anti depressent. Reviewed with Pt all daily circumstances that can contribute to inflammation and how this affects her current level of pain.  Also reviewed non medical strategies that can assist with reduction of pain including restorative yoga, and use of stationary bike  To address needs for safe movement practice . Post Treatment Pain Screening  Pain at present: 4         Plan Complete current POC and D/C as Pt will be receiveing ongoing injections and she has a very extensive HEP , a long list of daily activity modifications, Pain management strategies. Pt will have completed 12 sessions of OT starting in July of this year. Pt also had an extensive series of tx ending in December of 2020. Pt has been given extensive on line and direct HEP activities and strategies with no further recommendations available.                                           GoalsFinish and complete POC and D/C       Therapy Time   Individual Concurrent Group Co-treatment   Time In  1500         Time Out  1600         Minutes  60              Electronically signed by Aldona Hamman, OTR/L on 9/16/2021 at 11:04 AM    Aldona Hamman, OTR/KELSI

## 2021-09-16 ENCOUNTER — TELEPHONE (OUTPATIENT)
Dept: INFECTIOUS DISEASES | Age: 66
End: 2021-09-16

## 2021-09-16 NOTE — TELEPHONE ENCOUNTER
Patient states she is seeing a GI Specialist at [facility] due to ongoing stomach problems. Wants to consult Dr Michael Sandoval about ongoing suppresive Abx tx. Provided an appt to discuss with Dr Michael Sandoval. Patient is well known and established patient with this ID Office.

## 2021-09-17 ENCOUNTER — HOSPITAL ENCOUNTER (OUTPATIENT)
Dept: OCCUPATIONAL THERAPY | Age: 66
Setting detail: THERAPIES SERIES
Discharge: HOME | End: 2021-09-17
Payer: MEDICARE

## 2021-09-17 PROCEDURE — 97530 THERAPEUTIC ACTIVITIES: CPT

## 2021-09-17 PROCEDURE — 97140 MANUAL THERAPY 1/> REGIONS: CPT

## 2021-09-17 ASSESSMENT — PAIN SCALES - QUEBEC BACK PAIN DISABILITY SCALE
CARRY TWO BAGS OF GROCERIES: 3
CLIMB ONE FLIGHT OF STAIRS: 2
REACH UP TO HIGH SHELVES: 2
PUT ON SOCKS OR PANYHOSE: 2
BEND OVER TO CLEAN THE BATHTUB: 1
MAKE YOUR BED: 1
PULL OR PUSH HEAVY DOORS: 1
QUEBEC DISABILITY INDEX: 40-59%
SLEEP THROUGH THE NIGHT: 3
MOVE A CHAIR: 1
STAND UP FOR 20 TO 30 MINUTES: 2
LIFT AND CARRY A HEAVY SUITCASE: 5
WALK SEVERAL KILOMETERS  OR MILES: 5
TURN OVER IN BED: 2
RIDE IN A CAR: 1
THROW A BALL: 1
SIT IN A CHAIR FOR SEVERAL HOURS: 2
TOTAL SCORE: 46
QUEBEC CMS MODIFIER: CK
GET OUT OF BED: 2
TAKE FOOD OUT OF THE REFRIGERATOR: 2
RUN ONE BLOCK OR 100M: 5
WALK A FEW BLOCKS OR 300 TO 400M: 3

## 2021-09-17 NOTE — PROGRESS NOTES
Somewhat difficult  Make your bed: Minimally difficult  Put on socks (pantyhose):  Somewhat difficult  Bend over to clean the bathtub: Minimally difficult  Move a chair: Minimally difficult  Pull or push heavy doors: Minimally difficult  Carry two bags of groceries: Fairly difficult  Lift and carry a heavy suitcase: Unable to do  Jayden Total Score: 46  [place] Disability Index: 40-59%  Jayden CMS Modifier: CK  Post Treatment Pain Screening  Pain at present: 3         Evaluation score was 54 today 46      Plan    D/C as Pt has met all goals from evaluation with 4 visit extension       Goals No further goals at this time       Therapy Time   Individual Concurrent Group Co-treatment   Time In  1405         Time Out  1505         Minutes  60           Electronically signed by Uri Shaikh OTR/L on 9/17/2021 at [address], OTR/L

## 2021-09-21 ENCOUNTER — OFFICE VISIT (OUTPATIENT)
Dept: FAMILY MEDICINE CLINIC | Age: 66
End: 2021-09-21
Payer: MEDICARE

## 2021-09-21 VITALS
HEART RATE: 81 BPM | TEMPERATURE: 98.7 F | OXYGEN SATURATION: 98 % | RESPIRATION RATE: 12 BRPM | BODY MASS INDEX: 32.43 KG/M2 | DIASTOLIC BLOOD PRESSURE: 80 MMHG | WEIGHT: 214 LBS | SYSTOLIC BLOOD PRESSURE: 142 MMHG | HEIGHT: 68 IN

## 2021-09-21 DIAGNOSIS — I10 BENIGN ESSENTIAL HYPERTENSION: Primary | ICD-10-CM

## 2021-09-21 DIAGNOSIS — D23.30 BENIGN NEOPLASM OF SKIN OF FACE: ICD-10-CM

## 2021-09-21 DIAGNOSIS — E78.2 MIXED HYPERLIPIDEMIA: ICD-10-CM

## 2021-09-21 DIAGNOSIS — Z12.31 SCREENING MAMMOGRAM, ENCOUNTER FOR: ICD-10-CM

## 2021-09-21 DIAGNOSIS — Z23 NEED FOR INFLUENZA VACCINATION: ICD-10-CM

## 2021-09-21 PROCEDURE — 1123F ACP DISCUSS/DSCN MKR DOCD: CPT | Performed by: PHYSICIAN ASSISTANT

## 2021-09-21 PROCEDURE — G0008 ADMIN INFLUENZA VIRUS VAC: HCPCS | Performed by: PHYSICIAN ASSISTANT

## 2021-09-21 PROCEDURE — 3017F COLORECTAL CA SCREEN DOC REV: CPT | Performed by: PHYSICIAN ASSISTANT

## 2021-09-21 PROCEDURE — 4040F PNEUMOC VAC/ADMIN/RCVD: CPT | Performed by: PHYSICIAN ASSISTANT

## 2021-09-21 PROCEDURE — 90694 VACC AIIV4 NO PRSRV 0.5ML IM: CPT | Performed by: PHYSICIAN ASSISTANT

## 2021-09-21 PROCEDURE — G8427 DOCREV CUR MEDS BY ELIG CLIN: HCPCS | Performed by: PHYSICIAN ASSISTANT

## 2021-09-21 PROCEDURE — 99214 OFFICE O/P EST MOD 30 MIN: CPT | Performed by: PHYSICIAN ASSISTANT

## 2021-09-21 PROCEDURE — 1090F PRES/ABSN URINE INCON ASSESS: CPT | Performed by: PHYSICIAN ASSISTANT

## 2021-09-21 PROCEDURE — G8399 PT W/DXA RESULTS DOCUMENT: HCPCS | Performed by: PHYSICIAN ASSISTANT

## 2021-09-21 PROCEDURE — G8417 CALC BMI ABV UP PARAM F/U: HCPCS | Performed by: PHYSICIAN ASSISTANT

## 2021-09-21 PROCEDURE — 1036F TOBACCO NON-USER: CPT | Performed by: PHYSICIAN ASSISTANT

## 2021-09-21 SDOH — ECONOMIC STABILITY: FOOD INSECURITY: WITHIN THE PAST 12 MONTHS, YOU WORRIED THAT YOUR FOOD WOULD RUN OUT BEFORE YOU GOT MONEY TO BUY MORE.: NEVER TRUE

## 2021-09-21 SDOH — ECONOMIC STABILITY: FOOD INSECURITY: WITHIN THE PAST 12 MONTHS, THE FOOD YOU BOUGHT JUST DIDN'T LAST AND YOU DIDN'T HAVE MONEY TO GET MORE.: NEVER TRUE

## 2021-09-21 ASSESSMENT — SOCIAL DETERMINANTS OF HEALTH (SDOH): HOW HARD IS IT FOR YOU TO PAY FOR THE VERY BASICS LIKE FOOD, HOUSING, MEDICAL CARE, AND HEATING?: NOT HARD AT ALL

## 2021-09-21 NOTE — PROGRESS NOTES
[facility])     Shingles outbreak 2012    SI (sacroiliac) pain     Stenosis     Vitamin D deficiency      Past Surgical History:   Procedure Laterality Date    CARDIAC CATHETERIZATION  10/2016    CARDIOVERSION      2001 x3    CERVICAL FUSION  12/14/2011    Dr Merlin Mullen with bone graft and plate    COLONOSCOPY  09/2021    CC    ENDOSCOPIC ULTRASOUND (LOWER) N/A 07/11/2019    EGD/ EUS performed by Madalyn Jimenez MD at [address]  07/2005    HYSTERECTOMY  2001    HYSTERECTOMY, TOTAL ABDOMINAL      JOINT REPLACEMENT Left     tka    LIVER BIOPSY      MENISCECTOMY  10/31/2011    left knee    OTHER SURGICAL HISTORY  10/26/2009    CAUDALS BY DR Sharyn Stratton    NM COLON CA SCRN NOT  [address] IND N/A 04/12/2017    COLONOSCOPY performed by Meghan Scherer MD at . [facility] ESOPHAGOGASTRODUODENOSCOPY TRANSORAL DIAGNOSTIC N/A 04/12/2017    EGD ESOPHAGOGASTRODUODENOSCOPY performed by Meghan Scherer MD at [address]  12/2011    Dr Merlin uMllen Lumbar and c spine    TOTAL KNEE ARTHROPLASTY  07/30/2012    LEFT-DR AVELAR    UPPER GASTROINTESTINAL ENDOSCOPY  1995     Social History     Socioeconomic History    Marital status:      Spouse name: Blaire Rider Number of children: 2    Years of education: 15    Highest education level: Associate degree: occupational, technical, or vocational program   Occupational History    Occupation: Retired    Tobacco Use    Smoking status: Never Smoker    Smokeless tobacco: Never Used   Vaping Use    Vaping Use: Never used   Substance and Sexual Activity    Alcohol use: No    Drug use: No    Sexual activity: Not Currently   Other Topics Concern    Not on file   Social History Narrative    Lives in [place] with her  Myron Mart and son Dieter Jackson is mildly handicapped --CP right lilia with anxiety and LD. He works at [employer] and [employer].      Social Determinants of Health     Financial Resource Strain: Low Risk  Difficulty of Paying Living Expenses: Not hard at all   Food Insecurity: No Food Insecurity    Worried About Running Out of Food in the Last Year: Never true    Ran Out of Food in the Last Year: Never true   Transportation Needs:     Lack of Transportation (Medical):  Lack of Transportation (Non-Medical):    Physical Activity:     Days of Exercise per Week:     Minutes of Exercise per Session:    Stress:     Feeling of Stress :    Social Connections:     Frequency of Communication with Friends and Family:     Frequency of Social Gatherings with Friends and Family:     Attends Latter-day Services:     Active Member of Clubs or Organizations:     Attends Club or Organization Meetings:     Marital Status:    Intimate Partner Violence:     Fear of Current or Ex-Partner:     Emotionally Abused:     Physically Abused:     Sexually Abused:      Family History   Problem Relation Age of Onset    Heart Disease Father     High Cholesterol Father     High Blood Pressure Father     Stroke Mother     High Blood Pressure Mother     High Blood Pressure Brother      Allergies   Allergen Reactions    Latex Swelling     Swelling of hands with wearing latex gloves    Norvasc [Amlodipine Besylate] Other (See Comments)     swelling    Keflex [Cephalexin] Rash        Current Outpatient Medications   Medication Sig Dispense Refill    dilTIAZem (CARDIZEM) 30 MG tablet Take 30 mg by mouth 2 times daily      oxyCODONE-acetaminophen (PERCOCET) 7.5-325 MG per tablet Take 1 tablet by mouth every 6 hours as needed for Pain for up to 30 days.  Intended supply: 30 days 70 tablet 0    topiramate (TOPAMAX) 25 MG tablet TAKE 1 TABLET BY MOUTH EVERY DAY AT NIGHT 60 tablet 1    baclofen (LIORESAL) 10 MG tablet TAKE 1 TABLET BY MOUTH NIGHTLY AS NEEDED (MUSCLE SPASMS) 90 tablet 2    doxycycline hyclate (VIBRA-TABS) 100 MG tablet TAKE 1 TABLET BY MOUTH EVERY DAY 90 tablet 5    atorvastatin (LIPITOR) 10 MG tablet TAKE 1 TABLET BY MOUTH EVERY DAY AT NIGHT 90 tablet 3    losartan (COZAAR) 50 MG tablet TAKE 1 TABLET BY MOUTH EVERY DAY      cyanocobalamin 1000 MCG/ML injection INJECT 1 ML AS DIRECTED EVERY 14 DAYS FOR 12 DOSES INJECT ONE CC SUB-CUTANEOSLY EVERY OTHER WEEK 6 mL 3    hydroxychloroquine (PLAQUENIL) 200 MG tablet Take 1 tablet by mouth daily      melatonin 5 MG TABS tablet Take 5 mg by mouth nightly      aspirin 81 MG tablet Take 81 mg by mouth daily      Coenzyme Q10 (COQ10 PO) Take 1 capsule by mouth daily       Cholecalciferol (VITAMIN D3) 2000 units TABS Take 2 tablets by mouth daily       [START ON 10/8/2021] diazePAM (VALIUM) 5 MG tablet Take one or two tablets by mouth 45 minutes prior to procedure as needed for anxiety. (Patient not taking: Reported on 9/21/2021) 4 tablet 0    gabapentin (NEURONTIN) 300 MG capsule TAKE 1 CAPSULE BY MOUTH DAILY AND 2 AT NIGHT AS TOLERATED 270 capsule 1    nitroGLYCERIN (NITROSTAT) 0.4 MG SL tablet PLEASE SEE ATTACHED FOR DETAILED DIRECTIONS (Patient not taking: Reported on 9/21/2021) 25 tablet 3     Current Facility-Administered Medications   Medication Dose Route Frequency Provider Last Rate Last Admin    cyanocobalamin injection 1,000 mcg  1,000 mcg IntraMUSCular Once Rosaline Scullin, DO           Objective    Vitals:    09/21/21 1050   BP: (!) 142/80   Pulse: 81   Resp: 12   Temp: 98.7 °F (37.1 °C)   SpO2: 98%   Weight: 214 lb (97.1 kg)   Height: 5' 8\" (1.727 m)     Physical Exam  Constitutional:       General: She is not in acute distress. Appearance: She is obese. She is not ill-appearing. HENT:      Head: Normocephalic and atraumatic. Eyes:      Extraocular Movements: Extraocular movements intact. Conjunctiva/sclera: Conjunctivae normal.      Pupils: Pupils are equal, round, and reactive to light. Neck:      Thyroid: No thyromegaly. Cardiovascular:      Rate and Rhythm: Normal rate and regular rhythm. Heart sounds: Normal heart sounds.  No murmur heard. Pulmonary:      Effort: Pulmonary effort is normal. No respiratory distress. Breath sounds: Normal breath sounds. No wheezing or rales. Abdominal:      General: Bowel sounds are normal.      Palpations: Abdomen is soft. There is no mass. Tenderness: There is no abdominal tenderness. There is no guarding. Musculoskeletal:         General: Normal range of motion. Cervical back: Normal range of motion and neck supple. Right lower leg: No edema. Left lower leg: No edema. Lymphadenopathy:      Cervical: No cervical adenopathy. Skin:     General: Skin is warm and dry. Coloration: Skin is not jaundiced. Findings: Lesion present. Neurological:      General: No focal deficit present. Mental Status: She is alert and oriented to person, place, and time. Cranial Nerves: No cranial nerve deficit. Motor: No weakness. Coordination: Coordination normal.      Gait: Gait normal.   Psychiatric:         Mood and Affect: Mood normal.         Behavior: Behavior normal.         Thought Content: Thought content normal.         Judgment: Judgment normal.                 Assessment & Plan    Diagnosis Orders   1. Benign essential hypertension      controlled   2. Mixed hyperlipidemia     3. Benign neoplasm of skin of face  Ambulatory referral to Dermatology   4. Screening mammogram, encounter for  DONI DIGITAL SCREEN W OR WO CAD BILATERAL   5.  Need for influenza vaccination  INFLUENZA, QUADV, ADJUVANTED, 72 YRS =, IM, PF, PREFILL SYR, 0.5ML (FLUAD)         Orders Placed This Encounter   Procedures    DONI DIGITAL SCREEN W OR WO CAD BILATERAL     Standing Status:   Future     Standing Expiration Date:   11/21/2022    INFLUENZA, QUADV, ADJUVANTED, 72 YRS =, IM, PF, PREFILL SYR, 0.5ML (FLUAD)    Ambulatory referral to Dermatology     Referral Priority:   Routine     Referral Type:   Eval and Treat     Referral Reason:   Specialty Services Required     Referred to Provider:   Vicente Vargas MD     Number of Visits Requested:   1     No orders of the defined types were placed in this encounter. Medications Discontinued During This Encounter   Medication Reason    hydrochlorothiazide (HYDRODIURIL) 25 MG tablet Therapy completed    pantoprazole (PROTONIX) 20 MG tablet Therapy completed    simethicone (MYLICON) 80 MG chewable tablet Therapy completed    nystatin-triamcinolone (MYCOLOG II) 710165-6.1 UNIT/GM-% cream Therapy completed    loratadine (CLARITIN) 10 MG tablet Therapy completed    lidocaine (XYLOCAINE) 5 % ointment Therapy completed    lactobacillus (CULTURELLE) capsule Therapy completed     Return in about 6 months (around 3/21/2022) for repeat labs. Hailey Figueroa PA-C    Assessment & Plan    Diagnosis Orders   1. Benign essential hypertension      controlled   2. Mixed hyperlipidemia     3. Benign neoplasm of skin of face  Ambulatory referral to Dermatology   4. Screening mammogram, encounter for  DONI DIGITAL SCREEN W OR WO CAD BILATERAL   5. Need for influenza vaccination  INFLUENZA, QUADV, ADJUVANTED, 72 YRS =, IM, PF, PREFILL SYR, 0.5ML (FLUAD)         Orders Placed This Encounter   Procedures    DONI DIGITAL SCREEN W OR WO CAD BILATERAL     Standing Status:   Future     Standing Expiration Date:   11/21/2022    INFLUENZA, QUADV, ADJUVANTED, 72 YRS =, IM, PF, PREFILL SYR, 0.5ML (FLUAD)    Ambulatory referral to Dermatology     Referral Priority:   Routine     Referral Type:   Eval and Treat     Referral Reason:   Specialty Services Required     Referred to Provider:   Vicente Vargas MD     Number of Visits Requested:   1     No orders of the defined types were placed in this encounter.     Medications Discontinued During This Encounter   Medication Reason    hydrochlorothiazide (HYDRODIURIL) 25 MG tablet Therapy completed    pantoprazole (PROTONIX) 20 MG tablet Therapy completed    simethicone (MYLICON) 80 MG chewable tablet Therapy completed    nystatin-triamcinolone (MYCOLOG II) 213022-6.1 UNIT/GM-% cream Therapy completed    loratadine (CLARITIN) 10 MG tablet Therapy completed    lidocaine (XYLOCAINE) 5 % ointment Therapy completed    lactobacillus (CULTURELLE) capsule Therapy completed     Return in about 6 months (around 3/21/2022) for repeat labs.     Hailey Figueroa PA-C

## 2021-09-24 ASSESSMENT — ENCOUNTER SYMPTOMS: ROS SKIN COMMENTS: SKIN LESION

## 2021-09-27 ENCOUNTER — PROCEDURE VISIT (OUTPATIENT)
Dept: PHYSICAL MEDICINE AND REHAB | Age: 66
End: 2021-09-27
Payer: MEDICARE

## 2021-09-27 DIAGNOSIS — M54.41 CHRONIC BILATERAL LOW BACK PAIN WITH BILATERAL SCIATICA: ICD-10-CM

## 2021-09-27 DIAGNOSIS — M54.42 CHRONIC BILATERAL LOW BACK PAIN WITH BILATERAL SCIATICA: ICD-10-CM

## 2021-09-27 DIAGNOSIS — M54.17 LUMBOSACRAL RADICULITIS: ICD-10-CM

## 2021-09-27 DIAGNOSIS — G89.29 CHRONIC BILATERAL LOW BACK PAIN WITH BILATERAL SCIATICA: ICD-10-CM

## 2021-09-27 PROCEDURE — 64445 NJX AA&/STRD SCIATIC NRV IMG: CPT | Performed by: PHYSICAL MEDICINE & REHABILITATION

## 2021-09-27 PROCEDURE — 96372 THER/PROPH/DIAG INJ SC/IM: CPT | Performed by: PHYSICAL MEDICINE & REHABILITATION

## 2021-09-27 PROCEDURE — 76942 ECHO GUIDE FOR BIOPSY: CPT | Performed by: PHYSICAL MEDICINE & REHABILITATION

## 2021-09-27 RX ORDER — CYANOCOBALAMIN 1000 UG/ML
1000 INJECTION INTRAMUSCULAR; SUBCUTANEOUS ONCE
Status: COMPLETED | OUTPATIENT
Start: 2021-09-27 | End: 2021-09-27

## 2021-09-27 RX ORDER — LIDOCAINE HYDROCHLORIDE 20 MG/ML
5 INJECTION, SOLUTION INFILTRATION; PERINEURAL ONCE
Status: COMPLETED | OUTPATIENT
Start: 2021-09-27 | End: 2021-09-27

## 2021-09-27 RX ORDER — LIDOCAINE HYDROCHLORIDE 10 MG/ML
7 INJECTION, SOLUTION INFILTRATION; PERINEURAL ONCE
Status: COMPLETED | OUTPATIENT
Start: 2021-09-27 | End: 2021-09-27

## 2021-09-27 RX ADMIN — LIDOCAINE HYDROCHLORIDE 5 ML: 20 INJECTION, SOLUTION INFILTRATION; PERINEURAL at 14:45

## 2021-09-27 RX ADMIN — CYANOCOBALAMIN 1000 MCG: 1000 INJECTION INTRAMUSCULAR; SUBCUTANEOUS at 14:44

## 2021-09-27 RX ADMIN — LIDOCAINE HYDROCHLORIDE 7 ML: 10 INJECTION, SOLUTION INFILTRATION; PERINEURAL at 14:45

## 2021-09-27 NOTE — PROGRESS NOTES
Patient here for right sciatic injection with U/S. Patient taken back to exam room, and placed on drape locking stool. Areas to be injected marked appropriately, and cleansed with alcohol. 7cc of 1% Lidocaine, 5cc of 2% Lidocaine, and 1cc of B12 to be injected by provider.

## 2021-09-30 ENCOUNTER — OFFICE VISIT (OUTPATIENT)
Dept: INFECTIOUS DISEASES | Age: 66
End: 2021-09-30
Payer: MEDICARE

## 2021-09-30 VITALS
DIASTOLIC BLOOD PRESSURE: 84 MMHG | TEMPERATURE: 97.3 F | RESPIRATION RATE: 18 BRPM | BODY MASS INDEX: 32.13 KG/M2 | SYSTOLIC BLOOD PRESSURE: 169 MMHG | WEIGHT: 212 LBS | HEIGHT: 68 IN | HEART RATE: 63 BPM

## 2021-09-30 DIAGNOSIS — A49.01 STAPH AUREUS INFECTION: ICD-10-CM

## 2021-09-30 DIAGNOSIS — M46.26 OSTEOMYELITIS OF LUMBAR VERTEBRA (HCC): Primary | ICD-10-CM

## 2021-09-30 PROCEDURE — 4040F PNEUMOC VAC/ADMIN/RCVD: CPT | Performed by: INTERNAL MEDICINE

## 2021-09-30 PROCEDURE — 1036F TOBACCO NON-USER: CPT | Performed by: INTERNAL MEDICINE

## 2021-09-30 PROCEDURE — 3017F COLORECTAL CA SCREEN DOC REV: CPT | Performed by: INTERNAL MEDICINE

## 2021-09-30 PROCEDURE — 1123F ACP DISCUSS/DSCN MKR DOCD: CPT | Performed by: INTERNAL MEDICINE

## 2021-09-30 PROCEDURE — 1090F PRES/ABSN URINE INCON ASSESS: CPT | Performed by: INTERNAL MEDICINE

## 2021-09-30 PROCEDURE — G8417 CALC BMI ABV UP PARAM F/U: HCPCS | Performed by: INTERNAL MEDICINE

## 2021-09-30 PROCEDURE — G8399 PT W/DXA RESULTS DOCUMENT: HCPCS | Performed by: INTERNAL MEDICINE

## 2021-09-30 PROCEDURE — G8427 DOCREV CUR MEDS BY ELIG CLIN: HCPCS | Performed by: INTERNAL MEDICINE

## 2021-09-30 PROCEDURE — 99213 OFFICE O/P EST LOW 20 MIN: CPT | Performed by: INTERNAL MEDICINE

## 2021-09-30 ASSESSMENT — ENCOUNTER SYMPTOMS
ANAL BLEEDING: 0
BLOOD IN STOOL: 0
DIARRHEA: 1
CONSTIPATION: 1
ABDOMINAL PAIN: 1

## 2021-09-30 NOTE — PROGRESS NOTES
abdominal tenderness. Musculoskeletal:         General: Tenderness (Low back ) and deformity (B hands) present. No swelling. Cervical back: No rigidity. Lymphadenopathy:      Cervical: No cervical adenopathy. Skin:     Coloration: Skin is not jaundiced. Findings: No erythema or rash. Neurological:      General: No focal deficit present. Mental Status: She is alert and oriented to person, place, and time. Cranial Nerves: No cranial nerve deficit. Motor: No weakness. Gait: Gait normal.   Psychiatric:         Mood and Affect: Mood normal.         Behavior: Behavior normal.          8/10/2021  9:05 AM - System User, Default    CBC  Order: [id number]  (suggestion)  Information displayed in this report will not trend and will not trigger automated decision support. Ref Range & Units Value   WBC 3.70 - 11.00 k/uL 5.59        RBC 3.90 - 5.20 m/uL 4.48        Hemoglobin 11.5 - 15.5 g/dL 13.7        Hematocrit 36.0 - 46.0 % 40.7        MCV 80.0 - 100.0 fL 90.8        MCH 26.0 - 34.0 pG 30.6        MCHC 30.5 - 36.0 g/dL 33.7        RDW-CV 11.5 - 15.0 % 12.6        Platelet Count 798 - 400 k/uL 208        MPV 9.0 - 12.7 fL 9.6        Absolute nRBC <0.01 k/uL <0.01          Association. Diabetes Care. 2016.39(Suppl 1). BUN7 - 21 mg/vX85Envb  Creatinine0.58 - 0.96 mg/dL1. 32High  Wocjig260 - 144 mmol/L140 Potassium3. 7 - 5.1 mmol/L4. 8 Eydmebae03 - 105 mmol/L104  - 30 mmol/L24 Anion Gap9 - 18 mmol/L12     On this date 9/30/2021 I have spent 20 minutes reviewing previous notes, test results and face to face with the patient discussing the diagnosis and importance of compliance with the treatment plan as well as documenting on the day of the visit. An electronic signature was used to authenticate this note.     --Margy Morales MD

## 2021-10-05 ENCOUNTER — OFFICE VISIT (OUTPATIENT)
Dept: FAMILY MEDICINE CLINIC | Age: 66
End: 2021-10-05
Payer: MEDICARE

## 2021-10-05 VITALS — TEMPERATURE: 97.6 F | HEIGHT: 68 IN | WEIGHT: 212 LBS | BODY MASS INDEX: 32.13 KG/M2

## 2021-10-05 DIAGNOSIS — R23.8 SKIN IRRITATION: ICD-10-CM

## 2021-10-05 DIAGNOSIS — L82.1 SEBORRHEIC KERATOSES: ICD-10-CM

## 2021-10-05 DIAGNOSIS — L85.3 DRY SKIN DERMATITIS: Primary | ICD-10-CM

## 2021-10-05 DIAGNOSIS — D49.2 ABNORMAL SKIN GROWTH: ICD-10-CM

## 2021-10-05 PROCEDURE — 11311 SHAVE SKIN LESION 0.6-1.0 CM: CPT | Performed by: FAMILY MEDICINE

## 2021-10-05 PROCEDURE — G8427 DOCREV CUR MEDS BY ELIG CLIN: HCPCS | Performed by: FAMILY MEDICINE

## 2021-10-05 PROCEDURE — 1090F PRES/ABSN URINE INCON ASSESS: CPT | Performed by: FAMILY MEDICINE

## 2021-10-05 PROCEDURE — 3017F COLORECTAL CA SCREEN DOC REV: CPT | Performed by: FAMILY MEDICINE

## 2021-10-05 PROCEDURE — 99214 OFFICE O/P EST MOD 30 MIN: CPT | Performed by: FAMILY MEDICINE

## 2021-10-05 PROCEDURE — 11312 SHAVE SKIN LESION 1.1-2.0 CM: CPT | Performed by: FAMILY MEDICINE

## 2021-10-05 PROCEDURE — G8484 FLU IMMUNIZE NO ADMIN: HCPCS | Performed by: FAMILY MEDICINE

## 2021-10-05 PROCEDURE — 1123F ACP DISCUSS/DSCN MKR DOCD: CPT | Performed by: FAMILY MEDICINE

## 2021-10-05 PROCEDURE — 1036F TOBACCO NON-USER: CPT | Performed by: FAMILY MEDICINE

## 2021-10-05 PROCEDURE — 4040F PNEUMOC VAC/ADMIN/RCVD: CPT | Performed by: FAMILY MEDICINE

## 2021-10-05 PROCEDURE — G8417 CALC BMI ABV UP PARAM F/U: HCPCS | Performed by: FAMILY MEDICINE

## 2021-10-05 PROCEDURE — G8399 PT W/DXA RESULTS DOCUMENT: HCPCS | Performed by: FAMILY MEDICINE

## 2021-10-05 ASSESSMENT — ENCOUNTER SYMPTOMS: COLOR CHANGE: 1

## 2021-10-05 NOTE — PATIENT INSTRUCTIONS
Pt will obtain [employer] \"Psoriasis\" or \"rough and bumpy\" cream ( key component is Urea)to all dry skin sparing the face and genital areas. The above lotion will both help with moisturizing and exfoliating the skin abnormality. Incision/Laceration repair    -Clean surgical area with antibacterial soap and water once daily. --You may be instructed to soak the wound with Hydrogen Peroxide to loosen scabbing around sutures, this is not to be done more often that every 3 days, should be for 30 seconds-1 min and then rinsed off with water.     -Keep surgical site moist with vaseline or antibiotic ointment (single, not tripleantibiotic or Neosporin) and apply a fresh bandage daily until a solid scab forms or if the wound is at risk for trauma or dirt.     -Follow up immediately if any growing redness (minimal redness or pale pink is normal along wound edges) surrounds the surgical site or if dripping drainage occurs at surgical site. Once a solid scab forms no more bandage needed. A wet scab can look yellow. This is not infection, just moisture.  -Once the lesion is healed be sure to apply sunscreen to the area to prevent burn of the newer and more delicate skin during the first 6 months of healing.    -If the scar begins to be raised you may massage the area firmly twice a day to help break down scar tissue and help the area become a flat scar. There is some evidence that Mederma applied to a scar daily for the first few months can help shrink and fade it more quickly then without intervention.

## 2021-10-05 NOTE — PROGRESS NOTES
Diagnosis Orders   1. Dry skin dermatitis     2. Abnormal skin growth  Specimen to Pathology Outpatient    Specimen to Pathology Outpatient    [id number] - GA SHAV SKIN LES 6-10MM FACE,FACIAL    [id number] - GA SHAV SKIN LES 11-20MM FACE,FACIAL   3. Skin irritation     4. Seborrheic keratoses           Orders Placed This Encounter   Procedures    Specimen to Pathology Outpatient     Margins requested on all specimens  R/O squamous cell cancer  Location left medial mandible     Standing Status:   Future     Standing Expiration Date:   10/5/2022     Order Specific Question:   PREVIOUS BIOPSY     Answer:   No     Order Specific Question:   PREOP DIAGNOSIS     Answer:   Abnormal skin growth     Order Specific Question:   FROZEN SECTION - NO OR YES/SPECIMEN     Answer:   No    Specimen to Pathology Outpatient     Margins requested on all specimens  R/O melanoma versus seborrheic keratosis  Location left mandible angle     Standing Status:   Future     Standing Expiration Date:   10/5/2022     Order Specific Question:   PREVIOUS BIOPSY     Answer:   No     Order Specific Question:   PREOP DIAGNOSIS     Answer:   Abnormal pigmented lesion     Order Specific Question:   FROZEN SECTION - NO OR YES/SPECIMEN     Answer:   No    [id number] - GA SHAV SKIN LES 6-10MM FACE,FACIAL     Left mandibular angle    [id number] - GA SHAV SKIN LES 11-20MM FACE,FACIAL     Left middle mandible       Rayna was seen today for skin exam and skin problem. Diagnoses and all orders for this visit:    Dry skin dermatitis    Abnormal skin growth  -     Specimen to Pathology Outpatient; Future  -     Specimen to Pathology Outpatient; Future  -     [id number] - GA SHAV SKIN LES 6-10MM FACE,FACIAL  -     [id number] - GA SHAV SKIN LES 11-20MM FACE,FACIAL    Skin irritation    Seborrheic keratoses        Return in about 1 year (around 10/5/2022) for 15 min skin check.     Patient Instructions   Pt will obtain Gold Bond \"Psoriasis\" or \"rough and bumpy\" cream ( key component is Urea)to all dry skin sparing the face and genital areas. The above lotion will both help with moisturizing and exfoliating the skin abnormality. Incision/Laceration repair    -Clean surgical area with antibacterial soap and water once daily. --You may be instructed to soak the wound with Hydrogen Peroxide to loosen scabbing around sutures, this is not to be done more often that every 3 days, should be for 30 seconds-1 min and then rinsed off with water.     -Keep surgical site moist with vaseline or antibiotic ointment (single, not tripleantibiotic or Neosporin) and apply a fresh bandage daily until a solid scab forms or if the wound is at risk for trauma or dirt.     -Follow up immediately if any growing redness (minimal redness or pale pink is normal along wound edges) surrounds the surgical site or if dripping drainage occurs at surgical site. Once a solid scab forms no more bandage needed. A wet scab can look yellow. This is not infection, just moisture.  -Once the lesion is healed be sure to apply sunscreen to the area to prevent burn of the newer and more delicate skin during the first 6 months of healing.    -If the scar begins to be raised you may massage the area firmly twice a day to help break down scar tissue and help the area become a flat scar. There is some evidence that Mederma applied to a scar daily for the first few months can help shrink and fade it more quickly then without intervention. Pt notes a number of growing lesions she would like to have evaluated for color or texture changes. Long history of dry skin. Usually uses Aquaphor on it. Current Outpatient Medications on File Prior to Visit   Medication Sig Dispense Refill    dilTIAZem (CARDIZEM) 30 MG tablet Take 30 mg by mouth 2 times daily      [START ON 10/8/2021] diazePAM (VALIUM) 5 MG tablet Take one or two tablets by mouth 45 minutes prior to procedure as needed for anxiety.  4 tablet 0    oxyCODONE-acetaminophen (PERCOCET) 7.5-325 MG per tablet Take 1 tablet by mouth every 6 hours as needed for Pain for up to 30 days. Intended supply: 30 days 70 tablet 0    topiramate (TOPAMAX) 25 MG tablet TAKE 1 TABLET BY MOUTH EVERY DAY AT NIGHT 60 tablet 1    baclofen (LIORESAL) 10 MG tablet TAKE 1 TABLET BY MOUTH NIGHTLY AS NEEDED (MUSCLE SPASMS) 90 tablet 2    doxycycline hyclate (VIBRA-TABS) 100 MG tablet TAKE 1 TABLET BY MOUTH EVERY DAY 90 tablet 5    gabapentin (NEURONTIN) 300 MG capsule TAKE 1 CAPSULE BY MOUTH DAILY AND 2 AT NIGHT AS TOLERATED 270 capsule 1    atorvastatin (LIPITOR) 10 MG tablet TAKE 1 TABLET BY MOUTH EVERY DAY AT NIGHT 90 tablet 3    losartan (COZAAR) 50 MG tablet TAKE 1 TABLET BY MOUTH EVERY DAY      cyanocobalamin 1000 MCG/ML injection INJECT 1 ML AS DIRECTED EVERY 14 DAYS FOR 12 DOSES INJECT ONE CC SUB-CUTANEOSLY EVERY OTHER WEEK 6 mL 3    hydroxychloroquine (PLAQUENIL) 200 MG tablet Take 1 tablet by mouth daily      nitroGLYCERIN (NITROSTAT) 0.4 MG SL tablet PLEASE SEE ATTACHED FOR DETAILED DIRECTIONS 25 tablet 3    melatonin 5 MG TABS tablet Take 5 mg by mouth nightly      aspirin 81 MG tablet Take 81 mg by mouth daily      Coenzyme Q10 (COQ10 PO) Take 1 capsule by mouth daily       Cholecalciferol (VITAMIN D3) 2000 units TABS Take 2 tablets by mouth daily        Current Facility-Administered Medications on File Prior to Visit   Medication Dose Route Frequency Provider Last Rate Last Admin    cyanocobalamin injection 1,000 mcg  1,000 mcg IntraMUSCular Once Rosaline Scullin, DO         Latex, Norvasc [amlodipine besylate], and Keflex [cephalexin]    Review of Systems   Constitutional: Negative for chills and fever. Skin: Positive for color change and rash. Allergic/Immunologic: Negative for environmental allergies, food allergies and immunocompromised state. Hematological: Negative for adenopathy. Does not bruise/bleed easily.    Psychiatric/Behavioral: Negative for behavioral problems and sleep disturbance. Temp 97.6 °F (36.4 °C)   Ht 5' 8\" (1.727 m)   Wt 212 lb (96.2 kg)   BMI 32.23 kg/m²   Physical Exam  Constitutional:       General: She is not in acute distress. Appearance: Normal appearance. She is well-developed. She is not toxic-appearing. HENT:      Head: Normocephalic and atraumatic. Right Ear: Hearing and tympanic membrane normal.      Left Ear: Hearing and tympanic membrane normal.      Nose: Nose normal. No nasal deformity. Eyes:      General: Lids are normal.         Right eye: No discharge. Left eye: No discharge. Conjunctiva/sclera: Conjunctivae normal.      Pupils: Pupils are equal, round, and reactive to light. Neck:      Thyroid: No thyroid mass or thyromegaly. Vascular: No JVD. Trachea: Trachea and phonation normal.   Cardiovascular:      Rate and Rhythm: Normal rate and regular rhythm. Pulmonary:      Effort: No accessory muscle usage or respiratory distress. Musculoskeletal:      Cervical back: Full passive range of motion without pain. Comments: FROM all large muscle groups and joints as witnessed when walking to exam table, getting on, and getting off the exam table. Skin:     General: Skin is warm and dry. Findings: No rash. Comments: Diffusely dry skin bilateral upper and lower extremities some areas of 1 mm hyperkeratotic lesions. Multiple stuck on appearing lesions of the skin back and other areas soft flake oval brown. Lesion of the middle section of the left mandible is raised irregular rough texture some vascularity noted measuring 1.2 cm. Lesion at the mandibular angle on the left is irregular multicolor pigmented with irregular shape measuring 7 mm. Neurological:      Mental Status: She is alert. Motor: No tremor or atrophy. Gait: Gait normal.   Psychiatric:         Speech: Speech normal.         Behavior: Behavior normal.         Thought Content:  Thought content normal.                   PROCEDURE: Both lesions removed with the same technique  Informed consent was given by the patient. Risks including infection, bleeding and scarring were discussed. No guarantee how the scar will look. Patient skin was prepped with alcohol. Wound was anesthetized using 0.4 cc of 1% lidocaine with epinephrine for anesthesia. A Dermablade was used to obtain the complete removal of the visible lesion. Drysol was used at the base of site. Bacitracin ointment and bandage were placed on the wound. Estimated blood loss less than 1 cc    Post op wound care instructions were given to the patient. He/She will f/u in 2 weeks or sooner if needed for problems. The lesion of the middle section of the mandible had a small fragment that remained at the anterior aspect of the lesion and was sent placed in the pathology container with the larger section. Petra Ness was seen today for skin exam and skin problem. Diagnoses and all orders for this visit:    Dry skin dermatitis    Abnormal skin growth  -     Specimen to Pathology Outpatient; Future  -     Specimen to Pathology Outpatient; Future  -     [id number] - DC SHAV SKIN LES 6-10MM FACE,FACIAL  -     [id number] - DC SHAV SKIN LES 11-20MM FACE,FACIAL    Skin irritation    Seborrheic keratoses        Return in about 1 year (around 10/5/2022) for 15 min skin check. Patient Instructions   Pt will obtain Gold Bond \"Psoriasis\" or \"rough and bumpy\" cream ( key component is Urea)to all dry skin sparing the face and genital areas. The above lotion will both help with moisturizing and exfoliating the skin abnormality. Incision/Laceration repair    -Clean surgical area with antibacterial soap and water once daily. --You may be instructed to soak the wound with Hydrogen Peroxide to loosen scabbing around sutures, this is not to be done more often that every 3 days, should be for 30 seconds-1 min and then rinsed off with water. -Keep surgical site moist with vaseline or antibiotic ointment (single, not tripleantibiotic or Neosporin) and apply a fresh bandage daily until a solid scab forms or if the wound is at risk for trauma or dirt.     -Follow up immediately if any growing redness (minimal redness or pale pink is normal along wound edges) surrounds the surgical site or if dripping drainage occurs at surgical site. Once a solid scab forms no more bandage needed. A wet scab can look yellow. This is not infection, just moisture.  -Once the lesion is healed be sure to apply sunscreen to the area to prevent burn of the newer and more delicate skin during the first 6 months of healing.    -If the scar begins to be raised you may massage the area firmly twice a day to help break down scar tissue and help the area become a flat scar. There is some evidence that Mederma applied to a scar daily for the first few months can help shrink and fade it more quickly then without intervention. Gamal Chaudhry M.D. This note was partially created with the assistance of dictation. This may lead to grammatical or spelling errors.

## 2021-10-05 NOTE — PROGRESS NOTES
Prior to the start of the procedure known as bx an informed consent has been signed and scanned into patients EMR. After the patient is draped and prepped and before the start of the procedure  Dr. Gretel Moreno and attending staff Johanny Askew both must perform a verbal confirmation using patients Name   and  The patient should participate in this. Dr. Gretel Moreno will trey the surgical site if needed for documenting superior and inferior aspects. \"correct site. \" will be verified on container, and order. These above steps will be documented into the patients EMR chart.

## 2021-10-11 ENCOUNTER — OFFICE VISIT (OUTPATIENT)
Dept: FAMILY MEDICINE CLINIC | Age: 66
End: 2021-10-11
Payer: MEDICARE

## 2021-10-11 VITALS — WEIGHT: 212 LBS | BODY MASS INDEX: 32.13 KG/M2 | TEMPERATURE: 98.9 F | HEIGHT: 68 IN

## 2021-10-11 DIAGNOSIS — Z51.89 VISIT FOR WOUND CHECK: ICD-10-CM

## 2021-10-11 DIAGNOSIS — M54.42 CHRONIC BILATERAL LOW BACK PAIN WITH BILATERAL SCIATICA: ICD-10-CM

## 2021-10-11 DIAGNOSIS — L82.1 SEBORRHEIC KERATOSES: Primary | ICD-10-CM

## 2021-10-11 DIAGNOSIS — Z79.899 HIGH RISK MEDICATION USE: ICD-10-CM

## 2021-10-11 DIAGNOSIS — M54.2 CERVICALGIA: ICD-10-CM

## 2021-10-11 DIAGNOSIS — M05.79 RHEUMATOID ARTHRITIS INVOLVING MULTIPLE SITES WITH POSITIVE RHEUMATOID FACTOR (HCC): ICD-10-CM

## 2021-10-11 DIAGNOSIS — G89.29 CHRONIC BILATERAL LOW BACK PAIN WITH BILATERAL SCIATICA: ICD-10-CM

## 2021-10-11 DIAGNOSIS — M54.41 CHRONIC BILATERAL LOW BACK PAIN WITH BILATERAL SCIATICA: ICD-10-CM

## 2021-10-11 PROCEDURE — 1036F TOBACCO NON-USER: CPT | Performed by: FAMILY MEDICINE

## 2021-10-11 PROCEDURE — 3017F COLORECTAL CA SCREEN DOC REV: CPT | Performed by: FAMILY MEDICINE

## 2021-10-11 PROCEDURE — 1123F ACP DISCUSS/DSCN MKR DOCD: CPT | Performed by: FAMILY MEDICINE

## 2021-10-11 PROCEDURE — 99212 OFFICE O/P EST SF 10 MIN: CPT | Performed by: FAMILY MEDICINE

## 2021-10-11 PROCEDURE — G8399 PT W/DXA RESULTS DOCUMENT: HCPCS | Performed by: FAMILY MEDICINE

## 2021-10-11 PROCEDURE — 4040F PNEUMOC VAC/ADMIN/RCVD: CPT | Performed by: FAMILY MEDICINE

## 2021-10-11 PROCEDURE — 1090F PRES/ABSN URINE INCON ASSESS: CPT | Performed by: FAMILY MEDICINE

## 2021-10-11 PROCEDURE — G8417 CALC BMI ABV UP PARAM F/U: HCPCS | Performed by: FAMILY MEDICINE

## 2021-10-11 PROCEDURE — G8427 DOCREV CUR MEDS BY ELIG CLIN: HCPCS | Performed by: FAMILY MEDICINE

## 2021-10-11 PROCEDURE — G8484 FLU IMMUNIZE NO ADMIN: HCPCS | Performed by: FAMILY MEDICINE

## 2021-10-11 NOTE — PATIENT INSTRUCTIONS
Patient is to wash the wound with peroxide once today with rinsing afterwards small amount of antibiotic ointment and leave open to air.

## 2021-10-11 NOTE — PROGRESS NOTES
Diagnosis Orders   1. Seborrheic keratoses     2. Visit for wound check       Return for keep next planned appointment. Patient Instructions   Patient is to wash the wound with peroxide once today with rinsing afterwards small amount of antibiotic ointment and leave open to air. Subjective:      Patient ID: Caroline Sousa is a 74 y.o. female who presents for:  Chief Complaint   Patient presents with    Wound Check     left neck        Patient is concerned about the lesion under her chin. The one on the side of the face seems to be getting a normal scab looking good but a little bit pink around the edges but the one on the bottom she accidentally tore the scab off of the other day and now it has some yellow spots by it. No warmth. No drainage. Current Outpatient Medications on File Prior to Visit   Medication Sig Dispense Refill    dilTIAZem (CARDIZEM) 30 MG tablet Take 30 mg by mouth 2 times daily      diazePAM (VALIUM) 5 MG tablet Take one or two tablets by mouth 45 minutes prior to procedure as needed for anxiety.  4 tablet 0    topiramate (TOPAMAX) 25 MG tablet TAKE 1 TABLET BY MOUTH EVERY DAY AT NIGHT 60 tablet 1    baclofen (LIORESAL) 10 MG tablet TAKE 1 TABLET BY MOUTH NIGHTLY AS NEEDED (MUSCLE SPASMS) 90 tablet 2    doxycycline hyclate (VIBRA-TABS) 100 MG tablet TAKE 1 TABLET BY MOUTH EVERY DAY 90 tablet 5    atorvastatin (LIPITOR) 10 MG tablet TAKE 1 TABLET BY MOUTH EVERY DAY AT NIGHT 90 tablet 3    losartan (COZAAR) 50 MG tablet TAKE 1 TABLET BY MOUTH EVERY DAY      cyanocobalamin 1000 MCG/ML injection INJECT 1 ML AS DIRECTED EVERY 14 DAYS FOR 12 DOSES INJECT ONE CC SUB-CUTANEOSLY EVERY OTHER WEEK 6 mL 3    hydroxychloroquine (PLAQUENIL) 200 MG tablet Take 1 tablet by mouth daily      nitroGLYCERIN (NITROSTAT) 0.4 MG SL tablet PLEASE SEE ATTACHED FOR DETAILED DIRECTIONS 25 tablet 3    melatonin 5 MG TABS tablet Take 5 mg by mouth nightly      aspirin 81 MG tablet Take 81 mg by mouth daily      Coenzyme Q10 (COQ10 PO) Take 1 capsule by mouth daily       Cholecalciferol (VITAMIN D3) 2000 units TABS Take 2 tablets by mouth daily       gabapentin (NEURONTIN) 300 MG capsule TAKE 1 CAPSULE BY MOUTH DAILY AND 2 AT NIGHT AS TOLERATED 270 capsule 1     Current Facility-Administered Medications on File Prior to Visit   Medication Dose Route Frequency Provider Last Rate Last Admin    cyanocobalamin injection 1,000 mcg  1,000 mcg IntraMUSCular Once Sergio Fuel, DO         Past Medical History:   Diagnosis Date    Abnormal electromyogram (EMG) 8/19/09    SENSORIMOTOR NEUROPATHY OF BLE, RIGHT WORSE THAN LEFT, SUSPICIOUS FOR RIGHT S1 RADIC    Angina pectoris (HCC)     Angina pectoris ([address] 07/2019    sees Dr. Judith Dunn Ankle pain     Bleeding ulcer     Colon polyp 4/3/2017    Dermatitis     Fibromyalgia     Foot pain     Hyperlipidemia     Hypertension     Infection of intervertebral disc (pyogenic), lumbar region ([address] 8/13/2018    Low back pain     MRSA (methicillin resistant staph aureus) culture positive 2011    Neck pain     Neuropathy     Obesity     Osteoarthritis     PSVT (paroxysmal supraventricular tachycardia) ([address]     Shingles outbreak 2012    SI (sacroiliac) pain     Stenosis     Vitamin D deficiency      Past Surgical History:   Procedure Laterality Date    CARDIAC CATHETERIZATION  10/2016    CARDIOVERSION      2001 x3    CERVICAL FUSION  12/14/2011    Dr Mckenzie Pro with bone graft and plate    COLONOSCOPY  09/2021    CC    ENDOSCOPIC ULTRASOUND (LOWER) N/A 07/11/2019    EGD/ EUS performed by Sari Dubois MD at [address] [facility] HAND FUSION  07/2005    HYSTERECTOMY  2001    HYSTERECTOMY, TOTAL ABDOMINAL      JOINT REPLACEMENT Left     tka    LIVER BIOPSY      MENISCECTOMY  10/31/2011    left knee    OTHER SURGICAL HISTORY  10/26/2009    CAUDALS BY DR Brandon Joshua    [facility] N/A 04/12/2017    COLONOSCOPY performed by Henry Pardo MD at [address] ESOPHAGOGASTRODUODENOSCOPY TRANSORAL DIAGNOSTIC N/A 04/12/2017    EGD ESOPHAGOGASTRODUODENOSCOPY performed by Henry Pardo MD at [address]  12/2011    Dr Marito Nieves Lumbar and c spine    TOTAL KNEE ARTHROPLASTY  07/30/2012    LEFT-DR AVELAR    UPPER GASTROINTESTINAL ENDOSCOPY  1995     Social History     Socioeconomic History    Marital status:      Spouse name: Rhea Holguin Number of children: 2    Years of education: 15    Highest education level: Associate degree: occupational, technical, or vocational program   Occupational History    Occupation: Retired    Tobacco Use    Smoking status: Never Smoker    Smokeless tobacco: Never Used   Vaping Use    Vaping Use: Never used   Substance and Sexual Activity    Alcohol use: No    Drug use: No    Sexual activity: Not Currently   Other Topics Concern    Not on file   Social History Narrative    Lives in [place] with her  Miryam Pearson and son Sierra Galvez is mildly handicapped --CP right lilia with anxiety and LD. He works at [employer]. Social Determinants of Health     Financial Resource Strain: Low Risk     Difficulty of Paying Living Expenses: Not hard at all   Food Insecurity: No Food Insecurity    Worried About Running Out of Food in the Last Year: Never true    Romana of Food in the Last Year: Never true   Transportation Needs:     Lack of Transportation (Medical):      Lack of Transportation (Non-Medical):    Physical Activity:     Days of Exercise per Week:     Minutes of Exercise per Session:    Stress:     Feeling of Stress :    Social Connections:     Frequency of Communication with Friends and Family:     Frequency of Social Gatherings with Friends and Family:     Attends [employer] Services:     Active Member of Clubs or Organizations:     Attends Club or Organization Meetings:     Marital Status:    Intimate Partner Violence:     Fear of Current or Ex-Partner:     Emotionally Abused:     Physically Abused:     Sexually Abused:      Family History   Problem Relation Age of Onset    Heart Disease Father     High Cholesterol Father     High Blood Pressure Father     Stroke Mother     High Blood Pressure Mother     High Blood Pressure Brother      Allergies:  Latex, Norvasc [amlodipine besylate], and Keflex [cephalexin]    Review of Systems   Constitutional: Negative for chills and fever. Skin: Positive for wound. Allergic/Immunologic: Negative for environmental allergies, food allergies and immunocompromised state. Hematological: Negative for adenopathy. Does not bruise/bleed easily. Psychiatric/Behavioral: Negative for behavioral problems and sleep disturbance. Objective:   Temp 98.9 °F (37.2 °C)   Ht 5' 8\" (1.727 m)   Wt 212 lb (96.2 kg)   BMI 32.23 kg/m²     Physical Exam  Constitutional:       General: She is not in acute distress. Appearance: Normal appearance. She is well-developed. She is not toxic-appearing. HENT:      Head: Normocephalic and atraumatic. Right Ear: Hearing and tympanic membrane normal.      Left Ear: Hearing and tympanic membrane normal.      Nose: Nose normal. No nasal deformity. Eyes:      General: Lids are normal.         Right eye: No discharge. Left eye: No discharge. Conjunctiva/sclera: Conjunctivae normal.      Pupils: Pupils are equal, round, and reactive to light. Neck:      Thyroid: No thyroid mass or thyromegaly. Vascular: No JVD. Trachea: Trachea and phonation normal.   Cardiovascular:      Rate and Rhythm: Normal rate and regular rhythm. Pulmonary:      Effort: No accessory muscle usage or respiratory distress. Musculoskeletal:      Cervical back: Full passive range of motion without pain. Comments: FROM all large muscle groups and joints as witnessed when walking to exam table, getting on, and getting off the exam table.     Skin: General: Skin is warm and dry. Findings: No rash. Comments: Lesion on the lateral aspect of the mandible of the left side of the face has healthy eschar and minimally pink edges. The lesion on the inferior aspect of the mandible with independent tissue is slightly edematous minimally pink edges and a wet eschar present   Neurological:      Mental Status: She is alert. Motor: No tremor or atrophy. Gait: Gait normal.   Psychiatric:         Speech: Speech normal.         Behavior: Behavior normal.         Thought Content: Thought content normal.         No results found for this visit on 10/11/21. No results found for this or any previous visit (from the past 2016 hour(s)). [] Pt was seen by provider for      Minutes  Counseling and coordination of care was done for all assessment diagnosis listed for today with patient and any family/friend present. More than 50% of this visit was spent coordinating cuurent care, obtaining information for prior records, and counseling for current plan of action. Assessment:       Diagnosis Orders   1. Seborrheic keratoses     2. Visit for wound check           No orders of the defined types were placed in this encounter. No orders of the defined types were placed in this encounter. Medication List          Accurate as of October 11, 2021  1:07 PM. If you have any questions, ask your nurse or doctor.             CONTINUE taking these medications    aspirin 81 MG tablet     atorvastatin 10 MG tablet  Commonly known as: LIPITOR  TAKE 1 TABLET BY MOUTH EVERY DAY AT NIGHT     baclofen 10 MG tablet  Commonly known as: LIORESAL  TAKE 1 TABLET BY MOUTH NIGHTLY AS NEEDED (MUSCLE SPASMS)     COQ10 PO     cyanocobalamin 1000 MCG/ML injection  INJECT 1 ML AS DIRECTED EVERY 14 DAYS FOR 12 DOSES INJECT ONE CC SUB-CUTANEOSLY EVERY OTHER WEEK     diazePAM 5 MG tablet  Commonly known as: Valium  Take one or two tablets by mouth 45 minutes prior to procedure as needed for anxiety. dilTIAZem 30 MG tablet  Commonly known as: CARDIZEM     doxycycline hyclate 100 MG tablet  Commonly known as: VIBRA-TABS  TAKE 1 TABLET BY MOUTH EVERY DAY     gabapentin 300 MG capsule  Commonly known as: NEURONTIN  TAKE 1 CAPSULE BY MOUTH DAILY AND 2 AT NIGHT AS TOLERATED     hydroxychloroquine 200 MG tablet  Commonly known as: PLAQUENIL     losartan 50 MG tablet  Commonly known as: COZAAR     melatonin 5 MG Tabs tablet     nitroGLYCERIN 0.4 MG SL tablet  Commonly known as: NITROSTAT  PLEASE SEE ATTACHED FOR DETAILED DIRECTIONS     topiramate 25 MG tablet  Commonly known as: TOPAMAX  TAKE 1 TABLET BY MOUTH EVERY DAY AT NIGHT     Vitamin D3 50 MCG (2000 UT) Tabs              Plan:   Return for keep next planned appointment. Patient Instructions   Patient is to wash the wound with peroxide once today with rinsing afterwards small amount of antibiotic ointment and leave open to air. This note was partially created with the assistance of dictation. This may lead to grammatical or spelling errors. Gopal Ballard M.D.

## 2021-10-12 ENCOUNTER — HOSPITAL ENCOUNTER (OUTPATIENT)
Dept: INTERVENTIONAL RADIOLOGY/VASCULAR | Age: 66
Discharge: HOME | End: 2021-10-14
Payer: MEDICARE

## 2021-10-12 VITALS
HEIGHT: 68 IN | DIASTOLIC BLOOD PRESSURE: 73 MMHG | BODY MASS INDEX: 32.43 KG/M2 | SYSTOLIC BLOOD PRESSURE: 163 MMHG | WEIGHT: 214 LBS | HEART RATE: 67 BPM | OXYGEN SATURATION: 99 % | RESPIRATION RATE: 18 BRPM

## 2021-10-12 DIAGNOSIS — M54.16 LUMBAR RADICULOPATHY: ICD-10-CM

## 2021-10-12 PROCEDURE — 62323 NJX INTERLAMINAR LMBR/SAC: CPT | Performed by: PHYSICAL MEDICINE & REHABILITATION

## 2021-10-12 PROCEDURE — 2709999900 IR NERVE BLOCK PROCEDURE

## 2021-10-12 PROCEDURE — 6360000004 HC RX CONTRAST MEDICATION: Performed by: PHYSICAL MEDICINE & REHABILITATION

## 2021-10-12 PROCEDURE — 6360000002 HC RX W HCPCS: Performed by: PHYSICAL MEDICINE & REHABILITATION

## 2021-10-12 PROCEDURE — 2500000003 HC RX 250 WO HCPCS: Performed by: PHYSICAL MEDICINE & REHABILITATION

## 2021-10-12 RX ORDER — OXYCODONE AND ACETAMINOPHEN 7.5; 325 MG/1; MG/1
1 TABLET ORAL EVERY 6 HOURS PRN
Qty: 70 TABLET | Refills: 0 | Status: SHIPPED | OUTPATIENT
Start: 2021-10-12 | End: 2021-11-17 | Stop reason: SDUPTHER

## 2021-10-12 RX ORDER — LIDOCAINE HYDROCHLORIDE 5 MG/ML
50 INJECTION, SOLUTION INFILTRATION; INTRAVENOUS ONCE
Status: COMPLETED | OUTPATIENT
Start: 2021-10-12 | End: 2021-10-12

## 2021-10-12 RX ORDER — METHYLPREDNISOLONE ACETATE 40 MG/ML
40 INJECTION, SUSPENSION INTRA-ARTICULAR; INTRALESIONAL; INTRAMUSCULAR; SOFT TISSUE ONCE
Status: COMPLETED | OUTPATIENT
Start: 2021-10-12 | End: 2021-10-12

## 2021-10-12 RX ADMIN — METHYLPREDNISOLONE ACETATE 40 MG: 40 INJECTION, SUSPENSION INTRA-ARTICULAR; INTRALESIONAL; INTRAMUSCULAR; INTRASYNOVIAL; SOFT TISSUE at 09:53

## 2021-10-12 RX ADMIN — Medication 3 ML: at 09:52

## 2021-10-12 RX ADMIN — LIDOCAINE HYDROCHLORIDE 18 ML: 5 INJECTION, SOLUTION INFILTRATION at 09:50

## 2021-10-12 ASSESSMENT — PAIN - FUNCTIONAL ASSESSMENT
PAIN_FUNCTIONAL_ASSESSMENT: 0-10
PAIN_FUNCTIONAL_ASSESSMENT: 0-10

## 2021-10-12 ASSESSMENT — PAIN DESCRIPTION - DESCRIPTORS: DESCRIPTORS: BURNING;NUMBNESS;STABBING

## 2021-10-12 NOTE — PROCEDURES
OPERATIVE REPORT      DATE OF PROCEDURE: 10/12/2021    PREOPERATIVE DIAGNOSIS:   lumbar radiculopathy    POSTOPERATIVE DIAGNOSIS:   Same. OPERATIVE PROCEDURE:  Caudal epidural steroid injection with fluoroscopy and epidurogram.    PROCEDURE:  The patient taken to the special procedures department and placed in a prone position. A timeout was performed immediately prior to the start of the caudal block procedure and included the correct patient (two identifiers), correct procedure and correct site(s). Procedure consent and allergies were also verified. The sacral hiatus was identified and marked and the sacral region was then prepped and draped in the usual fashion. Local anesthetic of 0.5% Xylocaine was injected to form a skin wheal and then was injected to the depth of the sacrococcygeal membrane. At this point a C-arm  film was taken to verify for correct approach of the needle. The needle was then withdrawn. A #22 gauge 1-1/2 inch needle was inserted through the coccysacrogeal membrane into the epidural space using loss of resistance technique with air. After negative aspiration was confirmed, approximately 3ml of Isovue M 200 was used to verify needle tip location in the caudal epidural space. Distribution of the contrast medium was observed and a normal appearing epidurogram was noted. The air syringe was then removed. A mixture of 40mg of Depo-Medrol and 0.5% Lidocaine preservative free totaling 12cc was injected into the epidural space. The needle was aspirated prior to and during this injection several times to verify again that the needle was outside the intravascular or subdural regions. The needle was then withdrawn. The patient tolerated the procedure well and was taken to the post-anesthesia care unit in stable condition.   The patient was instructed to call our office the following business day for follow-up instructions and to notify us immediately if they were having any difficulties after the the injection.       Carla Davidson D.O.

## 2021-10-12 NOTE — PROGRESS NOTES
0915 Pt walked short distance with use of cane, was little unsteady. Pt sat into w/c and taken to CT holding room. 0920 Changed with minimal assistance. VSS. History and medication reviewed and updated. Patient complain of lower back pain 4/10, radiates down to right leg. Patient states she has \"bad sciatica down right leg\". Patient took percocet, aspirin and blood pressure medications this morning. [id number] procedure explained, questions answered, and consent signed. Emotional support offered.      7739 Patient taken to IR procedure room for Fluoroscopy Guided Caudal Epidural.

## 2021-10-12 NOTE — PROGRESS NOTES
Patient returned from specials department via wheelchair post caudal epidural block. Patient assessed and can wiggle toes. Patient has feelings in both feet and legs. Patient denies any new numbness or tingling in both feet or legs. Patient rates pain currently at 0/10. Patient able to get dressed without assistance. Gait slightly unsteadt. Discharge instructions received and reviewed with the patient. Patient questions answered. Patient verbalized understanding of discharge instructions. Patient given a copy of discharge instructions. Patient  discharged via wheelchair to front of hospital.  to drive them home.

## 2021-10-12 NOTE — SEDATION DOCUMENTATION
Patient was brought to Special Procedures suite via wheelchair. Patient onto procedure table in prone position with minimal assistance. Placed on vitals monitor. VSS. Pt c/o pain in back and shoulder while laying prone. Reassurance given. Caudal site prepped with Hibiclense and then betadine, draped and numbed with lidocaine. Needle placement verifed with fluoro guidance and contrast injection. All prep and medications given by Dr Yimi Howell. Patient tolerated well. Patient will return to the CT holding room. Electronically signed by Sonja Olmstead RN on 10/12/2021 at 9:53 AM

## 2021-10-14 DIAGNOSIS — Z79.899 HIGH RISK MEDICATION USE: ICD-10-CM

## 2021-10-14 DIAGNOSIS — M46.46 LUMBAR DISCITIS: ICD-10-CM

## 2021-10-14 DIAGNOSIS — M48.062 SPINAL STENOSIS OF LUMBAR REGION WITH NEUROGENIC CLAUDICATION: ICD-10-CM

## 2021-10-14 DIAGNOSIS — M15.9 PRIMARY OSTEOARTHRITIS INVOLVING MULTIPLE JOINTS: ICD-10-CM

## 2021-10-15 RX ORDER — ATORVASTATIN CALCIUM 10 MG/1
TABLET, FILM COATED ORAL
Qty: 90 TABLET | Refills: 3 | Status: SHIPPED | OUTPATIENT
Start: 2021-10-15

## 2021-10-15 RX ORDER — TOPIRAMATE 25 MG/1
TABLET ORAL
Qty: 90 TABLET | Refills: 1 | Status: SHIPPED | OUTPATIENT
Start: 2021-10-15 | End: 2022-08-04

## 2021-10-26 ENCOUNTER — HOSPITAL ENCOUNTER (OUTPATIENT)
Dept: INTERVENTIONAL RADIOLOGY/VASCULAR | Age: 66
Discharge: HOME | End: 2021-10-28
Payer: MEDICARE

## 2021-10-26 VITALS
HEART RATE: 64 BPM | RESPIRATION RATE: 16 BRPM | OXYGEN SATURATION: 98 % | SYSTOLIC BLOOD PRESSURE: 154 MMHG | DIASTOLIC BLOOD PRESSURE: 71 MMHG

## 2021-10-26 DIAGNOSIS — M54.16 LUMBAR RADICULOPATHY: ICD-10-CM

## 2021-10-26 PROCEDURE — 2500000003 HC RX 250 WO HCPCS: Performed by: PHYSICAL MEDICINE & REHABILITATION

## 2021-10-26 PROCEDURE — 62323 NJX INTERLAMINAR LMBR/SAC: CPT | Performed by: PHYSICAL MEDICINE & REHABILITATION

## 2021-10-26 PROCEDURE — 6360000002 HC RX W HCPCS: Performed by: PHYSICAL MEDICINE & REHABILITATION

## 2021-10-26 PROCEDURE — 2709999900 IR NERVE BLOCK PROCEDURE

## 2021-10-26 RX ORDER — DIAZEPAM 10 MG/1
10 TABLET ORAL EVERY 6 HOURS PRN
COMMUNITY
End: 2021-12-01 | Stop reason: SDUPTHER

## 2021-10-26 RX ORDER — LIDOCAINE HYDROCHLORIDE 5 MG/ML
50 INJECTION, SOLUTION INFILTRATION; INTRAVENOUS ONCE
Status: COMPLETED | OUTPATIENT
Start: 2021-10-26 | End: 2021-10-26

## 2021-10-26 RX ORDER — METHYLPREDNISOLONE ACETATE 40 MG/ML
40 INJECTION, SUSPENSION INTRA-ARTICULAR; INTRALESIONAL; INTRAMUSCULAR; SOFT TISSUE ONCE
Status: COMPLETED | OUTPATIENT
Start: 2021-10-26 | End: 2021-10-26

## 2021-10-26 RX ADMIN — LIDOCAINE HYDROCHLORIDE 18 ML: 5 INJECTION, SOLUTION INFILTRATION at 09:50

## 2021-10-26 RX ADMIN — METHYLPREDNISOLONE ACETATE 40 MG: 40 INJECTION, SUSPENSION INTRA-ARTICULAR; INTRALESIONAL; INTRAMUSCULAR; INTRASYNOVIAL; SOFT TISSUE at 09:50

## 2021-10-26 ASSESSMENT — PAIN - FUNCTIONAL ASSESSMENT
PAIN_FUNCTIONAL_ASSESSMENT: 0-10
PAIN_FUNCTIONAL_ASSESSMENT: 0-10

## 2021-10-26 ASSESSMENT — PAIN DESCRIPTION - DESCRIPTORS: DESCRIPTORS: BURNING;NUMBNESS;STABBING

## 2021-10-26 NOTE — PROCEDURES
OPERATIVE REPORT      DATE OF PROCEDURE: 10/26/2021    PREOPERATIVE DIAGNOSIS:   lumbar radiculopathy    POSTOPERATIVE DIAGNOSIS:   Same. OPERATIVE PROCEDURE:  Caudal epidural steroid injection with fluoroscopy and epidurogram.    PROCEDURE:  The patient taken to the special procedures department and placed in a prone position. A timeout was performed immediately prior to the start of the caudal block procedure and included the correct patient (two identifiers), correct procedure and correct site(s). Procedure consent and allergies were also verified. The sacral hiatus was identified and marked and the sacral region was then prepped and draped in the usual fashion. Local anesthetic of 0.5% Xylocaine was injected to form a skin wheal and then was injected to the depth of the sacrococcygeal membrane. At this point a C-arm  film was taken to verify for correct approach of the needle. The needle was then withdrawn. A #22 gauge 1-1/2 inch needle was inserted through the coccysacrogeal membrane into the epidural space using loss of resistance technique with air. After negative aspiration was confirmed, approximately 3ml of free air was used to verify needle tip location in the caudal epidural space. Distribution of the contrast medium was observed and a normal appearing epidurogram was noted. The air syringe was then removed. A mixture of 40mg of Depo-Medrol and 0.5% Lidocaine preservative free totaling 12cc was injected into the epidural space. The needle was aspirated prior to and during this injection several times to verify again that the needle was outside the intravascular or subdural regions. The needle was then withdrawn. The patient tolerated the procedure well and was taken to the post-anesthesia care unit in stable condition.   The patient was instructed to call our office the following business day for follow-up instructions and to notify us immediately if they were having any difficulties after the the injection.       Дмитрий Pickett D.O.

## 2021-10-26 NOTE — SEDATION DOCUMENTATION
Patient brought to Special Procedures suite via wheelchair. Patient onto procedure table in prone position with minimal assistance. Placed on vitals monitor. VSS. Caudal site prepped with Hibiclense and then betadine, draped and numbed with lidocaine. Needle placement verifed with fluoro guidance and air injection. All prep and medications given by Dr Sam Hansen. Patient tolerated well. Patient will return to the CT holding room.

## 2021-10-26 NOTE — PROGRESS NOTES
Pt ambulated with cane, gait steady, back to CT holding. Pt A&Ox4, respirations even and unlabored, skin p/w/d. Pt changed independently into hospital gown. Pt interview complete, pt denies contrast allergy and states isn't on any blood thinners only baby ASA. Pt took Valium this morning for procedure. Consent obtained, VSS. Pt taken to specials dept via wheelchair. Electronically signed by Benito Stanley RN on 10/26/2021 at 9:27 AM

## 2021-11-15 ENCOUNTER — OFFICE VISIT (OUTPATIENT)
Dept: FAMILY MEDICINE CLINIC | Age: 66
End: 2021-11-15
Payer: MEDICARE

## 2021-11-15 VITALS — TEMPERATURE: 97.6 F | WEIGHT: 218 LBS | HEIGHT: 68 IN | BODY MASS INDEX: 33.04 KG/M2

## 2021-11-15 DIAGNOSIS — L85.3 DRY SKIN DERMATITIS: ICD-10-CM

## 2021-11-15 DIAGNOSIS — L85.8 KERATOSIS PILARIS: Primary | ICD-10-CM

## 2021-11-15 DIAGNOSIS — L82.1 SEBORRHEIC KERATOSES: ICD-10-CM

## 2021-11-15 PROCEDURE — 1036F TOBACCO NON-USER: CPT | Performed by: FAMILY MEDICINE

## 2021-11-15 PROCEDURE — G8484 FLU IMMUNIZE NO ADMIN: HCPCS | Performed by: FAMILY MEDICINE

## 2021-11-15 PROCEDURE — 1090F PRES/ABSN URINE INCON ASSESS: CPT | Performed by: FAMILY MEDICINE

## 2021-11-15 PROCEDURE — 99213 OFFICE O/P EST LOW 20 MIN: CPT | Performed by: FAMILY MEDICINE

## 2021-11-15 PROCEDURE — G8427 DOCREV CUR MEDS BY ELIG CLIN: HCPCS | Performed by: FAMILY MEDICINE

## 2021-11-15 PROCEDURE — G8399 PT W/DXA RESULTS DOCUMENT: HCPCS | Performed by: FAMILY MEDICINE

## 2021-11-15 PROCEDURE — 1123F ACP DISCUSS/DSCN MKR DOCD: CPT | Performed by: FAMILY MEDICINE

## 2021-11-15 PROCEDURE — G8417 CALC BMI ABV UP PARAM F/U: HCPCS | Performed by: FAMILY MEDICINE

## 2021-11-15 PROCEDURE — 3017F COLORECTAL CA SCREEN DOC REV: CPT | Performed by: FAMILY MEDICINE

## 2021-11-15 PROCEDURE — 4040F PNEUMOC VAC/ADMIN/RCVD: CPT | Performed by: FAMILY MEDICINE

## 2021-11-15 NOTE — PROGRESS NOTES
Diagnosis Orders   1. Keratosis pilaris     2. Dry skin dermatitis     3. Seborrheic keratoses       Return in about 1 year (around 11/15/2022) for 15 min skin check. Patient Instructions   No action at this time. Yearly skin check     We encouraged routine use of the Goldbond products on the extremities to help improve the keratosis pilaris and dry skin dermatitis      Subjective:      Patient ID: Pankaj Adkins is a 74 y.o. female who presents for:  Chief Complaint   Patient presents with    Skin Exam    Skin Problem     rechecking facial lesions going on cruise in [address] [place] [address] [address] would like to make sure they are healed and ok to be going on the cruise. Patient states she did get the Goldbond products but has not been using them recently. There was a family wedding and a lot of chaos and she has been out of habit. When she was using it it was helping. She also wanted to recheck the lesions on the right side of her face for any sign of growth. She does not think she seen any. And she wanted to confirm that the lesions in the left side of her face are healed enough for sun exposure. We reviewed the pathology of the biopsies      Current Outpatient Medications on File Prior to Visit   Medication Sig Dispense Refill    diazePAM (VALIUM) 10 MG tablet Take 10 mg by mouth every 6 hours as needed for Anxiety (for caudal injection).       topiramate (TOPAMAX) 25 MG tablet TAKE 1 TABLET BY MOUTH EVERY DAY AT NIGHT 90 tablet 1    atorvastatin (LIPITOR) 10 MG tablet TAKE 1 TABLET BY MOUTH EVERY DAY AT NIGHT 90 tablet 3    dilTIAZem (CARDIZEM) 30 MG tablet Take 30 mg by mouth 2 times daily      baclofen (LIORESAL) 10 MG tablet TAKE 1 TABLET BY MOUTH NIGHTLY AS NEEDED (MUSCLE SPASMS) 90 tablet 2    doxycycline hyclate (VIBRA-TABS) 100 MG tablet TAKE 1 TABLET BY MOUTH EVERY DAY 90 tablet 5    losartan (COZAAR) 50 MG tablet TAKE 1 TABLET BY MOUTH EVERY DAY      cyanocobalamin 1000 MCG/ML injection INJECT 1 ML AS DIRECTED EVERY 14 DAYS FOR 12 DOSES INJECT ONE CC SUB-CUTANEOSLY EVERY OTHER WEEK 6 mL 3    hydroxychloroquine (PLAQUENIL) 200 MG tablet Take 1 tablet by mouth daily      nitroGLYCERIN (NITROSTAT) 0.4 MG SL tablet PLEASE SEE ATTACHED FOR DETAILED DIRECTIONS 25 tablet 3    melatonin 5 MG TABS tablet Take 5 mg by mouth nightly      aspirin 81 MG tablet Take 81 mg by mouth daily      Coenzyme Q10 (COQ10 PO) Take 1 capsule by mouth daily       Cholecalciferol (VITAMIN D3) 2000 units TABS Take 2 tablets by mouth daily       gabapentin (NEURONTIN) 300 MG capsule TAKE 1 CAPSULE BY MOUTH DAILY AND 2 AT NIGHT AS TOLERATED 270 capsule 1     Current Facility-Administered Medications on File Prior to Visit   Medication Dose Route Frequency Provider Last Rate Last Admin    cyanocobalamin injection 1,000 mcg  1,000 mcg IntraMUSCular Once Patrick Miami, DO         Past Medical History:   Diagnosis Date    Abnormal electromyogram (EMG) 8/19/09    SENSORIMOTOR NEUROPATHY OF BLE, RIGHT WORSE THAN LEFT, SUSPICIOUS FOR RIGHT S1 RADIC    Angina pectoris (HCC)     Angina pectoris ([address] 07/2019    sees Dr. Mary Nickerson Ankle pain     Bleeding ulcer     Colon polyp 4/3/2017    Dermatitis     Fibromyalgia     Foot pain     Hyperlipidemia     Hypertension     Infection of intervertebral disc (pyogenic), lumbar region ([address] 8/13/2018    Low back pain     MRSA (methicillin resistant staph aureus) culture positive 2011    Neck pain     Neuropathy     Obesity     Osteoarthritis     PSVT (paroxysmal supraventricular tachycardia) ([address]     Shingles outbreak 2012    SI (sacroiliac) pain     Stenosis     Vitamin D deficiency      Past Surgical History:   Procedure Laterality Date    CARDIAC CATHETERIZATION  10/2016    CARDIOVERSION      2001 x3    CERVICAL FUSION  12/14/2011    Dr Javan Lockett with bone graft and plate    COLONOSCOPY  09/2021    CC    ENDOSCOPIC ULTRASOUND (LOWER) N/A 07/11/2019    EGD/ EUS performed by Judit Warren MD at [address]  07/2005    HYSTERECTOMY  2001    HYSTERECTOMY, TOTAL ABDOMINAL      JOINT REPLACEMENT Left     tka    LIVER BIOPSY      MENISCECTOMY  10/31/2011    left knee    OTHER SURGICAL HISTORY  10/26/2009    CAUDALS BY DR Janet Thomason    NC COLON CA SCRN NOT  [address] IND N/A 04/12/2017    COLONOSCOPY performed by Kaia Abreu MD at [address] ESOPHAGOGASTRODUODENOSCOPY TRANSORAL DIAGNOSTIC N/A 04/12/2017    EGD ESOPHAGOGASTRODUODENOSCOPY performed by Kaia Abreu MD at [address]  12/2011    Dr Basilio Clifford Lumbar and c spine    TOTAL KNEE ARTHROPLASTY  07/30/2012    LEFT-DR AVELAR    UPPER GASTROINTESTINAL ENDOSCOPY  1995     Social History     Socioeconomic History    Marital status:      Spouse name: Romeo Collinst Number of children: 2    Years of education: 15    Highest education level: Associate degree: occupational, technical, or vocational program   Occupational History    Occupation: Retired    Tobacco Use    Smoking status: Never Smoker    Smokeless tobacco: Never Used   Vaping Use    Vaping Use: Never used   Substance and Sexual Activity    Alcohol use: No    Drug use: No    Sexual activity: Not Currently   Other Topics Concern    Not on file   Social History Narrative    Lives in [place] with her  Shelley Hawkins and son Dex Garcia is mildly handicapped --CP right lilia with anxiety and LD. He works at [employer]. Social Determinants of Health     Financial Resource Strain: Low Risk     Difficulty of Paying Living Expenses: Not hard at all   Food Insecurity: No Food Insecurity    Worried About Running Out of Food in the Last Year: Never true    Romana of Food in the Last Year: Never true   Transportation Needs:     Lack of Transportation (Medical): Not on file    Lack of Transportation (Non-Medical):  Not on file   Physical Activity:     Days of Exercise per Week: Not on file    Minutes of Exercise per Session: Not on file   Stress:     Feeling of Stress : Not on file   Social Connections:     Frequency of Communication with Friends and Family: Not on file    Frequency of Social Gatherings with Friends and Family: Not on file    Attends [employer] Services: Not on file    Active Member of [address] [place] [address] [employer] or Organizations: Not on file    Attends Club or Organization Meetings: Not on file    Marital Status: Not on file   Intimate Partner Violence:     Fear of Current or Ex-Partner: Not on file    Emotionally Abused: Not on file    Physically Abused: Not on file    Sexually Abused: Not on file   Housing Stability:     Unable to Pay for Housing in the Last Year: Not on file    Number of [place] in the Last Year: Not on file    Unstable Housing in the Last Year: Not on file     Family History   Problem Relation Age of Onset    Heart Disease Father     High Cholesterol Father     High Blood Pressure Father     Stroke Mother     High Blood Pressure Mother     High Blood Pressure Brother      Allergies:  Latex, Norvasc [amlodipine besylate], and Keflex [cephalexin]    Review of Systems   Constitutional: Negative for chills and fever. Allergic/Immunologic: Negative for environmental allergies, food allergies and immunocompromised state. Hematological: Negative for adenopathy. Does not bruise/bleed easily. Psychiatric/Behavioral: Negative for behavioral problems and sleep disturbance. Objective:   Temp 97.6 °F (36.4 °C)   Ht 5' 8\" (1.727 m)   Wt 218 lb (98.9 kg)   BMI 33.15 kg/m²     Physical Exam  Constitutional:       General: She is not in acute distress. Appearance: Normal appearance. She is well-developed. She is not toxic-appearing. HENT:      Head: Normocephalic and atraumatic. Right Ear: Hearing and tympanic membrane normal.      Left Ear: Hearing and tympanic membrane normal.      Nose: Nose normal. No nasal deformity.    Eyes: General: Lids are normal.         Right eye: No discharge. Left eye: No discharge. Conjunctiva/sclera: Conjunctivae normal.      Pupils: Pupils are equal, round, and reactive to light. Neck:      Thyroid: No thyroid mass or thyromegaly. Vascular: No JVD. Trachea: Trachea and phonation normal.   Cardiovascular:      Rate and Rhythm: Normal rate and regular rhythm. Pulmonary:      Effort: No accessory muscle usage or respiratory distress. Musculoskeletal:      Cervical back: Full passive range of motion without pain. Comments: FROM all large muscle groups and joints as witnessed when walking to exam table, getting on, and getting off the exam table. Skin:     General: Skin is warm and dry. Findings: No rash. Comments: All skin lesions are stable. Hyperkeratotic papules noted bilateral upper arms   Neurological:      Mental Status: She is alert. Motor: No tremor or atrophy. Gait: Gait normal.   Psychiatric:         Speech: Speech normal.         Behavior: Behavior normal.         Thought Content: Thought content normal.         No results found for this visit on 11/15/21. No results found for this or any previous visit (from the past 2016 hour(s)). [] Pt was seen by provider for      Minutes  Counseling and coordination of care was done for all assessment diagnosis listed for today with patient and any family/friend present. More than 50% of this visit was spent coordinating cuurent care, obtaining information for prior records, and counseling for current plan of action. Assessment:       Diagnosis Orders   1. Keratosis pilaris     2. Dry skin dermatitis     3. Seborrheic keratoses           No orders of the defined types were placed in this encounter. No orders of the defined types were placed in this encounter.          Medication List          Accurate as of November 15, 2021 11:17 AM. If you have any questions, ask your nurse or doctor. CONTINUE taking these medications    aspirin 81 MG tablet     atorvastatin 10 MG tablet  Commonly known as: LIPITOR  TAKE 1 TABLET BY MOUTH EVERY DAY AT NIGHT     baclofen 10 MG tablet  Commonly known as: LIORESAL  TAKE 1 TABLET BY MOUTH NIGHTLY AS NEEDED (MUSCLE SPASMS)     COQ10 PO     cyanocobalamin 1000 MCG/ML injection  INJECT 1 ML AS DIRECTED EVERY 14 DAYS FOR 12 DOSES INJECT ONE CC SUB-CUTANEOSLY EVERY OTHER WEEK     diazePAM 10 MG tablet  Commonly known as: VALIUM     dilTIAZem 30 MG tablet  Commonly known as: CARDIZEM     doxycycline hyclate 100 MG tablet  Commonly known as: VIBRA-TABS  TAKE 1 TABLET BY MOUTH EVERY DAY     gabapentin 300 MG capsule  Commonly known as: NEURONTIN  TAKE 1 CAPSULE BY MOUTH DAILY AND 2 AT NIGHT AS TOLERATED     hydroxychloroquine 200 MG tablet  Commonly known as: PLAQUENIL     losartan 50 MG tablet  Commonly known as: COZAAR     melatonin 5 MG Tabs tablet     nitroGLYCERIN 0.4 MG SL tablet  Commonly known as: NITROSTAT  PLEASE SEE ATTACHED FOR DETAILED DIRECTIONS     topiramate 25 MG tablet  Commonly known as: TOPAMAX  TAKE 1 TABLET BY MOUTH EVERY DAY AT NIGHT     Vitamin D3 50 MCG (2000 UT) Tabs              Plan:   Return in about 1 year (around 11/15/2022) for 15 min skin check. Patient Instructions   No action at this time. Yearly skin check     We encouraged routine use of the Goldbond products on the extremities to help improve the keratosis pilaris and dry skin dermatitis      This note was partially created with the assistance of dictation. This may lead to grammatical or spelling errors. Gopal Mota M.D.

## 2021-11-15 NOTE — PATIENT INSTRUCTIONS
No action at this time.   Yearly skin check     We encouraged routine use of the Goldbond products on the extremities to help improve the keratosis pilaris and dry skin dermatitis

## 2021-11-17 DIAGNOSIS — Z79.899 HIGH RISK MEDICATION USE: ICD-10-CM

## 2021-11-17 DIAGNOSIS — G89.29 CHRONIC BILATERAL LOW BACK PAIN WITH BILATERAL SCIATICA: ICD-10-CM

## 2021-11-17 DIAGNOSIS — M54.2 CERVICALGIA: ICD-10-CM

## 2021-11-17 DIAGNOSIS — M54.41 CHRONIC BILATERAL LOW BACK PAIN WITH BILATERAL SCIATICA: ICD-10-CM

## 2021-11-17 DIAGNOSIS — M54.42 CHRONIC BILATERAL LOW BACK PAIN WITH BILATERAL SCIATICA: ICD-10-CM

## 2021-11-17 DIAGNOSIS — M05.79 RHEUMATOID ARTHRITIS INVOLVING MULTIPLE SITES WITH POSITIVE RHEUMATOID FACTOR (HCC): ICD-10-CM

## 2021-11-17 RX ORDER — OXYCODONE AND ACETAMINOPHEN 7.5; 325 MG/1; MG/1
1 TABLET ORAL EVERY 6 HOURS PRN
Qty: 70 TABLET | Refills: 0 | Status: SHIPPED | OUTPATIENT
Start: 2021-11-17 | End: 2021-12-01 | Stop reason: SDUPTHER

## 2021-12-01 ENCOUNTER — OFFICE VISIT (OUTPATIENT)
Dept: PHYSICAL MEDICINE AND REHAB | Age: 66
End: 2021-12-01
Payer: MEDICARE

## 2021-12-01 VITALS
HEIGHT: 68 IN | BODY MASS INDEX: 34.1 KG/M2 | WEIGHT: 225 LBS | OXYGEN SATURATION: 99 % | DIASTOLIC BLOOD PRESSURE: 82 MMHG | HEART RATE: 82 BPM | SYSTOLIC BLOOD PRESSURE: 144 MMHG | RESPIRATION RATE: 12 BRPM

## 2021-12-01 DIAGNOSIS — M54.41 CHRONIC BILATERAL LOW BACK PAIN WITH BILATERAL SCIATICA: ICD-10-CM

## 2021-12-01 DIAGNOSIS — G89.29 CHRONIC BILATERAL LOW BACK PAIN WITH BILATERAL SCIATICA: ICD-10-CM

## 2021-12-01 DIAGNOSIS — F41.9 ANXIETY DUE TO INVASIVE PROCEDURE: Primary | ICD-10-CM

## 2021-12-01 DIAGNOSIS — M54.42 CHRONIC BILATERAL LOW BACK PAIN WITH BILATERAL SCIATICA: ICD-10-CM

## 2021-12-01 DIAGNOSIS — Z79.899 HIGH RISK MEDICATION USE: ICD-10-CM

## 2021-12-01 DIAGNOSIS — R76.8 ANA POSITIVE: ICD-10-CM

## 2021-12-01 DIAGNOSIS — M54.2 CERVICALGIA: ICD-10-CM

## 2021-12-01 DIAGNOSIS — M05.79 RHEUMATOID ARTHRITIS INVOLVING MULTIPLE SITES WITH POSITIVE RHEUMATOID FACTOR (HCC): ICD-10-CM

## 2021-12-01 DIAGNOSIS — M48.062 SPINAL STENOSIS OF LUMBAR REGION WITH NEUROGENIC CLAUDICATION: ICD-10-CM

## 2021-12-01 DIAGNOSIS — G57.93 NEUROPATHY INVOLVING BOTH LOWER EXTREMITIES: ICD-10-CM

## 2021-12-01 DIAGNOSIS — M48.062 SPINAL STENOSIS OF LUMBAR REGION WITH NEUROGENIC CLAUDICATION: Primary | ICD-10-CM

## 2021-12-01 PROCEDURE — 1090F PRES/ABSN URINE INCON ASSESS: CPT | Performed by: PHYSICAL MEDICINE & REHABILITATION

## 2021-12-01 PROCEDURE — G8399 PT W/DXA RESULTS DOCUMENT: HCPCS | Performed by: PHYSICAL MEDICINE & REHABILITATION

## 2021-12-01 PROCEDURE — G8484 FLU IMMUNIZE NO ADMIN: HCPCS | Performed by: PHYSICAL MEDICINE & REHABILITATION

## 2021-12-01 PROCEDURE — 99214 OFFICE O/P EST MOD 30 MIN: CPT | Performed by: PHYSICAL MEDICINE & REHABILITATION

## 2021-12-01 PROCEDURE — 1036F TOBACCO NON-USER: CPT | Performed by: PHYSICAL MEDICINE & REHABILITATION

## 2021-12-01 PROCEDURE — G8427 DOCREV CUR MEDS BY ELIG CLIN: HCPCS | Performed by: PHYSICAL MEDICINE & REHABILITATION

## 2021-12-01 PROCEDURE — 1123F ACP DISCUSS/DSCN MKR DOCD: CPT | Performed by: PHYSICAL MEDICINE & REHABILITATION

## 2021-12-01 PROCEDURE — 3017F COLORECTAL CA SCREEN DOC REV: CPT | Performed by: PHYSICAL MEDICINE & REHABILITATION

## 2021-12-01 PROCEDURE — 4040F PNEUMOC VAC/ADMIN/RCVD: CPT | Performed by: PHYSICAL MEDICINE & REHABILITATION

## 2021-12-01 PROCEDURE — G8417 CALC BMI ABV UP PARAM F/U: HCPCS | Performed by: PHYSICAL MEDICINE & REHABILITATION

## 2021-12-01 RX ORDER — OMEPRAZOLE 40 MG/1
40 CAPSULE, DELAYED RELEASE ORAL DAILY
COMMUNITY
Start: 2021-11-12

## 2021-12-01 RX ORDER — OXYCODONE AND ACETAMINOPHEN 7.5; 325 MG/1; MG/1
1 TABLET ORAL EVERY 6 HOURS PRN
Qty: 70 TABLET | Refills: 0 | Status: SHIPPED | OUTPATIENT
Start: 2021-12-16 | End: 2022-01-13 | Stop reason: SDUPTHER

## 2021-12-01 RX ORDER — DIAZEPAM 10 MG/1
10 TABLET ORAL EVERY 6 HOURS PRN
Qty: 4 TABLET | Refills: 0 | Status: SHIPPED | OUTPATIENT
Start: 2021-12-12 | End: 2021-12-13

## 2021-12-01 ASSESSMENT — ENCOUNTER SYMPTOMS
ABDOMINAL PAIN: 1
SHORTNESS OF BREATH: 1
BACK PAIN: 1
SORE THROAT: 0
DIARRHEA: 0
CONSTIPATION: 1
COUGH: 0
PHOTOPHOBIA: 1
VOMITING: 0
STRIDOR: 0
EYE PAIN: 0
WHEEZING: 0
BLOOD IN STOOL: 0
NAUSEA: 0
EYE REDNESS: 0

## 2021-12-01 NOTE — TELEPHONE ENCOUNTER
Patient requesting medication refill. Please approve or deny this request.    Rx requested:  Requested Prescriptions     Pending Prescriptions Disp Refills    diazePAM (VALIUM) 10 MG tablet 4 tablet 0     Sig: Take 1 tablet by mouth every 6 hours as needed for Anxiety (for caudal injection) for up to 1 day.          Last Office Visit:   12/1/2021      Next Visit Date:  Future Appointments   Date Time Provider Stacie Butler   12/14/2021  9:30 AM James Colorado DO [address]   1/18/2022 10:15 AM DO [facility] [place]   1/25/2022 10:00 AM James Colorado DO [address]   2/24/2022 10:45 AM James Colorado DO [address]   3/21/2022  9:00 AM Hailey Santoro [facility]   10/6/2022  9:45 AM Flynn Ballard MD [facility]

## 2021-12-01 NOTE — PROGRESS NOTES
Subjective  Mark Alvarado, 74 y.o. female presents today with:     Month Follow-Up (first Iris Jane helped the second one didnt help a lot, cant stand up right cant walk more than 15 feet )   Medication Refill   Back Pain (right burning down her leg, If she stands for too long pain on her left side, would like TP and a caudel block )         She is still doing well with her current medications and shows no signs of abuse or overuse. She is more functional on it. Does not need Neurontin refills. She is to avoid any NSAIDs but she will continue gabapentin as tolerated baclofen vitamin D and zinc.  She is again requesting monthly trigger point -sciatics as needed and to add a third caudal..       Back Pain  This is a chronic problem. The current episode started more than 1 year ago. The problem occurs daily. The problem is unchanged. The pain is present in the lumbar spine and gluteal. The quality of the pain is described as aching. Radiates to: bilateral legs, left leg worse, left hip. The pain is at a severity of 5/10. The pain is moderate. The pain is worse during the day. The symptoms are aggravated by bending, standing, position, sitting and twisting (Walking). Stiffness is present in the morning. Associated symptoms include abdominal pain, leg pain and pelvic pain. Pertinent negatives include no bladder incontinence, chest pain, dysuria, fever, headaches, numbness, paresis, perianal numbness or weakness. Risk factors include obesity, poor posture, menopause, lack of exercise, sedentary lifestyle and history of steroid use. She has tried analgesics, walking, home exercises, heat, muscle relaxant, NSAIDs, ice and bed rest (Sciatica block, trigger point injections, mobic, Gabapentin, Norco) for the symptoms. The treatment provided significant relief. Neck Pain   This is a chronic problem. The problem occurs constantly. The problem has been waxing and waning. The pain is at a severity of 5/10.  The pain is mild. The symptoms are aggravated by position and bending. Stiffness is present in the morning. Associated symptoms include leg pain and photophobia. Pertinent negatives include no chest pain, fever, headaches, numbness, paresis or weakness. She has tried heat, acetaminophen, chiropractic manipulation, ice, muscle relaxants, neck support, NSAIDs and oral narcotics for the symptoms. The treatment provided moderate relief. Foot Pain   The pain is present in the neck, back and left shoulder. This is a chronic problem. The current episode started more than 1 year ago. The problem occurs constantly. The problem has been gradually worsening. The pain is at a severity of 7/10. Associated symptoms include joint locking, joint swelling and a limited range of motion. Pertinent negatives include no fever or numbness. The symptoms are aggravated by contact, heat and standing. She has tried cold, NSAIDS, OTC ointments, OTC pain meds, chondroitin, heat, movement, acetaminophen, oral narcotics and rest for the symptoms. The treatment provided moderate relief. Family history includes rheumatoid arthritis. Her past medical history is significant for osteoarthritis. There is no history of diabetes.        Past Medical History:   Diagnosis Date    Abnormal electromyogram (EMG) 8/19/09    SENSORIMOTOR NEUROPATHY OF BLE, RIGHT WORSE THAN LEFT, SUSPICIOUS FOR RIGHT S1 RADIC    Angina pectoris (HCC)     Angina pectoris ([address] 07/2019    sees Dr. Freddy Lewis Ankle pain     Bleeding ulcer     Colon polyp 4/3/2017    Dermatitis     Fibromyalgia     Foot pain     Hyperlipidemia     Hypertension     Infection of intervertebral disc (pyogenic), lumbar region ([address] 8/13/2018    Low back pain     MRSA (methicillin resistant staph aureus) culture positive 2011    Neck pain     Neuropathy     Obesity     Osteoarthritis     PSVT (paroxysmal supraventricular tachycardia) ([address]     Shingles outbreak 2012    SI (sacroiliac) pain     Stenosis     Vitamin D deficiency      Past Surgical History:   Procedure Laterality Date    CARDIAC CATHETERIZATION  10/2016    CARDIOVERSION      2001 x3    CERVICAL FUSION  12/14/2011    Dr Bernice Mcleod with bone graft and plate    COLONOSCOPY  09/2021    CC    ENDOSCOPIC ULTRASOUND (LOWER) N/A 07/11/2019    EGD/ EUS performed by Akshat Martinez MD at [address]  07/2005    HYSTERECTOMY  2001    HYSTERECTOMY, TOTAL ABDOMINAL      JOINT REPLACEMENT Left     tka    LIVER BIOPSY      MENISCECTOMY  10/31/2011    left knee    OTHER SURGICAL HISTORY  10/26/2009    CAUDALS BY DR Scooby Turpin    [place] COLON CA SCRN NOT  [address] IND N/A 04/12/2017    COLONOSCOPY performed by Donice Bence, MD at [address] ESOPHAGOGASTRODUODENOSCOPY TRANSORAL DIAGNOSTIC N/A 04/12/2017    EGD ESOPHAGOGASTRODUODENOSCOPY performed by Donice Bence, MD at [address]  12/2011    Dr Bernice Mcleod Lumbar and c spine    TOTAL KNEE ARTHROPLASTY  07/30/2012    LEFT-DR AVELAR    UPPER GASTROINTESTINAL ENDOSCOPY  1995     Social History     Socioeconomic History    Marital status:      Spouse name: Kelley Verde Number of children: 2    Years of education: 15    Highest education level: Associate degree: occupational, technical, or vocational program   Occupational History    Occupation: Retired    Tobacco Use    Smoking status: Never Smoker    Smokeless tobacco: Never Used   Vaping Use    Vaping Use: Never used   Substance and Sexual Activity    Alcohol use: No    Drug use: No    Sexual activity: Not Currently   Other Topics Concern    None   Social History Narrative    Lives in [place] with her  Hersnancyl Cayla and son David Lin is mildly handicapped --CP right lilia with anxiety and LD. He works at [email or web address] and [employer].      Social Determinants of Health     Financial Resource Strain: Low Risk     Difficulty of Paying Living Expenses: Not hard at all Food Insecurity: No Food Insecurity    Worried About Running Out of Food in the Last Year: Never true    Romana of Food in the Last Year: Never true   Transportation Needs:     Lack of Transportation (Medical): Not on file    Lack of Transportation (Non-Medical): Not on file   Physical Activity:     Days of Exercise per Week: Not on file    Minutes of Exercise per Session: Not on file   Stress:     Feeling of Stress : Not on file   Social Connections:     Frequency of Communication with Friends and Family: Not on file    Frequency of Social Gatherings with Friends and Family: Not on file    Attends Holiness Services: Not on file    Active Member of [address] [place] [address] [employer] or Organizations: Not on file    Attends Club or Organization Meetings: Not on file    Marital Status: Not on file   Intimate Partner Violence:     Fear of Current or Ex-Partner: Not on file    Emotionally Abused: Not on file    Physically Abused: Not on file    Sexually Abused: Not on file   Housing Stability:     Unable to Pay for Housing in the Last Year: Not on file    Number of [place] in the Last Year: Not on file    Unstable Housing in the Last Year: Not on file     Family History   Problem Relation Age of Onset    Heart Disease Father     High Cholesterol Father     High Blood Pressure Father     Stroke Mother     High Blood Pressure Mother     High Blood Pressure Brother        Allergies   Allergen Reactions    Latex Swelling     Swelling of hands with wearing latex gloves    Norvasc [Amlodipine Besylate] Other (See Comments)     swelling    Keflex [Cephalexin] Rash       Review of Systems   Constitutional: Positive for activity change and fatigue. Negative for chills, diaphoresis and fever. HENT: Negative for congestion, ear discharge, ear pain, hearing loss, nosebleeds, sore throat and tinnitus. Eyes: Positive for photophobia. Negative for pain and redness. Respiratory: Positive for shortness of breath. present. No tracheal deviation. Cardiovascular:      Pulses: No decreased pulses. Pulmonary:      Effort: Pulmonary effort is normal. No tachypnea, bradypnea, accessory muscle usage or respiratory distress. Breath sounds: No wheezing. Chest:      Chest wall: No tenderness. Abdominal:      General: Bowel sounds are normal. There is no distension. Palpations: Abdomen is soft. There is no mass. Tenderness: There is no abdominal tenderness. There is no guarding or rebound. Comments: Pressure RUQ   Musculoskeletal:         General: Tenderness present. Right shoulder: Tenderness and bony tenderness present. Decreased range of motion. Left shoulder: Tenderness and bony tenderness present. Decreased range of motion. Right upper arm: Normal.      Left upper arm: Normal.      Right elbow: Normal.      Left elbow: Normal.      Right forearm: Normal.      Left forearm: Normal.      Right wrist: Normal.      Left wrist: Normal.      Right hand: Deformity, tenderness and bony tenderness present. Decreased range of motion. Left hand: Deformity, tenderness and bony tenderness present. Decreased range of motion. Arms:       Cervical back: Neck supple. Tenderness and bony tenderness present. No edema or rigidity. Spinous process tenderness and muscular tenderness present. Decreased range of motion. Thoracic back: Tenderness and bony tenderness present. Decreased range of motion. Lumbar back: Tenderness and bony tenderness present. No swelling, edema, deformity or lacerations. Decreased range of motion. Back:       Right hip: Tenderness present. Decreased range of motion. Decreased strength. Left hip: Tenderness present. Decreased range of motion. Decreased strength.       Right upper leg: Normal.      Left upper leg: Normal.      Right knee: Normal.      Left knee: Normal.      Right lower leg: Normal.      Left lower leg: Normal.      Right ankle: Normal.      Right Achilles Tendon: Normal.      Left ankle: Tenderness present over the lateral malleolus and medial malleolus. Decreased range of motion. Left Achilles Tendon: Normal.      Right foot: Decreased range of motion. Tenderness and bony tenderness present. Left foot: Decreased range of motion. Tenderness present. Legs:       Comments: Tender areas are indicated by numbered spot         Lymphadenopathy:      Cervical: No cervical adenopathy. Skin:     General: Skin is warm and dry. Coloration: Skin is not pale. Findings: No abrasion, bruising, ecchymosis, erythema, laceration, petechiae or rash. Rash is not macular, pustular or urticarial.      Nails: There is no clubbing. Neurological:      Mental Status: She is alert and oriented to person, place, and time. Cranial Nerves: No cranial nerve deficit. Sensory: No sensory deficit. Motor: No tremor, atrophy or abnormal muscle tone. Coordination: Coordination normal. Finger-Nose-Finger Test abnormal.      Gait: Gait normal.      Deep Tendon Reflexes: Reflexes abnormal. Babinski sign absent on the right side. Babinski sign absent on the left side. Psychiatric:         Attention and Perception: She is attentive. Mood and Affect: Mood is not anxious. Affect is not labile, blunt, angry or inappropriate. Speech: Speech normal.         Behavior: Behavior normal. Behavior is not agitated, slowed, aggressive, withdrawn, hyperactive or combative. Thought Content: Thought content normal. Thought content is not paranoid or delusional. Thought content does not include homicidal or suicidal ideation. Thought content does not include homicidal or suicidal plan. Cognition and Memory: Cognition is not impaired. Memory is not impaired. She does not exhibit impaired recent memory or impaired remote memory. Judgment: Judgment normal. Judgment is not impulsive or inappropriate. Ortho Exam  Neurologic Exam     Mental Status   Oriented to person, place, and time. Speech: speech is normal   Level of consciousness: alert  Knowledge: good. Able to name object. Able to read. Able to repeat. Able to write. Normal comprehension. Cranial Nerves     CN III, IV, VI   Pupils are equal, round, and reactive to light. Gait, Coordination, and Reflexes     Coordination   Finger to nose coordination: abnormal          After a thorough review and discussion of the previous medical records, patient comprehensive medical, surgical, and family and social history, Review of Systems, their OARRS, their Screener and Opioid Assessment for Patients with Pain (SOAPP®-R), recent diagnostics, and symptomatic results to previous treatment, it is my impression that the patients is suffering with progressive and severe:     Diagnosis Orders   1. Spinal stenosis of lumbar region with neurogenic claudication  oxyCODONE-acetaminophen (PERCOCET) 7.5-325 MG per tablet   2. Rheumatoid arthritis involving multiple sites with positive rheumatoid factor (HCC)  oxyCODONE-acetaminophen (PERCOCET) 7.5-325 MG per tablet   3. Cervicalgia  oxyCODONE-acetaminophen (PERCOCET) 7.5-325 MG per tablet   4. Chronic bilateral low back pain with bilateral sciatica  oxyCODONE-acetaminophen (PERCOCET) 7.5-325 MG per tablet   5. High risk medication use - 10/17/17 OARRS PM&R, 12/19/17 OARRS PM&R, 12/20/17 Tox screen positive for opiates, Hydrocodone PM&R, MED CONTRACT 2/2/17  oxyCODONE-acetaminophen (PERCOCET) 7.5-325 MG per tablet   6. COLTON positive  oxyCODONE-acetaminophen (PERCOCET) 7.5-325 MG per tablet   7.  Neuropathy involving both lower extremities         I am also concerned by lifestyle and mood issues including:    Past Medical History:   Diagnosis Date    Abnormal electromyogram (EMG) 8/19/09    SENSORIMOTOR NEUROPATHY OF BLE, RIGHT WORSE THAN LEFT, SUSPICIOUS FOR RIGHT S1 RADIC    Angina pectoris (HCC)     Angina pectoris ([address] 07/2019    sees Dr. Oconnor Poag Ankle pain     Bleeding ulcer     Colon polyp 4/3/2017    Dermatitis     Fibromyalgia     Foot pain     Hyperlipidemia     Hypertension     Infection of intervertebral disc (pyogenic), lumbar region ([address] 8/13/2018    Low back pain     MRSA (methicillin resistant staph aureus) culture positive 2011    Neck pain     Neuropathy     Obesity     Osteoarthritis     PSVT (paroxysmal supraventricular tachycardia) ([address]     Shingles outbreak 2012    SI (sacroiliac) pain     Stenosis     Vitamin D deficiency            Given their medication, chronic pain and lifestyle and medications they are at risk for :    Falls, constipation, addiction, toxicity on opiates  Loss of livelyhood due to severe pain, debility, weight gain and  vitamin D deficiency    The patient was educated regarding proper diet, fitness routine, and regulatory restrictions concerning pain medications. Previous notes, comprehensive past medical, surgical, family history, and diagnostics were reviewed. Patient education and councelling were provided regarding off label use,treatment options and medication and injection risks. Current and old OARRS ([place] Automated Prescription Reporting System) records reviewed, all refills reviewed since last visit,  Behavioral agreement/KATHLEEN regulations   and Toxicology screen was reviewed with patient and is up to date. There are   no current red flags. high risk meds review re intensive monitoring for toxicity and addiction,  They are making good progress regarding pain relief, they are performing at a functional level regarding activities of daily living, family interactions and psychological functioning, they're not having any adverse effects or side effects from the current medications, and I see no findings of aberrant drug taking or addiction related behaviors.   The patient is aware that they have a chronic pain condition and they may require opiates dosing for life. All efforts will be made to wean to the lowest effective dose. Other therapies for pain have not been effective including nonopiate medications. Injections and exercises are only partially effective. A Rx for Narcan was offered to help prevent accidental overdose. RX Monitoring 11/4/2021   Attestation -   Acute Pain Prescriptions -   Periodic Controlled Substance Monitoring Possible medication side effects, risk of tolerance/dependence & alternative treatments discussed. ;No signs of potential drug abuse or diversion identified. ;Assessed functional status. ;Obtaining appropriate analgesic effect of treatment. Chronic Pain > 50 MEDD Re-evaluated the status of the patient's underlying condition causing pain. ;Considered consultation with a specialist.;Obtained or confirmed \"Consent for Opioid Use\" on file. Chronic Pain > 80 MEDD -       Periodic Controlled Substance Monitoring: Possible medication side effects, risk of tolerance/dependence & alternative treatments discussed., No signs of potential drug abuse or diversion identified. , Assessed functional status., Obtaining appropriate analgesic effect of treatment. (Antionette Cuenca, )       Patient is currently taking:       I am having Rayna Hdez maintain her Vitamin D3, Coenzyme Q10 (COQ10 PO), aspirin, melatonin, nitroGLYCERIN, hydroxychloroquine, cyanocobalamin, losartan, gabapentin, doxycycline hyclate, baclofen, dilTIAZem, topiramate, atorvastatin, diazePAM, omeprazole, and oxyCODONE-acetaminophen. We will continue to administer cyanocobalamin. I also recommend the following Medications:    Orders Placed This Encounter   Medications    oxyCODONE-acetaminophen (PERCOCET) 7.5-325 MG per tablet     Sig: Take 1 tablet by mouth every 6 hours as needed for Pain for up to 30 days.  Intended supply: 30 days     Dispense:  70 tablet     Refill:  0     Reduce doses taken as pain becomes manageable -which helps with pain and function. Otherwise, continue the current pain medications that I have prescibed. Radiologic:   Old  unique films results reviewed  ,     I discussed results with patients. see Follow up plans below  For any new studies. Unique source and Care Everywhere Updates:  prior external notes requested and reviewed. No new issues noted. Unique History obtained by caregiver/independent historian-and verified with SOAPPR. Electrodiagnostic:  Previous studies requested,     I discussed results with patient. See follow-up plans for new studies.         Labs:  Previous labs reviewed     Lab Results   Component Value Date     06/04/2021    K 3.7 06/04/2021    K 4.4 08/09/2018    CL 95 06/04/2021    CO2 25 06/04/2021    BUN 25 06/04/2021    CREATININE 1.36 06/04/2021    CALCIUM 9.9 06/04/2021    LABALBU 4.3 02/09/2021    LABALBU 2.8 08/18/2018    BILITOT 0.4 02/09/2021    ALKPHOS 117 02/09/2021    AST 27 02/09/2021    ALT 26 02/09/2021     Lab Results   Component Value Date    WBC 5.5 06/04/2021    RBC 4.41 06/04/2021    RBC 2.81 09/06/2018    HGB 13.7 06/04/2021    HCT 40.1 06/04/2021    MCV 91.1 06/04/2021    MCH 31.0 06/04/2021    MCHC 34.1 06/04/2021    RDW 12.8 06/04/2021     06/04/2021    MPV 7.4 09/13/2018       Lab Results   Component Value Date    LABAMPH Neg 06/30/2021    BARBSCNU Neg 06/30/2021    LABBENZ Neg 06/30/2021    CANSU Neg 06/30/2021    COCAIMETSCRU Neg 06/30/2021    PHENCYCLIDINESCREENURINE Neg 17/14/1107    TRICYCLIC Neg 75/27/4165    DSCOMMENT see below 06/30/2021       Lab Results   Component Value Date    CODEINE Not Detected 12/02/2016    MORPHINE Not Detected 12/02/2016    ACETYLMORPHI Not Detected 12/02/2016    OXYCODONE Not Detected 12/02/2016    NOROXYCODONE Not Detected 12/02/2016    NOROXYMU Not Detected 12/02/2016    HYDRCO Present 12/02/2016    NORHYDU Present 12/02/2016    HYDROMO Not Detected 12/02/2016    BUPREN Not Detected 12/02/2016    NORBUPRNOR Not Detected 12/02/2016    FENTA Not Detected 12/02/2016    NORFENT Not Detected 12/02/2016    MEPERIDINE Not Detected 12/02/2016    TAPENU Not Detected 12/02/2016    TAPOSULFUR Not Detected 12/02/2016    METHADONE Not Detected 12/02/2016    LABPROP Not Detected 12/02/2016    TRAM Not Detected 12/02/2016    AMPH Not Detected 12/02/2016    METHAMP Not Detected 12/02/2016    MDMA Not Detected 12/02/2016    ECMDA Not Detected 12/02/2016       Lab Results   Component Value Date    METPHEN Not Detected 12/02/2016    PHENTERMINE Not Detected 12/02/2016    BENZOYL Not Detected 12/02/2016    Natalie Bulla Not Detected 12/02/2016    ALPHAOHALPRA Not Detected 12/02/2016    CLONAZEPAM Not Detected 12/02/2016    Beverli Alamin Not Detected 12/02/2016    DIAZEP Not Detected 12/02/2016    ROMAIN Not Detected 12/02/2016    OXAZ Not Detected 12/02/2016    Hiro Meres Not Detected 12/02/2016    LORAZEPAM Not Detected 12/02/2016    MIDAZOLAM Not Detected 12/02/2016    ZOLPIDEM Not Detected 12/02/2016    NAI Not Detected 12/02/2016    ETG Not Detected 12/02/2016    MARIJMET Not Detected 12/02/2016    PCP Not Detected 12/02/2016    PAINMGTDRUGP See Below 12/02/2016    EERPAINMGTPA See Note 12/02/2016    LABCREA 129.7 08/09/2018         , I discussed results with patient. See follow-up plans for new studies. Therapies:  HEP-gentle stretching and relaxation techniques-demonstrated with patient-they are to do them twice a day.   They are also advised to make the following lifestyle changes:  Goals      Exercise 3x per week (30 min per time)      SOAPP-R GOAL LESS THAN 9      09/02/16 SCORE: 8 LOW RISK   02/02/17 score 7-low risk   04/03/17 score: 6-low risk  06/05/17 score: 7-low risk  08/07/17 score: 8-low risk  10/06/17 score: 6-low risk  12/20/17 score: 5-low risk  02/18/18 score: 6-low risk  0425/2018 score 5-low risk  7/11/18 score: 3 low- risk  10/11/18 score:4- low risk  2/28/19 score: 2: low risk  5/13/19 score: 2- low risk  7/17/19 score: 1- low risk   9/13/19 score: 2- low risk  11/21/19 score: 3- low risk   1/22/20 score: 2- low risk   6- score 3 - low risk   6/23/21 score: 7- low risk  9/15/21 score: 4- low risk  12/01/2021 score 5- low risk        Weight < 150 lb (68.04 kg)           Injections or Epidurals:  Injection options were discussed. She would like to resume trigger point injections in the office and try to get authorization for third caudal block as she has not gotten complete results from the 2 Aleve due to improvement in her pain. After a complete review of the medical record,OARRS, a comprehensive physical exam, and discussion with the patient I have determined that the patient would benefit from a series of 2 caudal epidural steroid injections using a C-arm and contrast as appropriate. The risks and benefits were thoroughly explained to the patient orally and in writing. Pt gave verbal and writen consent. The patient was given the option of taking PO Valium 5-10 mg prior to the procedure. They were told that if they chose to take the Valium they should not drive while under it's affects. The patient is to call us as needed and the day following each procedure to report any concerns or problems. They will follow up with me approximately one month after the last block to evaluate the success of the injections and discuss need for further treatment. I am hoping to improve their low back pain by 60-80% for at least 6 months, improve their overall function, and minimize their reliance on oral medications. Patient gave verbal consent to ordered injections. See follow-up plans for planned injections. Supplements:  Vitamin D with increased dosing during the rainy months,   Education was given on:   Dietary and Fitness--daily stretches and low carb diet-in chair Yoga when possible             Follow up with Primary Care Physician regarding their general medical needs. Stressed the importance of following up with PCP and specialists for his/her chronic diseases, health, CV, and cancer screening and continued care. Will follow disease activity/progression and adjust therapeutic regimen to disease activity and severity. Discussed medication dosage, usage, goals of therapy, and side effects. Available test results were reviewed -Discussed findings, impression and plan with patient. An additional 5 minutes were spent outside of the patient visit to review records. Additional time spent with the patient to discuss their questions. Additional time spent with the patient devoted to discussing treatment strategy, planning, and implementation. Patient understands above plan; questions asked and answered. Patient agrees to plan as noted above. At least 50% of the visit was involved in the discussion of the options for treatment. We discussed exercises, medication, interventional therapies and surgery. Healthy life style is essential with patient hard work to achieve the wellness. In addition; discussion with the patient and/or family about patient's functional status any of the diagnostic results, impressions and/or recommended diagnostic studies, prognosis, risks and benefits of treatment options, instructions for treatment and/or follow-up, importance of compliance with chosen treatment options, risk-factor reduction, and patient/family education. They are to follow up in 2 1/2 -3 months to review medication, efficacy of injections, pill counts, OARRS check, high risk med review re intensive monitoring for toxicity and addiction, SOAPPR assessment, review diagnostics, to review previous and future treatment plans and assess appropriateness for continued therapy. New Diagnostics  No orders of the defined types were placed in this encounter.       Lance Arndt DO

## 2021-12-03 ENCOUNTER — VIRTUAL VISIT (OUTPATIENT)
Dept: FAMILY MEDICINE CLINIC | Age: 66
End: 2021-12-03
Payer: MEDICARE

## 2021-12-03 ENCOUNTER — HOSPITAL ENCOUNTER (EMERGENCY)
Age: 66
Discharge: HOME | End: 2021-12-03
Payer: MEDICARE

## 2021-12-03 VITALS
DIASTOLIC BLOOD PRESSURE: 86 MMHG | HEIGHT: 68 IN | BODY MASS INDEX: 33.34 KG/M2 | WEIGHT: 220 LBS | RESPIRATION RATE: 18 BRPM | HEART RATE: 99 BPM | OXYGEN SATURATION: 96 % | SYSTOLIC BLOOD PRESSURE: 191 MMHG | TEMPERATURE: 97.4 F

## 2021-12-03 DIAGNOSIS — Z11.52 ENCOUNTER FOR SCREENING FOR COVID-19: Primary | ICD-10-CM

## 2021-12-03 DIAGNOSIS — R04.0 EPISTAXIS: Primary | ICD-10-CM

## 2021-12-03 LAB
Lab: NORMAL
PERFORMING INSTRUMENT: NORMAL
QC PASS/FAIL: NORMAL
SARS-COV-2, POC: NORMAL

## 2021-12-03 PROCEDURE — 87426 SARSCOV CORONAVIRUS AG IA: CPT | Performed by: PHYSICIAN ASSISTANT

## 2021-12-03 PROCEDURE — 99441 PR PHYS/QHP TELEPHONE EVALUATION 5-10 MIN: CPT | Performed by: PHYSICIAN ASSISTANT

## 2021-12-03 PROCEDURE — 99283 EMERGENCY DEPT VISIT LOW MDM: CPT

## 2021-12-03 ASSESSMENT — PATIENT HEALTH QUESTIONNAIRE - PHQ9
SUM OF ALL RESPONSES TO PHQ9 QUESTIONS 1 & 2: 0
SUM OF ALL RESPONSES TO PHQ QUESTIONS 1-9: 0
SUM OF ALL RESPONSES TO PHQ QUESTIONS 1-9: 0
1. LITTLE INTEREST OR PLEASURE IN DOING THINGS: 0
SUM OF ALL RESPONSES TO PHQ QUESTIONS 1-9: 0
2. FEELING DOWN, DEPRESSED OR HOPELESS: 0

## 2021-12-03 ASSESSMENT — ENCOUNTER SYMPTOMS
CHEST TIGHTNESS: 0
BACK PAIN: 0
DIARRHEA: 0
SINUS PRESSURE: 0
SHORTNESS OF BREATH: 0
ABDOMINAL PAIN: 0
VOMITING: 0
TROUBLE SWALLOWING: 0
COUGH: 0

## 2021-12-03 NOTE — LETTER
[facility]  [address] [place] [address] [address] [id number]  Phone: [phone]  Fax: [phone] [place], [place]        December 3, 2021     [address] [place] [id number]      Dear Khushboo Soares:    Below are the results from your recent visit:       Ref Range & Units 12/3/21 1059   SARS-COV-2, POC Not Detected Not-Detected    Lot Number  [id number]    QC Pass/Fail  P    Performing Instrument  BD Veritor             If you have any questions or concerns, please don't hesitate to call.     Sincerely,            UDAY Epperson

## 2021-12-03 NOTE — PROGRESS NOTES
Spoke to patient's mom, Josselyn, and offered appt today with NP. Mom declined stating that she wanted to follow-up with Dr. Villalpando. Mom also reports that patient's diarrhea is improving. [facility] states [facility] Vill[facility]pando the ER note and results, paperwork is in Dr. Villalpando's in-box for review. Mom asking if Dr. Villalpando feels patient still needs follow-up appt and if able to schedule sooner than 08/30.    TELEHEALTH EVALUATION -- Audio/Visual (During CRO-88 public health emergency)    -   Baudilio Rankin is a 74 y.o. female being evaluated by a Virtual Visit (video visit) encounter to address concerns as mentioned above. A caregiver was present when appropriate. Due to this being a TeleHealth encounter (During RKWGB-67 public health emergency), evaluation of the following organ systems was limited: Vitals/Constitutional/EENT/Resp/CV/GI//MS/Neuro/Skin/Heme-Lymph-Imm. Pursuant to the emergency declaration under the [address] [place] [address], [address] [place] [address] authority and the [employer] Act, this Virtual Visit was conducted with patient's (and/or legal guardian's) consent, to reduce the patient's risk of exposure to COVID-19 and provide necessary medical care. The patient (and/or legal guardian) has also been advised to contact this office for worsening conditions or problems, and seek emergency medical treatment and/or call 911 if deemed necessary. Patient was contacted and agreed to proceed with a virtual visit via Telephone Visit  The risks and benefits of converting to a virtual visit were discussed in light of the current infectious disease epidemic. Patient also understood that insurance coverage and co-pays are up to their individual insurance plans. Patient was located at their home. Provider was located at their office. 12/3/2021  Baudilio Rankin (:  1955) has requested an audio/video evaluation for the following concern(s):    HPI  74year old female who is leaving on a cruise in two days. Needs a COVID-19 test. asymptomatic      Review of Systems   Constitutional: Negative for activity change, appetite change, chills, fever and unexpected weight change. HENT: Negative for drooling, ear pain, nosebleeds, sinus pressure and trouble swallowing.     Respiratory: Negative for cough, chest tightness and shortness of breath. Cardiovascular: Negative for chest pain and leg swelling. Gastrointestinal: Negative for abdominal pain, diarrhea and vomiting. Endocrine: Negative for polydipsia and polyphagia. Genitourinary: Negative for dysuria, flank pain and frequency. Musculoskeletal: Negative for back pain and myalgias. Skin: Negative for pallor and rash. Neurological: Negative for syncope, weakness and headaches. Hematological: Does not bruise/bleed easily. Psychiatric/Behavioral: Negative for agitation, behavioral problems and confusion. All other systems reviewed and are negative. Prior to Visit Medications    Medication Sig Taking? Authorizing Provider   omeprazole (PRILOSEC) 40 MG delayed release capsule Take 40 mg by mouth daily Yes Historical Provider, MD   oxyCODONE-acetaminophen (PERCOCET) 7.5-325 MG per tablet Take 1 tablet by mouth every 6 hours as needed for Pain for up to 30 days. Intended supply: 30 days Yes Rosaline Peterson DO   diazePAM (VALIUM) 10 MG tablet Take 1 tablet by mouth every 6 hours as needed for Anxiety (for caudal injection) for up to 1 day.  Yes Rosaline Peterson DO   topiramate (TOPAMAX) 25 MG tablet TAKE 1 TABLET BY MOUTH EVERY DAY AT NIGHT Yes Rosaline Peterson DO   atorvastatin (LIPITOR) 10 MG tablet TAKE 1 TABLET BY MOUTH EVERY DAY AT NIGHT Yes Hailey Figueroa PA-C   dilTIAZem (CARDIZEM) 30 MG tablet Take 30 mg by mouth 2 times daily Yes Historical Provider, MD   baclofen (LIORESAL) 10 MG tablet TAKE 1 TABLET BY MOUTH NIGHTLY AS NEEDED (MUSCLE SPASMS) Yes Rosaline Peterson DO   doxycycline hyclate (VIBRA-TABS) 100 MG tablet TAKE 1 TABLET BY MOUTH EVERY DAY Yes Leeanna Lujan DO   losartan (COZAAR) 50 MG tablet TAKE 1 TABLET BY MOUTH EVERY DAY Yes Historical Provider, MD   cyanocobalamin 1000 MCG/ML injection INJECT 1 ML AS DIRECTED EVERY 14 DAYS FOR 12 DOSES INJECT ONE CC SUB-CUTANEOSLY EVERY OTHER WEEK Yes Rosaline Peterson DO hydroxychloroquine (PLAQUENIL) 200 MG tablet Take 1 tablet by mouth daily Yes Historical Provider, MD   nitroGLYCERIN (NITROSTAT) 0.4 MG SL tablet PLEASE SEE ATTACHED FOR DETAILED DIRECTIONS Yes Juan Ivy MD   melatonin 5 MG TABS tablet Take 5 mg by mouth nightly Yes Historical Provider, MD   aspirin 81 MG tablet Take 81 mg by mouth daily Yes Historical Provider, MD   Coenzyme Q10 (COQ10 PO) Take 1 capsule by mouth daily  Yes Historical Provider, MD   Cholecalciferol (VITAMIN D3) 2000 units TABS Take 2 tablets by mouth daily  Yes Historical Provider, MD   gabapentin (NEURONTIN) 300 MG capsule TAKE 1 CAPSULE BY MOUTH DAILY AND 2 AT NIGHT AS TOLERATED  Rico Klein DO       Past Medical History:   Diagnosis Date    Abnormal electromyogram (EMG) 8/19/09    SENSORIMOTOR NEUROPATHY OF BLE, RIGHT WORSE THAN LEFT, SUSPICIOUS FOR RIGHT S1 RADIC    Angina pectoris ([address]     Angina pectoris ([address] 07/2019    sees Dr. Yeboah Sizer Ankle pain     Bleeding ulcer     Colon polyp 4/3/2017    Dermatitis     Fibromyalgia     Foot pain     Hyperlipidemia     Hypertension     Infection of intervertebral disc (pyogenic), lumbar region ([address] 8/13/2018    Low back pain     MRSA (methicillin resistant staph aureus) culture positive 2011    Neck pain     Neuropathy     Obesity     Osteoarthritis     PSVT (paroxysmal supraventricular tachycardia) ([address]     Shingles outbreak 2012    SI (sacroiliac) pain     Stenosis     Vitamin D deficiency      Past Surgical History:   Procedure Laterality Date    CARDIAC CATHETERIZATION  10/2016    CARDIOVERSION      2001 x3    CERVICAL FUSION  12/14/2011    Dr Adelina Miramontes with bone graft and plate    COLONOSCOPY  09/2021    CC    ENDOSCOPIC ULTRASOUND (LOWER) N/A 07/11/2019    EGD/ EUS performed by Sepideh Santiago MD at [facility] HAND FUSION  07/2005    HYSTERECTOMY  2001    HYSTERECTOMY, TOTAL ABDOMINAL      JOINT REPLACEMENT Left     tka    LIVER BIOPSY      MENISCECTOMY  10/31/2011    left knee    OTHER SURGICAL HISTORY  10/26/2009    CAUDALS BY DR Demetrio Alvarez    SD COLON CA SCRN NOT  [address] IND N/A 04/12/2017    COLONOSCOPY performed by Maral Campbell MD at . [facility] [address] ESOPHAGOGASTRODUODENOSCOPY TRANSORAL DIAGNOSTIC N/A 04/12/2017    EGD ESOPHAGOGASTRODUODENOSCOPY performed by Maral Campbell MD at [address]  12/2011    Dr Jeannie Barajas Lumbar and c spine    TOTAL KNEE ARTHROPLASTY  07/30/2012    LEFT-DR AVELAR    UPPER GASTROINTESTINAL ENDOSCOPY  1995     Social History     Socioeconomic History    Marital status:      Spouse name: Ynes Black Number of children: 2    Years of education: 15    Highest education level: Associate degree: occupational, technical, or vocational program   Occupational History    Occupation: Retired    Tobacco Use    Smoking status: Never Smoker    Smokeless tobacco: Never Used   Vaping Use    Vaping Use: Never used   Substance and Sexual Activity    Alcohol use: No    Drug use: No    Sexual activity: Not Currently   Other Topics Concern    Not on file   Social History Narrative    Lives in [place] with her  Ashley Keller and son Eliud Mack is mildly handicapped --CP right lilia with anxiety and LD. He works at [employer]. Social Determinants of Health     Financial Resource Strain: Low Risk     Difficulty of Paying Living Expenses: Not hard at all   Food Insecurity: No Food Insecurity    Worried About Running Out of Food in the Last Year: Never true    Romana of Food in the Last Year: Never true   Transportation Needs:     Lack of Transportation (Medical): Not on file    Lack of Transportation (Non-Medical):  Not on file   Physical Activity:     Days of Exercise per Week: Not on file    Minutes of Exercise per Session: Not on file   Stress:     Feeling of Stress : Not on file   Social Connections:     Frequency of Communication with Friends and Family: Not on file    Frequency of Social Gatherings with Friends and Family: Not on file    Attends [employer] Services: Not on file    Active Member of Clubs or Organizations: Not on file    Attends Club or Organization Meetings: Not on file    Marital Status: Not on file   Intimate Partner Violence:     Fear of Current or Ex-Partner: Not on file    Emotionally Abused: Not on file    Physically Abused: Not on file    Sexually Abused: Not on file   Housing Stability:     Unable to Pay for Housing in the Last Year: Not on file    Number of [place] in the Last Year: Not on file    Unstable Housing in the Last Year: Not on file     Family History   Problem Relation Age of Onset    Heart Disease Father     High Cholesterol Father     High Blood Pressure Father     Stroke Mother     High Blood Pressure Mother     High Blood Pressure Brother      Allergies   Allergen Reactions    Latex Swelling     Swelling of hands with wearing latex gloves    Norvasc [Amlodipine Besylate] Other (See Comments)     swelling    Keflex [Cephalexin] Rash       PMH, Surgical Hx, Family Hx, and Social Hx reviewed and updated. Health Maintenance reviewed. PHYSICAL EXAMINATION:  \"[x]\" Indicates a positive item  \"[]\" Indicates a negative item    Vital Signs: (As obtained by patient/caregiver or practitioner observation)    Blood pressure-  Heart rate-    Respiratory rate-    Temperature-  Pulse oximetry-     Constitutional: [x] Appears well-developed and well-nourished [x] No apparent distress      [] Abnormal-   Mental status  [x] Alert and awake  [x] Oriented to person/place/time [x]Able to follow commands      Eyes:  EOM    []  Normal  [] Abnormal-  Sclera  [x]  Normal  [] Abnormal -         Discharge [x]  None visible  [] Abnormal -    HENT:   [x] Normocephalic, atraumatic.   [] Abnormal   [x] Mouth/Throat: Mucous membranes are moist.     External Ears [x] Normal  [] Abnormal-     Neck: [x] No visualized mass Pulmonary/Chest: [x] Respiratory effort normal.  [x] No visualized signs of difficulty breathing or respiratory distress        [] Abnormal-      Musculoskeletal:   [x] Normal gait with no signs of ataxia         [x] Normal range of motion of neck        [] Abnormal-       Neurological:       [x] No Facial Asymmetry (Cranial nerve 7 motor function) (limited exam to video visit)          [x] No gaze palsy        [] Abnormal-         Skin:        [x] No significant exanthematous lesions or discoloration noted on facial skin         [] Abnormal-            Psychiatric:       [x] Normal Affect [x] No Hallucinations        [] Abnormal-     Other pertinent observable physical exam findings-   Results for orders placed or performed in visit on 12/03/21   POCT COVID-19, Antigen   Result Value Ref Range    SARS-COV-2, POC Not-Detected Not Detected    Lot Number [id number]     QC Pass/Fail P     Performing Instrument BD Veritor        ASSESSMENT/PLAN:  Assessment & Plan   Murray Stanton was seen today for covid testing. Diagnoses and all orders for this visit:    Encounter for screening for COVID-19  -     POCT COVID-19, Antigen      Orders Placed This Encounter   Procedures    POCT COVID-19, Antigen     Order Specific Question:   Is this test for diagnosis or screening? Answer:   Screening     Order Specific Question:   Symptomatic for COVID-19 as defined by CDC? Answer:   No     Order Specific Question:   Date of Symptom Onset     Answer:   N/A     Order Specific Question:   Hospitalized for COVID-19? Answer:   No     Order Specific Question:   Admitted to ICU for COVID-19? Answer:   No     Order Specific Question:   Employed in healthcare setting? Answer:   Unknown     Order Specific Question:   Resident in a congregate (group) care setting? Answer:   Unknown     Order Specific Question:   Pregnant? Answer:   No     Order Specific Question:   Previously tested for COVID-19? Answer:    Yes No orders of the defined types were placed in this encounter. There are no discontinued medications. Return if symptoms worsen or fail to improve. Reviewed with the patient: current clinical status, medications, activities and diet. Side effects, adverse effects of the medication prescribed today, as well as treatment plan/ rationale and result expectations have been discussed with the patient who expresses understanding and desires to proceed. Close follow up to evaluate treatment results and for coordination of care. I have reviewed the patient's medical history in detail and updated the computerized patient record. Patient identification was verified at the start of the visit: Yes    Total time spent on this encounter: Not billed by time      --UDAY Del Real on 12/3/2021 at 11:32 AM    An electronic signature was used to authenticate this note.

## 2021-12-04 ASSESSMENT — ENCOUNTER SYMPTOMS
DIARRHEA: 0
NAUSEA: 0
ABDOMINAL PAIN: 0
COUGH: 0
VOMITING: 0
PHOTOPHOBIA: 0
TROUBLE SWALLOWING: 0
SHORTNESS OF BREATH: 0

## 2021-12-04 NOTE — ED TRIAGE NOTES
Pt c/o nosebleed for one hour without injury. Pt denies thinners. Pt states she is leaving for a cruise in the morning. Pt is anxious in triage. Nose clamp placed upon arrival to ER. Pt has small amt of blood on a towel. No active bleeding noted during triage.  No distress noted

## 2021-12-04 NOTE — ED PROVIDER NOTES
[address] [facility] ED  eMERGENCY dEPARTMENT eNCOUnter      Pt Name: Gray Velasquez  MRN: [id number]  [place] 1955  Date of evaluation: 12/3/2021  Provider: UDAY Weaver        HISTORY OF PRESENT ILLNESS    Gray Velasquez is a 74 y.o. female per chart review has ah/o HTN. Patient presents to ED with resolved nose bleed, no evidence of continued bleeding at initial interview. Pt reports intermittent history of nosebleeds states she has been evaluated for this in the outpatient setting. States she has been using flonase in the past for nosebleeds. REVIEW OF SYSTEMS       Review of Systems   Constitutional: Negative for chills and fever. HENT: Positive for nosebleeds. Negative for congestion and trouble swallowing. Eyes: Negative for photophobia. Respiratory: Negative for cough and shortness of breath. Cardiovascular: Negative for chest pain. Gastrointestinal: Negative for abdominal pain, diarrhea, nausea and vomiting. Genitourinary: Negative for difficulty urinating. Musculoskeletal: Negative for myalgias. Neurological: Negative for headaches. Psychiatric/Behavioral: Negative for confusion. Except as noted above the remainder of the review of systems was reviewed and negative.        PAST MEDICAL HISTORY     Past Medical History:   Diagnosis Date    Abnormal electromyogram (EMG) 8/19/09    SENSORIMOTOR NEUROPATHY OF BLE, RIGHT WORSE THAN LEFT, SUSPICIOUS FOR RIGHT S1 RADIC    Angina pectoris (HCC)     Angina pectoris ([address] 07/2019    sees Dr. Lindsey Hernandez Ankle pain     Bleeding ulcer     Colon polyp 4/3/2017    Dermatitis     Fibromyalgia     Foot pain     Hyperlipidemia     Hypertension     Infection of intervertebral disc (pyogenic), lumbar region ([address] 8/13/2018    Low back pain     MRSA (methicillin resistant staph aureus) culture positive 2011    Neck pain     Neuropathy     Obesity     Osteoarthritis     PSVT (paroxysmal supraventricular tachycardia) [facility]     Shingles outbreak 2012    SI (sacroiliac) pain     Stenosis     Vitamin D deficiency          SURGICAL HISTORY       Past Surgical History:   Procedure Laterality Date    CARDIAC CATHETERIZATION  10/2016    CARDIOVERSION      2001 x3    CERVICAL FUSION  12/14/2011    Dr Jey Bland with bone graft and plate    COLONOSCOPY  09/2021    CC    ENDOSCOPIC ULTRASOUND (LOWER) N/A 07/11/2019    [facility]    HYSTERECTOMY  2001    HYSTERECTOMY, TOTAL ABDOMINAL      JOINT REPLACEMENT Left     tka    LIVER BIOPSY      MENISCECTOMY  10/31/2011    left knee    OTHER SURGICAL HISTORY  10/26/2009    CAUDALS BY DR Evelia Carrington    [place] COLON CA SCRN NOT  [address] IND N/A 04/12/2017    COLONOSCOPY performed by Mary Hurtado MD at [address] ESOPHAGOGASTRODUODENOSCOPY TRANSORAL DIAGNOSTIC N/A 04/12/2017    EGD ESOPHAGOGASTRODUODENOSCOPY performed by Mary Hurtado MD at [address]  12/2011    Dr Jey Bland Lumbar and c spine    TOTAL KNEE ARTHROPLASTY  07/30/2012    LEFT-DR AVELAR    UPPER GASTROINTESTINAL ENDOSCOPY  1995         CURRENT MEDICATIONS       Discharge Medication List as of 12/3/2021 11:07 PM      CONTINUE these medications which have NOT CHANGED    Details   omeprazole (PRILOSEC) 40 MG delayed release capsule Take 40 mg by mouth dailyHistorical Med      oxyCODONE-acetaminophen (PERCOCET) 7.5-325 MG per tablet Take 1 tablet by mouth every 6 hours as needed for Pain for up to 30 days.  Intended supply: 30 days, Disp-70 tablet, R-0Normal      diazePAM (VALIUM) 10 MG tablet Take 1 tablet by mouth every 6 hours as needed for Anxiety (for caudal injection) for up to 1 day., Disp-4 tablet, R-0Normal      topiramate (TOPAMAX) 25 MG tablet TAKE 1 TABLET BY MOUTH EVERY DAY AT NIGHT, Disp-90 tablet, R-1Normal      atorvastatin (LIPITOR) 10 MG tablet TAKE 1 TABLET BY MOUTH EVERY DAY AT NIGHT, Disp-90 tablet, R-3Normal      dilTIAZem (CARDIZEM) 30 MG tablet Take 30 mg by mouth 2 times dailyHistorical Med      baclofen (LIORESAL) 10 MG tablet TAKE 1 TABLET BY MOUTH NIGHTLY AS NEEDED (MUSCLE SPASMS), Disp-90 tablet, R-2Normal      doxycycline hyclate (VIBRA-TABS) 100 MG tablet TAKE 1 TABLET BY MOUTH EVERY DAY, Disp-90 tablet, R-5Normal      gabapentin (NEURONTIN) 300 MG capsule TAKE 1 CAPSULE BY MOUTH DAILY AND 2 AT NIGHT AS TOLERATED, Disp-270 capsule, R-1Normal      losartan (COZAAR) 50 MG tablet TAKE 1 TABLET BY MOUTH EVERY DAYHistorical Med      cyanocobalamin 1000 MCG/ML injection INJECT 1 ML AS DIRECTED EVERY 14 DAYS FOR 12 DOSES INJECT ONE CC SUB-CUTANEOSLY EVERY OTHER WEEK, Disp-6 mL, R-3Normal      hydroxychloroquine (PLAQUENIL) 200 MG tablet Take 1 tablet by mouth dailyHistorical Med      nitroGLYCERIN (NITROSTAT) 0.4 MG SL tablet PLEASE SEE ATTACHED FOR DETAILED DIRECTIONS, Disp-25 tablet, R-3Normal      melatonin 5 MG TABS tablet Take 5 mg by mouth nightlyHistorical Med      aspirin 81 MG tablet Take 81 mg by mouth dailyHistorical Med      Coenzyme Q10 (COQ10 PO) Take 1 capsule by mouth daily Historical Med      Cholecalciferol (VITAMIN D3) 2000 units TABS Take 2 tablets by mouth daily Historical Med             ALLERGIES     Latex, Norvasc [amlodipine besylate], and Keflex [cephalexin]    FAMILY HISTORY       Family History   Problem Relation Age of Onset    Heart Disease Father     High Cholesterol Father     High Blood Pressure Father     Stroke Mother     High Blood Pressure Mother     High Blood Pressure Brother           SOCIAL HISTORY       Social History     Socioeconomic History    Marital status:      Spouse name: Talha Leal Number of children: 2    Years of education: 15    Highest education level: Associate degree: occupational, technical, or vocational program   Occupational History    Occupation: Retired    Tobacco Use    Smoking status: Never Smoker    Smokeless tobacco: Never Used   Vaping Use    Vaping Use: Never used   Substance and Sexual Activity    Alcohol use: No    Drug use: No    Sexual activity: Not Currently   Other Topics Concern    Not on file   Social History Narrative    Lives in [place] with her  Blessing Kulkarni and son David Lin is mildly handicapped --CP right lilia with anxiety and LD. He works at [employer] and [employer]. Social Determinants of Health     Financial Resource Strain: Low Risk     Difficulty of Paying Living Expenses: Not hard at all   Food Insecurity: No Food Insecurity    Worried About Running Out of Food in the Last Year: Never true    Romana of Food in the Last Year: Never true   Transportation Needs:     Lack of Transportation (Medical): Not on file    Lack of Transportation (Non-Medical):  Not on file   Physical Activity:     Days of Exercise per Week: Not on file    Minutes of Exercise per Session: Not on file   Stress:     Feeling of Stress : Not on file   Social Connections:     Frequency of Communication with Friends and Family: Not on file    Frequency of Social Gatherings with Friends and Family: Not on file    Attends Baptism Services: Not on file    Active Member of [address] [place] [address] [employer] or Organizations: Not on file    Attends Club or Organization Meetings: Not on file    Marital Status: Not on file   Intimate Partner Violence:     Fear of Current or Ex-Partner: Not on file    Emotionally Abused: Not on file    Physically Abused: Not on file    Sexually Abused: Not on file   Housing Stability:     Unable to Pay for Housing in the Last Year: Not on file    Number of [place] in the Last Year: Not on file    Unstable Housing in the Last Year: Not on file         PHYSICAL EXAM        ED Triage Vitals [12/03/21 2116]   BP Temp Temp Source Pulse Resp SpO2 Height Weight   (!) 191/86 97.4 °F (36.3 °C) Oral 99 18 96 % 5' 8\" (1.727 m) 220 lb (99.8 kg)       Physical Exam  Constitutional:       General: She is not in acute distress. Appearance: Normal appearance. HENT:      Head: Normocephalic and atraumatic. Right Ear: External ear normal.      Left Ear: External ear normal.      Nose: Nose normal. No congestion or rhinorrhea. Comments: No visible blood, no visible clots in the nose, no septal hematoma     Mouth/Throat:      Mouth: Mucous membranes are moist.      Pharynx: Oropharynx is clear. Eyes:      Extraocular Movements: Extraocular movements intact. Cardiovascular:      Rate and Rhythm: Normal rate and regular rhythm. Pulmonary:      Effort: Pulmonary effort is normal. No respiratory distress. Breath sounds: Normal breath sounds. Abdominal:      General: Bowel sounds are normal.      Palpations: Abdomen is soft. Tenderness: There is no abdominal tenderness. Musculoskeletal:         General: Normal range of motion. Cervical back: Normal range of motion. Skin:     General: Skin is warm. Neurological:      Mental Status: She is alert and oriented to person, place, and time. Psychiatric:         Mood and Affect: Mood normal.         Behavior: Behavior normal.           LABS:  Labs Reviewed - No data to display      MDM:   Vitals:    Vitals:    12/03/21 2116   BP: (!) 191/86   Pulse: 99   Resp: 18   Temp: 97.4 °F (36.3 °C)   TempSrc: Oral   SpO2: 96%   Weight: 220 lb (99.8 kg)   Height: 5' 8\" (1.727 m)       Patient presents to ED with resolved nose bleed. She appears very anxious regarding her symptoms states she leaves for a cruise early tomorrow morning. Afebrile hemodynamically stable. No respiratory distress. No evidence of continued bleeding, clear nostrils on exam. Pt has history of nosebleeds has previously used flonase. Discussed humidifier at home as well as afrin vs flonase supportive therapy. Discussed rebound congestion with afrin and limiting use to 2-3 days. Pt will take nose clamps with her.  She is to follow with PCP or return to ED should new symptoms arise or episodes worsen. She agrees to plan with all questions answered. CRITICAL CARE TIME   Total CriticalCare time was 0 minutes, excluding separately reportable procedures. There was a high probability of clinically significant/life threatening deterioration in the patient's condition which required my urgent intervention.         PROCEDURES:  Unlessotherwise noted below, none      Procedures      FINAL IMPRESSION      1. Epistaxis          DISPOSITION/PLAN   DISPOSITION Decision To Discharge 12/03/2021 10:50:13 PM          UDAY Apodaca (electronically signed)  Attending Emergency Physician          Amy Apodaca  12/04/21 [id number]

## 2021-12-07 DIAGNOSIS — F41.9 ANXIETY DUE TO INVASIVE PROCEDURE: ICD-10-CM

## 2021-12-07 DIAGNOSIS — M05.79 RHEUMATOID ARTHRITIS INVOLVING MULTIPLE SITES WITH POSITIVE RHEUMATOID FACTOR (HCC): Primary | ICD-10-CM

## 2021-12-07 DIAGNOSIS — Z79.899 HIGH RISK MEDICATION USE: ICD-10-CM

## 2021-12-08 RX ORDER — DIAZEPAM 5 MG/1
TABLET ORAL
Qty: 2 TABLET | Refills: 0 | Status: SHIPPED | OUTPATIENT
Start: 2021-12-08 | End: 2021-12-17

## 2021-12-15 DIAGNOSIS — M48.062 SPINAL STENOSIS OF LUMBAR REGION WITH NEUROGENIC CLAUDICATION: ICD-10-CM

## 2021-12-15 DIAGNOSIS — M54.2 CERVICALGIA: ICD-10-CM

## 2021-12-15 DIAGNOSIS — Z79.899 HIGH RISK MEDICATION USE: ICD-10-CM

## 2021-12-15 DIAGNOSIS — M54.42 CHRONIC BILATERAL LOW BACK PAIN WITH BILATERAL SCIATICA: ICD-10-CM

## 2021-12-15 DIAGNOSIS — M05.79 RHEUMATOID ARTHRITIS INVOLVING MULTIPLE SITES WITH POSITIVE RHEUMATOID FACTOR (HCC): ICD-10-CM

## 2021-12-15 DIAGNOSIS — R76.8 ANA POSITIVE: ICD-10-CM

## 2021-12-15 DIAGNOSIS — G89.29 CHRONIC BILATERAL LOW BACK PAIN WITH BILATERAL SCIATICA: ICD-10-CM

## 2021-12-15 DIAGNOSIS — M54.41 CHRONIC BILATERAL LOW BACK PAIN WITH BILATERAL SCIATICA: ICD-10-CM

## 2022-01-06 ENCOUNTER — TELEPHONE (OUTPATIENT)
Dept: FAMILY MEDICINE CLINIC | Age: 67
End: 2022-01-06

## 2022-01-06 NOTE — TELEPHONE ENCOUNTER
----- Message from Chel Kennyayana sent at 1/4/2022  3:51 PM EST -----  Subject: Appointment Request    Reason for Call: Urgent Cough Cold    QUESTIONS  Type of Appointment? Established Patient  Reason for appointment request? No appointments available during search  Additional Information for Provider? Patient tested positive for covid   from an at home test and called to schedule a VV with provider. None where   available and she would like to talk to Dr about possible medication for   symptoms. ---------------------------------------------------------------------------  --------------  Luis Enrique Adams INFO  What is the best way for the office to contact you? OK to leave message on   voicemail  Preferred Call Back Phone Number? [phone]  ---------------------------------------------------------------------------  --------------  SCRIPT ANSWERS  Relationship to Patient? Self  Are you currently unable to finish sentences due to any difficulty   breathing? No  Are you unable to swallow liquids? No  Are you having fevers (100.4 or greater), chills, or sweats? Yes   Have you been diagnosed with, awaiting test results for, or told that you   are suspected of having COVID-19 (Coronavirus)? (If patient has tested   negative or was tested as a requirement for work, school, or travel and   not based on symptoms, answer no)? Yes  Did your symptoms begin within the past 14 days or was your positive test   result within the past 14 days?  Yes

## 2022-01-07 NOTE — TELEPHONE ENCOUNTER
Returned the call to the patient, patient stated that the only thing she had left was a horrible and that she did not want to schedule an appointment.

## 2022-01-13 DIAGNOSIS — M05.79 RHEUMATOID ARTHRITIS INVOLVING MULTIPLE SITES WITH POSITIVE RHEUMATOID FACTOR (HCC): ICD-10-CM

## 2022-01-13 DIAGNOSIS — M54.2 CERVICALGIA: ICD-10-CM

## 2022-01-13 DIAGNOSIS — M54.41 CHRONIC BILATERAL LOW BACK PAIN WITH BILATERAL SCIATICA: ICD-10-CM

## 2022-01-13 DIAGNOSIS — G89.29 CHRONIC BILATERAL LOW BACK PAIN WITH BILATERAL SCIATICA: ICD-10-CM

## 2022-01-13 DIAGNOSIS — M54.42 CHRONIC BILATERAL LOW BACK PAIN WITH BILATERAL SCIATICA: ICD-10-CM

## 2022-01-13 DIAGNOSIS — M48.062 SPINAL STENOSIS OF LUMBAR REGION WITH NEUROGENIC CLAUDICATION: ICD-10-CM

## 2022-01-13 DIAGNOSIS — R76.8 ANA POSITIVE: ICD-10-CM

## 2022-01-13 DIAGNOSIS — Z79.899 HIGH RISK MEDICATION USE: ICD-10-CM

## 2022-01-13 RX ORDER — OXYCODONE AND ACETAMINOPHEN 7.5; 325 MG/1; MG/1
1 TABLET ORAL EVERY 6 HOURS PRN
Qty: 70 TABLET | Refills: 0 | Status: SHIPPED | OUTPATIENT
Start: 2022-01-14 | End: 2022-02-16 | Stop reason: SDUPTHER

## 2022-01-18 ENCOUNTER — PROCEDURE VISIT (OUTPATIENT)
Dept: PHYSICAL MEDICINE AND REHAB | Age: 67
End: 2022-01-18
Payer: MEDICARE

## 2022-01-18 DIAGNOSIS — M54.31 SCIATICA OF RIGHT SIDE: Primary | ICD-10-CM

## 2022-01-18 PROCEDURE — 76942 ECHO GUIDE FOR BIOPSY: CPT | Performed by: PHYSICAL MEDICINE & REHABILITATION

## 2022-01-18 PROCEDURE — 64445 NJX AA&/STRD SCIATIC NRV IMG: CPT | Performed by: PHYSICAL MEDICINE & REHABILITATION

## 2022-01-18 RX ORDER — LIDOCAINE HYDROCHLORIDE 20 MG/ML
5 INJECTION, SOLUTION INFILTRATION; PERINEURAL ONCE
Status: COMPLETED | OUTPATIENT
Start: 2022-01-18 | End: 2022-01-18

## 2022-01-18 RX ORDER — LIDOCAINE HYDROCHLORIDE 10 MG/ML
8 INJECTION, SOLUTION INFILTRATION; PERINEURAL ONCE
Status: COMPLETED | OUTPATIENT
Start: 2022-01-18 | End: 2022-01-18

## 2022-01-18 RX ADMIN — LIDOCAINE HYDROCHLORIDE 8 ML: 10 INJECTION, SOLUTION INFILTRATION; PERINEURAL at 16:14

## 2022-01-18 RX ADMIN — LIDOCAINE HYDROCHLORIDE 5 ML: 20 INJECTION, SOLUTION INFILTRATION; PERINEURAL at 16:16

## 2022-01-25 ENCOUNTER — HOSPITAL ENCOUNTER (OUTPATIENT)
Dept: INTERVENTIONAL RADIOLOGY/VASCULAR | Age: 67
Discharge: HOME | End: 2022-01-27
Payer: MEDICARE

## 2022-01-25 VITALS
SYSTOLIC BLOOD PRESSURE: 155 MMHG | RESPIRATION RATE: 18 BRPM | OXYGEN SATURATION: 99 % | DIASTOLIC BLOOD PRESSURE: 70 MMHG | HEART RATE: 63 BPM

## 2022-01-25 DIAGNOSIS — M53.3 CHRONIC LEFT SI JOINT PAIN: ICD-10-CM

## 2022-01-25 DIAGNOSIS — G89.29 CHRONIC LEFT SI JOINT PAIN: ICD-10-CM

## 2022-01-25 PROCEDURE — 2500000003 HC RX 250 WO HCPCS: Performed by: PHYSICAL MEDICINE & REHABILITATION

## 2022-01-25 PROCEDURE — 27096 INJECT SACROILIAC JOINT: CPT | Performed by: PHYSICAL MEDICINE & REHABILITATION

## 2022-01-25 PROCEDURE — 2709999900 IR NERVE BLOCK PROCEDURE

## 2022-01-25 PROCEDURE — 62323 NJX INTERLAMINAR LMBR/SAC: CPT

## 2022-01-25 PROCEDURE — 6360000002 HC RX W HCPCS: Performed by: PHYSICAL MEDICINE & REHABILITATION

## 2022-01-25 RX ORDER — LIDOCAINE HYDROCHLORIDE 5 MG/ML
50 INJECTION, SOLUTION INFILTRATION; INTRAVENOUS ONCE
Status: COMPLETED | OUTPATIENT
Start: 2022-01-25 | End: 2022-01-25

## 2022-01-25 RX ORDER — METHYLPREDNISOLONE ACETATE 40 MG/ML
40 INJECTION, SUSPENSION INTRA-ARTICULAR; INTRALESIONAL; INTRAMUSCULAR; SOFT TISSUE ONCE
Status: COMPLETED | OUTPATIENT
Start: 2022-01-25 | End: 2022-01-25

## 2022-01-25 RX ORDER — DIAZEPAM 5 MG/1
10 TABLET ORAL PRN
COMMUNITY
End: 2022-04-13

## 2022-01-25 RX ADMIN — LIDOCAINE HYDROCHLORIDE 18 ML: 5 INJECTION, SOLUTION INFILTRATION; INTRAVENOUS at 10:25

## 2022-01-25 RX ADMIN — METHYLPREDNISOLONE ACETATE 40 MG: 40 INJECTION, SUSPENSION INTRA-ARTICULAR; INTRALESIONAL; INTRAMUSCULAR; INTRASYNOVIAL; SOFT TISSUE at 10:26

## 2022-01-25 ASSESSMENT — PAIN - FUNCTIONAL ASSESSMENT: PAIN_FUNCTIONAL_ASSESSMENT: 0-10

## 2022-01-25 NOTE — PROGRESS NOTES
Pt returned to CT holding via wheelchair. VSS. Pt changed back into their clothes independently. Site clean, dry, and no swelling. Pt able to wiggle bilateral toes and pt denies numbness/tingling in legs. Pt provided discharge instructions by KB-RN and verbalized understanding. Pt discharged via w/c with belongings. Electronically signed by Maira Omalley RN on 1/25/2022 at 10:42 AM

## 2022-01-25 NOTE — PROCEDURES
OPERATIVE REPORT      DATE OF PROCEDURE: 1/25/2022    PREOPERATIVE DIAGNOSIS:   Sacroiliac pain. POSTOPERATIVE DIAGNOSIS:   Same. OPERATIVE PROCEDURE:  Sacroiliac injection with fluoroscopy. PROCEDURE:  The patient taken to the special procedures department and placed in a prone position. A timeout was performed immediately prior to the start of the sacroiliac injection procedure and included the correct patient (two identifiers), correct procedure and correct site(s). Procedure consent and allergies were also verified. The left sacroiliac injection was performed under sterile conditions with C-arm assist.  The left sacroiliac space was identified with C-arm and needle was placed in the sacroiliac joint without any problems and we were able to get down to the joint, inject 20mg of Depo-Medrol with 3ml of 0.5% Xylocaine under sterile conditions. A small amount <1 cc of free air 200 was used to define the joint space. The patient tolerated the procedure well and was taken to the post-anesthesia care unit in stable condition. The patient was instructed to call our office the following business day for follow-up instructions and to notify us immediately if they were having any difficulties after the injection.           Chong Bartlett D.O.

## 2022-01-25 NOTE — SEDATION DOCUMENTATION
Patient was brought to Special Procedures suite via wheelchair. Patient onto procedure table in prone position with minimal assistance. Placed on vitals monitor. VSS. On RA. Caudal site prepped with Hibiclense and then betadine, draped and numbed with lidocaine. Needle placement verifed with fluoro guidance. All prep and medications given by Dr Mandeep Kemp. Patient tolerated well. Patient will return to the CT holding room.

## 2022-01-25 NOTE — PROGRESS NOTES
Pt arrived to CT holding. Pt assisted into gown by self. Pt hooked up to vitals sign machine. VSS. Medical history, allergies, and home medications reviewed with patient. Pt states she has taken her BP  meds as well as her valium today. She has held asa. Consents reviewed with patient and signed. Pt resting in wheelchair in stable condition.  Electronically signed by Ellis Zamora RN on 1/25/2022 at 9:41 AM

## 2022-02-01 ENCOUNTER — HOSPITAL ENCOUNTER (OUTPATIENT)
Dept: INTERVENTIONAL RADIOLOGY/VASCULAR | Age: 67
Discharge: HOME | End: 2022-02-03
Payer: MEDICARE

## 2022-02-01 VITALS
RESPIRATION RATE: 18 BRPM | WEIGHT: 214 LBS | OXYGEN SATURATION: 98 % | DIASTOLIC BLOOD PRESSURE: 72 MMHG | SYSTOLIC BLOOD PRESSURE: 166 MMHG | HEIGHT: 68 IN | BODY MASS INDEX: 32.43 KG/M2 | HEART RATE: 61 BPM

## 2022-02-01 DIAGNOSIS — M54.16 LUMBAR RADICULOPATHY: ICD-10-CM

## 2022-02-01 PROCEDURE — 2500000003 HC RX 250 WO HCPCS: Performed by: PHYSICAL MEDICINE & REHABILITATION

## 2022-02-01 PROCEDURE — 6360000002 HC RX W HCPCS: Performed by: PHYSICAL MEDICINE & REHABILITATION

## 2022-02-01 PROCEDURE — 62323 NJX INTERLAMINAR LMBR/SAC: CPT

## 2022-02-01 PROCEDURE — 27096 INJECT SACROILIAC JOINT: CPT | Performed by: PHYSICAL MEDICINE & REHABILITATION

## 2022-02-01 PROCEDURE — 2709999900 IR NERVE BLOCK PROCEDURE

## 2022-02-01 RX ORDER — METHYLPREDNISOLONE ACETATE 40 MG/ML
40 INJECTION, SUSPENSION INTRA-ARTICULAR; INTRALESIONAL; INTRAMUSCULAR; SOFT TISSUE ONCE
Status: COMPLETED | OUTPATIENT
Start: 2022-02-01 | End: 2022-02-01

## 2022-02-01 RX ORDER — LIDOCAINE HYDROCHLORIDE 5 MG/ML
50 INJECTION, SOLUTION INFILTRATION; INTRAVENOUS ONCE
Status: COMPLETED | OUTPATIENT
Start: 2022-02-01 | End: 2022-02-01

## 2022-02-01 RX ADMIN — LIDOCAINE HYDROCHLORIDE 18 ML: 5 INJECTION, SOLUTION INFILTRATION; INTRAVENOUS at 10:23

## 2022-02-01 RX ADMIN — METHYLPREDNISOLONE ACETATE 40 MG: 40 INJECTION, SUSPENSION INTRA-ARTICULAR; INTRALESIONAL; INTRAMUSCULAR; SOFT TISSUE at 10:24

## 2022-02-01 ASSESSMENT — PAIN DESCRIPTION - DESCRIPTORS: DESCRIPTORS: ACHING

## 2022-02-01 ASSESSMENT — PAIN - FUNCTIONAL ASSESSMENT: PAIN_FUNCTIONAL_ASSESSMENT: 0-10

## 2022-02-01 NOTE — PROCEDURES
OPERATIVE REPORT      DATE OF PROCEDURE: 2/1/2022    PREOPERATIVE DIAGNOSIS:   Sacroiliac pain. POSTOPERATIVE DIAGNOSIS:   Same. OPERATIVE PROCEDURE:  Sacroiliac injection with fluoroscopy. PROCEDURE:  The patient taken to the special procedures department and placed in a prone position. A timeout was performed immediately prior to the start of the sacroiliac injection procedure and included the correct patient (two identifiers), correct procedure and correct site(s). Procedure consent and allergies were also verified. The right sacroiliac injection was performed under sterile conditions with C-arm assist.  The right sacroiliac space was identified with C-arm and needle was placed in the sacroiliac joint without any problems and we were able to get down to the joint, inject 20mg of Depo-Medrol with 3ml of 0.5% Xylocaine under sterile conditions. A small amount <1 cc of free air 200 was used to define the joint space. The patient tolerated the procedure well and was taken to the post-anesthesia care unit in stable condition. The patient was instructed to call our office the following business day for follow-up instructions and to notify us immediately if they were having any difficulties after the injection.           Ruben Malik D.O.

## 2022-02-01 NOTE — SEDATION DOCUMENTATION
Patient was brought to Special Procedures suite via wheelchair. Patient onto procedure table in prone position with minimal assistance. Placed on vitals monitor. Vital signs obtained pt states she has discomfort while laying prone. R SI site prepped with Hibiclense and then betadine, draped and numbed with lidocaine. Time out performed. Needle placement verifed with fluoro guidance and contrast injection. All prep and medications given by Dr Logan Moy. Patient tolerated well. Patient will return to the CT holding room.

## 2022-02-01 NOTE — FLOWSHEET NOTE
PRE-PROCEDURE:     0930 - Pt ambulatory with steady gait, independent with cane to CT holding room. A&Ox3. Changed independently, procedure explained, questions answered, and consent signed. VSS. On RA. History and medication reviewed and updated. Pt reports taking valium and her pain and BP meds this AM, denies being on a blood thinner. Patient complains of back pain 4/10, chronic, aching. Emotional support offered. 3336 - Patient taken to IR procedure room for Fluoroscopy Guided SI Injection.  Electronically signed by Adamaris Britton RN on 2/1/2022 at 9:56 AM

## 2022-02-01 NOTE — FLOWSHEET NOTE
POST PROCEDURE:     1030 - Patient returned from specials department via wheelchair post SI injection. Patient assessed and can wiggle toes. Patient has feelings in both feet and legs. Patient denies any new numbness or tingling in both feet or legs. Patient rates pain currently at 5/10. Patient able to get dressed with no assistance. 1040 - Discharge instructions reviewed with the patient. Patient questions answered. Patient verbalized understanding of discharge instructions. Patient given a copy of discharge instructions. Patient  discharged via [facility] to [facility], surgery [facility] where  waiting to pick her up and drive them home.  Electronically signed by Morteza Rae RN on 2/1/2022 at 10:40 AM

## 2022-02-16 ENCOUNTER — OFFICE VISIT (OUTPATIENT)
Dept: PHYSICAL MEDICINE AND REHAB | Age: 67
End: 2022-02-16
Payer: MEDICARE

## 2022-02-16 VITALS
SYSTOLIC BLOOD PRESSURE: 130 MMHG | WEIGHT: 214 LBS | BODY MASS INDEX: 32.43 KG/M2 | DIASTOLIC BLOOD PRESSURE: 60 MMHG | HEIGHT: 68 IN

## 2022-02-16 DIAGNOSIS — M48.062 SPINAL STENOSIS OF LUMBAR REGION WITH NEUROGENIC CLAUDICATION: ICD-10-CM

## 2022-02-16 DIAGNOSIS — M54.41 CHRONIC BILATERAL LOW BACK PAIN WITH BILATERAL SCIATICA: Primary | ICD-10-CM

## 2022-02-16 DIAGNOSIS — G89.29 CHRONIC BILATERAL LOW BACK PAIN WITH BILATERAL SCIATICA: Primary | ICD-10-CM

## 2022-02-16 DIAGNOSIS — M05.79 RHEUMATOID ARTHRITIS INVOLVING MULTIPLE SITES WITH POSITIVE RHEUMATOID FACTOR (HCC): ICD-10-CM

## 2022-02-16 DIAGNOSIS — M54.42 CHRONIC BILATERAL LOW BACK PAIN WITH BILATERAL SCIATICA: Primary | ICD-10-CM

## 2022-02-16 DIAGNOSIS — R76.8 ANA POSITIVE: ICD-10-CM

## 2022-02-16 DIAGNOSIS — M54.2 CERVICALGIA: ICD-10-CM

## 2022-02-16 DIAGNOSIS — M20.001 DEFORMITY OF FINGER OF RIGHT HAND: ICD-10-CM

## 2022-02-16 DIAGNOSIS — Z79.899 HIGH RISK MEDICATION USE: ICD-10-CM

## 2022-02-16 PROCEDURE — 4040F PNEUMOC VAC/ADMIN/RCVD: CPT | Performed by: PHYSICAL MEDICINE & REHABILITATION

## 2022-02-16 PROCEDURE — G8427 DOCREV CUR MEDS BY ELIG CLIN: HCPCS | Performed by: PHYSICAL MEDICINE & REHABILITATION

## 2022-02-16 PROCEDURE — G8399 PT W/DXA RESULTS DOCUMENT: HCPCS | Performed by: PHYSICAL MEDICINE & REHABILITATION

## 2022-02-16 PROCEDURE — 3017F COLORECTAL CA SCREEN DOC REV: CPT | Performed by: PHYSICAL MEDICINE & REHABILITATION

## 2022-02-16 PROCEDURE — 99214 OFFICE O/P EST MOD 30 MIN: CPT | Performed by: PHYSICAL MEDICINE & REHABILITATION

## 2022-02-16 PROCEDURE — 1036F TOBACCO NON-USER: CPT | Performed by: PHYSICAL MEDICINE & REHABILITATION

## 2022-02-16 PROCEDURE — G8417 CALC BMI ABV UP PARAM F/U: HCPCS | Performed by: PHYSICAL MEDICINE & REHABILITATION

## 2022-02-16 PROCEDURE — 1123F ACP DISCUSS/DSCN MKR DOCD: CPT | Performed by: PHYSICAL MEDICINE & REHABILITATION

## 2022-02-16 PROCEDURE — 1090F PRES/ABSN URINE INCON ASSESS: CPT | Performed by: PHYSICAL MEDICINE & REHABILITATION

## 2022-02-16 PROCEDURE — G8484 FLU IMMUNIZE NO ADMIN: HCPCS | Performed by: PHYSICAL MEDICINE & REHABILITATION

## 2022-02-16 RX ORDER — OXYCODONE AND ACETAMINOPHEN 7.5; 325 MG/1; MG/1
1 TABLET ORAL EVERY 6 HOURS PRN
Qty: 70 TABLET | Refills: 0 | Status: SHIPPED | OUTPATIENT
Start: 2022-02-16 | End: 2022-03-11 | Stop reason: SDUPTHER

## 2022-02-16 ASSESSMENT — ENCOUNTER SYMPTOMS
NAUSEA: 0
VOMITING: 0
ABDOMINAL PAIN: 1
COUGH: 0
WHEEZING: 0
EYE PAIN: 0
SHORTNESS OF BREATH: 1
EYE REDNESS: 0
PHOTOPHOBIA: 1
BLOOD IN STOOL: 0
SORE THROAT: 0
BACK PAIN: 1
DIARRHEA: 0
STRIDOR: 0
CONSTIPATION: 1

## 2022-02-16 NOTE — PROGRESS NOTES
Subjective  Braeden Gardner, 74 y.o. female presents today with:    Back Pain Left SI 1/25/22 with 100% reduction in pain lasting 3 weeks. Right SI injection 2/1/22 with 60% reduction in pain. Neck Pain     Shoulder Pain Right    Hip Pain Right. Right sciatic injection 1/18/22 with % reduction in pain lasting. Leg Pain Right    Foot Pain Right    Medication Refill Percocet, Gabapentin, Baclofen, Zinc, Vit D refill & pill count today. Patient did not bring pain medication to office visit. She is still doing well with her current medications and shows no signs of abuse or overuse. She is more functional on it. Does not need Neurontin refills. She is to avoid any NSAIDs but she will continue gabapentin as tolerated baclofen vitamin D and zinc.   She will need TPs monthly with Left sciatc blocks. Meds are helping function and improving quality of life. Stressed- caring for  who had shoulder replacement. Back Pain  This is a chronic problem. The current episode started more than 1 year ago. The problem occurs daily. The problem is unchanged. The pain is present in the lumbar spine, gluteal and sacro-iliac. The quality of the pain is described as aching. Radiates to: bilateral legs, left leg worse, left hip. The pain is at a severity of 3/10. The pain is moderate. The pain is worse during the day. The symptoms are aggravated by bending, standing, position, sitting and twisting (Walking). Stiffness is present in the morning. Associated symptoms include abdominal pain, leg pain and pelvic pain. Pertinent negatives include no bladder incontinence, chest pain, dysuria, fever, headaches, numbness, paresis, perianal numbness or weakness. Risk factors include obesity, poor posture, menopause, lack of exercise, sedentary lifestyle and history of steroid use.  She has tried analgesics, walking, home exercises, heat, muscle relaxant, NSAIDs, ice and bed rest (Sciatica block, trigger point injections, mobic, Gabapentin, Norco) for the symptoms. The treatment provided significant relief. Neck Pain   This is a chronic problem. The problem occurs constantly. The problem has been waxing and waning. The pain is at a severity of 5/10. The pain is mild. The symptoms are aggravated by position and bending. Stiffness is present in the morning. Associated symptoms include leg pain and photophobia. Pertinent negatives include no chest pain, fever, headaches, numbness, paresis or weakness. She has tried heat, acetaminophen, chiropractic manipulation, ice, muscle relaxants, neck support, NSAIDs and oral narcotics for the symptoms. The treatment provided moderate relief. Shoulder Pain   Associated symptoms include joint locking, joint swelling and a limited range of motion. Pertinent negatives include no fever or numbness. Family history includes rheumatoid arthritis. Her past medical history is significant for osteoarthritis. There is no history of diabetes. Hip Pain   The incident occurred more than 1 week ago. The injury mechanism was a compression. The quality of the pain is described as aching. The pain is at a severity of 8/10. Pertinent negatives include no numbness. She reports no foreign bodies present. The symptoms are aggravated by movement, palpation and weight bearing. She has tried acetaminophen, heat, elevation, ice, immobilization, NSAIDs, non-weight bearing and rest for the symptoms. The treatment provided mild relief. Foot Pain   The pain is present in the neck, back and left shoulder. This is a chronic problem. The current episode started more than 1 year ago. The problem occurs constantly. The problem has been gradually worsening. The pain is at a severity of 4/10. Associated symptoms include joint locking, joint swelling and a limited range of motion. Pertinent negatives include no fever or numbness. The symptoms are aggravated by contact, heat, standing and lying down.  She has tried cold, NSAIDS, OTC ointments, OTC pain meds, chondroitin, heat, movement, acetaminophen, oral narcotics and rest for the symptoms. The treatment provided significant relief. Family history includes rheumatoid arthritis. Her past medical history is significant for osteoarthritis. There is no history of diabetes.        Past Medical History:   Diagnosis Date    Abnormal electromyogram (EMG) 8/19/09    SENSORIMOTOR NEUROPATHY OF BLE, RIGHT WORSE THAN LEFT, SUSPICIOUS FOR RIGHT S1 RADIC    Angina pectoris (HCC)     Angina pectoris ([address] 07/2019    sees Dr. Heather Bailey Ankle pain     Bleeding ulcer     Colon polyp 4/3/2017    Dermatitis     Fibromyalgia     Foot pain     Hyperlipidemia     Hypertension     Infection of intervertebral disc (pyogenic), lumbar region ([address] 8/13/2018    Low back pain     MRSA (methicillin resistant staph aureus) culture positive 2011    Nasal bleeding     Neck pain     Neuropathy     Obesity     Osteoarthritis     PSVT (paroxysmal supraventricular tachycardia) ([address]     Shingles outbreak 2012    SI (sacroiliac) pain     Stenosis     Vitamin D deficiency      Past Surgical History:   Procedure Laterality Date    CARDIAC CATHETERIZATION  10/2016    CARDIOVERSION      2001 x3    CERVICAL FUSION  12/14/2011    Dr Trujillo Laming with bone graft and plate    COLONOSCOPY  09/2021    CC    ENDOSCOPIC ULTRASOUND (LOWER) N/A 07/11/2019    EGD/ EUS performed by Thi Thorpe MD at [address] [place] [address] HAND FUSION  07/2005    HYSTERECTOMY  2001    HYSTERECTOMY, TOTAL ABDOMINAL      JOINT REPLACEMENT Left     tka    LIVER BIOPSY      MENISCECTOMY  10/31/2011    left knee    OTHER SURGICAL HISTORY  10/26/2009    CAUDALS BY DR Adam Vernon    PA COLON CA SCRN NOT  [address] IND N/A 04/12/2017    COLONOSCOPY performed by Katelyn Herrera MD at [address] ESOPHAGOGASTRODUODENOSCOPY TRANSORAL DIAGNOSTIC N/A 04/12/2017    EGD ESOPHAGOGASTRODUODENOSCOPY performed by Clau Torres MD at [address]  12/2011    Dr Kelsey Gu Lumbar and c spine    TOTAL KNEE ARTHROPLASTY  07/30/2012    LEFT-DR AVELAR    UPPER GASTROINTESTINAL ENDOSCOPY  1995     Social History     Socioeconomic History    Marital status:      Spouse name: Davi Pineda Number of children: 2    Years of education: 15    Highest education level: Associate degree: occupational, technical, or vocational program   Occupational History    Occupation: Retired    Tobacco Use    Smoking status: Never Smoker    Smokeless tobacco: Never Used   Vaping Use    Vaping Use: Never used   Substance and Sexual Activity    Alcohol use: No    Drug use: No    Sexual activity: Not Currently   Other Topics Concern    None   Social History Narrative    Lives in [place] with her  Jennifer Dillard and son Keyona Cordon is mildly handicapped --CP right lilia with anxiety and LD. He works at [employer]. Social Determinants of Health     Financial Resource Strain: Low Risk     Difficulty of Paying Living Expenses: Not hard at all   Food Insecurity: No Food Insecurity    Worried About Running Out of Food in the Last Year: Never true    Romana of Food in the Last Year: Never true   Transportation Needs:     Lack of Transportation (Medical): Not on file    Lack of Transportation (Non-Medical):  Not on file   Physical Activity:     Days of Exercise per Week: Not on file    Minutes of Exercise per Session: Not on file   Stress:     Feeling of Stress : Not on file   Social Connections:     Frequency of Communication with Friends and Family: Not on file    Frequency of Social Gatherings with Friends and Family: Not on file    Attends Islam Services: Not on file    Active Member of Clubs or Organizations: Not on file    Attends Club or Organization Meetings: Not on file    Marital Status: Not on file   Intimate Partner Violence:     Fear of Current or Ex-Partner: Not on file   [address] [place] [address] Emotionally Abused: Not on file    Physically Abused: Not on file    Sexually Abused: Not on file   Housing Stability:     Unable to Pay for Housing in the Last Year: Not on file    Number of Places Lived in the Last Year: Not on file    Unstable Housing in the Last Year: Not on file     Family History   Problem Relation Age of Onset    Heart Disease Father     High Cholesterol Father     High Blood Pressure Father     Stroke Mother     High Blood Pressure Mother     High Blood Pressure Brother        Allergies   Allergen Reactions    Latex Swelling     Swelling of hands with wearing latex gloves    Norvasc [Amlodipine Besylate] Other (See Comments)     swelling    Keflex [Cephalexin] Rash       Review of Systems   Constitutional: Positive for activity change and fatigue. Negative for chills, diaphoresis and fever. HENT: Negative for congestion, ear discharge, ear pain, hearing loss, nosebleeds, sore throat and tinnitus. Eyes: Positive for photophobia. Negative for pain and redness. Respiratory: Positive for shortness of breath. Negative for cough, wheezing and stridor. Shortness of breath on exertion   Cardiovascular: Negative for chest pain, palpitations and leg swelling. Gastrointestinal: Positive for abdominal pain and constipation. Negative for blood in stool, diarrhea, nausea and vomiting. Endocrine: Negative for polydipsia. Genitourinary: Positive for pelvic pain. Negative for bladder incontinence, dysuria, flank pain, frequency, hematuria and urgency. Musculoskeletal: Positive for back pain, gait problem, myalgias and neck pain. Skin: Negative for rash. Allergic/Immunologic: Positive for immunocompromised state. Negative for environmental allergies. Neurological: Negative for dizziness, tremors, seizures, weakness, numbness and headaches. Hematological: Does not bruise/bleed easily. Psychiatric/Behavioral: Negative for hallucinations and suicidal ideas.  The patient is not nervous/anxious. Objective    Vitals:    02/16/22 1052   BP: 130/60   Site: Left Upper Arm   Position: Sitting   Cuff Size: Large Adult   Weight: 214 lb (97.1 kg)   Height: 5' 8\" (1.727 m)     Pain Score: Five (Without medication)       Physical Exam  Vitals reviewed. Constitutional:       General: She is not in acute distress. Appearance: She is well-developed. She is not ill-appearing, toxic-appearing or diaphoretic. Comments:         HENT:      Head: Normocephalic and atraumatic. Right Ear: Hearing normal.      Left Ear: Hearing normal.      Nose: Nose normal.      Mouth/Throat:      Mouth: No oral lesions. Dentition: Normal dentition. Pharynx: No oropharyngeal exudate. Eyes:      General: No scleral icterus. Right eye: No discharge. Left eye: No discharge. Conjunctiva/sclera: Conjunctivae normal.      Right eye: No chemosis or exudate. Left eye: No chemosis or exudate. Pupils: Pupils are equal, round, and reactive to light. Neck:      Thyroid: No thyromegaly. Vascular: No JVD. Trachea: Tracheal tenderness present. No tracheal deviation. Cardiovascular:      Pulses: No decreased pulses. Pulmonary:      Effort: Pulmonary effort is normal. No tachypnea, bradypnea, accessory muscle usage or respiratory distress. Breath sounds: No wheezing. Chest:      Chest wall: No tenderness. Abdominal:      General: Bowel sounds are normal. There is no distension. Palpations: Abdomen is soft. There is no mass. Tenderness: There is no abdominal tenderness. There is no guarding or rebound. Comments: Pressure RUQ   Musculoskeletal:         General: Tenderness present. Right shoulder: Tenderness and bony tenderness present. Decreased range of motion. Left shoulder: Tenderness and bony tenderness present. Decreased range of motion.       Right upper arm: Normal.      Left upper arm: Normal.      Right elbow: Normal.      Left elbow: Normal.      Right forearm: Normal.      Left forearm: Normal.      Right wrist: Normal.      Left wrist: Normal.      Right hand: Deformity, tenderness and bony tenderness present. Decreased range of motion. Left hand: Deformity, tenderness and bony tenderness present. Decreased range of motion. Arms:       Cervical back: Neck supple. Tenderness and bony tenderness present. No edema or rigidity. Spinous process tenderness and muscular tenderness present. Decreased range of motion. Thoracic back: Tenderness and bony tenderness present. Decreased range of motion. Lumbar back: Tenderness and bony tenderness present. No swelling, edema, deformity or lacerations. Decreased range of motion. Back:       Right hip: Tenderness present. Decreased range of motion. Decreased strength. Left hip: Tenderness present. Decreased range of motion. Decreased strength. Right upper leg: Normal.      Left upper leg: Normal.      Right knee: Normal.      Left knee: Normal.      Right lower leg: Normal.      Left lower leg: Normal.      Right ankle: Normal.      Right Achilles Tendon: Normal.      Left ankle: Tenderness present over the lateral malleolus and medial malleolus. Decreased range of motion. Left Achilles Tendon: Normal.      Right foot: Decreased range of motion. Tenderness and bony tenderness present. Left foot: Decreased range of motion. Tenderness present. Legs:       Comments: Tender areas are indicated by numbered spot         Lymphadenopathy:      Cervical: No cervical adenopathy. Skin:     General: Skin is warm and dry. Coloration: Skin is not pale. Findings: No abrasion, bruising, ecchymosis, erythema, laceration, petechiae or rash. Rash is not macular, pustular or urticarial.      Nails: There is no clubbing. Neurological:      Mental Status: She is alert and oriented to person, place, and time. Cranial Nerves:  No cranial nerve deficit. Sensory: No sensory deficit. Motor: No tremor, atrophy or abnormal muscle tone. Coordination: Coordination normal. Finger-Nose-Finger Test abnormal.      Gait: Gait normal.      Deep Tendon Reflexes: Reflexes abnormal. Babinski sign absent on the right side. Babinski sign absent on the left side. Psychiatric:         Attention and Perception: She is attentive. Mood and Affect: Mood is not anxious. Affect is not labile, blunt, angry or inappropriate. Speech: Speech normal.         Behavior: Behavior normal. Behavior is not agitated, slowed, aggressive, withdrawn, hyperactive or combative. Thought Content: Thought content normal. Thought content is not paranoid or delusional. Thought content does not include homicidal or suicidal ideation. Thought content does not include homicidal or suicidal plan. Cognition and Memory: Cognition is not impaired. Memory is not impaired. She does not exhibit impaired recent memory or impaired remote memory. Judgment: Judgment normal. Judgment is not impulsive or inappropriate. Ortho Exam  Neurologic Exam     Mental Status   Oriented to person, place, and time. Speech: speech is normal   Level of consciousness: alert  Knowledge: good. Able to name object. Able to read. Able to repeat. Able to write. Normal comprehension. Cranial Nerves     CN III, IV, VI   Pupils are equal, round, and reactive to light.     Motor Exam   Muscle bulk: normal  Overall muscle tone: normal    Sensory Exam   Right arm light touch: decreased from fingers  Left arm light touch: decreased from fingers  Right leg light touch: decreased from ankle  Left leg light touch: decreased from ankle    Gait, Coordination, and Reflexes     Coordination   Finger to nose coordination: abnormal          After a thorough review and discussion of the previous medical records, patient comprehensive medical, surgical, and family and social history, Review of Systems, their OARRS, their Screener and Opioid Assessment for Patients with Pain (SOAPP®-R), recent diagnostics, and symptomatic results to previous treatment, it is my impression that the patients is suffering with progressive and severe:     Diagnosis Orders   1. Chronic bilateral low back pain with bilateral sciatica  oxyCODONE-acetaminophen (PERCOCET) 7.5-325 MG per tablet    MA INJECTION AA&/STRD SCIATIC NERVE   2. Deformity of finger of right hand     3. Rheumatoid arthritis involving multiple sites with positive rheumatoid factor (HCC)  oxyCODONE-acetaminophen (PERCOCET) 7.5-325 MG per tablet   4. Spinal stenosis of lumbar region with neurogenic claudication  oxyCODONE-acetaminophen (PERCOCET) 7.5-325 MG per tablet   5. Cervicalgia  oxyCODONE-acetaminophen (PERCOCET) 7.5-325 MG per tablet   6. High risk medication use - 10/17/17 OARRS PM&R, 12/19/17 OARRS PM&R, 12/20/17 Tox screen positive for opiates, Hydrocodone PM&R, MED CONTRACT 2/2/17  oxyCODONE-acetaminophen (PERCOCET) 7.5-325 MG per tablet   7.  COLTON positive  oxyCODONE-acetaminophen (PERCOCET) 7.5-325 MG per tablet       I am also concerned by lifestyle and mood issues including:    Past Medical History:   Diagnosis Date    Abnormal electromyogram (EMG) 8/19/09    SENSORIMOTOR NEUROPATHY OF BLE, RIGHT WORSE THAN LEFT, SUSPICIOUS FOR RIGHT S1 RADIC    Angina pectoris (HCC)     Angina p[facility]is [facility] 07/2019    sees Dr. Brian Garber Ankle pain     Bleeding ulcer     Colon polyp 4/3/2017    Dermatitis     Fibromyalgia     Foot pain     Hyperlipidemia     Hypertension     Infection of intervertebral disc (pyogenic), lumbar region ([address] 8/13/2018    Low back pain     MRSA (methicillin resistant staph aureus) culture positive 2011    Nasal bleeding     Neck pain     Neuropathy     Obesity     Osteoarthritis     PSVT (paroxysmal supraventricular tachycardia) ([address]     Shingles outbreak 2012    SI (sacroiliac) pain     Stenosis     Vitamin D deficiency            Given their medication, chronic pain and lifestyle and medications they are at risk for :    Falls, constipation, addiction, toxicity on opiates  Loss of livelyhood due to severe pain, debility, weight gain and  vitamin D deficiency    The patient was educated regarding proper diet, fitness routine, and regulatory restrictions concerning pain medications. Previous notes, comprehensive past medical, surgical, family history, and diagnostics were reviewed. Patient education and councelling were provided regarding off label use,treatment options and medication and injection risks. Current and old OARRS ([place] Automated Prescription Reporting System) records reviewed, all refills reviewed since last visit,  Behavioral agreement/KATHLEEN regulations   and Toxicology screen was reviewed with patient and is up to date. There are   no current red flags. high risk meds review re intensive monitoring for toxicity and addiction,  They are making good progress regarding pain relief, they are performing at a functional level regarding activities of daily living, family interactions and psychological functioning, they're not having any adverse effects or side effects from the current medications, and I see no findings of aberrant drug taking or addiction related behaviors. The patient is aware that they have a chronic pain condition and they may require opiates dosing for life. All efforts will be made to wean to the lowest effective dose. Other therapies for pain have not been effective including nonopiate medications. Injections and exercises are only partially effective. A Rx for Narcan was offered to help prevent accidental overdose. RX Monitoring 11/4/2021   Attestation -   Acute Pain Prescriptions -   Periodic Controlled Substance Monitoring Possible medication side effects, risk of tolerance/dependence & alternative treatments discussed. ;No signs of potential drug abuse or diversion identified. ;Assessed functional status. ;Obtaining appropriate analgesic effect of treatment. Chronic Pain > 50 MEDD Re-evaluated the status of the patient's underlying condition causing pain. ;Considered consultation with a specialist.;Obtained or confirmed \"Consent for Opioid Use\" on file. Chronic Pain > 80 MEDD -               Patient is currently taking:       I am having Rayna Hdez maintain her Vitamin D3, Coenzyme Q10 (COQ10 PO), aspirin, melatonin, nitroGLYCERIN, hydroxychloroquine, cyanocobalamin, losartan, gabapentin, doxycycline hyclate, baclofen, dilTIAZem, topiramate, atorvastatin, omeprazole, diazePAM, and oxyCODONE-acetaminophen. We will continue to administer cyanocobalamin. I also recommend the following Medications:    Orders Placed This Encounter   Medications    oxyCODONE-acetaminophen (PERCOCET) 7.5-325 MG per tablet     Sig: Take 1 tablet by mouth every 6 hours as needed for Pain for up to 30 days. Intended supply: 30 days     Dispense:  70 tablet     Refill:  0     Reduce doses taken as pain becomes manageable        -which helps with pain and function. Otherwise, continue the current pain medications that I have prescibed. Radiologic:   Old  unique films results reviewed SI joints patent,     I discussed results with patients. see Follow up plans below  For any new studies. Unique source and Care Everywhere Updates:  prior external notes requested and reviewed. No new issues noted. Unique History obtained by caregiver/independent historian-and verified with SOAPPR. Electrodiagnostic:  Previous studies requested,     I discussed results with patient. See follow-up plans for new studies.         Labs:  Previous labs reviewed     Lab Results   Component Value Date     06/04/2021    K 3.7 06/04/2021    K 4.4 08/09/2018    CL 95 06/04/2021    CO2 25 06/04/2021    BUN 25 06/04/2021    CREATININE 1.36 06/04/2021    CALCIUM 9.9 06/04/2021    LABALBU 4.3 02/09/2021    LABALBU 2.8 08/18/2018    BILITOT 0.4 02/09/2021    ALKPHOS 117 02/09/2021    AST 27 02/09/2021    ALT 26 02/09/2021     Lab Results   Component Value Date    WBC 5.5 06/04/2021    RBC 4.41 06/04/2021    RBC 2.81 09/06/2018    HGB 13.7 06/04/2021    HCT 40.1 06/04/2021    MCV 91.1 06/04/2021    MCH 31.0 06/04/2021    MCHC 34.1 06/04/2021    RDW 12.8 06/04/2021     06/04/2021    MPV 7.4 09/13/2018       Lab Results   Component Value Date    LABAMPH Neg 06/30/2021    BARBSCNU Neg 06/30/2021    LABBENZ Neg 06/30/2021    CANSU Neg 06/30/2021    COCAIMETSCRU Neg 06/30/2021    PHENCYCLIDINESCREENURINE Neg 00/47/3084    TRICYCLIC Neg 57/72/8779    DSCOMMENT see below 06/30/2021       Lab Results   Component Value Date    CODEINE Not Detected 12/02/2016    MORPHINE Not Detected 12/02/2016    ACETYLMORPHI Not Detected 12/02/2016    OXYCODONE Not Detected 12/02/2016    NOROXYCODONE Not Detected 12/02/2016    NOROXYMU Not Detected 12/02/2016    HYDRCO Present 12/02/2016    NORHYDU Present 12/02/2016    HYDROMO Not Detected 12/02/2016    Dhruv  Not Detected 12/02/2016    Noreene Cedrick Not Detected 12/02/2016    FENTA Not Detected 12/02/2016    NORFENT Not Detected 12/02/2016    MEPERIDINE Not Detected 12/02/2016    TAPENU Not Detected 12/02/2016    TAPOSULFUR Not Detected 12/02/2016    METHADONE Not Detected 12/02/2016    LABPROP Not Detected 12/02/2016    TRAM Not Detected 12/02/2016    AMPH Not Detected 12/02/2016    METHAMP Not Detected 12/02/2016    MDMA Not Detected 12/02/2016    ECMDA Not Detected 12/02/2016       Lab Results   Component Value Date    METPHEN Not Detected 12/02/2016    PHENTERMINE Not Detected 12/02/2016    BENZOYL Not Detected 12/02/2016    Clifm Deiters Not Detected 12/02/2016    ALPHAOHALPRA Not Detected 12/02/2016    CLONAZEPAM Not Detected 12/02/2016    Ran Lacey Not Detected 12/02/2016    DIAZEP Not Detected 12/02/2016    ROMAIN Not Detected 12/02/2016    OXAZ Not Detected 12/02/2016    Adeola Mcarthuryer Not Detected 12/02/2016    LORAZEPAM Not Detected 12/02/2016    MIDAZOLAM Not Detected 12/02/2016    ZOLPIDEM Not Detected 12/02/2016    NAI Not Detected 12/02/2016    ETG Not Detected 12/02/2016    MARIJMET Not Detected 12/02/2016    PCP Not Detected 12/02/2016    PAINMGTDRUGP See Below 12/02/2016    EERPAINMGTPA See Note 12/02/2016    LABCREA 129.7 08/09/2018         , I discussed results with patient. See follow-up plans for new studies. Therapies:  HEP-gentle stretching and relaxation techniques-demonstrated with patient-they are to do them twice a day. They are also advised to make the following lifestyle changes:  Goals      Exercise 3x per week (30 min per time)      SOAPP-R GOAL LESS THAN 9      09/02/16 SCORE: 8 LOW RISK   02/02/17 score 7-low risk   04/03/17 score: 6-low risk  06/05/17 score: 7-low risk  08/07/17 score: 8-low risk  10/06/17 score: 6-low risk  12/20/17 score: 5-low risk  02/18/18 score: 6-low risk  0425/2018 score 5-low risk  7/11/18 score: 3 low- risk  10/11/18 score:4- low risk  2/28/19 score: 2: low risk  5/13/19 score: 2- low risk  7/17/19 score: 1- low risk   9/13/19 score: 2- low risk  11/21/19 score: 3- low risk   1/22/20 score: 2- low risk   6- score 3 - low risk   6/23/21 score: 7- low risk  9/15/21 score: 4- low risk  12/01/2021 score 5- low risk        Weight < 150 lb (68.04 kg)           Injections or Epidurals:  Injection options were discussed. Patient gave verbal consent to ordered injections. See follow-up plans for planned injections. Supplements:  Vitamin D with increased dosing during the rainy months,   Education was given on:   Dietary and Fitness--daily stretches and low carb diet-in chair Yoga when possible             Follow up with Primary Care Physician regarding their general medical needs.      Stressed the importance of following up with PCP and specialists for his/her chronic diseases, health, CV, and cancer screening and continued care. Will follow disease activity/progression and adjust therapeutic regimen to disease activity and severity. Discussed medication dosage, usage, goals of therapy, and side effects. Available test results were reviewed -Discussed findings, impression and plan with patient. An additional 7 minutes were spent outside of the patient visit to review records. Additional time spent with the patient to discuss their questions. Additional time spent with the patient devoted to discussing treatment strategy, planning, and implementation. Patient understands above plan; questions asked and answered. Patient agrees to plan as noted above. At least 50% of the visit was involved in the discussion of the options for treatment. We discussed exercises, medication, interventional therapies and surgery. Healthy life style is essential with patient hard work to achieve the wellness. In addition; discussion with the patient and/or family about patient's functional status any of the diagnostic results, impressions and/or recommended diagnostic studies, prognosis, risks and benefits of treatment options, instructions for treatment and/or follow-up, importance of compliance with chosen treatment options, risk-factor reduction, and patient/family education. They are to follow up in 2 1/2 -3 months to review medication, efficacy of injections, pill counts, OARRS check, high risk med review re intensive monitoring for toxicity and addiction, SOAPPR assessment, review diagnostics, to review previous and future treatment plans and assess appropriateness for continued therapy.         New Diagnostics  Orders Placed This Encounter   Procedures    CT INJECTION AA&/STRD SCIATIC NERVE       Elvis Connolly DO

## 2022-03-01 ENCOUNTER — PROCEDURE VISIT (OUTPATIENT)
Dept: PHYSICAL MEDICINE AND REHAB | Age: 67
End: 2022-03-01
Payer: MEDICARE

## 2022-03-01 DIAGNOSIS — M54.42 CHRONIC BILATERAL LOW BACK PAIN WITH BILATERAL SCIATICA: ICD-10-CM

## 2022-03-01 DIAGNOSIS — G89.29 CHRONIC BILATERAL LOW BACK PAIN WITH BILATERAL SCIATICA: ICD-10-CM

## 2022-03-01 DIAGNOSIS — M54.41 CHRONIC BILATERAL LOW BACK PAIN WITH BILATERAL SCIATICA: ICD-10-CM

## 2022-03-01 PROCEDURE — 96372 THER/PROPH/DIAG INJ SC/IM: CPT | Performed by: PHYSICAL MEDICINE & REHABILITATION

## 2022-03-01 PROCEDURE — 76942 ECHO GUIDE FOR BIOPSY: CPT | Performed by: PHYSICAL MEDICINE & REHABILITATION

## 2022-03-01 PROCEDURE — 64445 NJX AA&/STRD SCIATIC NRV IMG: CPT | Performed by: PHYSICAL MEDICINE & REHABILITATION

## 2022-03-01 RX ORDER — LIDOCAINE HYDROCHLORIDE 10 MG/ML
7 INJECTION, SOLUTION INFILTRATION; PERINEURAL ONCE
Status: COMPLETED | OUTPATIENT
Start: 2022-03-01 | End: 2022-03-01

## 2022-03-01 RX ORDER — CYANOCOBALAMIN 1000 UG/ML
1000 INJECTION INTRAMUSCULAR; SUBCUTANEOUS ONCE
Status: COMPLETED | OUTPATIENT
Start: 2022-03-01 | End: 2022-03-01

## 2022-03-01 RX ORDER — LIDOCAINE HYDROCHLORIDE 20 MG/ML
5 INJECTION, SOLUTION INFILTRATION; PERINEURAL ONCE
Status: COMPLETED | OUTPATIENT
Start: 2022-03-01 | End: 2022-03-01

## 2022-03-01 RX ADMIN — LIDOCAINE HYDROCHLORIDE 7 ML: 10 INJECTION, SOLUTION INFILTRATION; PERINEURAL at 13:22

## 2022-03-01 RX ADMIN — CYANOCOBALAMIN 1000 MCG: 1000 INJECTION INTRAMUSCULAR; SUBCUTANEOUS at 13:22

## 2022-03-01 RX ADMIN — LIDOCAINE HYDROCHLORIDE 5 ML: 20 INJECTION, SOLUTION INFILTRATION; PERINEURAL at 13:22

## 2022-03-01 NOTE — PROGRESS NOTES
Patient here for left sciatic injection with U/S. Patient taken back to exam room, and placed on drape locking stool. Areas to be injected marked appropriately, and cleansed with alcohol. 7cc of 1% Lidocaine, 5cc of 2% Lidocaine, and 1cc of B12 to be injected by provider.

## 2022-03-11 DIAGNOSIS — Z79.899 HIGH RISK MEDICATION USE: ICD-10-CM

## 2022-03-11 DIAGNOSIS — R76.8 ANA POSITIVE: ICD-10-CM

## 2022-03-11 DIAGNOSIS — G89.29 CHRONIC BILATERAL LOW BACK PAIN WITH BILATERAL SCIATICA: ICD-10-CM

## 2022-03-11 DIAGNOSIS — M48.062 SPINAL STENOSIS OF LUMBAR REGION WITH NEUROGENIC CLAUDICATION: ICD-10-CM

## 2022-03-11 DIAGNOSIS — M54.2 CERVICALGIA: ICD-10-CM

## 2022-03-11 DIAGNOSIS — M05.79 RHEUMATOID ARTHRITIS INVOLVING MULTIPLE SITES WITH POSITIVE RHEUMATOID FACTOR (HCC): ICD-10-CM

## 2022-03-11 DIAGNOSIS — M54.41 CHRONIC BILATERAL LOW BACK PAIN WITH BILATERAL SCIATICA: ICD-10-CM

## 2022-03-11 DIAGNOSIS — M54.42 CHRONIC BILATERAL LOW BACK PAIN WITH BILATERAL SCIATICA: ICD-10-CM

## 2022-03-11 RX ORDER — OXYCODONE AND ACETAMINOPHEN 7.5; 325 MG/1; MG/1
1 TABLET ORAL EVERY 6 HOURS PRN
Qty: 70 TABLET | Refills: 0 | Status: SHIPPED | OUTPATIENT
Start: 2022-03-17 | End: 2022-04-06 | Stop reason: SDUPTHER

## 2022-03-30 ENCOUNTER — PROCEDURE VISIT (OUTPATIENT)
Dept: PHYSICAL MEDICINE AND REHAB | Age: 67
End: 2022-03-30
Payer: MEDICARE

## 2022-03-30 DIAGNOSIS — M54.32 SCIATICA OF LEFT SIDE: Primary | ICD-10-CM

## 2022-03-30 PROCEDURE — 76942 ECHO GUIDE FOR BIOPSY: CPT | Performed by: PHYSICAL MEDICINE & REHABILITATION

## 2022-03-30 PROCEDURE — 64445 NJX AA&/STRD SCIATIC NRV IMG: CPT | Performed by: PHYSICAL MEDICINE & REHABILITATION

## 2022-03-30 RX ADMIN — LIDOCAINE HYDROCHLORIDE 5 ML: 20 INJECTION, SOLUTION INFILTRATION; PERINEURAL at 10:45

## 2022-03-30 RX ADMIN — LIDOCAINE HYDROCHLORIDE 8 ML: 10 INJECTION, SOLUTION INFILTRATION; PERINEURAL at 19:45

## 2022-04-01 ENCOUNTER — TELEMEDICINE (OUTPATIENT)
Dept: FAMILY MEDICINE CLINIC | Age: 67
End: 2022-04-01
Payer: MEDICARE

## 2022-04-01 DIAGNOSIS — Z00.00 MEDICARE ANNUAL WELLNESS VISIT, SUBSEQUENT: Primary | ICD-10-CM

## 2022-04-01 DIAGNOSIS — Z13.220 SCREENING FOR HYPERLIPIDEMIA: ICD-10-CM

## 2022-04-01 PROCEDURE — 3017F COLORECTAL CA SCREEN DOC REV: CPT | Performed by: PHYSICIAN ASSISTANT

## 2022-04-01 PROCEDURE — 1123F ACP DISCUSS/DSCN MKR DOCD: CPT | Performed by: PHYSICIAN ASSISTANT

## 2022-04-01 PROCEDURE — 4040F PNEUMOC VAC/ADMIN/RCVD: CPT | Performed by: PHYSICIAN ASSISTANT

## 2022-04-01 PROCEDURE — G0439 PPPS, SUBSEQ VISIT: HCPCS | Performed by: PHYSICIAN ASSISTANT

## 2022-04-01 ASSESSMENT — PATIENT HEALTH QUESTIONNAIRE - PHQ9
SUM OF ALL RESPONSES TO PHQ QUESTIONS 1-9: 2
2. FEELING DOWN, DEPRESSED OR HOPELESS: 0
SUM OF ALL RESPONSES TO PHQ QUESTIONS 1-9: 2
SUM OF ALL RESPONSES TO PHQ9 QUESTIONS 1 & 2: 2
1. LITTLE INTEREST OR PLEASURE IN DOING THINGS: 2

## 2022-04-01 ASSESSMENT — LIFESTYLE VARIABLES: HOW OFTEN DO YOU HAVE A DRINK CONTAINING ALCOHOL: NEVER

## 2022-04-01 NOTE — PROGRESS NOTES
Medicare Annual Wellness Visit    Sherrine Epley is here for Medicare AWV    Assessment & Plan   Medicare annual wellness visit, subsequent  BMI 32.0-32.9,adult  Screening for hyperlipidemia  -     Lipid Panel; Future      Recommendations for Preventive Services Due: see orders and patient instructions/AVS.  Recommended screening schedule for the next 5-10 years is provided to the patient in written form: see Patient Instructions/AVS.     Return for Medicare Annual Wellness Visit in 1 year. Subjective   The following acute and/or chronic problems were also addressed today:  Chronic lower back pain, falls, depression screening. Patient's complete Health Risk Assessment and screening values have been reviewed and are found in Flowsheets. The following problems were reviewed today and where indicated follow up appointments were made and/or referrals ordered. Positive Risk Factor Screenings with Interventions:    Fall Risk:  Do you feel unsteady or are you worried about falling? : (!) yes  2 or more falls in past year?: (!) yes  Fall with injury in past year?: no     Fall Risk Interventions:    · Home safety tips provided  · Recommend strength and training, patient would like to wait              Opioid Risk: (Low risk score <55) Opioid risk score: 34    Patient is low risk for opioid use disorder or overdose. Last PDMP Lalo Ortez as Reviewed:  Review User Review Instant Review Result   Hailey Mcgee 1/30/2022  1:15 PM Reviewed PDMP [1]     Last Controlled Substance Monitoring Documentation      Office Visit from 12/1/2021 in [facility]   Periodic Controlled Substance Monitoring Possible medication side effects, risk of tolerance/dependence & alternative treatments discussed., No signs of potential drug abuse or diversion identified. , Assessed functional status., Obtaining appropriate analgesic effect of treatment.  filed at 11/04/2021 1416   Chronic Pain > 50 MEDD Re-evaluated the status of the patient's underlying condition causing pain. , Considered consultation with a specialist., Obtained or confirmed \"Consent for Opioid Use\" on file. filed at 11/04/2021 9475         General Health and ACP:  General  In general, how would you say your health is?: Fair  In the past 7 days, have you experienced any of the following: New or Increased Pain, New or Increased Fatigue, Loneliness, Social Isolation, Stress or Anger?: (!) Yes  Select all that apply: (!) Loneliness  Do you get the social and emotional support that you need?: Yes  Do you have a Living Will?: Yes    Advance Directives     Power of [address] [place] [address] Will ACP-Advance Directive ACP-Power of     Not on File Not on File Not on File Not on File      General Health Risk Interventions:  · Poor self-assessment of health status: patient advised to follow-up in this office for further evaluation and treatment of depression or situational depression within 10 day(s)  · Pain issues: F/u with pain management   · Loneliness: Recommend counseling for aging, however, patient would like to wait.     Health Habits/Nutrition:     Physical Activity: Insufficiently Active    Days of Exercise per Week: 3 days    Minutes of Exercise per Session: 40 min     Have you lost any weight without trying in the past 3 months?: No     Have you seen the dentist within the past year?: Yes    Health Habits/Nutrition Interventions:  · None at this time     Safety:  Do you have any tripping hazards - loose or unsecured carpets or rugs?: (!) Yes  Do you have any tripping hazards - clutter in doorways, halls, or stairs?: No  Do you have either shower bars, grab bars, non-slip mats or non-slip surfaces in your shower or bathtub?: Yes  Do all of your stairways have a railing or banister?: Yes  Do you always fasten your seatbelt when you are in a car?: Yes    Safety Interventions:  · Home safety tips provided           Objective Patient-Reported Vitals  No data recorded            Allergies   Allergen Reactions    Latex Swelling     Swelling of hands with wearing latex gloves    Norvasc [Amlodipine Besylate] Other (See Comments)     swelling    Keflex [Cephalexin] Rash     Prior to Visit Medications    Medication Sig Taking? Authorizing Provider   oxyCODONE-acetaminophen (PERCOCET) 7.5-325 MG per tablet Take 1 tablet by mouth every 6 hours as needed for Pain for up to 30 days. Intended supply: 30 days  Rosaline Peterson DO   diazePAM (VALIUM) 5 MG tablet Take 10 mg by mouth as needed for Anxiety.   Historical Provider, MD   omeprazole (PRILOSEC) 40 MG delayed release capsule Take 40 mg by mouth daily  Historical Provider, MD   topiramate (TOPAMAX) 25 MG tablet TAKE 1 TABLET BY MOUTH EVERY DAY AT NIGHT  Rosaline Peterson DO   atorvastatin (LIPITOR) 10 MG tablet TAKE 1 TABLET BY MOUTH EVERY DAY AT NIGHT  Hailey Figueroa PA-C   dilTIAZem (CARDIZEM) 30 MG tablet Take 30 mg by mouth 2 times daily  Historical Provider, MD   baclofen (LIORESAL) 10 MG tablet TAKE 1 TABLET BY MOUTH NIGHTLY AS NEEDED (MUSCLE SPASMS)  Rosaline Peterson DO   doxycycline hyclate (VIBRA-TABS) 100 MG tablet TAKE 1 TABLET BY MOUTH EVERY DAY  Leeanna Lujan DO   gabapentin (NEURONTIN) 300 MG capsule TAKE 1 CAPSULE BY MOUTH DAILY AND 2 AT NIGHT AS TOLERATED  Rosaline Peterson DO   losartan (COZAAR) 50 MG tablet TAKE 1 TABLET BY MOUTH EVERY DAY  Historical Provider, MD   cyanocobalamin 1000 MCG/ML injection INJECT 1 ML AS DIRECTED EVERY 14 DAYS FOR 12 DOSES INJECT ONE CC SUB-CUTANEOSLY EVERY OTHER WEEK  Rosaline Peterson DO   hydroxychloroquine (PLAQUENIL) 200 MG tablet Take 1 tablet by mouth daily  Historical Provider, MD   nitroGLYCERIN (NITROSTAT) 0.4 MG SL tablet PLEASE SEE ATTACHED FOR DETAILED DIRECTIONS  Steffi Mcclure MD   melatonin 5 MG TABS tablet Take 5 mg by mouth nightly  Historical Provider, MD   aspirin 81 MG tablet Take 81 mg by mouth daily  Historical Provider, MD   Coenzyme Q10 (COQ10 PO) Take 1 capsule by mouth daily   Historical Provider, MD   Cholecalciferol (VITAMIN D3) 2000 units TABS Take 2 tablets by mouth daily   Historical Provider, MD Alfonso (Including outside providers/suppliers regularly involved in providing care):   Patient Care Team:  Michael Arellano PA-C as PCP - General (Physician Assistant)  Michael Arellano PA-C as PCP [facility]  Kristina Marie DO as Consulting Physician (Physical Medicine and Rehab)  Senthil Vance MD (Infectious Diseases)    Reviewed and updated this visit:  Tobacco  Allergies  Meds  Problems  Med Hx  Surg Hx  Soc Hx  Fam Hx             Melina Mehul, was evaluated through a synchronous (real-time) audio-video encounter. The patient (or guardian if applicable) is aware that this is a billable service, which includes applicable co-pays. This Virtual Visit was conducted with patient's (and/or legal guardian's) consent. The visit was conducted pursuant to the emergency declaration under the [address] [place] [address] waiver authority and the [employer] and [employer] General Act. Patient identification was verified, and a caregiver was present when appropriate. The patient was located at home in a state where the provider was licensed to provide care.

## 2022-04-01 NOTE — PATIENT INSTRUCTIONS
Learning About Healthy Weight  What is a healthy weight? A healthy weight is the weight at which you feel good about yourself and have energy for work and play. It's also one that lowers your risk for healthproblems. What can you do to stay at a healthy weight? It can be hard to stay at a healthy weight, especially when fast food, vending-machine snacks, and processed foods are so easy to find. And with your busy lifestyle, activity may be low on your list of things to do. But stayingat a healthy weight may be easier than you think. Here are some dos and don'ts for staying at a healthy weight. Do eat healthy foods  The kinds of foods you eat have a big impact on both your weight and your health. Reaching and staying at a healthy weight is not about going on a diet. It's about making healthier food choices every day and changing your diet forgood. Healthy eating means eating a variety of foods so that you get all the nutrients you need. Your body needs protein, carbohydrate, and fats for energy. They keep your heart beating, your brain active, and your muscles working. On most days, try to eat from each food group. This means eating a variety of:   Whole grains, such as whole wheat breads and pastas.  Fruits and vegetables.  Dairy products, such as low-fat milk, yogurt, and cheese.  Lean proteins, such as all types of fish, chicken without the skin, and beans. Don't have too much or too little of one thing. All foods, if eaten inmoderation, can be part of healthy eating. Even sweets can be okay. If your favorite foods are high in fat, salt, sugar, or calories, limit howoften you eat them. Eat smaller servings, or look for healthy substitutes. Do watch what you eat  Many people eat more than their bodies need. Part of staying at a healthy weight means learning how much food you really need from day to day and not eating more than that.  Even with healthy foods, eating too much can make yougain weight. Having a well-balanced diet means that you eat enough, but not too much, and that your food gives you the nutrients you need to stay healthy. So listen toyour body. Eat when you're hungry. Stop when you feel satisfied. It's a good idea to have healthy snacks ready for when you get hungry. Keep healthy snacks with you at work, in your car, and at home. If you have a healthy snack easily available, you'll be less likely to pick a candy bar orbag of chips from a vending machine instead. Some healthy snacks you might want to keep on hand are fruit, low-fat yogurt, string cheese, low-fat microwave popcorn, raisins and other dried fruit, nuts,whole wheat crackers, pretzels, carrots, celery sticks, and broccoli. Do some physical activity  A big part of reaching and staying at a healthy weight is being active. When you're active, you burn calories. This makes it easier to reach and stay at a healthy weight. When you're active on a regular basis, your body burns more calories, even when you're at rest. Being active helps you lose fat andbuild lean muscle. Try to be active for at least 1 hour every day. This may sound like a lot, but it's okay to be active in smaller blocks of time that add up to 1 hour a day. Any activity that makes your heart beat faster and keeps it there for a while counts. A brisk walk, run, or swim will get your heart beating faster. So will climbing stairs, shooting baskets, or cycling. Even some household chores likevacuuming and mowing the lawn will get your heart rate up. Pick activities that you enjoy--ones that make your heart beat faster, your muscles stronger, and your muscles and joints more flexible. If you find more than one thing you like doing, do them all. You don't have to do the same thingevery day. Don't diet  Diets don't work. Diets are temporary. Because you give up so much when you diet, you may be hungry and think about food all the time.  And after you stop dieting, you also may overeat to make up for what you missed. Most people who diet end up gainingback the pounds they lost--and more. Remember that healthy bodies come in lots of shapes and sizes. Everyone can gethealthier by eating better and being more active. Where can you learn more? Go to https://[email or web address] org and sign in to your [employer] account. Enter [id number] in the "[employer]" box to learn more about \"Learning About Healthy Weight. \"     If you do not have an account, please click on the \"Sign Up Now\" link. Current as of: December 27, 2021               Content Version: 13.2  © 6961-2758 Healthwise, [employer]. Care instructions adapted under license by [facility] ([facility]). If you have questions about a medical condition or this instruction, always ask your healthcare professional. [place] [address] any warranty or liability for your use of this information. Personalized Preventive Plan for Antony Ash - 4/1/2022  Medicare offers a range of preventive health benefits. Some of the tests and screenings are paid in full while other may be subject to a deductible, co-insurance, and/or copay. Some of these benefits include a comprehensive review of your medical history including lifestyle, illnesses that may run in your family, and various assessments and screenings as appropriate. After reviewing your medical record and screening and assessments performed today your provider may have ordered immunizations, labs, imaging, and/or referrals for you. A list of these orders (if applicable) as well as your Preventive Care list are included within your After Visit Summary for your review. Other Preventive Recommendations:    · A preventive eye exam performed by an eye specialist is recommended every 1-2 years to screen for glaucoma; cataracts, macular degeneration, and other eye disorders. · A preventive dental visit is recommended every 6 months.   · Try to get at least 150 minutes of exercise per week or 10,000 steps per day on a pedometer . · Order or download the FREE \"Exercise & Physical Activity: Your Everyday Guide\" from [employer]. Call 7-930.261.4840 or search [employer] online. · You need 3936-9948 mg of calcium and 2842-9316 IU of vitamin D per day. It is possible to meet your calcium requirement with diet alone, but a vitamin D supplement is usually necessary to meet this goal.  · When exposed to the sun, use a sunscreen that protects against both UVA and UVB radiation with an SPF of 30 or greater. Reapply every 2 to 3 hours or after sweating, drying off with a towel, or swimming. · Always wear a seat belt when traveling in a car. Always wear a helmet when riding a bicycle or motorcycle.

## 2022-04-04 RX ORDER — LIDOCAINE HYDROCHLORIDE 20 MG/ML
5 INJECTION, SOLUTION INFILTRATION; PERINEURAL ONCE
Status: COMPLETED | OUTPATIENT
Start: 2022-04-04 | End: 2022-04-04

## 2022-04-04 RX ORDER — LIDOCAINE HYDROCHLORIDE 10 MG/ML
8 INJECTION, SOLUTION INFILTRATION; PERINEURAL ONCE
Status: COMPLETED | OUTPATIENT
Start: 2022-04-04 | End: 2022-04-04

## 2022-04-04 RX ADMIN — LIDOCAINE HYDROCHLORIDE 5 ML: 20 INJECTION, SOLUTION INFILTRATION; PERINEURAL at 14:51

## 2022-04-04 RX ADMIN — LIDOCAINE HYDROCHLORIDE 8 ML: 10 INJECTION, SOLUTION INFILTRATION; PERINEURAL at 14:51

## 2022-04-06 DIAGNOSIS — R76.8 ANA POSITIVE: ICD-10-CM

## 2022-04-06 DIAGNOSIS — M54.42 CHRONIC BILATERAL LOW BACK PAIN WITH BILATERAL SCIATICA: ICD-10-CM

## 2022-04-06 DIAGNOSIS — Z79.899 HIGH RISK MEDICATION USE: ICD-10-CM

## 2022-04-06 DIAGNOSIS — M05.79 RHEUMATOID ARTHRITIS INVOLVING MULTIPLE SITES WITH POSITIVE RHEUMATOID FACTOR (HCC): ICD-10-CM

## 2022-04-06 DIAGNOSIS — M48.062 SPINAL STENOSIS OF LUMBAR REGION WITH NEUROGENIC CLAUDICATION: ICD-10-CM

## 2022-04-06 DIAGNOSIS — M54.2 CERVICALGIA: ICD-10-CM

## 2022-04-06 DIAGNOSIS — M54.41 CHRONIC BILATERAL LOW BACK PAIN WITH BILATERAL SCIATICA: ICD-10-CM

## 2022-04-06 DIAGNOSIS — G89.29 CHRONIC BILATERAL LOW BACK PAIN WITH BILATERAL SCIATICA: ICD-10-CM

## 2022-04-11 RX ORDER — OXYCODONE AND ACETAMINOPHEN 7.5; 325 MG/1; MG/1
1 TABLET ORAL EVERY 6 HOURS PRN
Qty: 70 TABLET | Refills: 0 | Status: SHIPPED | OUTPATIENT
Start: 2022-04-15 | End: 2022-05-12 | Stop reason: SDUPTHER

## 2022-04-11 RX ORDER — BACLOFEN 10 MG/1
TABLET ORAL
Qty: 90 TABLET | Refills: 2 | Status: SHIPPED | OUTPATIENT
Start: 2022-04-11

## 2022-04-13 ENCOUNTER — OFFICE VISIT (OUTPATIENT)
Dept: FAMILY MEDICINE CLINIC | Age: 67
End: 2022-04-13
Payer: MEDICARE

## 2022-04-13 VITALS
BODY MASS INDEX: 33.34 KG/M2 | WEIGHT: 220 LBS | TEMPERATURE: 97 F | OXYGEN SATURATION: 97 % | RESPIRATION RATE: 11 BRPM | HEART RATE: 76 BPM | DIASTOLIC BLOOD PRESSURE: 66 MMHG | SYSTOLIC BLOOD PRESSURE: 122 MMHG | HEIGHT: 68 IN

## 2022-04-13 DIAGNOSIS — H91.93 DECREASED HEARING OF BOTH EARS: ICD-10-CM

## 2022-04-13 DIAGNOSIS — R82.998 DARK URINE: ICD-10-CM

## 2022-04-13 DIAGNOSIS — E78.2 MIXED HYPERLIPIDEMIA: ICD-10-CM

## 2022-04-13 DIAGNOSIS — E87.5 HYPERKALEMIA: ICD-10-CM

## 2022-04-13 DIAGNOSIS — I12.9 BENIGN HYPERTENSION WITH CHRONIC KIDNEY DISEASE, STAGE III (HCC): Primary | ICD-10-CM

## 2022-04-13 DIAGNOSIS — N18.30 BENIGN HYPERTENSION WITH CHRONIC KIDNEY DISEASE, STAGE III (HCC): Primary | ICD-10-CM

## 2022-04-13 DIAGNOSIS — R73.09 ELEVATED GLUCOSE: ICD-10-CM

## 2022-04-13 LAB
BILIRUBIN, POC: NORMAL
BLOOD URINE, POC: NORMAL
CLARITY, POC: CLEAR
COLOR, POC: YELLOW
GLUCOSE URINE, POC: NORMAL
KETONES, POC: NORMAL
LEUKOCYTE EST, POC: NORMAL
NITRITE, POC: NORMAL
PH, POC: 6
PROTEIN, POC: NORMAL
SPECIFIC GRAVITY, POC: 1.02
UROBILINOGEN, POC: NORMAL

## 2022-04-13 PROCEDURE — G8427 DOCREV CUR MEDS BY ELIG CLIN: HCPCS | Performed by: PHYSICIAN ASSISTANT

## 2022-04-13 PROCEDURE — G8399 PT W/DXA RESULTS DOCUMENT: HCPCS | Performed by: PHYSICIAN ASSISTANT

## 2022-04-13 PROCEDURE — 3017F COLORECTAL CA SCREEN DOC REV: CPT | Performed by: PHYSICIAN ASSISTANT

## 2022-04-13 PROCEDURE — 81003 URINALYSIS AUTO W/O SCOPE: CPT | Performed by: PHYSICIAN ASSISTANT

## 2022-04-13 PROCEDURE — 4040F PNEUMOC VAC/ADMIN/RCVD: CPT | Performed by: PHYSICIAN ASSISTANT

## 2022-04-13 PROCEDURE — 1123F ACP DISCUSS/DSCN MKR DOCD: CPT | Performed by: PHYSICIAN ASSISTANT

## 2022-04-13 PROCEDURE — 1036F TOBACCO NON-USER: CPT | Performed by: PHYSICIAN ASSISTANT

## 2022-04-13 PROCEDURE — 99214 OFFICE O/P EST MOD 30 MIN: CPT | Performed by: PHYSICIAN ASSISTANT

## 2022-04-13 PROCEDURE — G8417 CALC BMI ABV UP PARAM F/U: HCPCS | Performed by: PHYSICIAN ASSISTANT

## 2022-04-13 PROCEDURE — 1090F PRES/ABSN URINE INCON ASSESS: CPT | Performed by: PHYSICIAN ASSISTANT

## 2022-04-13 ASSESSMENT — ENCOUNTER SYMPTOMS
SINUS PAIN: 0
VOMITING: 0
SINUS PRESSURE: 0
RECTAL PAIN: 0
DIARRHEA: 0
BLOOD IN STOOL: 0
NAUSEA: 0
ABDOMINAL PAIN: 1
CONSTIPATION: 0
RHINORRHEA: 0

## 2022-04-13 NOTE — PROGRESS NOTES
Subjective  Daksha Bazanevaristo, 74 y.o. female presents today with:  Chief Complaint   Patient presents with    Follow-up     pt presents for a follow up, pt states she thinks she has a uti           Lab Results   Component Value Date    LABA1C 5.8 01/17/2012     Lab Results   Component Value Date    CREATININE 1.36 (H) 06/04/2021     Lab Results   Component Value Date    ALT 26 02/09/2021    AST 27 02/09/2021     Lab Results   Component Value Date    CHOL 212 (H) 03/20/2016    TRIG 121 03/20/2016     (H) 10/16/2020    LDLCALC 59 10/16/2020          HPI   Patient is here for 6 months follow-up in the interim she is established with Radha Rocha MD gastroenterology [facility] and Eleanor Alvarenga md nephrology [facility]  Patient had an EGD 9/20/2021 and again 3/20/2021 diagnosis duodenal polyps and gastritits   Omeprazole helps but does not resolve symptoms  She has some concern regarding her creatinine levels and potassium she is no longer seeing Makenna ROGERS - she has transferred her care to Eleanor Alvarenga MD nephrology [facility]  C/o cloudy urine for the past week- concerned about elevated potassium - losartan was adjusted to 50 mg in am and 25 mg q pm  C/o decreased hearing particular left - denies pain - wears hearing aides    Review of Systems   HENT: Positive for hearing loss. Negative for ear pain, postnasal drip, rhinorrhea, sinus pressure and sinus pain. Recurrent epistaxis - no recurrence since cautery  thru ent office   Gastrointestinal: Positive for abdominal pain. Negative for blood in stool, constipation, diarrhea, nausea, rectal pain and vomiting. Genitourinary: Negative for difficulty urinating, flank pain, frequency, pelvic pain and vaginal pain. All other systems reviewed and are negative.         Past Medical History:   Diagnosis Date    Abnormal electromyogram (EMG) 8/19/09    SENSORIMOTOR NEUROPATHY OF BLE, RIGHT WORSE THAN LEFT, SUSPICIOUS FOR RIGHT S1 RADIC    Angina pectoris ([address]     Angina pectoris ([address] 07/2019    sees Dr. Zenobia Davey Ankle pain     Bleeding ulcer     Colon polyp 4/3/2017    Dermatitis     Fibromyalgia     Foot pain     Hyperlipidemia     Hypertension     Infection of intervertebral disc (pyogenic), lumbar region ([address] 8/13/2018    Low back pain     MRSA (methicillin resistant staph aureus) culture positive 2011    Nasal bleeding     Neck pain     Neuropathy     Obesity     Osteoarthritis     PSVT (paroxysmal supraventricular tachycardia) ([address]     Shingles outbreak 2012    SI (sacroiliac) pain     Stage 3 chronic kidney disease, unspecified whether stage 3a or 3b CKD ([address] 4/13/2022    Stenosis     Vitamin D deficiency      Past Surgical History:   Procedure Laterality Date    CARDIAC CATHETERIZATION  10/2016    CARDIOVERSION      2001 x3    CERVICAL FUSION  12/14/2011    Dr Myles Lanes with bone graft and plate    COLONOSCOPY  09/2021    CC    ENDOSCOPIC ULTRASOUND (LOWER) N/A 07/11/2019    EGD/ EUS performed by Rona Vargas MD at [address] [facility] HAND FUSION  07/2005    HYSTERECTOMY  2001    HYSTERECTOMY, TOTAL ABDOMINAL      JOINT REPLACEMENT Left     tka    LIVER BIOPSY      MENISCECTOMY  10/31/2011    left knee    OTHER SURGICAL HISTORY  10/26/2009    CAUDALS BY DR Siria Zaidi    [place] COLON CA SCRN NOT  [facility] IND N/A 04/12/2017    COLONOSCOPY performed by Bekah Fields MD at . [facility] ESOPHAGOGASTRODUODENOSCOPY TRANSORAL DIAGNOSTIC N/A 04/12/2017    EGD ESOPHAGOGASTRODUODENOSCOPY performed by Bekah Fields MD at [address] [facility]  12/2011    Dr Myles Lanes Lumbar and c spine    TOTAL KNEE ARTHROPLASTY  07/30/2012    LEFT-DR AVELAR    UPPER GASTROINTESTINAL ENDOSCOPY  1995     Social History     Socioeconomic History    Marital status:      Spouse name: Guillermo Cisneros Number of children: 2    Years of education: 15    Highest education level: Associate degree: occupational, technical, or vocational program   Occupational History    Occupation: Retired    Tobacco Use    Smoking status: Never Smoker    Smokeless tobacco: Never Used   Vaping Use    Vaping Use: Never used   Substance and Sexual Activity    Alcohol use: No    Drug use: No    Sexual activity: Not Currently   Other Topics Concern    Not on file   Social History Narrative    Lives in [place] with her  Sara Barros and son Karely Joe is mildly handicapped --CP right lilia with anxiety and LD. He works at [employer]. Social Determinants of Health     Financial Resource Strain: Low Risk     Difficulty of Paying Living Expenses: Not hard at all   Food Insecurity: No Food Insecurity    Worried About Running Out of Food in the Last Year: Never true    Romana of Food in the Last Year: Never true   Transportation Needs:     Lack of Transportation (Medical): Not on file    Lack of Transportation (Non-Medical):  Not on file   Physical Activity: Insufficiently Active    Days of Exercise per Week: 3 days    Minutes of Exercise per Session: 40 min   Stress:     Feeling of Stress : Not on file   Social Connections:     Frequency of Communication with Friends and Family: Not on file    Frequency of Social Gatherings with Friends and Family: Not on file    Attends Voodoo Services: Not on file    Active Member of Clubs or Organizations: Not on file    Attends Club or Organization Meetings: Not on file    Marital Status: Not on file   Intimate Partner Violence:     Fear of Current or Ex-Partner: Not on file    Emotionally Abused: Not on file    Physically Abused: Not on file    Sexually Abused: Not on file   Housing Stability:     Unable to Pay for Housing in the Last Year: Not on file    Number of [place] in the Last Year: Not on file    Unstable Housing in the Last Year: Not on file     Family History   Problem Relation Age of Onset    Heart Disease Father     High Cholesterol 1,000 mcg  1,000 mcg IntraMUSCular Once Rosaline Scullin, DO           Objective    Vitals:    04/13/22 1129   BP: 122/66   Pulse: 76   Resp: 11   Temp: 97 °F (36.1 °C)   TempSrc: Temporal   SpO2: 97%   Weight: 220 lb (99.8 kg)   Height: 5' 8\" (1.727 m)     Physical Exam  Constitutional:       General: She is not in acute distress. Appearance: Normal appearance. She is not ill-appearing. HENT:      Head: Normocephalic and atraumatic. Right Ear: External ear normal.      Left Ear: External ear normal.      Nose: No congestion. Eyes:      Extraocular Movements: Extraocular movements intact. Conjunctiva/sclera: Conjunctivae normal.      Pupils: Pupils are equal, round, and reactive to light. Neck:      Thyroid: No thyromegaly. Cardiovascular:      Rate and Rhythm: Normal rate and regular rhythm. Heart sounds: Normal heart sounds. No murmur heard. Pulmonary:      Effort: Pulmonary effort is normal. No respiratory distress. Breath sounds: Normal breath sounds. No wheezing, rhonchi or rales. Abdominal:      General: Bowel sounds are normal.      Palpations: Abdomen is soft. There is no mass. Tenderness: There is no abdominal tenderness. There is no guarding. Musculoskeletal:         General: Deformity present. Cervical back: Normal range of motion and neck supple. Right lower leg: No edema. Left lower leg: No edema. Lymphadenopathy:      Cervical: No cervical adenopathy. Skin:     General: Skin is warm and dry. Coloration: Skin is not jaundiced or pale. Neurological:      General: No focal deficit present. Mental Status: She is alert and oriented to person, place, and time. Cranial Nerves: No cranial nerve deficit. Motor: Weakness present. Coordination: Coordination normal.      Gait: Gait abnormal.   Psychiatric:         Mood and Affect: Mood normal.         Behavior: Behavior normal.         Thought Content:  Thought content normal.         Judgment: Judgment normal.              Assessment & Plan    Diagnosis Orders   1. Benign hypertension with chronic kidney disease, stage III (HCC)     2. Elevated glucose  Hemoglobin A1C    POCT Urinalysis No Micro (Auto)   3. Hyperkalemia  Basic Metabolic Panel   4. Decreased hearing of both ears      f/u with audiologist   5. Dark urine  Culture, Urine   6. Mixed hyperlipidemia           Orders Placed This Encounter   Procedures    Culture, Urine     Standing Status:   Future     Standing Expiration Date:   4/13/2023     Order Specific Question:   Specify (ex-cath, midstream, cysto, etc)? Answer:   midstream    Hemoglobin A1C     Standing Status:   Future     Standing Expiration Date:   4/13/2023    Basic Metabolic Panel     Standing Status:   Future     Standing Expiration Date:   4/13/2023    POCT Urinalysis No Micro (Auto)     No orders of the defined types were placed in this encounter. Medications Discontinued During This Encounter   Medication Reason    diazePAM (VALIUM) 5 MG tablet     doxycycline hyclate (VIBRA-TABS) 100 MG tablet LIST CLEANUP     Return in about 6 months (around 10/13/2022).     Hailey Figueroa PA-C

## 2022-04-15 LAB
ORGANISM: ABNORMAL
URINE CULTURE, ROUTINE: ABNORMAL
URINE CULTURE, ROUTINE: ABNORMAL

## 2022-04-18 DIAGNOSIS — N30.00 ACUTE CYSTITIS WITHOUT HEMATURIA: Primary | ICD-10-CM

## 2022-04-18 RX ORDER — NITROFURANTOIN 25; 75 MG/1; MG/1
100 CAPSULE ORAL 2 TIMES DAILY
Qty: 20 CAPSULE | Refills: 0 | Status: SHIPPED | OUTPATIENT
Start: 2022-04-18 | End: 2022-04-28

## 2022-04-19 DIAGNOSIS — Z13.220 SCREENING FOR HYPERLIPIDEMIA: ICD-10-CM

## 2022-04-19 DIAGNOSIS — R73.09 ELEVATED GLUCOSE: ICD-10-CM

## 2022-04-19 DIAGNOSIS — E87.5 HYPERKALEMIA: ICD-10-CM

## 2022-04-19 LAB
ANION GAP SERPL CALCULATED.3IONS-SCNC: 18 MEQ/L (ref 9–15)
BUN BLDV-MCNC: 19 MG/DL (ref 8–23)
CALCIUM SERPL-MCNC: 10.1 MG/DL (ref 8.5–9.9)
CHLORIDE BLD-SCNC: 102 MEQ/L (ref 95–107)
CHOLESTEROL, TOTAL: 191 MG/DL (ref 0–199)
CO2: 20 MEQ/L (ref 20–31)
CREAT SERPL-MCNC: 1.31 MG/DL (ref 0.5–0.9)
GFR AFRICAN AMERICAN: 48.7
GFR NON-AFRICAN AMERICAN: 40.3
GLUCOSE BLD-MCNC: 114 MG/DL (ref 70–99)
HBA1C MFR BLD: 6.1 % (ref 4.8–5.9)
HDLC SERPL-MCNC: 95 MG/DL (ref 40–59)
LDL CHOLESTEROL CALCULATED: 80 MG/DL (ref 0–129)
POTASSIUM SERPL-SCNC: 4.8 MEQ/L (ref 3.4–4.9)
SODIUM BLD-SCNC: 140 MEQ/L (ref 135–144)
TRIGL SERPL-MCNC: 82 MG/DL (ref 0–150)

## 2022-04-25 PROBLEM — M25.552 PAIN IN LEFT HIP: Status: ACTIVE | Noted: 2022-03-07

## 2022-04-25 PROBLEM — G89.29 CHRONIC RIGHT SHOULDER PAIN: Status: ACTIVE | Noted: 2022-03-07

## 2022-04-25 PROBLEM — E79.0 HYPERURICEMIA: Status: ACTIVE | Noted: 2022-03-07

## 2022-04-25 PROBLEM — G60.8 OTHER HEREDITARY AND IDIOPATHIC NEUROPATHIES: Status: ACTIVE | Noted: 2022-04-25

## 2022-04-25 PROBLEM — M25.511 CHRONIC RIGHT SHOULDER PAIN: Status: ACTIVE | Noted: 2022-03-07

## 2022-04-28 ENCOUNTER — PROCEDURE VISIT (OUTPATIENT)
Dept: PHYSICAL MEDICINE AND REHAB | Age: 67
End: 2022-04-28
Payer: MEDICARE

## 2022-04-28 ENCOUNTER — OFFICE VISIT (OUTPATIENT)
Dept: FAMILY MEDICINE CLINIC | Age: 67
End: 2022-04-28
Payer: MEDICARE

## 2022-04-28 VITALS
HEIGHT: 68 IN | TEMPERATURE: 97.3 F | WEIGHT: 220 LBS | HEART RATE: 89 BPM | SYSTOLIC BLOOD PRESSURE: 130 MMHG | OXYGEN SATURATION: 98 % | BODY MASS INDEX: 33.34 KG/M2 | DIASTOLIC BLOOD PRESSURE: 72 MMHG

## 2022-04-28 DIAGNOSIS — Z91.81 AT HIGH RISK FOR FALLS: ICD-10-CM
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
## 2022-04-28 DIAGNOSIS — N18.31 STAGE 3A CHRONIC KIDNEY DISEASE (HCC): ICD-10-CM

## 2022-04-28 DIAGNOSIS — M79.644 PAIN IN RIGHT FINGER(S): ICD-10-CM

## 2022-04-28 DIAGNOSIS — M54.32 SCIATICA OF LEFT SIDE: Primary | ICD-10-CM

## 2022-04-28 PROBLEM — N18.30 CHRONIC RENAL DISEASE, STAGE III (HCC): Status: ACTIVE | Noted: 2022-04-28

## 2022-04-28 PROCEDURE — 3017F COLORECTAL CA SCREEN DOC REV: CPT | Performed by: PHYSICIAN ASSISTANT

## 2022-04-28 PROCEDURE — G8427 DOCREV CUR MEDS BY ELIG CLIN: HCPCS | Performed by: PHYSICIAN ASSISTANT

## 2022-04-28 PROCEDURE — 99213 OFFICE O/P EST LOW 20 MIN: CPT | Performed by: PHYSICIAN ASSISTANT

## 2022-04-28 PROCEDURE — G8399 PT W/DXA RESULTS DOCUMENT: HCPCS | Performed by: PHYSICIAN ASSISTANT

## 2022-04-28 PROCEDURE — G8417 CALC BMI ABV UP PARAM F/U: HCPCS | Performed by: PHYSICIAN ASSISTANT

## 2022-04-28 PROCEDURE — 4040F PNEUMOC VAC/ADMIN/RCVD: CPT | Performed by: PHYSICIAN ASSISTANT

## 2022-04-28 PROCEDURE — 76942 ECHO GUIDE FOR BIOPSY: CPT | Performed by: PHYSICAL MEDICINE & REHABILITATION

## 2022-04-28 PROCEDURE — 1036F TOBACCO NON-USER: CPT | Performed by: PHYSICIAN ASSISTANT

## 2022-04-28 PROCEDURE — 1090F PRES/ABSN URINE INCON ASSESS: CPT | Performed by: PHYSICIAN ASSISTANT

## 2022-04-28 PROCEDURE — 64445 NJX AA&/STRD SCIATIC NRV IMG: CPT | Performed by: PHYSICAL MEDICINE & REHABILITATION

## 2022-04-28 PROCEDURE — 1123F ACP DISCUSS/DSCN MKR DOCD: CPT | Performed by: PHYSICIAN ASSISTANT

## 2022-04-28 RX ORDER — LIDOCAINE HYDROCHLORIDE 20 MG/ML
5 INJECTION, SOLUTION INFILTRATION; PERINEURAL ONCE
Status: COMPLETED | OUTPATIENT
Start: 2022-04-28 | End: 2022-04-28

## 2022-04-28 RX ORDER — LIDOCAINE HYDROCHLORIDE 10 MG/ML
8 INJECTION, SOLUTION INFILTRATION; PERINEURAL ONCE
Status: COMPLETED | OUTPATIENT
Start: 2022-04-28 | End: 2022-04-28

## 2022-04-28 RX ADMIN — LIDOCAINE HYDROCHLORIDE 8 ML: 10 INJECTION, SOLUTION INFILTRATION; PERINEURAL at 16:30

## 2022-04-28 RX ADMIN — LIDOCAINE HYDROCHLORIDE 5 ML: 20 INJECTION, SOLUTION INFILTRATION; PERINEURAL at 16:30

## 2022-04-28 ASSESSMENT — ENCOUNTER SYMPTOMS
COUGH: 0
TROUBLE SWALLOWING: 0
SINUS PRESSURE: 0
DIARRHEA: 0
VOMITING: 0
BACK PAIN: 0
CHEST TIGHTNESS: 0
ABDOMINAL PAIN: 0
SHORTNESS OF BREATH: 0

## 2022-04-28 NOTE — PROGRESS NOTES
Subjective:      Patient ID: Leatha Pulliam is a 74 y.o. female who presents today for:  Chief Complaint   Patient presents with    Hand Pain     Patient is here c/o hand pain. Pt states she fell a few weeks ago, landing on her R hand. Pt states pain is mostly located in ring finger, describes pain as achy and stabbing in the evening. Pt states pain has been ongoing since she fell. HPI   74year old female who fell and injured her right hand and ring finger. States that she is having increased pain still increased. Swelling did go down a little.      Past Medical History:   Diagnosis Date    Abnormal electromyogram (EMG) 8/19/09    SENSORIMOTOR NEUROPATHY OF BLE, RIGHT WORSE THAN LEFT, SUSPICIOUS FOR RIGHT S1 RADIC    Angina pectoris (HCC)     Angina pectoris ([address] 07/2019    sees Dr. Kelly Padgett Ankle pain     Bleeding ulcer     Colon polyp 4/3/2017    Dermatitis     Fibromyalgia     Foot pain     Hyperlipidemia     Hypertension     Infection of intervertebral disc (pyogenic), lumbar region ([address] 8/13/2018    Low back pain     MRSA (methicillin resistant staph aureus) culture positive 2011    Nasal bleeding     Neck pain     Neuropathy     Obesity     Osteoarthritis     PSVT (paroxysmal supraventricular tachycardia) ([address]     Shingles outbreak 2012    SI (sacroiliac) pain     Stage 3 chronic kidney disease, unspecified whether stage 3a or 3b CKD ([address] 4/13/2022    Stenosis     Vitamin D deficiency      Past Surgical History:   Procedure Laterality Date    CARDIAC CATHETERIZATION  10/2016    CARDIOVERSION      2001 x3    CERVICAL FUSION  12/14/2011    Dr Wagner Pérez with bone graft and plate    COLONOSCOPY  09/2021    CC    ENDOSCOPIC ULTRASOUND (LOWER) N/A 07/11/2019    EGD/ EUS performed by Avni Loyola MD at [address] [facility] HAND FUSION  07/2005    HYSTERECTOMY  2001    HYSTERECTOMY, TOTAL ABDOMINAL      JOINT REPLACEMENT Left     tka    LIVER BIOPSY      MENISCECTOMY  10/31/2011    left knee    OTHER SURGICAL HISTORY  10/26/2009    CAUDALS BY DR Saad Nelson    [place] COLON CA SCRN NOT  [address] IND N/A 04/12/2017    COLONOSCOPY performed by Ranjith Day MD at . [address] ESOPHAGOGASTRODUODENOSCOPY TRANSORAL DIAGNOSTIC N/A 04/12/2017    EGD ESOPHAGOGASTRODUODENOSCOPY performed by Ranjith Day MD at [address]  12/2011    Dr Maximo Romero Lumbar and c spine    TOTAL KNEE ARTHROPLASTY  07/30/2012    LEFT-DR AVELAR    UPPER GASTROINTESTINAL ENDOSCOPY  1995     Social History     Socioeconomic History    Marital status:      Spouse name: Aren Sandoval Number of children: 2    Years of education: 15    Highest education level: Associate degree: occupational, technical, or vocational program   Occupational History    Occupation: Retired    Tobacco Use    Smoking status: Never Smoker    Smokeless tobacco: Never Used   Vaping Use    Vaping Use: Never used   Substance and Sexual Activity    Alcohol use: No    Drug use: No    Sexual activity: Not Currently   Other Topics Concern    Not on file   Social History Narrative    Lives in [place] with her  Tristen Morris and son Zenobia Jones is mildly handicapped --CP right lilia with anxiety and LD. He works at [employer]. Social Determinants of Health     Financial Resource Strain: Low Risk     Difficulty of Paying Living Expenses: Not hard at all   Food Insecurity: No Food Insecurity    Worried About Running Out of Food in the Last Year: Never true    Romana of Food in the Last Year: Never true   Transportation Needs:     Lack of Transportation (Medical): Not on file    Lack of Transportation (Non-Medical):  Not on file   Physical Activity: Insufficiently Active    Days of Exercise per Week: 3 days    Minutes of Exercise per Session: 40 min   Stress:     Feeling of Stress : Not on file   Social Connections:     Frequency of Communication with Friends and Family: Not on file    Frequency of Social Gatherings with Friends and Family: Not on file    Attends Tenriism Services: Not on file    Active Member of Clubs or Organizations: Not on file    Attends Club or Organization Meetings: Not on file    Marital Status: Not on file   Intimate Partner Violence:     Fear of Current or Ex-Partner: Not on file    Emotionally Abused: Not on file    Physically Abused: Not on file    Sexually Abused: Not on file   Housing Stability:     Unable to Pay for Housing in the Last Year: Not on file    Number of [place] in the Last Year: Not on file    Unstable Housing in the Last Year: Not on file     Family History   Problem Relation Age of Onset    Heart Disease Father     High Cholesterol Father     High Blood Pressure Father     Stroke Mother     High Blood Pressure Mother     High Blood Pressure Brother      Allergies   Allergen Reactions    Latex Swelling     Swelling of hands with wearing latex gloves    Norvasc [Amlodipine Besylate] Other (See Comments)     swelling    Keflex [Cephalexin] Rash     Current Outpatient Medications   Medication Sig Dispense Refill    nitrofurantoin, macrocrystal-monohydrate, (MACROBID) 100 MG capsule Take 1 capsule by mouth 2 times daily for 10 days 20 capsule 0    oxyCODONE-acetaminophen (PERCOCET) 7.5-325 MG per tablet Take 1 tablet by mouth every 6 hours as needed for Pain for up to 30 days.  Intended supply: 30 days 70 tablet 0    baclofen (LIORESAL) 10 MG tablet TAKE 1 TABLET BY MOUTH NIGHTLY AS NEEDED (MUSCLE SPASMS) 90 tablet 2    omeprazole (PRILOSEC) 40 MG delayed release capsule Take 40 mg by mouth daily      topiramate (TOPAMAX) 25 MG tablet TAKE 1 TABLET BY MOUTH EVERY DAY AT NIGHT 90 tablet 1    atorvastatin (LIPITOR) 10 MG tablet TAKE 1 TABLET BY MOUTH EVERY DAY AT NIGHT 90 tablet 3    dilTIAZem (CARDIZEM) 30 MG tablet Take 30 mg by mouth 2 times daily      losartan (COZAAR) 50 MG tablet TAKE 1 TABLET BY MOUTH EVERY DAY      cyanocobalamin 1000 MCG/ML injection INJECT 1 ML AS DIRECTED EVERY 14 DAYS FOR 12 DOSES INJECT ONE CC SUB-CUTANEOSLY EVERY OTHER WEEK 6 mL 3    hydroxychloroquine (PLAQUENIL) 200 MG tablet Take 1 tablet by mouth daily      nitroGLYCERIN (NITROSTAT) 0.4 MG SL tablet PLEASE SEE ATTACHED FOR DETAILED DIRECTIONS 25 tablet 3    melatonin 5 MG TABS tablet Take 5 mg by mouth nightly      Coenzyme Q10 (COQ10 PO) Take 1 capsule by mouth daily       Cholecalciferol (VITAMIN D3) 2000 units TABS Take 2 tablets by mouth daily       gabapentin (NEURONTIN) 300 MG capsule TAKE 1 CAPSULE BY MOUTH DAILY AND 2 AT NIGHT AS TOLERATED 270 capsule 1    aspirin 81 MG tablet Take 81 mg by mouth daily       Current Facility-Administered Medications   Medication Dose Route Frequency Provider Last Rate Last Admin    cyanocobalamin injection 1,000 mcg  1,000 mcg IntraMUSCular Once Thedora Layton, DO              Review of Systems   Constitutional: Negative for activity change, appetite change, chills, fever and unexpected weight change. HENT: Negative for drooling, ear pain, nosebleeds, sinus pressure and trouble swallowing. Respiratory: Negative for cough, chest tightness and shortness of breath. Cardiovascular: Negative for chest pain and leg swelling. Gastrointestinal: Negative for abdominal pain, diarrhea and vomiting. Endocrine: Negative for polydipsia and polyphagia. Genitourinary: Negative for dysuria, flank pain and frequency. Musculoskeletal: Positive for arthralgias (right hand and ring finger). Negative for back pain and myalgias. Skin: Negative for pallor and rash. Neurological: Negative for syncope, weakness and headaches. Hematological: Does not bruise/bleed easily. All other systems reviewed and are negative.       Objective:   /72 (Site: Right Upper Arm, Position: Sitting, Cuff Size: Large Adult)   Pulse 89   Temp 97.3 °F (36.3 °C)   Ht 5' 8\" (1.727 m)   Wt 220 lb (99.8 kg)   SpO2 98%   BMI 33.45 kg/m²     Physical Exam  Vitals and nursing note reviewed. Constitutional:       General: She is awake. She is not in acute distress. Appearance: Normal appearance. She is well-developed and normal weight. She is not ill-appearing, toxic-appearing or diaphoretic. Comments: No photophobia. No phonophobia. HENT:      Head: Normocephalic and atraumatic. No Shea's sign. Right Ear: External ear normal.      Left Ear: External ear normal.      Nose: Nose normal. No congestion or rhinorrhea. Mouth/Throat:      Mouth: Mucous membranes are moist.      Pharynx: Oropharynx is clear. No oropharyngeal exudate or posterior oropharyngeal erythema. Eyes:      General: No scleral icterus. Right eye: No foreign body or discharge. Left eye: No discharge. Extraocular Movements: Extraocular movements intact. Conjunctiva/sclera: Conjunctivae normal.      Left eye: No exudate. Pupils: Pupils are equal, round, and reactive to light. Neck:      Vascular: No JVD. Trachea: No tracheal deviation. Comments: No meningismus. Cardiovascular:      Rate and Rhythm: Normal rate and regular rhythm. Heart sounds: Normal heart sounds. Heart sounds not distant. No murmur heard. No friction rub. No gallop. Pulmonary:      Effort: Pulmonary effort is normal. No respiratory distress. Breath sounds: Normal breath sounds. No stridor. No wheezing, rhonchi or rales. Chest:      Chest wall: No tenderness. Abdominal:      General: Abdomen is flat. Bowel sounds are normal. There is no distension. Palpations: Abdomen is soft. There is no mass. Tenderness: There is no abdominal tenderness. There is no right CVA tenderness, left CVA tenderness, guarding or rebound. Hernia: No hernia is present. Musculoskeletal:         General: Swelling (right ring finger), deformity and signs of injury present. No tenderness.  Normal range of motion. Cervical back: Normal range of motion and neck supple. No rigidity. Lymphadenopathy:      Head:      Right side of head: No submental adenopathy. Left side of head: No submental adenopathy. Skin:     General: Skin is warm and dry. Capillary Refill: Capillary refill takes less than 2 seconds. Coloration: Skin is not jaundiced or pale. Findings: No bruising, erythema, lesion or rash. Neurological:      General: No focal deficit present. Mental Status: She is alert and oriented to person, place, and time. Mental status is at baseline. Cranial Nerves: No cranial nerve deficit. Sensory: No sensory deficit. Motor: No weakness. Coordination: Coordination normal.      Deep Tendon Reflexes: Reflexes are normal and symmetric. Psychiatric:         Mood and Affect: Mood normal.         Behavior: Behavior normal. Behavior is cooperative. Thought Content: Thought content normal.         Judgment: Judgment normal.         Assessment:       Diagnosis Orders   1. Fall, initial encounter  XR HAND RIGHT (MIN 3 VIEWS)   2. Pain in right finger(s)  XR HAND RIGHT (MIN 3 VIEWS)   3. Stage 3a chronic kidney disease ([facility]     4. At high risk for falls       No results found for this visit on 04/28/22. Plan:     Assessment & Plan   Jenny Bergeron was seen today for hand pain. Diagnoses and all orders for this visit:    Fall, initial encounter  -     XR HAND RIGHT (MIN 3 VIEWS); Future    Pain in right finger(s)  -     XR HAND RIGHT (MIN 3 VIEWS); Future    Stage 3a chronic kidney disease (HCC)    At high risk for falls      Orders Placed This Encounter   Procedures    XR HAND RIGHT (MIN 3 VIEWS)     Standing Status:   Future     Standing Expiration Date:   4/28/2023     Order Specific Question:   Reason for exam:     Answer:   r/o fx     No orders of the defined types were placed in this encounter. There are no discontinued medications.   Return if symptoms worsen or

## 2022-05-02 ENCOUNTER — OFFICE VISIT (OUTPATIENT)
Dept: ORTHOPEDIC SURGERY | Age: 67
End: 2022-05-02
Payer: MEDICARE

## 2022-05-02 VITALS
HEART RATE: 73 BPM | OXYGEN SATURATION: 98 % | TEMPERATURE: 97.2 F | WEIGHT: 220 LBS | BODY MASS INDEX: 33.34 KG/M2 | HEIGHT: 68 IN

## 2022-05-02 DIAGNOSIS — M21.941 DEFORMITY OF RIGHT HAND DUE TO RHEUMATOID ARTHRITIS (HCC): Primary | ICD-10-CM

## 2022-05-02 DIAGNOSIS — M06.9 DEFORMITY OF RIGHT HAND DUE TO RHEUMATOID ARTHRITIS (HCC): Primary | ICD-10-CM

## 2022-05-02 PROCEDURE — 3017F COLORECTAL CA SCREEN DOC REV: CPT | Performed by: ORTHOPAEDIC SURGERY

## 2022-05-02 PROCEDURE — G8427 DOCREV CUR MEDS BY ELIG CLIN: HCPCS | Performed by: ORTHOPAEDIC SURGERY

## 2022-05-02 PROCEDURE — 4040F PNEUMOC VAC/ADMIN/RCVD: CPT | Performed by: ORTHOPAEDIC SURGERY

## 2022-05-02 PROCEDURE — G8399 PT W/DXA RESULTS DOCUMENT: HCPCS | Performed by: ORTHOPAEDIC SURGERY

## 2022-05-02 PROCEDURE — 99203 OFFICE O/P NEW LOW 30 MIN: CPT | Performed by: ORTHOPAEDIC SURGERY

## 2022-05-02 PROCEDURE — 1123F ACP DISCUSS/DSCN MKR DOCD: CPT | Performed by: ORTHOPAEDIC SURGERY

## 2022-05-02 PROCEDURE — 1090F PRES/ABSN URINE INCON ASSESS: CPT | Performed by: ORTHOPAEDIC SURGERY

## 2022-05-02 PROCEDURE — 1036F TOBACCO NON-USER: CPT | Performed by: ORTHOPAEDIC SURGERY

## 2022-05-02 PROCEDURE — G8417 CALC BMI ABV UP PARAM F/U: HCPCS | Performed by: ORTHOPAEDIC SURGERY

## 2022-05-02 RX ORDER — LOSARTAN POTASSIUM 25 MG/1
TABLET ORAL
COMMUNITY
Start: 2022-04-07 | End: 2022-06-02

## 2022-05-02 RX ORDER — SIMETHICONE 125 MG
TABLET,CHEWABLE ORAL
COMMUNITY

## 2022-05-02 NOTE — PROGRESS NOTES
Subjective:      Patient ID: Leatha Pulliam is a 74 y.o. female who presents today for:  Chief Complaint   Patient presents with    Hand Pain     The patient c/o chronic right hand pain. The patient states that she hit her hand off of a fan. Her pain is located along the 4th finger. She states that she has swelling and increased pain. XR 4/28/22. She rates her pain as a 6/10 today. She also c/o numbness and tingling in her hand. HPI  Patient has a longstanding history of chronic pain issues. Unfortunately, patient has required multiple hand surgeries as a result of progressive deformity of her right hand. Patient had joint replacement surgery along the PIP joints of the ring and small finger and his fusion procedures of the index finger. Patient states that since she fell she has noted increased swelling at the MCP joint of the middle finger and pain along the fourth ray. Patient had radiographs that did not demonstrate any evidence of fracture. Patient is right-hand dominant and uses her right hand often for cane ambulation.       Past Medical History:   Diagnosis Date    Abnormal electromyogram (EMG) 8/19/09    SENSORIMOTOR NEUROPATHY OF BLE, RIGHT WORSE THAN LEFT, SUSPICIOUS FOR RIGHT S1 RADIC    Angina pectoris (HCC)     Angina pectoris ([address] 07/2019    sees Dr. Kelly Padgett Ankle pain     Bleeding ulcer     Colon polyp 4/3/2017    Dermatitis     Fibromyalgia     Foot pain     Hyperlipidemia     Hypertension     Infection of intervertebral disc (pyogenic), lumbar region ([address] 8/13/2018    Low back pain     MRSA (methicillin resistant staph aureus) culture positive 2011    Nasal bleeding     Neck pain     Neuropathy     Obesity     Osteoarthritis     PSVT (paroxysmal supraventricular tachycardia) ([address]     Shingles outbreak 2012    SI (sacroiliac) pain     Stage 3 chronic kidney disease, unspecified whether stage 3a or 3b CKD ([address] 4/13/2022    Stenosis     Vitamin D deficiency       Past Surgical History:   Procedure Laterality Date    CARDIAC CATHETERIZATION  10/2016    CARDIOVERSION      2001 x3    CERVICAL FUSION  12/14/2011    Dr Mendel Bridgeman with bone graft and plate    COLONOSCOPY  09/2021    CC    ENDOSCOPIC ULTRASOUND (LOWER) N/A 07/11/2019    EGD/ EUS performed by Claude Marino MD at [address]  07/2005    HYSTERECTOMY  2001    HYSTERECTOMY, TOTAL ABDOMINAL      JOINT REPLACEMENT Left     tka    LIVER BIOPSY      MENISCECTOMY  10/31/2011    left knee    OTHER SURGICAL HISTORY  10/26/2009    CAUDALS BY DR Gregory Nam    [place] COLON CA SCRN NOT  [address] IND N/A 04/12/2017    COLONOSCOPY performed by Lucien Kelly MD at [address] ESOPHAGOGASTRODUODENOSCOPY TRANSORAL DIAGNOSTIC N/A 04/12/2017    EGD ESOPHAGOGASTRODUODENOSCOPY performed by Lucien Kelly MD at [address]  12/2011    Dr Mendel Bridgeman Lumbar and c spine    TOTAL KNEE ARTHROPLASTY  07/30/2012    LEFT-DR AVELAR    UPPER GASTROINTESTINAL ENDOSCOPY  1995     Social History     Socioeconomic History    Marital status:      Spouse name: Charma Holstein Number of children: 2    Years of education: 15    Highest education level: Associate degree: occupational, technical, or vocational program   Occupational History    Occupation: Retired    Tobacco Use    Smoking status: Never Smoker    Smokeless tobacco: Never Used   Vaping Use    Vaping Use: Never used   Substance and Sexual Activity    Alcohol use: No    Drug use: No    Sexual activity: Not Currently   Other Topics Concern    Not on file   Social History Narrative    Lives in [place] with her  Chitra Meehan and son Senait Sanches is mildly handicapped --CP right lilia with anxiety and LD. He works at [employer].      Social Determinants of Health     Financial Resource Strain: Low Risk     Difficulty of Paying Living Expenses: Not hard at all   Food Insecurity: No Food Insecurity    Worried About Running Out of Food in the Last Year: Never true    Romana of Food in the Last Year: Never true   Transportation Needs:     Lack of Transportation (Medical): Not on file    Lack of Transportation (Non-Medical):  Not on file   Physical Activity: Insufficiently Active    Days of Exercise per Week: 3 days    Minutes of Exercise per Session: 40 min   Stress:     Feeling of Stress : Not on file   Social Connections:     Frequency of Communication with Friends and Family: Not on file    Frequency of Social Gatherings with Friends and Family: Not on file    Attends Congregation Services: Not on file    Active Member of Clubs or Organizations: Not on file    Attends Club or Organization Meetings: Not on file    Marital Status: Not on file   Intimate Partner Violence:     Fear of Current or Ex-Partner: Not on file    Emotionally Abused: Not on file    Physically Abused: Not on file    Sexually Abused: Not on file   Housing Stability:     Unable to Pay for Housing in the Last Year: Not on file    Number of [place] in the Last Year: Not on file    Unstable Housing in the Last Year: Not on file     Family History   Problem Relation Age of Onset    Heart Disease Father     High Cholesterol Father     High Blood Pressure Father     Stroke Mother     High Blood Pressure Mother     High Blood Pressure Brother      Allergies   Allergen Reactions    Latex Swelling     Swelling of hands with wearing latex gloves    Isosorbide Other (See Comments)    Lisinopril Other (See Comments)    Norvasc [Amlodipine Besylate] Other (See Comments)     swelling    Ranolazine Headaches and Nausea Only    Keflex [Cephalexin] Rash     Current Outpatient Medications on File Prior to Visit   Medication Sig Dispense Refill    losartan (COZAAR) 25 MG tablet TAKE 2 TABLETS BY MOUTH IN THE MORNING AND 1 IN THE EVENING      simethicone (GAS-X EXTRA STRENGTH) 125 MG chewable tablet Take by mouth  oxyCODONE-acetaminophen (PERCOCET) 7.5-325 MG per tablet Take 1 tablet by mouth every 6 hours as needed for Pain for up to 30 days.  Intended supply: 30 days 70 tablet 0    baclofen (LIORESAL) 10 MG tablet TAKE 1 TABLET BY MOUTH NIGHTLY AS NEEDED (MUSCLE SPASMS) 90 tablet 2    omeprazole (PRILOSEC) 40 MG delayed release capsule Take 40 mg by mouth daily      topiramate (TOPAMAX) 25 MG tablet TAKE 1 TABLET BY MOUTH EVERY DAY AT NIGHT 90 tablet 1    atorvastatin (LIPITOR) 10 MG tablet TAKE 1 TABLET BY MOUTH EVERY DAY AT NIGHT 90 tablet 3    dilTIAZem (CARDIZEM) 30 MG tablet Take 30 mg by mouth 2 times daily      gabapentin (NEURONTIN) 300 MG capsule TAKE 1 CAPSULE BY MOUTH DAILY AND 2 AT NIGHT AS TOLERATED 270 capsule 1    losartan (COZAAR) 50 MG tablet TAKE 1 TABLET BY MOUTH EVERY DAY      cyanocobalamin 1000 MCG/ML injection INJECT 1 ML AS DIRECTED EVERY 14 DAYS FOR 12 DOSES INJECT ONE CC SUB-CUTANEOSLY EVERY OTHER WEEK 6 mL 3    hydroxychloroquine (PLAQUENIL) 200 MG tablet Take 1 tablet by mouth daily      nitroGLYCERIN (NITROSTAT) 0.4 MG SL tablet PLEASE SEE ATTACHED FOR DETAILED DIRECTIONS 25 tablet 3    melatonin 5 MG TABS tablet Take 5 mg by mouth nightly      aspirin 81 MG tablet Take 81 mg by mouth daily      Coenzyme Q10 (COQ10 PO) Take 1 capsule by mouth daily       Cholecalciferol (VITAMIN D3) 2000 units TABS Take 2 tablets by mouth daily        Current Facility-Administered Medications on File Prior to Visit   Medication Dose Route Frequency Provider Last Rate Last Admin    cyanocobalamin injection 1,000 mcg  1,000 mcg IntraMUSCular Once Rosaline Nicholas, DO             Review of Systems      Objective:   Pulse 73   Temp 97.2 °F (36.2 °C) (Temporal)   Ht 5' 8\" (1.727 m)   Wt 220 lb (99.8 kg)   SpO2 98%   BMI 33.45 kg/m²     Ortho Exam  Right upper extremity:  Skin: Intact circumferentially, swelling is appreciated over the MCP joint of the middle ray  Neuro: Sensation intact light touch in the radial, ulnar and median nerve distribution. Able to perform all cardinal hand movements. Vascular: Strong palpable radial pulse, brisk cap refill in all digits. MSK: Patient has significant deformity appreciated about the right hand with significant flexion and ulnar deviation at the PIP joints of the small and ring finger. There is swelling appreciated about the MCP joint and limited range of motion of the middle and index finger. Somewhat globally tender palpation about the soft tissues about the hand. Radiographs and Laboratory Studies:     Diagnostic Imaging Studies:    PA oblique and lateral imaging of the right hand was reviewed from 4/28/2022    Radiographs demonstrate ulnar deviation and subluxation of the PIP joints of the ring and small finger. Significant degenerative changes present throughout with possible worsening or loosening of the arthroplasties of the PIP joints of the ring and small finger. There is significant progression of arthritic change of the DIP joints of all the lesser digits. Patient also has a significant narrowing of her MCP joint of the middle digit. Laboratory Studies:   Lab Results   Component Value Date    WBC 5.5 06/04/2021    HGB 13.7 06/04/2021    HCT 40.1 06/04/2021    MCV 91.1 06/04/2021     06/04/2021     Lab Results   Component Value Date    SEDRATE 40 09/27/2018     Lab Results   Component Value Date    CRP <0.3 10/16/2020       Assessment:      Progressive arthritis right hand     Plan:     Unfortunately, not much further to add to this patient's care she has already been seen by 2 other hand surgeons and both have recommended no further intervention given the current condition of her hand. Most suitable procedure for her various joints at this point would be forms of fusions which would further limit her motion and ability.   In regards to the pain she is experiencing currently it seems that she is simply experiencing progression of arthritic change now affecting the MCP joint of the middle ray which she has not experienced prior to this fall. No evidence of fracture or dislocation recommend Voltaren gel application to the hand which should not be systemically absorbed and not affect her kidney function given its application. Can see patient again on an as-needed basis. Orders Placed This Encounter   Medications    diclofenac sodium (VOLTAREN) 1 % GEL     Sig: Apply topically 2 times daily     Dispense:  2 g     Refill:  2       No follow-ups on file.       Traci Funez MD

## 2022-05-12 ENCOUNTER — OFFICE VISIT (OUTPATIENT)
Dept: PHYSICAL MEDICINE AND REHAB | Age: 67
End: 2022-05-12
Payer: MEDICARE

## 2022-05-12 VITALS
DIASTOLIC BLOOD PRESSURE: 80 MMHG | BODY MASS INDEX: 33.34 KG/M2 | HEIGHT: 68 IN | SYSTOLIC BLOOD PRESSURE: 130 MMHG | WEIGHT: 220 LBS

## 2022-05-12 DIAGNOSIS — M48.062 SPINAL STENOSIS OF LUMBAR REGION WITH NEUROGENIC CLAUDICATION: Primary | ICD-10-CM

## 2022-05-12 DIAGNOSIS — G57.93 NEUROPATHY INVOLVING BOTH LOWER EXTREMITIES: ICD-10-CM

## 2022-05-12 DIAGNOSIS — M54.41 CHRONIC BILATERAL LOW BACK PAIN WITH BILATERAL SCIATICA: ICD-10-CM

## 2022-05-12 DIAGNOSIS — M79.671 FOOT PAIN, BILATERAL: ICD-10-CM

## 2022-05-12 DIAGNOSIS — R76.8 ANA POSITIVE: ICD-10-CM

## 2022-05-12 DIAGNOSIS — Z79.899 HIGH RISK MEDICATION USE: ICD-10-CM

## 2022-05-12 DIAGNOSIS — M54.2 CERVICALGIA: ICD-10-CM

## 2022-05-12 DIAGNOSIS — M79.672 FOOT PAIN, BILATERAL: ICD-10-CM

## 2022-05-12 DIAGNOSIS — M54.42 CHRONIC BILATERAL LOW BACK PAIN WITH BILATERAL SCIATICA: ICD-10-CM

## 2022-05-12 DIAGNOSIS — M05.79 RHEUMATOID ARTHRITIS INVOLVING MULTIPLE SITES WITH POSITIVE RHEUMATOID FACTOR (HCC): ICD-10-CM

## 2022-05-12 DIAGNOSIS — G89.29 CHRONIC BILATERAL LOW BACK PAIN WITH BILATERAL SCIATICA: ICD-10-CM

## 2022-05-12 PROCEDURE — 1090F PRES/ABSN URINE INCON ASSESS: CPT | Performed by: PHYSICAL MEDICINE & REHABILITATION

## 2022-05-12 PROCEDURE — 1123F ACP DISCUSS/DSCN MKR DOCD: CPT | Performed by: PHYSICAL MEDICINE & REHABILITATION

## 2022-05-12 PROCEDURE — G8417 CALC BMI ABV UP PARAM F/U: HCPCS | Performed by: PHYSICAL MEDICINE & REHABILITATION

## 2022-05-12 PROCEDURE — 4040F PNEUMOC VAC/ADMIN/RCVD: CPT | Performed by: PHYSICAL MEDICINE & REHABILITATION

## 2022-05-12 PROCEDURE — 99214 OFFICE O/P EST MOD 30 MIN: CPT | Performed by: PHYSICAL MEDICINE & REHABILITATION

## 2022-05-12 PROCEDURE — G8399 PT W/DXA RESULTS DOCUMENT: HCPCS | Performed by: PHYSICAL MEDICINE & REHABILITATION

## 2022-05-12 PROCEDURE — 3017F COLORECTAL CA SCREEN DOC REV: CPT | Performed by: PHYSICAL MEDICINE & REHABILITATION

## 2022-05-12 PROCEDURE — G8427 DOCREV CUR MEDS BY ELIG CLIN: HCPCS | Performed by: PHYSICAL MEDICINE & REHABILITATION

## 2022-05-12 PROCEDURE — 1036F TOBACCO NON-USER: CPT | Performed by: PHYSICAL MEDICINE & REHABILITATION

## 2022-05-12 RX ORDER — LIDOCAINE 50 MG/G
OINTMENT TOPICAL
Qty: 1 EACH | Refills: 3 | Status: CANCELLED | OUTPATIENT
Start: 2022-05-12

## 2022-05-12 RX ORDER — LIDOCAINE 50 MG/G
OINTMENT TOPICAL
Qty: 1 EACH | Refills: 3 | Status: SHIPPED | OUTPATIENT
Start: 2022-05-12 | End: 2022-06-02

## 2022-05-12 RX ORDER — LIDOCAINE 50 MG/G
OINTMENT TOPICAL
Qty: 1 G | Refills: 3 | Status: CANCELLED | OUTPATIENT
Start: 2022-05-12

## 2022-05-12 RX ORDER — PREDNISONE 20 MG/1
20 TABLET ORAL 2 TIMES DAILY
Qty: 10 TABLET | Refills: 0 | Status: SHIPPED | OUTPATIENT
Start: 2022-05-12 | End: 2022-05-17

## 2022-05-12 RX ORDER — OXYCODONE AND ACETAMINOPHEN 7.5; 325 MG/1; MG/1
1 TABLET ORAL EVERY 6 HOURS PRN
Qty: 70 TABLET | Refills: 0 | Status: SHIPPED | OUTPATIENT
Start: 2022-05-14 | End: 2022-06-15 | Stop reason: SDUPTHER

## 2022-05-12 ASSESSMENT — ENCOUNTER SYMPTOMS
BACK PAIN: 1
ABDOMINAL PAIN: 1
PHOTOPHOBIA: 1

## 2022-05-12 NOTE — PROGRESS NOTES
Subjective  Elizabeth Tellez, 74 y.o. female presents today with:       Back Pain Left sciatic injection 03/1/22, 03/30/22, 04/28/22 with 80% in pain reduction lasting 3 weeks     Neck Pain     Shoulder Pain     Hip Pain     Leg Pain     Foot Pain     Medication Refill Percocet, gabapentin, baclofen, zinc, Vit D refill & pill count today           She is still doing well with her current medications and shows no signs of abuse or overuse. She is more functional on it. Does not need Neurontin refills. She is to avoid any NSAIDs but she will continue gabapentin as tolerated baclofen vitamin D and zinc.   Is still miserable dt flare up from a fall in April--whole body hurts--we discussed oral prednisone and a second opinion with [facility] for hand and wrist.  Will see PA for Dr Josseline Campoverde. Back Pain  This is a chronic problem. The current episode started more than 1 year ago. The problem occurs daily. The problem is unchanged. The pain is present in the lumbar spine, gluteal and sacro-iliac. The quality of the pain is described as aching. Radiates to: bilateral legs, left leg worse, left hip. The pain is at a severity of 3/10. The pain is moderate. The pain is worse during the day. The symptoms are aggravated by bending, standing, position, sitting and twisting (Walking). Stiffness is present in the morning. Associated symptoms include abdominal pain, leg pain and pelvic pain. Pertinent negatives include no bladder incontinence, chest pain, dysuria, fever, headaches, numbness, paresis, perianal numbness or weakness. Risk factors include obesity, poor posture, menopause, lack of exercise, sedentary lifestyle and history of steroid use. She has tried analgesics, walking, home exercises, heat, muscle relaxant, NSAIDs, ice and bed rest (Sciatica block, trigger point injections, mobic, Gabapentin, Norco) for the symptoms. The treatment provided significant relief. Neck Pain   This is a chronic problem.  The problem occurs constantly. The problem has been waxing and waning. The pain is at a severity of 5/10. The pain is mild. The symptoms are aggravated by position and bending. Stiffness is present in the morning. Associated symptoms include leg pain and photophobia. Pertinent negatives include no chest pain, fever, headaches, numbness, paresis or weakness. She has tried heat, acetaminophen, chiropractic manipulation, ice, muscle relaxants, neck support, NSAIDs and oral narcotics for the symptoms. The treatment provided moderate relief. Shoulder Pain   Associated symptoms include joint locking, joint swelling and a limited range of motion. Pertinent negatives include no fever or numbness. Family history includes rheumatoid arthritis. Her past medical history is significant for osteoarthritis. There is no history of diabetes. Hip Pain   The incident occurred more than 1 week ago. The injury mechanism was a compression. The quality of the pain is described as aching. The pain is at a severity of 8/10. Pertinent negatives include no numbness. She reports no foreign bodies present. The symptoms are aggravated by movement, palpation and weight bearing. She has tried acetaminophen, heat, elevation, ice, immobilization, NSAIDs, non-weight bearing and rest for the symptoms. The treatment provided mild relief. Leg Pain   Pertinent negatives include no numbness. Foot Pain   The pain is present in the neck, back and left shoulder. This is a chronic problem. The current episode started more than 1 year ago. The problem occurs constantly. The problem has been gradually worsening. The pain is at a severity of 4/10. Associated symptoms include joint locking, joint swelling and a limited range of motion. Pertinent negatives include no fever or numbness. The symptoms are aggravated by contact, heat, standing and lying down.  She has tried cold, NSAIDS, OTC ointments, OTC pain meds, chondroitin, heat, movement, acetaminophen, oral narcotics and rest for the symptoms. The treatment provided significant relief. Family history includes rheumatoid arthritis. Her past medical history is significant for osteoarthritis. There is no history of diabetes.        Past Medical History:   Diagnosis Date    Abnormal electromyogram (EMG) 8/19/09    SENSORIMOTOR NEUROPATHY OF BLE, RIGHT WORSE THAN LEFT, SUSPICIOUS FOR RIGHT S1 RADIC    Angina pectoris (HCC)     Angina pectoris ([address] 07/2019    sees Dr. Mary Garcia Ankle pain     Bleeding ulcer     Colon polyp 4/3/2017    Dermatitis     Fibromyalgia     Foot pain     Hyperlipidemia     Hypertension     Infection of intervertebral disc (pyogenic), lumbar region ([address] 8/13/2018    Low back pain     MRSA (methicillin resistant staph aureus) culture positive 2011    Nasal bleeding     Neck pain     Neuropathy     Obesity     Osteoarthritis     PSVT (paroxysmal supraventricular tachycardia) ([address]     Shingles outbreak 2012    SI (sacroiliac) pain     Stage 3 chronic kidney disease, unspecified whether stage 3a or 3b CKD ([address] 4/13/2022    Stenosis     Vitamin D deficiency      Past Surgical History:   Procedure Laterality Date    CARDIAC CATHETERIZATION  10/2016    CARDIOVERSION      2001 x3    CERVICAL FUSION  12/14/2011    Dr Dominga Gordon with bone graft and plate    COLONOSCOPY  09/2021    CC    ENDOSCOPIC ULTRASOUND (LOWER) N/A 07/11/2019    EGD/ EUS performed by Daylin Rubin MD at [address] [place] [address] HAND FUSION  07/2005    HYSTERECTOMY  2001    HYSTERECTOMY, TOTAL ABDOMINAL      JOINT REPLACEMENT Left     tka    LIVER BIOPSY      MENISCECTOMY  10/31/2011    left knee    OTHER SURGICAL HISTORY  10/26/2009    CAUDALS BY DR Eva Dias    NC COLON CA SCRN NOT  [address] IND N/A 04/12/2017    COLONOSCOPY performed by Ros Gotti MD at [address] ESOPHAGOGASTRODUODENOSCOPY TRANSORAL DIAGNOSTIC N/A 04/12/2017    EGD ESOPHAGOGASTRODUODENOSCOPY performed by Obed John MD at [address]  12/2011    Dr Nathaniel Nunez Lumbar and c spine    TOTAL KNEE ARTHROPLASTY  07/30/2012    LEFT-DR AVELAR    UPPER GASTROINTESTINAL ENDOSCOPY  1995     Social History     Socioeconomic History    Marital status:      Spouse name: Abbey Mansfield Number of children: 2    Years of education: 15    Highest education level: Associate degree: occupational, technical, or vocational program   Occupational History    Occupation: Retired    Tobacco Use    Smoking status: Never Smoker    Smokeless tobacco: Never Used   Vaping Use    Vaping Use: Never used   Substance and Sexual Activity    Alcohol use: No    Drug use: No    Sexual activity: Not Currently   Other Topics Concern    None   Social History Narrative    Lives in [place] with her  Virginia Swenson and son Cyndy Galaviz is mildly handicapped --CP right lilia with anxiety and LD. He works at [employer]. Social Determinants of Health     Financial Resource Strain: Low Risk     Difficulty of Paying Living Expenses: Not hard at all   Food Insecurity: No Food Insecurity    Worried About Running Out of Food in the Last Year: Never true    Romana of Food in the Last Year: Never true   Transportation Needs:     Lack of Transportation (Medical): Not on file    Lack of Transportation (Non-Medical):  Not on file   Physical Activity: Insufficiently Active    Days of Exercise per Week: 3 days    Minutes of Exercise per Session: 40 min   Stress:     Feeling of Stress : Not on file   Social Connections:     Frequency of Communication with Friends and Family: Not on file    Frequency of Social Gatherings with Friends and Family: Not on file    Attends Confucianism Services: Not on file    Active Member of Clubs or Organizations: Not on file    Attends Club or Organization Meetings: Not on file    Marital Status: Not on file   Intimate Partner Violence:     Fear of Current or Ex-Partner: Not on file    Emotionally Abused: Not on file    Physically Abused: Not on file    Sexually Abused: Not on file   Housing Stability:     Unable to Pay for Housing in the Last Year: Not on file    Number of [place] in the Last Year: Not on file    Unstable Housing in the Last Year: Not on file     Family History   Problem Relation Age of Onset    Heart Disease Father     High Cholesterol Father     High Blood Pressure Father     Stroke Mother     High Blood Pressure Mother     High Blood Pressure Brother        Allergies   Allergen Reactions    Latex Swelling     Swelling of hands with wearing latex gloves    Isosorbide Other (See Comments)    Lisinopril Other (See Comments)    Norvasc [Amlodipine Besylate] Other (See Comments)     swelling    Ranolazine Headaches and Nausea Only    Keflex [Cephalexin] Rash       Review of Systems   Constitutional: Negative for fever. Eyes: Positive for photophobia. Cardiovascular: Negative for chest pain. Gastrointestinal: Positive for abdominal pain. Genitourinary: Positive for pelvic pain. Negative for bladder incontinence and dysuria. Musculoskeletal: Positive for back pain and neck pain. Neurological: Negative for weakness, numbness and headaches. Objective    Vitals:    05/12/22 1127   BP: 130/80   Site: Left Upper Arm   Position: Sitting   Cuff Size: Medium Adult   Weight: 220 lb (99.8 kg)   Height: 5' 8\" (1.727 m)     Pain Score: EIGHT (with medication)       Physical Exam  Vitals reviewed. Constitutional:       General: She is not in acute distress. Appearance: She is well-developed. She is not ill-appearing, toxic-appearing or diaphoretic. Comments:         HENT:      Head: Normocephalic and atraumatic. Right Ear: Hearing normal.      Left Ear: Hearing normal.      Nose: Nose normal.      Mouth/Throat:      Mouth: No oral lesions. Dentition: Normal dentition. Pharynx: No oropharyngeal exudate.    Eyes: General: No scleral icterus. Right eye: No discharge. Left eye: No discharge. Conjunctiva/sclera: Conjunctivae normal.      Right eye: No chemosis or exudate. Left eye: No chemosis or exudate. Pupils: Pupils are equal, round, and reactive to light. Neck:      Thyroid: No thyromegaly. Vascular: No JVD. Trachea: Tracheal tenderness present. No tracheal deviation. Cardiovascular:      Pulses: No decreased pulses. Pulmonary:      Effort: Pulmonary effort is normal. No tachypnea, bradypnea, accessory muscle usage or respiratory distress. Breath sounds: No wheezing. Chest:      Chest wall: No tenderness. Abdominal:      General: Bowel sounds are normal. There is no distension. Palpations: Abdomen is soft. There is no mass. Tenderness: There is no abdominal tenderness. There is no guarding or rebound. Comments: Pressure RUQ   Musculoskeletal:         General: Tenderness present. Right shoulder: Deformity, tenderness and bony tenderness present. Decreased range of motion. Left shoulder: Deformity, tenderness and bony tenderness present. Decreased range of motion. Right upper arm: Tenderness and bony tenderness present. Left upper arm: Tenderness and bony tenderness present. Right elbow: Normal.      Left elbow: Normal.      Right forearm: Normal.      Left forearm: Normal.      Right wrist: Normal.      Left wrist: Normal.      Right hand: Deformity, tenderness and bony tenderness present. Decreased range of motion. Left hand: Deformity, tenderness and bony tenderness present. Decreased range of motion. Arms:       Cervical back: Neck supple. Tenderness and bony tenderness present. No edema or rigidity. Spinous process tenderness and muscular tenderness present. Decreased range of motion. Thoracic back: Tenderness and bony tenderness present. Decreased range of motion.       Lumbar back: Tenderness and bony tenderness present. No swelling, edema, deformity or lacerations. Decreased range of motion. Back:       Right hip: Tenderness present. Decreased range of motion. Decreased strength. Left hip: Tenderness present. Decreased range of motion. Decreased strength. Right upper leg: Normal.      Left upper leg: Normal.      Right knee: Normal.      Left knee: Normal.      Right lower leg: Normal.      Left lower leg: Normal.      Right ankle: Normal.      Right Achilles Tendon: Normal.      Left ankle: Tenderness present over the lateral malleolus and medial malleolus. Decreased range of motion. Left Achilles Tendon: Normal.      Right foot: Decreased range of motion. Tenderness and bony tenderness present. Left foot: Decreased range of motion. Tenderness present. Legs:       Comments: Tender areas are indicated by numbered spot         Lymphadenopathy:      Cervical: No cervical adenopathy. Skin:     General: Skin is warm and dry. Coloration: Skin is not pale. Findings: No abrasion, bruising, ecchymosis, erythema, laceration, petechiae or rash. Rash is not macular, pustular or urticarial.      Nails: There is no clubbing. Neurological:      Mental Status: She is alert and oriented to person, place, and time. Cranial Nerves: No cranial nerve deficit. Sensory: No sensory deficit. Motor: No tremor, atrophy or abnormal muscle tone. Coordination: Coordination normal. Finger-Nose-Finger Test abnormal.      Gait: Gait normal.      Deep Tendon Reflexes: Reflexes abnormal. Babinski sign absent on the right side. Babinski sign absent on the left side. Psychiatric:         Attention and Perception: She is attentive. Mood and Affect: Mood is not anxious. Affect is not labile, blunt, angry or inappropriate. Speech: Speech normal.         Behavior: Behavior normal. Behavior is not agitated, slowed, aggressive, withdrawn, hyperactive or combative. Thought Content: Thought content normal. Thought content is not paranoid or delusional. Thought content does not include homicidal or suicidal ideation. Thought content does not include homicidal or suicidal plan. Cognition and Memory: Cognition is not impaired. Memory is not impaired. She does not exhibit impaired recent memory or impaired remote memory. Judgment: Judgment normal. Judgment is not impulsive or inappropriate. Ortho Exam  Neurologic Exam     Mental Status   Oriented to person, place, and time. Speech: speech is normal     Cranial Nerves     CN III, IV, VI   Pupils are equal, round, and reactive to light. Gait, Coordination, and Reflexes     Coordination   Finger to nose coordination: abnormal          After a thorough review and discussion of the previous medical records, patient comprehensive medical, surgical, and family and social history, Review of Systems, their OARRS, their Screener and Opioid Assessment for Patients with Pain (SOAPP®-R), recent diagnostics, and symptomatic results to previous treatment, it is my impression that the patients is suffering with progressive and severe:     Diagnosis Orders   1. Spinal stenosis of lumbar region with neurogenic claudication  oxyCODONE-acetaminophen (PERCOCET) 7.5-325 MG per tablet    predniSONE (DELTASONE) 20 MG tablet   2. Neuropathy involving both lower extremities  predniSONE (DELTASONE) 20 MG tablet   3. Chronic bilateral low back pain with bilateral sciatica  oxyCODONE-acetaminophen (PERCOCET) 7.5-325 MG per tablet    predniSONE (DELTASONE) 20 MG tablet   4. Rheumatoid arthritis involving multiple sites with positive rheumatoid factor (HCC)  oxyCODONE-acetaminophen (PERCOCET) 7.5-325 MG per tablet    predniSONE (DELTASONE) 20 MG tablet   5. Cervicalgia  oxyCODONE-acetaminophen (PERCOCET) 7.5-325 MG per tablet    predniSONE (DELTASONE) 20 MG tablet   6.  High risk medication use - 10/17/17 OARRS PM&R, 12/19/17 OARRS PM&R, 12/20/17 Tox screen positive for opiates, Hydrocodone PM&R, MED CONTRACT 2/2/17  oxyCODONE-acetaminophen (PERCOCET) 7.5-325 MG per tablet   7. COLTON positive  oxyCODONE-acetaminophen (PERCOCET) 7.5-325 MG per tablet   8. Foot pain, bilateral  lidocaine (XYLOCAINE) 5 % ointment       I am also concerned by lifestyle and mood issues including:    Past Medical History:   Diagnosis Date    Abnormal electromyogram (EMG) 8/19/09    SENSORIMOTOR NEUROPATHY OF BLE, RIGHT WORSE THAN LEFT, SUSPICIOUS FOR RIGHT S1 RADIC    Angina pectoris (HCC)     [facility]ina p[facility]is [facility] 07/2019    sees Dr. Ding Askew Ankle pain     Bleeding ulcer     Colon polyp 4/3/2017    Dermatitis     Fibromyalgia     Foot pain     Hyperlipidemia     Hypertension     Infection of intervertebral disc (pyogenic), lumbar region ([address] 8/13/2018    Low back pain     MRSA (methicillin resistant staph aureus) culture positive 2011    Nasal bleeding     Neck pain     Neuropathy     Obesity     Osteoarthritis     PSVT (paroxysmal supraventricular tachycardia) ([address]     Shingles outbreak 2012    SI (sacroiliac) pain     Stage 3 chronic kidney disease, unspecified whether stage 3a or 3b CKD ([address] 4/13/2022    Stenosis     Vitamin D deficiency            Given their medication, chronic pain and lifestyle and medications they are at risk for :    Falls, constipation, addiction, toxicity on opiates  Loss of livelyhood due to severe pain, debility, weight gain and  vitamin D deficiency    The patient was educated regarding proper diet, fitness routine, and regulatory restrictions concerning pain medications. Previous notes, comprehensive past medical, surgical, family history, and diagnostics were reviewed. Patient education and councelling were provided regarding off label use,treatment options and medication and injection risks.     Current and old OARRS ([place] Automated Prescription Reporting System) records reviewed, all refills reviewed since last visit,  Behavioral agreement/KATHLEEN regulations   and Toxicology screen was reviewed with patient and is up to date. There are   no current red flags. high risk meds review re intensive monitoring for toxicity and addiction,  They are making good progress regarding pain relief, they are performing at a functional level regarding activities of daily living, family interactions and psychological functioning, they're not having any adverse effects or side effects from the current medications, and I see no findings of aberrant drug taking or addiction related behaviors. The patient is aware that they have a chronic pain condition and they may require opiates dosing for life. All efforts will be made to wean to the lowest effective dose. Other therapies for pain have not been effective including nonopiate medications. Injections and exercises are only partially effective. A Rx for Narcan was offered to help prevent accidental overdose. RX Monitoring 11/4/2021   Attestation -   Acute Pain Prescriptions -   Periodic Controlled Substance Monitoring Possible medication side effects, risk of tolerance/dependence & alternative treatments discussed. ;No signs of potential drug abuse or diversion identified. ;Assessed functional status. ;Obtaining appropriate analgesic effect of treatment. Chronic Pain > 50 MEDD Re-evaluated the status of the patient's underlying condition causing pain. ;Considered consultation with a specialist.;Obtained or confirmed \"Consent for Opioid Use\" on file. Chronic Pain > 80 MEDD -               Patient is currently taking:       I have changed Mekhi Leal. Lemuel's oxyCODONE-acetaminophen. I am also having her start on predniSONE.  Additionally, I am having her maintain her Vitamin D3, Coenzyme Q10 (COQ10 PO), aspirin, melatonin, nitroGLYCERIN, hydroxychloroquine, cyanocobalamin, losartan, gabapentin, dilTIAZem, topiramate, atorvastatin, omeprazole, baclofen, losartan, simethicone, diclofenac sodium, and lidocaine. We will continue to administer cyanocobalamin. I also recommend the following Medications:    Orders Placed This Encounter   Medications    oxyCODONE-acetaminophen (PERCOCET) 7.5-325 MG per tablet     Sig: Take 1 tablet by mouth every 6 hours as needed for Pain (earliest fill date 05/14/2022) for up to 30 days. Intended supply: 30 days     Dispense:  70 tablet     Refill:  0     Reduce doses taken as pain becomes manageable    lidocaine (XYLOCAINE) 5 % ointment     Sig: Apply topically as needed. Dispense:  1 each     Refill:  3    predniSONE (DELTASONE) 20 MG tablet     Sig: Take 1 tablet by mouth 2 times daily for 5 days Take before 2 pm     Dispense:  10 tablet     Refill:  0        -which helps with pain and function. Otherwise, continue the current pain medications that I have prescibed. Radiologic:   Old  unique films results reviewed  ,     I discussed results with patients. see Follow up plans below  For any new studies. Unique source and Care Everywhere Updates:  prior external notes requested and reviewed. Chief Complaint  FU ON EPISTAXIS      History of Present IllnessPatient returns. We are seeing her back today following complex control of epistaxis along with concern for potentially a mass versus just significant inflammation and reaction due to previous treatment for her epistaxis [place] this [place]. She doing much better overall. No other worrisome ENT symptoms or signs. No other change in history.      *Active Problems   Bilateral sensorineural hearing loss (389.18) (H90.3)  Nasal obstruction (478.19) (J34.89)  Nasal valve collapse (478.19) (J34.89)  Nasal valve stenosis (478.19) (J34.89)  1   Tinnitus (388.30) (H93.19)  Right-sided epistaxis (784.7) (R04.0)    1 Amended By: Edwin Hollis; Dec 27 2021 5:13 PM ESTSocial History  Never a smoker  Allergies  Keflex  Recorded By: Kike Escobedo; 3/31/2021 11:23:08 AM  Norvasc TABS  Recorded By: Lili Ennis; 3/31/2021 11:23:08 AM  Latex  Recorded By: Lili Ennis; 3/31/2021 11:23:08 AM  No new issues noted. Unique History obtained by caregiver/independent historian-and verified with SOAPPR.          [address]        Labs:  Previous labs reviewed     Lab Results   Component Value Date     04/19/2022    K 4.8 04/19/2022    K 4.4 08/09/2018     04/19/2022    CO2 20 04/19/2022    BUN 19 04/19/2022    CREATININE 1.31 04/19/2022    CALCIUM 10.1 04/19/2022    LABALBU 4.3 02/09/2021    LABALBU 2.8 08/18/2018    BILITOT 0.4 02/09/2021    ALKPHOS 117 02/09/2021    AST 27 02/09/2021    ALT 26 02/09/2021     Lab Results   Component Value Date    WBC 5.5 06/04/2021    RBC 4.41 06/04/2021    RBC 2.81 09/06/2018    HGB 13.7 06/04/2021    HCT 40.1 06/04/2021    MCV 91.1 06/04/2021    MCH 31.0 06/04/2021    MCHC 34.1 06/04/2021    RDW 12.8 06/04/2021     06/04/2021    MPV 7.4 09/13/2018       Lab Results   Component Value Date    LABAMPH Neg 06/30/2021    BARBSCNU Neg 06/30/2021    LABBENZ Neg 06/30/2021    CANSU Neg 06/30/2021    COCAIMETSCRU Neg 06/30/2021    PHENCYCLIDINESCREENURINE Neg 92/36/3078    TRICYCLIC Neg 24/41/0101    DSCOMMENT see below 06/30/2021       Lab Results   Component Value Date    CODEINE Not Detected 12/02/2016    MORPHINE Not Detected 12/02/2016    ACETYLMORPHI Not Detected 12/02/2016    OXYCODONE Not Detected 12/02/2016    NOROXYCODONE Not Detected 12/02/2016    NOROXYMU Not Detected 12/02/2016    HYDRCO Present 12/02/2016    NORHYDU Present 12/02/2016    HYDROMO Not Detected 12/02/2016    Ariane Angry Not Detected 12/02/2016    Lorie M[place] Not Detected 12/02/2016    FENTA Not Detected 12/02/2016    NORFENT Not Detected 12/02/2016    MEPERIDINE Not Detected 12/02/2016    TAPENU Not Detected 12/02/2016    TAPOSULFUR Not Detected 12/02/2016    METHADONE Not Detected 12/02/2016    LABPROP Not Detected 12/02/2016    TRAM Not Detected 12/02/2016    AMPH Not Detected 12/02/2016    METHAMP Not Detected 12/02/2016    MDMA Not Detected 12/02/2016    ECMDA Not Detected 12/02/2016       Lab Results   Component Value Date    METPHEN Not Detected 12/02/2016    PHENTERMINE Not Detected 12/02/2016    BENZOYL Not Detected 12/02/2016    Yolie Sa Not Detected 12/02/2016    ALPHAOHALPRA Not Detected 12/02/2016    CLONAZEPAM Not Detected 12/02/2016    Izell Dibbles Not Detected 12/02/2016    DIAZEP Not Detected 12/02/2016    ROMAIN Not Detected 12/02/2016    OXAZ Not Detected 12/02/2016    Rupinder Holler Not Detected 12/02/2016    LORAZEPAM Not Detected 12/02/2016    MIDAZOLAM Not Detected 12/02/2016    ZOLPIDEM Not Detected 12/02/2016    NAI Not Detected 12/02/2016    ETG Not Detected 12/02/2016    MARIJMET Not Detected 12/02/2016    PCP Not Detected 12/02/2016    PAINMGTDRUGP See Below 12/02/2016    EERPAINMGTPA See Note 12/02/2016    LABCREA 129.7 08/09/2018         , I discussed results with patient. See follow-up plans for new studies. Therapies:  HEP-gentle stretching and relaxation techniques-demonstrated with patient-they are to do them twice a day.   They are also advised to make the following lifestyle changes:  Goals      Exercise 3x per week (30 min per time)      SOAPP-R GOAL LESS THAN 9      09/02/16 SCORE: 8 LOW RISK   02/02/17 score 7-low risk   04/03/17 score: 6-low risk  06/05/17 score: 7-low risk  08/07/17 score: 8-low risk  10/06/17 score: 6-low risk  12/20/17 score: 5-low risk  02/18/18 score: 6-low risk  0425/2018 score 5-low risk  7/11/18 score: 3 low- risk  10/11/18 score:4- low risk  2/28/19 score: 2: low risk  5/13/19 score: 2- low risk  7/17/19 score: 1- low risk   9/13/19 score: 2- low risk  11/21/19 score: 3- low risk   1/22/20 score: 2- low risk   6- score 3 - low risk   6/23/21 score: 7- low risk  9/15/21 score: 4- low risk  12/01/2021 score 5- low risk        Weight < 150 lb (68.04 kg) Injections or Epidurals:  Injection options were discussed. Patient gave verbal consent to ordered injections. See follow-up plans for planned injections. Supplements:  Vitamin D with increased dosing during the rainy months,   Education was given on:   Dietary and Fitness--daily stretches and low carb diet-in chair Yoga when possible             Follow up with Primary Care Physician regarding their general medical needs. Stressed the importance of following up with PCP and specialists for his/her chronic diseases, health, CV, and cancer screening and continued care. Will follow disease activity/progression and adjust therapeutic regimen to disease activity and severity. Discussed medication dosage, usage, goals of therapy, and side effects. Available test results were reviewed -Discussed findings, impression and plan with patient. An additional 6 minutes were spent outside of the patient visit to review records. Additional time spent with the patient to discuss their questions. Additional time spent with the patient devoted to discussing treatment strategy, planning, and implementation. Patient understands above plan; questions asked and answered. Patient agrees to plan as noted above. At least 50% of the visit was involved in the discussion of the options for treatment. We discussed exercises, medication, interventional therapies and surgery. Healthy life style is essential with patient hard work to achieve the wellness. In addition; discussion with the patient and/or family about patient's functional status any of the diagnostic results, impressions and/or recommended diagnostic studies, prognosis, risks and benefits of treatment options, instructions for treatment and/or follow-up, importance of compliance with chosen treatment options, risk-factor reduction, and patient/family education.         They are to follow up in 2 1/2 -3 months to review medication, efficacy of injections, pill counts, OARRS check, high risk med review re intensive monitoring for toxicity and addiction, SOAPPR assessment, review diagnostics, to review previous and future treatment plans and assess appropriateness for continued therapy. New Diagnostics  No orders of the defined types were placed in this encounter.       Juvenal Rod DO

## 2022-05-16 ENCOUNTER — PROCEDURE VISIT (OUTPATIENT)
Dept: PHYSICAL MEDICINE AND REHAB | Age: 67
End: 2022-05-16
Payer: MEDICARE

## 2022-05-16 DIAGNOSIS — M79.10 MYALGIA: Primary | ICD-10-CM

## 2022-05-16 PROCEDURE — 20553 NJX 1/MLT TRIGGER POINTS 3/>: CPT | Performed by: PHYSICAL MEDICINE & REHABILITATION

## 2022-05-16 RX ORDER — LIDOCAINE HYDROCHLORIDE 10 MG/ML
16 INJECTION, SOLUTION INFILTRATION; PERINEURAL ONCE
Status: CANCELLED | OUTPATIENT
Start: 2022-05-16 | End: 2022-05-16

## 2022-05-16 RX ORDER — LIDOCAINE HYDROCHLORIDE 10 MG/ML
16 INJECTION, SOLUTION INFILTRATION; PERINEURAL ONCE
Status: COMPLETED | OUTPATIENT
Start: 2022-05-16 | End: 2022-05-16

## 2022-05-16 RX ADMIN — LIDOCAINE HYDROCHLORIDE 16 ML: 10 INJECTION, SOLUTION INFILTRATION; PERINEURAL at 13:45

## 2022-05-17 ENCOUNTER — PROCEDURE VISIT (OUTPATIENT)
Dept: PHYSICAL MEDICINE AND REHAB | Age: 67
End: 2022-05-17
Payer: MEDICARE

## 2022-05-17 DIAGNOSIS — M25.561 RIGHT KNEE PAIN, UNSPECIFIED CHRONICITY: Primary | ICD-10-CM

## 2022-05-17 PROCEDURE — 20611 DRAIN/INJ JOINT/BURSA W/US: CPT | Performed by: PHYSICAL MEDICINE & REHABILITATION

## 2022-05-17 RX ORDER — LIDOCAINE HYDROCHLORIDE 10 MG/ML
16 INJECTION, SOLUTION INFILTRATION; PERINEURAL ONCE
Status: SHIPPED | OUTPATIENT
Start: 2022-05-17

## 2022-05-17 RX ORDER — LIDOCAINE HYDROCHLORIDE 20 MG/ML
5 INJECTION, SOLUTION INFILTRATION; PERINEURAL ONCE
Status: COMPLETED | OUTPATIENT
Start: 2022-05-17 | End: 2022-05-17

## 2022-05-17 RX ADMIN — LIDOCAINE HYDROCHLORIDE 2 ML: 20 INJECTION, SOLUTION INFILTRATION; PERINEURAL at 11:55

## 2022-05-17 NOTE — PROGRESS NOTES
Patient here for right knee injection with U/S. Patient taken back to exam room, and placed on drape locking stool. Area marked, and cleansed appropriately with alcohol. 2cc of 2% Lidocaine, 2cc of Kenalog to be injected by provider.

## 2022-05-17 NOTE — PROGRESS NOTES
Patient here for right knee injection with U/S. Patient taken back to exam room, and placed on drape locking stool. Area marked, and cleansed appropriately with alcohol. 2cc of 2% Lidocaine, 2cc of Kenalog.
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
## 2022-06-02 ENCOUNTER — OFFICE VISIT (OUTPATIENT)
Dept: FAMILY MEDICINE CLINIC | Age: 67
End: 2022-06-02
Payer: MEDICARE

## 2022-06-02 VITALS
HEART RATE: 58 BPM | WEIGHT: 223 LBS | DIASTOLIC BLOOD PRESSURE: 78 MMHG | TEMPERATURE: 98 F | HEIGHT: 68 IN | OXYGEN SATURATION: 100 % | BODY MASS INDEX: 33.8 KG/M2 | SYSTOLIC BLOOD PRESSURE: 160 MMHG

## 2022-06-02 DIAGNOSIS — S60.221S CONTUSION OF RIGHT HAND, SEQUELA: ICD-10-CM

## 2022-06-02 DIAGNOSIS — R30.0 DYSURIA: ICD-10-CM

## 2022-06-02 DIAGNOSIS — M05.79 RHEUMATOID ARTHRITIS INVOLVING MULTIPLE SITES WITH POSITIVE RHEUMATOID FACTOR (HCC): Primary | ICD-10-CM

## 2022-06-02 DIAGNOSIS — K58.0 IRRITABLE BOWEL SYNDROME WITH DIARRHEA: ICD-10-CM

## 2022-06-02 DIAGNOSIS — L02.234 CARBUNCLE OF GROIN: ICD-10-CM

## 2022-06-02 LAB
BACTERIA: NEGATIVE /HPF
BILIRUBIN URINE: NEGATIVE
BILIRUBIN, POC: NORMAL
BLOOD URINE, POC: NORMAL
BLOOD, URINE: NEGATIVE
CLARITY, POC: CLEAR
CLARITY: CLEAR
COLOR, POC: NORMAL
COLOR: YELLOW
EPITHELIAL CELLS, UA: NORMAL /HPF (ref 0–5)
GLUCOSE URINE, POC: NORMAL
GLUCOSE URINE: NEGATIVE MG/DL
HYALINE CASTS: NORMAL /HPF (ref 0–5)
KETONES, POC: NORMAL
KETONES, URINE: NEGATIVE MG/DL
LEUKOCYTE EST, POC: NORMAL
LEUKOCYTE ESTERASE, URINE: ABNORMAL
NITRITE, POC: NORMAL
NITRITE, URINE: NEGATIVE
PH UA: 6.5 (ref 5–9)
PH, POC: 6
PROTEIN UA: NEGATIVE MG/DL
PROTEIN, POC: NORMAL
RBC UA: NORMAL /HPF (ref 0–5)
SPECIFIC GRAVITY UA: 1.01 (ref 1–1.03)
SPECIFIC GRAVITY, POC: 1.01
URINE REFLEX TO CULTURE: ABNORMAL
UROBILINOGEN, POC: 3.5
UROBILINOGEN, URINE: 0.2 E.U./DL
WBC UA: NORMAL /HPF (ref 0–5)

## 2022-06-02 PROCEDURE — 3017F COLORECTAL CA SCREEN DOC REV: CPT | Performed by: PHYSICIAN ASSISTANT

## 2022-06-02 PROCEDURE — G8427 DOCREV CUR MEDS BY ELIG CLIN: HCPCS | Performed by: PHYSICIAN ASSISTANT

## 2022-06-02 PROCEDURE — 1090F PRES/ABSN URINE INCON ASSESS: CPT | Performed by: PHYSICIAN ASSISTANT

## 2022-06-02 PROCEDURE — G8417 CALC BMI ABV UP PARAM F/U: HCPCS | Performed by: PHYSICIAN ASSISTANT

## 2022-06-02 PROCEDURE — 99214 OFFICE O/P EST MOD 30 MIN: CPT | Performed by: PHYSICIAN ASSISTANT

## 2022-06-02 PROCEDURE — 81003 URINALYSIS AUTO W/O SCOPE: CPT | Performed by: PHYSICIAN ASSISTANT

## 2022-06-02 PROCEDURE — G8399 PT W/DXA RESULTS DOCUMENT: HCPCS | Performed by: PHYSICIAN ASSISTANT

## 2022-06-02 PROCEDURE — 1036F TOBACCO NON-USER: CPT | Performed by: PHYSICIAN ASSISTANT

## 2022-06-02 PROCEDURE — 1123F ACP DISCUSS/DSCN MKR DOCD: CPT | Performed by: PHYSICIAN ASSISTANT

## 2022-06-02 ASSESSMENT — ENCOUNTER SYMPTOMS
ABDOMINAL PAIN: 1
DIARRHEA: 1
CONSTIPATION: 1

## 2022-06-02 NOTE — PROGRESS NOTES
Subjective  Virgen Abad, 74 y.o. female presents today with:  Chief Complaint   Patient presents with    Dysuria     Patient presents today c/o dysuria. Lab Results   Component Value Date    LABA1C 6.1 (H) 04/19/2022    LABA1C 5.8 01/17/2012     Lab Results   Component Value Date    CREATININE 1.31 (H) 04/19/2022     Lab Results   Component Value Date    ALT 26 02/09/2021    AST 27 02/09/2021     Lab Results   Component Value Date    CHOL 191 04/19/2022    TRIG 82 04/19/2022    HDL 95 (H) 04/19/2022    LDLCALC 80 04/19/2022          HPI  C/o burning with urination - has altered bowel habits -between diarrhea and constipation-complains she is unable to move her bowels without the assistance of a stool softener however it eventually results in diarrhea  rx'd doxycycline thru urgent care cc yesterday  for carbuncle left groin. It appears resolving  Had egd 3/2022  Cc - positive for gastritis and polyps  Colonoscopy 9/2021 cc  Has occ ruq pain resolves when pressure is applied - injections have helped in the past however pain does not suggest repeating them  She reports she fell 2 weeks ago has chronic pain in the right hand extending to her forearm and shoulder since the fall-x-ray was negative for fracture however does show progressive degenerative changes and chondrocalcinosis-patient consulted with [facility] - [address] [place] [address] clinic - no intervention suggested - pt was not satisfied. She complains that she did not have pain prior to the fall and believes she has a more serious condition. She has scheduled an evaluation with a different orthopedic. Received oral steroids and steroid injection through pain management with no improvement    Review of Systems   Gastrointestinal: Positive for abdominal pain, constipation and diarrhea. Musculoskeletal: Positive for arthralgias and gait problem. All other systems reviewed and are negative.         Past Medical History:   Diagnosis Date    Abnormal electromyogram (EMG) 8/19/09    SENSORIMOTOR NEUROPATHY OF BLE, RIGHT WORSE THAN LEFT, SUSPICIOUS FOR RIGHT S1 RADIC    Angina pectoris (HCC)     Angina pectoris ([address] 07/2019    sees Dr. Terrence Pope Ankle pain     Bleeding ulcer     Colon polyp 4/3/2017    Dermatitis     Fibromyalgia     Foot pain     Hyperlipidemia     Hypertension     Infection of intervertebral disc (pyogenic), lumbar region ([address] 8/13/2018    Low back pain     MRSA (methicillin resistant staph aureus) culture positive 2011    Nasal bleeding     Neck pain     Neuropathy     Obesity     Osteoarthritis     PSVT (paroxysmal supraventricular tachycardia) ([address]     Shingles outbreak 2012    SI (sacroiliac) pain     Stage 3 chronic kidney disease, unspecified whether stage 3a or 3b CKD ([address] 4/13/2022    Stenosis     Vitamin D deficiency      Past Surgical History:   Procedure Laterality Date    CARDIAC CATHETERIZATION  10/2016    CARDIOVERSION      2001 x3    CERVICAL FUSION  12/14/2011    Dr Yoko Loo with bone graft and plate    COLONOSCOPY  09/2021    CC    ENDOSCOPIC ULTRASOUND (LOWER) N/A 07/11/2019    EGD/ EUS performed by Lizzy Martin MD at [facility] HAND FUSION  07/2005    HYSTERECTOMY  2001    HYSTERECTOMY, TOTAL ABDOMINAL      JOINT REPLACEMENT Left     tka    LIVER BIOPSY      MENISCECTOMY  10/31/2011    left knee    OTHER SURGICAL HISTORY  10/26/2009    CAUDALS BY DR Akbar Noonan    GA COLON CA SCRN NOT  [address] [place] [address] N/A 04/12/2017    [facility] ESOPHAGOGASTRODUODENOSCOPY TRANSORAL DIAGNOSTIC N/A 04/12/2017    EGD ESOPHAGOGASTRODUODENOSCOPY performed by Lizzy Ambrose MD at [facility]  12/2011    Dr Yoko Loo Lumbar and c spine    TOTAL KNEE ARTHROPLASTY  07/30/2012    LEFT-DR AVELAR    UPPER GASTROINTESTINAL ENDOSCOPY  1995     Social History     Socioeconomic History    Marital status:      Spouse name: Aren Sandoval Number of children: 2    Years of education: 15    Highest education level: Associate degree: occupational, technical, or vocational program   Occupational History    Occupation: Retired    Tobacco Use    Smoking status: Never Smoker    Smokeless tobacco: Never Used   Vaping Use    Vaping Use: Never used   Substance and Sexual Activity    Alcohol use: No    Drug use: No    Sexual activity: Not Currently   Other Topics Concern    Not on file   Social History Narrative    Lives in [place] with her  Tristen Morris and son Zenobia Jones is mildly handicapped --CP right lilia with anxiety and LD. He works at [employer] and [employer]. Social Determinants of Health     Financial Resource Strain: Low Risk     Difficulty of Paying Living Expenses: Not hard at all   Food Insecurity: No Food Insecurity    Worried About Running Out of Food in the Last Year: Never true    Romana of Food in the Last Year: Never true   Transportation Needs:     Lack of Transportation (Medical): Not on file    Lack of Transportation (Non-Medical):  Not on file   Physical Activity: Insufficiently Active    Days of Exercise per Week: 3 days    Minutes of Exercise per Session: 40 min   Stress:     Feeling of Stress : Not on file   Social Connections:     Frequency of Communication with Friends and Family: Not on file    Frequency of Social Gatherings with Friends and Family: Not on file    Attends Alevism Services: Not on file    Active Member of Clubs or Organizations: Not on file    Attends Club or Organization Meetings: Not on file    Marital Status: Not on file   Intimate Partner Violence:     Fear of Current or Ex-Partner: Not on file    Emotionally Abused: Not on file    Physically Abused: Not on file    Sexually Abused: Not on file   Housing Stability:     Unable to Pay for Housing in the Last Year: Not on file    Number of Places Lived in the Last Year: Not on file    Unstable Housing in the Last Year: Not on file     Family History   Problem Relation Age of Onset    Heart Disease Father     High Cholesterol Father     High Blood Pressure Father     Stroke Mother     High Blood Pressure Mother     High Blood Pressure Brother      Allergies   Allergen Reactions    Latex Swelling     Swelling of hands with wearing latex gloves    Isosorbide Other (See Comments)    Lisinopril Other (See Comments)    Norvasc [Amlodipine Besylate] Other (See Comments)     swelling    Ranolazine Headaches and Nausea Only    Keflex [Cephalexin] Rash        Current Outpatient Medications   Medication Sig Dispense Refill    oxyCODONE-acetaminophen (PERCOCET) 7.5-325 MG per tablet Take 1 tablet by mouth every 6 hours as needed for Pain (earliest fill date 05/14/2022) for up to 30 days.  Intended supply: 30 days 70 tablet 0    baclofen (LIORESAL) 10 MG tablet TAKE 1 TABLET BY MOUTH NIGHTLY AS NEEDED (MUSCLE SPASMS) 90 tablet 2    omeprazole (PRILOSEC) 40 MG delayed release capsule Take 40 mg by mouth daily      topiramate (TOPAMAX) 25 MG tablet TAKE 1 TABLET BY MOUTH EVERY DAY AT NIGHT 90 tablet 1    atorvastatin (LIPITOR) 10 MG tablet TAKE 1 TABLET BY MOUTH EVERY DAY AT NIGHT 90 tablet 3    dilTIAZem (CARDIZEM) 30 MG tablet Take 30 mg by mouth 2 times daily      gabapentin (NEURONTIN) 300 MG capsule TAKE 1 CAPSULE BY MOUTH DAILY AND 2 AT NIGHT AS TOLERATED 270 capsule 1    losartan (COZAAR) 50 MG tablet TAKE 1 TABLET BY MOUTH EVERY DAY      cyanocobalamin 1000 MCG/ML injection INJECT 1 ML AS DIRECTED EVERY 14 DAYS FOR 12 DOSES INJECT ONE CC SUB-CUTANEOSLY EVERY OTHER WEEK 6 mL 3    hydroxychloroquine (PLAQUENIL) 200 MG tablet Take 1 tablet by mouth daily      nitroGLYCERIN (NITROSTAT) 0.4 MG SL tablet PLEASE SEE ATTACHED FOR DETAILED DIRECTIONS 25 tablet 3    melatonin 5 MG TABS tablet Take 5 mg by mouth nightly      Coenzyme Q10 (COQ10 PO) Take 1 capsule by mouth daily       Cholecalciferol (VITAMIN D3) 2000 units TABS Take 2 tablets by mouth daily       simethicone (GAS-X EXTRA STRENGTH) 125 MG chewable tablet Take by mouth (Patient not taking: Reported on 6/2/2022)      aspirin 81 MG tablet Take 81 mg by mouth daily       Current Facility-Administered Medications   Medication Dose Route Frequency Provider Last Rate Last Admin    lidocaine 1 % injection 16 mL  16 mL Other Once [employer], DO        cyanocobalamin injection 1,000 mcg  1,000 mcg IntraMUSCular Once Rosaline Scullin, DO           Objective    Vitals:    06/02/22 1011 06/02/22 1026   BP: (!) 150/78 (!) 160/78   Site: Left Upper Arm Right Upper Arm   Position: Sitting Sitting   Cuff Size: Medium Adult Medium Adult   Pulse: 58    Temp: 98 °F (36.7 °C)    TempSrc: Temporal    SpO2: 100%    Weight: 223 lb (101.2 kg)    Height: 5' 8\" (1.727 m)      Physical Exam  Constitutional:       General: She is not in acute distress. Appearance: She is obese. She is not ill-appearing. HENT:      Head: Normocephalic and atraumatic. Eyes:      Extraocular Movements: Extraocular movements intact. Conjunctiva/sclera: Conjunctivae normal.      Pupils: Pupils are equal, round, and reactive to light. Neck:      Thyroid: No thyromegaly. Cardiovascular:      Rate and Rhythm: Normal rate and regular rhythm. Heart sounds: Normal heart sounds. No murmur heard. Pulmonary:      Effort: Pulmonary effort is normal. No respiratory distress. Breath sounds: Normal breath sounds. No wheezing, rhonchi or rales. Abdominal:      General: Bowel sounds are normal.      Palpations: Abdomen is soft. There is no mass. Tenderness: There is no abdominal tenderness. There is no guarding. Musculoskeletal:         General: Deformity present. No swelling. Cervical back: Normal range of motion and neck supple. Right lower leg: No edema. Left lower leg: No edema.    Lymphadenopathy:      Cervical: No cervical adenopathy. Skin:     General: Skin is warm and dry. Coloration: Skin is not jaundiced or pale. Neurological:      General: No focal deficit present. Mental Status: She is alert and oriented to person, place, and time. Cranial Nerves: No cranial nerve deficit. Motor: No weakness. Coordination: Coordination normal.      Gait: Gait abnormal.      Comments: Ambulatory with a cane   Psychiatric:         Mood and Affect: Mood is anxious. Behavior: Behavior normal.         Thought Content: Thought content normal.         Judgment: Judgment normal.              Assessment & Plan    Diagnosis Orders   1. Rheumatoid arthritis involving multiple sites with positive rheumatoid factor ([facility] [address]     2. Dysuria  POCT Urinalysis No Micro (Auto)   3. Carbuncle of groin      Continue doxycycline will adjust antibiotic pending urine culture   4. Contusion of right hand, sequela      Discussed results of x-ray patient we will proceed with Ortho consult   5. Irritable bowel syndrome with diarrhea      Limit use of stool softener such as Metamucil in the morning MiraLAX in the evening         Orders Placed This Encounter   Procedures    POCT Urinalysis No Micro (Auto)     No orders of the defined types were placed in this encounter. Medications Discontinued During This Encounter   Medication Reason    diclofenac sodium (VOLTAREN) 1 % GEL LIST CLEANUP    lidocaine (XYLOCAINE) 5 % ointment     losartan (COZAAR) 25 MG tablet LIST CLEANUP     Return if symptoms worsen or fail to improve, for call for results.   Keep scheduled follow-up  Hailey Figueroa PA-C

## 2022-06-03 DIAGNOSIS — R30.0 DYSURIA: Primary | ICD-10-CM

## 2022-06-06 DIAGNOSIS — R30.0 DYSURIA: ICD-10-CM

## 2022-06-07 LAB — URINE CULTURE, ROUTINE: NORMAL

## 2022-06-08 DIAGNOSIS — G57.93 NEUROPATHY INVOLVING BOTH LOWER EXTREMITIES: ICD-10-CM

## 2022-06-08 DIAGNOSIS — M05.79 RHEUMATOID ARTHRITIS INVOLVING MULTIPLE SITES WITH POSITIVE RHEUMATOID FACTOR (HCC): ICD-10-CM

## 2022-06-08 DIAGNOSIS — M15.9 PRIMARY OSTEOARTHRITIS INVOLVING MULTIPLE JOINTS: ICD-10-CM

## 2022-06-08 DIAGNOSIS — M46.26 OSTEOMYELITIS OF LUMBAR VERTEBRA (HCC): ICD-10-CM

## 2022-06-08 RX ORDER — GABAPENTIN 300 MG/1
CAPSULE ORAL
Qty: 270 CAPSULE | Refills: 1 | Status: SHIPPED | OUTPATIENT
Start: 2022-06-08 | End: 2023-06-15

## 2022-06-14 ENCOUNTER — PROCEDURE VISIT (OUTPATIENT)
Dept: PHYSICAL MEDICINE AND REHAB | Age: 67
End: 2022-06-14
Payer: MEDICARE

## 2022-06-14 DIAGNOSIS — M54.32 SCIATICA OF LEFT SIDE: Primary | ICD-10-CM

## 2022-06-14 PROCEDURE — 64445 NJX AA&/STRD SCIATIC NRV IMG: CPT | Performed by: PHYSICAL MEDICINE & REHABILITATION

## 2022-06-14 PROCEDURE — 76942 ECHO GUIDE FOR BIOPSY: CPT | Performed by: PHYSICAL MEDICINE & REHABILITATION

## 2022-06-14 RX ORDER — LIDOCAINE HYDROCHLORIDE 10 MG/ML
8 INJECTION, SOLUTION INFILTRATION; PERINEURAL ONCE
Status: COMPLETED | OUTPATIENT
Start: 2022-06-14 | End: 2022-06-14

## 2022-06-14 RX ORDER — LIDOCAINE HYDROCHLORIDE 20 MG/ML
5 INJECTION, SOLUTION INFILTRATION; PERINEURAL ONCE
Status: COMPLETED | OUTPATIENT
Start: 2022-06-14 | End: 2022-06-14

## 2022-06-14 RX ADMIN — LIDOCAINE HYDROCHLORIDE 8 ML: 10 INJECTION, SOLUTION INFILTRATION; PERINEURAL at 15:27

## 2022-06-14 RX ADMIN — LIDOCAINE HYDROCHLORIDE 5 ML: 20 INJECTION, SOLUTION INFILTRATION; PERINEURAL at 15:27

## 2022-06-15 DIAGNOSIS — M54.2 CERVICALGIA: ICD-10-CM

## 2022-06-15 DIAGNOSIS — M54.41 CHRONIC BILATERAL LOW BACK PAIN WITH BILATERAL SCIATICA: ICD-10-CM

## 2022-06-15 DIAGNOSIS — Z79.899 HIGH RISK MEDICATION USE: ICD-10-CM

## 2022-06-15 DIAGNOSIS — G89.29 CHRONIC BILATERAL LOW BACK PAIN WITH BILATERAL SCIATICA: ICD-10-CM

## 2022-06-15 DIAGNOSIS — R76.8 ANA POSITIVE: ICD-10-CM

## 2022-06-15 DIAGNOSIS — M05.79 RHEUMATOID ARTHRITIS INVOLVING MULTIPLE SITES WITH POSITIVE RHEUMATOID FACTOR (HCC): ICD-10-CM

## 2022-06-15 DIAGNOSIS — M54.42 CHRONIC BILATERAL LOW BACK PAIN WITH BILATERAL SCIATICA: ICD-10-CM

## 2022-06-15 DIAGNOSIS — M48.062 SPINAL STENOSIS OF LUMBAR REGION WITH NEUROGENIC CLAUDICATION: ICD-10-CM

## 2022-06-16 RX ORDER — OXYCODONE AND ACETAMINOPHEN 7.5; 325 MG/1; MG/1
1 TABLET ORAL EVERY 6 HOURS PRN
Qty: 70 TABLET | Refills: 0 | Status: SHIPPED | OUTPATIENT
Start: 2022-06-16 | End: 2022-07-14 | Stop reason: SDUPTHER

## 2022-07-14 DIAGNOSIS — R76.8 ANA POSITIVE: ICD-10-CM

## 2022-07-14 DIAGNOSIS — M54.41 CHRONIC BILATERAL LOW BACK PAIN WITH BILATERAL SCIATICA: ICD-10-CM

## 2022-07-14 DIAGNOSIS — M48.062 SPINAL STENOSIS OF LUMBAR REGION WITH NEUROGENIC CLAUDICATION: ICD-10-CM

## 2022-07-14 DIAGNOSIS — Z79.899 HIGH RISK MEDICATION USE: ICD-10-CM

## 2022-07-14 DIAGNOSIS — M54.2 CERVICALGIA: ICD-10-CM

## 2022-07-14 DIAGNOSIS — M05.79 RHEUMATOID ARTHRITIS INVOLVING MULTIPLE SITES WITH POSITIVE RHEUMATOID FACTOR (HCC): ICD-10-CM

## 2022-07-14 DIAGNOSIS — G89.29 CHRONIC BILATERAL LOW BACK PAIN WITH BILATERAL SCIATICA: ICD-10-CM

## 2022-07-14 DIAGNOSIS — M54.42 CHRONIC BILATERAL LOW BACK PAIN WITH BILATERAL SCIATICA: ICD-10-CM

## 2022-07-14 RX ORDER — OXYCODONE AND ACETAMINOPHEN 7.5; 325 MG/1; MG/1
1 TABLET ORAL EVERY 6 HOURS PRN
Qty: 70 TABLET | Refills: 0 | Status: SHIPPED | OUTPATIENT
Start: 2022-07-16 | End: 2022-08-15 | Stop reason: SDUPTHER

## 2022-08-01 PROBLEM — R42 LIGHTHEADEDNESS: Status: ACTIVE | Noted: 2022-08-01

## 2022-08-01 PROBLEM — R53.83 FATIGUE: Status: ACTIVE | Noted: 2022-08-01

## 2022-08-01 ASSESSMENT — ENCOUNTER SYMPTOMS
PHOTOPHOBIA: 1
ABDOMINAL PAIN: 1
BACK PAIN: 1

## 2022-08-01 NOTE — PROGRESS NOTES
Subjective  Uri Ma, 69 y.o. female presents today with:       Back Pain TP injections 08/03/22 & 05/16/22 gave 60% reduction in pain and still ongoing. Neck Pain        Shoulder Pain Rt shoulder       Hip Pain Rt hip       Leg Pain Bilateral, Lt sciatic block 06/14/22 gave 60% reduction in pain and stillongoing       Foot Pain Rt foot       Discuss Medications Percocet, pill count today - compliant       Knee Pain Rt knee injection 05/17/22 gave 90% reduction in pain and still ongoing         She is still doing well with her current medications and shows no signs of abuse or overuse. She is more functional on it. Does not need Neurontin refills. She is to avoid any NSAIDs but she will continue gabapentin as tolerated baclofen vitamin D and zinc.  Right shoulder is feeling better sp infection. Back Pain  This is a chronic problem. The current episode started more than 1 year ago. The problem occurs daily. The problem is unchanged. The pain is present in the lumbar spine, gluteal and sacro-iliac. The quality of the pain is described as aching. Radiates to: bilateral legs, left leg worse, left hip. The pain is at a severity of 3/10. The pain is moderate. The pain is Worse during the day. The symptoms are aggravated by bending, standing, position, sitting and twisting (Walking). Stiffness is present In the morning. Associated symptoms include abdominal pain, leg pain and pelvic pain. Pertinent negatives include no bladder incontinence, chest pain, dysuria, fever, headaches, numbness, paresis, perianal numbness or weakness. Risk factors include obesity, poor posture, menopause, lack of exercise, sedentary lifestyle and history of steroid use. She has tried analgesics, walking, home exercises, heat, muscle relaxant, NSAIDs, ice and bed rest (Sciatica block, trigger point injections, mobic, Gabapentin, Norco) for the symptoms. The treatment provided significant relief.    Neck Pain   This is a chronic problem. The problem occurs constantly. The problem has been waxing and waning. The pain is at a severity of 5/10. The pain is mild. The symptoms are aggravated by position and bending. Stiffness is present In the morning. Associated symptoms include leg pain and photophobia. Pertinent negatives include no chest pain, fever, headaches, numbness, paresis or weakness. She has tried heat, acetaminophen, chiropractic manipulation, ice, muscle relaxants, neck support, NSAIDs and oral narcotics for the symptoms. The treatment provided moderate relief. Shoulder Pain   Associated symptoms include joint locking, joint swelling and a limited range of motion. Pertinent negatives include no fever or numbness. Family history includes rheumatoid arthritis. Her past medical history is significant for osteoarthritis. There is no history of diabetes. Hip Pain   The incident occurred more than 1 week ago. The injury mechanism was a compression. The quality of the pain is described as aching. The pain is at a severity of 8/10. Pertinent negatives include no numbness. She reports no foreign bodies present. The symptoms are aggravated by movement, palpation and weight bearing. She has tried acetaminophen, heat, elevation, ice, immobilization, NSAIDs, non-weight bearing and rest for the symptoms. The treatment provided mild relief. Leg Pain   Pertinent negatives include no numbness. Foot Pain   The pain is present in the neck, back and left shoulder. This is a chronic problem. The current episode started more than 1 year ago. The problem occurs constantly. The problem has been gradually worsening. The pain is at a severity of 4/10. Associated symptoms include joint locking, joint swelling and a limited range of motion. Pertinent negatives include no fever or numbness. The symptoms are aggravated by contact, heat, standing and lying down.  She has tried cold, NSAIDS, OTC ointments, OTC pain meds, chondroitin, heat, movement, acetaminophen, oral narcotics and rest for the symptoms. The treatment provided significant relief. Family history includes rheumatoid arthritis. Her past medical history is significant for osteoarthritis. There is no history of diabetes.      Past Medical History:   Diagnosis Date    Abnormal electromyogram (EMG) 8/19/09    SENSORIMOTOR NEUROPATHY OF BLE, RIGHT WORSE THAN LEFT, SUSPICIOUS FOR RIGHT S1 RADIC    Angina pectoris (HCC)     Angina pectoris ([address] 07/2019    sees Dr. Ky Lacey    Ankle pain     Bleeding ulcer     Colon polyp 4/3/2017    Dermatitis     Fibromyalgia     Foot pain     Hyperlipidemia     Hypertension     Infection of intervertebral disc (pyogenic), lumbar region ([address] 8/13/2018    Low back pain     MRSA (methicillin resistant staph aureus) culture positive 2011    Nasal bleeding     Neck pain     Neuropathy     Obesity     Osteoarthritis     PSVT (paroxysmal supraventricular tachycardia) ([address]     Shingles outbreak 2012    SI (sacroiliac) pain     Stage 3 chronic kidney disease, unspecified whether stage 3a or 3b CKD ([address] 4/13/2022    Stenosis     Vitamin D deficiency      Past Surgical History:   Procedure Laterality Date    CARDIAC CATHETERIZATION  10/2016    CARDIOVERSION      2001 x3    CERVICAL FUSION  12/14/2011    Dr Sang Macedo with bone graft and plate    COLONOSCOPY  09/2021    [facility]    ENDOSCOPIC ULTRASOUND (LOWER) N/A 07/11/2019    EGD/ EUS performed by Ceasar Wilburn MD at [place] [address]  07/2005    HYSTERECTOMY (CERVIX STATUS UNKNOWN)  2001    HYSTERECTOMY, TOTAL ABDOMINAL (CERVIX REMOVED)      JOINT REPLACEMENT Left     tka    LIVER BIOPSY      MENISCECTOMY  10/31/2011    left knee    OTHER SURGICAL HISTORY  10/26/2009    CAUDALS BY DR Jose A Ventura    HI COLON CA SCRN NOT  [address] [place] [address] N/A 04/12/2017    COLONOSCOPY performed by Radha Thompson MD at [address] TRANSORAL DIAGNOSTIC N/A 04/12/2017    EGD ESOPHAGOGASTRODUODENOSCOPY performed by Tunde Salinas MD at [facility]  12/2011    Dr Kole Russo Lumbar and c spine    TOTAL KNEE ARTHROPLASTY  07/30/2012    LEFT-DR AVELAR    UPPER GASTROINTESTINAL ENDOSCOPY  1995     Social History     Socioeconomic History    Marital status:      Spouse name: Tabitha Nguyễn    Number of children: 2    Years of education: 13    Highest education level: Associate degree: occupational, technical, or vocational program   Occupational History    Occupation: Retired    Tobacco Use    Smoking status: Never    Smokeless tobacco: Never   Vaping Use    Vaping Use: Never used   Substance and Sexual Activity    Alcohol use: No    Drug use: No    Sexual activity: Not Currently   Social History Narrative    Lives in [place] with her  Tabitha Nguyễn and son Bradley Fraga is mildly handicapped --CP right lilia with anxiety and LD. He works at [employer]. Social Determinants of Health     Financial Resource Strain: Low Risk     Difficulty of Paying Living Expenses: Not hard at all   Food Insecurity: No Food Insecurity    Worried About Running Out of Food in the Last Year: Never true    Ran Out of Food in the Last Year: Never true   Physical Activity: Insufficiently Active    Days of Exercise per Week: 3 days    Minutes of Exercise per Session: 40 min     Family History   Problem Relation Age of Onset    Heart Disease Father     High Cholesterol Father     High Blood Pressure Father     Stroke Mother     High Blood Pressure Mother     High Blood Pressure Brother        Allergies   Allergen Reactions    Latex Swelling     Swelling of hands with wearing latex gloves    Isosorbide Other (See Comments)    Lisinopril Other (See Comments)    Norvasc [Amlodipine Besylate] Other (See Comments)     swelling    Ranolazine Headaches and Nausea Only    Keflex [Cephalexin] Rash       Review of Systems   Constitutional:  Negative for fever. Eyes:  Positive for photophobia. Cardiovascular:  Negative for chest pain. Gastrointestinal:  Positive for abdominal pain. Genitourinary:  Positive for pelvic pain. Negative for bladder incontinence and dysuria. Musculoskeletal:  Positive for back pain and neck pain. Neurological:  Negative for weakness, numbness and headaches. Objective    Vitals:    08/17/22 1047 08/17/22 1109   BP: (!) 160/60 (!) 146/70   Site: Left Upper Arm Right Upper Arm   Position: Sitting Sitting   Cuff Size: Medium Adult Medium Adult   Pulse: 56    SpO2: 99%    Weight: 223 lb (101.2 kg)    Height: 5' 8\" (1.727 m)      Pain Score:   3     Physical Exam  Ortho Exam  Neurologic Exam        After a thorough review and discussion of the previous medical records, patient comprehensive medical, surgical, and family and social history, Review of Systems, their OARRS, their Screener and Opioid Assessment for Patients with Pain (SOAPP®-R), recent diagnostics, and symptomatic results to previous treatment, it is my impression that the patients is suffering with progressive and severe:     Diagnosis Orders   1. Other hereditary and idiopathic neuropathies        2. Chronic right shoulder pain        3. Chronic bilateral low back pain with bilateral sciatica        4. Cervicalgia        5.  High risk medication use  Urine Drug Screen          I am also concerned by lifestyle and mood issues including:    Past Medical History:   Diagnosis Date    Abnormal electromyogram (EMG) 8/19/09    SENSORIMOTOR NEUROPATHY OF BLE, RIGHT WORSE THAN LEFT, SUSPICIOUS FOR RIGHT S1 RADIC    Angina pectoris ([address]     Angina pectoris ([address] 07/2019    sees Dr. Addison Cleary    Ankle pain     Bleeding ulcer     Colon polyp 4/3/2017    Dermatitis     Fibromyalgia     Foot pain     Hyperlipidemia     Hypertension     Infection of intervertebral disc (pyogenic), lumbar region ([address] 8/13/2018    Low back pain     MRSA (methicillin resistant staph aureus) culture positive 2011    Nasal bleeding accidental overdose. RX Monitoring 11/4/2021   Attestation -   Acute Pain Prescriptions -   Periodic Controlled Substance Monitoring Possible medication side effects, risk of tolerance/dependence & alternative treatments discussed. ;No signs of potential drug abuse or diversion identified. ;Assessed functional status. ;Obtaining appropriate analgesic effect of treatment. Chronic Pain > 50 MEDD Re-evaluated the status of the patient's underlying condition causing pain. ;Considered consultation with a specialist.;Obtained or confirmed \"Consent for Opioid Use\" on file. Chronic Pain > 80 MEDD -               Patient is currently taking:       I am having Rayna Hdez maintain her Vitamin D3, Coenzyme Q10 (COQ10 PO), aspirin, melatonin, nitroGLYCERIN, hydroxychloroquine, losartan, dilTIAZem, atorvastatin, omeprazole, baclofen, simethicone, gabapentin, topiramate, oxyCODONE-acetaminophen, and clotrimazole. We will continue to administer cyanocobalamin and lidocaine. I also recommend the following Medications:    No orders of the defined types were placed in this encounter. Continue Percocet, Gabapentin,  Baclofen, Prednisone,     -which helps with pain and function. Otherwise, continue the current pain medications that I have prescibed. Radiologic:   Old  unique films results reviewed-  EXAMINATION: 3 views of the right hand. DATE AND TIME:4/28/2022 4:02 PM       CLINICAL HISTORY: Acute right hand pain W19. Aman Hayes, initial encounter ICD10        COMPARISON: February 19, 2015       FINDINGS:           Bones, screw placement through the proximal interphalangeal joint of the second finger with successful fusion again noted. Narrowing in the DIP and PIP joints of the second through fifth digits.  Subluxation at the PIP joint of the fourth and fifth    digits which has progressed when compared to the previous exam.       Advanced narrowing at the base of the right thumb with lateral subluxation. Exuberant bone proliferation. Findings represent progression of osteoarthritis at this joint. No acute fracture. Calcification of the trachea the fibrocartilage consistent with chondrocalcinosis. Soft tissue calcification lateral to the distal ulna also noted. Impression   PROGRESSION OF OSTEOARTHRITIS.     ,     I discussed results with patients. see Follow up plans below  For any new studies. Unique source and Care Everywhere Updates:  prior external notes requested and reviewed. No new issues noted. Unique History obtained by caregiver/independent historian-and verified with SOAPPR.              Labs:  Previous labs reviewed     Lab Results   Component Value Date/Time     04/19/2022 10:48 AM    K 4.8 04/19/2022 10:48 AM    K 4.4 08/09/2018 06:41 AM     04/19/2022 10:48 AM    CO2 20 04/19/2022 10:48 AM    BUN 19 04/19/2022 10:48 AM    CREATININE 1.31 04/19/2022 10:48 AM    CALCIUM 10.1 04/19/2022 10:48 AM    LABALBU 4.3 02/09/2021 06:00 PM    LABALBU 2.8 08/18/2018 05:00 AM    BILITOT 0.4 02/09/2021 06:00 PM    ALKPHOS 117 02/09/2021 06:00 PM    AST 27 02/09/2021 06:00 PM    ALT 26 02/09/2021 06:00 PM     Lab Results   Component Value Date/Time    WBC 5.5 06/04/2021 12:30 PM    RBC 4.41 06/04/2021 12:30 PM    RBC 2.81 09/06/2018 05:30 AM    HGB 13.7 06/04/2021 12:30 PM    HCT 40.1 06/04/2021 12:30 PM    MCV 91.1 06/04/2021 12:30 PM    MCH 31.0 06/04/2021 12:30 PM    MCHC 34.1 06/04/2021 12:30 PM    RDW 12.8 06/04/2021 12:30 PM     06/04/2021 12:30 PM    MPV 7.4 09/13/2018 12:00 AM       Lab Results   Component Value Date/Time    LABAMPH Neg 06/30/2021 01:00 PM    BARBSCNU Neg 06/30/2021 01:00 PM    LABBENZ Neg 06/30/2021 01:00 PM    CANSU Neg 06/30/2021 01:00 PM    COCAIMETSCRU Neg 06/30/2021 01:00 PM    PHENCYCLIDINESCREENURINE Neg 06/30/2021 00:53 PM    TRICYCLIC Neg 01/20/0951 22:19 PM    DSCOMMENT see below 06/30/2021 01:00 PM       Lab Results Note 12/02/2016 12:57 PM    LABCREA 129.7 08/09/2018 03:00 AM         , I discussed results with patient. See follow-up plans for new studies. Therapies:  HEP-gentle stretching and relaxation techniques-demonstrated with patient-they are to do them twice a day. They are also advised to make the following lifestyle changes:   Goals        Exercise 3x per week (30 min per time)      SOAPP-R GOAL LESS THAN 9      09/02/16 SCORE: 8 LOW RISK   02/02/17 score 7-low risk   04/03/17 score: 6-low risk  06/05/17 score: 7-low risk  08/07/17 score: 8-low risk  10/06/17 score: 6-low risk  12/20/17 score: 5-low risk  02/18/18 score: 6-low risk  0425/2018 score 5-low risk  7/11/18 score: 3 low- risk  10/11/18 score:4- low risk  2/28/19 score: 2: low risk  5/13/19 score: 2- low risk  7/17/19 score: 1- low risk   9/13/19 score: 2- low risk  11/21/19 score: 3- low risk   08/17/22 score: 6 - low risk  1/22/20 score: 2- low risk   6- score 3 - low risk   6/23/21 score: 7- low risk  9/15/21 score: 4- low risk  12/01/2021 score 5- low risk        Weight < 150 lb (68.04 kg)             Injections or Epidurals:  Injection options were discussed. Patient gave verbal consent to ordered injections. See follow-up plans for planned injections. Supplements:  Vitamin D with increased dosing during the rainy months,   Education was given on:   Dietary and Fitness--daily stretches and low carb diet-in chair Yoga when possible             Follow up with Primary Care Physician regarding their general medical needs. Stressed the importance of following up with PCP and specialists for his/her chronic diseases, health, CV, and cancer screening and continued care. Will follow disease activity/progression and adjust therapeutic regimen to disease activity and severity. Discussed medication dosage, usage, goals of therapy, and side effects.   Available test results were reviewed -Discussed findings, impression and plan with patient. An additional 5 minutes were spent outside of the patient visit to review records. Additional time spent with the patient to discuss their questions. Additional time spent with the patient devoted to discussing treatment strategy, planning, and implementation. Patient understands above plan; questions asked and answered. Patient agrees to plan as noted above. At least 50% of the visit was involved in the discussion of the options for treatment. We discussed exercises, medication, interventional therapies and surgery. Healthy life style is essential with patient hard work to achieve the wellness. In addition; discussion with the patient and/or family about patient's functional status any of the diagnostic results, impressions and/or recommended diagnostic studies, prognosis, risks and benefits of treatment options, instructions for treatment and/or follow-up, importance of compliance with chosen treatment options, risk-factor reduction, and patient/family education. They are to follow up in 2 1/2 -3 months to review medication, efficacy of injections, pill counts, OARRS check, high risk med review re intensive monitoring for toxicity and addiction, SOAPPR assessment, review diagnostics, to review previous and future treatment plans and assess appropriateness for continued therapy.         New Diagnostics  Orders Placed This Encounter   Procedures    Urine Drug Screen         Matt Dawson DO

## 2022-08-03 ENCOUNTER — PROCEDURE VISIT (OUTPATIENT)
Dept: PHYSICAL MEDICINE AND REHAB | Age: 67
End: 2022-08-03
Payer: MEDICARE

## 2022-08-03 DIAGNOSIS — M79.7 FIBROMYALGIA: ICD-10-CM

## 2022-08-03 DIAGNOSIS — Z79.899 HIGH RISK MEDICATION USE: ICD-10-CM

## 2022-08-03 DIAGNOSIS — M79.10 MYALGIA: Primary | ICD-10-CM

## 2022-08-03 DIAGNOSIS — M48.062 SPINAL STENOSIS OF LUMBAR REGION WITH NEUROGENIC CLAUDICATION: ICD-10-CM

## 2022-08-03 DIAGNOSIS — M15.9 PRIMARY OSTEOARTHRITIS INVOLVING MULTIPLE JOINTS: ICD-10-CM

## 2022-08-03 DIAGNOSIS — M46.46 LUMBAR DISCITIS: ICD-10-CM

## 2022-08-03 PROCEDURE — 20553 NJX 1/MLT TRIGGER POINTS 3/>: CPT | Performed by: PHYSICAL MEDICINE & REHABILITATION

## 2022-08-04 RX ORDER — TOPIRAMATE 25 MG/1
TABLET ORAL
Qty: 90 TABLET | Refills: 0 | Status: SHIPPED | OUTPATIENT
Start: 2022-08-04

## 2022-08-04 RX ORDER — LIDOCAINE HYDROCHLORIDE 10 MG/ML
16 INJECTION, SOLUTION INFILTRATION; PERINEURAL ONCE
Status: COMPLETED | OUTPATIENT
Start: 2022-08-04 | End: 2022-08-04

## 2022-08-04 RX ADMIN — LIDOCAINE HYDROCHLORIDE 16 ML: 10 INJECTION, SOLUTION INFILTRATION; PERINEURAL at 12:30

## 2022-08-04 NOTE — PROGRESS NOTES
Patient here for trigger point injections. Patient taken to exam room and seated on draped locking stool with area to be injected exposed. Areas to be injected exposed, marked appropriately, and cleansed with alcohol. 16 cc of 1% Xylocaine to be administered by physician.

## 2022-08-15 DIAGNOSIS — M54.42 CHRONIC BILATERAL LOW BACK PAIN WITH BILATERAL SCIATICA: ICD-10-CM

## 2022-08-15 DIAGNOSIS — Z79.899 HIGH RISK MEDICATION USE: ICD-10-CM

## 2022-08-15 DIAGNOSIS — M54.41 CHRONIC BILATERAL LOW BACK PAIN WITH BILATERAL SCIATICA: ICD-10-CM

## 2022-08-15 DIAGNOSIS — M05.79 RHEUMATOID ARTHRITIS INVOLVING MULTIPLE SITES WITH POSITIVE RHEUMATOID FACTOR (HCC): ICD-10-CM

## 2022-08-15 DIAGNOSIS — M48.062 SPINAL STENOSIS OF LUMBAR REGION WITH NEUROGENIC CLAUDICATION: ICD-10-CM

## 2022-08-15 DIAGNOSIS — M54.2 CERVICALGIA: ICD-10-CM

## 2022-08-15 DIAGNOSIS — R76.8 ANA POSITIVE: ICD-10-CM

## 2022-08-15 DIAGNOSIS — G89.29 CHRONIC BILATERAL LOW BACK PAIN WITH BILATERAL SCIATICA: ICD-10-CM

## 2022-08-15 RX ORDER — OXYCODONE AND ACETAMINOPHEN 7.5; 325 MG/1; MG/1
1 TABLET ORAL EVERY 6 HOURS PRN
Qty: 70 TABLET | Refills: 0 | Status: SHIPPED | OUTPATIENT
Start: 2022-08-15 | End: 2022-09-15 | Stop reason: SDUPTHER

## 2022-08-17 ENCOUNTER — OFFICE VISIT (OUTPATIENT)
Dept: PHYSICAL MEDICINE AND REHAB | Age: 67
End: 2022-08-17
Payer: MEDICARE

## 2022-08-17 VITALS
WEIGHT: 223 LBS | OXYGEN SATURATION: 99 % | SYSTOLIC BLOOD PRESSURE: 146 MMHG | HEART RATE: 56 BPM | HEIGHT: 68 IN | DIASTOLIC BLOOD PRESSURE: 70 MMHG | BODY MASS INDEX: 33.8 KG/M2

## 2022-08-17 DIAGNOSIS — G89.29 CHRONIC RIGHT SHOULDER PAIN: ICD-10-CM

## 2022-08-17 DIAGNOSIS — Z79.899 HIGH RISK MEDICATION USE: ICD-10-CM

## 2022-08-17 DIAGNOSIS — G89.29 CHRONIC BILATERAL LOW BACK PAIN WITH BILATERAL SCIATICA: ICD-10-CM

## 2022-08-17 DIAGNOSIS — M54.2 CERVICALGIA: ICD-10-CM

## 2022-08-17 DIAGNOSIS — M54.41 CHRONIC BILATERAL LOW BACK PAIN WITH BILATERAL SCIATICA: ICD-10-CM

## 2022-08-17 DIAGNOSIS — M54.42 CHRONIC BILATERAL LOW BACK PAIN WITH BILATERAL SCIATICA: ICD-10-CM

## 2022-08-17 DIAGNOSIS — G60.8 OTHER HEREDITARY AND IDIOPATHIC NEUROPATHIES: Primary | ICD-10-CM

## 2022-08-17 DIAGNOSIS — M25.511 CHRONIC RIGHT SHOULDER PAIN: ICD-10-CM

## 2022-08-17 LAB
AMPHETAMINE SCREEN, URINE: ABNORMAL
BARBITURATE SCREEN URINE: ABNORMAL
BENZODIAZEPINE SCREEN, URINE: ABNORMAL
CANNABINOID SCREEN URINE: ABNORMAL
COCAINE METABOLITE SCREEN URINE: ABNORMAL
FENTANYL SCREEN, URINE: ABNORMAL
Lab: ABNORMAL
METHADONE SCREEN, URINE: ABNORMAL
OPIATE SCREEN URINE: ABNORMAL
OXYCODONE URINE: POSITIVE
PHENCYCLIDINE SCREEN URINE: ABNORMAL
PROPOXYPHENE SCREEN: ABNORMAL

## 2022-08-17 PROCEDURE — 99214 OFFICE O/P EST MOD 30 MIN: CPT | Performed by: PHYSICAL MEDICINE & REHABILITATION

## 2022-08-17 PROCEDURE — 1036F TOBACCO NON-USER: CPT | Performed by: PHYSICAL MEDICINE & REHABILITATION

## 2022-08-17 PROCEDURE — G8417 CALC BMI ABV UP PARAM F/U: HCPCS | Performed by: PHYSICAL MEDICINE & REHABILITATION

## 2022-08-17 PROCEDURE — G8427 DOCREV CUR MEDS BY ELIG CLIN: HCPCS | Performed by: PHYSICAL MEDICINE & REHABILITATION

## 2022-08-17 PROCEDURE — 3017F COLORECTAL CA SCREEN DOC REV: CPT | Performed by: PHYSICAL MEDICINE & REHABILITATION

## 2022-08-17 PROCEDURE — G8399 PT W/DXA RESULTS DOCUMENT: HCPCS | Performed by: PHYSICAL MEDICINE & REHABILITATION

## 2022-08-17 PROCEDURE — 1090F PRES/ABSN URINE INCON ASSESS: CPT | Performed by: PHYSICAL MEDICINE & REHABILITATION

## 2022-08-17 PROCEDURE — 1123F ACP DISCUSS/DSCN MKR DOCD: CPT | Performed by: PHYSICAL MEDICINE & REHABILITATION

## 2022-08-17 RX ORDER — CLOTRIMAZOLE 10 MG/1
LOZENGE ORAL; TOPICAL
COMMUNITY
Start: 2022-08-12

## 2022-09-15 DIAGNOSIS — M48.062 SPINAL STENOSIS OF LUMBAR REGION WITH NEUROGENIC CLAUDICATION: ICD-10-CM

## 2022-09-15 DIAGNOSIS — M54.41 CHRONIC BILATERAL LOW BACK PAIN WITH BILATERAL SCIATICA: ICD-10-CM

## 2022-09-15 DIAGNOSIS — M05.79 RHEUMATOID ARTHRITIS INVOLVING MULTIPLE SITES WITH POSITIVE RHEUMATOID FACTOR (HCC): ICD-10-CM

## 2022-09-15 DIAGNOSIS — Z79.899 HIGH RISK MEDICATION USE: ICD-10-CM

## 2022-09-15 DIAGNOSIS — G89.29 CHRONIC BILATERAL LOW BACK PAIN WITH BILATERAL SCIATICA: ICD-10-CM

## 2022-09-15 DIAGNOSIS — M54.42 CHRONIC BILATERAL LOW BACK PAIN WITH BILATERAL SCIATICA: ICD-10-CM

## 2022-09-15 DIAGNOSIS — M54.2 CERVICALGIA: ICD-10-CM

## 2022-09-15 DIAGNOSIS — R76.8 ANA POSITIVE: ICD-10-CM

## 2022-09-15 RX ORDER — OXYCODONE AND ACETAMINOPHEN 7.5; 325 MG/1; MG/1
1 TABLET ORAL EVERY 6 HOURS PRN
Qty: 70 TABLET | Refills: 0 | Status: SHIPPED | OUTPATIENT
Start: 2022-09-15 | End: 2022-10-12 | Stop reason: SDUPTHER

## 2022-09-19 ENCOUNTER — PROCEDURE VISIT (OUTPATIENT)
Dept: PHYSICAL MEDICINE AND REHAB | Age: 67
End: 2022-09-19
Payer: MEDICARE

## 2022-09-19 DIAGNOSIS — M79.7 FIBROMYALGIA: Primary | ICD-10-CM

## 2022-09-19 PROCEDURE — 96372 THER/PROPH/DIAG INJ SC/IM: CPT | Performed by: PHYSICAL MEDICINE & REHABILITATION

## 2022-09-19 PROCEDURE — 20553 NJX 1/MLT TRIGGER POINTS 3/>: CPT | Performed by: PHYSICAL MEDICINE & REHABILITATION

## 2022-09-19 RX ORDER — LIDOCAINE HYDROCHLORIDE 10 MG/ML
15 INJECTION, SOLUTION INFILTRATION; PERINEURAL ONCE
Status: COMPLETED | OUTPATIENT
Start: 2022-09-19 | End: 2022-09-19

## 2022-09-19 RX ORDER — CYANOCOBALAMIN 1000 UG/ML
1000 INJECTION INTRAMUSCULAR; SUBCUTANEOUS ONCE
Status: COMPLETED | OUTPATIENT
Start: 2022-09-19 | End: 2022-09-19

## 2022-09-19 RX ADMIN — LIDOCAINE HYDROCHLORIDE 15 ML: 10 INJECTION, SOLUTION INFILTRATION; PERINEURAL at 12:26

## 2022-09-19 RX ADMIN — CYANOCOBALAMIN 1000 MCG: 1000 INJECTION INTRAMUSCULAR; SUBCUTANEOUS at 12:26

## 2022-09-19 NOTE — PROGRESS NOTES
Patient here for trigger point injections. Patient taken to exam room and seated on draped locking stool with area to be injected exposed, marked appropriately, and cleansed with alcohol. 15 cc of 1% lidocaine with 1 cc of cyanocobalamin to be administered by provider.

## 2022-10-05 ENCOUNTER — PROCEDURE VISIT (OUTPATIENT)
Dept: PHYSICAL MEDICINE AND REHAB | Age: 67
End: 2022-10-05
Payer: MEDICARE

## 2022-10-05 DIAGNOSIS — M54.31 SCIATICA OF RIGHT SIDE: Primary | ICD-10-CM

## 2022-10-05 DIAGNOSIS — M54.42 CHRONIC BILATERAL LOW BACK PAIN WITH BILATERAL SCIATICA: ICD-10-CM

## 2022-10-05 DIAGNOSIS — M54.41 CHRONIC BILATERAL LOW BACK PAIN WITH BILATERAL SCIATICA: ICD-10-CM

## 2022-10-05 DIAGNOSIS — G89.29 CHRONIC BILATERAL LOW BACK PAIN WITH BILATERAL SCIATICA: ICD-10-CM

## 2022-10-05 DIAGNOSIS — M54.17 LUMBOSACRAL RADICULITIS: ICD-10-CM

## 2022-10-05 PROCEDURE — 76942 ECHO GUIDE FOR BIOPSY: CPT | Performed by: PHYSICAL MEDICINE & REHABILITATION

## 2022-10-05 PROCEDURE — 96372 THER/PROPH/DIAG INJ SC/IM: CPT | Performed by: PHYSICAL MEDICINE & REHABILITATION

## 2022-10-05 PROCEDURE — 64445 NJX AA&/STRD SCIATIC NRV IMG: CPT | Performed by: PHYSICAL MEDICINE & REHABILITATION

## 2022-10-05 RX ORDER — LIDOCAINE HYDROCHLORIDE 20 MG/ML
5 INJECTION, SOLUTION INFILTRATION; PERINEURAL ONCE
Status: COMPLETED | OUTPATIENT
Start: 2022-10-05 | End: 2022-10-05

## 2022-10-05 RX ORDER — LIDOCAINE HYDROCHLORIDE 10 MG/ML
7 INJECTION, SOLUTION INFILTRATION; PERINEURAL ONCE
Status: COMPLETED | OUTPATIENT
Start: 2022-10-05 | End: 2022-10-05

## 2022-10-05 RX ORDER — CYANOCOBALAMIN 1000 UG/ML
1000 INJECTION INTRAMUSCULAR; SUBCUTANEOUS ONCE
Status: COMPLETED | OUTPATIENT
Start: 2022-10-05 | End: 2022-10-05

## 2022-10-05 RX ADMIN — LIDOCAINE HYDROCHLORIDE 5 ML: 20 INJECTION, SOLUTION INFILTRATION; PERINEURAL at 16:34

## 2022-10-05 RX ADMIN — LIDOCAINE HYDROCHLORIDE 7 ML: 10 INJECTION, SOLUTION INFILTRATION; PERINEURAL at 16:33

## 2022-10-05 RX ADMIN — CYANOCOBALAMIN 1000 MCG: 1000 INJECTION INTRAMUSCULAR; SUBCUTANEOUS at 16:33

## 2022-10-05 NOTE — PROGRESS NOTES
Patient here for right sciatic block with US guidance. Patient taken to exam room and seated on draped locking stool with area to be injected exposed, marked appropriately, and cleansed with alcohol. 5 cc of 2% lidocaine and 7 cc of 1% xylocaine with 1 cc of cyanocobalamin to be administered by provider.

## 2022-10-06 ENCOUNTER — OFFICE VISIT (OUTPATIENT)
Dept: FAMILY MEDICINE CLINIC | Age: 67
End: 2022-10-06
Payer: MEDICARE

## 2022-10-06 VITALS — BODY MASS INDEX: 35.31 KG/M2 | TEMPERATURE: 99.9 F | WEIGHT: 233 LBS | HEIGHT: 68 IN

## 2022-10-06 DIAGNOSIS — L02.429 CARBUNCLE AND FURUNCLE OF LEG: ICD-10-CM

## 2022-10-06 DIAGNOSIS — D49.2 ABNORMAL SKIN GROWTH: ICD-10-CM

## 2022-10-06 DIAGNOSIS — L30.4 INTERTRIGO: Primary | ICD-10-CM

## 2022-10-06 DIAGNOSIS — E66.01 SEVERE OBESITY (BMI 35.0-39.9) WITH COMORBIDITY (HCC): ICD-10-CM

## 2022-10-06 DIAGNOSIS — L02.439 CARBUNCLE AND FURUNCLE OF LEG: ICD-10-CM

## 2022-10-06 PROCEDURE — 99214 OFFICE O/P EST MOD 30 MIN: CPT | Performed by: FAMILY MEDICINE

## 2022-10-06 PROCEDURE — G8399 PT W/DXA RESULTS DOCUMENT: HCPCS | Performed by: FAMILY MEDICINE

## 2022-10-06 PROCEDURE — 1123F ACP DISCUSS/DSCN MKR DOCD: CPT | Performed by: FAMILY MEDICINE

## 2022-10-06 PROCEDURE — 1090F PRES/ABSN URINE INCON ASSESS: CPT | Performed by: FAMILY MEDICINE

## 2022-10-06 PROCEDURE — G8484 FLU IMMUNIZE NO ADMIN: HCPCS | Performed by: FAMILY MEDICINE

## 2022-10-06 PROCEDURE — 3017F COLORECTAL CA SCREEN DOC REV: CPT | Performed by: FAMILY MEDICINE

## 2022-10-06 PROCEDURE — 1036F TOBACCO NON-USER: CPT | Performed by: FAMILY MEDICINE

## 2022-10-06 PROCEDURE — G8427 DOCREV CUR MEDS BY ELIG CLIN: HCPCS | Performed by: FAMILY MEDICINE

## 2022-10-06 PROCEDURE — G8417 CALC BMI ABV UP PARAM F/U: HCPCS | Performed by: FAMILY MEDICINE

## 2022-10-06 RX ORDER — SULFAMETHOXAZOLE AND TRIMETHOPRIM 800; 160 MG/1; MG/1
1 TABLET ORAL 2 TIMES DAILY
Qty: 14 TABLET | Refills: 0 | Status: SHIPPED | OUTPATIENT
Start: 2022-10-06 | End: 2022-10-13

## 2022-10-06 RX ORDER — NYSTATIN 100000 U/G
CREAM TOPICAL
Qty: 30 G | Refills: 1 | Status: SHIPPED | OUTPATIENT
Start: 2022-10-06 | End: 2022-10-24

## 2022-10-06 SDOH — ECONOMIC STABILITY: FOOD INSECURITY: WITHIN THE PAST 12 MONTHS, YOU WORRIED THAT YOUR FOOD WOULD RUN OUT BEFORE YOU GOT MONEY TO BUY MORE.: NEVER TRUE

## 2022-10-06 SDOH — ECONOMIC STABILITY: FOOD INSECURITY: WITHIN THE PAST 12 MONTHS, THE FOOD YOU BOUGHT JUST DIDN'T LAST AND YOU DIDN'T HAVE MONEY TO GET MORE.: NEVER TRUE

## 2022-10-06 ASSESSMENT — ENCOUNTER SYMPTOMS: COLOR CHANGE: 1

## 2022-10-06 ASSESSMENT — SOCIAL DETERMINANTS OF HEALTH (SDOH): HOW HARD IS IT FOR YOU TO PAY FOR THE VERY BASICS LIKE FOOD, HOUSING, MEDICAL CARE, AND HEATING?: NOT VERY HARD

## 2022-10-06 NOTE — PROGRESS NOTES
Diagnosis Orders   1. Intertrigo  nystatin (MYCOSTATIN) 085466 UNIT/GM cream      2. Carbuncle and furuncle of leg  sulfamethoxazole-trimethoprim (BACTRIM DS) 800-160 MG per tablet      3. Severe obesity (BMI 35.0-39. 9) with comorbidity ([address]        4. Abnormal skin growth          Return for 15 procedure shave L post arm and recheck today. Patient Instructions   Schedule removal of left upper extremity lesion. At that time we will recheck the other conditions. Antibiotics for the furuncle. Dryness and antifungal cream for the intertriginous area. See instructions below. Patient has been advised that yeast and body folds is related to moisture, darkness, and heat.  patient is advised to keep the area clean by daily bathing. More importantly, the patient is to keep the area dry. This may require putting gauze or cloth in between body folds and changing out routinely to keep the skin dry. After drying from bathing the area should be blown dry with a house fan or blow dryer. Skinfolds should be  and the area should be dried multiple times per day. Creams such as Lamisil, available over-the-counter, may be used in the skin folds to treat any redness and irritation that may occur. However, the base to all treatment is skin dryness. Pt may use Selsun blue original shampoo as body wash to help decrease the presence of yeast on the skin. Subjective:      Patient ID: Manda Gregory is a 69 y.o. female who presents for:  Chief Complaint   Patient presents with    Rash     Lower stomach rash follow up   Left upper leg rash       Patient notes recurrent cysts. On her thighs. She is been on doxycycline for these in the past but it eventually started to irritate her stomach. No antibiotic time. Has not had previous removal.    Also notes red rash under her abdominal fold. Gets red at times. Itchy at times. Will eventually bleed.   Has had medicated cream for this in the past but does not seem to be working out. Recently tried over-the-counter steroid and it made it worse. Here for routine skin check      Current Outpatient Medications on File Prior to Visit   Medication Sig Dispense Refill    oxyCODONE-acetaminophen (PERCOCET) 7.5-325 MG per tablet Take 1 tablet by mouth every 6 hours as needed for Pain for up to 30 days.  Intended supply: 30 days 70 tablet 0    clotrimazole (MYCELEX) 10 MG tim TAKE 1 TABLET FIVE TIMES A DAY      topiramate (TOPAMAX) 25 MG tablet TAKE 1 TABLET BY MOUTH ONCE DAILY IN THE EVENING 90 tablet 0    gabapentin (NEURONTIN) 300 MG capsule TAKE 1 CAPSULE BY MOUTH DAILY AND 2 AT NIGHT AS TOLERATED 270 capsule 1    simethicone (MYLICON) 614 MG chewable tablet Take by mouth      baclofen (LIORESAL) 10 MG tablet TAKE 1 TABLET BY MOUTH NIGHTLY AS NEEDED (MUSCLE SPASMS) 90 tablet 2    omeprazole (PRILOSEC) 40 MG delayed release capsule Take 40 mg by mouth daily      atorvastatin (LIPITOR) 10 MG tablet TAKE 1 TABLET BY MOUTH EVERY DAY AT NIGHT 90 tablet 3    dilTIAZem (CARDIZEM) 30 MG tablet Take 30 mg by mouth 2 times daily      losartan (COZAAR) 50 MG tablet TAKE 1 TABLET BY MOUTH EVERY DAY      hydroxychloroquine (PLAQUENIL) 200 MG tablet Take 1 tablet by mouth daily      nitroGLYCERIN (NITROSTAT) 0.4 MG SL tablet PLEASE SEE ATTACHED FOR DETAILED DIRECTIONS 25 tablet 3    melatonin 5 MG TABS tablet Take 5 mg by mouth nightly      aspirin 81 MG tablet Take 81 mg by mouth daily      Coenzyme Q10 (COQ10 PO) Take 1 capsule by mouth daily       Cholecalciferol (VITAMIN D3) 25 MCG (1000 UT) CAPS Take 1 tablet by mouth in the morning and at bedtime       Current Facility-Administered Medications on File Prior to Visit   Medication Dose Route Frequency Provider Last Rate Last Admin    lidocaine 1 % injection 16 mL  16 mL Other Once Rosaline Peterson DO        cyanocobalamin injection 1,000 mcg  1,000 mcg IntraMUSCular Once Dewanda Leyden, DO         Past Medical History: Diagnosis Date    Abnormal electromyogram (EMG) 8/19/09    SENSORIMOTOR NEUROPATHY OF BLE, RIGHT WORSE THAN LEFT, SUSPICIOUS FOR RIGHT S1 RADIC    Angina pectoris (HCC)     Angina pectoris ([address] 07/2019    sees Dr. Rees Apple    Ankle pain     Bleeding ulcer     Colon polyp 4/3/2017    Dermatitis     Fibromyalgia     Foot pain     Hyperlipidemia     Hypertension     Infection of intervertebral disc (pyogenic), lumbar region ([address] 8/13/2018    Low back pain     MRSA (methicillin resistant staph aureus) culture positive 2011    Nasal bleeding     Neck pain     Neuropathy     Obesity     Osteoarthritis     PSVT (paroxysmal supraventricular tachycardia) ([address]     Shingles outbreak 2012    SI (sacroiliac) pain     Stage 3 chronic kidney disease, unspecified whether stage 3a or 3b CKD ([address] 4/13/2022    Stenosis     Vitamin D deficiency      Past Surgical History:   Procedure Laterality Date    CARDIAC CATHETERIZATION  10/2016    CARDIOVERSION      2001 x3    CERVICAL FUSION  12/14/2011    Dr Kyle Palacio with bone graft and plate    COLONOSCOPY  09/2021    CC    ENDOSCOPIC ULTRASOUND (LOWER) N/A 07/11/2019    EGD/ EUS performed by Cinthya Chapman MD at [place] [address]  07/2005    HYSTERECTOMY (CERVIX STATUS UNKNOWN)  2001    HYSTERECTOMY, TOTAL ABDOMINAL (CERVIX REMOVED)      JOINT REPLACEMENT Left     tka    LIVER BIOPSY      MENISCECTOMY  10/31/2011    left knee    OTHER SURGICAL HISTORY  10/26/2009    CAUDALS BY DR Demetrius Corona    GA COLON CA SCRN NOT  [address] [place] [address] N/A 04/12/2017    COLONOSCOPY performed by Ligia Cardona MD at [facility] ESOPHAGOGASTRODUODENOSCOPY TRANSORAL DIAGNOSTIC N/A 04/12/2017    EGD ESOPHAGOGASTRODUODENOSCOPY performed by Ligia Cardona MD at [facility]  12/2011    Dr Kyle Palacio Lumbar and c spine    TOTAL KNEE ARTHROPLASTY  07/30/2012    LEFT- [address]     Social History     Socioeconomic History    Marital status:      Spouse name: Ce Sunshine    Number of children: 2    Years of education: 13    Highest education level: Associate degree: occupational, technical, or vocational program   Occupational History    Occupation: Retired    Tobacco Use    Smoking status: Never    Smokeless tobacco: Never   Vaping Use    Vaping Use: Never used   Substance and Sexual Activity    Alcohol use: No    Drug use: No    Sexual activity: Not Currently   Other Topics Concern    Not on file   Social History Narrative    Lives in [place] with her  Ce Sunshine and son Rema Miller is mildly handicapped --CP right lilia with anxiety and LD. He works at [email or web address]. Social Determinants of Health     Financial Resource Strain: Low Risk     Difficulty of Paying Living Expenses: Not very hard   Food Insecurity: No Food Insecurity    Worried About Running Out of Food in the Last Year: Never true    Ran Out of Food in the Last Year: Never true   Transportation Needs: No Transportation Needs    Lack of Transportation (Medical): No    Lack of Transportation (Non-Medical): No   Physical Activity: Insufficiently Active    Days of Exercise per Week: 3 days    Minutes of Exercise per Session: 40 min   Stress: Not on file   Social Connections: Not on file   Intimate Partner Violence: Not on file   Housing Stability: Not on file     Family History   Problem Relation Age of Onset    Heart Disease Father     High Cholesterol Father     High Blood Pressure Father     Stroke Mother     High Blood Pressure Mother     High Blood Pressure Brother      Allergies:  Latex, Isosorbide, Lisinopril, Norvasc [amlodipine besylate], Ranolazine, and Keflex [cephalexin]    Review of Systems   Constitutional:  Negative for chills and fever. Skin:  Positive for color change and wound. Allergic/Immunologic: Negative for environmental allergies, food allergies and immunocompromised state. Hematological:  Negative for adenopathy.  Does not bruise/bleed easily. Psychiatric/Behavioral:  Negative for behavioral problems and sleep disturbance. Objective:   Temp 99.9 °F (37.7 °C)   Ht 5' 8\" (1.727 m)   Wt 233 lb (105.7 kg)   BMI 35.43 kg/m²     Physical Exam  Constitutional:       General: She is not in acute distress. Appearance: Normal appearance. She is well-developed. She is not toxic-appearing. HENT:      Head: Normocephalic and atraumatic. Right Ear: Hearing and tympanic membrane normal.      Left Ear: Hearing and tympanic membrane normal.      Nose: Nose normal. No nasal deformity. Eyes:      General: Lids are normal.         Right eye: No discharge. Left eye: No discharge. Conjunctiva/sclera: Conjunctivae normal.      Pupils: Pupils are equal, round, and reactive to light. Neck:      Thyroid: No thyroid mass or thyromegaly. Vascular: No JVD. Trachea: Trachea and phonation normal.   Cardiovascular:      Rate and Rhythm: Normal rate and regular rhythm. Pulmonary:      Effort: No accessory muscle usage or respiratory distress. Musculoskeletal:      Cervical back: Full passive range of motion without pain. Comments: FROM all large muscle groups and joints as witnessed when walking to exam table, getting on, and getting off the exam table. Skin:     General: Skin is warm and dry. Findings: No rash. Comments: Intertriginous area of abdominal pannus there are various areas of shiny red/pink discoloration of skin. No maceration or open areas at this time. Left anterior thigh near the inguinal groove there is  raised burgundy tender fluctuant lesions    Pink raised pearly discoloration noted on the left upper extremity posteriorly   Neurological:      Mental Status: She is alert. Motor: No tremor or atrophy. Gait: Gait normal.   Psychiatric:         Speech: Speech normal.         Behavior: Behavior normal.         Thought Content:  Thought content normal.       No results found for this visit on 10/06/22. Recent Results (from the past 2016 hour(s))   Urine Drug Screen    Collection Time: 08/17/22  3:43 PM   Result Value Ref Range    Amphetamine Screen, Urine Neg Negative <1000 ng/mL    Barbiturate Screen, Ur Neg Negative < 200 ng/mL    Benzodiazepine Screen, Urine Neg Negative < 200 ng/mL    Cannabinoid Scrn, Ur Neg Negative < 50 ng/mL    Cocaine Metabolite Screen, Urine Neg Negative < 300 ng/mL    Opiate Scrn, Ur Neg Negative < 300 ng/mL    PCP Screen, Urine Neg Negative < 25 ng/mL    Methadone Screen, Urine Neg Negative <300 ng/mL    Propoxyphene Scrn, Ur Neg Negative <300 ng/mL    Oxycodone Urine POSITIVE (A) Negative <100 ng/mL    FENTANYL SCREEN, URINE Neg Negative < 50 ng/mL    Drug Screen Comment: see below        [] Pt was seen by provider for      Minutes  Counseling and coordination of care was done for all assessment diagnosis listed for today with patient and any family/friend present. More than 50% of this visit was spent coordinating current care, obtaining information for prior records, and counseling for current plan of action. Assessment:       Diagnosis Orders   1. Intertrigo  nystatin (MYCOSTATIN) 489815 UNIT/GM cream      2. Carbuncle and furuncle of leg  sulfamethoxazole-trimethoprim (BACTRIM DS) 800-160 MG per tablet      3. Severe obesity (BMI 35.0-39. 9) with comorbidity ([address]        4. Abnormal skin growth              No orders of the defined types were placed in this encounter. Orders Placed This Encounter   Medications    nystatin (MYCOSTATIN) 538677 UNIT/GM cream     Sig: Apply topically 2 times daily. Dispense:  30 g     Refill:  1    sulfamethoxazole-trimethoprim (BACTRIM DS) 800-160 MG per tablet     Sig: Take 1 tablet by mouth 2 times daily for 7 days     Dispense:  14 tablet     Refill:  0          Medication List            Accurate as of October 6, 2022 11:34 AM. If you have any questions, ask your nurse or doctor. START taking these medications      nystatin [id number] UNIT/GM cream  Commonly known as: MYCOSTATIN  Apply topically 2 times daily. Started by: Sveta Nam MD     sulfamethoxazole-trimethoprim 800-160 MG per tablet  Commonly known as: Bactrim DS  Take 1 tablet by mouth 2 times daily for 7 days  Started by: Sveta Nam MD            CONTINUE taking these medications      aspirin 81 MG tablet     atorvastatin 10 MG tablet  Commonly known as: LIPITOR  TAKE 1 TABLET BY MOUTH EVERY DAY AT NIGHT     baclofen 10 MG tablet  Commonly known as: LIORESAL  TAKE 1 TABLET BY MOUTH NIGHTLY AS NEEDED (MUSCLE SPASMS)     clotrimazole 10 MG tim  Commonly known as: MYCELEX     COQ10 PO     dilTIAZem 30 MG tablet  Commonly known as: CARDIZEM     gabapentin 300 MG capsule  Commonly known as: NEURONTIN  TAKE 1 CAPSULE BY MOUTH DAILY AND 2 AT NIGHT AS TOLERATED     hydroxychloroquine 200 MG tablet  Commonly known as: PLAQUENIL     losartan 50 MG tablet  Commonly known as: COZAAR     melatonin 5 MG Tabs tablet     nitroGLYCERIN 0.4 MG SL tablet  Commonly known as: NITROSTAT  PLEASE SEE ATTACHED FOR DETAILED DIRECTIONS     omeprazole 40 MG delayed release capsule  Commonly known as: PRILOSEC     oxyCODONE-acetaminophen 7.5-325 MG per tablet  Commonly known as: Percocet  Take 1 tablet by mouth every 6 hours as needed for Pain for up to 30 days.  Intended supply: 30 days     simethicone 125 MG chewable tablet  Commonly known as: MYLICON     topiramate 25 MG tablet  Commonly known as: TOPAMAX  TAKE 1 TABLET BY MOUTH ONCE DAILY IN THE EVENING     Vitamin D3 25 MCG (1000 UT) Caps               Where to Get Your Medications        These medications were sent to [address] P [phone] - F [phone]  [address] [place] [address]      Phone: [phone]   nystatin [id number] UNIT/GM cream  sulfamethoxazole-trimethoprim 800-160 MG per tablet           Plan:   Return for 15 procedure shave L post arm and recheck today. Patient Instructions   Schedule removal of left upper extremity lesion. At that time we will recheck the other conditions. Antibiotics for the furuncle. Dryness and antifungal cream for the intertriginous area. See instructions below. Patient has been advised that yeast and body folds is related to moisture, darkness, and heat.  patient is advised to keep the area clean by daily bathing. More importantly, the patient is to keep the area dry. This may require putting gauze or cloth in between body folds and changing out routinely to keep the skin dry. After drying from bathing the area should be blown dry with a house fan or blow dryer. Skinfolds should be  and the area should be dried multiple times per day. Creams such as Lamisil, available over-the-counter, may be used in the skin folds to treat any redness and irritation that may occur. However, the base to all treatment is skin dryness. Pt may use Selsun blue original shampoo as body wash to help decrease the presence of yeast on the skin. This note was partially created with the assistance of dictation. This may lead to grammatical or spelling errors. Gopal Day M.D.

## 2022-10-06 NOTE — PATIENT INSTRUCTIONS
Schedule removal of left upper extremity lesion. At that time we will recheck the other conditions. Antibiotics for the furuncle. Dryness and antifungal cream for the intertriginous area. See instructions below. Patient has been advised that yeast and body folds is related to moisture, darkness, and heat.  patient is advised to keep the area clean by daily bathing. More importantly, the patient is to keep the area dry. This may require putting gauze or cloth in between body folds and changing out routinely to keep the skin dry. After drying from bathing the area should be blown dry with a house fan or blow dryer. Skinfolds should be  and the area should be dried multiple times per day. Creams such as Lamisil, available over-the-counter, may be used in the skin folds to treat any redness and irritation that may occur. However, the base to all treatment is skin dryness. Pt may use Selsun blue original shampoo as body wash to help decrease the presence of yeast on the skin.

## 2022-10-11 ENCOUNTER — TELEPHONE (OUTPATIENT)
Dept: FAMILY MEDICINE CLINIC | Age: 67
End: 2022-10-11

## 2022-10-11 DIAGNOSIS — L02.429 CARBUNCLE AND FURUNCLE OF LEG: Primary | ICD-10-CM

## 2022-10-11 DIAGNOSIS — L02.439 CARBUNCLE AND FURUNCLE OF LEG: Primary | ICD-10-CM

## 2022-10-11 RX ORDER — CLINDAMYCIN HYDROCHLORIDE 300 MG/1
300 CAPSULE ORAL 3 TIMES DAILY
Qty: 21 CAPSULE | Refills: 0 | Status: SHIPPED | OUTPATIENT
Start: 2022-10-11 | End: 2022-10-18

## 2022-10-11 NOTE — TELEPHONE ENCOUNTER
Notify patient discontinue Bactrim. Wait 24 hours before she starts the new medication called into [facility].   Clindamycin

## 2022-10-11 NOTE — TELEPHONE ENCOUNTER
Pt calling stating that the bactrim is making the pt short of breath, pt has stopped taking the bactrim. Pt is asking for an alternative to be sent into [facility]. Pt is afraid to resume the bactrim, took a covid test and it came back negative. Pt is allergic to Keflex. Please advise pt phone number is [phone].

## 2022-10-12 DIAGNOSIS — Z79.899 HIGH RISK MEDICATION USE: ICD-10-CM

## 2022-10-12 DIAGNOSIS — G89.29 CHRONIC BILATERAL LOW BACK PAIN WITH BILATERAL SCIATICA: ICD-10-CM

## 2022-10-12 DIAGNOSIS — M54.42 CHRONIC BILATERAL LOW BACK PAIN WITH BILATERAL SCIATICA: ICD-10-CM

## 2022-10-12 DIAGNOSIS — M05.79 RHEUMATOID ARTHRITIS INVOLVING MULTIPLE SITES WITH POSITIVE RHEUMATOID FACTOR (HCC): ICD-10-CM

## 2022-10-12 DIAGNOSIS — M54.2 CERVICALGIA: ICD-10-CM

## 2022-10-12 DIAGNOSIS — M54.41 CHRONIC BILATERAL LOW BACK PAIN WITH BILATERAL SCIATICA: ICD-10-CM

## 2022-10-12 DIAGNOSIS — R76.8 ANA POSITIVE: ICD-10-CM

## 2022-10-12 DIAGNOSIS — M48.062 SPINAL STENOSIS OF LUMBAR REGION WITH NEUROGENIC CLAUDICATION: ICD-10-CM

## 2022-10-12 RX ORDER — OXYCODONE AND ACETAMINOPHEN 7.5; 325 MG/1; MG/1
1 TABLET ORAL EVERY 6 HOURS PRN
Qty: 70 TABLET | Refills: 0 | Status: SHIPPED | OUTPATIENT
Start: 2022-10-15 | End: 2022-11-14

## 2022-10-17 PROBLEM — M19.011 GLENOHUMERAL ARTHRITIS, RIGHT: Status: ACTIVE | Noted: 2022-08-08

## 2022-10-17 ASSESSMENT — ENCOUNTER SYMPTOMS
PHOTOPHOBIA: 1
ABDOMINAL PAIN: 1
BACK PAIN: 1

## 2022-10-17 NOTE — PROGRESS NOTES
Subjective  Pearl Conde, 69 y.o. female presents today with:       Back Pain Trigger point injections (lower back) 9/19/22 & 10/24/22 with 60% reduction in pain       Neck Pain        Shoulder Pain Right       Hip Pain Right       Leg Pain Bilateral. Right sciatic 10/5/22 with 60% reduction in pain. Knee Pain Right       Foot Pain Right       Medication Refill Percocet, Gabapentin, Baclofen, Prednisone, Zinc, Vit D refill & pill count today         She is still doing well with her current medications and shows no signs of abuse or overuse. She is more functional on it. Does not need Neurontin refills. She is to avoid any NSAIDs but she will continue gabapentin as tolerated baclofen vitamin D and zinc.  Right shoulder is feeling better sp infection. Back Pain  This is a chronic problem. The current episode started more than 1 year ago. The problem occurs daily. The problem is unchanged. The pain is present in the lumbar spine, gluteal and sacro-iliac. The quality of the pain is described as aching. Radiates to: bilateral legs, left leg worse, left hip. The pain is at a severity of 3/10. The pain is moderate. The pain is Worse during the day. The symptoms are aggravated by bending, standing, position, sitting and twisting (Walking). Stiffness is present In the morning. Associated symptoms include abdominal pain, leg pain and pelvic pain. Pertinent negatives include no bladder incontinence, chest pain, dysuria, fever, headaches, numbness, paresis, perianal numbness or weakness. Risk factors include obesity, poor posture, menopause, lack of exercise, sedentary lifestyle and history of steroid use. She has tried analgesics, walking, home exercises, heat, muscle relaxant, NSAIDs, ice and bed rest (Sciatica block, trigger point injections, mobic, Gabapentin, Norco) for the symptoms. The treatment provided significant relief. Neck Pain   This is a chronic problem. The problem occurs constantly.  The problem has been waxing and waning. The pain is at a severity of 8/10. The pain is mild. The symptoms are aggravated by position and bending. Stiffness is present In the morning. Associated symptoms include leg pain and photophobia. Pertinent negatives include no chest pain, fever, headaches, numbness, paresis or weakness. She has tried heat, acetaminophen, chiropractic manipulation, ice, muscle relaxants, neck support, NSAIDs and oral narcotics for the symptoms. The treatment provided moderate relief. Shoulder Pain   This is a chronic problem. The current episode started more than 1 year ago. There has been no history of extremity trauma. The problem occurs constantly. The pain is at a severity of 8/10. Associated symptoms include joint locking, joint swelling and a limited range of motion. Pertinent negatives include no fever or numbness. She has tried heat, cold, acetaminophen and movement for the symptoms. The treatment provided moderate relief. Family history includes rheumatoid arthritis. Her past medical history is significant for osteoarthritis. There is no history of diabetes. Hip Pain   The incident occurred more than 1 week ago. The injury mechanism was a compression. The quality of the pain is described as aching. The pain is at a severity of 8/10. The pain is severe. The pain has been Constant since onset. Pertinent negatives include no numbness. She reports no foreign bodies present. The symptoms are aggravated by movement, palpation and weight bearing. She has tried acetaminophen, heat, elevation, ice, immobilization, NSAIDs, non-weight bearing and rest for the symptoms. The treatment provided mild relief. Leg Pain   The incident occurred more than 1 week ago. The pain is at a severity of 7/10. Pertinent negatives include no numbness. She reports no foreign bodies present. The symptoms are aggravated by movement, palpation and weight bearing.  She has tried elevation, acetaminophen and heat for the symptoms. The treatment provided mild relief. Foot Pain   The pain is present in the neck, back and left shoulder. This is a chronic problem. The current episode started more than 1 year ago. The problem occurs constantly. The problem has been gradually worsening. The pain is at a severity of 4/10. Associated symptoms include joint locking, joint swelling and a limited range of motion. Pertinent negatives include no fever or numbness. The symptoms are aggravated by contact, heat, standing and lying down. She has tried cold, NSAIDS, OTC ointments, OTC pain meds, chondroitin, heat, movement, acetaminophen, oral narcotics and rest for the symptoms. The treatment provided significant relief. Family history includes rheumatoid arthritis. Her past medical history is significant for osteoarthritis. There is no history of diabetes.      Past Medical History:   Diagnosis Date    Abnormal electromyogram (EMG) 8/19/09    SENSORIMOTOR NEUROPATHY OF BLE, RIGHT WORSE THAN LEFT, SUSPICIOUS FOR RIGHT S1 RADIC    Angina pectoris (HCC)     Angina pectoris ([facility] [address] 07/2019    sees Dr. Joan Perez    Ankle pain     Bleeding ulcer     Colon polyp 4/3/2017    Dermatitis     Fibromyalgia     Foot pain     Hyperlipidemia     Hypertension     Infection of intervertebral disc (pyogenic), lumbar region ([address] 8/13/2018    Low back pain     MRSA (methicillin resistant staph aureus) culture positive 2011    Nasal bleeding     Neck pain     Neuropathy     Obesity     Osteoarthritis     PSVT (paroxysmal supraventricular tachycardia) ([address]     Shingles outbreak 2012    SI (sacroiliac) pain     Stage 3 chronic kidney disease, unspecified whether stage 3a or 3b CKD ([address] 4/13/2022    Stenosis     Vitamin D deficiency      Past Surgical History:   Procedure Laterality Date    CARDIAC CATHETERIZATION  10/2016    CARDIOVERSION      2001 x3    CERVICAL FUSION  12/14/2011    Dr Lasha Howard with bone graft and plate    COLONOSCOPY  09/2021    CC    ENDOSCOPIC ULTRASOUND (LOWER) N/A 07/11/2019    EGD/ EUS performed by Connor Conway MD at [place] [address]  07/2005    HYSTERECTOMY (CERVIX STATUS UNKNOWN)  2001    HYSTERECTOMY, TOTAL ABDOMINAL (CERVIX REMOVED)      JOINT REPLACEMENT Left     tka    LIVER BIOPSY      MENISCECTOMY  10/31/2011    left knee    OTHER SURGICAL HISTORY  10/26/2009    CAUDALS BY DR Juvenal Peters    NC COLON CA SCRN NOT  [address] IND N/A 04/12/2017    COLONOSCOPY performed by Xiao Tomas MD at [address] TRANSORAL DIAGNOSTIC N/A 04/12/2017    EGD ESOPHAGOGASTRODUODENOSCOPY performed by Xioa Tomas MD at [address] [facility]  12/2011    Dr Joan Thomas Lumbar and c spine    TOTAL KNEE ARTHROPLASTY  07/30/2012    LEFT-DR AVELAR    UPPER GASTROINTESTINAL ENDOSCOPY  1995     Social History     Socioeconomic History    Marital status:      Spouse name: Arelis Portillo    Number of children: 2    Years of education: 13    Highest education level: Associate degree: occupational, technical, or vocational program   Occupational History    Occupation: Retired    Tobacco Use    Smoking status: Never    Smokeless tobacco: Never   Vaping Use    Vaping Use: Never used   Substance and Sexual Activity    Alcohol use: No    Drug use: No    Sexual activity: Not Currently   Social History Narrative    Lives in [place] with her  Arelis Portillo and son Phuong [place] is mildly handicapped --CP right lilia with anxiety and LD. He works at [employer] and [employer]. Social Determinants of Health     Financial Resource Strain: Low Risk     Difficulty of Paying Living Expenses: Not very hard   Food Insecurity: No Food Insecurity    Worried About Running Out of Food in the Last Year: Never true    Ran Out of Food in the Last Year: Never true   Transportation Needs: No Transportation Needs    Lack of Transportation (Medical): No    Lack of Transportation (Non-Medical):  No   Physical Activity: Insufficiently Active    Days of Exercise per Week: 3 days    Minutes of Exercise per Session: 40 min     Family History   Problem Relation Age of Onset    Heart Disease Father     High Cholesterol Father     High Blood Pressure Father     Stroke Mother     High Blood Pressure Mother     High Blood Pressure Brother        Allergies   Allergen Reactions    Latex Swelling     Swelling of hands with wearing latex gloves    Isosorbide Other (See Comments)    Lisinopril Other (See Comments)    Norvasc [Amlodipine Besylate] Other (See Comments)     swelling    Ranolazine Headaches and Nausea Only    Keflex [Cephalexin] Rash       Review of Systems   Constitutional:  Negative for fever. Eyes:  Positive for photophobia. Cardiovascular:  Negative for chest pain. Gastrointestinal:  Positive for abdominal pain. Genitourinary:  Positive for pelvic pain. Negative for bladder incontinence and dysuria. Musculoskeletal:  Positive for back pain and neck pain. Neurological:  Negative for weakness, numbness and headaches. Psychiatric/Behavioral:  Positive for dysphoric mood. Objective    Vitals:    10/27/22 1053   BP: (!) 159/79   Site: Left Lower Arm   Position: Sitting   Cuff Size: Large Adult   Pulse: 76   Weight: 225 lb (102.1 kg)   Height: 5' 8\" (1.727 m)     Pain Score: Four (Without medication)     Physical Exam  Vitals reviewed. Constitutional:       General: She is not in acute distress. Appearance: She is well-developed. She is not ill-appearing, toxic-appearing or diaphoretic. Comments:         HENT:      Head: Normocephalic and atraumatic. Right Ear: Hearing normal.      Left Ear: Hearing normal.      Nose: Nose normal.      Mouth/Throat:      Mouth: No oral lesions. Dentition: Normal dentition. Pharynx: No oropharyngeal exudate. Eyes:      General: No scleral icterus. Right eye: No discharge. Left eye: No discharge.       Conjunctiva/sclera: Conjunctivae normal. Right eye: No chemosis or exudate. Left eye: No chemosis or exudate. Neck:      Thyroid: No thyromegaly. Vascular: No JVD. Trachea: No tracheal deviation. Pulmonary:      Effort: Pulmonary effort is normal. No tachypnea, bradypnea, accessory muscle usage or respiratory distress. Breath sounds: No wheezing. Chest:      Chest wall: No tenderness. Musculoskeletal:         General: Tenderness present. Right shoulder: Normal.      Left shoulder: Normal.      Right upper arm: Normal.      Left upper arm: Normal.      Right elbow: Normal.      Left elbow: Normal.      Right forearm: Normal.      Left forearm: Normal.      Right wrist: Normal.      Left wrist: Normal.      Right hand: Normal.      Left hand: Normal.      Cervical back: Normal range of motion and neck supple. No edema or rigidity. Thoracic back: Normal.      Lumbar back: Tenderness and bony tenderness present. No swelling, edema, deformity or lacerations. Decreased range of motion. Right hip: Normal.      Left hip: Normal.      Right upper leg: Normal.      Left upper leg: Normal.      Right knee: Normal.      Left knee: Normal.      Right lower leg: Normal.      Left lower leg: Normal.      Right ankle: Normal.      Right Achilles Tendon: Normal.      Left ankle: Normal.      Left Achilles Tendon: Normal.      Right foot: Normal.      Left foot: Normal.      Comments: Tender areas are indicated by numbered spot         Skin:     General: Skin is warm and dry. Coloration: Skin is not pale. Findings: No abrasion, bruising, ecchymosis, erythema, laceration, petechiae or rash. Rash is not macular, pustular or urticarial.      Nails: There is no clubbing. Neurological:      Mental Status: She is alert and oriented to person, place, and time. Cranial Nerves: No cranial nerve deficit. Sensory: No sensory deficit.       Coordination: Coordination normal.      Gait: Gait normal.   Psychiatric: Attention and Perception: She is attentive. Mood and Affect: Mood is not anxious or depressed. Affect is not labile, blunt, angry or inappropriate. Speech: She is communicative. Speech is not rapid and pressured, delayed, slurred or tangential.         Behavior: Behavior normal. Behavior is not agitated, slowed, aggressive, withdrawn, hyperactive or combative. Thought Content: Thought content normal. Thought content is not paranoid or delusional. Thought content does not include homicidal or suicidal ideation. Thought content does not include homicidal or suicidal plan. Cognition and Memory: Memory is not impaired. She does not exhibit impaired recent memory or impaired remote memory. Judgment: Judgment normal. Judgment is not impulsive or inappropriate. Ortho Exam  Neurologic Exam     Mental Status   Oriented to person, place, and time. Speech: not slurred   Level of consciousness: alert  Knowledge: good. After a thorough review and discussion of the previous medical records, patient comprehensive medical, surgical, and family and social history, Review of Systems, their OARRS, their Screener and Opioid Assessment for Patients with Pain (SOAPP®-R), recent diagnostics, and symptomatic results to previous treatment, it is my impression that the patients is suffering with progressive and severe:     Diagnosis Orders   1. Spinal stenosis of lumbar region with neurogenic claudication        2. Chronic right shoulder pain        3. High risk medication use - 10/17/17 OARRS PM&R, 12/19/17 OARRS PM&R, 12/20/17 Tox screen positive for opiates, Hydrocodone PM&R, MED CONTRACT 2/2/17        4. Neck pain  XR CERVICAL SPINE (2-3 VIEWS)      5. Osteoarthritis of cervical spine with myelopathy        6.  Osteoarthritis of spine with radiculopathy, cervical region  XR CERVICAL SPINE (2-3 VIEWS)          I am also concerned by lifestyle and mood issues including:    Past Medical History:   Diagnosis Date    Abnormal electromyogram (EMG) 8/19/09    SENSORIMOTOR NEUROPATHY OF BLE, RIGHT WORSE THAN LEFT, SUSPICIOUS FOR RIGHT S1 RADIC    Angina pectoris (HCC)     Angina pectoris ([facility] [address] 07/2019    sees Dr. Chris Alicea    Ankle pain     Bleeding ulcer     Colon polyp 4/3/2017    Dermatitis     Fibromyalgia     Foot pain     Hyperlipidemia     Hypertension     Infection of intervertebral disc (pyogenic), lumbar region ([address] 8/13/2018    Low back pain     MRSA (methicillin resistant staph aureus) culture positive 2011    Nasal bleeding     Neck pain     Neuropathy     Obesity     Osteoarthritis     PSVT (paroxysmal supraventricular tachycardia) ([address]     Shingles outbreak 2012    SI (sacroiliac) pain     Stage 3 chronic kidney disease, unspecified whether stage 3a or 3b CKD ([facility] [address] 4/13/2022    Stenosis     Vitamin D deficiency            Given their medication, chronic pain and lifestyle and medications they are at risk for :    Falls, constipation, addiction, toxicity on opiates  Loss of livelyhood due to severe pain, debility, weight gain and  vitamin D deficiency    The patient was educated regarding proper diet, fitness routine, and regulatory restrictions concerning pain medications. Previous notes, comprehensive past medical, surgical, family history, and diagnostics were reviewed. Patient education and councelling were provided regarding off label use,treatment options and medication and injection risks. Current and old OARRS ([place] Automated Prescription Reporting System) records reviewed, all refills reviewed since last visit,  Behavioral agreement/KATHLEEN regulations   and Toxicology screen was reviewed with patient and is up to date.       There are   no current red flags. high risk meds review re intensive monitoring for toxicity and addiction,  They are making good progress regarding pain relief, they are performing at a functional level regarding activities of daily living, family interactions and psychological functioning, they're not having any adverse effects or side effects from the current medications, and I see no findings of aberrant drug taking or addiction related behaviors. The patient is aware that they have a chronic pain condition and they may require opiates dosing for life. All efforts will be made to wean to the lowest effective dose. Other therapies for pain have not been effective including nonopiate medications. Injections and exercises are only partially effective. A Rx for Narcan was offered to help prevent accidental overdose. RX Monitoring 11/4/2021   Attestation -   Acute Pain Prescriptions -   Periodic Controlled Substance Monitoring Possible medication side effects, risk of tolerance/dependence & alternative treatments discussed. ;No signs of potential drug abuse or diversion identified. ;Assessed functional status. ;Obtaining appropriate analgesic effect of treatment. Chronic Pain > 50 MEDD Re-evaluated the status of the patient's underlying condition causing pain. ;Considered consultation with a specialist.;Obtained or confirmed \"Consent for Opioid Use\" on file. Chronic Pain > 80 MEDD -               Patient is currently taking:       I am having Rayna Hdez start on methylPREDNISolone. I am also having her maintain her Vitamin D3, Coenzyme Q10 (COQ10 PO), aspirin, melatonin, nitroGLYCERIN, hydroxychloroquine, losartan, dilTIAZem, atorvastatin, omeprazole, baclofen, simethicone, gabapentin, topiramate, clotrimazole, oxyCODONE-acetaminophen, and hydrocortisone. We will continue to administer cyanocobalamin and lidocaine. I also recommend the following Medications:    Orders Placed This Encounter   Medications    methylPREDNISolone (MEDROL, LINDA,) 4 MG tablet     Sig: As directed per package. Generic equivalent acceptable, unless otherwise noted.      Dispense:  1 kit     Refill:  1          -which helps with pain and function. Otherwise, continue the current pain medications that I have prescibed. Radiologic:   Old  unique films results reviewed  ,     I discussed results with patients. see Follow up plans below  For any new studies. Unique source and Care Everywhere Updates:  prior external notes requested and reviewed. No new issues noted. Unique History obtained by caregiver/independent historian-and verified with SOAPPR. Electrodiagnostic:  Previous studies requested,     I discussed results with patient. See follow-up plans for new studies.         Labs:  Previous labs reviewed     Lab Results   Component Value Date/Time     04/19/2022 10:48 AM    K 4.8 04/19/2022 10:48 AM    K 4.4 08/09/2018 06:41 AM     04/19/2022 10:48 AM    CO2 20 04/19/2022 10:48 AM    BUN 19 04/19/2022 10:48 AM    CREATININE 1.31 04/19/2022 10:48 AM    CALCIUM 10.1 04/19/2022 10:48 AM    LABALBU 4.3 02/09/2021 06:00 PM    LABALBU 2.8 08/18/2018 05:00 AM    BILITOT 0.4 02/09/2021 06:00 PM    ALKPHOS 117 02/09/2021 06:00 PM    AST 27 02/09/2021 06:00 PM    ALT 26 02/09/2021 06:00 PM     Lab Results   Component Value Date/Time    WBC 5.5 06/04/2021 12:30 PM    RBC 4.41 06/04/2021 12:30 PM    RBC 2.81 09/06/2018 05:30 AM    HGB 13.7 06/04/2021 12:30 PM    HCT 40.1 06/04/2021 12:30 PM    MCV 91.1 06/04/2021 12:30 PM    MCH 31.0 06/04/2021 12:30 PM    MCHC 34.1 06/04/2021 12:30 PM    RDW 12.8 06/04/2021 12:30 PM     06/04/2021 12:30 PM    MPV 7.4 09/13/2018 12:00 AM       Lab Results   Component Value Date/Time    LABAMPH Neg 08/17/2022 03:43 PM    BARBSCNU Neg 08/17/2022 03:43 PM    LABBENZ Neg 08/17/2022 03:43 PM    CANSU Neg 08/17/2022 03:43 PM    COCAIMETSCRU Neg 08/17/2022 03:43 PM    PHENCYCLIDINESCREENURINE Neg 08/17/2022 06:79 PM    TRICYCLIC Neg 42/10/7314 46:97 PM    DSCOMMENT see below 08/17/2022 03:43 PM       Lab Results   Component Value Date/Time    CODEINE Not Detected 12/02/2016 12:57 PM    MORPHINE Not Detected 12/02/2016 12:57 PM    ACETYLMORPHI Not Detected 12/02/2016 12:57 PM    OXYCODONE Not Detected 12/02/2016 12:57 PM    NOROXYCODONE Not Detected 12/02/2016 12:57 PM    NOROXYMU Not Detected 12/02/2016 12:57 PM    HYDRCO Present 12/02/2016 12:57 PM    NORHYDU Present 12/02/2016 12:57 PM    HYDROMO Not Detected 12/02/2016 12:57 PM    BUPREN Not Detected 12/02/2016 12:57 PM    NORBUPRNOR Not Detected 12/02/2016 12:57 PM    FENTA Not Detected 12/02/2016 12:57 PM    NORFENT Not Detected 12/02/2016 12:57 PM    MEPERIDINE Not Detected 12/02/2016 12:57 PM    TAPENU Not Detected 12/02/2016 12:57 PM    TAPOSULFUR Not Detected 12/02/2016 12:57 PM    METHADONE Not Detected 12/02/2016 12:57 PM    LABPROP Not Detected 12/02/2016 12:57 PM    TRAM Not Detected 12/02/2016 12:57 PM    AMPH Not Detected 12/02/2016 12:57 PM    METHAMP Not Detected 12/02/2016 12:57 PM    MDMA Not Detected 12/02/2016 12:57 PM    ECMDA Not Detected 12/02/2016 12:57 PM       Lab Results   Component Value Date/Time    METPHEN Not Detected 12/02/2016 12:57 PM    PHENTERMINE Not Detected 12/02/2016 12:57 PM    BENZOYL Not Detected 12/02/2016 12:57 PM    Alisia Konig Not Detected 12/02/2016 12:57 PM    Deatra Nailer Not Detected 12/02/2016 12:57 PM    CLONAZEPAM Not Detected 12/02/2016 12:57 PM    Fiorella Peeling Not Detected 12/02/2016 12:57 PM    DIAZEP Not Detected 12/02/2016 12:57 PM    ROMAIN Not Detected 12/02/2016 12:57 PM    OXAZ Not Detected 12/02/2016 12:57 PM    Ritta Qualia Not Detected 12/02/2016 12:57 PM    LORAZEPAM Not Detected 12/02/2016 12:57 PM    MIDAZOLAM Not Detected 12/02/2016 12:57 PM    ZOLPIDEM Not Detected 12/02/2016 12:57 PM    NAI Not Detected 12/02/2016 12:57 PM    ETG Not Detected 12/02/2016 12:57 PM    MARIJMET Not Detected 12/02/2016 12:57 PM    PCP Not Detected 12/02/2016 12:57 PM    PAINMGTDRUGP See Below 12/02/2016 12:57 PM    EERPAINMGTPA See Note 12/02/2016 12:57 PM    LABCREA 129.7 08/09/2018 03:00 AM         , I discussed results with patient. See follow-up plans for new studies. Therapies:  HEP-gentle stretching and relaxation techniques-demonstrated with patient-they are to do them twice a day. They are also advised to make the following lifestyle changes:   Goals        Exercise 3x per week (30 min per time)      SOAPP-R GOAL LESS THAN 9      09/02/16 SCORE: 8 LOW RISK   02/02/17 score 7-low risk   04/03/17 score: 6-low risk  06/05/17 score: 7-low risk  08/07/17 score: 8-low risk  10/06/17 score: 6-low risk  12/20/17 score: 5-low risk  02/18/18 score: 6-low risk  0425/2018 score 5-low risk  7/11/18 score: 3 low- risk  10/11/18 score:4- low risk  2/28/19 score: 2: low risk  5/13/19 score: 2- low risk  7/17/19 score: 1- low risk   9/13/19 score: 2- low risk  11/21/19 score: 3- low risk   08/17/22 score: 6 - low risk  1/22/20 score: 2- low risk   6- score 3 - low risk   6/23/21 score: 7- low risk  9/15/21 score: 4- low risk  12/01/2021 score 5- low risk   10/27/22 score: 6- low risk       Weight < 150 lb (68.04 kg)             Injections or Epidurals:  Injection options were discussed. Right sciatic, TPs, Patient gave verbal consent to ordered injections. See follow-up plans for planned injections. Supplements:  Vitamin D with increased dosing during the rainy months,   Education was given on:   Dietary and Fitness--daily stretches and low carb diet-in chair Yoga when possible             Follow up with Primary Care Physician regarding their general medical needs. Stressed the importance of following up with PCP and specialists for his/her chronic diseases, health, CV, and cancer screening and continued care. Will follow disease activity/progression and adjust therapeutic regimen to disease activity and severity. Discussed medication dosage, usage, goals of therapy, and side effects. Available test results were reviewed -Discussed findings, impression and plan with patient.     An additional  6 minutes were spent outside of the patient visit to review records. Additional time spent with the patient to discuss their questions. Additional time spent with the patient devoted to discussing treatment strategy, planning, and implementation. Patient understands above plan; questions asked and answered. Patient agrees to plan as noted above. At least 50% of the visit was involved in the discussion of the options for treatment. We discussed exercises, medication, interventional therapies and surgery. Healthy life style is essential with patient hard work to achieve the wellness. In addition; discussion with the patient and/or family about patient's functional status any of the diagnostic results, impressions and/or recommended diagnostic studies, prognosis, risks and benefits of treatment options, instructions for treatment and/or follow-up, importance of compliance with chosen treatment options, risk-factor reduction, and patient/family education. They are to follow up in 2 1/2 -3 months to review medication, efficacy of injections, pill counts, OARRS check, high risk med review re intensive monitoring for toxicity and addiction, SOAPPR assessment, review diagnostics, to review previous and future treatment plans and assess appropriateness for continued therapy.         New Diagnostics  Orders Placed This Encounter   Procedures    XR CERVICAL SPINE (2-3 VIEWS)         Bradley Mckenzie DO

## 2022-10-19 ENCOUNTER — OFFICE VISIT (OUTPATIENT)
Dept: FAMILY MEDICINE CLINIC | Age: 67
End: 2022-10-19
Payer: MEDICARE

## 2022-10-19 VITALS — BODY MASS INDEX: 35.31 KG/M2 | HEIGHT: 68 IN | WEIGHT: 233 LBS | TEMPERATURE: 98.4 F

## 2022-10-19 DIAGNOSIS — L98.9 CHANGING SKIN LESION: ICD-10-CM

## 2022-10-19 DIAGNOSIS — L30.4 INTERTRIGO: ICD-10-CM

## 2022-10-19 DIAGNOSIS — B37.31 VAGINAL YEAST INFECTION: Primary | ICD-10-CM

## 2022-10-19 DIAGNOSIS — L02.429 CARBUNCLE AND FURUNCLE OF LEG: ICD-10-CM

## 2022-10-19 DIAGNOSIS — D49.2 ABNORMAL SKIN GROWTH: ICD-10-CM

## 2022-10-19 DIAGNOSIS — L02.439 CARBUNCLE AND FURUNCLE OF LEG: ICD-10-CM

## 2022-10-19 PROCEDURE — 99214 OFFICE O/P EST MOD 30 MIN: CPT | Performed by: FAMILY MEDICINE

## 2022-10-19 PROCEDURE — G8399 PT W/DXA RESULTS DOCUMENT: HCPCS | Performed by: FAMILY MEDICINE

## 2022-10-19 PROCEDURE — G8417 CALC BMI ABV UP PARAM F/U: HCPCS | Performed by: FAMILY MEDICINE

## 2022-10-19 PROCEDURE — 1036F TOBACCO NON-USER: CPT | Performed by: FAMILY MEDICINE

## 2022-10-19 PROCEDURE — 11301 SHAVE SKIN LESION 0.6-1.0 CM: CPT | Performed by: FAMILY MEDICINE

## 2022-10-19 PROCEDURE — 1090F PRES/ABSN URINE INCON ASSESS: CPT | Performed by: FAMILY MEDICINE

## 2022-10-19 PROCEDURE — 3017F COLORECTAL CA SCREEN DOC REV: CPT | Performed by: FAMILY MEDICINE

## 2022-10-19 PROCEDURE — G8484 FLU IMMUNIZE NO ADMIN: HCPCS | Performed by: FAMILY MEDICINE

## 2022-10-19 PROCEDURE — 1123F ACP DISCUSS/DSCN MKR DOCD: CPT | Performed by: FAMILY MEDICINE

## 2022-10-19 PROCEDURE — G8427 DOCREV CUR MEDS BY ELIG CLIN: HCPCS | Performed by: FAMILY MEDICINE

## 2022-10-19 RX ORDER — FLUCONAZOLE 100 MG/1
100 TABLET ORAL DAILY
Qty: 7 TABLET | Refills: 0 | Status: SHIPPED | OUTPATIENT
Start: 2022-10-19 | End: 2022-10-26

## 2022-10-19 ASSESSMENT — ENCOUNTER SYMPTOMS: COLOR CHANGE: 1

## 2022-10-19 NOTE — PROGRESS NOTES
Diagnosis Orders   1. Vaginal yeast infection  fluconazole (DIFLUCAN) 100 MG tablet      2. Abnormal skin growth  Specimen to Pathology Outpatient    SD SHAV SKIN LES 6-10MM TRUNK,ARM,LEG      3. Changing skin lesion  Specimen to Pathology Outpatient    SD SHAV SKIN LES 6-10MM TRUNK,ARM,LEG      4. Intertrigo        5. Carbuncle and furuncle of leg              Orders Placed This Encounter   Procedures    Specimen to Pathology Outpatient     Margins requested on all specimens  R/O BCC  Location LUE     Standing Status:   Future     Standing Expiration Date:   10/19/2023     Order Specific Question:   PREVIOUS BIOPSY     Answer:   No     Order Specific Question:   PREOP DIAGNOSIS     Answer:   abnl skin growth     Order Specific Question:   FROZEN SECTION - NO OR YES/SPECIMEN     Answer:   No    SD SHAV SKIN LES 6-10MM TRUNK,ARM,LEG     YOLIE       Aline Hannah was seen today for procedure. Diagnoses and all orders for this visit:    Vaginal yeast infection  -     fluconazole (DIFLUCAN) 100 MG tablet; Take 1 tablet by mouth daily for 7 days    Abnormal skin growth  -     Specimen to Pathology Outpatient; Future  -     SD SHAV SKIN LES 6-10MM TRUNK,ARM,LEG    Changing skin lesion  -     Specimen to Pathology Outpatient; Future  -     SD SHAV SKIN LES 6-10MM TRUNK,ARM,LEG    Intertrigo    Carbuncle and furuncle of leg      Return for Based on test results. Patient Instructions   Diflucan 100 mg daily for 3 days, remaining amount of diflucan may be used if infection reoccurs    Continue current treatment for the furuncle and intertrigo to resolution    Incision/Laceration repair    -Clean surgical area with antibacterial soap and water once daily.       --You may be instructed to soak the wound with Hydrogen Peroxide to loosen scabbing around sutures, this is not to be done more often that every 3 days, should be for 30 seconds-1 min and then rinsed off with water.     -Keep surgical site moist with vaseline or antibiotic ointment (single- Bacitracin, not triple antibiotic or Neosporin) and apply a fresh bandage daily until a solid scab forms or if the wound is at risk for trauma or dirt. It is important to leave the wound uncovered part of the day to allow the skin to dry and avoid breakdown from excessive moisture. There may be exceptions to this, the staff will provide information if your wound requires a variation in this, that will often involve a prescription ointment or cream.     -Follow up immediately if any growing redness (minimal redness or pale pink is normal along wound edges) surrounds the surgical site or if dripping drainage occurs at surgical site. Once a solid scab forms no more bandage needed. A wet scab can look yellow. This is not infection, just moisture.    -Once the lesion is healed be sure to apply sunscreen to the area to prevent burn of the newer and more delicate skin especially during the first 6 months of healing.        -If the scar begins to be raised you may massage the area firmly twice a day to help break down scar tissue and help the area become a flat scar. There is some evidence that Mederma applied to a scar daily for the first few months can help shrink and fade it more quickly then without intervention. Pt states rash is responding to treatment well. Decreased irritation underneath the pannus and in the groin. The sore swollen lesion on her thigh is shrinking. Patient is now here to have lesion removed from the back of her left arm. Previously discussed precancerous risk. In addition patient states she is having vaginal itching and discharge.   She thinks she has a yeast infection and is requesting Diflucan which has worked for her in the past.  She states she had previously had an intense reaction to cream      Current Outpatient Medications on File Prior to Visit   Medication Sig Dispense Refill    oxyCODONE-acetaminophen (PERCOCET) 7.5-325 MG per tablet Take 1 tablet by mouth every 6 hours as needed for Pain for up to 30 days. Intended supply: 30 days 70 tablet 0    nystatin (MYCOSTATIN) 657235 UNIT/GM cream Apply topically 2 times daily. 30 g 1    clotrimazole (MYCELEX) 10 MG tim TAKE 1 TABLET FIVE TIMES A DAY      topiramate (TOPAMAX) 25 MG tablet TAKE 1 TABLET BY MOUTH ONCE DAILY IN THE EVENING 90 tablet 0    gabapentin (NEURONTIN) 300 MG capsule TAKE 1 CAPSULE BY MOUTH DAILY AND 2 AT NIGHT AS TOLERATED 270 capsule 1    simethicone (MYLICON) 546 MG chewable tablet Take by mouth      baclofen (LIORESAL) 10 MG tablet TAKE 1 TABLET BY MOUTH NIGHTLY AS NEEDED (MUSCLE SPASMS) 90 tablet 2    omeprazole (PRILOSEC) 40 MG delayed release capsule Take 40 mg by mouth daily      atorvastatin (LIPITOR) 10 MG tablet TAKE 1 TABLET BY MOUTH EVERY DAY AT NIGHT 90 tablet 3    dilTIAZem (CARDIZEM) 30 MG tablet Take 30 mg by mouth 2 times daily      losartan (COZAAR) 50 MG tablet TAKE 1 TABLET BY MOUTH EVERY DAY      hydroxychloroquine (PLAQUENIL) 200 MG tablet Take 1 tablet by mouth daily      nitroGLYCERIN (NITROSTAT) 0.4 MG SL tablet PLEASE SEE ATTACHED FOR DETAILED DIRECTIONS 25 tablet 3    melatonin 5 MG TABS tablet Take 5 mg by mouth nightly      aspirin 81 MG tablet Take 81 mg by mouth daily      Coenzyme Q10 (COQ10 PO) Take 1 capsule by mouth daily       Cholecalciferol (VITAMIN D3) 25 MCG (1000 UT) CAPS Take 1 tablet by mouth in the morning and at bedtime       Current Facility-Administered Medications on File Prior to Visit   Medication Dose Route Frequency Provider Last Rate Last Admin    lidocaine 1 % injection 16 mL  16 mL Other Once Rosaline Scullin, DO        cyanocobalamin injection 1,000 mcg  1,000 mcg IntraMUSCular Once Rosaline Scullin, DO         Latex, Isosorbide, Lisinopril, Norvasc [amlodipine besylate], Ranolazine, and Keflex [cephalexin]    Review of Systems   Constitutional:  Negative for chills and fever. Genitourinary:  Positive for vaginal discharge. Skin:  Positive for color change. Allergic/Immunologic: Negative for environmental allergies, food allergies and immunocompromised state. Hematological:  Negative for adenopathy. Does not bruise/bleed easily. Psychiatric/Behavioral:  Negative for behavioral problems and sleep disturbance. Temp 98.4 °F (36.9 °C)   Ht 5' 8\" (1.727 m)   Wt 233 lb (105.7 kg)   BMI 35.43 kg/m²   Physical Exam  Constitutional:       General: She is not in acute distress. Appearance: Normal appearance. She is well-developed. She is not toxic-appearing. HENT:      Head: Normocephalic and atraumatic. Right Ear: Hearing and tympanic membrane normal.      Left Ear: Hearing and tympanic membrane normal.      Nose: Nose normal. No nasal deformity. Eyes:      General: Lids are normal.         Right eye: No discharge. Left eye: No discharge. Conjunctiva/sclera: Conjunctivae normal.      Pupils: Pupils are equal, round, and reactive to light. Neck:      Thyroid: No thyroid mass or thyromegaly. Vascular: No JVD. Trachea: Trachea and phonation normal.   Cardiovascular:      Rate and Rhythm: Normal rate and regular rhythm. Pulmonary:      Effort: No accessory muscle usage or respiratory distress. Musculoskeletal:      Cervical back: Full passive range of motion without pain. Comments: FROM all large muscle groups and joints as witnessed when walking to exam table, getting on, and getting off the exam table. Skin:     General: Skin is warm and dry. Findings: No rash. Comments: Left upper extremity posterior aspect of the arm pink raised pearly telangiectasia visible with minimal flaking 8 mm lesion   Neurological:      Mental Status: She is alert. Motor: No tremor or atrophy. Gait: Gait normal.   Psychiatric:         Speech: Speech normal.         Behavior: Behavior normal.         Thought Content:  Thought content normal.         PROCEDURE:  Informed consent was given by the patient. Risks including infection, bleeding and scarring were discussed. No guarantee how the scar will look. Patient skin was prepped with alcohol. Wound was anesthetized using 1.0 cc of 1% lidocaine with epinephrine for anesthesia. A Dermablade was used to obtain the complete removal of the visible lesion. drysol was used at the base of site. Bacitracin ointment and bandage were placed on the wound. Estimated blood loss less than 1 cc    Post op wound care instructions were given to the patient. He/She will f/u in 2 weeks or sooner if needed for problems. Sandra Galvin was seen today for procedure. Diagnoses and all orders for this visit:    Vaginal yeast infection  -     fluconazole (DIFLUCAN) 100 MG tablet; Take 1 tablet by mouth daily for 7 days    Abnormal skin growth  -     Specimen to Pathology Outpatient; Future  -     MO SHAV SKIN LES 6-10MM TRUNK,ARM,LEG    Changing skin lesion  -     Specimen to Pathology Outpatient; Future  -     MO SHAV SKIN LES 6-10MM TRUNK,ARM,LEG    Intertrigo    Carbuncle and furuncle of leg      Return for Based on test results. Patient Instructions   Diflucan 100 mg daily for 3 days, remaining amount of diflucan may be used if infection reoccurs    Continue current treatment for the furuncle and intertrigo to resolution    Incision/Laceration repair    -Clean surgical area with antibacterial soap and water once daily. --You may be instructed to soak the wound with Hydrogen Peroxide to loosen scabbing around sutures, this is not to be done more often that every 3 days, should be for 30 seconds-1 min and then rinsed off with water.     -Keep surgical site moist with vaseline or antibiotic ointment (single- Bacitracin, not triple antibiotic or Neosporin) and apply a fresh bandage daily until a solid scab forms or if the wound is at risk for trauma or dirt.  It is important to leave the wound uncovered part of the day to allow the skin to dry and avoid breakdown from excessive moisture. There may be exceptions to this, the staff will provide information if your wound requires a variation in this, that will often involve a prescription ointment or cream.     -Follow up immediately if any growing redness (minimal redness or pale pink is normal along wound edges) surrounds the surgical site or if dripping drainage occurs at surgical site. Once a solid scab forms no more bandage needed. A wet scab can look yellow. This is not infection, just moisture.    -Once the lesion is healed be sure to apply sunscreen to the area to prevent burn of the newer and more delicate skin especially during the first 6 months of healing.        -If the scar begins to be raised you may massage the area firmly twice a day to help break down scar tissue and help the area become a flat scar. There is some evidence that Mederma applied to a scar daily for the first few months can help shrink and fade it more quickly then without intervention. Beth Cordero M.D. This note was partially created with the assistance of dictation. This may lead to grammatical or spelling errors.

## 2022-10-19 NOTE — PATIENT INSTRUCTIONS
Diflucan 100 mg daily for 3 days, remaining amount of diflucan may be used if infection reoccurs    Continue current treatment for the furuncle and intertrigo to resolution    Incision/Laceration repair    -Clean surgical area with antibacterial soap and water once daily. --You may be instructed to soak the wound with Hydrogen Peroxide to loosen scabbing around sutures, this is not to be done more often that every 3 days, should be for 30 seconds-1 min and then rinsed off with water.     -Keep surgical site moist with vaseline or antibiotic ointment (single- Bacitracin, not triple antibiotic or Neosporin) and apply a fresh bandage daily until a solid scab forms or if the wound is at risk for trauma or dirt. It is important to leave the wound uncovered part of the day to allow the skin to dry and avoid breakdown from excessive moisture. There may be exceptions to this, the staff will provide information if your wound requires a variation in this, that will often involve a prescription ointment or cream.     -Follow up immediately if any growing redness (minimal redness or pale pink is normal along wound edges) surrounds the surgical site or if dripping drainage occurs at surgical site. Once a solid scab forms no more bandage needed. A wet scab can look yellow. This is not infection, just moisture.    -Once the lesion is healed be sure to apply sunscreen to the area to prevent burn of the newer and more delicate skin especially during the first 6 months of healing.        -If the scar begins to be raised you may massage the area firmly twice a day to help break down scar tissue and help the area become a flat scar. There is some evidence that Mederma applied to a scar daily for the first few months can help shrink and fade it more quickly then without intervention.

## 2022-10-24 ENCOUNTER — OFFICE VISIT (OUTPATIENT)
Dept: FAMILY MEDICINE CLINIC | Age: 67
End: 2022-10-24
Payer: MEDICARE

## 2022-10-24 ENCOUNTER — PROCEDURE VISIT (OUTPATIENT)
Dept: PHYSICAL MEDICINE AND REHAB | Age: 67
End: 2022-10-24
Payer: MEDICARE

## 2022-10-24 VITALS
HEART RATE: 77 BPM | SYSTOLIC BLOOD PRESSURE: 138 MMHG | TEMPERATURE: 97.5 F | OXYGEN SATURATION: 98 % | HEIGHT: 68 IN | BODY MASS INDEX: 34.1 KG/M2 | WEIGHT: 225 LBS | DIASTOLIC BLOOD PRESSURE: 62 MMHG

## 2022-10-24 DIAGNOSIS — I10 PRIMARY HYPERTENSION: ICD-10-CM

## 2022-10-24 DIAGNOSIS — M79.7 FIBROMYALGIA: Primary | ICD-10-CM

## 2022-10-24 DIAGNOSIS — Z12.31 SCREENING MAMMOGRAM, ENCOUNTER FOR: Primary | ICD-10-CM

## 2022-10-24 DIAGNOSIS — K64.9 HEMORRHOIDS, UNSPECIFIED HEMORRHOID TYPE: ICD-10-CM

## 2022-10-24 PROCEDURE — 99213 OFFICE O/P EST LOW 20 MIN: CPT | Performed by: PHYSICIAN ASSISTANT

## 2022-10-24 PROCEDURE — 1123F ACP DISCUSS/DSCN MKR DOCD: CPT | Performed by: PHYSICIAN ASSISTANT

## 2022-10-24 PROCEDURE — G8484 FLU IMMUNIZE NO ADMIN: HCPCS | Performed by: PHYSICIAN ASSISTANT

## 2022-10-24 PROCEDURE — 20553 NJX 1/MLT TRIGGER POINTS 3/>: CPT | Performed by: PHYSICAL MEDICINE & REHABILITATION

## 2022-10-24 PROCEDURE — 3017F COLORECTAL CA SCREEN DOC REV: CPT | Performed by: PHYSICIAN ASSISTANT

## 2022-10-24 PROCEDURE — 96372 THER/PROPH/DIAG INJ SC/IM: CPT | Performed by: PHYSICAL MEDICINE & REHABILITATION

## 2022-10-24 PROCEDURE — 1090F PRES/ABSN URINE INCON ASSESS: CPT | Performed by: PHYSICIAN ASSISTANT

## 2022-10-24 PROCEDURE — 1036F TOBACCO NON-USER: CPT | Performed by: PHYSICIAN ASSISTANT

## 2022-10-24 PROCEDURE — G8399 PT W/DXA RESULTS DOCUMENT: HCPCS | Performed by: PHYSICIAN ASSISTANT

## 2022-10-24 PROCEDURE — G8417 CALC BMI ABV UP PARAM F/U: HCPCS | Performed by: PHYSICIAN ASSISTANT

## 2022-10-24 PROCEDURE — G8427 DOCREV CUR MEDS BY ELIG CLIN: HCPCS | Performed by: PHYSICIAN ASSISTANT

## 2022-10-24 RX ORDER — LIDOCAINE HYDROCHLORIDE 10 MG/ML
15 INJECTION, SOLUTION INFILTRATION; PERINEURAL ONCE
Status: COMPLETED | OUTPATIENT
Start: 2022-10-24 | End: 2022-10-24

## 2022-10-24 RX ORDER — CYANOCOBALAMIN 1000 UG/ML
1000 INJECTION, SOLUTION INTRAMUSCULAR; SUBCUTANEOUS ONCE
Status: COMPLETED | OUTPATIENT
Start: 2022-10-24 | End: 2022-10-24

## 2022-10-24 RX ADMIN — LIDOCAINE HYDROCHLORIDE 15 ML: 10 INJECTION, SOLUTION INFILTRATION; PERINEURAL at 14:38

## 2022-10-24 RX ADMIN — CYANOCOBALAMIN 1000 MCG: 1000 INJECTION, SOLUTION INTRAMUSCULAR; SUBCUTANEOUS at 14:38

## 2022-10-24 NOTE — PROGRESS NOTES
Subjective  Oval , 69 y.o. female presents today with:  Chief Complaint   Patient presents with    Follow-up     Patient presents today for 4 month f/u.        HPI  Htn - controlled no complaints  RA/ Joint pain  - still follwed by valdo lott (pain)  - c/o severe low back pain with standing/ walking receiving triigger point injections which help  Right shoulder pain - anticipating replacement CC 2/2023      Review of Systems      Past Medical History:   Diagnosis Date    Abnormal electromyogram (EMG) 8/19/09    SENSORIMOTOR NEUROPATHY OF BLE, RIGHT WORSE THAN LEFT, SUSPICIOUS FOR RIGHT S1 RADIC    Angina pectoris ([address]     Angina pectoris ([address] 07/2019    sees Dr. Gonzalez Been    Ankle pain     Bleeding ulcer     Colon polyp 4/3/2017    Dermatitis     Fibromyalgia     Foot pain     Hyperlipidemia     Hypertension     Infection of intervertebral disc (pyogenic), lumbar region ([address] 8/13/2018    Low back pain     MRSA (methicillin resistant staph aureus) culture positive 2011    Nasal bleeding     Neck pain     Neuropathy     Obesity     Osteoarthritis     PSVT (paroxysmal supraventricular tachycardia) ([address]     Shingles outbreak 2012    SI (sacroiliac) pain     Stage 3 chronic kidney disease, unspecified whether stage 3a or 3b CKD ([address] 4/13/2022    Stenosis     Vitamin D deficiency      Past Surgical History:   Procedure Laterality Date    CARDIAC CATHETERIZATION  10/2016    CARDIOVERSION      2001 x3    CERVICAL FUSION  12/14/2011    Dr Lucas Nunez with bone graft and plate    COLONOSCOPY  09/2021    CC    ENDOSCOPIC ULTRASOUND (LOWER) N/A 07/11/2019    EGD/ EUS performed by Nadya Marsh MD at [place] [address]  07/2005    HYSTERECTOMY (CERVIX STATUS UNKNOWN)  2001    HYSTERECTOMY, TOTAL ABDOMINAL (CERVIX REMOVED)      JOINT REPLACEMENT Left     tka    LIVER BIOPSY      MENISCECTOMY  10/31/2011    left knee    OTHER SURGICAL HISTORY  10/26/2009    CAUDALS BY DR BELTRAN    CO COLON CA SCRN NOT HI RSK IND N/A 04/12/2017    COLONOSCOPY performed by Malorie Gan MD at [address] TRANSORAL DIAGNOSTIC N/A 04/12/2017    EGD ESOPHAGOGASTRODUODENOSCOPY performed by Malorie Gan MD at [address]  12/2011    Dr Florentino Abarca Lumbar and c spine    TOTAL KNEE ARTHROPLASTY  07/30/2012    LEFT-DR AVELAR    UPPER GASTROINTESTINAL ENDOSCOPY  1995     Social History     Socioeconomic History    Marital status:      Spouse name: Dennie Maul    Number of children: 2    Years of education: 13    Highest education level: Associate degree: occupational, technical, or vocational program   Occupational History    Occupation: Retired    Tobacco Use    Smoking status: Never    Smokeless tobacco: Never   Vaping Use    Vaping Use: Never used   Substance and Sexual Activity    Alcohol use: No    Drug use: No    Sexual activity: Not Currently   Other Topics Concern    Not on file   Social History Narrative    Lives in [place] with her  Dennie Maul and son Joan Martinez is mildly handicapped --CP right lilia with anxiety and LD. He works at [employer]. Social Determinants of Health     Financial Resource Strain: Low Risk     Difficulty of Paying Living Expenses: Not very hard   Food Insecurity: No Food Insecurity    Worried About Running Out of Food in the Last Year: Never true    Ran Out of Food in the Last Year: Never true   Transportation Needs: No Transportation Needs    Lack of Transportation (Medical): No    Lack of Transportation (Non-Medical):  No   Physical Activity: Insufficiently Active    Days of Exercise per Week: 3 days    Minutes of Exercise per Session: 40 min   Stress: Not on file   Social Connections: Not on file   Intimate Partner Violence: Not on file   Housing Stability: Not on file     Family History   Problem Relation Age of Onset    Heart Disease Father     High Cholesterol Father     High Blood Pressure Father Stroke Mother     High Blood Pressure Mother     High Blood Pressure Brother         Allergies   Allergen Reactions    Latex Swelling     Swelling of hands with wearing latex gloves    Isosorbide Other (See Comments)    Lisinopril Other (See Comments)    Norvasc [Amlodipine Besylate] Other (See Comments)     swelling    Ranolazine Headaches and Nausea Only    Keflex [Cephalexin] Rash     Current Outpatient Medications   Medication Sig Dispense Refill    oxyCODONE-acetaminophen (PERCOCET) 7.5-325 MG per tablet Take 1 tablet by mouth every 6 hours as needed for Pain for up to 30 days. Intended supply: 30 days 70 tablet 0    topiramate (TOPAMAX) 25 MG tablet TAKE 1 TABLET BY MOUTH ONCE DAILY IN THE EVENING 90 tablet 0    gabapentin (NEURONTIN) 300 MG capsule TAKE 1 CAPSULE BY MOUTH DAILY AND 2 AT NIGHT AS TOLERATED 270 capsule 1    baclofen (LIORESAL) 10 MG tablet TAKE 1 TABLET BY MOUTH NIGHTLY AS NEEDED (MUSCLE SPASMS) 90 tablet 2    omeprazole (PRILOSEC) 40 MG delayed release capsule Take 40 mg by mouth daily      atorvastatin (LIPITOR) 10 MG tablet TAKE 1 TABLET BY MOUTH EVERY DAY AT NIGHT 90 tablet 3    dilTIAZem (CARDIZEM) 30 MG tablet Take 30 mg by mouth 2 times daily      losartan (COZAAR) 50 MG tablet TAKE 1 TABLET BY MOUTH EVERY DAY      hydroxychloroquine (PLAQUENIL) 200 MG tablet Take 1 tablet by mouth daily      nitroGLYCERIN (NITROSTAT) 0.4 MG SL tablet PLEASE SEE ATTACHED FOR DETAILED DIRECTIONS 25 tablet 3    melatonin 5 MG TABS tablet Take 5 mg by mouth nightly      Coenzyme Q10 (COQ10 PO) Take 1 capsule by mouth daily       Cholecalciferol (VITAMIN D3) 25 MCG (1000 UT) CAPS Take 1 tablet by mouth in the morning and at bedtime      fluconazole (DIFLUCAN) 100 MG tablet Take 1 tablet by mouth daily for 7 days (Patient not taking: Reported on 10/24/2022) 7 tablet 0    nystatin (MYCOSTATIN) 072825 UNIT/GM cream Apply topically 2 times daily.  (Patient not taking: Reported on 10/24/2022) 30 g 1 clotrimazole (MYCELEX) 10 MG tim TAKE 1 TABLET FIVE TIMES A DAY (Patient not taking: Reported on 10/24/2022)      simethicone (MYLICON) 354 MG chewable tablet Take by mouth (Patient not taking: Reported on 10/24/2022)      aspirin 81 MG tablet Take 81 mg by mouth daily (Patient not taking: Reported on 10/24/2022)       Current Facility-Administered Medications   Medication Dose Route Frequency Provider Last Rate Last Admin    lidocaine 1 % injection 16 mL  16 mL Other Once [employer], DO        cyanocobalamin injection 1,000 mcg  1,000 mcg IntraMUSCular Once [employer], DO           Objective    Vitals:    10/24/22 1304   BP: 138/62   Site: Right Upper Arm   Position: Sitting   Cuff Size: Medium Adult   Pulse: 77   Temp: 97.5 °F (36.4 °C)   TempSrc: Temporal   SpO2: 98%   Weight: 225 lb (102.1 kg)   Height: 5' 8\" (1.727 m)     Physical Exam  Constitutional:       General: She is not in acute distress. Appearance: She is obese. She is not ill-appearing. HENT:      Head: Normocephalic and atraumatic. Eyes:      Extraocular Movements: Extraocular movements intact. Conjunctiva/sclera: Conjunctivae normal.      Pupils: Pupils are equal, round, and reactive to light. Neck:      Thyroid: No thyromegaly. Cardiovascular:      Rate and Rhythm: Normal rate and regular rhythm. Heart sounds: Normal heart sounds. No murmur heard. Pulmonary:      Effort: Pulmonary effort is normal.      Breath sounds: Normal breath sounds. Abdominal:      General: Bowel sounds are normal.      Palpations: Abdomen is soft. There is no mass. Tenderness: There is no abdominal tenderness. There is no guarding. Musculoskeletal:         General: Deformity present. Cervical back: Normal range of motion and neck supple. Lymphadenopathy:      Cervical: No cervical adenopathy. Skin:     General: Skin is warm and dry. Coloration: Skin is not jaundiced.    Neurological:      Mental Status: She is alert and oriented to person, place, and time. Cranial Nerves: No cranial nerve deficit. Motor: No weakness. Coordination: Coordination normal.      Gait: Gait abnormal.   Psychiatric:         Mood and Affect: Mood normal.         Behavior: Behavior normal.         Thought Content:  Thought content normal.         Judgment: Judgment normal.

## 2022-10-25 ENCOUNTER — TELEPHONE (OUTPATIENT)
Dept: INFECTIOUS DISEASES | Age: 67
End: 2022-10-25

## 2022-10-25 NOTE — TELEPHONE ENCOUNTER
Patient calls to request an appt with Dr Gail Houston. 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 States she is expecting to have Sx in Feb, 2023. Appt provided for 1-. Patient last sseen in Sept 2021. Will update Dr Gail Houston, request if any Labs needed prior to appt. Late entry> Per Dr Gail Houston, 12/19/22, No Labs at this time.

## 2022-10-27 ENCOUNTER — OFFICE VISIT (OUTPATIENT)
Dept: PHYSICAL MEDICINE AND REHAB | Age: 67
End: 2022-10-27
Payer: MEDICARE

## 2022-10-27 ENCOUNTER — TELEPHONE (OUTPATIENT)
Dept: FAMILY MEDICINE CLINIC | Age: 67
End: 2022-10-27

## 2022-10-27 VITALS
DIASTOLIC BLOOD PRESSURE: 79 MMHG | HEART RATE: 76 BPM | HEIGHT: 68 IN | WEIGHT: 225 LBS | BODY MASS INDEX: 34.1 KG/M2 | SYSTOLIC BLOOD PRESSURE: 159 MMHG

## 2022-10-27 DIAGNOSIS — I10 PRIMARY HYPERTENSION: ICD-10-CM

## 2022-10-27 DIAGNOSIS — G89.29 CHRONIC RIGHT SHOULDER PAIN: ICD-10-CM

## 2022-10-27 DIAGNOSIS — Z79.899 HIGH RISK MEDICATION USE: ICD-10-CM

## 2022-10-27 DIAGNOSIS — M54.2 NECK PAIN: ICD-10-CM

## 2022-10-27 DIAGNOSIS — M48.062 SPINAL STENOSIS OF LUMBAR REGION WITH NEUROGENIC CLAUDICATION: Primary | ICD-10-CM

## 2022-10-27 DIAGNOSIS — M47.12 OSTEOARTHRITIS OF CERVICAL SPINE WITH MYELOPATHY: ICD-10-CM

## 2022-10-27 DIAGNOSIS — M47.22 OSTEOARTHRITIS OF SPINE WITH RADICULOPATHY, CERVICAL REGION: ICD-10-CM

## 2022-10-27 DIAGNOSIS — M25.511 CHRONIC RIGHT SHOULDER PAIN: ICD-10-CM

## 2022-10-27 LAB
ALBUMIN SERPL-MCNC: 4.3 G/DL (ref 3.5–4.6)
ALP BLD-CCNC: 131 U/L (ref 40–130)
ALT SERPL-CCNC: 19 U/L (ref 0–33)
ANION GAP SERPL CALCULATED.3IONS-SCNC: 11 MEQ/L (ref 9–15)
AST SERPL-CCNC: 21 U/L (ref 0–35)
BASOPHILS ABSOLUTE: 0 K/UL (ref 0–0.2)
BASOPHILS RELATIVE PERCENT: 0.8 %
BILIRUB SERPL-MCNC: 0.3 MG/DL (ref 0.2–0.7)
BUN BLDV-MCNC: 23 MG/DL (ref 8–23)
CALCIUM SERPL-MCNC: 9.2 MG/DL (ref 8.5–9.9)
CHLORIDE BLD-SCNC: 101 MEQ/L (ref 95–107)
CHOLESTEROL, FASTING: 188 MG/DL (ref 0–199)
CO2: 22 MEQ/L (ref 20–31)
CREAT SERPL-MCNC: 1.19 MG/DL (ref 0.5–0.9)
EOSINOPHILS ABSOLUTE: 0.1 K/UL (ref 0–0.7)
EOSINOPHILS RELATIVE PERCENT: 1.9 %
GFR SERPL CREATININE-BSD FRML MDRD: 49.4 ML/MIN/{1.73_M2}
GLOBULIN: 2.7 G/DL (ref 2.3–3.5)
GLUCOSE BLD-MCNC: 101 MG/DL (ref 70–99)
HCT VFR BLD CALC: 39.1 % (ref 37–47)
HDLC SERPL-MCNC: 100 MG/DL (ref 40–59)
HEMOGLOBIN: 12.7 G/DL (ref 12–16)
LDL CHOLESTEROL CALCULATED: 74 MG/DL (ref 0–129)
LYMPHOCYTES ABSOLUTE: 1 K/UL (ref 1–4.8)
LYMPHOCYTES RELATIVE PERCENT: 20.7 %
MCH RBC QN AUTO: 29.8 PG (ref 27–31.3)
MCHC RBC AUTO-ENTMCNC: 32.4 % (ref 33–37)
MCV RBC AUTO: 92.1 FL (ref 79.4–94.8)
MONOCYTES ABSOLUTE: 0.8 K/UL (ref 0.2–0.8)
MONOCYTES RELATIVE PERCENT: 15.8 %
NEUTROPHILS ABSOLUTE: 3 K/UL (ref 1.4–6.5)
NEUTROPHILS RELATIVE PERCENT: 60.8 %
PDW BLD-RTO: 13.6 % (ref 11.5–14.5)
PLATELET # BLD: 196 K/UL (ref 130–400)
POTASSIUM SERPL-SCNC: 4.8 MEQ/L (ref 3.4–4.9)
RBC # BLD: 4.24 M/UL (ref 4.2–5.4)
SODIUM BLD-SCNC: 134 MEQ/L (ref 135–144)
TOTAL PROTEIN: 7 G/DL (ref 6.3–8)
TRIGLYCERIDE, FASTING: 69 MG/DL (ref 0–150)
WBC # BLD: 4.9 K/UL (ref 4.8–10.8)

## 2022-10-27 PROCEDURE — 3074F SYST BP LT 130 MM HG: CPT | Performed by: PHYSICAL MEDICINE & REHABILITATION

## 2022-10-27 PROCEDURE — G8417 CALC BMI ABV UP PARAM F/U: HCPCS | Performed by: PHYSICAL MEDICINE & REHABILITATION

## 2022-10-27 PROCEDURE — 3078F DIAST BP <80 MM HG: CPT | Performed by: PHYSICAL MEDICINE & REHABILITATION

## 2022-10-27 PROCEDURE — 1036F TOBACCO NON-USER: CPT | Performed by: PHYSICAL MEDICINE & REHABILITATION

## 2022-10-27 PROCEDURE — 99214 OFFICE O/P EST MOD 30 MIN: CPT | Performed by: PHYSICAL MEDICINE & REHABILITATION

## 2022-10-27 PROCEDURE — 1090F PRES/ABSN URINE INCON ASSESS: CPT | Performed by: PHYSICAL MEDICINE & REHABILITATION

## 2022-10-27 PROCEDURE — 1123F ACP DISCUSS/DSCN MKR DOCD: CPT | Performed by: PHYSICAL MEDICINE & REHABILITATION

## 2022-10-27 PROCEDURE — G8427 DOCREV CUR MEDS BY ELIG CLIN: HCPCS | Performed by: PHYSICAL MEDICINE & REHABILITATION

## 2022-10-27 PROCEDURE — 3017F COLORECTAL CA SCREEN DOC REV: CPT | Performed by: PHYSICAL MEDICINE & REHABILITATION

## 2022-10-27 PROCEDURE — G8399 PT W/DXA RESULTS DOCUMENT: HCPCS | Performed by: PHYSICAL MEDICINE & REHABILITATION

## 2022-10-27 PROCEDURE — G8484 FLU IMMUNIZE NO ADMIN: HCPCS | Performed by: PHYSICAL MEDICINE & REHABILITATION

## 2022-10-27 RX ORDER — METHYLPREDNISOLONE 4 MG/1
TABLET ORAL
Qty: 1 KIT | Refills: 1 | Status: SHIPPED | OUTPATIENT
Start: 2022-10-27 | End: 2022-11-02

## 2022-11-08 ENCOUNTER — PROCEDURE VISIT (OUTPATIENT)
Dept: PHYSICAL MEDICINE AND REHAB | Age: 67
End: 2022-11-08
Payer: MEDICARE

## 2022-11-08 ENCOUNTER — HOSPITAL ENCOUNTER (OUTPATIENT)
Dept: GENERAL RADIOLOGY | Age: 67
Discharge: HOME | End: 2022-11-10
Payer: MEDICARE

## 2022-11-08 DIAGNOSIS — M47.22 OSTEOARTHRITIS OF SPINE WITH RADICULOPATHY, CERVICAL REGION: ICD-10-CM

## 2022-11-08 DIAGNOSIS — M46.46 LUMBAR DISCITIS: ICD-10-CM

## 2022-11-08 DIAGNOSIS — M48.062 SPINAL STENOSIS OF LUMBAR REGION WITH NEUROGENIC CLAUDICATION: ICD-10-CM

## 2022-11-08 DIAGNOSIS — M54.31 SCIATICA OF RIGHT SIDE: Primary | ICD-10-CM

## 2022-11-08 DIAGNOSIS — M15.9 PRIMARY OSTEOARTHRITIS INVOLVING MULTIPLE JOINTS: ICD-10-CM

## 2022-11-08 DIAGNOSIS — M54.2 NECK PAIN: ICD-10-CM

## 2022-11-08 DIAGNOSIS — Z79.899 HIGH RISK MEDICATION USE: ICD-10-CM

## 2022-11-08 PROCEDURE — 76942 ECHO GUIDE FOR BIOPSY: CPT | Performed by: PHYSICAL MEDICINE & REHABILITATION

## 2022-11-08 PROCEDURE — 96372 THER/PROPH/DIAG INJ SC/IM: CPT | Performed by: PHYSICAL MEDICINE & REHABILITATION

## 2022-11-08 PROCEDURE — 64445 NJX AA&/STRD SCIATIC NRV IMG: CPT | Performed by: PHYSICAL MEDICINE & REHABILITATION

## 2022-11-08 PROCEDURE — 72040 X-RAY EXAM NECK SPINE 2-3 VW: CPT

## 2022-11-08 RX ORDER — LIDOCAINE HYDROCHLORIDE 20 MG/ML
5 INJECTION, SOLUTION INFILTRATION; PERINEURAL ONCE
Status: COMPLETED | OUTPATIENT
Start: 2022-11-08 | End: 2022-11-08

## 2022-11-08 RX ORDER — LIDOCAINE HYDROCHLORIDE 10 MG/ML
7 INJECTION, SOLUTION INFILTRATION; PERINEURAL ONCE
Status: COMPLETED | OUTPATIENT
Start: 2022-11-08 | End: 2022-11-08

## 2022-11-08 RX ORDER — ATORVASTATIN CALCIUM 10 MG/1
TABLET, FILM COATED ORAL
Qty: 90 TABLET | Refills: 2 | Status: SHIPPED | OUTPATIENT
Start: 2022-11-08

## 2022-11-08 RX ORDER — CYANOCOBALAMIN 1000 UG/ML
1000 INJECTION, SOLUTION INTRAMUSCULAR; SUBCUTANEOUS ONCE
Status: COMPLETED | OUTPATIENT
Start: 2022-11-08 | End: 2022-11-08

## 2022-11-08 RX ADMIN — CYANOCOBALAMIN 1000 MCG: 1000 INJECTION, SOLUTION INTRAMUSCULAR; SUBCUTANEOUS at 13:18

## 2022-11-08 RX ADMIN — LIDOCAINE HYDROCHLORIDE 7 ML: 10 INJECTION, SOLUTION INFILTRATION; PERINEURAL at 13:18

## 2022-11-08 RX ADMIN — LIDOCAINE HYDROCHLORIDE 5 ML: 20 INJECTION, SOLUTION INFILTRATION; PERINEURAL at 13:19

## 2022-11-08 NOTE — TELEPHONE ENCOUNTER
requesting medication refill.  Please approve or deny this request.    Rx requested:  Requested Prescriptions     Pending Prescriptions Disp Refills    atorvastatin (LIPITOR) 10 MG tablet [Pharmacy Med Name: Atorvastatin Calcium 10 MG Oral Tablet] 90 tablet 0     Sig: TAKE 1 TABLET BY MOUTH ONCE DAILY NIGHTLY         Last Office Visit:   10/24/2022      Next Visit Date:  Future Appointments   Date Time Provider Stacie Butler   11/15/2022 10:00 AM James Colorado, DO [address]   12/6/2022 10:30 AM James Colorado DO [address]   12/12/2022 11:30 AM James Colorado DO [address]   1/11/2023  1:30 PM James Colorado, DO [address]   1/16/2023 11:00 AM Beth Boyd MD [address] [place]   1/18/2023 10:45 AM James Colorado DO [facility] [place]   1/24/2023 11:15 AM James Colorado DO [facility] EMERGENCY [facility] [place]   2/21/2023 10:45 AM James Colorado, DO [address]   4/25/2023 11:00 AM Hailey Figueroa PA-C [address]

## 2022-11-09 ENCOUNTER — TELEPHONE (OUTPATIENT)
Dept: PHYSICAL MEDICINE AND REHAB | Age: 67
End: 2022-11-09

## 2022-11-09 RX ORDER — TOPIRAMATE 25 MG/1
TABLET ORAL
Qty: 90 TABLET | Refills: 0 | Status: SHIPPED | OUTPATIENT
Start: 2022-11-09

## 2022-11-09 NOTE — TELEPHONE ENCOUNTER
----- Message from Pia Frausto DO sent at 11/9/2022  1:29 PM EST -----  Nicholas Downing       please review and call patient with results if needed

## 2022-11-10 DIAGNOSIS — F41.9 ANXIETY DUE TO INVASIVE PROCEDURE: ICD-10-CM

## 2022-11-10 DIAGNOSIS — Z79.899 HIGH RISK MEDICATION USE: Primary | ICD-10-CM

## 2022-11-10 RX ORDER — DIAZEPAM 5 MG/1
5-10 TABLET ORAL PRN
Qty: 4 TABLET | Refills: 0 | Status: SHIPPED | OUTPATIENT
Start: 2022-11-10 | End: 2022-11-11

## 2022-11-11 DIAGNOSIS — M54.41 CHRONIC BILATERAL LOW BACK PAIN WITH BILATERAL SCIATICA: ICD-10-CM

## 2022-11-11 DIAGNOSIS — R76.8 ANA POSITIVE: ICD-10-CM

## 2022-11-11 DIAGNOSIS — G89.29 CHRONIC BILATERAL LOW BACK PAIN WITH BILATERAL SCIATICA: ICD-10-CM

## 2022-11-11 DIAGNOSIS — Z79.899 HIGH RISK MEDICATION USE: ICD-10-CM

## 2022-11-11 DIAGNOSIS — M54.2 CERVICALGIA: ICD-10-CM

## 2022-11-11 DIAGNOSIS — M48.062 SPINAL STENOSIS OF LUMBAR REGION WITH NEUROGENIC CLAUDICATION: ICD-10-CM

## 2022-11-11 DIAGNOSIS — M05.79 RHEUMATOID ARTHRITIS INVOLVING MULTIPLE SITES WITH POSITIVE RHEUMATOID FACTOR (HCC): ICD-10-CM

## 2022-11-11 DIAGNOSIS — M54.42 CHRONIC BILATERAL LOW BACK PAIN WITH BILATERAL SCIATICA: ICD-10-CM

## 2022-11-11 RX ORDER — OXYCODONE AND ACETAMINOPHEN 7.5; 325 MG/1; MG/1
1 TABLET ORAL EVERY 6 HOURS PRN
Qty: 70 TABLET | Refills: 0 | Status: SHIPPED | OUTPATIENT
Start: 2022-11-14 | End: 2022-12-14

## 2022-11-15 ENCOUNTER — HOSPITAL ENCOUNTER (OUTPATIENT)
Dept: INTERVENTIONAL RADIOLOGY/VASCULAR | Age: 67
Discharge: HOME | End: 2022-11-17
Payer: MEDICARE

## 2022-11-15 VITALS
RESPIRATION RATE: 16 BRPM | HEART RATE: 72 BPM | DIASTOLIC BLOOD PRESSURE: 86 MMHG | SYSTOLIC BLOOD PRESSURE: 184 MMHG | OXYGEN SATURATION: 96 %

## 2022-11-15 DIAGNOSIS — M54.16 LUMBAR RADICULOPATHY: ICD-10-CM

## 2022-11-15 PROCEDURE — 62323 NJX INTERLAMINAR LMBR/SAC: CPT

## 2022-11-15 PROCEDURE — 6360000004 HC RX CONTRAST MEDICATION: Performed by: PHYSICAL MEDICINE & REHABILITATION

## 2022-11-15 PROCEDURE — 2500000003 HC RX 250 WO HCPCS: Performed by: PHYSICAL MEDICINE & REHABILITATION

## 2022-11-15 PROCEDURE — 6360000002 HC RX W HCPCS: Performed by: PHYSICAL MEDICINE & REHABILITATION

## 2022-11-15 PROCEDURE — 2709999900 IR NERVE BLOCK PROCEDURE

## 2022-11-15 PROCEDURE — 62323 NJX INTERLAMINAR LMBR/SAC: CPT | Performed by: PHYSICAL MEDICINE & REHABILITATION

## 2022-11-15 RX ORDER — METHYLPREDNISOLONE ACETATE 40 MG/ML
40 INJECTION, SUSPENSION INTRA-ARTICULAR; INTRALESIONAL; INTRAMUSCULAR; SOFT TISSUE ONCE
Status: COMPLETED | OUTPATIENT
Start: 2022-11-15 | End: 2022-11-15

## 2022-11-15 RX ORDER — LIDOCAINE HYDROCHLORIDE 5 MG/ML
50 INJECTION, SOLUTION INFILTRATION; INTRAVENOUS ONCE
Status: COMPLETED | OUTPATIENT
Start: 2022-11-15 | End: 2022-11-15

## 2022-11-15 RX ORDER — DIAZEPAM 5 MG/1
10 TABLET ORAL PRN
COMMUNITY

## 2022-11-15 RX ADMIN — Medication 3 ML: at 10:29

## 2022-11-15 RX ADMIN — METHYLPREDNISOLONE ACETATE 40 MG: 40 INJECTION, SUSPENSION INTRA-ARTICULAR; INTRALESIONAL; INTRAMUSCULAR; INTRASYNOVIAL; SOFT TISSUE at 10:30

## 2022-11-15 RX ADMIN — LIDOCAINE HYDROCHLORIDE 18 ML: 5 INJECTION, SOLUTION INFILTRATION at 10:28

## 2022-11-15 ASSESSMENT — PAIN DESCRIPTION - LOCATION
LOCATION: BACK
LOCATION: BACK

## 2022-11-15 ASSESSMENT — PAIN SCALES - GENERAL
PAINLEVEL_OUTOF10: 5
PAINLEVEL_OUTOF10: 6

## 2022-11-15 ASSESSMENT — PAIN DESCRIPTION - ORIENTATION
ORIENTATION: LOWER
ORIENTATION: LOWER

## 2022-11-15 NOTE — FLOWSHEET NOTE
Pt arrived to CT holding. Pt changed into gown. Pt hooked up to vitals sign machine. VSS. Medical history, allergies, and home medications reviewed with patient. Consents reviewed with patient and signed. Pt  taken to specials via WC. Electronically signed by Medhat Moser RN on 11/15/2022 at 10:06 AM      Pt arrived back to ct holding. Pt reports 6/10 pain to lower back. Pt able to move all extremities without complication. Pts VSS, pt somewhat hypertensive. Pt states that it is coming down and that when she relaxes, her pressure will normalize. Electronically signed by Medhat Moser RN on 11/15/2022 at 10:39 AM    Pt dressing into street clothes at this time. PT calling  to secure ride at this time. Discharge instructions provided and reviewed with patient. Pt has no questions or concerns at this time. Pt taken to discharge exit via [facility] at this time.  Electronically signed by Medhat Moser RN on 11/15/2022 at 10:42 AM

## 2022-11-15 NOTE — DISCHARGE INSTRUCTIONS
Caudal Block Discharge Instructions    Activity:  Rest, avoid strenuous activity such as bending or lifting heavy objects for at least 24 hours. Be aware that your legs may feel heavy for up to 4 hours. May shower, bathe, or swim after 24 hours. Diet:  Resume usual diet    Medication:  * Resume home medication unless otherwise instructed by your physician. * May take mild pain reliever, as needed, such as Acetaminophen or Ibuprofen. INSTRUCTIONS OR COMMENTS RELATIVE TO SPECIFIC EXAM PERFORMED:  - You may experience some soreness over the next 1 to 2 days. - If any signs of infection (redness, swelling, pus) at the site, Go to ER for evaluation.   - Be aware that your legs may feel heavy for up to 4 hours. - For large amount of bleeding or drainage, Go to ER for evaluation.  - Do not drive, drink alcohol, or make any important decisions for the next 24 hours, if valium was taken prior to procedure. - Do not shower, bathe, or swim for the next 24 hours. - Follow-up with Dr. Devin Morris. IF YOU DEVELOP ANY OF THE FOLLOWING SYMPTOMS OVER THE NEXT 48HRS, CONTACT PHYSICIAN LISTED BELOW:  Physician performing procedure: DR. Leonila Kiran - [phone] or [phone], Ask to be put in contact with DR. Leonila Kiran. After hours contact ER. * Extreme pain that increases after leaving the hospital  * Active bleeding (refer to above instructions)  * Chills, fever above 100 degrees Farenheit and fatigue. * Weakness, dizziness, lightheadedness or fainting. If you are unable to contact the above physician and you feel you have a major problem, please go to the Emergency Room for treatment.

## 2022-11-15 NOTE — PROCEDURES
OPERATIVE REPORT      DATE OF PROCEDURE: 11/15/2022    PREOPERATIVE DIAGNOSIS:   lumbar radiculopathy    POSTOPERATIVE DIAGNOSIS:   Same. OPERATIVE PROCEDURE:  Caudal epidural steroid injection with fluoroscopy and epidurogram.    PROCEDURE:  The patient taken to the special procedures department and placed in a prone position. A timeout was performed immediately prior to the start of the caudal block procedure and included the correct patient (two identifiers), correct procedure and correct site(s). Procedure consent and allergies were also verified. The sacral hiatus was identified and marked and the sacral region was then prepped and draped in the usual fashion. Local anesthetic of 0.5% Xylocaine was injected to form a skin wheal and then was injected to the depth of the sacrococcygeal membrane. At this point a C-arm  film was taken to verify for correct approach of the needle. The needle was then withdrawn. A #22 gauge 1-1/2 inch needle was inserted through the coccysacrogeal membrane into the epidural space using loss of resistance technique with air. After negative aspiration was confirmed, approximately 3ml of Isovue M 200 was used to verify needle tip location in the caudal epidural space. Distribution of the contrast medium was observed and a normal appearing epidurogram was noted. The air syringe was then removed. A mixture of 40mg of Depo-Medrol and 0.5% Lidocaine preservative free totaling 12cc was injected into the epidural space. The needle was aspirated prior to and during this injection several times to verify again that the needle was outside the intravascular or subdural regions. The needle was then withdrawn. The patient tolerated the procedure well and was taken to the post-anesthesia care unit in stable condition.   The patient was instructed to call our office the following business day for follow-up instructions and to notify us immediately if they were having any difficulties after the the injection.       Jose Francisco Fishman D.O.

## 2022-11-15 NOTE — OR NURSING
Patient was brought to Special Procedures suite via wheelchair. Patient onto procedure table in prone position with minimal assistance. Placed on vitals monitor. VSS. Caudal site prepped with Hibiclense and then betadine, draped and numbed with lidocaine. Needle placement verifed with fluoro guidance and contrast injection. All prep and medications given by Dr Riley Gordon. Patient tolerated well. Patient will return to the CT holding room. Electronically signed by Tanna Sicard, RN on 11/15/2022 at 10:30 AM

## 2022-12-12 ENCOUNTER — PROCEDURE VISIT (OUTPATIENT)
Dept: PHYSICAL MEDICINE AND REHAB | Age: 67
End: 2022-12-12
Payer: MEDICARE

## 2022-12-12 DIAGNOSIS — G89.29 CHRONIC BILATERAL LOW BACK PAIN WITH BILATERAL SCIATICA: ICD-10-CM

## 2022-12-12 DIAGNOSIS — M48.062 SPINAL STENOSIS OF LUMBAR REGION WITH NEUROGENIC CLAUDICATION: ICD-10-CM

## 2022-12-12 DIAGNOSIS — M05.79 RHEUMATOID ARTHRITIS INVOLVING MULTIPLE SITES WITH POSITIVE RHEUMATOID FACTOR (HCC): ICD-10-CM

## 2022-12-12 DIAGNOSIS — M54.42 CHRONIC BILATERAL LOW BACK PAIN WITH BILATERAL SCIATICA: ICD-10-CM

## 2022-12-12 DIAGNOSIS — M79.7 FIBROMYALGIA: Primary | ICD-10-CM

## 2022-12-12 DIAGNOSIS — R76.8 ANA POSITIVE: ICD-10-CM

## 2022-12-12 DIAGNOSIS — M54.2 CERVICALGIA: ICD-10-CM

## 2022-12-12 DIAGNOSIS — Z79.899 HIGH RISK MEDICATION USE: ICD-10-CM

## 2022-12-12 DIAGNOSIS — M54.41 CHRONIC BILATERAL LOW BACK PAIN WITH BILATERAL SCIATICA: ICD-10-CM

## 2022-12-12 PROCEDURE — 96372 THER/PROPH/DIAG INJ SC/IM: CPT | Performed by: PHYSICAL MEDICINE & REHABILITATION

## 2022-12-12 PROCEDURE — 20553 NJX 1/MLT TRIGGER POINTS 3/>: CPT | Performed by: PHYSICAL MEDICINE & REHABILITATION

## 2022-12-12 RX ORDER — CYANOCOBALAMIN 1000 UG/ML
1000 INJECTION, SOLUTION INTRAMUSCULAR; SUBCUTANEOUS ONCE
Status: COMPLETED | OUTPATIENT
Start: 2022-12-12 | End: 2022-12-12

## 2022-12-12 RX ORDER — LIDOCAINE HYDROCHLORIDE 10 MG/ML
15 INJECTION, SOLUTION INFILTRATION; PERINEURAL ONCE
Status: COMPLETED | OUTPATIENT
Start: 2022-12-12 | End: 2022-12-12

## 2022-12-12 RX ORDER — OXYCODONE AND ACETAMINOPHEN 7.5; 325 MG/1; MG/1
1 TABLET ORAL EVERY 6 HOURS PRN
Qty: 70 TABLET | Refills: 0 | Status: SHIPPED | OUTPATIENT
Start: 2022-12-13 | End: 2023-01-12

## 2022-12-12 RX ADMIN — CYANOCOBALAMIN 1000 MCG: 1000 INJECTION, SOLUTION INTRAMUSCULAR; SUBCUTANEOUS at 14:08

## 2022-12-12 RX ADMIN — LIDOCAINE HYDROCHLORIDE 15 ML: 10 INJECTION, SOLUTION INFILTRATION; PERINEURAL at 14:08

## 2022-12-13 ENCOUNTER — HOSPITAL ENCOUNTER (OUTPATIENT)
Dept: INTERVENTIONAL RADIOLOGY/VASCULAR | Age: 67
Discharge: HOME | End: 2022-12-15
Payer: MEDICARE

## 2022-12-13 VITALS
SYSTOLIC BLOOD PRESSURE: 171 MMHG | HEART RATE: 61 BPM | RESPIRATION RATE: 16 BRPM | OXYGEN SATURATION: 100 % | DIASTOLIC BLOOD PRESSURE: 82 MMHG

## 2022-12-13 DIAGNOSIS — M54.16 LUMBAR RADICULOPATHY: ICD-10-CM

## 2022-12-13 PROCEDURE — 62323 NJX INTERLAMINAR LMBR/SAC: CPT | Performed by: PHYSICAL MEDICINE & REHABILITATION

## 2022-12-13 PROCEDURE — 2500000003 HC RX 250 WO HCPCS: Performed by: PHYSICAL MEDICINE & REHABILITATION

## 2022-12-13 PROCEDURE — 2709999900 IR NERVE BLOCK PROCEDURE

## 2022-12-13 PROCEDURE — 62323 NJX INTERLAMINAR LMBR/SAC: CPT

## 2022-12-13 PROCEDURE — 6360000004 HC RX CONTRAST MEDICATION: Performed by: PHYSICAL MEDICINE & REHABILITATION

## 2022-12-13 PROCEDURE — 6360000002 HC RX W HCPCS: Performed by: PHYSICAL MEDICINE & REHABILITATION

## 2022-12-13 RX ORDER — METHYLPREDNISOLONE ACETATE 40 MG/ML
40 INJECTION, SUSPENSION INTRA-ARTICULAR; INTRALESIONAL; INTRAMUSCULAR; SOFT TISSUE ONCE
Status: COMPLETED | OUTPATIENT
Start: 2022-12-13 | End: 2022-12-13

## 2022-12-13 RX ORDER — LIDOCAINE HYDROCHLORIDE 5 MG/ML
50 INJECTION, SOLUTION INFILTRATION; INTRAVENOUS ONCE
Status: COMPLETED | OUTPATIENT
Start: 2022-12-13 | End: 2022-12-13

## 2022-12-13 RX ADMIN — METHYLPREDNISOLONE ACETATE 40 MG: 40 INJECTION, SUSPENSION INTRA-ARTICULAR; INTRALESIONAL; INTRAMUSCULAR; INTRASYNOVIAL; SOFT TISSUE at 11:38

## 2022-12-13 RX ADMIN — LIDOCAINE HYDROCHLORIDE 18 ML: 5 INJECTION, SOLUTION INFILTRATION at 11:32

## 2022-12-13 RX ADMIN — IOPAMIDOL 3 ML: 408 INJECTION, SOLUTION INTRATHECAL at 11:36

## 2022-12-13 NOTE — FLOWSHEET NOTE
Pt ambulated, gait steady but slow with cane,  back to CT holding. Pt A&Ox4, respirations even and unlabored, skin p/w/d. Pt changed independently into hospital gown. Pt interview complete, pt denies contrast allergy and states isn't on any blood thinners. Hx, allergies, medications reviewed. Consent obtained, VSS. Pt with no c/o pain at this time as took her pain medication this morning. Pt awaiting to go to specials dept via wheelchair. Electronically signed by Viji Mercado RN on 12/13/2022 at 11:03 AM

## 2022-12-13 NOTE — DISCHARGE INSTRUCTIONS
Caudal Block Discharge Instructions    Activity:  Rest, avoid strenuous activity such as bending or lifting heavy objects for at least 24 hours. Be aware that your legs may feel heavy for up to 4 hours. May shower, bathe, or swim after 24 hours. Diet:  Resume usual diet    Medication:  * Resume home medication unless otherwise instructed by your physician. * May take mild pain reliever, as needed, such as Acetaminophen or Ibuprofen. INSTRUCTIONS OR COMMENTS RELATIVE TO SPECIFIC EXAM PERFORMED:  - You may experience some soreness over the next 1 to 2 days. - If any signs of infection (redness, swelling, pus) at the site, Go to ER for evaluation.   - Be aware that your legs may feel heavy for up to 4 hours. - For large amount of bleeding or drainage, Go to ER for evaluation.  - Do not drive, drink alcohol, or make any important decisions for the next 24 hours, if valium was taken prior to procedure. - Do not shower, bathe, or swim for the next 24 hours. - Follow-up with Dr. Cristi Bowser. IF YOU DEVELOP ANY OF THE FOLLOWING SYMPTOMS OVER THE NEXT 48HRS, CONTACT PHYSICIAN LISTED BELOW:  Physician performing procedure: DR. Esperanza Serrato - [phone] or [phone], Ask to be put in contact with DR. Esperanza Serrato. After hours contact ER. * Extreme pain that increases after leaving the hospital  * Active bleeding (refer to above instructions)  * Chills, fever above 100 degrees Farenheit and fatigue. * Weakness, dizziness, lightheadedness or fainting. If you are unable to contact the above physician and you feel you have a major problem, please go to the Emergency Room for treatment.

## 2022-12-13 NOTE — OR NURSING
1120 Patient was brought to Special Procedures suite via wheelchair. Patient onto procedure table in prone position with minimal assistance. Placed on vitals monitor. VSS. BP up but patient states that is has been up . She states it is due to pain. She stated that she needs  Right shoulder replacement surgery and that has not happened yet. Will continue to monitor. 1132 Caudal site prepped with Hibiclense and then betadine, draped and numbed with lidocaine. 1136 Needle placement verifed with fluoro guidance and contrast injection. 1138 All prep and medications Lidocaine and Depomedrol given by Dr Eva Tapia. Patient tolerated well. 1139  Patient will return to the CT holding room.

## 2022-12-13 NOTE — PROCEDURES
OPERATIVE REPORT      DATE OF PROCEDURE: 12/13/2022    PREOPERATIVE DIAGNOSIS:   lumbar radiculopathy    POSTOPERATIVE DIAGNOSIS:   Same. OPERATIVE PROCEDURE:  Caudal epidural steroid injection with fluoroscopy and epidurogram.    PROCEDURE:  The patient taken to the special procedures department and placed in a prone position. A timeout was performed immediately prior to the start of the caudal block procedure and included the correct patient (two identifiers), correct procedure and correct site(s). Procedure consent and allergies were also verified. The sacral hiatus was identified and marked and the sacral region was then prepped and draped in the usual fashion. Local anesthetic of 0.5% Xylocaine was injected to form a skin wheal and then was injected to the depth of the sacrococcygeal membrane. At this point a C-arm  film was taken to verify for correct approach of the needle. The needle was then withdrawn. A #22 gauge 1-1/2 inch needle was inserted through the coccysacrogeal membrane into the epidural space using loss of resistance technique with air. After negative aspiration was confirmed, approximately 3ml of Isovue M 200 was used to verify needle tip location in the caudal epidural space. Distribution of the contrast medium was observed and a normal appearing epidurogram was noted. The air syringe was then removed. A mixture of 40mg of Depo-Medrol and 0.5% Lidocaine preservative free totaling 12cc was injected into the epidural space. The needle was aspirated prior to and during this injection several times to verify again that the needle was outside the intravascular or subdural regions. The needle was then withdrawn. The patient tolerated the procedure well and was taken to the post-anesthesia care unit in stable condition.   The patient was instructed to call our office the following business day for follow-up instructions and to notify us immediately if they were having any difficulties after the the injection.       Hilda Zaragoza D.O.

## 2022-12-13 NOTE — FLOWSHEET NOTE
Pt arrived back to ct holding. Pt reports 0/10 pain to lower back, an improvement from prior. Pt states she is having pain in her shoulders from position, 7/10 and sore in nature. Pt able to move all extremities without complication. Pts BP is 203/88. Electronically signed by Melissa Holder RN on 12/13/2022 at 11:47 AM     Pts blood pressure returning to baseline. She states that her pressure is normally 145/70 when she is relaxed. She states that is is normal for her pressure to be elevated when patient is up and moving around. Pts blood pressure continues to decrease. Electronically signed by Melissa Holder RN on 12/13/2022 at 11:55 AM     Pt dressing into street clothes at this time. Discharge instructions provided and reviewed with patient. Pt has no questions or concerns at this time. Pt taken to discharge exit area via [facility]. Pts  to meet and take patient home.  Electronically signed by Melissa Holder RN on 12/13/2022 at 12:03 PM

## 2022-12-27 ASSESSMENT — ENCOUNTER SYMPTOMS
PHOTOPHOBIA: 1
ABDOMINAL PAIN: 1
BACK PAIN: 1

## 2022-12-27 NOTE — PROGRESS NOTES
Subjective  Annie Fowler, 69 y.o. female presents today with:       Back Pain Caudal block 11/15/22 with 70% reduction in pain & 12/13/22 with 25% reduction in pain. Trigger point injections 12/12/22 & 1/11/23 with 60% reduction in pain. Neck Pain        Shoulder Pain Right       Hip Pain Right       Leg Pain Bilateral. Right sciatic injection 11/8/22 with 60% reduction in pain. Knee Pain Right       Foot Pain Right       Medication Refill Percocet, Gabapentin, Baclofen, Prednisone, Zinc, Vit D refill & pill count today        Will need an increased dose of meds dt pending surgery--then will try to go back to 3 per day. She is still doing well with her current medications and shows no signs of abuse or overuse. She is more functional on it. Does not need Neurontin refills. She is to avoid any NSAIDs but she will continue gabapentin as tolerated baclofen vitamin D and zinc.  Right shoulder is feeling better sp infection. Back Pain  This is a chronic problem. The current episode started more than 1 year ago. The problem occurs daily. The problem is unchanged. The pain is present in the lumbar spine, gluteal and sacro-iliac. The quality of the pain is described as aching. Radiates to: bilateral legs, left leg worse, left hip. The pain is at a severity of 4/10. The pain is moderate. The pain is Worse during the day. The symptoms are aggravated by bending, standing, position, sitting and twisting (Walking). Stiffness is present In the morning. Associated symptoms include abdominal pain, leg pain and pelvic pain. Pertinent negatives include no bladder incontinence, chest pain, dysuria, fever, headaches, numbness, paresis, perianal numbness or weakness. Risk factors include obesity, poor posture, menopause, lack of exercise, sedentary lifestyle and history of steroid use.  She has tried analgesics, walking, home exercises, heat, muscle relaxant, NSAIDs, ice and bed rest (Sciatica block, trigger point injections, mobic, Gabapentin, Norco) for the symptoms. The treatment provided significant relief.   Neck Pain   This is a chronic problem. The problem occurs constantly. The problem has been waxing and waning. The pain is at a severity of 8/10. The pain is mild. The symptoms are aggravated by position and bending. Stiffness is present In the morning. Associated symptoms include leg pain and photophobia. Pertinent negatives include no chest pain, fever, headaches, numbness, paresis or weakness. She has tried heat, acetaminophen, chiropractic manipulation, ice, muscle relaxants, neck support, NSAIDs and oral narcotics for the symptoms. The treatment provided moderate relief.   Shoulder Pain   This is a chronic problem. The current episode started more than 1 year ago. There has been no history of extremity trauma. The problem occurs constantly. The pain is at a severity of 8/10. Associated symptoms include joint locking, joint swelling and a limited range of motion. Pertinent negatives include no fever or numbness. She has tried heat, cold, acetaminophen and movement for the symptoms. The treatment provided moderate relief. Family history includes rheumatoid arthritis. Her past medical history is significant for osteoarthritis. There is no history of diabetes.   Hip Pain   The incident occurred more than 1 week ago. The injury mechanism was a compression. The quality of the pain is described as aching. The pain is at a severity of 8/10. The pain is severe. The pain has been Constant since onset. Pertinent negatives include no numbness. She reports no foreign bodies present. The symptoms are aggravated by movement, palpation and weight bearing. She has tried acetaminophen, heat, elevation, ice, immobilization, NSAIDs, non-weight bearing and rest for the symptoms. The treatment provided mild relief.   Leg Pain   The incident occurred more than 1 week ago. The pain is at a severity of 7/10. Pertinent  negatives include no numbness. She reports no foreign bodies present. The symptoms are aggravated by movement, palpation and weight bearing. She has tried elevation, acetaminophen and heat for the symptoms. The treatment provided mild relief. Foot Pain   The pain is present in the neck, back and left shoulder. This is a chronic problem. The current episode started more than 1 year ago. The problem occurs constantly. The problem has been gradually worsening. The pain is at a severity of 4/10. Associated symptoms include joint locking, joint swelling and a limited range of motion. Pertinent negatives include no fever or numbness. The symptoms are aggravated by contact, heat, standing and lying down. She has tried cold, NSAIDS, OTC ointments, OTC pain meds, chondroitin, heat, movement, acetaminophen, oral narcotics and rest for the symptoms. The treatment provided significant relief. Family history includes rheumatoid arthritis. Her past medical history is significant for osteoarthritis. There is no history of diabetes.      Past Medical History:   Diagnosis Date    Abnormal electromyogram (EMG) 8/19/09    SENSORIMOTOR NEUROPATHY OF BLE, RIGHT WORSE THAN LEFT, SUSPICIOUS FOR RIGHT S1 RADIC    Angina pectoris (HCC)     Angina pectoris ([address] 07/2019    sees Dr. Montaño Notice    Ankle pain     Bleeding ulcer     Colon polyp 4/3/2017    Dermatitis     Fibromyalgia     Foot pain     Hyperlipidemia     Hypertension     Infection of intervertebral disc (pyogenic), lumbar region ([address] 8/13/2018    Low back pain     MRSA (methicillin resistant staph aureus) culture positive 2011    Nasal bleeding     Neck pain     Neuropathy     Obesity     Osteoarthritis     PSVT (paroxysmal supraventricular tachycardia) ([address]     Shingles outbreak 2012    SI (sacroiliac) pain     Stage 3 chronic kidney disease, unspecified whether stage 3a or 3b CKD ([address] 4/13/2022    Stenosis     Vitamin D deficiency      Past Surgical History:   Procedure Laterality Date    CARDIAC CATHETERIZATION  10/2016    CARDIOVERSION      2001 x3    CAUDAL BLOCK N/A 12/13/2022    performed by Dr. Frankie Ireland  12/14/2011    Dr Nathaniel Nunez with bone graft and plate    COLONOSCOPY  09/2021    CC    ENDOSCOPIC ULTRASOUND (LOWER) N/A 07/11/2019    EGD/ EUS performed by Inocente Sullivan MD at [place] [address]  07/2005    HYSTERECTOMY (CERVIX STATUS UNKNOWN)  2001    HYSTERECTOMY, TOTAL ABDOMINAL (CERVIX REMOVED)      JOINT REPLACEMENT Left     tka    LIVER BIOPSY      MENISCECTOMY  10/31/2011    left knee    OTHER SURGICAL HISTORY  10/26/2009    CAUDALS BY DR Angie Ramirez    NC COLON CA SCRN NOT  [address] IND N/A 04/12/2017    COLONOSCOPY performed by Obed John MD at [facility] ESOPHAGOGASTRODUODENOSCOPY TRANSORAL DIAGNOSTIC N/A 04/12/2017    EGD ESOPHAGOGASTRODUODENOSCOPY performed by Obed John MD at [facility]  12/2011    Dr Nathaniel Nunez Lumbar and c spine    TOTAL KNEE ARTHROPLASTY  07/30/2012    LEFT-DR AVELAR    UPPER GASTROINTESTINAL ENDOSCOPY  1995     Social History     Socioeconomic History    Marital status:      Spouse name: Virginia Swenson    Number of children: 2    Years of education: 13    Highest education level: Associate degree: occupational, technical, or vocational program   Occupational History    Occupation: Retired    Tobacco Use    Smoking status: Never    Smokeless tobacco: Never   Vaping Use    Vaping Use: Never used   Substance and Sexual Activity    Alcohol use: No    Drug use: No    Sexual activity: Not Currently   Social History [place]    Lives in [place] with her  Virginia Swenson and son Cyndy Galaviz is mildly handicapped --CP right lilia with anxiety and LD. He works at [employer].      Social Determinants of Health     Financial Resource Strain: Low Risk     Difficulty of Paying Living Expenses: Not very hard   Food Insecurity: No Food Insecurity    Worried About Running Out of Food in the Last Year: Never true    Ran Out of Food in the Last Year: Never true   Transportation Needs: No Transportation Needs    Lack of Transportation (Medical): No    Lack of Transportation (Non-Medical): No   Physical Activity: Insufficiently Active    Days of Exercise per Week: 3 days    Minutes of Exercise per Session: 40 min     Family History   Problem Relation Age of Onset    Heart Disease Father     High Cholesterol Father     High Blood Pressure Father     Stroke Mother     High Blood Pressure Mother     High Blood Pressure Brother        Allergies   Allergen Reactions    Latex Swelling     Swelling of hands with wearing latex gloves    Isosorbide Other (See Comments)    Lisinopril Other (See Comments)    Norvasc [Amlodipine Besylate] Other (See Comments)     swelling    Ranolazine Headaches and Nausea Only    Keflex [Cephalexin] Rash       Review of Systems   Constitutional:  Negative for fever. Eyes:  Positive for photophobia. Cardiovascular:  Negative for chest pain. Gastrointestinal:  Positive for abdominal pain. Genitourinary:  Positive for pelvic pain. Negative for bladder incontinence and dysuria. Musculoskeletal:  Positive for back pain and neck pain. Neurological:  Negative for weakness, numbness and headaches.      Objective    Vitals:    01/18/23 1051 01/18/23 1056   BP: (!) 160/82 (!) 160/82   Site: Left Upper Arm Left Upper Arm   Position: Sitting Sitting   Cuff Size: Large Adult Large Adult   Weight: 222 lb (100.7 kg)    Height: 5' 8\" (1.727 m)      Pain Score: Five (Without medication)     Physical Exam  Ortho Exam  Neurologic Exam        After a thorough review and discussion of the previous medical records, patient comprehensive medical, surgical, and family and social history, Review of Systems, their OARRS, their Screener and Opioid Assessment for Patients with Pain (SOAPP®-R), recent diagnostics, and symptomatic results to previous treatment, it is my impression that the patients is suffering with progressive and severe:     Diagnosis Orders   1. Chronic right shoulder pain        2. Neuropathy involving both lower extremities        3. Spinal stenosis of lumbar region with neurogenic claudication  oxyCODONE-acetaminophen (PERCOCET) 7.5-325 MG per tablet    topiramate (TOPAMAX) 25 MG tablet    VT INJECTION AA&/STRD SCIATIC NERVE W/IMG GDN      4. Other hereditary and idiopathic neuropathies        5. Chronic bilateral low back pain with bilateral sciatica  oxyCODONE-acetaminophen (PERCOCET) 7.5-325 MG per tablet    VT INJECTION AA&/STRD SCIATIC NERVE W/IMG GDN      6. Rheumatoid arthritis involving multiple sites with positive rheumatoid factor (HCC)  oxyCODONE-acetaminophen (PERCOCET) 7.5-325 MG per tablet      7. Cervicalgia  oxyCODONE-acetaminophen (PERCOCET) 7.5-325 MG per tablet      8. High risk medication use - 10/17/17 OARRS PM&R, 12/19/17 OARRS PM&R, 12/20/17 Tox screen positive for opiates, Hydrocodone PM&R, MED CONTRACT 2/2/17  oxyCODONE-acetaminophen (PERCOCET) 7.5-325 MG per tablet    topiramate (TOPAMAX) 25 MG tablet      9. COLTON positive  oxyCODONE-acetaminophen (PERCOCET) 7.5-325 MG per tablet      10. Primary osteoarthritis involving multiple joints  topiramate (TOPAMAX) 25 MG tablet      11.  Lumbar discitis  topiramate (TOPAMAX) 25 MG tablet          I am also concerned by lifestyle and mood issues including:    Past Medical History:   Diagnosis Date    Abnormal electromyogram (EMG) 8/19/09    SENSORIMOTOR NEUROPATHY OF BLE, RIGHT WORSE THAN LEFT, SUSPICIOUS FOR RIGHT S1 RADIC    Angina pectoris ([address]     Angina pectoris ([address] 07/2019    sees Dr. Lauren Katz    Ankle pain     Bleeding ulcer     Colon polyp 4/3/2017    Dermatitis     Fibromyalgia     Foot pain     Hyperlipidemia     Hypertension     Infection of intervertebral disc (pyogenic), lumbar region ([address] 8/13/2018    Low back pain     MRSA (methicillin resistant staph aureus) culture positive 2011 Nasal bleeding     Neck pain     Neuropathy     Obesity     Osteoarthritis     PSVT (paroxysmal supraventricular tachycardia) ([facility])     Shingles outbreak 2012    SI (sacroiliac) pain     Stage 3 chronic kidney disease, unspecified whether stage 3a or 3b CKD ([facility]) 4/13/2022    Stenosis     Vitamin D deficiency            Given their medication, chronic pain and lifestyle and medications they are at risk for :    Falls, constipation, addiction, toxicity on opiates  Loss of livelyhood due to severe pain, debility, weight gain and  vitamin D deficiency    The patient was educated regarding proper diet, fitness routine, and regulatory restrictions concerning pain medications.        Previous notes, comprehensive past medical, surgical, family history, and diagnostics were reviewed.   Patient education and councelling were provided regarding off label use,treatment options and medication and injection risks.    Current and old OARRS ([place] Automated Prescription Reporting System) records reviewed, all refills reviewed since last visit,  Behavioral agreement/KATHLEEN regulations   and Toxicology screen was reviewed with patient and is up to date.      There are   no current red flags. high risk meds review re intensive monitoring for toxicity and addiction,  They are making good progress regarding pain relief, they are performing at a functional level regarding activities of daily living, family interactions and psychological functioning, they're not having any adverse effects or side effects from the current medications, and I see no findings of aberrant drug taking or addiction related behaviors.  The patient is aware that they have a chronic pain condition and they may require opiates dosing for life.  All efforts will be made to wean to the lowest effective dose.  Other therapies for pain have not been effective including nonopiate medications.  Injections and exercises are only partially effective.  A Rx for Narcan was  offered to help prevent accidental overdose. RX Monitoring 11/4/2021   Attestation -   Acute Pain Prescriptions -   Periodic Controlled Substance Monitoring Possible medication side effects, risk of tolerance/dependence & alternative treatments discussed. ;No signs of potential drug abuse or diversion identified. ;Assessed functional status. ;Obtaining appropriate analgesic effect of treatment. Chronic Pain > 50 MEDD Re-evaluated the status of the patient's underlying condition causing pain. ;Considered consultation with a specialist.;Obtained or confirmed \"Consent for Opioid Use\" on file. Chronic Pain > 80 MEDD -               Patient is currently taking:       I am having Rayna Hdez maintain her Vitamin D3, Coenzyme Q10 (COQ10 PO), melatonin, nitroGLYCERIN, hydroxychloroquine, losartan, dilTIAZem, omeprazole, simethicone, gabapentin, clotrimazole, hydrocortisone, atorvastatin, diazePAM, baclofen, doxycycline hyclate, oxyCODONE-acetaminophen, and topiramate. We will continue to administer cyanocobalamin and lidocaine. I also recommend the following Medications:    Orders Placed This Encounter   Medications    oxyCODONE-acetaminophen (PERCOCET) 7.5-325 MG per tablet     Sig: Take 1 tablet by mouth every 6 hours as needed for Pain for up to 30 days. Intended supply: 30 days     Dispense:  120 tablet     Refill:  0     Reduce doses taken as pain becomes manageable    topiramate (TOPAMAX) 25 MG tablet     Sig: TAKE 1 TABLET BY MOUTH ONCE DAILY IN THE EVENING     Dispense:  90 tablet     Refill:  0          -which helps with pain and function. Otherwise, continue the current pain medications that I have prescibed. Radiologic:   Old  unique films results reviewed  ,     I discussed results with patients. see Follow up plans below  For any new studies. Unique source and Care Everywhere Updates:  prior external notes requested and reviewed. No new issues noted.       Unique History obtained by caregiver/independent historian-and verified with SOAPPR.              Labs:  Previous labs reviewed     Lab Results   Component Value Date/Time     10/27/2022 11:47 AM    K 4.8 10/27/2022 11:47 AM    K 4.4 08/09/2018 06:41 AM     10/27/2022 11:47 AM    CO2 22 10/27/2022 11:47 AM    BUN 23 10/27/2022 11:47 AM    CREATININE 1.19 10/27/2022 11:47 AM    CALCIUM 9.2 10/27/2022 11:47 AM    LABALBU 4.3 10/27/2022 11:47 AM    LABALBU 2.8 08/18/2018 05:00 AM    BILITOT 0.3 10/27/2022 11:47 AM    ALKPHOS 131 10/27/2022 11:47 AM    AST 21 10/27/2022 11:47 AM    ALT 19 10/27/2022 11:47 AM     Lab Results   Component Value Date/Time    WBC 4.9 10/27/2022 11:47 AM    RBC 4.24 10/27/2022 11:47 AM    RBC 2.81 09/06/2018 05:30 AM    HGB 12.7 10/27/2022 11:47 AM    HCT 39.1 10/27/2022 11:47 AM    MCV 92.1 10/27/2022 11:47 AM    MCH 29.8 10/27/2022 11:47 AM    MCHC 32.4 10/27/2022 11:47 AM    RDW 13.6 10/27/2022 11:47 AM     10/27/2022 11:47 AM    MPV 7.4 09/13/2018 12:00 AM       Lab Results   Component Value Date/Time    LABAMPH Neg 08/17/2022 03:43 PM    BARBSCNU Neg 08/17/2022 03:43 PM    LABBENZ Neg 08/17/2022 03:43 PM    CANSU Neg 08/17/2022 03:43 PM    COCAIMETSCRU Neg 08/17/2022 03:43 PM    PHENCYCLIDINESCREENURINE Neg 08/17/2022 67:70 PM    TRICYCLIC Neg 79/65/6275 18:65 PM    DSCOMMENT see below 08/17/2022 03:43 PM       Lab Results   Component Value Date/Time    CODEINE Not Detected 12/02/2016 12:57 PM    MORPHINE Not Detected 12/02/2016 12:57 PM    ACETYLMORPHI Not Detected 12/02/2016 12:57 PM    OXYCODONE Not Detected 12/02/2016 12:57 PM    NOROXYCODONE Not Detected 12/02/2016 12:57 PM    NOROXYMU Not Detected 12/02/2016 12:57 PM    HYDRCO Present 12/02/2016 12:57 PM    NORHYDU Present 12/02/2016 12:57 PM    HYDROMO Not Detected 12/02/2016 12:57 PM    Margy Favia Not Detected 12/02/2016 12:57 PM    Elby Fall Not Detected 12/02/2016 12:57 PM    FENTA Not Detected 12/02/2016 12:57 PM NORFENT Not Detected 12/02/2016 12:57 PM    MEPERIDINE Not Detected 12/02/2016 12:57 PM    TAPENU Not Detected 12/02/2016 12:57 PM    TAPOSULFUR Not Detected 12/02/2016 12:57 PM    METHADONE Not Detected 12/02/2016 12:57 PM    LABPROP Not Detected 12/02/2016 12:57 PM    TRAM Not Detected 12/02/2016 12:57 PM    AMPH Not Detected 12/02/2016 12:57 PM    METHAMP Not Detected 12/02/2016 12:57 PM    MDMA Not Detected 12/02/2016 12:57 PM    ECMDA Not Detected 12/02/2016 12:57 PM       Lab Results   Component Value Date/Time    METPHEN Not Detected 12/02/2016 12:57 PM    PHENTERMINE Not Detected 12/02/2016 12:57 PM    BENZOYL Not Detected 12/02/2016 12:57 PM    Luisa Sages Not Detected 12/02/2016 12:57 PM    Joaracelise Marito Not Detected 12/02/2016 12:57 PM    CLONAZEPAM Not Detected 12/02/2016 12:57 PM    Mik Damme Not Detected 12/02/2016 12:57 PM    DIAZEP Not Detected 12/02/2016 12:57 PM    ROMAIN Not Detected 12/02/2016 12:57 PM    OXAZ Not Detected 12/02/2016 12:57 PM    Lina Portuguese Not Detected 12/02/2016 12:57 PM    LORAZEPAM Not Detected 12/02/2016 12:57 PM    MIDAZOLAM Not Detected 12/02/2016 12:57 PM    ZOLPIDEM Not Detected 12/02/2016 12:57 PM    NAI Not Detected 12/02/2016 12:57 PM    ETG Not Detected 12/02/2016 12:57 PM    MARIJMET Not Detected 12/02/2016 12:57 PM    PCP Not Detected 12/02/2016 12:57 PM    PAINMGTDRUGP See Below 12/02/2016 12:57 PM    EERPAINMGTPA See Note 12/02/2016 12:57 PM    LABCREA 129.7 08/09/2018 03:00 AM         , I discussed results with patient. See follow-up plans for new studies. Therapies:  HEP-gentle stretching and relaxation techniques-demonstrated with patient-they are to do them twice a day.   They are also advised to make the following lifestyle changes:   Goals        Exercise 3x per week (30 min per time)      SOAPP-R GOAL LESS THAN 9      09/02/16 SCORE: 8 LOW RISK   02/02/17 score 7-low risk   04/03/17 score: 6-low risk  06/05/17 score: 7-low risk  08/07/17 score: 8-low risk  10/06/17 score: 6-low risk  12/20/17 score: 5-low risk  02/18/18 score: 6-low risk  0425/2018 score 5-low risk  7/11/18 score: 3 low- risk  10/11/18 score:4- low risk  2/28/19 score: 2: low risk  5/13/19 score: 2- low risk  7/17/19 score: 1- low risk   9/13/19 score: 2- low risk  11/21/19 score: 3- low risk   08/17/22 score: 6 - low risk  1/22/20 score: 2- low risk   6- score 3 - low risk   6/23/21 score: 7- low risk  9/15/21 score: 4- low risk  12/01/2021 score 5- low risk   10/27/22 score: 6- low risk  1/18/23 score: 10- moderate risk       Weight < 150 lb (68.04 kg)             Injections or Epidurals:  Injection options were discussed. Patient gave verbal consent to ordered injections. See follow-up plans for planned injections. Supplements:  Vitamin D with increased dosing during the rainy months,   Education was given on:   Dietary and Fitness--daily stretches and low carb diet-in chair Yoga when possible             Follow up with Primary Care Physician regarding their general medical needs. Stressed the importance of following up with PCP and specialists for his/her chronic diseases, health, CV, and cancer screening and continued care. Will follow disease activity/progression and adjust therapeutic regimen to disease activity and severity. Discussed medication dosage, usage, goals of therapy, and side effects. Available test results were reviewed -Discussed findings, impression and plan with patient. An additional 5 minutes were spent outside of the patient visit to review records. Additional time spent with the patient to discuss their questions. Additional time spent with the patient devoted to discussing treatment strategy, planning, and implementation. Patient understands above plan; questions asked and answered. Patient agrees to plan as noted above. At least 50% of the visit was involved in the discussion of the options for treatment.  We discussed exercises, medication, interventional therapies and surgery. Healthy life style is essential with patient hard work to achieve the wellness. In addition; discussion with the patient and/or family about patient's functional status any of the diagnostic results, impressions and/or recommended diagnostic studies, prognosis, risks and benefits of treatment options, instructions for treatment and/or follow-up, importance of compliance with chosen treatment options, risk-factor reduction, and patient/family education. They are to follow up in 2 1/2 -3 months to review medication, efficacy of injections, pill counts, OARRS check, high risk med review re intensive monitoring for toxicity and addiction, SOAPPR assessment, review diagnostics, to review previous and future treatment plans and assess appropriateness for continued therapy.         New Diagnostics  Orders Placed This Encounter   Procedures    [place] INJECTION AA&/STRD SCIATIC NERVE W/IMG TANNER Peterson,

## 2023-01-10 DIAGNOSIS — M54.2 CERVICALGIA: ICD-10-CM

## 2023-01-10 DIAGNOSIS — R76.8 ANA POSITIVE: ICD-10-CM

## 2023-01-10 DIAGNOSIS — M54.42 CHRONIC BILATERAL LOW BACK PAIN WITH BILATERAL SCIATICA: ICD-10-CM

## 2023-01-10 DIAGNOSIS — Z79.899 HIGH RISK MEDICATION USE: ICD-10-CM

## 2023-01-10 DIAGNOSIS — M48.062 SPINAL STENOSIS OF LUMBAR REGION WITH NEUROGENIC CLAUDICATION: ICD-10-CM

## 2023-01-10 DIAGNOSIS — M54.41 CHRONIC BILATERAL LOW BACK PAIN WITH BILATERAL SCIATICA: ICD-10-CM

## 2023-01-10 DIAGNOSIS — M05.79 RHEUMATOID ARTHRITIS INVOLVING MULTIPLE SITES WITH POSITIVE RHEUMATOID FACTOR (HCC): ICD-10-CM

## 2023-01-10 DIAGNOSIS — G89.29 CHRONIC BILATERAL LOW BACK PAIN WITH BILATERAL SCIATICA: ICD-10-CM

## 2023-01-11 ENCOUNTER — PROCEDURE VISIT (OUTPATIENT)
Dept: PHYSICAL MEDICINE AND REHAB | Age: 68
End: 2023-01-11

## 2023-01-11 DIAGNOSIS — M79.10 MYALGIA: Primary | ICD-10-CM

## 2023-01-11 RX ORDER — BACLOFEN 10 MG/1
TABLET ORAL
Qty: 90 TABLET | Refills: 2 | Status: SHIPPED | OUTPATIENT
Start: 2023-01-11

## 2023-01-11 RX ORDER — OXYCODONE AND ACETAMINOPHEN 7.5; 325 MG/1; MG/1
1 TABLET ORAL EVERY 6 HOURS PRN
Qty: 70 TABLET | Refills: 0 | Status: SHIPPED | OUTPATIENT
Start: 2023-01-11 | End: 2023-02-10

## 2023-01-11 RX ORDER — LIDOCAINE HYDROCHLORIDE 10 MG/ML
15 INJECTION, SOLUTION INFILTRATION; PERINEURAL ONCE
Status: COMPLETED | OUTPATIENT
Start: 2023-01-11 | End: 2023-01-11

## 2023-01-11 RX ORDER — CYANOCOBALAMIN 1000 UG/ML
1000 INJECTION, SOLUTION INTRAMUSCULAR; SUBCUTANEOUS ONCE
Status: COMPLETED | OUTPATIENT
Start: 2023-01-11 | End: 2023-01-11

## 2023-01-11 RX ORDER — OXYCODONE AND ACETAMINOPHEN 7.5; 325 MG/1; MG/1
1 TABLET ORAL EVERY 6 HOURS PRN
Qty: 70 TABLET | Refills: 0 | Status: SHIPPED | OUTPATIENT
Start: 2023-02-10 | End: 2023-03-12

## 2023-01-11 RX ADMIN — LIDOCAINE HYDROCHLORIDE 15 ML: 10 INJECTION, SOLUTION INFILTRATION; PERINEURAL at 13:58

## 2023-01-11 RX ADMIN — CYANOCOBALAMIN 1000 MCG: 1000 INJECTION, SOLUTION INTRAMUSCULAR; SUBCUTANEOUS at 13:58

## 2023-01-16 ENCOUNTER — OFFICE VISIT (OUTPATIENT)
Dept: INFECTIOUS DISEASES | Age: 68
End: 2023-01-16
Payer: MEDICARE

## 2023-01-16 VITALS
RESPIRATION RATE: 22 BRPM | SYSTOLIC BLOOD PRESSURE: 128 MMHG | TEMPERATURE: 97 F | HEIGHT: 68 IN | BODY MASS INDEX: 33.65 KG/M2 | DIASTOLIC BLOOD PRESSURE: 60 MMHG | WEIGHT: 222 LBS

## 2023-01-16 DIAGNOSIS — L08.9 RECURRENT INFECTION OF SKIN: ICD-10-CM

## 2023-01-16 DIAGNOSIS — L02.412 ABSCESS OF AXILLA, LEFT: Primary | ICD-10-CM

## 2023-01-16 PROCEDURE — 1090F PRES/ABSN URINE INCON ASSESS: CPT | Performed by: INTERNAL MEDICINE

## 2023-01-16 PROCEDURE — 3017F COLORECTAL CA SCREEN DOC REV: CPT | Performed by: INTERNAL MEDICINE

## 2023-01-16 PROCEDURE — G8399 PT W/DXA RESULTS DOCUMENT: HCPCS | Performed by: INTERNAL MEDICINE

## 2023-01-16 PROCEDURE — 99214 OFFICE O/P EST MOD 30 MIN: CPT | Performed by: INTERNAL MEDICINE

## 2023-01-16 PROCEDURE — G8484 FLU IMMUNIZE NO ADMIN: HCPCS | Performed by: INTERNAL MEDICINE

## 2023-01-16 PROCEDURE — G8427 DOCREV CUR MEDS BY ELIG CLIN: HCPCS | Performed by: INTERNAL MEDICINE

## 2023-01-16 PROCEDURE — 1123F ACP DISCUSS/DSCN MKR DOCD: CPT | Performed by: INTERNAL MEDICINE

## 2023-01-16 PROCEDURE — 3074F SYST BP LT 130 MM HG: CPT | Performed by: INTERNAL MEDICINE

## 2023-01-16 PROCEDURE — G8417 CALC BMI ABV UP PARAM F/U: HCPCS | Performed by: INTERNAL MEDICINE

## 2023-01-16 PROCEDURE — 3078F DIAST BP <80 MM HG: CPT | Performed by: INTERNAL MEDICINE

## 2023-01-16 PROCEDURE — 1036F TOBACCO NON-USER: CPT | Performed by: INTERNAL MEDICINE

## 2023-01-16 RX ORDER — DOXYCYCLINE HYCLATE 100 MG/1
CAPSULE ORAL
COMMUNITY
Start: 2023-01-10

## 2023-01-16 ASSESSMENT — PATIENT HEALTH QUESTIONNAIRE - PHQ9
SUM OF ALL RESPONSES TO PHQ QUESTIONS 1-9: 0
1. LITTLE INTEREST OR PLEASURE IN DOING THINGS: 0
SUM OF ALL RESPONSES TO PHQ QUESTIONS 1-9: 0
SUM OF ALL RESPONSES TO PHQ9 QUESTIONS 1 & 2: 0
2. FEELING DOWN, DEPRESSED OR HOPELESS: 0

## 2023-01-16 ASSESSMENT — ENCOUNTER SYMPTOMS: BACK PAIN: 1

## 2023-01-16 NOTE — PROGRESS NOTES
Dali Gonzalez (:  1955) is a 69 y.o. female,Established patient, here for evaluation of the following chief complaint(s):  Shoulder Problem (NEED RIGHT SHOULDER CLEARANCE. Abscess under left armpit)         ASSESSMENT/PLAN:  Need for orthopedic surgery clearance   left axillary abscess   History off lumbar discitis with hardware in place  History of MSSA infection with history of sepsis and septic arthritis  Rheumatoid arthritis with chronic low back pain with R sciatica  CKD stage 3    May go for right total shoulder replacement   May give vancomycin for preop prophylaxis since patient has history of cephalosporin allergy  Finish 2 more days of doxycycline  No additional antibiotics indicated at this point  Subjective   SUBJECTIVE/OBJECTIVE:  HPI  Patient had left axillary abscess after she shaved and was treated by surgery, currently on doxycycline 100 mg p.o. twice daily. Patient is scheduled for right total shoulder replacement in 2 weeks and needs clearance from infectious disease perspective. Patient has been having frequent falls. Reports severe degenerative disc disease in the neck and would like to see a neurosurgeon. Had history of cervical fusion by Dr. Kate Leung in the past.   Currently walks with a cane  Has rheumatoid arthritis and currently is back on Plaquenil    Review of Systems   Musculoskeletal:  Positive for arthralgias, back pain and neck pain. Neurological:  Positive for weakness. All other systems reviewed and are negative. Objective   Physical Exam     Vitals:    23 1102   BP: 128/60   Resp: 22   Temp: 97 °F (36.1 °C)   TempSrc: Temporal   Weight: 222 lb (100.7 kg)   Height: 5' 8\" (1.727 m)     General Appearance: alert and oriented to person, place and time, well-developed and well-nourished, in no acute distress  Skin: warm and dry, no rash.    Head: normocephalic and atraumatic  Eyes: anicteric sclerae  ENT: oropharynx clear and moist with normal mucous membranes. No oral thrush  Lungs: normal respiratory effort  Abdomen: soft, no tenderness  No leg edema  No erythema, no tenderness  Left axilla with resolved swelling erythema. No fluctuance  Bilateral hands with deformed joints secondary to rheumatoid arthritis  Uses a cane for ambulation    Last blood work done in October 27, 2022 was reviewed  CBC is within normal  Creatinine of 1.19  On this date 1/16/2023 I have spent 30 minutes reviewing previous notes, test results and face to face with the patient discussing the diagnosis and importance of compliance with the treatment plan as well as documenting on the day of the visit. An electronic signature was used to authenticate this note.     --Crissy Fung MD

## 2023-01-18 ENCOUNTER — OFFICE VISIT (OUTPATIENT)
Dept: PHYSICAL MEDICINE AND REHAB | Age: 68
End: 2023-01-18
Payer: MEDICARE

## 2023-01-18 VITALS
BODY MASS INDEX: 33.65 KG/M2 | DIASTOLIC BLOOD PRESSURE: 82 MMHG | WEIGHT: 222 LBS | SYSTOLIC BLOOD PRESSURE: 160 MMHG | HEIGHT: 68 IN

## 2023-01-18 DIAGNOSIS — M54.42 CHRONIC BILATERAL LOW BACK PAIN WITH BILATERAL SCIATICA: ICD-10-CM

## 2023-01-18 DIAGNOSIS — G89.29 CHRONIC RIGHT SHOULDER PAIN: Primary | ICD-10-CM

## 2023-01-18 DIAGNOSIS — M48.062 SPINAL STENOSIS OF LUMBAR REGION WITH NEUROGENIC CLAUDICATION: ICD-10-CM

## 2023-01-18 DIAGNOSIS — R76.8 ANA POSITIVE: ICD-10-CM

## 2023-01-18 DIAGNOSIS — M25.511 CHRONIC RIGHT SHOULDER PAIN: Primary | ICD-10-CM

## 2023-01-18 DIAGNOSIS — Z79.899 HIGH RISK MEDICATION USE: ICD-10-CM

## 2023-01-18 DIAGNOSIS — M05.79 RHEUMATOID ARTHRITIS INVOLVING MULTIPLE SITES WITH POSITIVE RHEUMATOID FACTOR (HCC): ICD-10-CM

## 2023-01-18 DIAGNOSIS — M54.2 CERVICALGIA: ICD-10-CM

## 2023-01-18 DIAGNOSIS — G57.93 NEUROPATHY INVOLVING BOTH LOWER EXTREMITIES: ICD-10-CM

## 2023-01-18 DIAGNOSIS — G60.8 OTHER HEREDITARY AND IDIOPATHIC NEUROPATHIES: ICD-10-CM

## 2023-01-18 DIAGNOSIS — M54.41 CHRONIC BILATERAL LOW BACK PAIN WITH BILATERAL SCIATICA: ICD-10-CM

## 2023-01-18 DIAGNOSIS — M15.9 PRIMARY OSTEOARTHRITIS INVOLVING MULTIPLE JOINTS: ICD-10-CM

## 2023-01-18 DIAGNOSIS — G89.29 CHRONIC BILATERAL LOW BACK PAIN WITH BILATERAL SCIATICA: ICD-10-CM

## 2023-01-18 DIAGNOSIS — M46.46 LUMBAR DISCITIS: ICD-10-CM

## 2023-01-18 PROCEDURE — G8417 CALC BMI ABV UP PARAM F/U: HCPCS | Performed by: PHYSICAL MEDICINE & REHABILITATION

## 2023-01-18 PROCEDURE — 3077F SYST BP >= 140 MM HG: CPT | Performed by: PHYSICAL MEDICINE & REHABILITATION

## 2023-01-18 PROCEDURE — 3017F COLORECTAL CA SCREEN DOC REV: CPT | Performed by: PHYSICAL MEDICINE & REHABILITATION

## 2023-01-18 PROCEDURE — 3079F DIAST BP 80-89 MM HG: CPT | Performed by: PHYSICAL MEDICINE & REHABILITATION

## 2023-01-18 PROCEDURE — 99214 OFFICE O/P EST MOD 30 MIN: CPT | Performed by: PHYSICAL MEDICINE & REHABILITATION

## 2023-01-18 PROCEDURE — 1090F PRES/ABSN URINE INCON ASSESS: CPT | Performed by: PHYSICAL MEDICINE & REHABILITATION

## 2023-01-18 PROCEDURE — 1036F TOBACCO NON-USER: CPT | Performed by: PHYSICAL MEDICINE & REHABILITATION

## 2023-01-18 PROCEDURE — G8399 PT W/DXA RESULTS DOCUMENT: HCPCS | Performed by: PHYSICAL MEDICINE & REHABILITATION

## 2023-01-18 PROCEDURE — 1123F ACP DISCUSS/DSCN MKR DOCD: CPT | Performed by: PHYSICAL MEDICINE & REHABILITATION

## 2023-01-18 PROCEDURE — G8484 FLU IMMUNIZE NO ADMIN: HCPCS | Performed by: PHYSICAL MEDICINE & REHABILITATION

## 2023-01-18 PROCEDURE — G8427 DOCREV CUR MEDS BY ELIG CLIN: HCPCS | Performed by: PHYSICAL MEDICINE & REHABILITATION

## 2023-01-18 RX ORDER — OXYCODONE AND ACETAMINOPHEN 7.5; 325 MG/1; MG/1
1 TABLET ORAL EVERY 6 HOURS PRN
Qty: 120 TABLET | Refills: 0 | Status: SHIPPED | OUTPATIENT
Start: 2023-02-09 | End: 2023-03-11

## 2023-01-18 RX ORDER — TOPIRAMATE 25 MG/1
TABLET ORAL
Qty: 90 TABLET | Refills: 0 | Status: SHIPPED | OUTPATIENT
Start: 2023-01-18

## 2023-01-24 ENCOUNTER — PROCEDURE VISIT (OUTPATIENT)
Dept: PHYSICAL MEDICINE AND REHAB | Age: 68
End: 2023-01-24

## 2023-01-24 DIAGNOSIS — M54.41 CHRONIC BILATERAL LOW BACK PAIN WITH BILATERAL SCIATICA: ICD-10-CM

## 2023-01-24 DIAGNOSIS — G89.29 CHRONIC BILATERAL LOW BACK PAIN WITH BILATERAL SCIATICA: ICD-10-CM

## 2023-01-24 DIAGNOSIS — M54.42 CHRONIC BILATERAL LOW BACK PAIN WITH BILATERAL SCIATICA: ICD-10-CM

## 2023-01-24 DIAGNOSIS — M48.062 SPINAL STENOSIS OF LUMBAR REGION WITH NEUROGENIC CLAUDICATION: ICD-10-CM

## 2023-01-24 DIAGNOSIS — M54.31 SCIATICA OF RIGHT SIDE: Primary | ICD-10-CM

## 2023-01-24 RX ORDER — CYANOCOBALAMIN 1000 UG/ML
1000 INJECTION, SOLUTION INTRAMUSCULAR; SUBCUTANEOUS ONCE
Status: COMPLETED | OUTPATIENT
Start: 2023-01-24 | End: 2023-01-24

## 2023-01-24 RX ORDER — LIDOCAINE HYDROCHLORIDE 10 MG/ML
12 INJECTION, SOLUTION INFILTRATION; PERINEURAL ONCE
Status: COMPLETED | OUTPATIENT
Start: 2023-01-24 | End: 2023-01-24

## 2023-01-24 RX ADMIN — CYANOCOBALAMIN 1000 MCG: 1000 INJECTION, SOLUTION INTRAMUSCULAR; SUBCUTANEOUS at 11:59

## 2023-01-24 RX ADMIN — LIDOCAINE HYDROCHLORIDE 12 ML: 10 INJECTION, SOLUTION INFILTRATION; PERINEURAL at 12:00

## 2023-01-24 NOTE — PROGRESS NOTES
Patient here for right sciatic injection with U/S. Patient taken back to exam room, and placed on drape locking stool. Areas to be injected marked appropriately, and cleansed with alcohol. 12cc of 1% Lidocaine, and 1cc of B12 to be injected by provider.

## 2023-02-21 ENCOUNTER — PROCEDURE VISIT (OUTPATIENT)
Dept: PHYSICAL MEDICINE AND REHAB | Age: 68
End: 2023-02-21

## 2023-02-21 DIAGNOSIS — M54.31 SCIATICA OF RIGHT SIDE: Primary | ICD-10-CM

## 2023-02-21 RX ORDER — CYANOCOBALAMIN 1000 UG/ML
1000 INJECTION, SOLUTION INTRAMUSCULAR; SUBCUTANEOUS ONCE
Status: COMPLETED | OUTPATIENT
Start: 2023-02-21 | End: 2023-02-21

## 2023-02-21 RX ORDER — LIDOCAINE HYDROCHLORIDE 10 MG/ML
12 INJECTION, SOLUTION INFILTRATION; PERINEURAL ONCE
Status: COMPLETED | OUTPATIENT
Start: 2023-02-21 | End: 2023-02-21

## 2023-02-21 RX ADMIN — CYANOCOBALAMIN 1000 MCG: 1000 INJECTION, SOLUTION INTRAMUSCULAR; SUBCUTANEOUS at 13:50

## 2023-02-21 RX ADMIN — LIDOCAINE HYDROCHLORIDE 12 ML: 10 INJECTION, SOLUTION INFILTRATION; PERINEURAL at 13:48

## 2023-03-08 ASSESSMENT — ENCOUNTER SYMPTOMS
BACK PAIN: 1
PHOTOPHOBIA: 1

## 2023-03-08 NOTE — PROGRESS NOTES
[email or web address]. Social Determinants of Health     Financial Resource Strain: Low Risk     Difficulty of Paying Living Expenses: Not very hard   Food Insecurity: No Food Insecurity    Worried About Running Out of Food in the Last Year: Never true    Ran Out of Food in the Last Year: Never true   Transportation Needs: No Transportation Needs    Lack of Transportation (Medical): No    Lack of Transportation (Non-Medical): No     Family History   Problem Relation Age of Onset    Heart Disease Father     High Cholesterol Father     High Blood Pressure Father     Stroke Mother     High Blood Pressure Mother     High Blood Pressure Brother        Allergies   Allergen Reactions    Latex Swelling     Swelling of hands with wearing latex gloves    Isosorbide Other (See Comments)    Lisinopril Other (See Comments)    Norvasc [Amlodipine Besylate] Other (See Comments)     swelling    Ranolazine Headaches and Nausea Only    Keflex [Cephalexin] Rash       Review of Systems   Constitutional:  Positive for activity change and fatigue. Negative for chills, diaphoresis and fever. HENT:  Negative for congestion, ear discharge, ear pain, hearing loss, nosebleeds, sore throat and tinnitus. Eyes:  Positive for photophobia. Negative for pain and redness. Respiratory:  Negative for cough, shortness of breath, wheezing and stridor. Shortness of breath on exertion   Cardiovascular:  Negative for chest pain, palpitations and leg swelling. Gastrointestinal:  Positive for constipation. Negative for abdominal pain, blood in stool, diarrhea, nausea and vomiting. Endocrine: Negative for polydipsia. Genitourinary:  Positive for pelvic pain. Negative for bladder incontinence, dysuria, flank pain, frequency, hematuria and urgency. Musculoskeletal:  Positive for back pain, gait problem, myalgias and neck pain. Skin:  Negative for rash. Allergic/Immunologic: Positive for immunocompromised state.  Negative for environmental

## 2023-03-13 DIAGNOSIS — G89.29 CHRONIC BILATERAL LOW BACK PAIN WITH BILATERAL SCIATICA: ICD-10-CM

## 2023-03-13 DIAGNOSIS — Z79.899 HIGH RISK MEDICATION USE: ICD-10-CM

## 2023-03-13 DIAGNOSIS — M05.79 RHEUMATOID ARTHRITIS INVOLVING MULTIPLE SITES WITH POSITIVE RHEUMATOID FACTOR (HCC): ICD-10-CM

## 2023-03-13 DIAGNOSIS — M54.42 CHRONIC BILATERAL LOW BACK PAIN WITH BILATERAL SCIATICA: ICD-10-CM

## 2023-03-13 DIAGNOSIS — M54.2 CERVICALGIA: ICD-10-CM

## 2023-03-13 DIAGNOSIS — R76.8 ANA POSITIVE: ICD-10-CM

## 2023-03-13 DIAGNOSIS — M48.062 SPINAL STENOSIS OF LUMBAR REGION WITH NEUROGENIC CLAUDICATION: ICD-10-CM

## 2023-03-13 DIAGNOSIS — M54.41 CHRONIC BILATERAL LOW BACK PAIN WITH BILATERAL SCIATICA: ICD-10-CM

## 2023-03-13 RX ORDER — OXYCODONE AND ACETAMINOPHEN 7.5; 325 MG/1; MG/1
1 TABLET ORAL EVERY 6 HOURS PRN
Qty: 120 TABLET | Refills: 0 | Status: SHIPPED | OUTPATIENT
Start: 2023-03-13 | End: 2023-04-12

## 2023-03-27 DIAGNOSIS — M46.26 OSTEOMYELITIS OF LUMBAR VERTEBRA (HCC): ICD-10-CM

## 2023-03-27 DIAGNOSIS — M05.79 RHEUMATOID ARTHRITIS INVOLVING MULTIPLE SITES WITH POSITIVE RHEUMATOID FACTOR (HCC): ICD-10-CM

## 2023-03-27 DIAGNOSIS — G57.93 NEUROPATHY INVOLVING BOTH LOWER EXTREMITIES: ICD-10-CM

## 2023-03-27 DIAGNOSIS — M15.9 PRIMARY OSTEOARTHRITIS INVOLVING MULTIPLE JOINTS: ICD-10-CM

## 2023-03-28 RX ORDER — GABAPENTIN 300 MG/1
CAPSULE ORAL
Qty: 270 CAPSULE | Refills: 0 | Status: SHIPPED | OUTPATIENT
Start: 2023-03-28 | End: 2023-06-26

## 2023-04-03 ENCOUNTER — OFFICE VISIT (OUTPATIENT)
Dept: PHYSICAL MEDICINE AND REHAB | Age: 68
End: 2023-04-03
Payer: MEDICARE

## 2023-04-03 VITALS
DIASTOLIC BLOOD PRESSURE: 69 MMHG | HEIGHT: 68 IN | BODY MASS INDEX: 33.65 KG/M2 | SYSTOLIC BLOOD PRESSURE: 129 MMHG | WEIGHT: 222 LBS

## 2023-04-03 DIAGNOSIS — M79.10 MYALGIA: ICD-10-CM

## 2023-04-03 DIAGNOSIS — Z79.899 HIGH RISK MEDICATION USE: ICD-10-CM

## 2023-04-03 DIAGNOSIS — M54.42 CHRONIC BILATERAL LOW BACK PAIN WITH BILATERAL SCIATICA: ICD-10-CM

## 2023-04-03 DIAGNOSIS — M54.2 CERVICALGIA: ICD-10-CM

## 2023-04-03 DIAGNOSIS — M46.46 LUMBAR DISCITIS: ICD-10-CM

## 2023-04-03 DIAGNOSIS — G89.29 CHRONIC BILATERAL LOW BACK PAIN WITH BILATERAL SCIATICA: ICD-10-CM

## 2023-04-03 DIAGNOSIS — M48.062 SPINAL STENOSIS OF LUMBAR REGION WITH NEUROGENIC CLAUDICATION: Primary | ICD-10-CM

## 2023-04-03 DIAGNOSIS — M54.41 CHRONIC BILATERAL LOW BACK PAIN WITH BILATERAL SCIATICA: ICD-10-CM

## 2023-04-03 DIAGNOSIS — G60.8 OTHER HEREDITARY AND IDIOPATHIC NEUROPATHIES: ICD-10-CM

## 2023-04-03 DIAGNOSIS — F11.20 OPIOID DEPENDENCE WITH CURRENT USE (HCC): ICD-10-CM

## 2023-04-03 DIAGNOSIS — I20.9 ANGINA PECTORIS, UNSPECIFIED (HCC): ICD-10-CM

## 2023-04-03 DIAGNOSIS — M05.79 RHEUMATOID ARTHRITIS INVOLVING MULTIPLE SITES WITH POSITIVE RHEUMATOID FACTOR (HCC): ICD-10-CM

## 2023-04-03 DIAGNOSIS — R76.8 ANA POSITIVE: ICD-10-CM

## 2023-04-03 PROBLEM — I16.0 HYPERTENSIVE URGENCY, MALIGNANT: Status: ACTIVE | Noted: 2023-03-28

## 2023-04-03 PROBLEM — I25.119 ATHEROSCLEROTIC HEART DISEASE OF NATIVE CORONARY ARTERY WITH UNSPECIFIED ANGINA PECTORIS (HCC): Status: ACTIVE | Noted: 2023-04-03

## 2023-04-03 PROBLEM — D63.1 ANEMIA OF CHRONIC RENAL FAILURE, STAGE 3A (HCC): Status: ACTIVE | Noted: 2023-03-28

## 2023-04-03 PROBLEM — N25.81 SECONDARY RENAL HYPERPARATHYROIDISM (HCC): Status: ACTIVE | Noted: 2023-03-28

## 2023-04-03 PROBLEM — N18.31 ANEMIA OF CHRONIC RENAL FAILURE, STAGE 3A (HCC): Status: ACTIVE | Noted: 2023-03-28

## 2023-04-03 PROCEDURE — G8399 PT W/DXA RESULTS DOCUMENT: HCPCS | Performed by: PHYSICAL MEDICINE & REHABILITATION

## 2023-04-03 PROCEDURE — G8417 CALC BMI ABV UP PARAM F/U: HCPCS | Performed by: PHYSICAL MEDICINE & REHABILITATION

## 2023-04-03 PROCEDURE — 3017F COLORECTAL CA SCREEN DOC REV: CPT | Performed by: PHYSICAL MEDICINE & REHABILITATION

## 2023-04-03 PROCEDURE — 1123F ACP DISCUSS/DSCN MKR DOCD: CPT | Performed by: PHYSICAL MEDICINE & REHABILITATION

## 2023-04-03 PROCEDURE — 1090F PRES/ABSN URINE INCON ASSESS: CPT | Performed by: PHYSICAL MEDICINE & REHABILITATION

## 2023-04-03 PROCEDURE — 99214 OFFICE O/P EST MOD 30 MIN: CPT | Performed by: PHYSICAL MEDICINE & REHABILITATION

## 2023-04-03 PROCEDURE — 3078F DIAST BP <80 MM HG: CPT | Performed by: PHYSICAL MEDICINE & REHABILITATION

## 2023-04-03 PROCEDURE — 1036F TOBACCO NON-USER: CPT | Performed by: PHYSICAL MEDICINE & REHABILITATION

## 2023-04-03 PROCEDURE — 3074F SYST BP LT 130 MM HG: CPT | Performed by: PHYSICAL MEDICINE & REHABILITATION

## 2023-04-03 PROCEDURE — G8427 DOCREV CUR MEDS BY ELIG CLIN: HCPCS | Performed by: PHYSICAL MEDICINE & REHABILITATION

## 2023-04-03 RX ORDER — PSEUDOEPHED/ACETAMINOPH/DIPHEN 30MG-500MG
TABLET ORAL
COMMUNITY
Start: 2023-02-03

## 2023-04-03 RX ORDER — OXYCODONE HYDROCHLORIDE 5 MG/1
5 TABLET ORAL EVERY 6 HOURS PRN
COMMUNITY
Start: 2023-02-08

## 2023-04-03 RX ORDER — PREDNISONE 1 MG/1
TABLET ORAL
COMMUNITY
Start: 2023-02-21

## 2023-04-03 RX ORDER — LOSARTAN POTASSIUM 25 MG/1
TABLET ORAL
COMMUNITY
Start: 2023-03-10

## 2023-04-03 RX ORDER — OXYCODONE AND ACETAMINOPHEN 7.5; 325 MG/1; MG/1
1 TABLET ORAL EVERY 6 HOURS PRN
Qty: 120 TABLET | Refills: 0 | Status: SHIPPED | OUTPATIENT
Start: 2023-04-10 | End: 2023-05-10

## 2023-04-03 RX ORDER — FUROSEMIDE 40 MG/1
40 TABLET ORAL DAILY
COMMUNITY
Start: 2023-03-13

## 2023-04-03 RX ORDER — TORSEMIDE 20 MG/1
40 TABLET ORAL DAILY
COMMUNITY
Start: 2023-03-10

## 2023-04-03 ASSESSMENT — ENCOUNTER SYMPTOMS
ABDOMINAL PAIN: 0
BLOOD IN STOOL: 0
SORE THROAT: 0
WHEEZING: 0
CONSTIPATION: 1
EYE PAIN: 0
EYE REDNESS: 0
NAUSEA: 0
COUGH: 0
VOMITING: 0
DIARRHEA: 0
SHORTNESS OF BREATH: 0
STRIDOR: 0

## 2023-04-24 ENCOUNTER — PROCEDURE VISIT (OUTPATIENT)
Dept: PHYSICAL MEDICINE AND REHAB | Age: 68
End: 2023-04-24
Payer: MEDICARE

## 2023-04-24 DIAGNOSIS — M54.31 SCIATICA, RIGHT SIDE: Primary | ICD-10-CM

## 2023-04-24 DIAGNOSIS — M48.062 SPINAL STENOSIS OF LUMBAR REGION WITH NEUROGENIC CLAUDICATION: ICD-10-CM

## 2023-04-24 DIAGNOSIS — M46.46 LUMBAR DISCITIS: ICD-10-CM

## 2023-04-24 PROCEDURE — 96372 THER/PROPH/DIAG INJ SC/IM: CPT | Performed by: PHYSICAL MEDICINE & REHABILITATION

## 2023-04-24 PROCEDURE — 64445 NJX AA&/STRD SCIATIC NRV IMG: CPT | Performed by: PHYSICAL MEDICINE & REHABILITATION

## 2023-04-24 RX ORDER — CYANOCOBALAMIN 1000 UG/ML
1000 INJECTION, SOLUTION INTRAMUSCULAR; SUBCUTANEOUS ONCE
Status: COMPLETED | OUTPATIENT
Start: 2023-04-24 | End: 2023-04-24

## 2023-04-24 RX ORDER — LIDOCAINE HYDROCHLORIDE 20 MG/ML
5 INJECTION, SOLUTION INFILTRATION; PERINEURAL ONCE
Status: COMPLETED | OUTPATIENT
Start: 2023-04-24 | End: 2023-04-24

## 2023-04-24 RX ORDER — LIDOCAINE HYDROCHLORIDE 10 MG/ML
7 INJECTION, SOLUTION INFILTRATION; PERINEURAL ONCE
Status: COMPLETED | OUTPATIENT
Start: 2023-04-24 | End: 2023-04-24

## 2023-04-24 RX ADMIN — LIDOCAINE HYDROCHLORIDE 7 ML: 10 INJECTION, SOLUTION INFILTRATION; PERINEURAL at 16:18

## 2023-04-24 RX ADMIN — LIDOCAINE HYDROCHLORIDE 5 ML: 20 INJECTION, SOLUTION INFILTRATION; PERINEURAL at 16:18

## 2023-04-24 RX ADMIN — CYANOCOBALAMIN 1000 MCG: 1000 INJECTION, SOLUTION INTRAMUSCULAR; SUBCUTANEOUS at 16:17

## 2023-04-25 ENCOUNTER — OFFICE VISIT (OUTPATIENT)
Dept: FAMILY MEDICINE CLINIC | Age: 68
End: 2023-04-25

## 2023-04-25 ENCOUNTER — HOSPITAL ENCOUNTER (OUTPATIENT)
Dept: GENERAL RADIOLOGY | Age: 68
Discharge: HOME | End: 2023-04-27
Payer: MEDICARE

## 2023-04-25 VITALS
BODY MASS INDEX: 32.43 KG/M2 | DIASTOLIC BLOOD PRESSURE: 60 MMHG | WEIGHT: 214 LBS | SYSTOLIC BLOOD PRESSURE: 122 MMHG | OXYGEN SATURATION: 100 % | HEART RATE: 73 BPM | HEIGHT: 68 IN | TEMPERATURE: 97.6 F

## 2023-04-25 DIAGNOSIS — M25.561 CHRONIC PAIN OF RIGHT KNEE: ICD-10-CM

## 2023-04-25 DIAGNOSIS — I10 PRIMARY HYPERTENSION: ICD-10-CM

## 2023-04-25 DIAGNOSIS — R93.89 ABNORMAL FINDINGS ON DIAGNOSTIC IMAGING OF OTHER SPECIFIED BODY STRUCTURES: ICD-10-CM

## 2023-04-25 DIAGNOSIS — M10.00 ACUTE IDIOPATHIC GOUT, UNSPECIFIED SITE: ICD-10-CM

## 2023-04-25 DIAGNOSIS — R79.89 ABNORMAL TSH: ICD-10-CM

## 2023-04-25 DIAGNOSIS — G89.29 CHRONIC PAIN OF RIGHT KNEE: ICD-10-CM

## 2023-04-25 DIAGNOSIS — M05.79 RHEUMATOID ARTHRITIS INVOLVING MULTIPLE SITES WITH POSITIVE RHEUMATOID FACTOR (HCC): Primary | ICD-10-CM

## 2023-04-25 DIAGNOSIS — N18.30 STAGE 3 CHRONIC KIDNEY DISEASE, UNSPECIFIED WHETHER STAGE 3A OR 3B CKD (HCC): ICD-10-CM

## 2023-04-25 LAB
TSH REFLEX: 3.41 UIU/ML (ref 0.44–3.86)
URATE SERPL-MCNC: 7.5 MG/DL (ref 2.4–5.7)

## 2023-04-25 PROCEDURE — 73560 X-RAY EXAM OF KNEE 1 OR 2: CPT

## 2023-04-25 SDOH — ECONOMIC STABILITY: HOUSING INSECURITY
IN THE LAST 12 MONTHS, WAS THERE A TIME WHEN YOU DID NOT HAVE A STEADY PLACE TO SLEEP OR SLEPT IN A SHELTER (INCLUDING NOW)?: NO

## 2023-04-25 SDOH — ECONOMIC STABILITY: FOOD INSECURITY: WITHIN THE PAST 12 MONTHS, YOU WORRIED THAT YOUR FOOD WOULD RUN OUT BEFORE YOU GOT MONEY TO BUY MORE.: NEVER TRUE

## 2023-04-25 SDOH — ECONOMIC STABILITY: INCOME INSECURITY: HOW HARD IS IT FOR YOU TO PAY FOR THE VERY BASICS LIKE FOOD, HOUSING, MEDICAL CARE, AND HEATING?: NOT VERY HARD

## 2023-04-25 SDOH — ECONOMIC STABILITY: FOOD INSECURITY: WITHIN THE PAST 12 MONTHS, THE FOOD YOU BOUGHT JUST DIDN'T LAST AND YOU DIDN'T HAVE MONEY TO GET MORE.: NEVER TRUE

## 2023-04-25 ASSESSMENT — ENCOUNTER SYMPTOMS
CHEST TIGHTNESS: 0
CHOKING: 0
SHORTNESS OF BREATH: 0

## 2023-04-25 NOTE — PROGRESS NOTES
Subjective  Amanda Liu, 69 y.o. female presents today with:  Chief Complaint   Patient presents with    Follow-up     Patient presents today for 6 month f/up. Patient is c/o right knee pain since last year. HPI  S/p right shoulder 2/13 arthroplasty 2/13 [facility] - still receving PT- h/o RA  Recurrent epistaxis 3/2022 - had surgery kathy vitale md - no recurrence  Right knee pain for the past yr- occ buckling no falls  Stage 3 ckd -Labs thru nephrology indicated abnormal tsh and uric acid levels - state she has been with le edema - elevation in creatinine following treatment with lasix  Review of Systems   Respiratory:  Negative for choking, chest tightness and shortness of breath. Cardiovascular:  Positive for leg swelling. All other systems reviewed and are negative.       Past Medical History:   Diagnosis Date    Abnormal electromyogram (EMG) 8/19/09    SENSORIMOTOR NEUROPATHY OF BLE, RIGHT WORSE THAN LEFT, SUSPICIOUS FOR RIGHT S1 RADIC    Angina pectoris (HCC)     Angina pectoris ([address] 07/2019    sees Dr. Marbella Valerio    Ankle pain     Atherosclerotic heart disease of native coronary artery with unspecified angina pectoris 4/3/2023    Bleeding ulcer     Colon polyp 4/3/2017    Dermatitis     Fibromyalgia     Foot pain     Hyperlipidemia     Hypertension     Infection of intervertebral disc (pyogenic), lumbar region ([address] 8/13/2018    Low back pain     MRSA (methicillin resistant staph aureus) culture positive 2011    Nasal bleeding     Neck pain     Neuropathy     Obesity     Osteoarthritis     PSVT (paroxysmal supraventricular tachycardia) ([address]     Shingles outbreak 2012    SI (sacroiliac) pain     Stage 3 chronic kidney disease, unspecified whether stage 3a or 3b CKD ([address] 4/13/2022    Stenosis     Vitamin D deficiency      Past Surgical History:   Procedure Laterality Date    CARDIAC CATHETERIZATION  10/2016    CARDIOVERSION      2001 x3    CAUDAL BLOCK N/A 12/13/2022    performed by Dr. Cristi Bowser

## 2023-04-26 DIAGNOSIS — M05.79 RHEUMATOID ARTHRITIS INVOLVING MULTIPLE SITES WITH POSITIVE RHEUMATOID FACTOR (HCC): Primary | ICD-10-CM

## 2023-04-26 DIAGNOSIS — M48.062 SPINAL STENOSIS OF LUMBAR REGION WITH NEUROGENIC CLAUDICATION: ICD-10-CM

## 2023-04-26 DIAGNOSIS — M25.561 CHRONIC PAIN OF BOTH KNEES: ICD-10-CM

## 2023-04-26 DIAGNOSIS — Z79.899 HIGH RISK MEDICATION USE: ICD-10-CM

## 2023-04-26 DIAGNOSIS — M25.562 CHRONIC PAIN OF BOTH KNEES: ICD-10-CM

## 2023-04-26 DIAGNOSIS — G89.29 CHRONIC PAIN OF BOTH KNEES: ICD-10-CM

## 2023-04-26 DIAGNOSIS — M15.9 PRIMARY OSTEOARTHRITIS INVOLVING MULTIPLE JOINTS: ICD-10-CM

## 2023-04-26 DIAGNOSIS — M46.46 LUMBAR DISCITIS: ICD-10-CM

## 2023-05-01 RX ORDER — TOPIRAMATE 25 MG/1
TABLET ORAL
Qty: 90 TABLET | Refills: 0 | Status: SHIPPED | OUTPATIENT
Start: 2023-05-01

## 2023-05-04 ENCOUNTER — HOSPITAL ENCOUNTER (EMERGENCY)
Age: 68
Discharge: HOME | End: 2023-05-04
Attending: EMERGENCY MEDICINE
Payer: MEDICARE

## 2023-05-04 VITALS
SYSTOLIC BLOOD PRESSURE: 165 MMHG | BODY MASS INDEX: 32.39 KG/M2 | OXYGEN SATURATION: 99 % | WEIGHT: 213 LBS | TEMPERATURE: 96.3 F | DIASTOLIC BLOOD PRESSURE: 66 MMHG | RESPIRATION RATE: 18 BRPM | HEART RATE: 77 BPM

## 2023-05-04 DIAGNOSIS — R07.9 CHEST PAIN, UNSPECIFIED TYPE: Primary | ICD-10-CM

## 2023-05-04 DIAGNOSIS — R55 SYMPTOM OF SYNCOPE: ICD-10-CM

## 2023-05-04 LAB
ALBUMIN SERPL-MCNC: 4 G/DL (ref 3.5–4.6)
ALP SERPL-CCNC: 130 U/L (ref 40–130)
ALT SERPL-CCNC: 14 U/L (ref 0–33)
ANION GAP SERPL CALCULATED.3IONS-SCNC: 16 MEQ/L (ref 9–15)
AST SERPL-CCNC: 20 U/L (ref 0–35)
BASOPHILS # BLD: 0 K/UL (ref 0–0.2)
BASOPHILS NFR BLD: 0.8 %
BILIRUB SERPL-MCNC: 0.3 MG/DL (ref 0.2–0.7)
BUN SERPL-MCNC: 34 MG/DL (ref 8–23)
CALCIUM SERPL-MCNC: 9.1 MG/DL (ref 8.5–9.9)
CHLORIDE SERPL-SCNC: 100 MEQ/L (ref 95–107)
CO2 SERPL-SCNC: 22 MEQ/L (ref 20–31)
CREAT SERPL-MCNC: 1.58 MG/DL (ref 0.5–0.9)
EKG ATRIAL RATE: 64 BPM
EKG P AXIS: 57 DEGREES
EKG P-R INTERVAL: 194 MS
EKG Q-T INTERVAL: 446 MS
EKG QRS DURATION: 128 MS
EKG QTC CALCULATION (BAZETT): 460 MS
EKG R AXIS: -35 DEGREES
EKG T AXIS: 31 DEGREES
EKG VENTRICULAR RATE: 64 BPM
EOSINOPHIL # BLD: 0.2 K/UL (ref 0–0.7)
EOSINOPHIL NFR BLD: 2.9 %
ERYTHROCYTE [DISTWIDTH] IN BLOOD BY AUTOMATED COUNT: 14.2 % (ref 11.5–14.5)
GLOBULIN SER CALC-MCNC: 2.4 G/DL (ref 2.3–3.5)
GLUCOSE SERPL-MCNC: 111 MG/DL (ref 70–99)
HCT VFR BLD AUTO: 33.2 % (ref 37–47)
HGB BLD-MCNC: 10.8 G/DL (ref 12–16)
INR PPP: 1.1
LYMPHOCYTES # BLD: 1.1 K/UL (ref 1–4.8)
LYMPHOCYTES NFR BLD: 20.4 %
MCH RBC QN AUTO: 28.2 PG (ref 27–31.3)
MCHC RBC AUTO-ENTMCNC: 32.6 % (ref 33–37)
MCV RBC AUTO: 86.4 FL (ref 79.4–94.8)
MONOCYTES # BLD: 0.6 K/UL (ref 0.2–0.8)
MONOCYTES NFR BLD: 10.6 %
NEUTROPHILS # BLD: 3.5 K/UL (ref 1.4–6.5)
NEUTS SEG NFR BLD: 65.3 %
PLATELET # BLD AUTO: 179 K/UL (ref 130–400)
POTASSIUM SERPL-SCNC: 4.4 MEQ/L (ref 3.4–4.9)
PROT SERPL-MCNC: 6.4 G/DL (ref 6.3–8)
PROTHROMBIN TIME: 13.8 SEC (ref 12.3–14.9)
RBC # BLD AUTO: 3.85 M/UL (ref 4.2–5.4)
SODIUM SERPL-SCNC: 138 MEQ/L (ref 135–144)
TROPONIN T SERPL-MCNC: <0.01 NG/ML (ref 0–0.01)
WBC # BLD AUTO: 5.4 K/UL (ref 4.8–10.8)

## 2023-05-04 PROCEDURE — 85610 PROTHROMBIN TIME: CPT

## 2023-05-04 PROCEDURE — 84484 ASSAY OF TROPONIN QUANT: CPT

## 2023-05-04 PROCEDURE — 85025 COMPLETE CBC W/AUTO DIFF WBC: CPT

## 2023-05-04 PROCEDURE — 93005 ELECTROCARDIOGRAM TRACING: CPT | Performed by: EMERGENCY MEDICINE

## 2023-05-04 PROCEDURE — 80053 COMPREHEN METABOLIC PANEL: CPT

## 2023-05-04 PROCEDURE — 99284 EMERGENCY DEPT VISIT MOD MDM: CPT

## 2023-05-04 PROCEDURE — 93010 ELECTROCARDIOGRAM REPORT: CPT | Performed by: INTERNAL MEDICINE

## 2023-05-04 PROCEDURE — 36415 COLL VENOUS BLD VENIPUNCTURE: CPT

## 2023-05-04 NOTE — ED PROVIDER NOTES
[facility] ED  EMERGENCY DEPARTMENT ENCOUNTER      Pt Name: Quentin Vance  MRN: [id number]  [place] 1955  Date of evaluation: 5/4/2023  Provider: Deshawn Lawson DO    CHIEF COMPLAINT       Chief Complaint   Patient presents with    Loss of Consciousness    Chest Pain         HISTORY OF PRESENT ILLNESS   (Location/Symptom, Timing/Onset, Context/Setting, Quality, Duration, Modifying Factors, Severity)  Note limiting factors. Quentin Vance is a 69 y.o. female who presents to the emergency department . Patient brought into the ER after passing out at [address] [place] [address]. Patient gets angina at times and Dr. Jessica Chirinos is well aware of that. She developed her typical angina in the chest going into her right jaw. She took 1 nitroglycerin and pain was improving but it was still there a little bit. She decided to take a second nitro. When medical staff went back into the room to see her she had passed out in the chair. She did not fall to the ground. Systolic blood pressure was 80. They wanted her to come to the ER because she passed out after getting chest pain. Patient tells me that she has had several heart catheterizations and Dr. Jessica Chirinos has told her that she does not have coronary artery disease. She tells me that Dr. Veronica Cristobal explained to her that she probably has small vessels and that is why she sometimes gets angina. She has no chest pain at this time and feels fine. HPI    Nursing Notes were reviewed. REVIEW OF SYSTEMS    (2-9 systems for level 4, 10 or more for level 5)     Review of Systems    Except as noted above the remainder of the review of systems was reviewed and negative.        PAST MEDICAL HISTORY     Past Medical History:   Diagnosis Date    Abnormal electromyogram (EMG) 8/19/09    SENSORIMOTOR NEUROPATHY OF BLE, RIGHT WORSE THAN LEFT, SUSPICIOUS FOR RIGHT S1 RADIC    Angina pectoris (HCC)     Angina pectoris [facility] 07/2019    sees Dr. Brodie Perry    Ankle pain

## 2023-05-04 NOTE — ED TRIAGE NOTES
Pt presents c/o episode of syncope. Pt states she was at her PCPs office, had an episode of angina, took two Nitro, and began to feel dizzy and passed out. Pt states she did not fall, she was sitting in a chair. Pt did urinate on self during episode. Pt denies CP at this time. Pt A&Ox4, ABCs intact, GCS15, skin pwd.

## 2023-05-09 DIAGNOSIS — M05.79 RHEUMATOID ARTHRITIS INVOLVING MULTIPLE SITES WITH POSITIVE RHEUMATOID FACTOR (HCC): ICD-10-CM

## 2023-05-09 DIAGNOSIS — M54.42 CHRONIC BILATERAL LOW BACK PAIN WITH BILATERAL SCIATICA: ICD-10-CM

## 2023-05-09 DIAGNOSIS — Z79.899 HIGH RISK MEDICATION USE: ICD-10-CM

## 2023-05-09 DIAGNOSIS — G89.29 CHRONIC BILATERAL LOW BACK PAIN WITH BILATERAL SCIATICA: ICD-10-CM

## 2023-05-09 DIAGNOSIS — M54.2 CERVICALGIA: ICD-10-CM

## 2023-05-09 DIAGNOSIS — R76.8 ANA POSITIVE: ICD-10-CM

## 2023-05-09 DIAGNOSIS — M48.062 SPINAL STENOSIS OF LUMBAR REGION WITH NEUROGENIC CLAUDICATION: ICD-10-CM

## 2023-05-09 DIAGNOSIS — M54.41 CHRONIC BILATERAL LOW BACK PAIN WITH BILATERAL SCIATICA: ICD-10-CM

## 2023-05-10 RX ORDER — OXYCODONE AND ACETAMINOPHEN 7.5; 325 MG/1; MG/1
1 TABLET ORAL EVERY 6 HOURS PRN
Qty: 120 TABLET | Refills: 0 | Status: SHIPPED | OUTPATIENT
Start: 2023-05-10 | End: 2023-06-09

## 2023-05-15 ENCOUNTER — PROCEDURE VISIT (OUTPATIENT)
Dept: PHYSICAL MEDICINE AND REHAB | Age: 68
End: 2023-05-15
Payer: MEDICARE

## 2023-05-15 DIAGNOSIS — M79.10 MYALGIA: ICD-10-CM

## 2023-05-15 PROCEDURE — 20553 NJX 1/MLT TRIGGER POINTS 3/>: CPT | Performed by: PHYSICAL MEDICINE & REHABILITATION

## 2023-05-15 RX ORDER — CYANOCOBALAMIN 1000 UG/ML
1000 INJECTION, SOLUTION INTRAMUSCULAR; SUBCUTANEOUS ONCE
Status: COMPLETED | OUTPATIENT
Start: 2023-05-15 | End: 2023-05-15

## 2023-05-15 RX ORDER — LIDOCAINE HYDROCHLORIDE 10 MG/ML
15 INJECTION, SOLUTION INFILTRATION; PERINEURAL ONCE
Status: COMPLETED | OUTPATIENT
Start: 2023-05-15 | End: 2023-05-15

## 2023-05-15 RX ADMIN — LIDOCAINE HYDROCHLORIDE 15 ML: 10 INJECTION, SOLUTION INFILTRATION; PERINEURAL at 14:54

## 2023-05-15 RX ADMIN — CYANOCOBALAMIN 1000 MCG: 1000 INJECTION, SOLUTION INTRAMUSCULAR; SUBCUTANEOUS at 14:53

## 2023-06-01 DIAGNOSIS — R76.8 ANA POSITIVE: ICD-10-CM

## 2023-06-01 DIAGNOSIS — Z79.899 HIGH RISK MEDICATION USE: ICD-10-CM

## 2023-06-01 DIAGNOSIS — M54.41 CHRONIC BILATERAL LOW BACK PAIN WITH BILATERAL SCIATICA: ICD-10-CM

## 2023-06-01 DIAGNOSIS — M48.062 SPINAL STENOSIS OF LUMBAR REGION WITH NEUROGENIC CLAUDICATION: ICD-10-CM

## 2023-06-01 DIAGNOSIS — M54.2 CERVICALGIA: ICD-10-CM

## 2023-06-01 DIAGNOSIS — G89.29 CHRONIC BILATERAL LOW BACK PAIN WITH BILATERAL SCIATICA: ICD-10-CM

## 2023-06-01 DIAGNOSIS — M05.79 RHEUMATOID ARTHRITIS INVOLVING MULTIPLE SITES WITH POSITIVE RHEUMATOID FACTOR (HCC): ICD-10-CM

## 2023-06-01 DIAGNOSIS — M54.42 CHRONIC BILATERAL LOW BACK PAIN WITH BILATERAL SCIATICA: ICD-10-CM

## 2023-06-02 RX ORDER — OXYCODONE AND ACETAMINOPHEN 7.5; 325 MG/1; MG/1
1 TABLET ORAL EVERY 6 HOURS PRN
Qty: 120 TABLET | Refills: 0 | Status: SHIPPED | OUTPATIENT
Start: 2023-06-08 | End: 2023-07-08

## 2023-06-02 ASSESSMENT — ENCOUNTER SYMPTOMS
SORE THROAT: 0
NAUSEA: 0
VOMITING: 0
ABDOMINAL PAIN: 0
SHORTNESS OF BREATH: 0
CHEST TIGHTNESS: 0
EYE PAIN: 0

## 2023-06-05 PROBLEM — M06.9 RHEUMATOID ARTHRITIS (HCC): Status: ACTIVE | Noted: 2023-02-16

## 2023-06-05 PROBLEM — R04.0 EPISTAXIS: Status: ACTIVE | Noted: 2023-02-16

## 2023-06-05 PROBLEM — Z91.040 LATEX ALLERGY STATUS: Status: ACTIVE | Noted: 2023-02-16

## 2023-06-05 PROBLEM — K21.9 GASTRO-ESOPHAGEAL REFLUX DISEASE WITHOUT ESOPHAGITIS: Status: ACTIVE | Noted: 2023-02-16

## 2023-06-05 ASSESSMENT — ENCOUNTER SYMPTOMS
PHOTOPHOBIA: 1
ABDOMINAL PAIN: 1
BACK PAIN: 1

## 2023-06-21 ENCOUNTER — OFFICE VISIT (OUTPATIENT)
Dept: PHYSICAL MEDICINE AND REHAB | Age: 68
End: 2023-06-21
Payer: MEDICARE

## 2023-06-21 VITALS
WEIGHT: 213 LBS | SYSTOLIC BLOOD PRESSURE: 112 MMHG | DIASTOLIC BLOOD PRESSURE: 57 MMHG | BODY MASS INDEX: 32.28 KG/M2 | HEIGHT: 68 IN

## 2023-06-21 DIAGNOSIS — R76.8 ANA POSITIVE: ICD-10-CM

## 2023-06-21 DIAGNOSIS — M54.41 CHRONIC BILATERAL LOW BACK PAIN WITH BILATERAL SCIATICA: ICD-10-CM

## 2023-06-21 DIAGNOSIS — M48.062 SPINAL STENOSIS OF LUMBAR REGION WITH NEUROGENIC CLAUDICATION: Primary | ICD-10-CM

## 2023-06-21 DIAGNOSIS — M54.42 CHRONIC BILATERAL LOW BACK PAIN WITH BILATERAL SCIATICA: ICD-10-CM

## 2023-06-21 DIAGNOSIS — Z79.899 HIGH RISK MEDICATION USE: ICD-10-CM

## 2023-06-21 DIAGNOSIS — M54.2 CERVICALGIA: ICD-10-CM

## 2023-06-21 DIAGNOSIS — G89.29 CHRONIC BILATERAL LOW BACK PAIN WITH BILATERAL SCIATICA: ICD-10-CM

## 2023-06-21 DIAGNOSIS — G57.93 NEUROPATHY INVOLVING BOTH LOWER EXTREMITIES: ICD-10-CM

## 2023-06-21 DIAGNOSIS — M05.79 RHEUMATOID ARTHRITIS INVOLVING MULTIPLE SITES WITH POSITIVE RHEUMATOID FACTOR (HCC): ICD-10-CM

## 2023-06-21 DIAGNOSIS — M79.10 MYALGIA: ICD-10-CM

## 2023-06-21 PROCEDURE — 3078F DIAST BP <80 MM HG: CPT | Performed by: PHYSICAL MEDICINE & REHABILITATION

## 2023-06-21 PROCEDURE — 1123F ACP DISCUSS/DSCN MKR DOCD: CPT | Performed by: PHYSICAL MEDICINE & REHABILITATION

## 2023-06-21 PROCEDURE — 1090F PRES/ABSN URINE INCON ASSESS: CPT | Performed by: PHYSICAL MEDICINE & REHABILITATION

## 2023-06-21 PROCEDURE — G8417 CALC BMI ABV UP PARAM F/U: HCPCS | Performed by: PHYSICAL MEDICINE & REHABILITATION

## 2023-06-21 PROCEDURE — 3074F SYST BP LT 130 MM HG: CPT | Performed by: PHYSICAL MEDICINE & REHABILITATION

## 2023-06-21 PROCEDURE — G8427 DOCREV CUR MEDS BY ELIG CLIN: HCPCS | Performed by: PHYSICAL MEDICINE & REHABILITATION

## 2023-06-21 PROCEDURE — 1036F TOBACCO NON-USER: CPT | Performed by: PHYSICAL MEDICINE & REHABILITATION

## 2023-06-21 PROCEDURE — G8399 PT W/DXA RESULTS DOCUMENT: HCPCS | Performed by: PHYSICAL MEDICINE & REHABILITATION

## 2023-06-21 PROCEDURE — 3017F COLORECTAL CA SCREEN DOC REV: CPT | Performed by: PHYSICAL MEDICINE & REHABILITATION

## 2023-06-21 PROCEDURE — 99214 OFFICE O/P EST MOD 30 MIN: CPT | Performed by: PHYSICAL MEDICINE & REHABILITATION

## 2023-06-21 RX ORDER — OXYCODONE AND ACETAMINOPHEN 7.5; 325 MG/1; MG/1
1 TABLET ORAL EVERY 6 HOURS PRN
Qty: 120 TABLET | Refills: 0 | Status: SHIPPED | OUTPATIENT
Start: 2023-07-07 | End: 2023-08-06

## 2023-06-21 RX ORDER — NYSTATIN 100000 U/G
CREAM TOPICAL
COMMUNITY
Start: 2023-05-03

## 2023-07-20 ENCOUNTER — HOSPITAL ENCOUNTER (OUTPATIENT)
Dept: OCCUPATIONAL THERAPY | Age: 68
Setting detail: THERAPIES SERIES
Discharge: HOME | End: 2023-07-20
Attending: PHYSICAL MEDICINE & REHABILITATION
Payer: MEDICARE

## 2023-07-20 PROCEDURE — 97167 OT EVAL HIGH COMPLEX 60 MIN: CPT

## 2023-07-20 ASSESSMENT — PAIN SCALES - QUEBEC BACK PAIN DISABILITY SCALE
RUN ONE BLOCK OR 100M: 5
SIT IN A CHAIR FOR SEVERAL HOURS: 4
REACH UP TO HIGH SHELVES: 3
TOTAL SCORE: 73
SLEEP THROUGH THE NIGHT: 5
PULL OR PUSH HEAVY DOORS: 4
CARRY TWO BAGS OF GROCERIES: 5
CLIMB ONE FLIGHT OF STAIRS: 4
LIFT AND CARRY A HEAVY SUITCASE: 5
BEND OVER TO CLEAN THE BATHTUB: 4
STAND UP FOR 20 TO 30 MINUTES: 5
MOVE A CHAIR: 2
TAKE FOOD OUT OF THE REFRIGERATOR: 1
WALK A FEW BLOCKS OR 300 TO 400M: 5
MAKE YOUR BED: 4
RIDE IN A CAR: 3
TURN OVER IN BED: 5
WALK SEVERAL KILOMETERS  OR MILES: 5
PUT ON SOCKS OR PANYHOSE: 1
GET OUT OF BED: 3
THROW A BALL: 0

## 2023-07-20 NOTE — PROGRESS NOTES
[facility]                                                                                                              Occupational Therapy: CHRONIC PAIN EVALUATION     Patient: Kia Wolfe (34 y.o. female)   Date: 2023   :  1955  MRN: [id number]  CSN: [id number]  Account #: [de-identified]   Insurance: Payor: MEDICARE / Plan: MEDICARE PART A AND B / Product Type: *No Product type* /   Insurance ID: [id number] - (Medicare) Secondary Insurance (if applicable):  [facility]   Referring Physician: Manolo Ventura DO     PCP: Loretta Vaca MD  REFERRAL DATE: 23 Visits to Date:   Visits Approved:      No Show:    Cancelled Appts:      Medical Diagnosis: Spinal stenosis of lumbar region with neurogenic claudication [M48.062]  Neuropathy involving both lower extremities [G57.93]  Chronic bilateral low back pain with bilateral sciatica [M54.42, M54.41, G89.29]    No data recorded                        Time Code minutes: 70     OT Eval mod complexity 70 minutes for 1 unit, CPT 99758                                                                                                                                PERTINENT MEDICAL HISTORY:  Patient Active Problem List   Diagnosis    Spinal stenosis of lumbar region with neurogenic claudication    High risk medication use - 10/17/17 OARRS PM&R, 17 OARRS PM&R, 17 Tox screen positive for opiates, Hydrocodone PM&R, MED CONTRACT 17    Mixed hyperlipidemia    Encounter for screening for COVID-19    Benign essential hypertension    Neuropathy involving both lower extremities    Gait abnormality    Rheumatoid arthritis involving multiple sites with positive rheumatoid factor (HCC)    Ankle swelling    COLTON positive    Deformity of finger of right hand    Chronic bilateral low back pain with bilateral sciatica    Cervicalgia    Keratosis pilaris

## 2023-07-25 DIAGNOSIS — M48.062 SPINAL STENOSIS OF LUMBAR REGION WITH NEUROGENIC CLAUDICATION: ICD-10-CM

## 2023-07-25 DIAGNOSIS — Z79.899 HIGH RISK MEDICATION USE: ICD-10-CM

## 2023-07-25 DIAGNOSIS — M46.46 LUMBAR DISCITIS: ICD-10-CM

## 2023-07-25 DIAGNOSIS — M46.26 OSTEOMYELITIS OF LUMBAR VERTEBRA (HCC): ICD-10-CM

## 2023-07-25 DIAGNOSIS — M15.9 PRIMARY OSTEOARTHRITIS INVOLVING MULTIPLE JOINTS: ICD-10-CM

## 2023-07-25 DIAGNOSIS — G57.93 NEUROPATHY INVOLVING BOTH LOWER EXTREMITIES: ICD-10-CM

## 2023-07-25 DIAGNOSIS — M05.79 RHEUMATOID ARTHRITIS INVOLVING MULTIPLE SITES WITH POSITIVE RHEUMATOID FACTOR (HCC): ICD-10-CM

## 2023-07-26 ENCOUNTER — HOSPITAL ENCOUNTER (OUTPATIENT)
Dept: OCCUPATIONAL THERAPY | Age: 68
Setting detail: THERAPIES SERIES
Discharge: HOME | End: 2023-07-26
Attending: PHYSICAL MEDICINE & REHABILITATION
Payer: MEDICARE

## 2023-07-26 RX ORDER — GABAPENTIN 300 MG/1
CAPSULE ORAL
Qty: 270 CAPSULE | Refills: 0 | Status: SHIPPED | OUTPATIENT
Start: 2023-07-26 | End: 2023-10-25

## 2023-07-26 RX ORDER — TOPIRAMATE 25 MG/1
TABLET ORAL
Qty: 90 TABLET | Refills: 0 | Status: SHIPPED | OUTPATIENT
Start: 2023-07-26

## 2023-07-26 NOTE — PROGRESS NOTES
Therapy                                        Cancellation    Date: 2023  Patient Name: Pérez Singh    : 1955  (6 y.o.)     MRN: [id number]    Account #: [de-identified]            Comments: For today's appointment patient:  [x]  Cancelled  []  Rescheduled appointment  []  No-show   []  Called pt to remind of next appointment     Reason given by patient:  [x]  Patient ill  []  Conflicting appointment  []  No transportation    []  Conflict with work  []  No reason given  []  Other:      [x] Pt has future appointments scheduled, no follow up needed  [] Pt requests to be on hold.     Reason:   If > 2 weeks please discuss with therapist.  [] Therapist to call pt for follow up     Signature: MYRA Rice 2023 10:29 AM

## 2023-07-28 ENCOUNTER — HOSPITAL ENCOUNTER (OUTPATIENT)
Dept: OCCUPATIONAL THERAPY | Age: 68
Setting detail: THERAPIES SERIES
Discharge: HOME | End: 2023-07-28
Attending: PHYSICAL MEDICINE & REHABILITATION
Payer: MEDICARE

## 2023-07-28 PROCEDURE — 97530 THERAPEUTIC ACTIVITIES: CPT

## 2023-07-28 PROCEDURE — 97140 MANUAL THERAPY 1/> REGIONS: CPT

## 2023-07-28 NOTE — PROGRESS NOTES
Lumbar/Pelvis/Sacral: deep release of lumbo/sacral area, right piriformis release , right quadratus lumborum , and right psoas release     Followed with seated trunk rotation and R/L lateral flexion. Pt required min facilitation to achieved max available AROM. Reviewed transitions from flat surfaces to protect low back      Patient Education/HEP:    encouraged Pt to continue with seated lateral trunk flexion as start of HEP also suggested chair yoga   as an opportunity to safely move her whole body       ASSESSMENT       Assessment: Pt tolerated treatment good. Pt reported increased  pain after OT treatment. Pt making moderate progress towards goals. Post Treatment Pain: 5/10 mild increase due Pt being active in movement    Patient's Activity Tolerance: very good                 GOALS               LTG 1 : Patient will report pain 4/10 or less during functional activities. LTG 2 : Patient/caregiver will be IND with all recommended HEP, adaptive techniques, and/or adaptive strategies. LTG 3 : Patient will be IND with community based exercise program.   LTG 4 : Patient will decrease fascial restrictions in low back, hips and B LES, upper back and R shoulder to decrease pain and increase ROM during functional activities.   LTG 5 : Pt will pursue with encouragement a counseling referral to address needs due to declining functional status             TREATMENT PLAN   Continue POC           Electronically signed by MYRA Walden  on 7/28/2023 at 10:06 AM Electronically signed by MYRA Walden on 7/28/2023 at 2:53 PM

## 2023-07-31 ENCOUNTER — PROCEDURE VISIT (OUTPATIENT)
Dept: PHYSICAL MEDICINE AND REHAB | Age: 68
End: 2023-07-31
Payer: MEDICARE

## 2023-07-31 DIAGNOSIS — M54.42 CHRONIC BILATERAL LOW BACK PAIN WITH BILATERAL SCIATICA: Primary | ICD-10-CM

## 2023-07-31 DIAGNOSIS — G89.29 CHRONIC BILATERAL LOW BACK PAIN WITH BILATERAL SCIATICA: Primary | ICD-10-CM

## 2023-07-31 DIAGNOSIS — M54.41 CHRONIC BILATERAL LOW BACK PAIN WITH BILATERAL SCIATICA: Primary | ICD-10-CM

## 2023-07-31 DIAGNOSIS — M48.062 SPINAL STENOSIS OF LUMBAR REGION WITH NEUROGENIC CLAUDICATION: ICD-10-CM

## 2023-07-31 PROCEDURE — 96372 THER/PROPH/DIAG INJ SC/IM: CPT | Performed by: PHYSICAL MEDICINE & REHABILITATION

## 2023-07-31 PROCEDURE — 64445 NJX AA&/STRD SCIATIC NRV IMG: CPT | Performed by: PHYSICAL MEDICINE & REHABILITATION

## 2023-07-31 RX ORDER — CYANOCOBALAMIN 1000 UG/ML
1000 INJECTION, SOLUTION INTRAMUSCULAR; SUBCUTANEOUS ONCE
Status: COMPLETED | OUTPATIENT
Start: 2023-07-31 | End: 2023-07-31

## 2023-07-31 RX ORDER — LIDOCAINE HYDROCHLORIDE 10 MG/ML
7 INJECTION, SOLUTION INFILTRATION; PERINEURAL ONCE
Status: COMPLETED | OUTPATIENT
Start: 2023-07-31 | End: 2023-07-31

## 2023-07-31 RX ORDER — LIDOCAINE HYDROCHLORIDE 20 MG/ML
5 INJECTION, SOLUTION INFILTRATION; PERINEURAL ONCE
Status: COMPLETED | OUTPATIENT
Start: 2023-07-31 | End: 2023-07-31

## 2023-07-31 RX ADMIN — LIDOCAINE HYDROCHLORIDE 5 ML: 20 INJECTION, SOLUTION INFILTRATION; PERINEURAL at 14:24

## 2023-07-31 RX ADMIN — LIDOCAINE HYDROCHLORIDE 7 ML: 10 INJECTION, SOLUTION INFILTRATION; PERINEURAL at 14:24

## 2023-07-31 RX ADMIN — CYANOCOBALAMIN 1000 MCG: 1000 INJECTION, SOLUTION INTRAMUSCULAR; SUBCUTANEOUS at 14:23

## 2023-07-31 NOTE — PROGRESS NOTES
Patient here for Right sciatic injection with U/S. Patient taken back to exam room, and placed on drape locking stool. Areas to be injected marked appropriately, and cleansed with alcohol. 7cc of 1% Lidocaine, and 5cc of 2% Lidocaine and 1cc of b12 to be injected by provider.

## 2023-08-01 ENCOUNTER — HOSPITAL ENCOUNTER (OUTPATIENT)
Dept: OCCUPATIONAL THERAPY | Age: 68
Setting detail: THERAPIES SERIES
Discharge: HOME | End: 2023-08-01
Attending: PHYSICAL MEDICINE & REHABILITATION
Payer: MEDICARE

## 2023-08-01 PROCEDURE — 97530 THERAPEUTIC ACTIVITIES: CPT

## 2023-08-01 PROCEDURE — 97140 MANUAL THERAPY 1/> REGIONS: CPT

## 2023-08-01 NOTE — PROGRESS NOTES
[facility]     Occupational Therapy: Daily Note   Patient: Halie Tripp (87 y.o. female)   Date:   Plan of Care Certification Period:      :  1955  MRN: [id number]  CSN: [id number]   Insurance: Payor: MEDICARE / Plan: MEDICARE PART A AND B / Product Type: *No Product type* /   Insurance ID: [id number] - (Medicare) Secondary Insurance (if applicable):  [facility]   Referring Physician: Sherman Cota DO     PCP: Pedrito Sams MD Visits to Date:   2 plus eval  Progress note:   Visits Approved:   8   No Show:    Cancelled Appts:      Medical Diagnosis: Spinal stenosis of lumbar region with neurogenic claudication [M48.062]  Neuropathy involving both lower extremities [G57.93]  Chronic bilateral low back pain with bilateral sciatica [M54.42, M54.41, G89.29]          Therapy Time     Time in  1003   Time out  1103   Total treatment minutes  60   Total time code minutes   0        OT Manual therapy 30 minutes for 2 unit(s), CPT 40549  OT Therapeutic activities 30 minutes for 2 unit(s), CPT 17081       SUBJECTIVE EXAMINATION     Patient's date of birth confirmed: yes    Pain Level:   3/10  Location: mid low back region  Description: ache     Patient Comments:   Pt reports she had a R sciatic nerve block yesterday with Dr Yandel [place] barrier: no,        HEP/Strategies/Orthosis Compliance: Patient verbally confirmed compliant with HEP's          Restrictions: Pt has multiple endurance and joint restrictions but not any  from a physician             OBJECTIVE EXAMINATION     In free standing without cane and also with cane Pt noted to have severe R trunk shortening    TREATMENT     Focus of treatment was on the following:   decreasing fascial/soft tissue restrictions, decreasing pain, education/training, and home management     MFR/Manual:   Pt treated side lying on mat table  Thoracic: right thoracic paraspinals , right lateral obliques , and

## 2023-08-03 DIAGNOSIS — Z79.899 HIGH RISK MEDICATION USE: ICD-10-CM

## 2023-08-03 DIAGNOSIS — M48.062 SPINAL STENOSIS OF LUMBAR REGION WITH NEUROGENIC CLAUDICATION: ICD-10-CM

## 2023-08-03 DIAGNOSIS — M54.41 CHRONIC BILATERAL LOW BACK PAIN WITH BILATERAL SCIATICA: ICD-10-CM

## 2023-08-03 DIAGNOSIS — R76.8 ANA POSITIVE: ICD-10-CM

## 2023-08-03 DIAGNOSIS — M54.2 CERVICALGIA: ICD-10-CM

## 2023-08-03 DIAGNOSIS — M54.42 CHRONIC BILATERAL LOW BACK PAIN WITH BILATERAL SCIATICA: ICD-10-CM

## 2023-08-03 DIAGNOSIS — G89.29 CHRONIC BILATERAL LOW BACK PAIN WITH BILATERAL SCIATICA: ICD-10-CM

## 2023-08-03 DIAGNOSIS — M05.79 RHEUMATOID ARTHRITIS INVOLVING MULTIPLE SITES WITH POSITIVE RHEUMATOID FACTOR (HCC): ICD-10-CM

## 2023-08-03 RX ORDER — OXYCODONE AND ACETAMINOPHEN 7.5; 325 MG/1; MG/1
1 TABLET ORAL EVERY 6 HOURS PRN
Qty: 120 TABLET | Refills: 0 | OUTPATIENT
Start: 2023-08-05 | End: 2023-09-04

## 2023-08-04 ENCOUNTER — HOSPITAL ENCOUNTER (OUTPATIENT)
Dept: OCCUPATIONAL THERAPY | Age: 68
Setting detail: THERAPIES SERIES
Discharge: HOME | End: 2023-08-04
Attending: PHYSICAL MEDICINE & REHABILITATION
Payer: MEDICARE

## 2023-08-04 PROCEDURE — 97140 MANUAL THERAPY 1/> REGIONS: CPT

## 2023-08-04 PROCEDURE — 97530 THERAPEUTIC ACTIVITIES: CPT

## 2023-08-04 NOTE — PROGRESS NOTES
[facility]     Occupational Therapy: Daily Note   Patient: Gladys Theodore (60 y.o. female)   Date: 3598  Plan of Care Certification Period:      :  1955  MRN: [id number]  CSN: [id number]   Insurance: Payor: MEDICARE / Plan: MEDICARE PART A AND B / Product Type: *No Product type* /   Insurance ID: [id number] - (Medicare) Secondary Insurance (if applicable):  [facility]   Referring Physician: Lisa Flores DO     PCP: Corey Nails MD Visits to Date:   3 plus eval  Progress note:   Visits Approved:    8  No Show:    Cancelled Appts:      Medical Diagnosis: Spinal stenosis of lumbar region with neurogenic claudication [M48.062]  Neuropathy involving both lower extremities [G57.93]  Chronic bilateral low back pain with bilateral sciatica [M54.42, M54.41, G89.29]          Therapy Time     Time in  1000   Time out  1100   Total treatment minutes  60   Total time code minutes           OT Manual therapy 45 minutes for 3 unit(s), CPT 12075  OT Therapeutic activities 15 minutes for 1 unit(s), CPT 61806       SUBJECTIVE EXAMINATION     Patient's date of birth confirmed: Yes     Pain Level:   3/10  Location: back and feet  Description: Pt is having treatment for feet that is painful    Patient Comments:   Pt reports she has a \"kidney appointment \"today         Learning/Language barrier: no,        HEP/Strategies/Orthosis Compliance: Patient verbally confirmed compliant with HEP's          Restrictions: none           OBJECTIVE EXAMINATION     Pt's R trunk less compressed today , hip flexion more pronounced B    TREATMENT     Focus of treatment was on the following:   decreasing fascial/soft tissue restrictions, decreasing pain, and posture/positioning     MFR/Manual: Pt was positioned on the R and the L side during the session  Pt treated side lying on mat table  Lower extremity: bilateral  leg pull  and deep release bilateral  quadriceps  and bilateral  IT band

## 2023-08-08 ENCOUNTER — HOSPITAL ENCOUNTER (OUTPATIENT)
Dept: OCCUPATIONAL THERAPY | Age: 68
Setting detail: THERAPIES SERIES
Discharge: HOME | End: 2023-08-08
Attending: PHYSICAL MEDICINE & REHABILITATION
Payer: MEDICARE

## 2023-08-08 PROCEDURE — 97140 MANUAL THERAPY 1/> REGIONS: CPT

## 2023-08-08 PROCEDURE — 97530 THERAPEUTIC ACTIVITIES: CPT

## 2023-08-08 NOTE — PROGRESS NOTES
[facility]     Occupational Therapy: Daily Note   Patient: Jina Borjas (68 y.o. female)   Date:   Plan of Care Certification Period:      :  1955  MRN: [id number]  CSN: [id number]   Insurance: Payor: MEDICARE / Plan: MEDICARE PART A AND B / Product Type: *No Product type* /   Insurance ID: [id number] - (Medicare) Secondary Insurance (if applicable): [facility]   Referring Physician: Mary De La Fuente DO     PCP: Lizz Moss MD Visits to Date:   4 plus eval  Progress note:   Visits Approved:      No Show:    Cancelled Appts:      Medical Diagnosis: Spinal stenosis of lumbar region with neurogenic claudication [M48.062]  Neuropathy involving both lower extremities [G57.93]  Chronic bilateral low back pain with bilateral sciatica [M54.42, M54.41, G89.29]          Therapy Time     Time in  1300   Time out  1400   Total treatment minutes  60   Total time code minutes           OT Manual therapy 45 minutes for 3 unit(s), CPT 82477  OT Therapeutic activities 15 minutes for 1 unit(s), CPT 06474       SUBJECTIVE EXAMINATION     Patient's date of birth confirmed: Yes     Pain Level:   5/10  Location: L elbow R Low Back pain  Description: ache    Patient Comments:   Pt reports that she had a dex screen this morning and is sore from that         Learning/Language barrier: no,        HEP/Strategies/Orthosis Compliance: Parent/caregiver verbally confirmed compliant with HEP's and adaptive strategies.            Restrictions: none per provider           OBJECTIVE EXAMINATION     Improved symmetry noted with decreased R trunk shortening , mod to severe hip flexion remains    TREATMENT     Focus of treatment was on the following:   decreasing fascial/soft tissue restrictions, decreasing pain, and posture/positioning     MFR/Manual: Pt treated in supported supine  Pt treated supine on mat table  Thoracic: right pectoralis minor , horizontal pectoral , and vertical pectoral

## 2023-08-10 ENCOUNTER — HOSPITAL ENCOUNTER (OUTPATIENT)
Dept: OCCUPATIONAL THERAPY | Age: 68
Setting detail: THERAPIES SERIES
Discharge: HOME | End: 2023-08-10
Attending: PHYSICAL MEDICINE & REHABILITATION
Payer: MEDICARE

## 2023-08-10 PROCEDURE — 97140 MANUAL THERAPY 1/> REGIONS: CPT

## 2023-08-10 PROCEDURE — 97110 THERAPEUTIC EXERCISES: CPT

## 2023-08-10 PROCEDURE — 97530 THERAPEUTIC ACTIVITIES: CPT

## 2023-08-10 NOTE — PROGRESS NOTES
[facility]     Occupational Therapy: Daily Note   Patient: Carrie Dupont (52 y.o. female)   Date:   Plan of Care Certification Period:      :  1955  MRN: [id number]  CSN: [id number]   Insurance: Payor: MEDICARE / Plan: MEDICARE PART A AND B / Product Type: *No Product type* /   Insurance ID: [id number] - (Medicare) Secondary Insurance (if applicable): [facility]   Referring Physician: Betty Sicard, DO     PCP: Kody Schwab MD Visits to Date:     Progress note:   Visits Approved:      No Show:    Cancelled Appts:      Medical Diagnosis: Spinal stenosis of lumbar region with neurogenic claudication [M48.062]  Neuropathy involving both lower extremities [G57.93]  Chronic bilateral low back pain with bilateral sciatica [M54.42, M54.41, G89.29]          Therapy Time     Time in  1000   Time out  1100   Total treatment minutes  60   Total time code minutes   0        OT Manual therapy 30 minutes for 2 unit(s), CPT 56194  OT Therapeutic activities 15 minutes for 1 unit(s), CPT 09344  OT Therapeutic exercises 15 minutes for 1 unit(s), CPT 75388       SUBJECTIVE EXAMINATION     Patient's date of birth confirmed: Yes     Pain Level:   4/10  Location: LBP   Description: sharp stabbing  with movement ;deep ache when standing    Patient Comments:   Pt reports that she is having more and more issues with static standing and holding flexed posture for most activities. Reviewed joint protection and energy conservation and work simplification techniques especially for food preparation vs leaning on the counter in a flexed position.  Pt also agreed to to consider the use of a stool at the counter         Learning/Language barrier: no, pt report trying to lay flat       HEP/Strategies/Orthosis Compliance: Patient verbally confirmed compliant with HEP's      Pt with effort is working at trying to change habitual postures to reduce pain    Restrictions: none

## 2023-08-15 ENCOUNTER — APPOINTMENT (OUTPATIENT)
Dept: OCCUPATIONAL THERAPY | Age: 68
End: 2023-08-15
Attending: PHYSICAL MEDICINE & REHABILITATION
Payer: MEDICARE

## 2023-08-21 PROBLEM — G60.8 HEREDITARY SENSORY NEUROPATHY: Status: ACTIVE | Noted: 2023-05-24

## 2023-08-21 PROBLEM — M21.41 ACQUIRED PES PLANUS OF BOTH FEET: Status: ACTIVE | Noted: 2023-05-24

## 2023-08-21 PROBLEM — B35.1 DERMATOPHYTOSIS OF NAIL: Status: ACTIVE | Noted: 2023-05-24

## 2023-08-21 PROBLEM — M21.42 ACQUIRED PES PLANUS OF BOTH FEET: Status: ACTIVE | Noted: 2023-05-24

## 2023-08-21 NOTE — PROGRESS NOTES
Subjective  Chris Acosta, 77 y.o. female presents today with:    Back Pain Trigger point injection 9/5/23 with 30% reduction in pain lasting a week. Neck Pain        Hip Pain Right       Leg Pain Bilateral. Right sciatic injection 7/31/23 with 70% reduction in pain lasting 3 weeks       Knee Pain Right       Foot Pain Right       Shoulder Pain Right       Medication Refill Percocet, Gabapentin, Baclofen, Prednisone, Zinc, Vit D. Pill count today  compliant. Came off the Topamax. Doing well with current meds. Failed recent trial of with Dr Dakotah Ulloa. As always trying to limit Percocet to max of 3 per day. Unfortunately the non narcotics are not helpful to her and have had significant side effects. Is SP OT myofascial.  It help quite a bit. She wants to try and get more if possible this Winter. She is still doing well with her current medications and shows no signs of abuse or overuse. She is more functional on it. Does not need Neurontin refills. She is to avoid any NSAIDs but she will continue gabapentin as tolerated baclofen vitamin D and zinc.  Right shoulder is feeling better sp infection. Back Pain  This is a chronic problem. The current episode started more than 1 year ago. The problem occurs daily. The problem is unchanged. The pain is present in the lumbar spine, gluteal and sacro-iliac. The quality of the pain is described as aching. Radiates to: bilateral legs, left leg worse, left hip. The pain is at a severity of 4/10. The pain is moderate. The pain is Worse during the day. The symptoms are aggravated by bending, standing, position, sitting and twisting (Walking). Stiffness is present In the morning. Associated symptoms include abdominal pain, leg pain and pelvic pain. Pertinent negatives include no bladder incontinence, chest pain, dysuria, fever, headaches, numbness, paresis, perianal numbness or weakness.  Risk factors include obesity, poor posture,

## 2023-08-22 ENCOUNTER — HOSPITAL ENCOUNTER (OUTPATIENT)
Dept: OCCUPATIONAL THERAPY | Age: 68
Setting detail: THERAPIES SERIES
Discharge: HOME | End: 2023-08-22
Attending: PHYSICAL MEDICINE & REHABILITATION
Payer: MEDICARE

## 2023-08-22 PROCEDURE — 97530 THERAPEUTIC ACTIVITIES: CPT

## 2023-08-22 PROCEDURE — 97140 MANUAL THERAPY 1/> REGIONS: CPT

## 2023-08-22 NOTE — PROGRESS NOTES
[facility] OCCUPATIONAL THERAPY     Occupational Therapy: Daily Note   Patient: Lev Francisco (57 y.o. female)   Date:   Plan of Care Certification Period:      :  1955  MRN: [id number]  CSN: [id number]   Insurance: Payor: MEDICARE / Plan: MEDICARE PART A AND B / Product Type: *No Product type* /   Insurance ID: [id number] - (Medicare) Secondary Insurance (if applicable): [facility]   Referring Physician: Tariq Ruby DO     PCP: Dana Childers MD Visits to Date:     Progress note:   Visits Approved:      No Show:    Cancelled Appts:      Medical Diagnosis: Spinal stenosis of lumbar region with neurogenic claudication [M48.062]  Neuropathy involving both lower extremities [G57.93]  Chronic bilateral low back pain with bilateral sciatica [M54.42, M54.41, G89.29]          Therapy Time     Time in  1000   Time out  1100   Total treatment minutes  60   Total time code minutes   0          OT Manual therapy 45 minutes for 3 unit(s), CPT 11678  OT Therapeutic activities 15 minutes for 1 unit(s), CPT 79998       SUBJECTIVE EXAMINATION     Patient's date of birth confirmed: Yes     Pain Level:   4/10 WITH PAIN MEDS  Location: LBP and neck   Description: deep ache    Patient Comments:     Pt reports her pain was worse earlier this morning  Pt was having difficulty establishing an anchor for her theraband activities. Problem solved with Pt  to find safe anchor  so HEP can be done safely         Learning/Language barrier: no,        HEP/Strategies/Orthosis Compliance: Parent/caregiver verbally confirmed compliant with HEP's and adaptive strategies. Restrictions: none           OBJECTIVE EXAMINATION   Pt continues to have hip flexed posture and associated LBP. Pt noting that the back pain is even affecting her comfort level with BM's.  Pt was encouraged to also address this with POR      TREATMENT     Focus of treatment was on the following:   decreasing fascial/soft tissue

## 2023-08-24 ENCOUNTER — HOSPITAL ENCOUNTER (OUTPATIENT)
Dept: OCCUPATIONAL THERAPY | Age: 68
Setting detail: THERAPIES SERIES
Discharge: HOME | End: 2023-08-24
Attending: PHYSICAL MEDICINE & REHABILITATION
Payer: MEDICARE

## 2023-08-24 PROCEDURE — 97140 MANUAL THERAPY 1/> REGIONS: CPT

## 2023-08-24 PROCEDURE — 97112 NEUROMUSCULAR REEDUCATION: CPT

## 2023-08-24 NOTE — PROGRESS NOTES
bilateral  quadriceps , bilateral  IT band , and bilateral  Internal/external hip release   Lumbar/Pelvis/Sacral: bilateral  quadratus lumborum , pelvic floor transverse plane, and bilateral  psoas release     Exercises/Activities: Followed with active standing and postural awareness activities. Pt required verbal cues and occasional tactile cues to achieve more upright position        Patient Education/HEP:   Pt notes that she had B piriformis pain after direct manual tx. Pt was educated that this to be expected as the posterior gluteal musculature realigns to this more upright position. Pt was encouraged to use tennis ball on the wall for self accupressureContinue recommended HEP/activities. ASSESSMENT       Assessment: Pt tolerated treatment well. Pt reported decreased  pain after OT treatment. Pt making moderate progress towards goals. Post Treatment Pain: 3/10    Patient's Activity Tolerance: good today for direct treatment                 GOALS    LTG 1 : Patient will report pain 4/10 or less during functional activities. LTG 2 : Patient/caregiver will be IND with all recommended HEP, adaptive techniques, and/or adaptive strategies. LTG 3 : Patient will be IND with community based exercise program.   LTG 4 : Patient will decrease fascial restrictions in low back, hips and B LES, upper back and R shoulder to decrease pain and increase ROM during functional activities. LTG 5 : Pt will pursue with encouragement a counseling referral to address needs due to declining functional status                      TREATMENT PLAN   Pt's last visit is the next visit .  Will determine if POC will be extended  Continue POC  Electronically signed by MYRA Quijano on 8/24/2023 at 11:43 AM            Electronically signed by MYRA Quijano  on 8/24/2023 at 10:01 AM

## 2023-09-01 ENCOUNTER — HOSPITAL ENCOUNTER (OUTPATIENT)
Dept: OCCUPATIONAL THERAPY | Age: 68
Setting detail: THERAPIES SERIES
Discharge: HOME | End: 2023-09-01
Attending: PHYSICAL MEDICINE & REHABILITATION
Payer: MEDICARE

## 2023-09-01 PROCEDURE — 97530 THERAPEUTIC ACTIVITIES: CPT

## 2023-09-01 PROCEDURE — 97140 MANUAL THERAPY 1/> REGIONS: CPT

## 2023-09-01 PROCEDURE — 97168 OT RE-EVAL EST PLAN CARE: CPT

## 2023-09-01 ASSESSMENT — PAIN SCALES - QUEBEC BACK PAIN DISABILITY SCALE
THROW A BALL: 2
CLIMB ONE FLIGHT OF STAIRS: 4
SIT IN A CHAIR FOR SEVERAL HOURS: 2
TURN OVER IN BED: 0
RUN ONE BLOCK OR 100M: 5
STAND UP FOR 20 TO 30 MINUTES: 5
TOTAL SCORE: 53
WALK A FEW BLOCKS OR 300 TO 400M: 5
PUT ON SOCKS OR PANYHOSE: 0
GET OUT OF BED: 1
CARRY TWO BAGS OF GROCERIES: 2
REACH UP TO HIGH SHELVES: 2
PULL OR PUSH HEAVY DOORS: 3
RIDE IN A CAR: 3
SLEEP THROUGH THE NIGHT: 4
BEND OVER TO CLEAN THE BATHTUB: 2
MAKE YOUR BED: 2
WALK SEVERAL KILOMETERS  OR MILES: 5
LIFT AND CARRY A HEAVY SUITCASE: 3
TAKE FOOD OUT OF THE REFRIGERATOR: 1
MOVE A CHAIR: 2

## 2023-09-01 NOTE — PROGRESS NOTES
[facility]     Occupational Therapy: Discharge Note   Patient: Halie Tripp (79 y.o. female)   Date:   Plan of Care Certification Period:      :  1955  MRN: [id number]  CSN: [id number]   Insurance: Payor: MEDICARE / Plan: MEDICARE PART A AND B / Product Type: *No Product type* /   Insurance ID: [id number] - (Medicare) Secondary Insurance (if applicable):  [facility]   Referring Physician: Sherman Cota DO     PCP: Pedrito Sams MD Visits to Date:     Progress note:   Visits Approved:      No Show:    Cancelled Appts:      Medical Diagnosis: Spinal stenosis of lumbar region with neurogenic claudication [M48.062]  Neuropathy involving both lower extremities [G57.93]  Chronic bilateral low back pain with bilateral sciatica [M54.42, M54.41, G89.29]          Therapy Time       Time in  1000   Time out  1100   Total treatment minutes  60   Total time code minutes   0        OT Manual therapy 30 minutes for 2 unit(s), CPT 92713  OT Re-evaluation of plan of care 15 minutes for 1 unit, CPT 95352   OT Therapeutic activities 15 minutes for 1 unit(s), CPT 63931       SUBJECTIVE EXAMINATION     Patient's date of birth confirmed: Yes     Pain Level:   3/10  Location: R hip and back  Description: aching , burning    Patient Comments:   Pt reports she was \"doing something outside and tweaked her back\"     Overall pt reports feeling more upright since being willing to allow direct tx with her in supported supine position    Learning/Language barrier: no,        HEP/Strategies/Orthosis Compliance: Patient verbally confirmed compliant with HEP's          Restrictions: none           OBJECTIVE EXAMINATION     The [place] Back Pain Disability Scale     The [place] Back Pain Disability Scale  Get out of bed: Minimally difficult  Sleep through the night: Very difficult  Turn over in bed: Not difficult at all  Ride in a car: Fairly difficult  Stand up for 20-30 minutes: Unable to

## 2023-09-05 ENCOUNTER — PROCEDURE VISIT (OUTPATIENT)
Dept: PHYSICAL MEDICINE AND REHAB | Age: 68
End: 2023-09-05
Payer: MEDICARE

## 2023-09-05 DIAGNOSIS — R76.8 ANA POSITIVE: ICD-10-CM

## 2023-09-05 DIAGNOSIS — M05.79 RHEUMATOID ARTHRITIS INVOLVING MULTIPLE SITES WITH POSITIVE RHEUMATOID FACTOR (HCC): ICD-10-CM

## 2023-09-05 DIAGNOSIS — M79.10 MYALGIA: ICD-10-CM

## 2023-09-05 DIAGNOSIS — G89.29 CHRONIC BILATERAL LOW BACK PAIN WITH BILATERAL SCIATICA: ICD-10-CM

## 2023-09-05 DIAGNOSIS — M48.062 SPINAL STENOSIS OF LUMBAR REGION WITH NEUROGENIC CLAUDICATION: ICD-10-CM

## 2023-09-05 DIAGNOSIS — Z79.899 HIGH RISK MEDICATION USE: ICD-10-CM

## 2023-09-05 DIAGNOSIS — M54.41 CHRONIC BILATERAL LOW BACK PAIN WITH BILATERAL SCIATICA: ICD-10-CM

## 2023-09-05 DIAGNOSIS — M54.42 CHRONIC BILATERAL LOW BACK PAIN WITH BILATERAL SCIATICA: ICD-10-CM

## 2023-09-05 DIAGNOSIS — M54.2 CERVICALGIA: ICD-10-CM

## 2023-09-05 PROCEDURE — 96372 THER/PROPH/DIAG INJ SC/IM: CPT | Performed by: PHYSICAL MEDICINE & REHABILITATION

## 2023-09-05 PROCEDURE — 20553 NJX 1/MLT TRIGGER POINTS 3/>: CPT | Performed by: PHYSICAL MEDICINE & REHABILITATION

## 2023-09-05 RX ORDER — LIDOCAINE HYDROCHLORIDE 10 MG/ML
8 INJECTION, SOLUTION INFILTRATION; PERINEURAL ONCE
Status: COMPLETED | OUTPATIENT
Start: 2023-09-05 | End: 2023-09-05

## 2023-09-05 RX ORDER — LIDOCAINE HYDROCHLORIDE 10 MG/ML
7 INJECTION, SOLUTION INFILTRATION; PERINEURAL ONCE
Status: COMPLETED | OUTPATIENT
Start: 2023-09-05 | End: 2023-09-05

## 2023-09-05 RX ORDER — CYANOCOBALAMIN 1000 UG/ML
1000 INJECTION, SOLUTION INTRAMUSCULAR; SUBCUTANEOUS ONCE
Status: COMPLETED | OUTPATIENT
Start: 2023-09-05 | End: 2023-09-05

## 2023-09-05 RX ADMIN — LIDOCAINE HYDROCHLORIDE 8 ML: 10 INJECTION, SOLUTION INFILTRATION; PERINEURAL at 12:47

## 2023-09-05 RX ADMIN — CYANOCOBALAMIN 1000 MCG: 1000 INJECTION, SOLUTION INTRAMUSCULAR; SUBCUTANEOUS at 12:49

## 2023-09-05 RX ADMIN — LIDOCAINE HYDROCHLORIDE 7 ML: 10 INJECTION, SOLUTION INFILTRATION; PERINEURAL at 12:48

## 2023-09-05 NOTE — PROGRESS NOTES
Patient here for trigger point injections. Patient taken back to exam room, and placed on drape locking stool. Areas to be injected marked appropriately, and cleansed with alcohol. 15cc of 1% Lidocaine abd 1cc of b12 to be injected by provider.

## 2023-09-06 RX ORDER — OXYCODONE AND ACETAMINOPHEN 7.5; 325 MG/1; MG/1
1 TABLET ORAL EVERY 6 HOURS PRN
Qty: 120 TABLET | Refills: 0 | Status: SHIPPED | OUTPATIENT
Start: 2023-09-06 | End: 2023-10-06

## 2023-09-14 ENCOUNTER — OFFICE VISIT (OUTPATIENT)
Dept: PHYSICAL MEDICINE AND REHAB | Age: 68
End: 2023-09-14
Payer: MEDICARE

## 2023-09-14 VITALS
SYSTOLIC BLOOD PRESSURE: 139 MMHG | DIASTOLIC BLOOD PRESSURE: 76 MMHG | BODY MASS INDEX: 32.28 KG/M2 | WEIGHT: 213 LBS | HEIGHT: 68 IN

## 2023-09-14 DIAGNOSIS — G89.29 CHRONIC BILATERAL LOW BACK PAIN WITH BILATERAL SCIATICA: ICD-10-CM

## 2023-09-14 DIAGNOSIS — M46.26 OSTEOMYELITIS OF LUMBAR VERTEBRA (HCC): ICD-10-CM

## 2023-09-14 DIAGNOSIS — G57.93 NEUROPATHY INVOLVING BOTH LOWER EXTREMITIES: ICD-10-CM

## 2023-09-14 DIAGNOSIS — M05.79 RHEUMATOID ARTHRITIS INVOLVING MULTIPLE SITES WITH POSITIVE RHEUMATOID FACTOR (HCC): ICD-10-CM

## 2023-09-14 DIAGNOSIS — M54.41 CHRONIC BILATERAL LOW BACK PAIN WITH BILATERAL SCIATICA: ICD-10-CM

## 2023-09-14 DIAGNOSIS — R76.8 ANA POSITIVE: ICD-10-CM

## 2023-09-14 DIAGNOSIS — M54.2 CERVICALGIA: ICD-10-CM

## 2023-09-14 DIAGNOSIS — M48.062 SPINAL STENOSIS OF LUMBAR REGION WITH NEUROGENIC CLAUDICATION: Primary | ICD-10-CM

## 2023-09-14 DIAGNOSIS — M46.46 LUMBAR DISCITIS: ICD-10-CM

## 2023-09-14 DIAGNOSIS — Z79.899 HIGH RISK MEDICATION USE: ICD-10-CM

## 2023-09-14 DIAGNOSIS — M15.9 PRIMARY OSTEOARTHRITIS INVOLVING MULTIPLE JOINTS: ICD-10-CM

## 2023-09-14 DIAGNOSIS — M54.42 CHRONIC BILATERAL LOW BACK PAIN WITH BILATERAL SCIATICA: ICD-10-CM

## 2023-09-14 DIAGNOSIS — M79.10 MYALGIA: ICD-10-CM

## 2023-09-14 PROCEDURE — 3078F DIAST BP <80 MM HG: CPT | Performed by: PHYSICAL MEDICINE & REHABILITATION

## 2023-09-14 PROCEDURE — 3075F SYST BP GE 130 - 139MM HG: CPT | Performed by: PHYSICAL MEDICINE & REHABILITATION

## 2023-09-14 PROCEDURE — 99214 OFFICE O/P EST MOD 30 MIN: CPT | Performed by: PHYSICAL MEDICINE & REHABILITATION

## 2023-09-14 PROCEDURE — 1036F TOBACCO NON-USER: CPT | Performed by: PHYSICAL MEDICINE & REHABILITATION

## 2023-09-14 PROCEDURE — 1090F PRES/ABSN URINE INCON ASSESS: CPT | Performed by: PHYSICAL MEDICINE & REHABILITATION

## 2023-09-14 PROCEDURE — G8417 CALC BMI ABV UP PARAM F/U: HCPCS | Performed by: PHYSICAL MEDICINE & REHABILITATION

## 2023-09-14 PROCEDURE — 3017F COLORECTAL CA SCREEN DOC REV: CPT | Performed by: PHYSICAL MEDICINE & REHABILITATION

## 2023-09-14 PROCEDURE — G8399 PT W/DXA RESULTS DOCUMENT: HCPCS | Performed by: PHYSICAL MEDICINE & REHABILITATION

## 2023-09-14 PROCEDURE — 1123F ACP DISCUSS/DSCN MKR DOCD: CPT | Performed by: PHYSICAL MEDICINE & REHABILITATION

## 2023-09-14 PROCEDURE — G8427 DOCREV CUR MEDS BY ELIG CLIN: HCPCS | Performed by: PHYSICAL MEDICINE & REHABILITATION

## 2023-09-14 RX ORDER — DULOXETIN HYDROCHLORIDE 30 MG/1
30 CAPSULE, DELAYED RELEASE ORAL DAILY
COMMUNITY
Start: 2023-07-13

## 2023-09-14 RX ORDER — GABAPENTIN 300 MG/1
CAPSULE ORAL
Qty: 180 CAPSULE | Refills: 0 | Status: SHIPPED | OUTPATIENT
Start: 2023-10-25 | End: 2023-12-14

## 2023-09-14 RX ORDER — OXYCODONE AND ACETAMINOPHEN 7.5; 325 MG/1; MG/1
1 TABLET ORAL EVERY 6 HOURS PRN
Qty: 90 TABLET | Refills: 0 | Status: SHIPPED | OUTPATIENT
Start: 2023-10-06 | End: 2023-11-05

## 2023-09-14 RX ORDER — LORAZEPAM 1 MG/1
TABLET ORAL
COMMUNITY
Start: 2023-08-08

## 2023-09-14 RX ORDER — BACLOFEN 10 MG/1
TABLET ORAL
Qty: 90 TABLET | Refills: 2 | Status: SHIPPED | OUTPATIENT
Start: 2023-09-28

## 2023-09-14 RX ORDER — OXYCODONE AND ACETAMINOPHEN 7.5; 325 MG/1; MG/1
1 TABLET ORAL EVERY 6 HOURS PRN
Qty: 120 TABLET | Refills: 0 | Status: SHIPPED | OUTPATIENT
Start: 2023-10-06 | End: 2023-09-14 | Stop reason: DRUGHIGH

## 2023-09-14 ASSESSMENT — ENCOUNTER SYMPTOMS
BLOOD IN STOOL: 0
SORE THROAT: 0
STRIDOR: 0
DIARRHEA: 0
COUGH: 0
EYE PAIN: 0
NAUSEA: 0
EYE REDNESS: 0
ABDOMINAL PAIN: 1
BACK PAIN: 1
VOMITING: 0
PHOTOPHOBIA: 0
CONSTIPATION: 1
SHORTNESS OF BREATH: 0
WHEEZING: 0

## 2023-09-20 DIAGNOSIS — Z79.899 HIGH RISK MEDICATION USE: ICD-10-CM

## 2023-09-20 LAB
AMPHET UR QL SCN: ABNORMAL
BARBITURATES UR QL SCN: ABNORMAL
BENZODIAZ UR QL SCN: ABNORMAL
CANNABINOIDS UR QL SCN: ABNORMAL
COCAINE UR QL SCN: ABNORMAL
DRUG SCREEN COMMENT UR-IMP: ABNORMAL
FENTANYL SCREEN, URINE: ABNORMAL
METHADONE UR QL SCN: ABNORMAL
OPIATES UR QL SCN: ABNORMAL
OXYCODONE UR QL SCN: POSITIVE
PCP UR QL SCN: ABNORMAL
PROPOXYPH UR QL SCN: ABNORMAL

## 2023-10-16 ENCOUNTER — PROCEDURE VISIT (OUTPATIENT)
Dept: PHYSICAL MEDICINE AND REHAB | Age: 68
End: 2023-10-16
Payer: MEDICARE

## 2023-10-16 DIAGNOSIS — M54.41 CHRONIC BILATERAL LOW BACK PAIN WITH BILATERAL SCIATICA: ICD-10-CM

## 2023-10-16 DIAGNOSIS — M54.42 CHRONIC BILATERAL LOW BACK PAIN WITH BILATERAL SCIATICA: ICD-10-CM

## 2023-10-16 DIAGNOSIS — G89.29 CHRONIC BILATERAL LOW BACK PAIN WITH BILATERAL SCIATICA: ICD-10-CM

## 2023-10-16 PROCEDURE — 64445 NJX AA&/STRD SCIATIC NRV IMG: CPT | Performed by: PHYSICAL MEDICINE & REHABILITATION

## 2023-10-16 PROCEDURE — 96372 THER/PROPH/DIAG INJ SC/IM: CPT | Performed by: PHYSICAL MEDICINE & REHABILITATION

## 2023-10-16 RX ORDER — LIDOCAINE HYDROCHLORIDE 10 MG/ML
7 INJECTION, SOLUTION INFILTRATION; PERINEURAL ONCE
Status: COMPLETED | OUTPATIENT
Start: 2023-10-16 | End: 2023-10-16

## 2023-10-16 RX ORDER — LIDOCAINE HYDROCHLORIDE 20 MG/ML
5 INJECTION, SOLUTION INFILTRATION; PERINEURAL ONCE
Status: COMPLETED | OUTPATIENT
Start: 2023-10-16 | End: 2023-10-16

## 2023-10-16 RX ORDER — CYANOCOBALAMIN 1000 UG/ML
1000 INJECTION, SOLUTION INTRAMUSCULAR; SUBCUTANEOUS ONCE
Status: COMPLETED | OUTPATIENT
Start: 2023-10-16 | End: 2023-10-16

## 2023-10-16 RX ADMIN — LIDOCAINE HYDROCHLORIDE 5 ML: 20 INJECTION, SOLUTION INFILTRATION; PERINEURAL at 14:28

## 2023-10-16 RX ADMIN — LIDOCAINE HYDROCHLORIDE 7 ML: 10 INJECTION, SOLUTION INFILTRATION; PERINEURAL at 14:27

## 2023-10-16 RX ADMIN — CYANOCOBALAMIN 1000 MCG: 1000 INJECTION, SOLUTION INTRAMUSCULAR; SUBCUTANEOUS at 14:27

## 2023-11-03 DIAGNOSIS — G89.29 CHRONIC BILATERAL LOW BACK PAIN WITH BILATERAL SCIATICA: ICD-10-CM

## 2023-11-03 DIAGNOSIS — M54.2 CERVICALGIA: ICD-10-CM

## 2023-11-03 DIAGNOSIS — M05.79 RHEUMATOID ARTHRITIS INVOLVING MULTIPLE SITES WITH POSITIVE RHEUMATOID FACTOR (HCC): ICD-10-CM

## 2023-11-03 DIAGNOSIS — Z79.899 HIGH RISK MEDICATION USE: ICD-10-CM

## 2023-11-03 DIAGNOSIS — M54.41 CHRONIC BILATERAL LOW BACK PAIN WITH BILATERAL SCIATICA: ICD-10-CM

## 2023-11-03 DIAGNOSIS — R76.8 ANA POSITIVE: ICD-10-CM

## 2023-11-03 DIAGNOSIS — M48.062 SPINAL STENOSIS OF LUMBAR REGION WITH NEUROGENIC CLAUDICATION: ICD-10-CM

## 2023-11-03 DIAGNOSIS — M54.42 CHRONIC BILATERAL LOW BACK PAIN WITH BILATERAL SCIATICA: ICD-10-CM

## 2023-11-03 RX ORDER — OXYCODONE AND ACETAMINOPHEN 7.5; 325 MG/1; MG/1
1 TABLET ORAL EVERY 6 HOURS PRN
Qty: 90 TABLET | Refills: 0 | Status: SHIPPED | OUTPATIENT
Start: 2023-11-04 | End: 2023-12-04

## 2023-11-13 NOTE — PROGRESS NOTES
Subjective  Jose L Erickson, 77 y.o. female presents today with:       Back Pain Trigger Points injection on 11/20/23 with 25% reduction in pain lasting 1 week. Neck Pain        Hip Pain Right       Leg Pain Bilateral---Right Sciatic injection on 10/16/23 with 60% reduction in pain lasting 1 week. Knee Pain Right       Foot Pain Right       Shoulder Pain Right       Medication Refill Percocet, Gabapentin, Baclofen, Prednisone, Zinc, Vit D. Pill count today      She is doing well she is considering getting Botox in her vocal cord area to relieve her dysphonia. She saw the [facility] ear nose and throat yesterday. Chart was reviewed regarding ear nose and throat note    She is now back on Topamax I have cautioned her that she probably should not be taking the Topamax and gabapentin together. She feels that the Topamax is helping at night. Denies side effects. Denies sedation. She is still doing well with her current medications and shows no signs of abuse or overuse. She is more functional on it. Does not need Neurontin refills. She is to avoid any NSAIDs but she will continue gabapentin as tolerated baclofen vitamin D and zinc.  Right shoulder is feeling better sp infection. Back Pain  This is a chronic problem. The current episode started more than 1 year ago. The problem occurs daily. The problem is unchanged. The pain is present in the lumbar spine, gluteal and sacro-iliac. The quality of the pain is described as aching. Radiates to: bilateral legs, left leg worse, left hip. The pain is at a severity of 4/10. The pain is moderate. The pain is Worse during the day. The symptoms are aggravated by bending, standing, position, sitting and twisting (Walking). Stiffness is present In the morning. Associated symptoms include abdominal pain and pelvic pain.  Pertinent negatives include no bladder incontinence, chest pain, dysuria, fever, headaches, paresis, perianal numbness

## 2023-11-17 ENCOUNTER — OFFICE VISIT (OUTPATIENT)
Dept: OTOLARYNGOLOGY | Facility: CLINIC | Age: 68
End: 2023-11-17
Payer: MEDICARE

## 2023-11-17 VITALS — HEIGHT: 67 IN | WEIGHT: 208 LBS | BODY MASS INDEX: 32.65 KG/M2

## 2023-11-17 DIAGNOSIS — H65.03 NON-RECURRENT ACUTE SEROUS OTITIS MEDIA OF BOTH EARS: Primary | ICD-10-CM

## 2023-11-17 PROCEDURE — 99213 OFFICE O/P EST LOW 20 MIN: CPT | Performed by: PHYSICIAN ASSISTANT

## 2023-11-17 PROCEDURE — 1036F TOBACCO NON-USER: CPT | Performed by: PHYSICIAN ASSISTANT

## 2023-11-17 PROCEDURE — 1159F MED LIST DOCD IN RCRD: CPT | Performed by: PHYSICIAN ASSISTANT

## 2023-11-17 RX ORDER — PREDNISONE 20 MG/1
TABLET ORAL
Qty: 9 TABLET | Refills: 0 | Status: SHIPPED | OUTPATIENT
Start: 2023-11-17 | End: 2024-03-20 | Stop reason: WASHOUT

## 2023-11-17 RX ORDER — FLUTICASONE PROPIONATE 50 MCG
2 SPRAY, SUSPENSION (ML) NASAL DAILY
Qty: 9.9 ML | Refills: 0 | Status: SHIPPED | OUTPATIENT
Start: 2023-11-17 | End: 2023-12-17

## 2023-11-17 NOTE — PROGRESS NOTES
Jazmin Hallman is a 68 y.o. year old female patient with Ear Fullness     The patient was traveling by airplane over the weekend and upon her descent home on Sunday the ears felt clogged and blocked.  She reports symptoms to be worse left than right.  She is otherwise well without other ENT concerns.        Physical Exam:   General appearance: No acute distress. Normal facies. Symmetric facial movement. No gross lesions of the face are noted.  The external ear structures appear normal.  Patient with evidence of bilateral serous otitis media.  Patient with damian-colored effusion noted bilaterally.  Anterior rhinoscopy notes essentially a midline nasal septum. Examination is noted for normal healthy mucosal membranes without any evidence of lesions, polyps, or exudate. The tongue is normally mobile. There are no lesions on the gingiva, buccal, or oral mucosa. There are no oral cavity masses.  The neck is negative for mass lymphadenopathy. The trachea and parotid are clear. The thyroid bed is grossly unremarkable. The salivary gland structures are grossly unremarkable.    Assessment/Plan   1.  Bilateral serous otitis media  Patient seen in the office today for assessment of ears with bilateral serous otitis media.  Patient to be started on nasal steroids and prednisone and see us back in 3 weeks and if not improved consider placement of tubes.

## 2023-11-20 ENCOUNTER — PROCEDURE VISIT (OUTPATIENT)
Dept: PHYSICAL MEDICINE AND REHAB | Age: 68
End: 2023-11-20
Payer: MEDICARE

## 2023-11-20 DIAGNOSIS — M79.10 MYALGIA: ICD-10-CM

## 2023-11-20 PROCEDURE — 20553 NJX 1/MLT TRIGGER POINTS 3/>: CPT | Performed by: PHYSICAL MEDICINE & REHABILITATION

## 2023-11-20 PROCEDURE — 96372 THER/PROPH/DIAG INJ SC/IM: CPT | Performed by: PHYSICAL MEDICINE & REHABILITATION

## 2023-11-20 RX ORDER — LIDOCAINE HYDROCHLORIDE 10 MG/ML
15 INJECTION, SOLUTION INFILTRATION; PERINEURAL ONCE
Status: COMPLETED | OUTPATIENT
Start: 2023-11-20 | End: 2023-11-20

## 2023-11-20 RX ORDER — CYANOCOBALAMIN 1000 UG/ML
1000 INJECTION, SOLUTION INTRAMUSCULAR; SUBCUTANEOUS ONCE
Status: COMPLETED | OUTPATIENT
Start: 2023-11-20 | End: 2023-11-20

## 2023-11-20 RX ADMIN — LIDOCAINE HYDROCHLORIDE 15 ML: 10 INJECTION, SOLUTION INFILTRATION; PERINEURAL at 14:29

## 2023-11-20 RX ADMIN — CYANOCOBALAMIN 1000 MCG: 1000 INJECTION, SOLUTION INTRAMUSCULAR; SUBCUTANEOUS at 14:28

## 2023-11-20 NOTE — PROGRESS NOTES
Patient here for trigger point injections. Patient taken back to exam room, and placed on drape locking stool. Areas to be injected marked appropriately, and cleansed with alcohol. 15cc of 1% Lidocaine with 1cc B-12 to be injected by provider.

## 2023-12-01 DIAGNOSIS — G89.29 CHRONIC BILATERAL LOW BACK PAIN WITH BILATERAL SCIATICA: ICD-10-CM

## 2023-12-01 DIAGNOSIS — M54.42 CHRONIC BILATERAL LOW BACK PAIN WITH BILATERAL SCIATICA: ICD-10-CM

## 2023-12-01 DIAGNOSIS — M05.79 RHEUMATOID ARTHRITIS INVOLVING MULTIPLE SITES WITH POSITIVE RHEUMATOID FACTOR (HCC): ICD-10-CM

## 2023-12-01 DIAGNOSIS — M48.062 SPINAL STENOSIS OF LUMBAR REGION WITH NEUROGENIC CLAUDICATION: ICD-10-CM

## 2023-12-01 DIAGNOSIS — R76.8 ANA POSITIVE: ICD-10-CM

## 2023-12-01 DIAGNOSIS — M54.2 CERVICALGIA: ICD-10-CM

## 2023-12-01 DIAGNOSIS — Z79.899 HIGH RISK MEDICATION USE: ICD-10-CM

## 2023-12-01 DIAGNOSIS — M54.41 CHRONIC BILATERAL LOW BACK PAIN WITH BILATERAL SCIATICA: ICD-10-CM

## 2023-12-01 RX ORDER — OXYCODONE AND ACETAMINOPHEN 7.5; 325 MG/1; MG/1
1 TABLET ORAL EVERY 6 HOURS PRN
Qty: 90 TABLET | Refills: 0 | Status: SHIPPED | OUTPATIENT
Start: 2023-12-04 | End: 2024-01-03

## 2023-12-06 ENCOUNTER — OFFICE VISIT (OUTPATIENT)
Dept: OTOLARYNGOLOGY | Facility: CLINIC | Age: 68
End: 2023-12-06
Payer: MEDICARE

## 2023-12-06 DIAGNOSIS — H65.03 NON-RECURRENT ACUTE SEROUS OTITIS MEDIA OF BOTH EARS: Primary | ICD-10-CM

## 2023-12-06 PROCEDURE — 99213 OFFICE O/P EST LOW 20 MIN: CPT | Performed by: OTOLARYNGOLOGY

## 2023-12-06 PROCEDURE — 1159F MED LIST DOCD IN RCRD: CPT | Performed by: OTOLARYNGOLOGY

## 2023-12-06 PROCEDURE — 1036F TOBACCO NON-USER: CPT | Performed by: OTOLARYNGOLOGY

## 2023-12-06 NOTE — PROGRESS NOTES
Jazmin Hallman is a 68 y.o. year old female patient with FU ON EARS     The patient returns to the office today for follow-up on ears.  Patient with history of otitis media secondary to eustachian tube dysfunction after air travel.  Patient reporting near complete resolution in symptoms still slightly symptomatic right greater than left.  All other ENT issues are negative.      Physical Exam:   General appearance: No acute distress. Normal facies. Symmetric facial movement. No gross lesions of the face are noted.  The external ear structures appear normal.  Patient with minimal effusion noted right.  Left ear appears improved.  Anterior rhinoscopy notes essentially a midline nasal septum. Examination is noted for normal healthy mucosal membranes without any evidence of lesions, polyps, or exudate. The tongue is normally mobile. There are no lesions on the gingiva, buccal, or oral mucosa. There are no oral cavity masses.  The neck is negative for mass lymphadenopathy. The trachea and parotid are clear. The thyroid bed is grossly unremarkable. The salivary gland structures are grossly unremarkable.    Assessment/Plan   1.  Otitis media  Patient seen in the office today for follow-up on otitis media.  The right ear has minimal effusion remaining and left is resolved.  I recommend continuation of nasal steroids and see me back should symptoms not completely resolved in the right side.    All questions were answered in this regard accordingly.

## 2023-12-07 RX ORDER — AMOXICILLIN 500 MG/1
CAPSULE ORAL
COMMUNITY
Start: 2023-11-13

## 2023-12-07 RX ORDER — PREDNISONE 20 MG/1
TABLET ORAL
COMMUNITY
Start: 2023-11-17

## 2023-12-07 RX ORDER — FLUTICASONE PROPIONATE 50 MCG
SPRAY, SUSPENSION (ML) NASAL
COMMUNITY
Start: 2023-11-17

## 2023-12-08 ENCOUNTER — OFFICE VISIT (OUTPATIENT)
Dept: PHYSICAL MEDICINE AND REHAB | Age: 68
End: 2023-12-08
Payer: MEDICARE

## 2023-12-08 VITALS
HEIGHT: 68 IN | BODY MASS INDEX: 31.83 KG/M2 | WEIGHT: 210 LBS | SYSTOLIC BLOOD PRESSURE: 160 MMHG | DIASTOLIC BLOOD PRESSURE: 74 MMHG

## 2023-12-08 DIAGNOSIS — M15.9 PRIMARY OSTEOARTHRITIS INVOLVING MULTIPLE JOINTS: ICD-10-CM

## 2023-12-08 DIAGNOSIS — R20.0 LEG NUMBNESS: ICD-10-CM

## 2023-12-08 DIAGNOSIS — M46.26 OSTEOMYELITIS OF LUMBAR VERTEBRA (HCC): ICD-10-CM

## 2023-12-08 DIAGNOSIS — M05.79 RHEUMATOID ARTHRITIS INVOLVING MULTIPLE SITES WITH POSITIVE RHEUMATOID FACTOR (HCC): ICD-10-CM

## 2023-12-08 DIAGNOSIS — M05.711 RHEUMATOID ARTHRITIS INVOLVING RIGHT SHOULDER WITH POSITIVE RHEUMATOID FACTOR (HCC): ICD-10-CM

## 2023-12-08 DIAGNOSIS — M54.2 CERVICALGIA: ICD-10-CM

## 2023-12-08 DIAGNOSIS — M48.062 SPINAL STENOSIS OF LUMBAR REGION WITH NEUROGENIC CLAUDICATION: Primary | ICD-10-CM

## 2023-12-08 DIAGNOSIS — R76.8 ANA POSITIVE: ICD-10-CM

## 2023-12-08 DIAGNOSIS — G57.93 NEUROPATHY INVOLVING BOTH LOWER EXTREMITIES: ICD-10-CM

## 2023-12-08 PROBLEM — H65.00 ACUTE SEROUS OTITIS MEDIA: Status: ACTIVE | Noted: 2023-11-17

## 2023-12-08 PROBLEM — H65.03 NON-RECURRENT ACUTE SEROUS OTITIS MEDIA OF BOTH EARS: Status: ACTIVE | Noted: 2023-11-17

## 2023-12-08 PROBLEM — R49.8 VOCAL TREMOR: Status: ACTIVE | Noted: 2023-12-05

## 2023-12-08 PROCEDURE — 3078F DIAST BP <80 MM HG: CPT | Performed by: PHYSICAL MEDICINE & REHABILITATION

## 2023-12-08 PROCEDURE — G8427 DOCREV CUR MEDS BY ELIG CLIN: HCPCS | Performed by: PHYSICAL MEDICINE & REHABILITATION

## 2023-12-08 PROCEDURE — 3017F COLORECTAL CA SCREEN DOC REV: CPT | Performed by: PHYSICAL MEDICINE & REHABILITATION

## 2023-12-08 PROCEDURE — 1123F ACP DISCUSS/DSCN MKR DOCD: CPT | Performed by: PHYSICAL MEDICINE & REHABILITATION

## 2023-12-08 PROCEDURE — G8399 PT W/DXA RESULTS DOCUMENT: HCPCS | Performed by: PHYSICAL MEDICINE & REHABILITATION

## 2023-12-08 PROCEDURE — G8484 FLU IMMUNIZE NO ADMIN: HCPCS | Performed by: PHYSICAL MEDICINE & REHABILITATION

## 2023-12-08 PROCEDURE — G8417 CALC BMI ABV UP PARAM F/U: HCPCS | Performed by: PHYSICAL MEDICINE & REHABILITATION

## 2023-12-08 PROCEDURE — 99214 OFFICE O/P EST MOD 30 MIN: CPT | Performed by: PHYSICAL MEDICINE & REHABILITATION

## 2023-12-08 PROCEDURE — 1090F PRES/ABSN URINE INCON ASSESS: CPT | Performed by: PHYSICAL MEDICINE & REHABILITATION

## 2023-12-08 PROCEDURE — 3077F SYST BP >= 140 MM HG: CPT | Performed by: PHYSICAL MEDICINE & REHABILITATION

## 2023-12-08 PROCEDURE — 1036F TOBACCO NON-USER: CPT | Performed by: PHYSICAL MEDICINE & REHABILITATION

## 2023-12-08 RX ORDER — TOPIRAMATE 25 MG/1
25 TABLET ORAL DAILY
COMMUNITY
End: 2023-12-08 | Stop reason: SDUPTHER

## 2023-12-08 RX ORDER — GABAPENTIN 300 MG/1
CAPSULE ORAL
Qty: 180 CAPSULE | Refills: 0 | Status: SHIPPED | OUTPATIENT
Start: 2023-12-08 | End: 2024-01-27

## 2023-12-08 RX ORDER — TOPIRAMATE 25 MG/1
25 TABLET ORAL DAILY
Qty: 60 TABLET | Refills: 5 | Status: SHIPPED | OUTPATIENT
Start: 2023-12-08

## 2023-12-08 ASSESSMENT — ENCOUNTER SYMPTOMS
BACK PAIN: 1
ABDOMINAL PAIN: 1
PHOTOPHOBIA: 1

## 2023-12-19 PROBLEM — R76.8 ANA POSITIVE: Status: ACTIVE | Noted: 2019-04-20

## 2023-12-19 PROBLEM — I25.119 ATHEROSCLEROTIC HEART DISEASE OF NATIVE CORONARY ARTERY WITH UNSPECIFIED ANGINA PECTORIS (CMS-HCC): Status: ACTIVE | Noted: 2018-09-06

## 2023-12-19 PROBLEM — M21.42 ACQUIRED PES PLANUS OF BOTH FEET: Status: ACTIVE | Noted: 2023-05-24

## 2023-12-19 PROBLEM — M21.41 ACQUIRED PES PLANUS OF BOTH FEET: Status: ACTIVE | Noted: 2023-05-24

## 2023-12-19 PROBLEM — I20.9 ANGINA, CLASS I (CMS-HCC): Status: ACTIVE | Noted: 2023-04-03

## 2023-12-19 PROBLEM — M25.473 ANKLE SWELLING: Status: ACTIVE | Noted: 2018-12-19

## 2023-12-19 PROBLEM — D63.1 ANEMIA OF CHRONIC RENAL FAILURE, STAGE 3A (MULTI): Status: ACTIVE | Noted: 2023-03-28

## 2023-12-19 PROBLEM — N18.31 ANEMIA OF CHRONIC RENAL FAILURE, STAGE 3A (MULTI): Status: ACTIVE | Noted: 2023-03-28

## 2023-12-19 RX ORDER — LOSARTAN POTASSIUM 25 MG/1
100 TABLET ORAL DAILY
COMMUNITY
Start: 2023-11-28

## 2023-12-19 RX ORDER — ATORVASTATIN CALCIUM 10 MG/1
10 TABLET, FILM COATED ORAL DAILY
COMMUNITY
Start: 2023-10-17 | End: 2024-01-10 | Stop reason: SDUPTHER

## 2023-12-19 RX ORDER — OMEPRAZOLE 40 MG/1
40 CAPSULE, DELAYED RELEASE ORAL DAILY
COMMUNITY
Start: 2023-10-23

## 2023-12-19 RX ORDER — DILTIAZEM HYDROCHLORIDE 30 MG/1
30 TABLET, FILM COATED ORAL 3 TIMES DAILY
COMMUNITY
Start: 2023-10-15 | End: 2024-01-10 | Stop reason: DRUGHIGH

## 2023-12-19 RX ORDER — HYDROXYCHLOROQUINE SULFATE 200 MG/1
200 TABLET, FILM COATED ORAL 2 TIMES DAILY
COMMUNITY
Start: 2023-09-14

## 2023-12-19 RX ORDER — OXYCODONE AND ACETAMINOPHEN 7.5; 325 MG/1; MG/1
1 TABLET ORAL EVERY 4 HOURS PRN
COMMUNITY
Start: 2023-12-05

## 2023-12-19 RX ORDER — FUROSEMIDE 20 MG/1
20 TABLET ORAL 2 TIMES DAILY
COMMUNITY
Start: 2023-11-22

## 2023-12-19 RX ORDER — NITROGLYCERIN 0.4 MG/1
0.4 TABLET SUBLINGUAL EVERY 5 MIN PRN
COMMUNITY
Start: 2023-11-28

## 2023-12-19 RX ORDER — ACETAMINOPHEN 500 MG
5 TABLET ORAL NIGHTLY PRN
COMMUNITY

## 2023-12-19 RX ORDER — CHOLECALCIFEROL (VITAMIN D3) 50 MCG
1 TABLET ORAL DAILY
COMMUNITY

## 2023-12-19 RX ORDER — TOPIRAMATE 25 MG/1
25 TABLET ORAL EVERY EVENING
COMMUNITY
Start: 2023-07-26

## 2023-12-19 RX ORDER — EPINEPHRINE 0.22MG
1 AEROSOL WITH ADAPTER (ML) INHALATION DAILY
COMMUNITY
End: 2024-03-20 | Stop reason: WASHOUT

## 2023-12-19 RX ORDER — BACLOFEN 10 MG/1
1 TABLET ORAL DAILY
COMMUNITY

## 2023-12-28 DIAGNOSIS — Z79.899 HIGH RISK MEDICATION USE: ICD-10-CM

## 2023-12-28 DIAGNOSIS — M48.062 SPINAL STENOSIS OF LUMBAR REGION WITH NEUROGENIC CLAUDICATION: ICD-10-CM

## 2023-12-28 DIAGNOSIS — M05.79 RHEUMATOID ARTHRITIS INVOLVING MULTIPLE SITES WITH POSITIVE RHEUMATOID FACTOR (HCC): ICD-10-CM

## 2023-12-28 DIAGNOSIS — M54.2 CERVICALGIA: ICD-10-CM

## 2023-12-28 DIAGNOSIS — R76.8 ANA POSITIVE: ICD-10-CM

## 2023-12-28 DIAGNOSIS — M54.41 CHRONIC BILATERAL LOW BACK PAIN WITH BILATERAL SCIATICA: ICD-10-CM

## 2023-12-28 DIAGNOSIS — M54.42 CHRONIC BILATERAL LOW BACK PAIN WITH BILATERAL SCIATICA: ICD-10-CM

## 2023-12-28 DIAGNOSIS — G89.29 CHRONIC BILATERAL LOW BACK PAIN WITH BILATERAL SCIATICA: ICD-10-CM

## 2023-12-28 RX ORDER — OXYCODONE AND ACETAMINOPHEN 7.5; 325 MG/1; MG/1
1 TABLET ORAL EVERY 6 HOURS PRN
Qty: 90 TABLET | Refills: 0 | Status: SHIPPED | OUTPATIENT
Start: 2024-01-02 | End: 2024-02-01

## 2024-01-10 ENCOUNTER — OFFICE VISIT (OUTPATIENT)
Dept: CARDIOLOGY | Facility: CLINIC | Age: 69
End: 2024-01-10
Payer: MEDICARE

## 2024-01-10 VITALS
SYSTOLIC BLOOD PRESSURE: 148 MMHG | HEIGHT: 68 IN | BODY MASS INDEX: 32.13 KG/M2 | WEIGHT: 212 LBS | DIASTOLIC BLOOD PRESSURE: 68 MMHG

## 2024-01-10 DIAGNOSIS — I20.9 ANGINA, CLASS I (CMS-HCC): ICD-10-CM

## 2024-01-10 DIAGNOSIS — I20.9 ANGINA PECTORIS WITH NORMAL CORONARY ARTERIOGRAM (CMS-HCC): ICD-10-CM

## 2024-01-10 DIAGNOSIS — E87.1 HYPONATREMIA: ICD-10-CM

## 2024-01-10 DIAGNOSIS — I10 PRIMARY HYPERTENSION: ICD-10-CM

## 2024-01-10 PROBLEM — M25.473 ANKLE SWELLING: Status: RESOLVED | Noted: 2018-12-19 | Resolved: 2024-01-10

## 2024-01-10 PROBLEM — I25.119 ATHEROSCLEROTIC HEART DISEASE OF NATIVE CORONARY ARTERY WITH UNSPECIFIED ANGINA PECTORIS (CMS-HCC): Status: RESOLVED | Noted: 2018-09-06 | Resolved: 2024-01-10

## 2024-01-10 PROCEDURE — 3077F SYST BP >= 140 MM HG: CPT | Performed by: INTERNAL MEDICINE

## 2024-01-10 PROCEDURE — 3078F DIAST BP <80 MM HG: CPT | Performed by: INTERNAL MEDICINE

## 2024-01-10 PROCEDURE — 1036F TOBACCO NON-USER: CPT | Performed by: INTERNAL MEDICINE

## 2024-01-10 PROCEDURE — 99214 OFFICE O/P EST MOD 30 MIN: CPT | Performed by: INTERNAL MEDICINE

## 2024-01-10 PROCEDURE — 1159F MED LIST DOCD IN RCRD: CPT | Performed by: INTERNAL MEDICINE

## 2024-01-10 RX ORDER — AMOXICILLIN 500 MG/1
500 TABLET, FILM COATED ORAL
COMMUNITY

## 2024-01-10 RX ORDER — LIDOCAINE 50 MG/G
OINTMENT TOPICAL
COMMUNITY

## 2024-01-10 RX ORDER — DILTIAZEM HYDROCHLORIDE 60 MG/1
60 TABLET, FILM COATED ORAL 3 TIMES DAILY
Qty: 270 TABLET | Refills: 1 | Status: SHIPPED | OUTPATIENT
Start: 2024-01-10 | End: 2024-05-08 | Stop reason: DRUGHIGH

## 2024-01-10 RX ORDER — ATORVASTATIN CALCIUM 10 MG/1
10 TABLET, FILM COATED ORAL DAILY
Qty: 100 TABLET | Refills: 1 | Status: SHIPPED | OUTPATIENT
Start: 2024-01-10

## 2024-01-10 RX ORDER — KETOROLAC TROMETHAMINE 5 MG/ML
1 SOLUTION OPHTHALMIC 4 TIMES DAILY
COMMUNITY
End: 2024-05-08 | Stop reason: WASHOUT

## 2024-01-10 RX ORDER — GABAPENTIN 300 MG/1
300 CAPSULE ORAL 2 TIMES DAILY
COMMUNITY
Start: 2019-07-17 | End: 2024-05-08 | Stop reason: WASHOUT

## 2024-01-10 RX ORDER — ACETAMINOPHEN 500 MG
500 TABLET ORAL EVERY 8 HOURS PRN
COMMUNITY

## 2024-01-10 RX ORDER — NYSTATIN 100000 U/G
CREAM TOPICAL 2 TIMES DAILY PRN
COMMUNITY

## 2024-01-10 NOTE — PROGRESS NOTES
Patient:  Jazmin Hallman  YOB: 1955  MRN: [id number]       Impression/Plan:       Angina pectoris with normal coronary arteriogram (CMS/HCC)  Angina, class I (CMS/HCC)  -     Only 1 episode of chest discomfort and was probably esophageal based on recurrence when trying to swallow certain pills and she is following with GI for that.  Current medications seem to be controlling this fairly well but as noted below she does have abdominal control of blood pressure and will increase Cardizem to 60 3 times daily    Hyponatremia  -     None of her cardiac medications are associated with significant hyponatremia.  Narcotics however can affect ADH and may be contributing.  She will follow-up with pain management and general medicine in that regard    Primary hypertension  -    Increase Cardizem to 60 3 times daily.  She knows this may occasionally increase lower extremity edema but currently her edema is quite mild      Chief Complaint/Active Symptoms:       Jazmin Hallman is a 68 y.o. female who presents with SVT, symptomatic PACs/PVCs, hypertension and hyperlipidemia. 2016 coronary angiography demonstrated normal coronary arteries. 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     Last initial office July 2023 functional class I angina related small vessel disease.  Rare episodes was continued on calcium blockers.  Blood pressure was well-controlled.  Does have venous insufficiency probably exacerbated by Cardizem and gabapentin.  She felt her edema manageable.    Lab work tempora 2023 CMP sodium 127 potassium 4.5 creatinine 1.09 liver function studies normal CBC normal 9/20 7 repeat BMP sodium 131 potassium 4.6    She denies angina, dyspnea, palpitation, edema, lightheadedness or syncope.  She has had no symptoms of claudication or neurologic deterioration.  There have been no hospitalizations or emergency room visits  since last office visit.    Has considerable musculoskeletal pain requires chronic narcotic therapy.  Does have lower extremity edema relatively mild just 1-2+ today.               Review of Systems: Unremarkable except as noted above    Meds     Current Outpatient Medications   Medication Instructions    acetaminophen (TYLENOL) 500 mg, oral, Every 8 hours PRN    amoxicillin (AMOXIL) 500 mg, oral, Take 6 tablets 1 hour prior to procedure, and 2 tablets 6 hours following procedure for dental appts    atorvastatin (LIPITOR) 10 mg, oral, Daily    baclofen (Lioresal) 10 mg tablet 1 tablet, oral, Daily    cholecalciferol (Vitamin D-3) 50 MCG (2000 UT) tablet 1 tablet, oral, Daily    coenzyme Q-10 100 mg capsule 1 tablet, oral, Daily    CYANOCOBALAMIN, VITAMIN B-12, INJ 1 mL, injection, Every 30 days    diclofenac sodium 1 % kit topical (top), 2 times daily, Apply to affected area twice daily     dilTIAZem (CARDIZEM) 30 mg, oral, 3 times daily    fluticasone (Flonase) 50 mcg/actuation nasal spray 2 sprays, Each Nostril, Daily, Shake gently. Before first use, prime pump. After use, clean tip and replace cap.    furosemide (LASIX) 20 mg, oral, 2 times daily    gabapentin (NEURONTIN) 300 mg, oral, 2 times daily    hydroxychloroquine (PLAQUENIL) 200 mg, 2 times daily    ketorolac (Acular) 0.5 % ophthalmic solution 1 drop, 4 times daily, Use as directed     lidocaine (Xylocaine) 5 % ointment Topical, Every 24 hours PRN, Apply to affected area every 12 hours as needed for pain    losartan (Cozaar) 25 mg tablet TAKE 3 TABLETS BY MOUTH ONCE DAILY AFTER BREAKFAST AND 1 AT BEDTIME    melatonin 5 mg tablet 1 tab(s) orally once a day (at bedtime), As Needed    nitroglycerin (NITROSTAT) 0.4 mg, sublingual, Every 5 min PRN, DO NOT EXCEED A TOTAL OF 3 DOSES IN 15 MINUTES    nystatin (Mycostatin) cream Topical, 2 times daily PRN    omeprazole (PRILOSEC) 40 mg, oral, Daily    oxyCODONE-acetaminophen (Percocet) 7.5-325 mg tablet Every 4  "hours PRN    predniSONE (Deltasone) 20 mg tablet 2 tabs daily x3 days then 1 tablet daily for 2 days then 1/2 tablet for 2 days    topiramate (TOPAMAX) 25 mg, oral, Every evening        Allergies     Allergies   Allergen Reactions    Amlodipine Cough, Other, Swelling and Unknown     kidney issues    swelling    Isosorbide Cough, Other and Unknown    Lisinopril Cough, Unknown and Other     kidney issues    Ranolazine GI Upset, Headache, Nausea Only, Other and Unknown     headache    Keflex [Cephalexin] Rash    Latex Hives, Rash and Swelling     Swelling of hands with wearing latex gloves         Annotated Problems     Specialty Problems          Cardiology Problems    Atherosclerotic heart disease of native coronary artery with unspecified angina pectoris (CMS/[facility])    Angina, class I (CMS/[facility])     Last Assessment & Plan: Formatting of this note might be different from the original. Assessment: managed on Cardizem and Nitro PRN negative stress test Diagnosed with syndrome X small vessel disease             Problem List     Patient Active Problem List    Diagnosis Date Noted    Non-recurrent acute serous otitis media of both ears 11/17/2023    Acquired pes planus of both feet 05/24/2023    Angina, class I (CMS/HCC) 04/03/2023    Anemia of chronic renal failure, stage 3a (CMS/HCC) 03/28/2023    LUKAS positive 04/20/2019    Ankle swelling 12/19/2018    Atherosclerotic heart disease of native coronary artery with unspecified angina pectoris (CMS/[facility]) 09/06/2018       Objective:     Vitals:    01/10/24 1028   BP: 148/68   BP Location: Left arm   Patient Position: Sitting   Weight: 96.2 kg (212 lb)   Height: 1.727 m (5' 8\")      Wt Readings from Last 4 Encounters:   01/10/24 96.2 kg (212 lb)   11/17/23 94.3 kg (208 lb)   07/12/23 96.2 kg (212 lb)   02/15/23 97.4 kg (214 lb 11.7 oz)           LAB:     Lab Results   Component Value Date    WBC 7.3 02/16/2023    HGB 11.0 (L) 02/16/2023    HCT 33.3 (L) 02/16/2023     " 02/16/2023    ALT 13 02/16/2023    AST 20 02/16/2023     (L) 02/16/2023    K 4.2 02/16/2023    CL 99 02/16/2023    CREATININE 1.12 (H) 02/16/2023    BUN 15 02/16/2023    CO2 23 02/16/2023    HGBA1C 5.7 (H) 03/21/2023       Diagnostic Studies:     Patient was never admitted.      Radiology:     No orders to display       Physical Exam     General Appearance: alert and oriented to person, place and time, in no acute distress  Cardiovascular: normal rate, regular rhythm, normal S1 and S2, no murmurs, rubs, clicks, or gallops,  no JVD  Pulmonary/Chest: clear to auscultation bilaterally- no wheezes, rales or rhonchi, normal air movement, no respiratory distress  Abdomen: soft, non-tender, non-distended, normal bowel sounds, no masses   Extremities: no cyanosis, clubbing.  1-2+ edema  Skin: warm and dry, no rash or erythema  Eyes: EOMI  Neck: supple and non-tender without mass, no thyromegaly             Scribe Attestation  By signing my name below, I, Paulina Caro LPN  , Scribe   attest that this documentation has been prepared under the direction and in the presence of Bo Mendoza MD.

## 2024-01-16 ENCOUNTER — OFFICE VISIT (OUTPATIENT)
Dept: FAMILY MEDICINE CLINIC | Age: 69
End: 2024-01-16

## 2024-01-16 VITALS
DIASTOLIC BLOOD PRESSURE: 62 MMHG | HEIGHT: 68 IN | BODY MASS INDEX: 32.43 KG/M2 | SYSTOLIC BLOOD PRESSURE: 152 MMHG | TEMPERATURE: 97.6 F | WEIGHT: 214 LBS | HEART RATE: 58 BPM | OXYGEN SATURATION: 100 %

## 2024-01-16 DIAGNOSIS — M05.79 RHEUMATOID ARTHRITIS INVOLVING MULTIPLE SITES WITH POSITIVE RHEUMATOID FACTOR (HCC): Primary | ICD-10-CM

## 2024-01-16 DIAGNOSIS — I10 PRIMARY HYPERTENSION: ICD-10-CM

## 2024-01-16 DIAGNOSIS — M25.561 CHRONIC PAIN OF RIGHT KNEE: ICD-10-CM

## 2024-01-16 DIAGNOSIS — G89.29 CHRONIC PAIN OF RIGHT KNEE: ICD-10-CM

## 2024-01-16 DIAGNOSIS — Z12.31 SCREENING MAMMOGRAM, ENCOUNTER FOR: ICD-10-CM

## 2024-01-16 DIAGNOSIS — N18.30 STAGE 3 CHRONIC KIDNEY DISEASE, UNSPECIFIED WHETHER STAGE 3A OR 3B CKD (HCC): ICD-10-CM

## 2024-01-16 DIAGNOSIS — Z13.220 LIPID SCREENING: ICD-10-CM

## 2024-01-16 DIAGNOSIS — E78.2 MIXED HYPERLIPIDEMIA: ICD-10-CM

## 2024-01-16 DIAGNOSIS — N25.81 SECONDARY RENAL HYPERPARATHYROIDISM (HCC): ICD-10-CM

## 2024-01-16 DIAGNOSIS — R94.6 ABNORMAL RESULTS OF THYROID FUNCTION STUDIES: ICD-10-CM

## 2024-01-16 DIAGNOSIS — R49.8 VOICE TREMOR: ICD-10-CM

## 2024-01-16 ASSESSMENT — PATIENT HEALTH QUESTIONNAIRE - PHQ9
SUM OF ALL RESPONSES TO PHQ QUESTIONS 1-9: 0
SUM OF ALL RESPONSES TO PHQ9 QUESTIONS 1 & 2: 0
2. FEELING DOWN, DEPRESSED OR HOPELESS: 0
SUM OF ALL RESPONSES TO PHQ QUESTIONS 1-9: 0
1. LITTLE INTEREST OR PLEASURE IN DOING THINGS: 0

## 2024-01-16 NOTE — PROGRESS NOTES
Infection of intervertebral disc (pyogenic), lumbar region (HCC) 8/13/2018    Low back pain     MRSA (methicillin resistant staph aureus) culture positive 2011    Nasal bleeding     Neck pain     Neuropathy     Obesity     Osteoarthritis     PSVT (paroxysmal supraventricular tachycardia)     Shingles outbreak 2012    SI (sacroiliac) pain     Stage 3 chronic kidney disease, unspecified whether stage 3a or 3b CKD (HCC) 4/13/2022    Stenosis     Vitamin D deficiency      Past Surgical History:   Procedure Laterality Date    CARDIAC CATHETERIZATION  10/2016    CARDIOVERSION      2001 x3    CAUDAL BLOCK N/A 12/13/2022    performed by Dr. Beltran    CERVICAL FUSION  12/14/2011    Dr Evans with bone graft and plate    COLONOSCOPY  09/2021    CC    ENDOSCOPIC ULTRASOUND (LOWER) N/A 07/11/2019    EGD/ EUS performed by El Recio MD at [facility] OR    ESOPHAGOSCOPY      HAND FUSION  07/2005    HYSTERECTOMY (CERVIX STATUS UNKNOWN)  2001    HYSTERECTOMY, TOTAL ABDOMINAL (CERVIX REMOVED)      JOINT REPLACEMENT Left     tka    LIVER BIOPSY      MENISCECTOMY  10/31/2011    left knee    OTHER SURGICAL HISTORY  10/26/2009    CAUDALS BY DR BELTRAN    NH COLON CA SCRN NOT HI RSK IND N/A 04/12/2017    COLONOSCOPY performed by Jason Garcia MD at [facility]    NH ESOPHAGOGASTRODUODENOSCOPY TRANSORAL DIAGNOSTIC N/A 04/12/2017    EGD ESOPHAGOGASTRODUODENOSCOPY performed by Jason Garcia MD at [facility]    SPINE SURGERY  12/2011    Dr Evans Lumbar and c spine    TOTAL KNEE ARTHROPLASTY  07/30/2012    LEFT-DR AVELAR    UPPER GASTROINTESTINAL ENDOSCOPY  1995     Social History     Socioeconomic History    Marital status:      Spouse name: Bill    Number of children: 2    Years of education: 13    Highest education level: Associate degree: occupational, technical, or vocational program   Occupational History    Occupation: Retired    Tobacco Use    Smoking status: Never    Smokeless tobacco: Never

## 2024-01-29 ENCOUNTER — PROCEDURE VISIT (OUTPATIENT)
Dept: PHYSICAL MEDICINE AND REHAB | Age: 69
End: 2024-01-29
Payer: MEDICARE

## 2024-01-29 DIAGNOSIS — F41.9 ANXIETY DUE TO INVASIVE PROCEDURE: ICD-10-CM

## 2024-01-29 DIAGNOSIS — G89.29 CHRONIC BILATERAL LOW BACK PAIN WITH BILATERAL SCIATICA: Primary | ICD-10-CM

## 2024-01-29 DIAGNOSIS — M54.41 CHRONIC BILATERAL LOW BACK PAIN WITH BILATERAL SCIATICA: Primary | ICD-10-CM

## 2024-01-29 DIAGNOSIS — M54.42 CHRONIC BILATERAL LOW BACK PAIN WITH BILATERAL SCIATICA: Primary | ICD-10-CM

## 2024-01-29 PROCEDURE — 64445 NJX AA&/STRD SCIATIC NRV IMG: CPT | Performed by: PHYSICAL MEDICINE & REHABILITATION

## 2024-01-29 PROCEDURE — 96372 THER/PROPH/DIAG INJ SC/IM: CPT | Performed by: PHYSICAL MEDICINE & REHABILITATION

## 2024-01-29 RX ORDER — DIAZEPAM 5 MG/1
5-10 TABLET ORAL PRN
Qty: 4 TABLET | Refills: 0 | Status: SHIPPED | OUTPATIENT
Start: 2024-01-29 | End: 2024-01-30

## 2024-01-29 RX ORDER — LIDOCAINE HYDROCHLORIDE 20 MG/ML
5 INJECTION, SOLUTION INFILTRATION; PERINEURAL ONCE
Status: COMPLETED | OUTPATIENT
Start: 2024-01-29 | End: 2024-01-29

## 2024-01-29 RX ORDER — LIDOCAINE HYDROCHLORIDE 10 MG/ML
7 INJECTION, SOLUTION INFILTRATION; PERINEURAL ONCE
Status: COMPLETED | OUTPATIENT
Start: 2024-01-29 | End: 2024-01-29

## 2024-01-29 RX ORDER — CYANOCOBALAMIN 1000 UG/ML
1000 INJECTION, SOLUTION INTRAMUSCULAR; SUBCUTANEOUS ONCE
Status: COMPLETED | OUTPATIENT
Start: 2024-01-29 | End: 2024-01-29

## 2024-01-29 RX ADMIN — LIDOCAINE HYDROCHLORIDE 5 ML: 20 INJECTION, SOLUTION INFILTRATION; PERINEURAL at 11:40

## 2024-01-29 RX ADMIN — LIDOCAINE HYDROCHLORIDE 7 ML: 10 INJECTION, SOLUTION INFILTRATION; PERINEURAL at 11:42

## 2024-01-29 RX ADMIN — CYANOCOBALAMIN 1000 MCG: 1000 INJECTION, SOLUTION INTRAMUSCULAR; SUBCUTANEOUS at 11:39

## 2024-01-29 NOTE — PROGRESS NOTES
Patient here for Right Sciatic injection with U/S. Patient taken back to exam room, and placed on drape locking stool. Areas to be injected marked appropriately, and cleansed with alcohol. 7cc of 1% Lidocaine with 1cc B-12, and 5cc of 2% Lidocaine to be injected by provider.

## 2024-02-01 DIAGNOSIS — G89.29 CHRONIC BILATERAL LOW BACK PAIN WITH BILATERAL SCIATICA: ICD-10-CM

## 2024-02-01 DIAGNOSIS — M54.2 CERVICALGIA: ICD-10-CM

## 2024-02-01 DIAGNOSIS — M05.79 RHEUMATOID ARTHRITIS INVOLVING MULTIPLE SITES WITH POSITIVE RHEUMATOID FACTOR (HCC): ICD-10-CM

## 2024-02-01 DIAGNOSIS — M54.41 CHRONIC BILATERAL LOW BACK PAIN WITH BILATERAL SCIATICA: ICD-10-CM

## 2024-02-01 DIAGNOSIS — M54.42 CHRONIC BILATERAL LOW BACK PAIN WITH BILATERAL SCIATICA: ICD-10-CM

## 2024-02-01 DIAGNOSIS — M48.062 SPINAL STENOSIS OF LUMBAR REGION WITH NEUROGENIC CLAUDICATION: ICD-10-CM

## 2024-02-01 DIAGNOSIS — Z79.899 HIGH RISK MEDICATION USE: ICD-10-CM

## 2024-02-01 DIAGNOSIS — R76.8 ANA POSITIVE: ICD-10-CM

## 2024-02-01 RX ORDER — OXYCODONE AND ACETAMINOPHEN 7.5; 325 MG/1; MG/1
1 TABLET ORAL EVERY 6 HOURS PRN
Qty: 90 TABLET | Refills: 0 | Status: SHIPPED | OUTPATIENT
Start: 2024-02-01 | End: 2024-03-02

## 2024-02-06 ENCOUNTER — HOSPITAL ENCOUNTER (OUTPATIENT)
Dept: INTERVENTIONAL RADIOLOGY/VASCULAR | Age: 69
Discharge: HOME | End: 2024-02-08
Payer: MEDICARE

## 2024-02-06 VITALS
WEIGHT: 214 LBS | RESPIRATION RATE: 14 BRPM | BODY MASS INDEX: 32.43 KG/M2 | HEART RATE: 64 BPM | DIASTOLIC BLOOD PRESSURE: 68 MMHG | OXYGEN SATURATION: 96 % | HEIGHT: 68 IN | SYSTOLIC BLOOD PRESSURE: 156 MMHG

## 2024-02-06 DIAGNOSIS — M54.16 LUMBAR RADICULOPATHY: ICD-10-CM

## 2024-02-06 PROCEDURE — 6360000002 HC RX W HCPCS: Performed by: PHYSICAL MEDICINE & REHABILITATION

## 2024-02-06 PROCEDURE — 2709999900 IR NERVE BLOCK PROCEDURE

## 2024-02-06 PROCEDURE — 6360000004 HC RX CONTRAST MEDICATION: Performed by: PHYSICAL MEDICINE & REHABILITATION

## 2024-02-06 PROCEDURE — 62323 NJX INTERLAMINAR LMBR/SAC: CPT

## 2024-02-06 PROCEDURE — 2500000003 HC RX 250 WO HCPCS: Performed by: PHYSICAL MEDICINE & REHABILITATION

## 2024-02-06 PROCEDURE — 62323 NJX INTERLAMINAR LMBR/SAC: CPT | Performed by: PHYSICAL MEDICINE & REHABILITATION

## 2024-02-06 RX ORDER — METHYLPREDNISOLONE ACETATE 40 MG/ML
40 INJECTION, SUSPENSION INTRA-ARTICULAR; INTRALESIONAL; INTRAMUSCULAR; SOFT TISSUE ONCE
Status: COMPLETED | OUTPATIENT
Start: 2024-02-06 | End: 2024-02-06

## 2024-02-06 RX ORDER — IOPAMIDOL 408 MG/ML
3 INJECTION, SOLUTION INTRATHECAL
Status: COMPLETED | OUTPATIENT
Start: 2024-02-06 | End: 2024-02-06

## 2024-02-06 RX ORDER — LIDOCAINE HYDROCHLORIDE 5 MG/ML
50 INJECTION, SOLUTION INFILTRATION; INTRAVENOUS ONCE
Status: COMPLETED | OUTPATIENT
Start: 2024-02-06 | End: 2024-02-06

## 2024-02-06 RX ADMIN — LIDOCAINE HYDROCHLORIDE 50 ML: 5 INJECTION, SOLUTION INFILTRATION at 11:45

## 2024-02-06 RX ADMIN — METHYLPREDNISOLONE ACETATE 40 MG: 40 INJECTION, SUSPENSION INTRA-ARTICULAR; INTRALESIONAL; INTRAMUSCULAR; INTRASYNOVIAL; SOFT TISSUE at 11:47

## 2024-02-06 RX ADMIN — Medication 3 ML: at 11:46

## 2024-02-06 ASSESSMENT — PAIN - FUNCTIONAL ASSESSMENT: PAIN_FUNCTIONAL_ASSESSMENT: NONE - DENIES PAIN

## 2024-02-06 NOTE — DISCHARGE INSTRUCTIONS
Caudal Block Discharge Instructions    Activity:  Rest, avoid strenuous activity such as bending or lifting heavy objects for at least 24 hours.    Be aware that your legs may feel heavy for up to 4 hours.    May shower, bathe, or swim after 24 hours.      Diet:  Resume usual diet    Medication:  * Resume home medication unless otherwise instructed by your physician.  * May take mild pain reliever, as needed, such as Acetaminophen or Ibuprofen.    INSTRUCTIONS OR COMMENTS RELATIVE TO SPECIFIC EXAM PERFORMED:  - You may experience some soreness over the next 1 to 2 days.    - If any signs of infection (redness, swelling, pus) at the site, Go to ER for evaluation.   - Be aware that your legs may feel heavy for up to 4 hours.  - For large amount of bleeding or drainage, Go to ER for evaluation.  - If valium was taken prior to procedure, do not drive, drink alcohol, or make any important decisions for the next 24 hours.   - Do not shower, bathe, or swim for the next 24 hours.  - Follow-up with Dr. Beltran.    IF YOU DEVELOP ANY OF THE FOLLOWING SYMPTOMS OVER THE NEXT 48HRS, CONTACT PHYSICIAN LISTED BELOW:  Physician performing procedure: DR. BELTRAN - [phone] or [phone], Ask to be put in contact with DR. BELTRAN.  After hours contact ER.  * Extreme pain that increases after leaving the hospital  * Active bleeding (refer to above instructions)  * Chills, fever above 100 degrees Farenheit and fatigue.  * Weakness, dizziness, lightheadedness or fainting.    If you are unable to contact the above physician and you feel you have a major problem, please go to the Emergency Room for treatment.

## 2024-02-06 NOTE — PROGRESS NOTES
Pt ambulated with cane, steady slow gait back to CT holding. Pt A&Ox4, respirations even and unlabored, skin WNL for pt. Pt changed independently into hospital gown. Pt interview complete, pt denies contrast allergy and states isn't on any blood thinners. Hx, allergies, medications reviewed. Confirmed pt does not have any diabetic testing device on. Consent obtained, VSS.  Pt awaiting to go to specials dept via wheelchair.Electronically signed by Reena Barrow RN on 2/6/2024 at 11:29 AM

## 2024-02-06 NOTE — PROCEDURES
OPERATIVE REPORT      DATE OF PROCEDURE: 2/6/2024    PREOPERATIVE DIAGNOSIS:   lumbar radiculopathy    POSTOPERATIVE DIAGNOSIS:   Same.     OPERATIVE PROCEDURE:  Caudal epidural steroid injection with fluoroscopy and epidurogram.    PROCEDURE:  The patient taken to the special procedures department and placed in a prone position.   A timeout was performed immediately prior to the start of the caudal block procedure and included the correct patient (two identifiers), correct procedure and correct site(s).  Procedure consent and allergies were also verified.      The sacral hiatus was identified and marked and the sacral region was then prepped and draped in the usual fashion.  Local anesthetic of 0.5% Xylocaine was injected to form a skin wheal and then was injected to the depth of the sacrococcygeal membrane.  At this point a C-arm  film was taken to verify for correct approach of the needle.  The needle was then withdrawn.  A #22 gauge 1-1/2 inch needle was inserted through the coccysacrogeal membrane into the epidural space using loss of resistance technique with air.  After negative aspiration was confirmed, approximately 3ml of Isovue M 200 was used to verify needle tip location in the caudal epidural space.  Distribution of the contrast medium was observed and a normal appearing epidurogram was noted.  The air syringe was then removed.  A mixture of 40mg of Depo-Medrol and 0.5% Lidocaine preservative free totaling 12cc was injected into the epidural space.  The needle was aspirated prior to and during this injection several times to verify again that the needle was outside the intravascular or subdural regions.  The needle was then withdrawn.    The patient tolerated the procedure well and was taken to the post-anesthesia care unit in stable condition.  The patient was instructed to call our office the following business day for follow-up instructions and to notify us immediately if they were having any

## 2024-02-06 NOTE — OR NURSING
1130Patient was brought to Special Procedures suite via wheelchair.  Patient onto procedure table in prone position with 3 a assist.  Placed on vitals monitor. VSS (Blood pressure noted to be elevated, pt states she is uncomfortable laying prone for procedure). Will check  PB again prior to discharge. Pt states that her pain is a 6/10 in her lower back.     Caudal site cleansed with Hibiclense and site marked by Dr. Peterson. Site prepped with betadine, draped and numbed with lidocaine.  Needle placement verifed with fluoro guidance and contrast injection.  All prep and medications given by Dr Peterson.  Pt states that she has numbness at injection site and pain is a 0/10.     Patient tolerated well.  Patient will return to the CT holding room.

## 2024-02-06 NOTE — FLOWSHEET NOTE
1154 - Patient brought back to CT holding via WC. A&Ox4, calm and cooperative. Rates pain 0/10 at procedural site / lower back. Patient does report pain in right knee due to recent falls. VS obtained and stable, on RA. Able to move LE's and denies numbness / tingling.     1201 -Getting dressed independently. D/C instructions reviewed with patient by YENY RN and understanding indicated.     1204 - From CT holding via WC. To lobby entrance where met by  who will drive her home. Electronically signed by Zaida Cano RN on 2/6/2024 at 12:04 PM

## 2024-02-12 ENCOUNTER — HOSPITAL ENCOUNTER (OUTPATIENT)
Dept: MRI IMAGING | Age: 69
Discharge: HOME | End: 2024-02-14
Payer: MEDICARE

## 2024-02-12 DIAGNOSIS — M48.062 SPINAL STENOSIS OF LUMBAR REGION WITH NEUROGENIC CLAUDICATION: ICD-10-CM

## 2024-02-12 DIAGNOSIS — R20.0 LEG NUMBNESS: ICD-10-CM

## 2024-02-12 PROCEDURE — 72146 MRI CHEST SPINE W/O DYE: CPT

## 2024-02-12 PROCEDURE — 72148 MRI LUMBAR SPINE W/O DYE: CPT

## 2024-02-15 DIAGNOSIS — R94.6 ABNORMAL RESULTS OF THYROID FUNCTION STUDIES: ICD-10-CM

## 2024-02-15 DIAGNOSIS — R79.89 ABNORMAL TSH: Primary | ICD-10-CM

## 2024-02-15 DIAGNOSIS — I10 PRIMARY HYPERTENSION: ICD-10-CM

## 2024-02-15 DIAGNOSIS — Z13.220 LIPID SCREENING: ICD-10-CM

## 2024-02-15 LAB
ALBUMIN SERPL-MCNC: 4.3 G/DL (ref 3.5–4.6)
ALP SERPL-CCNC: 146 U/L (ref 40–130)
ALT SERPL-CCNC: 21 U/L (ref 0–33)
ANION GAP SERPL CALCULATED.3IONS-SCNC: 14 MEQ/L (ref 9–15)
AST SERPL-CCNC: 19 U/L (ref 0–35)
BILIRUB SERPL-MCNC: 0.3 MG/DL (ref 0.2–0.7)
BUN SERPL-MCNC: 26 MG/DL (ref 8–23)
CALCIUM SERPL-MCNC: 9.3 MG/DL (ref 8.5–9.9)
CHLORIDE SERPL-SCNC: 99 MEQ/L (ref 95–107)
CHOLEST SERPL-MCNC: 185 MG/DL (ref 0–199)
CO2 SERPL-SCNC: 21 MEQ/L (ref 20–31)
CREAT SERPL-MCNC: 1.37 MG/DL (ref 0.5–0.9)
GLOBULIN SER CALC-MCNC: 2.4 G/DL (ref 2.3–3.5)
GLUCOSE SERPL-MCNC: 90 MG/DL (ref 70–99)
HDLC SERPL-MCNC: 114 MG/DL (ref 40–59)
LDL CHOLESTEROL CALCULATED: 59 MG/DL (ref 0–129)
POTASSIUM SERPL-SCNC: 4.1 MEQ/L (ref 3.4–4.9)
PROT SERPL-MCNC: 6.7 G/DL (ref 6.3–8)
SODIUM SERPL-SCNC: 134 MEQ/L (ref 135–144)
T4 FREE SERPL-MCNC: 1.29 NG/DL (ref 0.84–1.68)
TRIGLYCERIDE, FASTING: 59 MG/DL (ref 0–150)
TSH REFLEX: 5.16 UIU/ML (ref 0.44–3.86)

## 2024-02-17 NOTE — PROGRESS NOTES
heat, muscle relaxant, NSAIDs, ice and bed rest (Sciatica block, trigger point injections, mobic, Gabapentin, Norco) for the symptoms. The treatment provided significant relief.   Neck Pain   This is a chronic problem. The problem occurs constantly. The problem has been waxing and waning. The pain is at a severity of 8/10. The pain is mild. The symptoms are aggravated by position and bending. Stiffness is present In the morning. Associated symptoms include leg pain. Pertinent negatives include no chest pain, fever, headaches, numbness, paresis, photophobia or weakness. She has tried heat, acetaminophen, chiropractic manipulation, ice, muscle relaxants, neck support, NSAIDs and oral narcotics for the symptoms. The treatment provided moderate relief.   Shoulder Pain   This is a chronic problem. The current episode started more than 1 year ago. There has been no history of extremity trauma. The problem occurs constantly. The pain is at a severity of 8/10. Associated symptoms include joint locking, joint swelling and a limited range of motion. Pertinent negatives include no fever or numbness. She has tried heat, cold, acetaminophen and movement for the symptoms. The treatment provided moderate relief. Family history includes rheumatoid arthritis. Her past medical history is significant for osteoarthritis. There is no history of diabetes.   Hip Pain   The incident occurred more than 1 week ago. The injury mechanism was a compression. The quality of the pain is described as aching. The pain is at a severity of 8/10. The pain is severe. The pain has been Constant since onset. Pertinent negatives include no numbness. She reports no foreign bodies present. The symptoms are aggravated by movement, palpation and weight bearing. She has tried acetaminophen, heat, elevation, ice, immobilization, NSAIDs, non-weight bearing and rest for the symptoms. The treatment provided mild relief.       Past Medical History:   Diagnosis Date

## 2024-02-19 ENCOUNTER — TELEPHONE (OUTPATIENT)
Dept: FAMILY MEDICINE CLINIC | Age: 69
End: 2024-02-19

## 2024-02-19 DIAGNOSIS — F41.9 ANXIETY: Primary | ICD-10-CM

## 2024-02-19 RX ORDER — DIAZEPAM 2 MG/1
TABLET ORAL
Qty: 2 TABLET | Refills: 0 | Status: SHIPPED | OUTPATIENT
Start: 2024-02-19 | End: 2024-02-19

## 2024-02-19 NOTE — TELEPHONE ENCOUNTER
Patient called and is asking that if Ativan or Valium can be prescribed for her because she is going Thursday to have a Botox injection in her vocal cords.  She stated she gets something prescribed when she gets MRIs also.    If this can be done, please send to [facility].

## 2024-02-23 DIAGNOSIS — Z79.899 HIGH RISK MEDICATION USE: ICD-10-CM

## 2024-02-23 DIAGNOSIS — M05.79 RHEUMATOID ARTHRITIS INVOLVING MULTIPLE SITES WITH POSITIVE RHEUMATOID FACTOR (HCC): ICD-10-CM

## 2024-02-23 DIAGNOSIS — M54.2 CERVICALGIA: ICD-10-CM

## 2024-02-23 DIAGNOSIS — M48.062 SPINAL STENOSIS OF LUMBAR REGION WITH NEUROGENIC CLAUDICATION: ICD-10-CM

## 2024-02-23 DIAGNOSIS — R76.8 ANA POSITIVE: ICD-10-CM

## 2024-02-23 DIAGNOSIS — M54.42 CHRONIC BILATERAL LOW BACK PAIN WITH BILATERAL SCIATICA: ICD-10-CM

## 2024-02-23 DIAGNOSIS — G89.29 CHRONIC BILATERAL LOW BACK PAIN WITH BILATERAL SCIATICA: ICD-10-CM

## 2024-02-23 DIAGNOSIS — M54.41 CHRONIC BILATERAL LOW BACK PAIN WITH BILATERAL SCIATICA: ICD-10-CM

## 2024-02-23 RX ORDER — OXYCODONE AND ACETAMINOPHEN 7.5; 325 MG/1; MG/1
1 TABLET ORAL EVERY 6 HOURS PRN
Qty: 90 TABLET | Refills: 0 | Status: SHIPPED | OUTPATIENT
Start: 2024-03-01 | End: 2024-03-31

## 2024-03-06 ENCOUNTER — TELEPHONE (OUTPATIENT)
Dept: CARDIOLOGY | Facility: CLINIC | Age: 69
End: 2024-03-06
Payer: MEDICARE

## 2024-03-06 NOTE — TELEPHONE ENCOUNTER
Pt leaves voicemail message stating that she went to her rheumatologist this morning and her BP was 145/45 states that her doctor was concerned. Pt states that her diastolic has been running consistently in the 40's. Pt states that her nephrologist had placed her on Doxazosin 4 mg once daily on 02/05/2023. Pt states that she felt lightheaded so she cut the dose to 2 mg one tab daily. Pt would like to know recommendations. Glo

## 2024-03-06 NOTE — TELEPHONE ENCOUNTER
Called patient who was notified regarding Dr. Bo Mendoza F.A.C.C. response and orders.  She was instructed to keep her scheduled appointment 3/20/24.  Patient in agreement with plan and verbalized understanding.  Patricia Roblero CMA

## 2024-03-06 NOTE — TELEPHONE ENCOUNTER
Called patient with Dr. Bo Mendoza F.A.C.C. response and orders.  She verbalized understanding and states on 3/3/24 she was getting ready for [place]ai.  She states she bent down to tie her shoe then stood up and became very dizzy.  Patient states she took her BP which was 100/43 and then was 100/40.  She is asking if this is ok.  Patient states she is eating and drinking.  She states she is drinking at least 50 ounces of fluid consistenly.  She states she is restricted to 50 oz only due to low sodium.  Patient informed will address above with Dr. Bo Mendoza F.A.C.C.  She was instructed to monitor BP at home and bring in written BP diary along with her unit to her next appointment which is scheduled with Linda Fischer CNP on 3/20/24.  Patricia Roblero CMA

## 2024-03-11 ENCOUNTER — OFFICE VISIT (OUTPATIENT)
Dept: PHYSICAL MEDICINE AND REHAB | Age: 69
End: 2024-03-11
Payer: MEDICARE

## 2024-03-11 VITALS
DIASTOLIC BLOOD PRESSURE: 65 MMHG | WEIGHT: 214 LBS | HEIGHT: 68 IN | SYSTOLIC BLOOD PRESSURE: 118 MMHG | HEART RATE: 66 BPM | BODY MASS INDEX: 32.43 KG/M2

## 2024-03-11 DIAGNOSIS — G89.29 CHRONIC BILATERAL LOW BACK PAIN WITH BILATERAL SCIATICA: Primary | ICD-10-CM

## 2024-03-11 DIAGNOSIS — M54.32 SCIATICA OF LEFT SIDE: ICD-10-CM

## 2024-03-11 DIAGNOSIS — M25.561 RIGHT KNEE PAIN, UNSPECIFIED CHRONICITY: ICD-10-CM

## 2024-03-11 DIAGNOSIS — M54.41 CHRONIC BILATERAL LOW BACK PAIN WITH BILATERAL SCIATICA: Primary | ICD-10-CM

## 2024-03-11 DIAGNOSIS — M54.42 CHRONIC BILATERAL LOW BACK PAIN WITH BILATERAL SCIATICA: Primary | ICD-10-CM

## 2024-03-11 DIAGNOSIS — M79.7 FIBROMYALGIA: ICD-10-CM

## 2024-03-11 DIAGNOSIS — R76.8 ANA POSITIVE: ICD-10-CM

## 2024-03-11 DIAGNOSIS — M48.062 SPINAL STENOSIS OF LUMBAR REGION WITH NEUROGENIC CLAUDICATION: ICD-10-CM

## 2024-03-11 DIAGNOSIS — G57.93 NEUROPATHY INVOLVING BOTH LOWER EXTREMITIES: ICD-10-CM

## 2024-03-11 DIAGNOSIS — M05.79 RHEUMATOID ARTHRITIS INVOLVING MULTIPLE SITES WITH POSITIVE RHEUMATOID FACTOR (HCC): ICD-10-CM

## 2024-03-11 DIAGNOSIS — Z79.899 HIGH RISK MEDICATION USE: ICD-10-CM

## 2024-03-11 DIAGNOSIS — M54.2 CERVICALGIA: ICD-10-CM

## 2024-03-11 PROCEDURE — 1090F PRES/ABSN URINE INCON ASSESS: CPT | Performed by: PHYSICAL MEDICINE & REHABILITATION

## 2024-03-11 PROCEDURE — 3074F SYST BP LT 130 MM HG: CPT | Performed by: PHYSICAL MEDICINE & REHABILITATION

## 2024-03-11 PROCEDURE — 99214 OFFICE O/P EST MOD 30 MIN: CPT | Performed by: PHYSICAL MEDICINE & REHABILITATION

## 2024-03-11 PROCEDURE — 1123F ACP DISCUSS/DSCN MKR DOCD: CPT | Performed by: PHYSICAL MEDICINE & REHABILITATION

## 2024-03-11 PROCEDURE — 1036F TOBACCO NON-USER: CPT | Performed by: PHYSICAL MEDICINE & REHABILITATION

## 2024-03-11 PROCEDURE — G8417 CALC BMI ABV UP PARAM F/U: HCPCS | Performed by: PHYSICAL MEDICINE & REHABILITATION

## 2024-03-11 PROCEDURE — 3078F DIAST BP <80 MM HG: CPT | Performed by: PHYSICAL MEDICINE & REHABILITATION

## 2024-03-11 PROCEDURE — G8484 FLU IMMUNIZE NO ADMIN: HCPCS | Performed by: PHYSICAL MEDICINE & REHABILITATION

## 2024-03-11 PROCEDURE — G8427 DOCREV CUR MEDS BY ELIG CLIN: HCPCS | Performed by: PHYSICAL MEDICINE & REHABILITATION

## 2024-03-11 PROCEDURE — 3017F COLORECTAL CA SCREEN DOC REV: CPT | Performed by: PHYSICAL MEDICINE & REHABILITATION

## 2024-03-11 PROCEDURE — G8399 PT W/DXA RESULTS DOCUMENT: HCPCS | Performed by: PHYSICAL MEDICINE & REHABILITATION

## 2024-03-11 RX ORDER — OXYCODONE AND ACETAMINOPHEN 7.5; 325 MG/1; MG/1
1 TABLET ORAL EVERY 6 HOURS PRN
Qty: 90 TABLET | Refills: 0 | Status: SHIPPED | OUTPATIENT
Start: 2024-03-30 | End: 2024-04-29

## 2024-03-11 RX ORDER — DOXAZOSIN 2 MG/1
2 TABLET ORAL NIGHTLY
COMMUNITY

## 2024-03-11 RX ORDER — DIAZEPAM 2 MG/1
TABLET ORAL
COMMUNITY
Start: 2024-02-19

## 2024-03-11 ASSESSMENT — VISUAL ACUITY: VA_NORMAL: 1

## 2024-03-18 ENCOUNTER — PROCEDURE VISIT (OUTPATIENT)
Dept: PHYSICAL MEDICINE AND REHAB | Age: 69
End: 2024-03-18
Payer: MEDICARE

## 2024-03-18 DIAGNOSIS — G89.29 CHRONIC BILATERAL LOW BACK PAIN WITH BILATERAL SCIATICA: ICD-10-CM

## 2024-03-18 DIAGNOSIS — M54.42 CHRONIC BILATERAL LOW BACK PAIN WITH BILATERAL SCIATICA: ICD-10-CM

## 2024-03-18 DIAGNOSIS — M54.41 CHRONIC BILATERAL LOW BACK PAIN WITH BILATERAL SCIATICA: ICD-10-CM

## 2024-03-18 PROCEDURE — 64445 NJX AA&/STRD SCIATIC NRV IMG: CPT | Performed by: PHYSICAL MEDICINE & REHABILITATION

## 2024-03-18 PROCEDURE — 96372 THER/PROPH/DIAG INJ SC/IM: CPT | Performed by: PHYSICAL MEDICINE & REHABILITATION

## 2024-03-18 RX ORDER — CYANOCOBALAMIN 1000 UG/ML
1000 INJECTION, SOLUTION INTRAMUSCULAR; SUBCUTANEOUS ONCE
Status: COMPLETED | OUTPATIENT
Start: 2024-03-18 | End: 2024-03-18

## 2024-03-18 RX ORDER — LIDOCAINE HYDROCHLORIDE 10 MG/ML
7 INJECTION, SOLUTION INFILTRATION; PERINEURAL ONCE
Status: COMPLETED | OUTPATIENT
Start: 2024-03-18 | End: 2024-03-18

## 2024-03-18 RX ORDER — LIDOCAINE HYDROCHLORIDE 20 MG/ML
5 INJECTION, SOLUTION INFILTRATION; PERINEURAL ONCE
Status: COMPLETED | OUTPATIENT
Start: 2024-03-18 | End: 2024-03-18

## 2024-03-18 RX ADMIN — LIDOCAINE HYDROCHLORIDE 5 ML: 20 INJECTION, SOLUTION INFILTRATION; PERINEURAL at 13:54

## 2024-03-18 RX ADMIN — LIDOCAINE HYDROCHLORIDE 7 ML: 10 INJECTION, SOLUTION INFILTRATION; PERINEURAL at 13:54

## 2024-03-18 RX ADMIN — CYANOCOBALAMIN 1000 MCG: 1000 INJECTION, SOLUTION INTRAMUSCULAR; SUBCUTANEOUS at 13:52

## 2024-03-20 ENCOUNTER — LAB (OUTPATIENT)
Dept: LAB | Facility: LAB | Age: 69
End: 2024-03-20
Payer: MEDICARE

## 2024-03-20 ENCOUNTER — OFFICE VISIT (OUTPATIENT)
Dept: CARDIOLOGY | Facility: CLINIC | Age: 69
End: 2024-03-20
Payer: MEDICARE

## 2024-03-20 VITALS
HEIGHT: 68 IN | WEIGHT: 221 LBS | BODY MASS INDEX: 33.49 KG/M2 | DIASTOLIC BLOOD PRESSURE: 61 MMHG | SYSTOLIC BLOOD PRESSURE: 130 MMHG | HEART RATE: 55 BPM

## 2024-03-20 DIAGNOSIS — R42 DIZZINESS: ICD-10-CM

## 2024-03-20 DIAGNOSIS — I95.9 HYPOTENSION, UNSPECIFIED HYPOTENSION TYPE: Primary | ICD-10-CM

## 2024-03-20 DIAGNOSIS — I95.9 HYPOTENSION, UNSPECIFIED HYPOTENSION TYPE: ICD-10-CM

## 2024-03-20 LAB
ANION GAP SERPL CALC-SCNC: 12 MMOL/L (ref 10–20)
BUN SERPL-MCNC: 16 MG/DL (ref 6–23)
CALCIUM SERPL-MCNC: 9.1 MG/DL (ref 8.6–10.3)
CHLORIDE SERPL-SCNC: 101 MMOL/L (ref 98–107)
CO2 SERPL-SCNC: 25 MMOL/L (ref 21–32)
CREAT SERPL-MCNC: 1.34 MG/DL (ref 0.5–1.05)
EGFRCR SERPLBLD CKD-EPI 2021: 43 ML/MIN/1.73M*2
ERYTHROCYTE [DISTWIDTH] IN BLOOD BY AUTOMATED COUNT: 13.5 % (ref 11.5–14.5)
GLUCOSE SERPL-MCNC: 91 MG/DL (ref 74–99)
HCT VFR BLD AUTO: 33.4 % (ref 36–46)
HGB BLD-MCNC: 10.4 G/DL (ref 12–16)
MCH RBC QN AUTO: 28.3 PG (ref 26–34)
MCHC RBC AUTO-ENTMCNC: 31.1 G/DL (ref 32–36)
MCV RBC AUTO: 91 FL (ref 80–100)
NRBC BLD-RTO: 0 /100 WBCS (ref 0–0)
PLATELET # BLD AUTO: 232 X10*3/UL (ref 150–450)
POTASSIUM SERPL-SCNC: 4.5 MMOL/L (ref 3.5–5.3)
RBC # BLD AUTO: 3.68 X10*6/UL (ref 4–5.2)
SODIUM SERPL-SCNC: 133 MMOL/L (ref 136–145)
WBC # BLD AUTO: 6.3 X10*3/UL (ref 4.4–11.3)

## 2024-03-20 PROCEDURE — 1160F RVW MEDS BY RX/DR IN RCRD: CPT | Performed by: NURSE PRACTITIONER

## 2024-03-20 PROCEDURE — 3078F DIAST BP <80 MM HG: CPT | Performed by: NURSE PRACTITIONER

## 2024-03-20 PROCEDURE — 1036F TOBACCO NON-USER: CPT | Performed by: NURSE PRACTITIONER

## 2024-03-20 PROCEDURE — 99213 OFFICE O/P EST LOW 20 MIN: CPT | Performed by: NURSE PRACTITIONER

## 2024-03-20 PROCEDURE — 3074F SYST BP LT 130 MM HG: CPT | Performed by: NURSE PRACTITIONER

## 2024-03-20 PROCEDURE — 1157F ADVNC CARE PLAN IN RCRD: CPT | Performed by: NURSE PRACTITIONER

## 2024-03-20 PROCEDURE — 36415 COLL VENOUS BLD VENIPUNCTURE: CPT

## 2024-03-20 PROCEDURE — 85027 COMPLETE CBC AUTOMATED: CPT

## 2024-03-20 PROCEDURE — 80048 BASIC METABOLIC PNL TOTAL CA: CPT

## 2024-03-20 PROCEDURE — 1159F MED LIST DOCD IN RCRD: CPT | Performed by: NURSE PRACTITIONER

## 2024-03-20 RX ORDER — DOXAZOSIN 4 MG/1
2 TABLET ORAL NIGHTLY
COMMUNITY
Start: 2024-02-05 | End: 2024-05-08 | Stop reason: WASHOUT

## 2024-03-20 RX ORDER — GABAPENTIN 300 MG/1
CAPSULE ORAL
COMMUNITY

## 2024-03-20 NOTE — PROGRESS NOTES
Jazmin Hallman is a 68 y.o. female that presents to the office today for cardiac evaluation.  She follows with her  primary cardiologist   and was added to my schedule today.  She  has a PMH of   SVT, symptomatic PACs/PVCs, hypertension and hyperlipidemia. 2016 coronary angiography demonstrated normal coronary arteries. 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      Last initial office July 2023 functional class I angina related small vessel disease.  Rare episodes was continued on calcium blockers.  Blood pressure was well-controlled.  Does have venous insufficiency probably exacerbated by Cardizem and gabapentin.  She felt her edema manageable.     Lab work tempora 2023 CMP sodium 127 potassium 4.5 creatinine 1.09 liver function studies normal CBC normal 9/20 7 repeat BMP sodium 131 potassium 4.6    She states that she accidentally mixed up her Lasix and Losartan which resulted in taking 140 mg of lasix for 2 days.  She states that she urinated non-stop after that and has been extremely dizzy and off balance since. She states that she did notify her nephrologist office after and was not given any orders.     Assessment/Plan  Dizziness:  possibly secondary to dehydration as she mistakenly took incorrect dosage of Lasix for 2 days.  She also reports being cold all the time.  Will obtain basic metabolic panel and CBC.  Hypertension: Has recently been started on Doxazosin by nephrology. Blood pressure well controlled.   Will call with lab results  Further recommendations pending lab results  Follow with Dr. Mendoza in office as scheduled or sooner if needed.      Patient Active Problem List   Diagnosis    Non-recurrent acute serous otitis media of both ears    Acquired pes planus of both feet    LUKAS positive    Anemia of chronic renal failure, stage 3a (CMS/HCC)    Angina, class I (CMS/HCC)    Hyponatremia    Primary  hypertension    Angina pectoris with normal coronary arteriogram (CMS/[facility])       Social History     Tobacco Use    Smoking status: Never    Smokeless tobacco: Never       Past Medical History:   Diagnosis Date    Dizziness and giddiness     Lightheadedness    Epistaxis 04/27/2022    Right-sided epistaxis    Personal history of other diseases of the respiratory system 12/27/2021    History of nasal obstruction    Personal history of other specified conditions     History of palpitations    Personal history of other specified conditions     History of weakness         Current Outpatient Medications:     acetaminophen (Tylenol) 500 mg tablet, Take 1 tablet (500 mg) by mouth every 8 hours if needed for mild pain (1 - 3)., Disp: , Rfl:     amoxicillin (Amoxil) 500 mg tablet, Take 1 tablet (500 mg) by mouth. Take 6 tablets 1 hour prior to procedure, and 2 tablets 6 hours following procedure for dental appts, Disp: , Rfl:     atorvastatin (Lipitor) 10 mg tablet, Take 1 tablet (10 mg) by mouth once daily., Disp: 100 tablet, Rfl: 1    baclofen (Lioresal) 10 mg tablet, Take 1 tablet (10 mg) by mouth once daily., Disp: , Rfl:     cholecalciferol (Vitamin D-3) 50 MCG (2000 UT) tablet, Take 1 tablet (2,000 Units) by mouth once daily., Disp: , Rfl:     CYANOCOBALAMIN, VITAMIN B-12, INJ, Inject 1 mL as directed every 30 (thirty) days., Disp: , Rfl:     diclofenac sodium 1 % kit, Apply topically 2 times a day. Apply to affected area twice daily, Disp: , Rfl:     dilTIAZem (Cardizem) 60 mg immediate release tablet, Take 1 tablet (60 mg) by mouth 3 times a day., Disp: 270 tablet, Rfl: 1    doxazosin (Cardura) 4 mg tablet, Take 0.5 tablets (2 mg) by mouth once daily at bedtime., Disp: , Rfl:     furosemide (Lasix) 20 mg tablet, Take 1 tablet (20 mg) by mouth 2 times a day., Disp: , Rfl:     gabapentin (Neurontin) 300 mg capsule, 1 capsule (300 mg). TAKE 2 CAPSULES AT NIGHT ALTERNATING WITH 1 CAPSULE THE NEXT. ETC, Disp: , Rfl:      hydroxychloroquine (Plaquenil) 200 mg tablet, 1 tablet (200 mg) 2 times a day., Disp: , Rfl:     ketorolac (Acular) 0.5 % ophthalmic solution, 1 drop 4 times a day. Use as directed, Disp: , Rfl:     lidocaine (Xylocaine) 5 % ointment, Apply topically every 24 (twenty four) hours if needed for mild pain (1 - 3). Apply to affected area every 12 hours as needed for pain, Disp: , Rfl:     losartan (Cozaar) 25 mg tablet, TAKE 3 TABLETS BY MOUTH ONCE DAILY AFTER BREAKFAST AND 1 AT BEDTIME, Disp: , Rfl:     melatonin 5 mg tablet, 1 tab(s) orally once a day (at bedtime), As Needed, Disp: , Rfl:     nitroglycerin (Nitrostat) 0.4 mg SL tablet, Place 1 tablet (0.4 mg) under the tongue every 5 minutes if needed for chest pain. DO NOT EXCEED A TOTAL OF 3 DOSES IN 15 MINUTES, Disp: , Rfl:     nystatin (Mycostatin) cream, Apply topically 2 times a day as needed., Disp: , Rfl:     omeprazole (PriLOSEC) 40 mg DR capsule, Take 1 capsule (40 mg) by mouth once daily., Disp: , Rfl:     oxyCODONE-acetaminophen (Percocet) 7.5-325 mg tablet, every 4 hours if needed., Disp: , Rfl:     topiramate (Topamax) 25 mg tablet, Take 1 tablet (25 mg) by mouth once daily in the evening., Disp: , Rfl:     fluticasone (Flonase) 50 mcg/actuation nasal spray, Administer 2 sprays into each nostril once daily. Shake gently. Before first use, prime pump. After use, clean tip and replace cap., Disp: 9.9 mL, Rfl: 0    gabapentin (Neurontin) 300 mg capsule, Take 1 capsule (300 mg) by mouth 2 times a day., Disp: , Rfl:     Amlodipine, Isosorbide, Lisinopril, Ranolazine, Keflex [cephalexin], and Latex    No family history on file.    Past Surgical History:   Procedure Laterality Date    OTHER SURGICAL HISTORY  03/03/2022    Total hysterectomy abdominal    OTHER SURGICAL HISTORY  03/03/2022    Knee replacement    OTHER SURGICAL HISTORY  03/03/2022    Colonoscopy    OTHER SURGICAL HISTORY  03/03/2022    Back surgery          Review of systems  Constitutional: No  "weight loss, fever, chills, weakness or fatigue  HEENT: No visual loss, blurred vision, double vision or yellow sclerae  Skin: No rash or itching  Cardiovascular: No chest pain, pressure or discomfort, No palpitations or edema.  Respiratory: No shortness of breath, cough or sputum  Gastrointestinal: No nausea, vomiting or diarrhea. No bloody or dark tarry stools.  Neurological: Positive for lightheadedness/dizziness.  No headache, syncope. No numbness or tingling in the extremities. No change in mood, affect, memory, metation.   Musculoskeletal: No muscle, back pain, joint pain or stiffness.  Hematologic: No anemia, bleeding or bruising.    /61 (BP Location: Right arm, Patient Position: Sitting)   Pulse 55   Ht 1.727 m (5' 8\")   Wt 100 kg (221 lb)   BMI 33.60 kg/m²     Patient Active Problem List   Diagnosis    Non-recurrent acute serous otitis media of both ears    Acquired pes planus of both feet    LUKAS positive    Anemia of chronic renal failure, stage 3a (CMS/HCC)    Angina, class I (CMS/HCC)    Hyponatremia    Primary hypertension    Angina pectoris with normal coronary arteriogram (CMS/[facility])         Physical Exam  Constitutional: Well developed, awake/alert x 3, no distress.  Head/Neck: No JVD, No bruits  Respiratory/Thorax: patent airways, CTAB, normal breath sounds with good expansion.  Cardiovascular: Regular rate and rhythm, no murmurs, normal S1 and S2,   Gastrointestinal: Non distended, soft, non-tender, no rebound tenderness or guarding.  Extremities: No cyanosis, edema.   Neurological: Alert and oriented x 3. Moves extremities spontaneous with purpose.  Psychological: Appropriate mood and behavior  Skin: Warm and Dry. No lesions or rashes.         Please excuse any errors in grammar or translation related to dictation, voice recognition software was used to prepare this document.      "

## 2024-03-21 NOTE — PROGRESS NOTES
(4/25/2023)    Housing Stability Vital Sign     Unable to Pay for Housing in the Last Year: Not on file     Number of Places Lived in the Last Year: Not on file     Unstable Housing in the Last Year: No       Family History:    Family History   Problem Relation Age of Onset    Heart Disease Father     High Cholesterol Father     High Blood Pressure Father     Stroke Mother     High Blood Pressure Mother     High Blood Pressure Brother        Occupation:  retired   - Occupational requirements:  none    Workers Compensation:  Have you missed work for this issue?  no  Is this issue being addressed under a worker's comp claim?  no    Review of Systems:    Review of Systems   Constitutional:  Negative for activity change, appetite change and chills.   HENT:  Negative for congestion, ear pain and hearing loss.    Eyes:  Negative for pain, discharge and itching.   Respiratory:  Negative for cough and shortness of breath.    Cardiovascular:  Negative for chest pain and leg swelling.   Gastrointestinal:  Negative for abdominal pain, constipation and diarrhea.   Endocrine: Negative for cold intolerance, heat intolerance and polydipsia.   Genitourinary:  Negative for difficulty urinating, flank pain and frequency.   Skin:  Negative for rash and wound.   Allergic/Immunologic: Negative for environmental allergies and food allergies.   Neurological:  Negative for dizziness, seizures and syncope.       Physical Exam:    Examination of the right knee    Gait:  antalgic gait affecting right  Inspection:  neutral  Swelling:  none  Erythema:  none  Ecchymosis:  none  Effusion:  1+  Palpation:  distal medial femoral condyle, medial joint line, distal lateral femoral condyle, lateral joint line, and lateral patella  Extension:  5  Flexion:  110  Strength:  5  Varus/Valgus Instability:  none  Anterior/Posterior Instability:  none  Yg:  negative  Thessaly:  positive  Modified Apley:  negative  Lachman:  negative  Patellar

## 2024-03-22 ENCOUNTER — OFFICE VISIT (OUTPATIENT)
Dept: ORTHOPEDIC SURGERY | Age: 69
End: 2024-03-22

## 2024-03-22 VITALS — OXYGEN SATURATION: 97 % | WEIGHT: 214 LBS | BODY MASS INDEX: 32.43 KG/M2 | HEART RATE: 67 BPM | HEIGHT: 68 IN

## 2024-03-22 DIAGNOSIS — M17.11 PRIMARY OSTEOARTHRITIS OF RIGHT KNEE: Primary | ICD-10-CM

## 2024-03-22 ASSESSMENT — ENCOUNTER SYMPTOMS
SHORTNESS OF BREATH: 0
CONSTIPATION: 0
EYE PAIN: 0
COUGH: 0
EYE DISCHARGE: 0
EYE ITCHING: 0
DIARRHEA: 0

## 2024-03-26 ENCOUNTER — TELEPHONE (OUTPATIENT)
Dept: FAMILY MEDICINE CLINIC | Age: 69
End: 2024-03-26

## 2024-04-04 ENCOUNTER — HOSPITAL ENCOUNTER (OUTPATIENT)
Dept: RADIOLOGY | Facility: CLINIC | Age: 69
Discharge: HOME | End: 2024-04-04
Payer: MEDICARE

## 2024-04-04 VITALS — BODY MASS INDEX: 33.9 KG/M2 | HEIGHT: 67 IN | WEIGHT: 216 LBS

## 2024-04-04 DIAGNOSIS — Z12.31 ENCOUNTER FOR SCREENING MAMMOGRAM FOR MALIGNANT NEOPLASM OF BREAST: ICD-10-CM

## 2024-04-04 PROCEDURE — 77067 SCR MAMMO BI INCL CAD: CPT | Performed by: RADIOLOGY

## 2024-04-04 PROCEDURE — 77067 SCR MAMMO BI INCL CAD: CPT

## 2024-04-04 PROCEDURE — 77063 BREAST TOMOSYNTHESIS BI: CPT | Performed by: RADIOLOGY

## 2024-04-16 RX ORDER — FLUCONAZOLE 150 MG/1
150 TABLET ORAL ONCE
Qty: 1 TABLET | Refills: 1 | Status: SHIPPED | OUTPATIENT
Start: 2024-04-16 | End: 2024-04-16

## 2024-04-18 ENCOUNTER — OFFICE VISIT (OUTPATIENT)
Dept: ORTHOPEDIC SURGERY | Age: 69
End: 2024-04-18
Payer: MEDICARE

## 2024-04-18 VITALS — HEART RATE: 59 BPM | HEIGHT: 68 IN | BODY MASS INDEX: 32.43 KG/M2 | WEIGHT: 214 LBS | OXYGEN SATURATION: 99 %

## 2024-04-18 DIAGNOSIS — M17.11 PRIMARY OSTEOARTHRITIS OF RIGHT KNEE: Primary | ICD-10-CM

## 2024-04-18 PROCEDURE — 1036F TOBACCO NON-USER: CPT | Performed by: ORTHOPAEDIC SURGERY

## 2024-04-18 PROCEDURE — G8399 PT W/DXA RESULTS DOCUMENT: HCPCS | Performed by: ORTHOPAEDIC SURGERY

## 2024-04-18 PROCEDURE — 3017F COLORECTAL CA SCREEN DOC REV: CPT | Performed by: ORTHOPAEDIC SURGERY

## 2024-04-18 PROCEDURE — G8417 CALC BMI ABV UP PARAM F/U: HCPCS | Performed by: ORTHOPAEDIC SURGERY

## 2024-04-18 PROCEDURE — G8427 DOCREV CUR MEDS BY ELIG CLIN: HCPCS | Performed by: ORTHOPAEDIC SURGERY

## 2024-04-18 PROCEDURE — 1123F ACP DISCUSS/DSCN MKR DOCD: CPT | Performed by: ORTHOPAEDIC SURGERY

## 2024-04-18 PROCEDURE — 1090F PRES/ABSN URINE INCON ASSESS: CPT | Performed by: ORTHOPAEDIC SURGERY

## 2024-04-18 PROCEDURE — 99214 OFFICE O/P EST MOD 30 MIN: CPT | Performed by: ORTHOPAEDIC SURGERY

## 2024-04-18 ASSESSMENT — ENCOUNTER SYMPTOMS
SHORTNESS OF BREATH: 0
DIARRHEA: 0
ABDOMINAL PAIN: 0
CONSTIPATION: 0
EYE ITCHING: 0
EYE DISCHARGE: 0
COUGH: 0
EYE PAIN: 0

## 2024-04-18 NOTE — PROGRESS NOTES
We went over the risks of surgery.  Risks include, but are not limited to, infection, neurovascular injury, hematoma formation, wound healing complications, blood clot, persistent postoperative pain, ligament injury, and risks of anesthesia.  The patient expressed understanding and wishes to proceed with a total knee replacement as above.      No orders of the defined types were placed in this encounter.      No orders of the defined types were placed in this encounter.      No follow-ups on file.    Sal Williamson MD       Surgery Phone: [phone]   [facility]   Surgery Fax: [phone]    Phone: [phone]          Fax: [phone]    Orthopedics: Surgery Scheduling, PAT & PRE-OP Order Form  Call to advance [place] at [phone] at least 24 hours prior to date of service     Surgery Location: [facility] Surgery: [address] [place] [address]  OFFICE   Surgeon's Name:Sal Williamson MD Surgery Date: 24  Time: to follow  Patient's Name: Rayna Hdez : 1955    #:  [id number]  Gender: female  Home Phone:  [phone] Cell Phone: [phone]  Emergency Contact:  MikieDavid hui   Phone: [phone]  Payor: MEDICARE /  /  /    ID No.: [id number]      PROVIDER TO COMPLETE:  Diagnosis: The encounter diagnosis was Primary osteoarthritis of right knee.   Procedure/Consent: Right total knee arthroplasty  CPT CODES: 59763  Case Comments/Implants: [employer] Total Knee System  Surgery Scheduled as: Outpatient  Anesthesia Requested: Choice with adductor canal block  Referring Family Doctor: Hailey Figueroa PA-C   PAT  [x] [facility] PAT Date/Time:                                                            [x] History & Physical [] Physician will Provide [] Attached [] Dictated [] Other  [x] Follow Anesthesia Pre-Op Orders for X-rays, [facility]     [x] SN & PT to evaluate and treat/educate disease management,

## 2024-04-23 ENCOUNTER — PROCEDURE VISIT (OUTPATIENT)
Dept: PHYSICAL MEDICINE AND REHAB | Age: 69
End: 2024-04-23
Payer: MEDICARE

## 2024-04-23 DIAGNOSIS — G89.29 CHRONIC BILATERAL LOW BACK PAIN WITH BILATERAL SCIATICA: Primary | ICD-10-CM

## 2024-04-23 DIAGNOSIS — M54.41 CHRONIC BILATERAL LOW BACK PAIN WITH BILATERAL SCIATICA: Primary | ICD-10-CM

## 2024-04-23 DIAGNOSIS — M54.42 CHRONIC BILATERAL LOW BACK PAIN WITH BILATERAL SCIATICA: Primary | ICD-10-CM

## 2024-04-23 PROCEDURE — 64445 NJX AA&/STRD SCIATIC NRV IMG: CPT | Performed by: PHYSICAL MEDICINE & REHABILITATION

## 2024-04-23 PROCEDURE — 96372 THER/PROPH/DIAG INJ SC/IM: CPT | Performed by: PHYSICAL MEDICINE & REHABILITATION

## 2024-04-23 RX ORDER — CYANOCOBALAMIN 1000 UG/ML
1000 INJECTION, SOLUTION INTRAMUSCULAR; SUBCUTANEOUS ONCE
Status: COMPLETED | OUTPATIENT
Start: 2024-04-23 | End: 2024-04-23

## 2024-04-23 RX ORDER — LIDOCAINE HYDROCHLORIDE 20 MG/ML
5 INJECTION, SOLUTION INFILTRATION; PERINEURAL ONCE
Status: COMPLETED | OUTPATIENT
Start: 2024-04-23 | End: 2024-04-23

## 2024-04-23 RX ORDER — LIDOCAINE HYDROCHLORIDE 10 MG/ML
7 INJECTION, SOLUTION INFILTRATION; PERINEURAL ONCE
Status: COMPLETED | OUTPATIENT
Start: 2024-04-23 | End: 2024-04-23

## 2024-04-23 RX ADMIN — LIDOCAINE HYDROCHLORIDE 5 ML: 20 INJECTION, SOLUTION INFILTRATION; PERINEURAL at 11:24

## 2024-04-23 RX ADMIN — LIDOCAINE HYDROCHLORIDE 7 ML: 10 INJECTION, SOLUTION INFILTRATION; PERINEURAL at 11:30

## 2024-04-23 RX ADMIN — CYANOCOBALAMIN 1000 MCG: 1000 INJECTION, SOLUTION INTRAMUSCULAR; SUBCUTANEOUS at 11:31

## 2024-04-25 ENCOUNTER — TELEPHONE (OUTPATIENT)
Dept: ORTHOPEDIC SURGERY | Age: 69
End: 2024-04-25

## 2024-04-25 ENCOUNTER — PREP FOR PROCEDURE (OUTPATIENT)
Dept: ORTHOPEDIC SURGERY | Age: 69
End: 2024-04-25

## 2024-04-25 DIAGNOSIS — Z96.651 STATUS POST TOTAL RIGHT KNEE REPLACEMENT: Primary | ICD-10-CM

## 2024-04-25 PROBLEM — M17.11 OSTEOARTHRITIS OF RIGHT KNEE: Status: ACTIVE | Noted: 2024-04-25

## 2024-04-25 NOTE — TELEPHONE ENCOUNTER
Please review below details in regards to surgery with . If you have any further questions please contact   office at [phone] Monday-Friday [phone].        You will receive a phone call the day prior to your surgery to confirm your surgery time. If your surgery is on a Monday, you will receive a call the Friday before.     NO LIQUID OR FOOD (NPO) AFTER MIDNIGHT PRIOR TO SURGERY     Please Note: If you see a cardiologist or a pulmonologist, please contact their office as you will need to obtain clearance from them for your surgery. Clearance must be received prior to your pre-admission testing appointment.        Surgeon:                  [ X] Dr. Sal Williamson                           Surgery Date: 5/28/2024           Surgery Location:   [address] [address]                 Pre-op: 5/21/2024 @ 9:00am  W/ Preston Son PA-C  [x] [address] [place] [id number]        Labs: 5/21/2024 @ 10:00am  [place]: [address] \"B\" Check In at Welcome Desk     Post-op:  6/13/2024 @ 8:30am W/ Preston Son PA-C  [x] [address] [place] [id number]

## 2024-04-29 DIAGNOSIS — M54.2 CERVICALGIA: ICD-10-CM

## 2024-04-29 DIAGNOSIS — Z79.899 HIGH RISK MEDICATION USE: ICD-10-CM

## 2024-04-29 DIAGNOSIS — R76.8 ANA POSITIVE: ICD-10-CM

## 2024-04-29 DIAGNOSIS — M54.42 CHRONIC BILATERAL LOW BACK PAIN WITH BILATERAL SCIATICA: ICD-10-CM

## 2024-04-29 DIAGNOSIS — G89.29 CHRONIC BILATERAL LOW BACK PAIN WITH BILATERAL SCIATICA: ICD-10-CM

## 2024-04-29 DIAGNOSIS — M05.79 RHEUMATOID ARTHRITIS INVOLVING MULTIPLE SITES WITH POSITIVE RHEUMATOID FACTOR (HCC): ICD-10-CM

## 2024-04-29 DIAGNOSIS — M54.41 CHRONIC BILATERAL LOW BACK PAIN WITH BILATERAL SCIATICA: ICD-10-CM

## 2024-04-29 DIAGNOSIS — M48.062 SPINAL STENOSIS OF LUMBAR REGION WITH NEUROGENIC CLAUDICATION: ICD-10-CM

## 2024-04-29 RX ORDER — OXYCODONE AND ACETAMINOPHEN 7.5; 325 MG/1; MG/1
1 TABLET ORAL EVERY 6 HOURS PRN
Qty: 90 TABLET | Refills: 0 | Status: SHIPPED | OUTPATIENT
Start: 2024-04-29 | End: 2024-05-29

## 2024-05-01 ENCOUNTER — TELEPHONE (OUTPATIENT)
Dept: ORTHOPEDIC SURGERY | Age: 69
End: 2024-05-01

## 2024-05-01 DIAGNOSIS — R94.6 ABNORMAL RESULTS OF THYROID FUNCTION STUDIES: ICD-10-CM

## 2024-05-01 DIAGNOSIS — R79.89 ABNORMAL TSH: ICD-10-CM

## 2024-05-01 DIAGNOSIS — Z01.818 PREOP EXAMINATION: Primary | ICD-10-CM

## 2024-05-01 LAB
T4 FREE SERPL-MCNC: 1.06 NG/DL (ref 0.84–1.68)
TSH REFLEX: 5.71 UIU/ML (ref 0.44–3.86)

## 2024-05-01 NOTE — TELEPHONE ENCOUNTER
Pt called requesting a order for a shower chair. She didn't think she need one at first but now she does.     [facility]   [address], [place] [address]

## 2024-05-02 ENCOUNTER — TELEPHONE (OUTPATIENT)
Dept: FAMILY MEDICINE CLINIC | Age: 69
End: 2024-05-02

## 2024-05-02 DIAGNOSIS — E03.9 ACQUIRED HYPOTHYROIDISM: Primary | ICD-10-CM

## 2024-05-02 RX ORDER — LEVOTHYROXINE SODIUM 0.03 MG/1
25 TABLET ORAL DAILY
Qty: 90 TABLET | Refills: 1 | Status: SHIPPED | OUTPATIENT
Start: 2024-05-02

## 2024-05-02 NOTE — TELEPHONE ENCOUNTER
Patient called about the results from thyroid labs. She said sending in a prescription would be ok.     She is getting ready to have a knee replacement so she will call back to schedule 2 month f/u.    Thank you.

## 2024-05-07 DIAGNOSIS — M15.9 PRIMARY OSTEOARTHRITIS INVOLVING MULTIPLE JOINTS: ICD-10-CM

## 2024-05-07 DIAGNOSIS — M25.561 RIGHT KNEE PAIN, UNSPECIFIED CHRONICITY: ICD-10-CM

## 2024-05-07 DIAGNOSIS — Z79.899 HIGH RISK MEDICATION USE: ICD-10-CM

## 2024-05-07 DIAGNOSIS — M54.42 CHRONIC BILATERAL LOW BACK PAIN WITH BILATERAL SCIATICA: Primary | ICD-10-CM

## 2024-05-07 DIAGNOSIS — M05.79 RHEUMATOID ARTHRITIS INVOLVING MULTIPLE SITES WITH POSITIVE RHEUMATOID FACTOR (HCC): ICD-10-CM

## 2024-05-07 DIAGNOSIS — M54.41 CHRONIC BILATERAL LOW BACK PAIN WITH BILATERAL SCIATICA: Primary | ICD-10-CM

## 2024-05-07 DIAGNOSIS — G89.18 POST-OP PAIN: ICD-10-CM

## 2024-05-07 DIAGNOSIS — G89.29 CHRONIC BILATERAL LOW BACK PAIN WITH BILATERAL SCIATICA: Primary | ICD-10-CM

## 2024-05-07 RX ORDER — OXYCODONE AND ACETAMINOPHEN 10; 325 MG/1; MG/1
1 TABLET ORAL EVERY 6 HOURS PRN
Qty: 100 TABLET | Refills: 0 | Status: SHIPPED | OUTPATIENT
Start: 2024-05-07 | End: 2024-06-06

## 2024-05-08 ENCOUNTER — OFFICE VISIT (OUTPATIENT)
Dept: CARDIOLOGY | Facility: CLINIC | Age: 69
End: 2024-05-08
Payer: MEDICARE

## 2024-05-08 VITALS
HEIGHT: 68 IN | BODY MASS INDEX: 32.61 KG/M2 | DIASTOLIC BLOOD PRESSURE: 54 MMHG | WEIGHT: 215.2 LBS | SYSTOLIC BLOOD PRESSURE: 116 MMHG | HEART RATE: 56 BPM

## 2024-05-08 DIAGNOSIS — E87.1 HYPONATREMIA: ICD-10-CM

## 2024-05-08 DIAGNOSIS — Z01.810 PREOPERATIVE CARDIOVASCULAR EXAMINATION: ICD-10-CM

## 2024-05-08 DIAGNOSIS — R60.9 EDEMA, UNSPECIFIED TYPE: Primary | ICD-10-CM

## 2024-05-08 DIAGNOSIS — I20.9 ANGINA PECTORIS WITH NORMAL CORONARY ARTERIOGRAM (CMS-HCC): ICD-10-CM

## 2024-05-08 PROCEDURE — 99214 OFFICE O/P EST MOD 30 MIN: CPT | Performed by: INTERNAL MEDICINE

## 2024-05-08 PROCEDURE — 3078F DIAST BP <80 MM HG: CPT | Performed by: INTERNAL MEDICINE

## 2024-05-08 PROCEDURE — 3074F SYST BP LT 130 MM HG: CPT | Performed by: INTERNAL MEDICINE

## 2024-05-08 PROCEDURE — 1157F ADVNC CARE PLAN IN RCRD: CPT | Performed by: INTERNAL MEDICINE

## 2024-05-08 PROCEDURE — 1159F MED LIST DOCD IN RCRD: CPT | Performed by: INTERNAL MEDICINE

## 2024-05-08 PROCEDURE — 1160F RVW MEDS BY RX/DR IN RCRD: CPT | Performed by: INTERNAL MEDICINE

## 2024-05-08 RX ORDER — POLYETHYLENE GLYCOL 3350 17 G/17G
17 POWDER, FOR SOLUTION ORAL NIGHTLY
COMMUNITY

## 2024-05-08 RX ORDER — DILTIAZEM HYDROCHLORIDE 60 MG/1
60 TABLET, FILM COATED ORAL 2 TIMES DAILY
Qty: 180 TABLET | Refills: 3 | Status: SHIPPED | OUTPATIENT
Start: 2024-05-08 | End: 2025-05-08

## 2024-05-08 RX ORDER — DOCUSATE SODIUM 100 MG/1
100 CAPSULE, LIQUID FILLED ORAL 2 TIMES DAILY
COMMUNITY

## 2024-05-08 RX ORDER — LEVOTHYROXINE SODIUM 25 UG/1
1 TABLET ORAL DAILY
COMMUNITY
Start: 2024-05-02

## 2024-05-08 RX ORDER — SODIUM BICARBONATE 650 MG/1
650 TABLET ORAL 2 TIMES DAILY
COMMUNITY
Start: 2024-04-25 | End: 2024-07-24

## 2024-05-08 NOTE — PATIENT INSTRUCTIONS
Decrease Cardizem (Diltiazem) 60 mg 1 tablet twice daily 2 weeks after surgery    Ok for surgery from a cardiac standpoint.        -Please bring all medicines, vitamins, and herbal supplements with you in original bottles to every appointment!!!!    -Prescriptions will not be filled unless you are compliant with your follow up appointments or have a follow up appointment scheduled as per instruction of your physician. Refills should be requested at the time of your visit.

## 2024-05-08 NOTE — PROGRESS NOTES
Patient:  Jazmin Hallman  YOB: 1955  MRN: [id number]       Impression/Plan:     Diagnoses and all orders for this visit:  Edema, unspecified type  -    Some related to being a bit more sedentary because she needs knee replacement but also from the Cardizem.  Blood pressure is on the low normal side and she has not had angina or SVT for quite a long period of time we will see if she can tolerate being on Cardizem 60 twice daily however will keep her on 3 times daily until after her knee replacement as it has clearly controlled her angina and I would not want her to develop angina around the time of surgery if we find she needs to 3 times daily dosage    Angina pectoris with normal coronary arteriogram (CMS-HCC)  -    Has been quite well-controlled.  As noted above once she is completed her knee surgery will give a trial of twice daily Cardizem     Preop cardiovascular assessment          -     She has angiographically normal coronary arteries and is on atorvastatin which reduces cardiovascular events perioperatively.           -     Has normal LV systolic function and can easily accomplish over 5 METS activity            -    She has a history of SVT but none for years continue current dose of Cardizem            -    She would be considered low albeit not 0 risk of cardiac events with the proposed surgical procedure i.e. knee replacement             -    Hypertension is currently controlled could become somewhat hypertensive postoperatively and can be managed in the perioperative setting.  Currently well-controlled on the medical regimen               -   Has no cardiac contraindications to proceeding with surgery      Hyponatremia              -    Remains hyponatremic.               -    This has been present for over a year she is on no cardiac medications that can contribute.  Occasionally narcotics can contribute.  She follows regularly with nephrology and is perhaps nephrogenic.  She may need a  formal evaluation for SIADH      Chief Complaint/Active Symptoms:       Jazmin Hallman is a 68 y.o. female who presents with SVT, symptomatic PACs/PVCs, hypertension and hyperlipidemia. 2016 coronary angiography demonstrated normal coronary arteries. 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     I had last seen her 1/10/2024 at which time she had had just 1 episode of chest fullness which was felt likely esophageal based as it was associated with swallowing.  No clear-cut angina.  He is also noted to be hyponatremic at that time without any cardiac medicines contributing and she was referred on to medicine to assure narcotics were contributing as they can affect ADH on occasion.  Pressure was excessive and Cardizem increased to 60 3 times daily at that time.    She was hospitalized at [facility] 4/24/2024 through 4/25/2024.  She had presented with acute kidney injury, hyponatremia and hyperkalemia with metabolic acidosis all felt related to use of Bactrim.  She had presented with shortness of breath chest pain and chest fluttering.  Evaluation for PE was negative.  Dyspnea resolved while in the ER.  Sodium bicarbonate was instituted for hyperkalemia and acute kidney injury.  Felt deterioration was related to the use of Bactrim.  No cardiac issues described    5/2/2024 sodium 128 potassium 4.5 BUN of 22 creatinine 1.36 followed by nephrology TSH mildly elevated 5.7 with normal free T4           Hemoglobin 10.7 with normal ferritin level    Was seen 5/7/2024 by nephrology at [facility].  [place] stable from a nephrology standpoint comments as the hyponatremia made as though it was a new finding actually it has been present for some time and hence referral to neuro-ophthalmology and medicine.  Has not been on hydrochlorothiazide for quite some time.    She is planning to have a right total knee replacement with Dr. Cesario Crouch at [facility].  She has had no angina on the current medical regimen.  She has not had shortness of breath.  No palpitation, lightheadedness or syncope.  She does note lower extremity edema blood pressure has been quite well-controlled.  She has had no palpitation, lightheadedness or syncope.  No recurrent SVT for years    Review of Systems: Unremarkable except as noted above    Meds     Current Outpatient Medications   Medication Instructions    acetaminophen (TYLENOL) 500 mg, oral, Every 8 hours PRN    amoxicillin (AMOXIL) 500 mg, oral, Take 6 tablets 1 hour prior to procedure, and 2 tablets 6 hours following procedure for dental appts    atorvastatin (LIPITOR) 10 mg, oral, Daily    baclofen (Lioresal) 10 mg tablet 1 tablet, oral, Daily    cholecalciferol (Vitamin D-3) 50 MCG (2000 UT) tablet 1 tablet, oral, Daily    CYANOCOBALAMIN, VITAMIN B-12, INJ 1 mL, injection, Every 30 days    diclofenac sodium 1 % kit topical (top), 2 times daily, Apply to affected area twice daily     dilTIAZem (CARDIZEM) 60 mg, oral, 3 times daily    docusate sodium (STOOL SOFTENER) 100 mg, oral, 2 times daily    doxazosin (CARDURA) 2 mg, oral, Nightly    fluticasone (Flonase) 50 mcg/actuation nasal spray 2 sprays, Each Nostril, Daily, Shake gently. Before first use, prime pump. After use, clean tip and replace cap.    furosemide (LASIX) 20 mg, oral, 2 times daily    gabapentin (NEURONTIN) 300 mg, oral, 2 times daily    gabapentin (NEURONTIN) 300 mg, oral, 3 times daily, TAKE 2 CAPSULES AT NIGHT ALTERNATING WITH 1 CAPSULE THE NEXT. ETC     hydroxychloroquine (PLAQUENIL) 200 mg, oral, 2 times daily    ketorolac (Acular) 0.5 % ophthalmic solution 1 drop, 4 times daily, Use as directed     levothyroxine (Synthroid, Levoxyl) 25 mcg tablet 1 tablet, oral, Daily    lidocaine (Xylocaine) 5 % ointment Topical, Every 24 hours PRN, Apply to affected area every 12 hours as needed for pain    losartan (Cozaar) 25 mg tablet Take 4 tablets  (100 mg) by mouth once daily. TAKE 3 TABLETS BY MOUTH ONCE DAILY AFTER BREAKFAST AND 1 AT BEDTIME    melatonin 5 mg tablet Take 1 tablet (5 mg) by mouth as needed at bedtime for sleep.    nitroglycerin (NITROSTAT) 0.4 mg, sublingual, Every 5 min PRN, DO NOT EXCEED A TOTAL OF 3 DOSES IN 15 MINUTES    nystatin (Mycostatin) cream Topical, 2 times daily PRN    omeprazole (PRILOSEC) 40 mg, oral, Daily    oxyCODONE-acetaminophen (Percocet) 7.5-325 mg tablet 1 tablet, oral, Every 4 hours PRN    polyethylene glycol (GLYCOLAX, MIRALAX) 17 g, oral, Nightly    sodium bicarbonate 650 mg, oral, 2 times daily    topiramate (TOPAMAX) 25 mg, oral, Every evening        Allergies     Allergies   Allergen Reactions    Amlodipine Cough, Other, Swelling and Unknown     kidney issues    swelling    Isosorbide Cough, Other and Unknown    Lisinopril Cough, Unknown and Other     kidney issues    Ranolazine GI Upset, Headache, Nausea Only, Other and Unknown     headache    Keflex [Cephalexin] Rash    Latex Hives, Rash and Swelling     Swelling of hands with wearing latex gloves         Annotated Problems     Specialty Problems          Cardiology Problems    Angina, class I (CMS-HCC)     Last Assessment & Plan: Formatting of this note might be different from the original. Assessment: managed on Cardizem and Nitro PRN negative stress test Diagnosed with syndrome X small vessel disease         Angina pectoris with normal coronary arteriogram (CMS-HCC)      · 4/1/2016. LVEF 65-70%. LVEDP 9 mmHg. Angiographically normal coronary arteries          Primary hypertension        Problem List     Patient Active Problem List    Diagnosis Date Noted    Hyponatremia 01/10/2024    Primary hypertension 01/10/2024    Angina pectoris with normal coronary arteriogram (CMS-HCC) 01/10/2024    Non-recurrent acute serous otitis media of both ears 11/17/2023    Acquired pes planus of both feet 05/24/2023    Angina, class I (CMS-HCC) 04/03/2023    Anemia of chronic  "renal failure, stage 3a (Multi) 03/28/2023    LUKAS positive 04/20/2019       Objective:     Vitals:    05/08/24 0953   BP: 116/54   BP Location: Left arm   Patient Position: Sitting   Pulse: 56   Weight: 97.6 kg (215 lb 3.2 oz)   Height: 1.727 m (5' 8\")      Wt Readings from Last 4 Encounters:   05/08/24 97.6 kg (215 lb 3.2 oz)   04/04/24 98 kg (216 lb)   03/20/24 100 kg (221 lb)   01/10/24 96.2 kg (212 lb)           LAB:     Lab Results   Component Value Date    WBC 6.3 03/20/2024    HGB 10.4 (L) 03/20/2024    HCT 33.4 (L) 03/20/2024     03/20/2024    ALT 13 02/16/2023    AST 20 02/16/2023     (L) 03/20/2024    K 4.5 03/20/2024     03/20/2024    CREATININE 1.34 (H) 03/20/2024    BUN 16 03/20/2024    CO2 25 03/20/2024    HGBA1C 6.0 (H) 01/29/2024       Diagnostic Studies:     BI mammo bilateral screening tomosynthesis    Result Date: 4/4/2024  Interpreted By:  Terry Brenner, STUDY: BI MAMMO BILATERAL SCREENING TOMOSYNTHESIS;  4/4/2024 11:30 am   ACCESSION NUMBER(S): [id number]   ORDERING CLINICIAN: GRETA BOGGS   INDICATION: Screening.   COMPARISON: Mammograms dated 15 December 2022 back through the oldest mammogram available, 6 December 2006   FINDINGS: 2D and tomosynthesis images were reviewed at 1 mm slice thickness.   Density:  There are areas of scattered fibroglandular tissue.   No suspect masses or calcifications are identified       No mammographic evidence of malignancy.   Based on the Tyrer-Cuzick model for breast cancer risk assessment, the patient's lifetime risk of breast cancer is 5.38%. Patients with over a 20% lifetime risk of developing breast cancer may benefit from additional screening with breast MRI or ultrasound. Please note that this estimate is based on responses provided on the patient questionnaire. For more information regarding high risk consultation, please call [phone].   BI-RADS CATEGORY:   BI-RADS Category:  1 Negative. Recommendation:  Routine " Screening Mammogram in 1 Year. Recommended Date:  1 Year. Laterality:  Bilateral.   For any future breast imaging appointments, please call [phone] ([phone]).   MACRO: None   Signed by: Terry Brenner 4/4/2024 12:09 PM Dictation workstation:   [id number]        Radiology:     No orders to display       Physical Exam     General Appearance: alert and oriented to person, place and time, in no acute distress  Cardiovascular: normal rate, regular rhythm, normal S1 and S2, no murmurs, rubs, clicks, or gallops,  no JVD  Pulmonary/Chest: clear to auscultation bilaterally- no wheezes, rales or rhonchi, normal air movement, no respiratory distress  Abdomen: soft, non-tender, non-distended, normal bowel sounds, no masses   Extremities: no cyanosis, clubbing or edema  Skin: warm and dry, no rash or erythema  Eyes: EOMI  Neck: supple and non-tender without mass, no thyromegaly   Neurological: alert, oriented, normal speech, no focal findings or movement disorder noted  Vascular: Pulses 2+

## 2024-05-10 ENCOUNTER — TELEPHONE (OUTPATIENT)
Dept: INFECTIOUS DISEASES | Age: 69
End: 2024-05-10

## 2024-05-10 NOTE — TELEPHONE ENCOUNTER
Patient calls this ID Office to provide an update and request to Dr Salguero. Patient is scheduled to have surgery on R.Knee with Dr IKNDRA Williamson on 5/28/24. Patient request a clearance from ID.   Patient last seen by ID in Jan.2023 at which time clearance for shoulder repair was given. Patient states she has been cleared with Cardiology and Nephrology and would appreciate a recommendation by ID, Will update ID Physician.

## 2024-05-14 ENCOUNTER — TELEMEDICINE (OUTPATIENT)
Dept: FAMILY MEDICINE CLINIC | Age: 69
End: 2024-05-14

## 2024-05-14 DIAGNOSIS — Z00.00 MEDICARE ANNUAL WELLNESS VISIT, SUBSEQUENT: Primary | ICD-10-CM

## 2024-05-14 PROBLEM — R06.02 SHORTNESS OF BREATH: Status: ACTIVE | Noted: 2024-04-25

## 2024-05-14 PROBLEM — E87.20 METABOLIC ACIDOSIS: Status: ACTIVE | Noted: 2024-04-25

## 2024-05-14 PROBLEM — N17.9 AKI (ACUTE KIDNEY INJURY) (HCC): Status: ACTIVE | Noted: 2024-04-25

## 2024-05-14 PROBLEM — E87.5 HYPERKALEMIA: Status: ACTIVE | Noted: 2024-04-25

## 2024-05-14 PROBLEM — I95.9 HYPOTENSION: Status: ACTIVE | Noted: 2024-03-20

## 2024-05-14 ASSESSMENT — PATIENT HEALTH QUESTIONNAIRE - PHQ9
SUM OF ALL RESPONSES TO PHQ QUESTIONS 1-9: 0
1. LITTLE INTEREST OR PLEASURE IN DOING THINGS: NOT AT ALL
SUM OF ALL RESPONSES TO PHQ QUESTIONS 1-9: 0
SUM OF ALL RESPONSES TO PHQ QUESTIONS 1-9: 0
SUM OF ALL RESPONSES TO PHQ9 QUESTIONS 1 & 2: 0
2. FEELING DOWN, DEPRESSED OR HOPELESS: NOT AT ALL
SUM OF ALL RESPONSES TO PHQ QUESTIONS 1-9: 0

## 2024-05-14 ASSESSMENT — LIFESTYLE VARIABLES
HOW MANY STANDARD DRINKS CONTAINING ALCOHOL DO YOU HAVE ON A TYPICAL DAY: PATIENT DOES NOT DRINK
HOW OFTEN DO YOU HAVE A DRINK CONTAINING ALCOHOL: NEVER

## 2024-05-14 NOTE — PROGRESS NOTES
Medicare Annual Wellness Visit    Rayna Hdez is here for Medicare AWV    Assessment & Plan   Medicare annual wellness visit, subsequent  Recommendations for Preventive Services Due: see orders and patient instructions/AVS.  Recommended screening schedule for the next 5-10 years is provided to the patient in written form: see Patient Instructions/AVS.     Return in 1 year (on 5/14/2025).     Subjective       Patient's complete Health Risk Assessment and screening values have been reviewed and are found in Flowsheets. The following problems were reviewed today and where indicated follow up appointments were made and/or referrals ordered.    Positive Risk Factor Screenings with Interventions:    Fall Risk:  Do you feel unsteady or are you worried about falling? : (!) yes (pt has balance issues and bad knees, uses cane)  2 or more falls in past year?: no (just one fall)  Fall with injury in past year?: no     Interventions:    Patient comments: Pt states she has balance issues and bad knees.  She's having knee surgery at the end of the month.  pt states she uses a cane all the time.  She also has a walker, rollater, and wheelchair to use as needed.     Cognitive:   Clock Drawing Test (CDT):  (n/a, pt was a telephone visit)  Words recalled: 3 Words Recalled  Total Score: 3  Total Score Interpretation: Normal Mini-Cog  Interventions:  Pt was alert and oriented. Answered questions appropriately.  This was a telephone visit so clock drawing was n/a.       Controlled Medication Review:      Today's Pain Level: No data recorded     Opioid Risk: (High risk score ?55) Opioid risk score: 71      Last PDMP Stephan as Reviewed:  Review User Review Instant Review Result   KIRK BELTRAN 2/17/2024 12:11 PM @   Reviewed PDMP [1]     Last Controlled Substance Monitoring Documentation      Flowsheet Row Refill from 5/7/2024 in [facility]   Periodic Controlled Substance Monitoring

## 2024-05-20 NOTE — DISCHARGE INSTRUCTIONS
Dr. Sal Williamson Jr., MD  [facility]    Post Operative Instructions for Total Knee Replacement    Incision and dressing  Your incision is covered with a waterproof Aquacel dressing.  It has been impregnated with silver nitrate to help faye off infection.  The dressing is to be removed 7 days after the date of your surgery.    The incision has been closed with skin glue.  The glue will flake off over time and fall off on its own.  DO NOT apply any lotions, creams, peroxide or Betadine to the skin glue, as it will degrade and dissolve the glue.  DO NOT peel the glue off, as this could result in opening of the incision.  There are no sutures that need to be removed; the skin and subcutaneous layers have been closed with dissolvable sutures, which will dissolve over 7 to 8 weeks.    Showering  The Aquacel dressing is waterproof.  You may shower when you feel ready.  Once the Aquacel dressing has been removed, you may continue to shower.  The glue provides a waterproof seal to the incision.  Let the water run over the incision, and pat dry with a towel.  Do not scrub or rub the glue.    Compression stockings  The compression stockings/TRISHA hose are used to help reduce swelling and assist in the prevention of blood clots.  They are to be worn on the operative leg for 3 weeks.  They should be worn full-time during the day, but may be removed at nighttime or during periods of elevation during the day.  They are optional on the nonoperative leg, depending on how much swelling may be encountered.    Activity  You will begin activity immediately after surgery.  Physical therapy will commence (at home or as an outpatient) within a few days of surgery.  You may bear weight on the operative leg as tolerated.  It is encouraged that you get up for short walks frequently throughout the day.  Pump your ankles up and down at least 10 times every hour while you are awake, or if you are standing, stand on your toes 10

## 2024-05-21 ENCOUNTER — OFFICE VISIT (OUTPATIENT)
Dept: ORTHOPEDIC SURGERY | Age: 69
End: 2024-05-21

## 2024-05-21 ENCOUNTER — HOSPITAL ENCOUNTER (OUTPATIENT)
Dept: PREADMISSION TESTING | Age: 69
Discharge: HOME | End: 2024-05-25
Payer: MEDICARE

## 2024-05-21 VITALS
DIASTOLIC BLOOD PRESSURE: 62 MMHG | HEIGHT: 68 IN | SYSTOLIC BLOOD PRESSURE: 138 MMHG | TEMPERATURE: 98 F | BODY MASS INDEX: 32.43 KG/M2 | WEIGHT: 214 LBS | HEART RATE: 72 BPM | OXYGEN SATURATION: 98 %

## 2024-05-21 DIAGNOSIS — Z01.818 PREOP EXAMINATION: ICD-10-CM

## 2024-05-21 DIAGNOSIS — R73.03 PREDIABETES: ICD-10-CM

## 2024-05-21 DIAGNOSIS — Z01.818 PREOP EXAMINATION: Primary | ICD-10-CM

## 2024-05-21 LAB
ABO + RH BLD: NORMAL
ALBUMIN SERPL-MCNC: 4.5 G/DL (ref 3.5–4.6)
ALP SERPL-CCNC: 166 U/L (ref 40–130)
ALT SERPL-CCNC: 12 U/L (ref 0–33)
ANION GAP SERPL CALCULATED.3IONS-SCNC: 14 MEQ/L (ref 9–15)
AST SERPL-CCNC: 19 U/L (ref 0–35)
BILIRUB SERPL-MCNC: 0.4 MG/DL (ref 0.2–0.7)
BILIRUB UR QL STRIP: NEGATIVE
BLD GP AB SCN SERPL QL: NORMAL
BUN SERPL-MCNC: 18 MG/DL (ref 8–23)
CALCIUM SERPL-MCNC: 9.8 MG/DL (ref 8.5–9.9)
CHLORIDE SERPL-SCNC: 94 MEQ/L (ref 95–107)
CLARITY UR: CLEAR
CO2 SERPL-SCNC: 22 MEQ/L (ref 20–31)
COLOR UR: YELLOW
CREAT SERPL-MCNC: 1.29 MG/DL (ref 0.5–0.9)
GLOBULIN SER CALC-MCNC: 2.8 G/DL (ref 2.3–3.5)
GLUCOSE SERPL-MCNC: 111 MG/DL (ref 70–99)
GLUCOSE UR STRIP-MCNC: NEGATIVE MG/DL
HGB UR QL STRIP: NEGATIVE
KETONES UR STRIP-MCNC: NEGATIVE MG/DL
LEUKOCYTE ESTERASE UR QL STRIP: NEGATIVE
NITRITE UR QL STRIP: NEGATIVE
PH UR STRIP: 6.5 [PH] (ref 5–9)
POTASSIUM SERPL-SCNC: 4.4 MEQ/L (ref 3.4–4.9)
PROT SERPL-MCNC: 7.3 G/DL (ref 6.3–8)
PROT UR STRIP-MCNC: NEGATIVE MG/DL
SODIUM SERPL-SCNC: 130 MEQ/L (ref 135–144)
SP GR UR STRIP: 1.01 (ref 1–1.03)
UROBILINOGEN UR STRIP-ACNC: 0.2 E.U./DL

## 2024-05-21 PROCEDURE — 80053 COMPREHEN METABOLIC PANEL: CPT

## 2024-05-21 PROCEDURE — 86901 BLOOD TYPING SEROLOGIC RH(D): CPT

## 2024-05-21 PROCEDURE — 86850 RBC ANTIBODY SCREEN: CPT

## 2024-05-21 PROCEDURE — 87641 MR-STAPH DNA AMP PROBE: CPT

## 2024-05-21 PROCEDURE — 86900 BLOOD TYPING SEROLOGIC ABO: CPT

## 2024-05-21 PROCEDURE — 87086 URINE CULTURE/COLONY COUNT: CPT

## 2024-05-21 PROCEDURE — 81003 URINALYSIS AUTO W/O SCOPE: CPT

## 2024-05-21 PROCEDURE — 99024 POSTOP FOLLOW-UP VISIT: CPT | Performed by: PHYSICIAN ASSISTANT

## 2024-05-21 RX ORDER — SODIUM BICARBONATE 650 MG/1
TABLET ORAL
COMMUNITY
Start: 2024-04-25 | End: 2024-07-24

## 2024-05-21 RX ORDER — SODIUM CHLORIDE, SODIUM LACTATE, POTASSIUM CHLORIDE, CALCIUM CHLORIDE 600; 310; 30; 20 MG/100ML; MG/100ML; MG/100ML; MG/100ML
INJECTION, SOLUTION INTRAVENOUS CONTINUOUS
OUTPATIENT
Start: 2024-05-28

## 2024-05-21 RX ORDER — SODIUM CHLORIDE 0.9 % (FLUSH) 0.9 %
5-40 SYRINGE (ML) INJECTION EVERY 12 HOURS SCHEDULED
OUTPATIENT
Start: 2024-05-28

## 2024-05-21 RX ORDER — SODIUM CHLORIDE 9 MG/ML
INJECTION, SOLUTION INTRAVENOUS PRN
OUTPATIENT
Start: 2024-05-28

## 2024-05-21 RX ORDER — CHLORHEXIDINE GLUCONATE 40 MG/ML
SOLUTION TOPICAL
Qty: 118 ML | Refills: 0 | Status: SHIPPED | OUTPATIENT
Start: 2024-05-21

## 2024-05-21 RX ORDER — SODIUM CHLORIDE 0.9 % (FLUSH) 0.9 %
5-40 SYRINGE (ML) INJECTION PRN
OUTPATIENT
Start: 2024-05-28

## 2024-05-21 NOTE — H&P
[facility]    H&P: Preadmission Testing     Patient: Rayna Hdez  YOB: 1955  MRN: [id number]    Subjective:     Chief Complaint   Patient presents with    Pre-op Exam     Pt presents for pre-op for right total knee arthropasty, 5/2824 Dr. Williamson       HPI: Rayna Hdez is a 68 y.o. femaleis here for right total knee replacement to be performed by Dr. Williamson.   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  Both are very stable.  She was cleared by cardiology with low risk.  There is no recent chest pain or discomfort, palpitations, shortness of breath, BOYKIN, difficulties with exercise.  She does have some ankle swelling which is attribute it to most likely her calcium channel blocker.  Not taking any blood thinners.    There is no known history of obstructive or restrictive lung disease.    No smoking.  No recent wheezing or use of any kind of inhalers.  No recent hospitalizations for any lung-related illnesses. No recent Covid infection.    No known history of gastric issues which includes ulcers, herniations, bariatric surgeries.  No recent GI bleeds    No known history of hyper or hypercoagulable states.    They are not diabetic.  They have kidney dysfunction - stable.  She at baseline is hyponatremic, she is being worked up for this.  It could be from SIADH.    Past Medical History:        Diagnosis Date    Abnormal electromyogram (EMG) 8/19/09    SENSORIMOTOR NEUROPATHY OF BLE, RIGHT WORSE THAN LEFT, SUSPICIOUS FOR RIGHT S1 RADIC    Angina pectoris (HCC)     Angina pectoris (HCC) 07/2019    sees Dr. Freda ROMERO    Ankle pain     Atherosclerotic heart disease of native coronary artery with unspecified angina pectoris 4/3/2023    Bleeding ulcer     Colon polyp 4/3/2017

## 2024-05-21 NOTE — PATIENT INSTRUCTIONS
-please ensure that blood work is done at least 3 days before your surgery at the preadmission testing site (located at surgery check in - Outpatient Center - entrance B)    -stop taking asprin and motrin until after your surgery.  All blood thinners need to be stopped at least 5 days in advance prior to surgery. If you experiencing pain, the only medication you can take for that is tylenol 3-4x / day not to exceed 4000 mg.  -The morning of your surgery, do not take your oxycodone, lasix, neurontin, cardizem, losartan     -Hibiclens was sent to your pharmacy to .  Please follow the instructions provided with the prescription and use daily starting 5 days before surgery.     -no eating / drinking the after midnight the night before surgery. Sips of water the morning of for all other medications is OK    -our surgery department will reach out the you the day before your surgery and let you know what time to arrive at the Outpatient Center (door B, same place as where you got blood work)    If you have any questions, please call our office and I will be happy to answer them

## 2024-05-22 LAB
BACTERIA UR CULT: NORMAL
MRSA, DNA, NASAL: NEGATIVE
SPECIMEN DESCRIPTION: NORMAL

## 2024-05-23 ENCOUNTER — PROCEDURE VISIT (OUTPATIENT)
Dept: PHYSICAL MEDICINE AND REHAB | Age: 69
End: 2024-05-23
Payer: MEDICARE

## 2024-05-23 DIAGNOSIS — M54.41 CHRONIC BILATERAL LOW BACK PAIN WITH BILATERAL SCIATICA: Primary | ICD-10-CM

## 2024-05-23 DIAGNOSIS — M54.42 CHRONIC BILATERAL LOW BACK PAIN WITH BILATERAL SCIATICA: Primary | ICD-10-CM

## 2024-05-23 DIAGNOSIS — G89.29 CHRONIC BILATERAL LOW BACK PAIN WITH BILATERAL SCIATICA: Primary | ICD-10-CM

## 2024-05-23 PROCEDURE — 96372 THER/PROPH/DIAG INJ SC/IM: CPT | Performed by: PHYSICAL MEDICINE & REHABILITATION

## 2024-05-23 PROCEDURE — 64445 NJX AA&/STRD SCIATIC NRV IMG: CPT | Performed by: PHYSICAL MEDICINE & REHABILITATION

## 2024-05-23 RX ORDER — CYANOCOBALAMIN 1000 UG/ML
1000 INJECTION, SOLUTION INTRAMUSCULAR; SUBCUTANEOUS ONCE
Status: COMPLETED | OUTPATIENT
Start: 2024-05-23 | End: 2024-05-23

## 2024-05-23 RX ORDER — LIDOCAINE HYDROCHLORIDE 10 MG/ML
7 INJECTION, SOLUTION INFILTRATION; PERINEURAL ONCE
Status: COMPLETED | OUTPATIENT
Start: 2024-05-23 | End: 2024-05-23

## 2024-05-23 RX ORDER — LIDOCAINE HYDROCHLORIDE 20 MG/ML
5 INJECTION, SOLUTION INFILTRATION; PERINEURAL ONCE
Status: COMPLETED | OUTPATIENT
Start: 2024-05-23 | End: 2024-05-23

## 2024-05-23 RX ADMIN — CYANOCOBALAMIN 1000 MCG: 1000 INJECTION, SOLUTION INTRAMUSCULAR; SUBCUTANEOUS at 11:42

## 2024-05-23 RX ADMIN — LIDOCAINE HYDROCHLORIDE 5 ML: 20 INJECTION, SOLUTION INFILTRATION; PERINEURAL at 11:43

## 2024-05-23 RX ADMIN — LIDOCAINE HYDROCHLORIDE 7 ML: 10 INJECTION, SOLUTION INFILTRATION; PERINEURAL at 11:44

## 2024-05-24 DIAGNOSIS — Z79.899 HIGH RISK MEDICATION USE: ICD-10-CM

## 2024-05-24 DIAGNOSIS — M54.42 CHRONIC BILATERAL LOW BACK PAIN WITH BILATERAL SCIATICA: ICD-10-CM

## 2024-05-24 DIAGNOSIS — M05.79 RHEUMATOID ARTHRITIS INVOLVING MULTIPLE SITES WITH POSITIVE RHEUMATOID FACTOR (HCC): ICD-10-CM

## 2024-05-24 DIAGNOSIS — M54.41 CHRONIC BILATERAL LOW BACK PAIN WITH BILATERAL SCIATICA: ICD-10-CM

## 2024-05-24 DIAGNOSIS — M25.561 RIGHT KNEE PAIN, UNSPECIFIED CHRONICITY: ICD-10-CM

## 2024-05-24 DIAGNOSIS — G89.29 CHRONIC BILATERAL LOW BACK PAIN WITH BILATERAL SCIATICA: ICD-10-CM

## 2024-05-24 DIAGNOSIS — G89.18 POST-OP PAIN: ICD-10-CM

## 2024-05-24 DIAGNOSIS — M15.9 PRIMARY OSTEOARTHRITIS INVOLVING MULTIPLE JOINTS: ICD-10-CM

## 2024-05-24 RX ORDER — OXYCODONE AND ACETAMINOPHEN 10; 325 MG/1; MG/1
1 TABLET ORAL EVERY 6 HOURS PRN
Qty: 100 TABLET | Refills: 0 | Status: SHIPPED | OUTPATIENT
Start: 2024-05-24 | End: 2024-06-23

## 2024-05-27 ENCOUNTER — ANESTHESIA EVENT (OUTPATIENT)
Dept: OPERATING ROOM | Age: 69
End: 2024-05-27
Payer: MEDICARE

## 2024-05-27 RX ORDER — SODIUM CHLORIDE 9 MG/ML
INJECTION, SOLUTION INTRAVENOUS CONTINUOUS
Status: CANCELLED | OUTPATIENT
Start: 2024-05-27

## 2024-05-27 RX ORDER — OXYCODONE HYDROCHLORIDE 5 MG/1
5 TABLET ORAL EVERY 4 HOURS PRN
Status: CANCELLED | OUTPATIENT
Start: 2024-05-27

## 2024-05-27 RX ORDER — ASPIRIN 81 MG/1
81 TABLET ORAL 2 TIMES DAILY
Status: CANCELLED | OUTPATIENT
Start: 2024-05-27

## 2024-05-27 RX ORDER — ONDANSETRON 2 MG/ML
4 INJECTION INTRAMUSCULAR; INTRAVENOUS EVERY 6 HOURS PRN
Status: CANCELLED | OUTPATIENT
Start: 2024-05-27

## 2024-05-27 RX ORDER — ACETAMINOPHEN 325 MG/1
650 TABLET ORAL EVERY 6 HOURS
Status: CANCELLED | OUTPATIENT
Start: 2024-05-27

## 2024-05-27 RX ORDER — SODIUM CHLORIDE 9 MG/ML
INJECTION, SOLUTION INTRAVENOUS PRN
Status: CANCELLED | OUTPATIENT
Start: 2024-05-27

## 2024-05-27 RX ORDER — SODIUM CHLORIDE 0.9 % (FLUSH) 0.9 %
5-40 SYRINGE (ML) INJECTION PRN
Status: CANCELLED | OUTPATIENT
Start: 2024-05-27

## 2024-05-27 RX ORDER — KETOROLAC TROMETHAMINE 30 MG/ML
7.5 INJECTION, SOLUTION INTRAMUSCULAR; INTRAVENOUS EVERY 6 HOURS
Status: CANCELLED | OUTPATIENT
Start: 2024-05-27 | End: 2024-05-28

## 2024-05-27 RX ORDER — OXYCODONE HYDROCHLORIDE 5 MG/1
2.5 TABLET ORAL EVERY 4 HOURS PRN
Status: CANCELLED | OUTPATIENT
Start: 2024-05-27

## 2024-05-27 RX ORDER — SENNA AND DOCUSATE SODIUM 50; 8.6 MG/1; MG/1
1 TABLET, FILM COATED ORAL 2 TIMES DAILY
Status: CANCELLED | OUTPATIENT
Start: 2024-05-27

## 2024-05-27 RX ORDER — SODIUM CHLORIDE 0.9 % (FLUSH) 0.9 %
5-40 SYRINGE (ML) INJECTION EVERY 12 HOURS SCHEDULED
Status: CANCELLED | OUTPATIENT
Start: 2024-05-27

## 2024-05-27 RX ORDER — HYDROXYZINE HYDROCHLORIDE 10 MG/1
10 TABLET, FILM COATED ORAL 3 TIMES DAILY
Status: CANCELLED | OUTPATIENT
Start: 2024-05-27

## 2024-05-27 RX ORDER — MORPHINE SULFATE 2 MG/ML
2 INJECTION, SOLUTION INTRAMUSCULAR; INTRAVENOUS
Status: CANCELLED | OUTPATIENT
Start: 2024-05-27

## 2024-05-27 RX ORDER — OXYCODONE HYDROCHLORIDE 5 MG/1
10 TABLET ORAL EVERY 8 HOURS
Status: CANCELLED | OUTPATIENT
Start: 2024-05-27

## 2024-05-27 RX ORDER — CYCLOBENZAPRINE HCL 10 MG
10 TABLET ORAL 3 TIMES DAILY PRN
Status: CANCELLED | OUTPATIENT
Start: 2024-05-27

## 2024-05-27 RX ORDER — ONDANSETRON 4 MG/1
4 TABLET, ORALLY DISINTEGRATING ORAL EVERY 8 HOURS PRN
Status: CANCELLED | OUTPATIENT
Start: 2024-05-27

## 2024-05-27 RX ORDER — MAGNESIUM HYDROXIDE/ALUMINUM HYDROXICE/SIMETHICONE 120; 1200; 1200 MG/30ML; MG/30ML; MG/30ML
15 SUSPENSION ORAL EVERY 6 HOURS PRN
Status: CANCELLED | OUTPATIENT
Start: 2024-05-27

## 2024-05-28 ENCOUNTER — APPOINTMENT (OUTPATIENT)
Dept: ULTRASOUND IMAGING | Age: 69
End: 2024-05-28
Attending: ORTHOPAEDIC SURGERY
Payer: MEDICARE

## 2024-05-28 ENCOUNTER — ANESTHESIA (OUTPATIENT)
Dept: OPERATING ROOM | Age: 69
End: 2024-05-28
Payer: MEDICARE

## 2024-05-28 ENCOUNTER — HOSPITAL ENCOUNTER (OUTPATIENT)
Age: 69
Setting detail: OBSERVATION
Discharge: ANOTHER HOSPITAL | End: 2024-05-29
Attending: ORTHOPAEDIC SURGERY | Admitting: ORTHOPAEDIC SURGERY
Payer: MEDICARE

## 2024-05-28 ENCOUNTER — APPOINTMENT (OUTPATIENT)
Dept: GENERAL RADIOLOGY | Age: 69
End: 2024-05-28
Attending: ORTHOPAEDIC SURGERY
Payer: MEDICARE

## 2024-05-28 PROBLEM — Z96.651 STATUS POST TOTAL KNEE REPLACEMENT, RIGHT: Status: ACTIVE | Noted: 2024-05-28

## 2024-05-28 PROCEDURE — 97535 SELF CARE MNGMENT TRAINING: CPT

## 2024-05-28 PROCEDURE — 6360000002 HC RX W HCPCS: Performed by: NURSE PRACTITIONER

## 2024-05-28 PROCEDURE — 7100000000 HC PACU RECOVERY - FIRST 15 MIN: Performed by: ORTHOPAEDIC SURGERY

## 2024-05-28 PROCEDURE — A4216 STERILE WATER/SALINE, 10 ML: HCPCS | Performed by: ORTHOPAEDIC SURGERY

## 2024-05-28 PROCEDURE — 2580000003 HC RX 258: Performed by: PHYSICIAN ASSISTANT

## 2024-05-28 PROCEDURE — C1776 JOINT DEVICE (IMPLANTABLE): HCPCS | Performed by: ORTHOPAEDIC SURGERY

## 2024-05-28 PROCEDURE — 2580000003 HC RX 258: Performed by: STUDENT IN AN ORGANIZED HEALTH CARE EDUCATION/TRAINING PROGRAM

## 2024-05-28 PROCEDURE — 2580000003 HC RX 258: Performed by: NURSE PRACTITIONER

## 2024-05-28 PROCEDURE — 97110 THERAPEUTIC EXERCISES: CPT

## 2024-05-28 PROCEDURE — 2580000003 HC RX 258: Performed by: ORTHOPAEDIC SURGERY

## 2024-05-28 PROCEDURE — 27447 TOTAL KNEE ARTHROPLASTY: CPT | Performed by: ORTHOPAEDIC SURGERY

## 2024-05-28 PROCEDURE — 3600000014 HC SURGERY LEVEL 4 ADDTL 15MIN: Performed by: ORTHOPAEDIC SURGERY

## 2024-05-28 PROCEDURE — 6370000000 HC RX 637 (ALT 250 FOR IP): Performed by: NURSE PRACTITIONER

## 2024-05-28 PROCEDURE — 27447 TOTAL KNEE ARTHROPLASTY: CPT | Performed by: PHYSICIAN ASSISTANT

## 2024-05-28 PROCEDURE — 3700000001 HC ADD 15 MINUTES (ANESTHESIA): Performed by: ORTHOPAEDIC SURGERY

## 2024-05-28 PROCEDURE — 64447 NJX AA&/STRD FEMORAL NRV IMG: CPT | Performed by: STUDENT IN AN ORGANIZED HEALTH CARE EDUCATION/TRAINING PROGRAM

## 2024-05-28 PROCEDURE — 7100000001 HC PACU RECOVERY - ADDTL 15 MIN: Performed by: ORTHOPAEDIC SURGERY

## 2024-05-28 PROCEDURE — 76942 ECHO GUIDE FOR BIOPSY: CPT

## 2024-05-28 PROCEDURE — 97530 THERAPEUTIC ACTIVITIES: CPT

## 2024-05-28 PROCEDURE — 6360000002 HC RX W HCPCS: Performed by: ORTHOPAEDIC SURGERY

## 2024-05-28 PROCEDURE — 6360000002 HC RX W HCPCS: Performed by: STUDENT IN AN ORGANIZED HEALTH CARE EDUCATION/TRAINING PROGRAM

## 2024-05-28 PROCEDURE — 97166 OT EVAL MOD COMPLEX 45 MIN: CPT

## 2024-05-28 PROCEDURE — 2500000003 HC RX 250 WO HCPCS: Performed by: NURSE PRACTITIONER

## 2024-05-28 PROCEDURE — 6370000000 HC RX 637 (ALT 250 FOR IP): Performed by: INTERNAL MEDICINE

## 2024-05-28 PROCEDURE — 2500000003 HC RX 250 WO HCPCS: Performed by: STUDENT IN AN ORGANIZED HEALTH CARE EDUCATION/TRAINING PROGRAM

## 2024-05-28 PROCEDURE — 3600000004 HC SURGERY LEVEL 4 BASE: Performed by: ORTHOPAEDIC SURGERY

## 2024-05-28 PROCEDURE — A4217 STERILE WATER/SALINE, 500 ML: HCPCS | Performed by: ORTHOPAEDIC SURGERY

## 2024-05-28 PROCEDURE — G0378 HOSPITAL OBSERVATION PER HR: HCPCS

## 2024-05-28 PROCEDURE — 94664 DEMO&/EVAL PT USE INHALER: CPT

## 2024-05-28 PROCEDURE — 2709999900 HC NON-CHARGEABLE SUPPLY: Performed by: ORTHOPAEDIC SURGERY

## 2024-05-28 PROCEDURE — 97162 PT EVAL MOD COMPLEX 30 MIN: CPT

## 2024-05-28 PROCEDURE — 3700000000 HC ANESTHESIA ATTENDED CARE: Performed by: ORTHOPAEDIC SURGERY

## 2024-05-28 PROCEDURE — 73560 X-RAY EXAM OF KNEE 1 OR 2: CPT

## 2024-05-28 DEVICE — COMPONENT TOT KNEE CAPPED PRIMARY K2STRYKER] STRYKER CORP]: Type: IMPLANTABLE DEVICE | Site: KNEE | Status: FUNCTIONAL

## 2024-05-28 DEVICE — TIBIAL COMPONENT
Type: IMPLANTABLE DEVICE | Site: KNEE | Status: FUNCTIONAL
Brand: TRIATHLON

## 2024-05-28 DEVICE — TIBIAL BEARING INSERT - CS
Type: IMPLANTABLE DEVICE | Site: KNEE | Status: FUNCTIONAL
Brand: TRIATHLON

## 2024-05-28 DEVICE — CRUCIATE RETAINING FEMORAL
Type: IMPLANTABLE DEVICE | Site: KNEE | Status: FUNCTIONAL
Brand: TRIATHLON

## 2024-05-28 DEVICE — PATELLA
Type: IMPLANTABLE DEVICE | Site: KNEE | Status: FUNCTIONAL
Brand: TRIATHLON

## 2024-05-28 RX ORDER — NALOXONE HYDROCHLORIDE 0.4 MG/ML
INJECTION, SOLUTION INTRAMUSCULAR; INTRAVENOUS; SUBCUTANEOUS PRN
Status: DISCONTINUED | OUTPATIENT
Start: 2024-05-28 | End: 2024-05-28 | Stop reason: HOSPADM

## 2024-05-28 RX ORDER — OXYCODONE HCL 10 MG/1
10 TABLET, FILM COATED, EXTENDED RELEASE ORAL ONCE
Status: COMPLETED | OUTPATIENT
Start: 2024-05-28 | End: 2024-05-28

## 2024-05-28 RX ORDER — SODIUM CHLORIDE 9 MG/ML
INJECTION, SOLUTION INTRAVENOUS PRN
Status: DISCONTINUED | OUTPATIENT
Start: 2024-05-28 | End: 2024-05-28 | Stop reason: HOSPADM

## 2024-05-28 RX ORDER — GABAPENTIN 300 MG/1
300 CAPSULE ORAL DAILY
Status: DISCONTINUED | OUTPATIENT
Start: 2024-05-28 | End: 2024-05-29 | Stop reason: HOSPADM

## 2024-05-28 RX ORDER — POLYETHYLENE GLYCOL 3350 17 G/17G
17 POWDER, FOR SOLUTION ORAL DAILY
Status: ON HOLD | COMMUNITY

## 2024-05-28 RX ORDER — SODIUM CHLORIDE 0.9 % (FLUSH) 0.9 %
5-40 SYRINGE (ML) INJECTION PRN
Status: DISCONTINUED | OUTPATIENT
Start: 2024-05-28 | End: 2024-05-28 | Stop reason: HOSPADM

## 2024-05-28 RX ORDER — ASPIRIN 81 MG/1
81 TABLET ORAL 2 TIMES DAILY
Status: DISCONTINUED | OUTPATIENT
Start: 2024-05-28 | End: 2024-05-29 | Stop reason: HOSPADM

## 2024-05-28 RX ORDER — BUPIVACAINE HYDROCHLORIDE 7.5 MG/ML
INJECTION, SOLUTION INTRASPINAL
Status: COMPLETED | OUTPATIENT
Start: 2024-05-28 | End: 2024-05-28

## 2024-05-28 RX ORDER — MAGNESIUM HYDROXIDE/ALUMINUM HYDROXICE/SIMETHICONE 120; 1200; 1200 MG/30ML; MG/30ML; MG/30ML
15 SUSPENSION ORAL EVERY 6 HOURS PRN
Status: DISCONTINUED | OUTPATIENT
Start: 2024-05-28 | End: 2024-05-29 | Stop reason: HOSPADM

## 2024-05-28 RX ORDER — OXYCODONE HYDROCHLORIDE 5 MG/1
5 TABLET ORAL EVERY 4 HOURS PRN
Status: DISCONTINUED | OUTPATIENT
Start: 2024-05-28 | End: 2024-05-29 | Stop reason: HOSPADM

## 2024-05-28 RX ORDER — OXYCODONE HYDROCHLORIDE 5 MG/1
5 TABLET ORAL
Status: DISCONTINUED | OUTPATIENT
Start: 2024-05-28 | End: 2024-05-28 | Stop reason: HOSPADM

## 2024-05-28 RX ORDER — ROPIVACAINE HYDROCHLORIDE 5 MG/ML
INJECTION, SOLUTION EPIDURAL; INFILTRATION; PERINEURAL
Status: COMPLETED | OUTPATIENT
Start: 2024-05-28 | End: 2024-05-28

## 2024-05-28 RX ORDER — ACETAMINOPHEN 325 MG/1
650 TABLET ORAL EVERY 6 HOURS
Status: DISCONTINUED | OUTPATIENT
Start: 2024-05-28 | End: 2024-05-29 | Stop reason: HOSPADM

## 2024-05-28 RX ORDER — CYCLOBENZAPRINE HCL 10 MG
10 TABLET ORAL 3 TIMES DAILY PRN
Status: DISCONTINUED | OUTPATIENT
Start: 2024-05-28 | End: 2024-05-29 | Stop reason: HOSPADM

## 2024-05-28 RX ORDER — LOSARTAN POTASSIUM 25 MG/1
25 TABLET ORAL
Status: DISCONTINUED | OUTPATIENT
Start: 2024-05-28 | End: 2024-05-29 | Stop reason: HOSPADM

## 2024-05-28 RX ORDER — HYDROXYCHLOROQUINE SULFATE 200 MG/1
200 TABLET, FILM COATED ORAL DAILY
Status: DISCONTINUED | OUTPATIENT
Start: 2024-05-28 | End: 2024-05-29 | Stop reason: HOSPADM

## 2024-05-28 RX ORDER — KETOROLAC TROMETHAMINE 15 MG/ML
7.5 INJECTION, SOLUTION INTRAMUSCULAR; INTRAVENOUS EVERY 6 HOURS
Status: DISPENSED | OUTPATIENT
Start: 2024-05-28 | End: 2024-05-29

## 2024-05-28 RX ORDER — SODIUM CHLORIDE 0.9 % (FLUSH) 0.9 %
5-40 SYRINGE (ML) INJECTION PRN
Status: DISCONTINUED | OUTPATIENT
Start: 2024-05-28 | End: 2024-05-29 | Stop reason: HOSPADM

## 2024-05-28 RX ORDER — DOCUSATE SODIUM 100 MG/1
100 CAPSULE, LIQUID FILLED ORAL 2 TIMES DAILY
Status: ON HOLD | COMMUNITY

## 2024-05-28 RX ORDER — SODIUM CHLORIDE 9 MG/ML
INJECTION, SOLUTION INTRAVENOUS CONTINUOUS
Status: DISCONTINUED | OUTPATIENT
Start: 2024-05-28 | End: 2024-05-29

## 2024-05-28 RX ORDER — SODIUM CHLORIDE 0.9 % (FLUSH) 0.9 %
5-40 SYRINGE (ML) INJECTION EVERY 12 HOURS SCHEDULED
Status: DISCONTINUED | OUTPATIENT
Start: 2024-05-28 | End: 2024-05-28 | Stop reason: HOSPADM

## 2024-05-28 RX ORDER — BACLOFEN 10 MG/1
10 TABLET ORAL NIGHTLY PRN
Status: DISCONTINUED | OUTPATIENT
Start: 2024-05-28 | End: 2024-05-29 | Stop reason: HOSPADM

## 2024-05-28 RX ORDER — POLYETHYLENE GLYCOL 3350 17 G/17G
17 POWDER, FOR SOLUTION ORAL DAILY
Status: DISCONTINUED | OUTPATIENT
Start: 2024-05-28 | End: 2024-05-29 | Stop reason: HOSPADM

## 2024-05-28 RX ORDER — ACETAMINOPHEN 500 MG
1000 TABLET ORAL ONCE
Status: COMPLETED | OUTPATIENT
Start: 2024-05-28 | End: 2024-05-28

## 2024-05-28 RX ORDER — EPHEDRINE SULFATE 50 MG/ML
INJECTION, SOLUTION INTRAVENOUS PRN
Status: DISCONTINUED | OUTPATIENT
Start: 2024-05-28 | End: 2024-05-28 | Stop reason: SDUPTHER

## 2024-05-28 RX ORDER — OXYCODONE HYDROCHLORIDE 5 MG/1
10 TABLET ORAL EVERY 8 HOURS
Status: DISCONTINUED | OUTPATIENT
Start: 2024-05-28 | End: 2024-05-29 | Stop reason: HOSPADM

## 2024-05-28 RX ORDER — SODIUM CHLORIDE 9 MG/ML
INJECTION, SOLUTION INTRAVENOUS
Status: COMPLETED
Start: 2024-05-28 | End: 2024-05-28

## 2024-05-28 RX ORDER — FENTANYL CITRATE 0.05 MG/ML
25 INJECTION, SOLUTION INTRAMUSCULAR; INTRAVENOUS EVERY 5 MIN PRN
Status: DISCONTINUED | OUTPATIENT
Start: 2024-05-28 | End: 2024-05-28 | Stop reason: HOSPADM

## 2024-05-28 RX ORDER — CELECOXIB 100 MG/1
200 CAPSULE ORAL ONCE
Status: DISCONTINUED | OUTPATIENT
Start: 2024-05-28 | End: 2024-05-28 | Stop reason: HOSPADM

## 2024-05-28 RX ORDER — OXYCODONE HYDROCHLORIDE 5 MG/1
2.5 TABLET ORAL EVERY 4 HOURS PRN
Status: DISCONTINUED | OUTPATIENT
Start: 2024-05-28 | End: 2024-05-29 | Stop reason: HOSPADM

## 2024-05-28 RX ORDER — EPHEDRINE SULFATE 50 MG/ML
INJECTION INTRAVENOUS
Status: DISPENSED
Start: 2024-05-28 | End: 2024-05-28

## 2024-05-28 RX ORDER — ONDANSETRON 2 MG/ML
4 INJECTION INTRAMUSCULAR; INTRAVENOUS
Status: DISCONTINUED | OUTPATIENT
Start: 2024-05-28 | End: 2024-05-28 | Stop reason: HOSPADM

## 2024-05-28 RX ORDER — ONDANSETRON 2 MG/ML
4 INJECTION INTRAMUSCULAR; INTRAVENOUS EVERY 6 HOURS PRN
Status: DISCONTINUED | OUTPATIENT
Start: 2024-05-28 | End: 2024-05-29 | Stop reason: HOSPADM

## 2024-05-28 RX ORDER — MIDAZOLAM HYDROCHLORIDE 1 MG/ML
INJECTION INTRAMUSCULAR; INTRAVENOUS
Status: COMPLETED | OUTPATIENT
Start: 2024-05-28 | End: 2024-05-28

## 2024-05-28 RX ORDER — HYDROXYZINE HYDROCHLORIDE 10 MG/1
10 TABLET, FILM COATED ORAL 3 TIMES DAILY
Status: DISCONTINUED | OUTPATIENT
Start: 2024-05-28 | End: 2024-05-29 | Stop reason: HOSPADM

## 2024-05-28 RX ORDER — MORPHINE SULFATE 2 MG/ML
2 INJECTION, SOLUTION INTRAMUSCULAR; INTRAVENOUS
Status: DISCONTINUED | OUTPATIENT
Start: 2024-05-28 | End: 2024-05-29

## 2024-05-28 RX ORDER — SODIUM CHLORIDE 9 MG/ML
INJECTION, SOLUTION INTRAVENOUS PRN
Status: DISCONTINUED | OUTPATIENT
Start: 2024-05-28 | End: 2024-05-29 | Stop reason: HOSPADM

## 2024-05-28 RX ORDER — LOSARTAN POTASSIUM 25 MG/1
25 TABLET ORAL
Status: ON HOLD | COMMUNITY

## 2024-05-28 RX ORDER — DOCUSATE SODIUM 100 MG/1
100 CAPSULE, LIQUID FILLED ORAL 2 TIMES DAILY
Status: DISCONTINUED | OUTPATIENT
Start: 2024-05-28 | End: 2024-05-29 | Stop reason: HOSPADM

## 2024-05-28 RX ORDER — SODIUM CHLORIDE 0.9 % (FLUSH) 0.9 %
5-40 SYRINGE (ML) INJECTION EVERY 12 HOURS SCHEDULED
Status: DISCONTINUED | OUTPATIENT
Start: 2024-05-28 | End: 2024-05-29 | Stop reason: HOSPADM

## 2024-05-28 RX ORDER — ONDANSETRON 4 MG/1
4 TABLET, ORALLY DISINTEGRATING ORAL EVERY 8 HOURS PRN
Status: DISCONTINUED | OUTPATIENT
Start: 2024-05-28 | End: 2024-05-29 | Stop reason: HOSPADM

## 2024-05-28 RX ORDER — SENNA AND DOCUSATE SODIUM 50; 8.6 MG/1; MG/1
1 TABLET, FILM COATED ORAL 2 TIMES DAILY
Status: DISCONTINUED | OUTPATIENT
Start: 2024-05-28 | End: 2024-05-29 | Stop reason: HOSPADM

## 2024-05-28 RX ORDER — MAGNESIUM HYDROXIDE 1200 MG/15ML
LIQUID ORAL CONTINUOUS PRN
Status: DISCONTINUED | OUTPATIENT
Start: 2024-05-28 | End: 2024-05-28 | Stop reason: HOSPADM

## 2024-05-28 RX ORDER — ATORVASTATIN CALCIUM 10 MG/1
10 TABLET, FILM COATED ORAL NIGHTLY
Status: DISCONTINUED | OUTPATIENT
Start: 2024-05-28 | End: 2024-05-29 | Stop reason: HOSPADM

## 2024-05-28 RX ORDER — SODIUM CHLORIDE, SODIUM LACTATE, POTASSIUM CHLORIDE, CALCIUM CHLORIDE 600; 310; 30; 20 MG/100ML; MG/100ML; MG/100ML; MG/100ML
INJECTION, SOLUTION INTRAVENOUS CONTINUOUS
Status: DISCONTINUED | OUTPATIENT
Start: 2024-05-28 | End: 2024-05-28 | Stop reason: HOSPADM

## 2024-05-28 RX ORDER — ACETAMINOPHEN 325 MG/1
650 TABLET ORAL
Status: DISCONTINUED | OUTPATIENT
Start: 2024-05-28 | End: 2024-05-28 | Stop reason: HOSPADM

## 2024-05-28 RX ORDER — PROPOFOL 10 MG/ML
INJECTION, EMULSION INTRAVENOUS PRN
Status: DISCONTINUED | OUTPATIENT
Start: 2024-05-28 | End: 2024-05-28 | Stop reason: SDUPTHER

## 2024-05-28 RX ORDER — PANTOPRAZOLE SODIUM 40 MG/1
40 TABLET, DELAYED RELEASE ORAL
Status: DISCONTINUED | OUTPATIENT
Start: 2024-05-29 | End: 2024-05-29 | Stop reason: HOSPADM

## 2024-05-28 RX ORDER — LOSARTAN POTASSIUM 25 MG/1
75 TABLET ORAL
Status: ON HOLD | COMMUNITY

## 2024-05-28 RX ORDER — VITAMIN B COMPLEX
1000 TABLET ORAL 2 TIMES DAILY
Status: DISCONTINUED | OUTPATIENT
Start: 2024-05-28 | End: 2024-05-29 | Stop reason: HOSPADM

## 2024-05-28 RX ADMIN — VANCOMYCIN HYDROCHLORIDE 1500 MG: 1 INJECTION, POWDER, LYOPHILIZED, FOR SOLUTION INTRAVENOUS at 22:14

## 2024-05-28 RX ADMIN — ACETAMINOPHEN 650 MG: 325 TABLET ORAL at 15:24

## 2024-05-28 RX ADMIN — GABAPENTIN 300 MG: 300 CAPSULE ORAL at 20:09

## 2024-05-28 RX ADMIN — TRANEXAMIC ACID 1000 MG: 10 INJECTION, SOLUTION INTRAVENOUS at 10:35

## 2024-05-28 RX ADMIN — ASPIRIN 81 MG: 81 TABLET, COATED ORAL at 14:26

## 2024-05-28 RX ADMIN — HYDROXYZINE HYDROCHLORIDE 10 MG: 10 TABLET ORAL at 13:08

## 2024-05-28 RX ADMIN — LOSARTAN POTASSIUM 25 MG: 25 TABLET, FILM COATED ORAL at 21:38

## 2024-05-28 RX ADMIN — EPHEDRINE SULFATE 10 MG: 50 INJECTION INTRAMUSCULAR; INTRAVENOUS; SUBCUTANEOUS at 11:28

## 2024-05-28 RX ADMIN — VANCOMYCIN HYDROCHLORIDE 1000 MG: 1 INJECTION, POWDER, LYOPHILIZED, FOR SOLUTION INTRAVENOUS at 10:30

## 2024-05-28 RX ADMIN — OXYCODONE 10 MG: 5 TABLET ORAL at 17:02

## 2024-05-28 RX ADMIN — HYDROXYCHLOROQUINE SULFATE 200 MG: 200 TABLET ORAL at 15:25

## 2024-05-28 RX ADMIN — EPHEDRINE SULFATE 10 MG: 50 INJECTION INTRAMUSCULAR; INTRAVENOUS; SUBCUTANEOUS at 10:20

## 2024-05-28 RX ADMIN — MORPHINE SULFATE 2 MG: 2 INJECTION, SOLUTION INTRAMUSCULAR; INTRAVENOUS at 16:27

## 2024-05-28 RX ADMIN — SENNOSIDES AND DOCUSATE SODIUM 1 TABLET: 8.6; 5 TABLET ORAL at 14:26

## 2024-05-28 RX ADMIN — TRANEXAMIC ACID 1000 MG: 10 INJECTION, SOLUTION INTRAVENOUS at 11:15

## 2024-05-28 RX ADMIN — SODIUM CHLORIDE: 9 INJECTION, SOLUTION INTRAVENOUS at 12:42

## 2024-05-28 RX ADMIN — PROPOFOL 60 MG: 10 INJECTION, EMULSION INTRAVENOUS at 10:30

## 2024-05-28 RX ADMIN — ATORVASTATIN CALCIUM 10 MG: 10 TABLET, FILM COATED ORAL at 20:08

## 2024-05-28 RX ADMIN — EPHEDRINE SULFATE 10 MG: 50 INJECTION INTRAMUSCULAR; INTRAVENOUS; SUBCUTANEOUS at 10:38

## 2024-05-28 RX ADMIN — DILTIAZEM HYDROCHLORIDE 60 MG: 30 TABLET, FILM COATED ORAL at 20:09

## 2024-05-28 RX ADMIN — ROPIVACAINE HYDROCHLORIDE 30 ML: 5 INJECTION, SOLUTION EPIDURAL; INFILTRATION; PERINEURAL at 10:13

## 2024-05-28 RX ADMIN — DILTIAZEM HYDROCHLORIDE 60 MG: 30 TABLET, FILM COATED ORAL at 15:24

## 2024-05-28 RX ADMIN — MIDAZOLAM HYDROCHLORIDE 2 MG: 2 INJECTION, SOLUTION INTRAMUSCULAR; INTRAVENOUS at 10:13

## 2024-05-28 RX ADMIN — POLYETHYLENE GLYCOL 3350 17 G: 17 POWDER, FOR SOLUTION ORAL at 15:25

## 2024-05-28 RX ADMIN — MORPHINE SULFATE 2 MG: 2 INJECTION, SOLUTION INTRAMUSCULAR; INTRAVENOUS at 23:56

## 2024-05-28 RX ADMIN — BUPIVACAINE HYDROCHLORIDE IN DEXTROSE 13.5 MG: 7.5 INJECTION, SOLUTION SUBARACHNOID at 10:05

## 2024-05-28 RX ADMIN — PROPOFOL 60 MCG/KG/MIN: 10 INJECTION, EMULSION INTRAVENOUS at 10:31

## 2024-05-28 RX ADMIN — ACETAMINOPHEN 650 MG: 325 TABLET ORAL at 20:09

## 2024-05-28 RX ADMIN — ASPIRIN 81 MG: 81 TABLET, COATED ORAL at 20:08

## 2024-05-28 RX ADMIN — OXYCODONE HYDROCHLORIDE 10 MG: 10 TABLET, FILM COATED, EXTENDED RELEASE ORAL at 09:30

## 2024-05-28 RX ADMIN — SODIUM CHLORIDE, POTASSIUM CHLORIDE, SODIUM LACTATE AND CALCIUM CHLORIDE: 600; 310; 30; 20 INJECTION, SOLUTION INTRAVENOUS at 09:26

## 2024-05-28 RX ADMIN — Medication 1000 UNITS: at 20:08

## 2024-05-28 RX ADMIN — OXYCODONE 5 MG: 5 TABLET ORAL at 21:38

## 2024-05-28 RX ADMIN — MORPHINE SULFATE 2 MG: 2 INJECTION, SOLUTION INTRAMUSCULAR; INTRAVENOUS at 13:07

## 2024-05-28 RX ADMIN — ACETAMINOPHEN 1000 MG: 500 TABLET ORAL at 09:30

## 2024-05-28 RX ADMIN — SODIUM CHLORIDE: 9 INJECTION, SOLUTION INTRAVENOUS at 10:25

## 2024-05-28 RX ADMIN — HYDROXYZINE HYDROCHLORIDE 10 MG: 10 TABLET ORAL at 20:08

## 2024-05-28 RX ADMIN — DOCUSATE SODIUM 100 MG: 100 CAPSULE, LIQUID FILLED ORAL at 20:11

## 2024-05-28 ASSESSMENT — PAIN SCALES - GENERAL
PAINLEVEL_OUTOF10: 7
PAINLEVEL_OUTOF10: 6
PAINLEVEL_OUTOF10: 7
PAINLEVEL_OUTOF10: 4
PAINLEVEL_OUTOF10: 7
PAINLEVEL_OUTOF10: 5

## 2024-05-28 ASSESSMENT — PAIN DESCRIPTION - LOCATION
LOCATION: KNEE
LOCATION: KNEE;LEG;GROIN
LOCATION: BACK;LEG
LOCATION: KNEE
LOCATION: LEG;HIP

## 2024-05-28 ASSESSMENT — PAIN - FUNCTIONAL ASSESSMENT
PAIN_FUNCTIONAL_ASSESSMENT: 0-10
PAIN_FUNCTIONAL_ASSESSMENT: PREVENTS OR INTERFERES SOME ACTIVE ACTIVITIES AND ADLS
PAIN_FUNCTIONAL_ASSESSMENT: NONE - DENIES PAIN
PAIN_FUNCTIONAL_ASSESSMENT: PREVENTS OR INTERFERES SOME ACTIVE ACTIVITIES AND ADLS
PAIN_FUNCTIONAL_ASSESSMENT: PREVENTS OR INTERFERES SOME ACTIVE ACTIVITIES AND ADLS

## 2024-05-28 ASSESSMENT — PAIN DESCRIPTION - DESCRIPTORS
DESCRIPTORS: ACHING
DESCRIPTORS: ACHING;DISCOMFORT
DESCRIPTORS: ACHING;DISCOMFORT
DESCRIPTORS: ACHING

## 2024-05-28 ASSESSMENT — PAIN DESCRIPTION - ORIENTATION
ORIENTATION: RIGHT

## 2024-05-28 NOTE — ANESTHESIA PROCEDURE NOTES
Spinal Block    Patient location during procedure: procedural area  End time: 5/28/2024 10:12 AM  Reason for block: primary anesthetic  Staffing  Performed: anesthesiologist   Anesthesiologist: Lynsey Ramírez MD  Performed by: Lynsey Ramírez MD  Authorized by: Lynsey Ramírez MD    Spinal Block  Patient position: sitting  Prep: ChloraPrep  Patient monitoring: continuous pulse ox and frequent blood pressure checks  Approach: midline  Location: L2/L3  Provider prep: mask and sterile gloves  Local infiltration: lidocaine  Needle  Needle type: Pencan   Needle gauge: 25 G  Needle length: 5 in  Assessment  Swirl obtained: Yes  CSF: clear  Attempts: 1  Hemodynamics: stable  Additional Notes  Consent obtained and timeout performed. Patient positioned in sitting position and lumbar spine palpated. Field prepped and draped in sterile fashion. Skin anesthetized with 3ml 1% lidocaine. Guide needle placed midline at L2-L3 level, followed by spinal needle. Spinal needle advanced midline without difficult, no os noted. CSF obtained, swirl present and 1.8ml of hyperbaric bupivacaine injected. Patient tolerated well.   Preanesthetic Checklist  Completed: patient identified, IV checked, site marked, risks and benefits discussed, surgical/procedural consents, equipment checked, pre-op evaluation, timeout performed, anesthesia consent given, oxygen available and monitors applied/VS acknowledged

## 2024-05-28 NOTE — H&P
The history and physical in the electronic medical record dated 5/21/2024 was personally reviewed.  There are no interval changes that need to be made.  Plan is to proceed with a right total knee as scheduled. The patient is opted to proceed with a knee replacement surgery.  I brought the model in, and we sat down and talked about surgery.  We talked about the recovery.  I stressed the importance of physical therapy and active participation in physical therapy to the patient in order to achieve a satisfactory postoperative outcome.  We went over the risks of surgery.  Risks include, but are not limited to, infection, neurovascular injury, hematoma formation, wound healing complications, blood clot, persistent postoperative pain, ligament injury, and risks of anesthesia.  The patient expressed understanding and wishes to proceed with a total knee replacement as above.

## 2024-05-28 NOTE — ANESTHESIA PRE PROCEDURE
History:    Social History     Tobacco Use    Smoking status: Never    Smokeless tobacco: Never   Substance Use Topics    Alcohol use: No                                Counseling given: Not Answered      Vital Signs (Current):   Vitals:    05/28/24 0916 05/28/24 0922   BP:  (!) 189/65   Pulse:  82   Resp:  16   Temp:  97.5 °F (36.4 °C)   TempSrc:  Temporal   SpO2:  97%   Weight: 97.1 kg (214 lb)    Height: 1.702 m (5' 7\")                                               BP Readings from Last 3 Encounters:   05/28/24 (!) 189/65   05/21/24 138/62   03/11/24 118/65       NPO Status: Time of last liquid consumption: 2355                        Time of last solid consumption: 2330                        Date of last liquid consumption: 05/27/24                        Date of last solid food consumption: 05/27/24    BMI:   Wt Readings from Last 3 Encounters:   05/28/24 97.1 kg (214 lb)   05/21/24 97.1 kg (214 lb)   04/18/24 97.1 kg (214 lb)     Body mass index is 33.52 kg/m².    CBC:   Lab Results   Component Value Date/Time    WBC 5.4 05/04/2023 01:30 PM    RBC 3.85 05/04/2023 01:30 PM    RBC 2.81 09/06/2018 05:30 AM    HGB 10.8 05/04/2023 01:30 PM    HCT 33.2 05/04/2023 01:30 PM    MCV 86.4 05/04/2023 01:30 PM    RDW 14.2 05/04/2023 01:30 PM     05/04/2023 01:30 PM       CMP:   Lab Results   Component Value Date/Time     05/21/2024 10:40 AM    K 4.4 05/21/2024 10:40 AM    K 4.4 08/09/2018 06:41 AM    CL 94 05/21/2024 10:40 AM    CO2 22 05/21/2024 10:40 AM    BUN 18 05/21/2024 10:40 AM    CREATININE 1.29 05/21/2024 10:40 AM    GFRAA 48.7 04/19/2022 10:48 AM    AGRATIO 0.8 08/18/2018 05:00 AM    LABGLOM 44.4 05/21/2024 10:40 AM    LABGLOM 41.4 02/15/2024 11:35 AM    GLUCOSE 111 05/21/2024 10:40 AM    GLUCOSE 113 02/16/2023 11:30 AM    CALCIUM 9.8 05/21/2024 10:40 AM    BILITOT 0.4 05/21/2024 10:40 AM    ALKPHOS 166 05/21/2024 10:40 AM    AST 19 05/21/2024 10:40 AM    ALT 12 05/21/2024 10:40 AM       POC Tests:

## 2024-05-28 NOTE — ANESTHESIA POSTPROCEDURE EVALUATION
Department of Anesthesiology  Postprocedure Note    Patient: Rayna Hdez  MRN: [id number]  YOB: 1955  Date of evaluation: 5/28/2024    Procedure Summary       Date: 05/28/24 Room / Location: [address]    Anesthesia Start: 1025 Anesthesia Stop: 1151    Procedure: Right total knee arthroplasty (Right: Knee) Diagnosis:       Osteoarthritis of right knee      (Osteoarthritis of right knee [M17.11])    Surgeons: Sal Williamson MD Responsible Provider: Lynsey Ramírez MD    Anesthesia Type: Regional, MAC, Spinal ASA Status: 3            Anesthesia Type: Regional, MAC, Spinal    Jesus Phase I: Jesus Score: 9    Jesus Phase II:      Anesthesia Post Evaluation    Patient location during evaluation: bedside  Patient participation: complete - patient participated  Level of consciousness: awake and awake and alert  Pain score: 0  Airway patency: patent  Nausea & Vomiting: no nausea and no vomiting  Cardiovascular status: blood pressure returned to baseline and hemodynamically stable  Respiratory status: acceptable  Hydration status: euvolemic  Pain management: adequate    No notable events documented.

## 2024-05-28 NOTE — OP NOTE
Operative Note      Patient: Rayna Hdez  YOB: 1955  MRN: [id number]    Date of Procedure: 5/28/2024    Pre-Op Diagnosis Codes:     * Osteoarthritis of right knee [M17.11]    Post-Op Diagnosis: Same       Procedure(s):  Right total knee arthroplasty    Surgeon(s):  Sal Williamson MD    Assistant:    Assistant: Jolene Castellanos  Physician Assistant: Preston Son PA-C -his assistance consisted of assistance with positioning of the limb, retraction and and closing the wound    Anesthesia: Choice    Estimated Blood Loss (mL): less than 100     Complications: None    Specimens:   * No specimens in log *    Implants:  Implant Name Type Inv. Item Serial No.  Lot No. LRB No. Used Action   COMPONENT PAT [id number] SUPERIOR/INFERIOR KNEE - [id number]  COMPONENT PAT [id number] SUPERIOR/INFERIOR KNEE  TalkShoe [id number] Right 1 Implanted   COMPONENT FEM SZ 4 R KNEE CRUCE RET CEMENTLESS BEAD W/ JIMBO - [id number]  COMPONENT FEM SZ 4 R KNEE CRUCE RET CEMENTLESS BEAD W/ JIMBO  TalkShoe [id number] Right 1 Implanted   INSERT TIB CS 4 10 MM ARTC POST KNEE BEAR TECHNOLOGY X3 - [id number]  INSERT TIB CS 4 10 MM ARTC POST KNEE BEAR TECHNOLOGY X3  [employer] [id number] Right 1 Implanted   BASEPLATE TIB SZ 4 AP46MM ML70MM KNEE TRITANIUM 4 CRUCFRM - [id number]  BASEPLATE TIB SZ 4 AP46MM ML70MM KNEE TRITANIUM 4 CRUCFRM  TalkShoe [id number] Right 1 Implanted         Drains: * No LDAs found *    Findings:  Infection Present At Time Of Surgery (PATOS) (choose all levels that have infection present):  No infection present  Other Findings: Osteoarthritis of the right knee    Detailed Description of Procedure:     Patient was brought to the operating room.  After adequate induction of spinal anesthesia by anesthesiology patient was placed supine on the operating table.  Tourniquet was applied to the right upper thigh.  The right lower

## 2024-05-28 NOTE — ANESTHESIA PROCEDURE NOTES
Peripheral Block    Patient location during procedure: pre-op  Reason for block: post-op pain management and at surgeon's request  Start time: 5/28/2024 10:13 AM  End time: 5/28/2024 10:19 AM  Staffing  Performed: anesthesiologist   Anesthesiologist: Lynsey Ramírez MD  Performed by: Lynsey Ramírez MD  Authorized by: Lynsey Ramírez MD    Preanesthetic Checklist  Completed: patient identified, IV checked, site marked, risks and benefits discussed, surgical/procedural consents, equipment checked, pre-op evaluation, timeout performed, anesthesia consent given, oxygen available and monitors applied/VS acknowledged  Peripheral Block   Patient position: supine  Prep: ChloraPrep  Provider prep: mask and sterile gloves  Patient monitoring: cardiac monitor, continuous pulse ox, frequent blood pressure checks, IV access, oxygen and responsive to questions  Block type: Saphenous and iPacks  Laterality: right  Injection technique: single-shot  Guidance: ultrasound guided  Local infiltration: lidocaine  Infiltration strength: 1 %  Local infiltration: lidocaine  Dose: 3 mL    Needle   Needle type: combined needle/nerve stimulator   Needle gauge: 22 G  Needle localization: anatomical landmarks and ultrasound guidance  Needle length: 10 cm  Assessment   Injection assessment: negative aspiration for heme, no paresthesia on injection, local visualized surrounding nerve on ultrasound and no intravascular symptoms  Paresthesia pain: immediately resolved  Slow fractionated injection: yes  Hemodynamics: stable  Outcomes: uncomplicated and patient tolerated procedure well    Additional Notes  Ultrasound image in chart.       Consent obtained and timeout performed. Sedation provided with 2mg IV Versed. Site prepped in sterile fashion. Ultrasound used to visualize neurovascular structures and skin anesthetized with 3cc of 1% lidocaine. Needle advanced under US guidance,10cc of 0.5% ropivacaine deposited circumferentially

## 2024-05-29 ENCOUNTER — HOSPITAL ENCOUNTER (INPATIENT)
Age: 69
LOS: 8 days | Discharge: HOME | DRG: 560 | End: 2024-06-06
Attending: PHYSICAL MEDICINE & REHABILITATION | Admitting: PHYSICAL MEDICINE & REHABILITATION
Payer: MEDICARE

## 2024-05-29 VITALS
WEIGHT: 214 LBS | HEIGHT: 67 IN | HEART RATE: 57 BPM | OXYGEN SATURATION: 92 % | BODY MASS INDEX: 33.59 KG/M2 | RESPIRATION RATE: 18 BRPM | TEMPERATURE: 97.3 F | SYSTOLIC BLOOD PRESSURE: 133 MMHG | DIASTOLIC BLOOD PRESSURE: 49 MMHG

## 2024-05-29 DIAGNOSIS — R60.0 LOCALIZED EDEMA: Primary | ICD-10-CM

## 2024-05-29 DIAGNOSIS — Z74.09 IMPAIRED MOBILITY AND ACTIVITIES OF DAILY LIVING: ICD-10-CM

## 2024-05-29 DIAGNOSIS — Z78.9 IMPAIRED MOBILITY AND ACTIVITIES OF DAILY LIVING: ICD-10-CM

## 2024-05-29 LAB
ANION GAP SERPL CALCULATED.3IONS-SCNC: 10 MEQ/L (ref 9–15)
BUN SERPL-MCNC: 15 MG/DL (ref 8–23)
CALCIUM SERPL-MCNC: 8.9 MG/DL (ref 8.5–9.9)
CHLORIDE SERPL-SCNC: 101 MEQ/L (ref 95–107)
CO2 SERPL-SCNC: 23 MEQ/L (ref 20–31)
CREAT SERPL-MCNC: 1.13 MG/DL (ref 0.5–0.9)
ERYTHROCYTE [DISTWIDTH] IN BLOOD BY AUTOMATED COUNT: 14.5 % (ref 11.7–14.4)
GLUCOSE SERPL-MCNC: 119 MG/DL (ref 70–99)
HCT VFR BLD AUTO: 30.5 % (ref 37–47)
HGB BLD-MCNC: 9.6 G/DL (ref 11.2–15.7)
MCH RBC QN AUTO: 27.5 PG (ref 25.6–32.2)
MCHC RBC AUTO-ENTMCNC: 31.5 % (ref 32.2–35.5)
MCV RBC AUTO: 87.4 FL (ref 79.4–94.8)
PLATELET # BLD AUTO: 189 K/UL (ref 182–369)
POTASSIUM SERPL-SCNC: 4.5 MEQ/L (ref 3.4–4.9)
RBC # BLD AUTO: 3.49 M/UL (ref 3.93–5.22)
SODIUM SERPL-SCNC: 134 MEQ/L (ref 135–144)
WBC # BLD AUTO: 5.6 K/UL (ref 4–10)

## 2024-05-29 PROCEDURE — 97535 SELF CARE MNGMENT TRAINING: CPT

## 2024-05-29 PROCEDURE — 96375 TX/PRO/DX INJ NEW DRUG ADDON: CPT

## 2024-05-29 PROCEDURE — 97110 THERAPEUTIC EXERCISES: CPT

## 2024-05-29 PROCEDURE — 97530 THERAPEUTIC ACTIVITIES: CPT

## 2024-05-29 PROCEDURE — 2580000003 HC RX 258: Performed by: NURSE PRACTITIONER

## 2024-05-29 PROCEDURE — 80048 BASIC METABOLIC PNL TOTAL CA: CPT

## 2024-05-29 PROCEDURE — 36415 COLL VENOUS BLD VENIPUNCTURE: CPT

## 2024-05-29 PROCEDURE — 2500000003 HC RX 250 WO HCPCS: Performed by: INTERNAL MEDICINE

## 2024-05-29 PROCEDURE — 6370000000 HC RX 637 (ALT 250 FOR IP): Performed by: NURSE PRACTITIONER

## 2024-05-29 PROCEDURE — 6370000000 HC RX 637 (ALT 250 FOR IP): Performed by: INTERNAL MEDICINE

## 2024-05-29 PROCEDURE — G0378 HOSPITAL OBSERVATION PER HR: HCPCS

## 2024-05-29 PROCEDURE — 85027 COMPLETE CBC AUTOMATED: CPT

## 2024-05-29 PROCEDURE — 6360000002 HC RX W HCPCS: Performed by: NURSE PRACTITIONER

## 2024-05-29 PROCEDURE — 97116 GAIT TRAINING THERAPY: CPT

## 2024-05-29 PROCEDURE — 1180000000 HC REHAB R&B

## 2024-05-29 PROCEDURE — 96374 THER/PROPH/DIAG INJ IV PUSH: CPT

## 2024-05-29 PROCEDURE — 6370000000 HC RX 637 (ALT 250 FOR IP)

## 2024-05-29 RX ORDER — BACLOFEN 10 MG/1
10 TABLET ORAL NIGHTLY PRN
Status: CANCELLED | OUTPATIENT
Start: 2024-05-29

## 2024-05-29 RX ORDER — HYDRALAZINE HYDROCHLORIDE 25 MG/1
25 TABLET, FILM COATED ORAL EVERY 6 HOURS SCHEDULED
Status: DISCONTINUED | OUTPATIENT
Start: 2024-05-29 | End: 2024-05-29

## 2024-05-29 RX ORDER — CYCLOBENZAPRINE HCL 10 MG
5 TABLET ORAL 3 TIMES DAILY PRN
Status: CANCELLED | OUTPATIENT
Start: 2024-05-29

## 2024-05-29 RX ORDER — MAGNESIUM HYDROXIDE/ALUMINUM HYDROXICE/SIMETHICONE 120; 1200; 1200 MG/30ML; MG/30ML; MG/30ML
15 SUSPENSION ORAL EVERY 6 HOURS PRN
Status: DISCONTINUED | OUTPATIENT
Start: 2024-05-29 | End: 2024-06-06 | Stop reason: HOSPADM

## 2024-05-29 RX ORDER — LOSARTAN POTASSIUM 25 MG/1
25 TABLET ORAL
Status: CANCELLED | OUTPATIENT
Start: 2024-05-29

## 2024-05-29 RX ORDER — OXYCODONE HYDROCHLORIDE 5 MG/1
2.5 TABLET ORAL EVERY 4 HOURS PRN
Status: DISCONTINUED | OUTPATIENT
Start: 2024-05-29 | End: 2024-05-30

## 2024-05-29 RX ORDER — ASPIRIN 81 MG/1
81 TABLET ORAL 2 TIMES DAILY
Status: DISCONTINUED | OUTPATIENT
Start: 2024-05-29 | End: 2024-06-06 | Stop reason: HOSPADM

## 2024-05-29 RX ORDER — ACETAMINOPHEN 325 MG/1
650 TABLET ORAL EVERY 6 HOURS
Status: CANCELLED | OUTPATIENT
Start: 2024-05-29

## 2024-05-29 RX ORDER — HYDROXYZINE HYDROCHLORIDE 10 MG/1
10 TABLET, FILM COATED ORAL 3 TIMES DAILY
Status: DISCONTINUED | OUTPATIENT
Start: 2024-05-29 | End: 2024-06-02 | Stop reason: SINTOL

## 2024-05-29 RX ORDER — UREA 10 %
5 LOTION (ML) TOPICAL NIGHTLY
COMMUNITY

## 2024-05-29 RX ORDER — POLYETHYLENE GLYCOL 3350 17 G/17G
17 POWDER, FOR SOLUTION ORAL DAILY
Status: CANCELLED | OUTPATIENT
Start: 2024-05-30

## 2024-05-29 RX ORDER — HYDRALAZINE HYDROCHLORIDE 25 MG/1
25 TABLET, FILM COATED ORAL EVERY 6 HOURS PRN
Status: DISCONTINUED | OUTPATIENT
Start: 2024-05-29 | End: 2024-06-06 | Stop reason: HOSPADM

## 2024-05-29 RX ORDER — SODIUM CHLORIDE 0.9 % (FLUSH) 0.9 %
5-40 SYRINGE (ML) INJECTION EVERY 12 HOURS SCHEDULED
Status: CANCELLED | OUTPATIENT
Start: 2024-05-29

## 2024-05-29 RX ORDER — SODIUM CHLORIDE 0.9 % (FLUSH) 0.9 %
5-40 SYRINGE (ML) INJECTION PRN
Status: DISCONTINUED | OUTPATIENT
Start: 2024-05-29 | End: 2024-06-06 | Stop reason: HOSPADM

## 2024-05-29 RX ORDER — DOCUSATE SODIUM 100 MG/1
100 CAPSULE, LIQUID FILLED ORAL 2 TIMES DAILY
Status: DISCONTINUED | OUTPATIENT
Start: 2024-05-29 | End: 2024-06-06 | Stop reason: HOSPADM

## 2024-05-29 RX ORDER — GABAPENTIN 300 MG/1
300 CAPSULE ORAL DAILY
Status: CANCELLED | OUTPATIENT
Start: 2024-05-29

## 2024-05-29 RX ORDER — VITAMIN B COMPLEX
1000 TABLET ORAL 2 TIMES DAILY
Status: CANCELLED | OUTPATIENT
Start: 2024-05-29

## 2024-05-29 RX ORDER — CYCLOBENZAPRINE HCL 10 MG
5 TABLET ORAL 3 TIMES DAILY PRN
Status: DISCONTINUED | OUTPATIENT
Start: 2024-05-29 | End: 2024-06-06 | Stop reason: HOSPADM

## 2024-05-29 RX ORDER — HYDROMORPHONE HYDROCHLORIDE 1 MG/ML
0.5 INJECTION, SOLUTION INTRAMUSCULAR; INTRAVENOUS; SUBCUTANEOUS ONCE
Status: COMPLETED | OUTPATIENT
Start: 2024-05-29 | End: 2024-05-29

## 2024-05-29 RX ORDER — ATORVASTATIN CALCIUM 10 MG/1
10 TABLET, FILM COATED ORAL NIGHTLY
Status: DISCONTINUED | OUTPATIENT
Start: 2024-05-29 | End: 2024-06-06 | Stop reason: HOSPADM

## 2024-05-29 RX ORDER — OXYCODONE HYDROCHLORIDE 5 MG/1
5 TABLET ORAL EVERY 4 HOURS PRN
Status: CANCELLED | OUTPATIENT
Start: 2024-05-29

## 2024-05-29 RX ORDER — LOSARTAN POTASSIUM 25 MG/1
25 TABLET ORAL
Status: DISCONTINUED | OUTPATIENT
Start: 2024-05-29 | End: 2024-06-03

## 2024-05-29 RX ORDER — VITAMIN B COMPLEX
1000 TABLET ORAL 2 TIMES DAILY
Status: DISCONTINUED | OUTPATIENT
Start: 2024-05-29 | End: 2024-06-06 | Stop reason: HOSPADM

## 2024-05-29 RX ORDER — HYDROXYZINE HYDROCHLORIDE 10 MG/1
10 TABLET, FILM COATED ORAL 3 TIMES DAILY
Status: CANCELLED | OUTPATIENT
Start: 2024-05-29

## 2024-05-29 RX ORDER — SENNA AND DOCUSATE SODIUM 50; 8.6 MG/1; MG/1
1 TABLET, FILM COATED ORAL 2 TIMES DAILY
Status: DISCONTINUED | OUTPATIENT
Start: 2024-05-29 | End: 2024-06-06 | Stop reason: HOSPADM

## 2024-05-29 RX ORDER — DOCUSATE SODIUM 100 MG/1
100 CAPSULE, LIQUID FILLED ORAL 2 TIMES DAILY
Status: CANCELLED | OUTPATIENT
Start: 2024-05-29

## 2024-05-29 RX ORDER — ASPIRIN 81 MG/1
81 TABLET ORAL 2 TIMES DAILY
Status: CANCELLED | OUTPATIENT
Start: 2024-05-29

## 2024-05-29 RX ORDER — OXYCODONE HYDROCHLORIDE 5 MG/1
5 TABLET ORAL EVERY 4 HOURS PRN
Status: DISCONTINUED | OUTPATIENT
Start: 2024-05-29 | End: 2024-05-30

## 2024-05-29 RX ORDER — HYDROXYCHLOROQUINE SULFATE 200 MG/1
200 TABLET, FILM COATED ORAL DAILY
Status: CANCELLED | OUTPATIENT
Start: 2024-05-30

## 2024-05-29 RX ORDER — MAGNESIUM HYDROXIDE/ALUMINUM HYDROXICE/SIMETHICONE 120; 1200; 1200 MG/30ML; MG/30ML; MG/30ML
15 SUSPENSION ORAL EVERY 6 HOURS PRN
Status: CANCELLED | OUTPATIENT
Start: 2024-05-29

## 2024-05-29 RX ORDER — GABAPENTIN 300 MG/1
300 CAPSULE ORAL DAILY
Status: DISCONTINUED | OUTPATIENT
Start: 2024-05-29 | End: 2024-06-06 | Stop reason: HOSPADM

## 2024-05-29 RX ORDER — OXYCODONE HYDROCHLORIDE 5 MG/1
10 TABLET ORAL EVERY 8 HOURS
Status: DISCONTINUED | OUTPATIENT
Start: 2024-05-29 | End: 2024-05-30

## 2024-05-29 RX ORDER — OXYCODONE HYDROCHLORIDE 5 MG/1
10 TABLET ORAL EVERY 8 HOURS
Status: CANCELLED | OUTPATIENT
Start: 2024-05-29

## 2024-05-29 RX ORDER — HYDROXYCHLOROQUINE SULFATE 200 MG/1
200 TABLET, FILM COATED ORAL DAILY
Status: DISCONTINUED | OUTPATIENT
Start: 2024-05-30 | End: 2024-06-06 | Stop reason: HOSPADM

## 2024-05-29 RX ORDER — HYDROMORPHONE HYDROCHLORIDE 1 MG/ML
0.5 INJECTION, SOLUTION INTRAMUSCULAR; INTRAVENOUS; SUBCUTANEOUS EVERY 4 HOURS PRN
Status: DISCONTINUED | OUTPATIENT
Start: 2024-05-29 | End: 2024-05-29 | Stop reason: HOSPADM

## 2024-05-29 RX ORDER — ATORVASTATIN CALCIUM 10 MG/1
10 TABLET, FILM COATED ORAL NIGHTLY
Status: CANCELLED | OUTPATIENT
Start: 2024-05-29

## 2024-05-29 RX ORDER — SODIUM CHLORIDE 0.9 % (FLUSH) 0.9 %
5-40 SYRINGE (ML) INJECTION EVERY 12 HOURS SCHEDULED
Status: DISCONTINUED | OUTPATIENT
Start: 2024-05-29 | End: 2024-06-02 | Stop reason: SINTOL

## 2024-05-29 RX ORDER — OXYCODONE HYDROCHLORIDE 5 MG/1
2.5 TABLET ORAL EVERY 4 HOURS PRN
Status: CANCELLED | OUTPATIENT
Start: 2024-05-29

## 2024-05-29 RX ORDER — PANTOPRAZOLE SODIUM 40 MG/1
40 TABLET, DELAYED RELEASE ORAL
Status: DISCONTINUED | OUTPATIENT
Start: 2024-05-30 | End: 2024-06-06 | Stop reason: HOSPADM

## 2024-05-29 RX ORDER — SODIUM CHLORIDE 0.9 % (FLUSH) 0.9 %
5-40 SYRINGE (ML) INJECTION PRN
Status: CANCELLED | OUTPATIENT
Start: 2024-05-29

## 2024-05-29 RX ORDER — POLYETHYLENE GLYCOL 3350 17 G/17G
17 POWDER, FOR SOLUTION ORAL DAILY
Status: DISCONTINUED | OUTPATIENT
Start: 2024-05-30 | End: 2024-06-06 | Stop reason: HOSPADM

## 2024-05-29 RX ORDER — SENNA AND DOCUSATE SODIUM 50; 8.6 MG/1; MG/1
1 TABLET, FILM COATED ORAL 2 TIMES DAILY
Status: CANCELLED | OUTPATIENT
Start: 2024-05-29

## 2024-05-29 RX ORDER — PANTOPRAZOLE SODIUM 40 MG/1
40 TABLET, DELAYED RELEASE ORAL
Status: CANCELLED | OUTPATIENT
Start: 2024-05-30

## 2024-05-29 RX ORDER — BACLOFEN 10 MG/1
10 TABLET ORAL NIGHTLY PRN
Status: DISCONTINUED | OUTPATIENT
Start: 2024-05-29 | End: 2024-06-06 | Stop reason: HOSPADM

## 2024-05-29 RX ORDER — ACETAMINOPHEN 325 MG/1
650 TABLET ORAL EVERY 6 HOURS
Status: DISCONTINUED | OUTPATIENT
Start: 2024-05-29 | End: 2024-06-02 | Stop reason: SINTOL

## 2024-05-29 RX ADMIN — SENNOSIDES AND DOCUSATE SODIUM 1 TABLET: 50; 8.6 TABLET ORAL at 20:26

## 2024-05-29 RX ADMIN — SENNOSIDES AND DOCUSATE SODIUM 1 TABLET: 8.6; 5 TABLET ORAL at 08:06

## 2024-05-29 RX ADMIN — ACETAMINOPHEN 325MG 650 MG: 325 TABLET ORAL at 20:25

## 2024-05-29 RX ADMIN — OXYCODONE 10 MG: 5 TABLET ORAL at 08:07

## 2024-05-29 RX ADMIN — HYDRALAZINE HYDROCHLORIDE 25 MG: 25 TABLET, FILM COATED ORAL at 20:26

## 2024-05-29 RX ADMIN — HYDROXYZINE HYDROCHLORIDE 10 MG: 10 TABLET ORAL at 13:56

## 2024-05-29 RX ADMIN — HYDROXYZINE HYDROCHLORIDE 10 MG: 10 TABLET ORAL at 08:08

## 2024-05-29 RX ADMIN — SODIUM CHLORIDE: 9 INJECTION, SOLUTION INTRAVENOUS at 05:56

## 2024-05-29 RX ADMIN — Medication 1000 UNITS: at 08:09

## 2024-05-29 RX ADMIN — DOCUSATE SODIUM 100 MG: 100 CAPSULE, LIQUID FILLED ORAL at 08:07

## 2024-05-29 RX ADMIN — PANTOPRAZOLE SODIUM 40 MG: 40 TABLET, DELAYED RELEASE ORAL at 05:55

## 2024-05-29 RX ADMIN — OXYCODONE 5 MG: 5 TABLET ORAL at 17:01

## 2024-05-29 RX ADMIN — DOCUSATE SODIUM 100 MG: 100 CAPSULE, LIQUID FILLED ORAL at 20:26

## 2024-05-29 RX ADMIN — HYDROMORPHONE HYDROCHLORIDE 0.5 MG: 1 INJECTION, SOLUTION INTRAMUSCULAR; INTRAVENOUS; SUBCUTANEOUS at 12:53

## 2024-05-29 RX ADMIN — OXYCODONE 5 MG: 5 TABLET ORAL at 20:32

## 2024-05-29 RX ADMIN — OXYCODONE 10 MG: 5 TABLET ORAL at 01:28

## 2024-05-29 RX ADMIN — OXYCODONE 5 MG: 5 TABLET ORAL at 04:55

## 2024-05-29 RX ADMIN — LOSARTAN POTASSIUM 25 MG: 25 TABLET, FILM COATED ORAL at 17:00

## 2024-05-29 RX ADMIN — DILTIAZEM HYDROCHLORIDE 60 MG: 30 TABLET, FILM COATED ORAL at 08:09

## 2024-05-29 RX ADMIN — ASPIRIN 81 MG: 81 TABLET, COATED ORAL at 08:08

## 2024-05-29 RX ADMIN — HYDROXYZINE HYDROCHLORIDE 10 MG: 10 TABLET, FILM COATED ORAL at 20:26

## 2024-05-29 RX ADMIN — Medication 1000 UNITS: at 20:26

## 2024-05-29 RX ADMIN — GABAPENTIN 300 MG: 300 CAPSULE ORAL at 20:30

## 2024-05-29 RX ADMIN — MORPHINE SULFATE 2 MG: 2 INJECTION, SOLUTION INTRAMUSCULAR; INTRAVENOUS at 06:30

## 2024-05-29 RX ADMIN — ATORVASTATIN CALCIUM 10 MG: 10 TABLET, FILM COATED ORAL at 20:26

## 2024-05-29 RX ADMIN — OXYCODONE 5 MG: 5 TABLET ORAL at 10:26

## 2024-05-29 RX ADMIN — ASPIRIN 81 MG: 81 TABLET, COATED ORAL at 20:26

## 2024-05-29 RX ADMIN — HYDROXYCHLOROQUINE SULFATE 200 MG: 200 TABLET ORAL at 08:08

## 2024-05-29 RX ADMIN — OXYCODONE 10 MG: 5 TABLET ORAL at 23:07

## 2024-05-29 RX ADMIN — LOSARTAN POTASSIUM 75 MG: 50 TABLET, FILM COATED ORAL at 08:07

## 2024-05-29 RX ADMIN — ACETAMINOPHEN 650 MG: 325 TABLET ORAL at 08:07

## 2024-05-29 RX ADMIN — POLYETHYLENE GLYCOL 3350 17 G: 17 POWDER, FOR SOLUTION ORAL at 08:06

## 2024-05-29 ASSESSMENT — PAIN DESCRIPTION - ORIENTATION
ORIENTATION: RIGHT

## 2024-05-29 ASSESSMENT — PAIN DESCRIPTION - DESCRIPTORS
DESCRIPTORS: ACHING;STABBING;THROBBING
DESCRIPTORS: SHARP;STABBING;ACHING
DESCRIPTORS: ACHING;DISCOMFORT
DESCRIPTORS: ACHING;THROBBING
DESCRIPTORS: ACHING;SHARP;THROBBING
DESCRIPTORS: ACHING
DESCRIPTORS: ACHING
DESCRIPTORS: ACHING;DISCOMFORT
DESCRIPTORS: SHARP;ACHING;THROBBING
DESCRIPTORS: ACHING

## 2024-05-29 ASSESSMENT — PAIN SCALES - GENERAL
PAINLEVEL_OUTOF10: 6
PAINLEVEL_OUTOF10: 9
PAINLEVEL_OUTOF10: 7
PAINLEVEL_OUTOF10: 7
PAINLEVEL_OUTOF10: 4
PAINLEVEL_OUTOF10: 7
PAINLEVEL_OUTOF10: 6
PAINLEVEL_OUTOF10: 9
PAINLEVEL_OUTOF10: 7
PAINLEVEL_OUTOF10: 10
PAINLEVEL_OUTOF10: 7
PAINLEVEL_OUTOF10: 7
PAINLEVEL_OUTOF10: 9

## 2024-05-29 ASSESSMENT — PAIN DESCRIPTION - LOCATION
LOCATION: KNEE
LOCATION: KNEE;LEG
LOCATION: HIP;LEG;KNEE
LOCATION: KNEE
LOCATION: KNEE;LEG
LOCATION: KNEE

## 2024-05-29 ASSESSMENT — PAIN DESCRIPTION - FREQUENCY
FREQUENCY: CONTINUOUS

## 2024-05-29 ASSESSMENT — PAIN DESCRIPTION - PAIN TYPE
TYPE: SURGICAL PAIN

## 2024-05-29 ASSESSMENT — PAIN DESCRIPTION - ONSET
ONSET: ON-GOING

## 2024-05-29 NOTE — FLOWSHEET NOTE
Patient alert, oriented and cooperative. Admitted to room 233. Room and unit procedures explained with patient and  verbalizing understanding. Assessment completed, see flow sheet. Patient complains of right knee pain as well as right sciatica pain, pain medication given. Denies any further needs or complaints at this time. Call light within reach.

## 2024-05-29 NOTE — PLAN OF CARE
Problem: Discharge Planning  Goal: Discharge to home or other facility with appropriate resources  Outcome: Completed  Flowsheets (Taken 5/29/2024 0516)  Discharge to home or other facility with appropriate resources: Identify barriers to discharge with patient and caregiver     Problem: Pain  Goal: Verbalizes/displays adequate comfort level or baseline comfort level  Outcome: Completed     Problem: Safety - Adult  Goal: Free from fall injury  Outcome: Completed     Problem: ABCDS Injury Assessment  Goal: Absence of physical injury  Outcome: Completed

## 2024-05-29 NOTE — PLAN OF CARE
Problem: Discharge Planning  Goal: Discharge to home or other facility with appropriate resources  5/28/2024 2323 by Isis Cameron RN  Outcome: Progressing  5/28/2024 1549 by Jaz Calles RN  Outcome: Progressing     Problem: Pain  Goal: Verbalizes/displays adequate comfort level or baseline comfort level  5/28/2024 2323 by Isis Cameron RN  Outcome: Progressing  5/28/2024 1549 by Jaz Calles RN  Outcome: Progressing     Problem: Safety - Adult  Goal: Free from fall injury  5/28/2024 2323 by Isis Cameron RN  Outcome: Progressing  5/28/2024 1549 by Jaz Calles RN  Outcome: Progressing     Problem: ABCDS Injury Assessment  Goal: Absence of physical injury  5/28/2024 2323 by Isis Camreon RN  Outcome: Progressing  5/28/2024 1549 by Jaz Calles RN  Outcome: Progressing

## 2024-05-29 NOTE — PROGRESS NOTES
1008 Dr. Ramírez at bedside. Time out completed with patient, Dr. Ramírez and this RN. 1013 Spinal completed VS obtained see EMR. 1020 Procedure complete Pt jin well. VS obtained. Dr. Ramírez aware of BP. 1022 VS obtained Dr. Ramírez at bedside, Dr. Ramírez administered ephedrine IV.  
1025 Dr Williamson made aware of Note from Nephrology to hold all Nsaids and Patient states she can not take NSAIDS due to kidneys. Celebrex held. Dr. Williamson verbalizes understanding of information provided.  
CHARLIE received phone call from Jossie in admissions at [facility] in [place].  Jossie confirms that referral was received and information was reviewed with staff.  Jossie reports that Dr. Peterson has approved admission to Acute Rehab today.    SS updated patient whom is agreeable with plan to DC to [facility] in [place] today.  Patient reports spouse plans to transport patient from [facility] to [facility] via car at 3:30pm today.      CHARLIE updated Jossie in admissions at Acute Rehab.  Jossie provided number for [facility] nursing to call report.  No further follow up from this  requested.      CHARLIE provided [facility] RN number to call report.  [facility] RN plans to [facility] [place] to give report prior to patient's DC.  No further follow up from  requested  
Chart reviewed.  Patient was admitted to [facility] under observation status post right total knee replacement.  Patient's care discussed in daily quality rounds.  Patient was evaluated by PT/OT and therapies recommending acute rehab upon DC.    This  met with patient to discuss DC planning.  Upon visit patient is alert and laying in hospital bed with head of bed elevated.  Discussed DC planning and reviewed therapies recommendations.  Freedom of choice provided.  Patient reports need to get stronger before returning home and desires to go to [facility] Acute Rehab.  SS left confidential voice mail with [facility] Acute Rehab Admissions Department regarding new referral.  SS to continue to follow   
Physical Therapy  Facility/Department: [facility] MED SURG UNIT  Daily Treatment Note  NAME: Rayna Hdez  : 1955  MRN: [id number]    Date of Service: 2024    Discharge Recommendations:  (P) Continue to assess pending progress, IP Rehab        Patient Diagnosis(es): There were no encounter diagnoses.    Assessment   Assessment: (P) Pt limited by pain this session with  in room for part of session and wanting to change pain meds. pt is 10/10 r knee pain. AROM of R knee in reclined position 10-83 degrees with pt limited in range by pain at this point but feels able to go further. Seated Ther ex to increase R knee ROm and B LE strength with VC's for proper technique. Pt requiring much encouragment this session and was on and off crying. Pt required Patrice to transfer from room chair to standing with VC's for good positioning prior to standing. Pt ambulated to RR requiring min A and max cues for upright posture. Session cut short due to pt needing to make a BM.  Activity Tolerance: (P) Patient limited by pain     Plan    Physical Therapy Plan  General Plan: (P) 5-7 times per week  Current Treatment Recommendations: (P) Strengthening;ROM;Balance training;Functional mobility training;Transfer training;Endurance training;Stair training;Gait training;Manual;Pain management;Home exercise program;Safety education & training;Patient/Caregiver education & training;Equipment evaluation, education, & procurement;Modalities;Therapeutic activities     Restrictions  Restrictions/Precautions  Restrictions/Precautions: Weight Bearing (L TKA , back surgery, R reverse TSA , needs L shoulder replacement, new R TKA 24)  Lower Extremity Weight Bearing Restrictions  Right Lower Extremity Weight Bearing: Weight Bearing As Tolerated     Subjective    Subjective  Subjective: (P) Pt states 10/10 r knee pain at arrival and seated in room chair.  Pain: 9/10 R knee  Orientation  Overall Orientation Status: Within Functional 
Physical Therapy  Facility/Department: [facility] MED SURG UNIT  Daily Treatment Note  NAME: Rayna Hdez  : 1955  MRN: [id number]    Date of Service: 2024    Discharge Recommendations:  (P) Continue to assess pending progress, IP Rehab        Patient Diagnosis(es): There were no encounter diagnoses.    Assessment   Assessment: (P) PT session focused on HEP exercies. Pt given new pain meds prior to PT session w/ stated less R knee pain.HEP given and gone through with Pt able to demo and verbally understand all VC's and instructions for proper technique and reps. Pt had increased pain w/ heel slides w/ exercise stopped and pt requesting to go to RR. Pt refuses to take longer walk. Pt continues to require Patrice with transfers. Much encouragment needed throughout treatment today with pt to be D/C shortly. Pt required A to don R shoe at end of session and ice brought to pt for R knee.  Activity Tolerance: (P) Patient limited by pain     Plan    Physical Therapy Plan  General Plan: (P) 5-7 times per week  Current Treatment Recommendations: (P) Strengthening;ROM;Balance training;Functional mobility training;Transfer training;Endurance training;Stair training;Gait training;Manual;Pain management;Home exercise program;Safety education & training;Patient/Caregiver education & training;Equipment evaluation, education, & procurement;Modalities;Therapeutic activities     Restrictions  Restrictions/Precautions  Restrictions/Precautions: Weight Bearing (L TKA , back surgery, R reverse TSA , needs L shoulder replacement, new R TKA 24)  Lower Extremity Weight Bearing Restrictions  Right Lower Extremity Weight Bearing: Weight Bearing As Tolerated     Subjective    Subjective  Subjective: (P) Pt in recined room chair at arrival and states 6-7/10 R knee pain with new pain meds given 30 mins ago.  Pain: 9/10 R knee  Orientation  Overall Orientation Status: (P) Within Functional Limits  Orientation Level: (P) 
Pt A&Ox4 in chair. Assessment and vitals are as charted. Medications given per orders. Pt assisted to bedside commode and then back to chair. Pt states no further needs at this time. Call light and table are within reach, chair alarm on for safety.   
Pt is a&o x4, x1 assist with walker. Aquacel on R knee is clean, dry, and intact. Pain is managed with routine oxy and PRN morphine/adryan. Pt is able to make needs known. Call light within reach. POC continued.   
Pt [place] to acute rehab unit via her . IV removed, cathter intact. AVS and MAR report sent with patient.   
Report called and given to Sandra Howell on rehab unit.   
05:44 AM    GLUCOSE 113 02/16/2023 11:30 AM       ASSESSMENT AND PLAN:    Post operative day 1 status post right total knee arthroplasty    1:  Weight bearing as tolerated  2:  Deep venous thrombosis prophylaxis -aspirin  3:  Continue physical therapy  4:  D/C Plan:  Skilled Nursing Facility  5:  Pain Control: Good  6: The patient desires skilled nursing facility.  We will put in for authorization for this.  
    Kristy Quinteros, [id number]     
Clay  Diagnosis: R TKR  Subjective  Subjective: \"I'm okay.\"  3/10 R knee  Social/Functional History  Social/Functional History  Lives With: Spouse (one dog - 1yo border collie)  Type of Home: House  Home Layout: One level  Home Access: Stairs to enter with rails  Entrance Stairs - Number of Steps: 2  Entrance Stairs - Rails: Right (grab bar on right , no AD on left)  Bathroom Shower/Tub: Walk-in shower, Shower chair with back  Bathroom Toilet: Handicap height  Bathroom Equipment: Grab bars in shower, Shower chair, Hand-held shower  Bathroom Accessibility: Walker accessible  Home Equipment: Walker - Rolling, Cane, Wheelchair - Manual, Rollator (transport chair)  Has the patient had two or more falls in the past year or any fall with injury in the past year?: Yes (fell once trippping on chair, balance issues - since sepsis 2018- some persisting balance issues)  Receives Help From: Family  ADL Assistance: Independent  Homemaking Assistance: Independent  Homemaking Responsibilities: Yes  Ambulation Assistance: Independent (cane inside home, has transport wheelchair with multiple medical deficits and extensive surgical history)  Transfer Assistance: Independent  Active : Yes  Mode of Transportation: [employer]  Occupation: Retired  Type of Occupation: Dental hygenist, 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  Leisure & Hobbies: Read, goes camping with spouse, road trip       Objective   Temp: 97.5 °F (36.4 °C)  Pulse: 58  Heart Rate Source: Monitor  Respirations: 16  SpO2: 93 %  O2 Device: None (Room air)  BP: (!) 161/50  MAP (Calculated): 87  BP Location: Right upper arm  Patient Position: Semi fowlers          Observation/Palpation  Posture: Fair  Observation: YOLIE IV, R knee surgical dressing, aquacell in place under surgical dressing  Safety Devices  Type of Devices: Left in chair;Call light within reach;Chair alarm in place;Gait belt;Patient at risk for falls  Bed Mobility Training  Bed Mobility Training: Yes  Overall Level of Assistance: 
fusions, old L reverse TSA, R shoulder needs reverse TSA, tremors, kidney disease       PT discussed with  and SW  likely need for acute rehab at discharge postop  Plan:    Physical Therapy Plan  General Plan: 5-7 times per week (1-2x per day)  Current Treatment Recommendations: Strengthening, ROM, Balance training, Functional mobility training, Transfer training, Endurance training, Stair training, Gait training, Manual, Pain management, Home exercise program, Safety education & training, Patient/Caregiver education & training, Equipment evaluation, education, & procurement, Modalities, Therapeutic activities  Additional Comments: KT ape if approriate and tolerated for edema and R knee pain post R TKA  Safety Devices  Type of Devices: Left in chair, Call light within reach, Chair alarm in place, Gait belt, Patient at risk for falls  Goals:  Short Term Goals  Time Frame for Short Term Goals: 3-5 days if needed postop medical pt  Short Term Goal 1: sit to stand and bed to recliner with FWW adn <= CGA of one with min vc for safety  Short Term Goal 2: amb >= 25 ft  before fatigued with FWW, WBAT RLE, no R knee buckling and <= CGA of 1  Short Term Goal 3: sit to supine with <= Min A RLE  Short Term Goal 4: assess 3 four inch stairs with B rails and <= CGA with mod vc, marked time, WBAT RLE  Short Term Goal 5: R knee AAROM >= 15-70 degrees  Additional Goals?: Yes  Short Term Goal 6: Tolerate x 10 LE seated or suine BHAVYA to gain strength and endurance for functional mobility.  Short Term Goal 7: Safe discharge plan and HEP in place for discharge.  Long Term Goals  Time Frame for Long Term Goals : same as STG postop pt  Patient Goals   Patient Goals : \"I hurt a lot of places and don't want to fall. I thnk I need some rehab before I go home.\"       Therapy Time:   Individual Concurrent Group Co-treatment   Time In  300         Time Out  400         Minutes  60                 Emily Staton, [id number]

## 2024-05-29 NOTE — CARE COORDINATION
No  Are you able to do the things that help you spiritually even though you are sick? : No  Do you need support with your Episcopalian or spiritual needs?: No   Funding needs:     None Noted    Expected Level of Improvement with Rehab  Assist for ADL Modified [place]  Assist for Transfers Contact Guard / Minimal Assistance  Assist for Gait Contact Guard / Minimal Assistance    Patient's willingness to participate: Yes  Patient's ability to tolerate proposed care: Yes (spoke with patient prior to surgery and reviewed rehab/ requirements, patient very motivated)  Patient/Family Goals of Rehab (in patient's/family's own words): get stronger and go home    Anticipated Discharge Plan:  Home with   Home Health, RN PT OT Aide to be determined      Barriers to Discharge:  Home entry accessibility  Equipment needs  Caregiver availability  Resource availability      Rehab evaluation plan: Recommend Acute inpatient rehab  Rehabilitation Impairment Group Code: 06.2  Rehab Impairment Group: Orthopedic / Rheumatologic: Total Knee Replacement  Estimated Length of Stay (days): 15  Rehab Diagnosis: Impaired mobility and ADL's due to OA flare s/p right total knee arthroplasty  Reviewer's Signature: Electronically signed by Rose Bernard on 5/29/2024 at 10:23 AM      I have reviewed and concur with the above Preadmission Screening.   Rehab Admitting Doctor: Dr. Rosaline Peterson, DO

## 2024-05-29 NOTE — DISCHARGE SUMMARY
Discharge summary  This patient Rayna Hdez was admitted to the hospital on 5/28/2024  after undergoing Procedure(s) (LRB):  Right total knee arthroplasty (Right) without complications that morning.    During the postoperative period,while in hospital, patient was medically managed by the hospitalist. Please see medial notes and H&P for patients additional diagnoses.  Ortho agrees with all medical diagnoses and treatments while patient in hospital.  No significant or unexpected findings or abnormal ortho imaging were noted during the hospital stay    Hospital course  Patient tolerated surgical procedure well and there was no complications. Patient progressed adequtly through their recovery during hospital stay including PT and rehabilitation.  DVT prophylaxis was implemented POD#1  Patient was then D/C on  to Rehab  in stable condition.  Patient was instructed on the use of pain medications, the signs and symptoms of infection, and was given our number to call should they have any questions or concerns following discharge.

## 2024-05-29 NOTE — DISCHARGE INSTR - COC
Date of Last BM: ***    Intake/Output Summary (Last 24 hours) at 2024 1508  Last data filed at 2024 1803  Gross per 24 hour   Intake 523 ml   Output --   Net 523 ml     I/O last 3 completed shifts:  In: 2263 [P.O.:240; I.V.:1573; IV Piggyback:450]  Out: 100 [Blood:100]    Safety Concerns:     { AMANDA Safety Concerns:041697839}    Impairments/Disabilities:      { AMANDA Impairments/Disabilities:433437181}    Nutrition Therapy:  Current Nutrition Therapy:   { AMANDA Diet List:127365625}    Routes of Feeding: {[facility] Other Feedings:739757587}  Liquids: {Slp liquid thickness:50551}  Daily Fluid Restriction: {[facility] DME Yes amt example:319753046}  Last Modified Barium Swallow with Video (Video Swallowing Test): {Done Not Done Date:}    Treatments at the Time of Hospital Discharge:   Respiratory Treatments: ***  Oxygen Therapy:  {Therapy; copd oxygen:90584}  Ventilator:    {[facility] Vent List:687035516}    Rehab Therapies: {THERAPEUTIC INTERVENTION:3418950835}  Weight Bearing Status/Restrictions: {[facility] Weight Bearin}  Other Medical Equipment (for information only, NOT a DME order):  {EQUIPMENT:000678964}  Other Treatments: ***    Patient's personal belongings (please select all that are sent with patient):  {[facility] DME Belongings:767983422}    RN SIGNATURE:  {Esignature:536449738}    CASE MANAGEMENT/SOCIAL WORK SECTION    Readmission Risk Assessment Score:  Readmission Risk              Risk of Unplanned Readmission:  0           Discharging to Facility/ Agency   Name: [facility]  Address: [facility] [address]   Phone: [phone]    / signature: Electronically signed by FELIPE Alexander on 24 at 3:09 PM EDT    PHYSICIAN SECTION    Prognosis: {Prognosis:0887644415}    Condition at Discharge: { Patient Condition:615325294}    Rehab Potential (if transferring to Rehab): {Prognosis:1051752590}    Recommended Labs or Other Treatments After Discharge:

## 2024-05-30 PROBLEM — G89.18 POST-OP PAIN: Status: ACTIVE | Noted: 2024-05-30

## 2024-05-30 PROBLEM — Z74.09 IMPAIRED MOBILITY AND ADLS: Status: ACTIVE | Noted: 2024-05-30

## 2024-05-30 PROBLEM — R26.9 GAIT ABNORMALITY: Status: RESOLVED | Noted: 2018-08-13 | Resolved: 2024-05-30

## 2024-05-30 PROBLEM — Z78.9 IMPAIRED MOBILITY AND ACTIVITIES OF DAILY LIVING: Status: ACTIVE | Noted: 2024-05-30

## 2024-05-30 PROBLEM — Z74.09 IMPAIRED MOBILITY AND ACTIVITIES OF DAILY LIVING: Status: ACTIVE | Noted: 2024-05-30

## 2024-05-30 PROBLEM — Z78.9 IMPAIRED MOBILITY AND ADLS: Status: ACTIVE | Noted: 2024-05-30

## 2024-05-30 PROCEDURE — 97162 PT EVAL MOD COMPLEX 30 MIN: CPT

## 2024-05-30 PROCEDURE — 97116 GAIT TRAINING THERAPY: CPT

## 2024-05-30 PROCEDURE — 6370000000 HC RX 637 (ALT 250 FOR IP): Performed by: PHYSICAL MEDICINE & REHABILITATION

## 2024-05-30 PROCEDURE — 97535 SELF CARE MNGMENT TRAINING: CPT

## 2024-05-30 PROCEDURE — 97110 THERAPEUTIC EXERCISES: CPT

## 2024-05-30 PROCEDURE — 94150 VITAL CAPACITY TEST: CPT

## 2024-05-30 PROCEDURE — 6370000000 HC RX 637 (ALT 250 FOR IP): Performed by: NURSE PRACTITIONER

## 2024-05-30 PROCEDURE — 92610 EVALUATE SWALLOWING FUNCTION: CPT

## 2024-05-30 PROCEDURE — 6370000000 HC RX 637 (ALT 250 FOR IP)

## 2024-05-30 PROCEDURE — 99222 1ST HOSP IP/OBS MODERATE 55: CPT | Performed by: PHYSICAL MEDICINE & REHABILITATION

## 2024-05-30 PROCEDURE — 97166 OT EVAL MOD COMPLEX 45 MIN: CPT

## 2024-05-30 PROCEDURE — 6360000002 HC RX W HCPCS: Performed by: PHYSICAL MEDICINE & REHABILITATION

## 2024-05-30 PROCEDURE — 1180000000 HC REHAB R&B

## 2024-05-30 RX ORDER — OXYCODONE HYDROCHLORIDE 5 MG/1
10 TABLET ORAL EVERY 4 HOURS PRN
Status: DISCONTINUED | OUTPATIENT
Start: 2024-05-30 | End: 2024-06-06 | Stop reason: HOSPADM

## 2024-05-30 RX ORDER — DILTIAZEM HYDROCHLORIDE 60 MG/1
60 TABLET, FILM COATED ORAL EVERY 8 HOURS SCHEDULED
Status: DISCONTINUED | OUTPATIENT
Start: 2024-05-30 | End: 2024-06-03

## 2024-05-30 RX ORDER — OXYCODONE HYDROCHLORIDE 5 MG/1
5 TABLET ORAL EVERY 4 HOURS PRN
Status: DISCONTINUED | OUTPATIENT
Start: 2024-05-30 | End: 2024-06-06 | Stop reason: HOSPADM

## 2024-05-30 RX ORDER — LABETALOL HYDROCHLORIDE 5 MG/ML
10 INJECTION, SOLUTION INTRAVENOUS EVERY 4 HOURS PRN
Status: DISCONTINUED | OUTPATIENT
Start: 2024-05-30 | End: 2024-05-30

## 2024-05-30 RX ORDER — ENEMA 19; 7 G/133ML; G/133ML
1 ENEMA RECTAL DAILY PRN
Status: DISCONTINUED | OUTPATIENT
Start: 2024-05-30 | End: 2024-06-06 | Stop reason: HOSPADM

## 2024-05-30 RX ORDER — UBIDECARENONE 100 MG
100 CAPSULE ORAL DAILY
Status: DISCONTINUED | OUTPATIENT
Start: 2024-05-30 | End: 2024-06-06 | Stop reason: HOSPADM

## 2024-05-30 RX ORDER — CLONIDINE HYDROCHLORIDE 0.1 MG/1
0.1 TABLET ORAL ONCE
Status: COMPLETED | OUTPATIENT
Start: 2024-05-30 | End: 2024-05-30

## 2024-05-30 RX ORDER — ACETAMINOPHEN 325 MG/1
650 TABLET ORAL EVERY 4 HOURS PRN
Status: DISCONTINUED | OUTPATIENT
Start: 2024-05-30 | End: 2024-06-02 | Stop reason: SINTOL

## 2024-05-30 RX ORDER — CYANOCOBALAMIN 1000 UG/ML
1000 INJECTION, SOLUTION INTRAMUSCULAR; SUBCUTANEOUS WEEKLY
Status: DISCONTINUED | OUTPATIENT
Start: 2024-05-30 | End: 2024-06-06 | Stop reason: HOSPADM

## 2024-05-30 RX ORDER — BISACODYL 10 MG
10 SUPPOSITORY, RECTAL RECTAL DAILY PRN
Status: DISCONTINUED | OUTPATIENT
Start: 2024-05-30 | End: 2024-06-06 | Stop reason: HOSPADM

## 2024-05-30 RX ORDER — OXYCODONE HCL 10 MG/1
10 TABLET, FILM COATED, EXTENDED RELEASE ORAL EVERY 8 HOURS
Status: DISCONTINUED | OUTPATIENT
Start: 2024-05-30 | End: 2024-06-06 | Stop reason: HOSPADM

## 2024-05-30 RX ORDER — LIDOCAINE 4 G/G
3 PATCH TOPICAL DAILY
Status: DISCONTINUED | OUTPATIENT
Start: 2024-05-30 | End: 2024-06-06 | Stop reason: HOSPADM

## 2024-05-30 RX ADMIN — OXYCODONE 5 MG: 5 TABLET ORAL at 03:05

## 2024-05-30 RX ADMIN — LOSARTAN POTASSIUM 75 MG: 50 TABLET, FILM COATED ORAL at 09:45

## 2024-05-30 RX ADMIN — Medication 1000 UNITS: at 20:21

## 2024-05-30 RX ADMIN — PANTOPRAZOLE SODIUM 40 MG: 40 TABLET, DELAYED RELEASE ORAL at 06:31

## 2024-05-30 RX ADMIN — ASPIRIN 81 MG: 81 TABLET, COATED ORAL at 09:45

## 2024-05-30 RX ADMIN — POLYETHYLENE GLYCOL 3350 17 G: 17 POWDER, FOR SOLUTION ORAL at 09:44

## 2024-05-30 RX ADMIN — OXYCODONE 10 MG: 5 TABLET ORAL at 06:31

## 2024-05-30 RX ADMIN — OXYCODONE HYDROCHLORIDE 10 MG: 10 TABLET, FILM COATED, EXTENDED RELEASE ORAL at 09:47

## 2024-05-30 RX ADMIN — DILTIAZEM HYDROCHLORIDE 60 MG: 60 TABLET ORAL at 18:18

## 2024-05-30 RX ADMIN — ACETAMINOPHEN 325MG 650 MG: 325 TABLET ORAL at 03:06

## 2024-05-30 RX ADMIN — DILTIAZEM HYDROCHLORIDE 60 MG: 60 TABLET ORAL at 20:18

## 2024-05-30 RX ADMIN — CYANOCOBALAMIN 1000 MCG: 1000 INJECTION INTRAMUSCULAR; SUBCUTANEOUS at 09:44

## 2024-05-30 RX ADMIN — ATORVASTATIN CALCIUM 10 MG: 10 TABLET, FILM COATED ORAL at 20:19

## 2024-05-30 RX ADMIN — ACETAMINOPHEN 325MG 650 MG: 325 TABLET ORAL at 20:22

## 2024-05-30 RX ADMIN — OXYCODONE 5 MG: 5 TABLET ORAL at 14:27

## 2024-05-30 RX ADMIN — HYDROXYZINE HYDROCHLORIDE 10 MG: 10 TABLET, FILM COATED ORAL at 20:18

## 2024-05-30 RX ADMIN — ASPIRIN 81 MG: 81 TABLET, COATED ORAL at 20:21

## 2024-05-30 RX ADMIN — HYDRALAZINE HYDROCHLORIDE 25 MG: 25 TABLET, FILM COATED ORAL at 19:28

## 2024-05-30 RX ADMIN — OXYCODONE 10 MG: 5 TABLET ORAL at 20:38

## 2024-05-30 RX ADMIN — HYDROXYZINE HYDROCHLORIDE 10 MG: 10 TABLET, FILM COATED ORAL at 14:03

## 2024-05-30 RX ADMIN — DOCUSATE SODIUM 100 MG: 100 CAPSULE, LIQUID FILLED ORAL at 20:20

## 2024-05-30 RX ADMIN — GABAPENTIN 300 MG: 300 CAPSULE ORAL at 20:18

## 2024-05-30 RX ADMIN — SENNOSIDES AND DOCUSATE SODIUM 1 TABLET: 50; 8.6 TABLET ORAL at 20:21

## 2024-05-30 RX ADMIN — OXYCODONE HYDROCHLORIDE 10 MG: 10 TABLET, FILM COATED, EXTENDED RELEASE ORAL at 18:16

## 2024-05-30 RX ADMIN — DOCUSATE SODIUM 100 MG: 100 CAPSULE, LIQUID FILLED ORAL at 09:46

## 2024-05-30 RX ADMIN — Medication 100 MG: at 09:45

## 2024-05-30 RX ADMIN — CLONIDINE HYDROCHLORIDE 0.1 MG: 0.1 TABLET ORAL at 03:13

## 2024-05-30 RX ADMIN — SENNOSIDES AND DOCUSATE SODIUM 1 TABLET: 50; 8.6 TABLET ORAL at 09:46

## 2024-05-30 RX ADMIN — HYDROXYZINE HYDROCHLORIDE 10 MG: 10 TABLET, FILM COATED ORAL at 09:46

## 2024-05-30 RX ADMIN — Medication 1000 UNITS: at 09:46

## 2024-05-30 RX ADMIN — LOSARTAN POTASSIUM 25 MG: 25 TABLET, FILM COATED ORAL at 18:18

## 2024-05-30 ASSESSMENT — PAIN SCALES - GENERAL
PAINLEVEL_OUTOF10: 5
PAINLEVEL_OUTOF10: 7
PAINLEVEL_OUTOF10: 7
PAINLEVEL_OUTOF10: 4
PAINLEVEL_OUTOF10: 6
PAINLEVEL_OUTOF10: 5

## 2024-05-30 ASSESSMENT — PAIN DESCRIPTION - LOCATION
LOCATION: BACK;LEG;KNEE
LOCATION: KNEE;LEG
LOCATION: KNEE
LOCATION: KNEE
LOCATION: BACK;KNEE

## 2024-05-30 ASSESSMENT — PAIN DESCRIPTION - DESCRIPTORS
DESCRIPTORS: ACHING

## 2024-05-30 ASSESSMENT — PAIN DESCRIPTION - ORIENTATION
ORIENTATION: RIGHT

## 2024-05-30 NOTE — FLOWSHEET NOTE
Assessment completed. Asking for her Cardizem from home med list, NP aware. Pain controlled wit oxycodone.

## 2024-05-30 NOTE — H&P
0      After extensive review of the records and above physical exam, I have formulated the following diagnoses and plan:      DIAGNOSES:    1.  The patient was admitted to the acute rehabilitation unit with the primary rehab diagnosis being severe abnormality of gait and mobility and impaired self care and ADL's due to right total knee replacement.    Compared to Pre-Admission Assessment, patient’s medical and functional status remain challengingly complex and patient continues to requireintensive therapeutic intervention from multiple therapies, therefore, initiate acute intensive comprehensive inpatient rehabilitation program including PT/OT to improve balance, ambulation, ADL’s, and to improve the P/AROM.  Functional and medical status reassessed regarding patient’s ability to participate in therapies and patient found to be able to participate in acute intensive comprehensive inpatient rehabilitation program.  Therapeutic modifications regarding activities in therapies, place, amount of time per day and intensity of therapy made daily.      Enroll in acute course of therapy program to include 1 1/2 hours per day of PT 5 to 7days per week and 1 1/2 hours per day of OT 5 to 7 days per week,     and Rec T 1/2 hour per day 3-5 days per week.  The patient is stable medically and physically on previous exam.  If deemed necessary therapy will be spread out over a 7-day window.     This patient presented with significant new onset decreased mobility and inability to perform activities of daily living skills independently and is at significant risk for prolonged disability  For this reason they have been admitted to [facility].  The patient's current functional and medical status are highly complex but the patient is able to participate in intensive rehabilitation.  A comprehensive inpatient rehabilitation program is appropriate.  The patient will undergo

## 2024-05-31 LAB
ANION GAP SERPL CALCULATED.3IONS-SCNC: 13 MEQ/L (ref 9–15)
BACTERIA URNS QL MICRO: NEGATIVE /HPF
BASOPHILS # BLD: 0 K/UL (ref 0–0.2)
BASOPHILS NFR BLD: 0.6 %
BILIRUB UR QL STRIP: NEGATIVE
BUN SERPL-MCNC: 12 MG/DL (ref 8–23)
CALCIUM SERPL-MCNC: 9 MG/DL (ref 8.5–9.9)
CHLORIDE SERPL-SCNC: 100 MEQ/L (ref 95–107)
CLARITY UR: ABNORMAL
CO2 SERPL-SCNC: 21 MEQ/L (ref 20–31)
COLOR UR: YELLOW
CREAT SERPL-MCNC: 1 MG/DL (ref 0.5–0.9)
EOSINOPHIL # BLD: 0.3 K/UL (ref 0–0.7)
EOSINOPHIL NFR BLD: 3.7 %
EPI CELLS #/AREA URNS AUTO: ABNORMAL /HPF (ref 0–5)
ERYTHROCYTE [DISTWIDTH] IN BLOOD BY AUTOMATED COUNT: 14.6 % (ref 11.5–14.5)
GLUCOSE SERPL-MCNC: 100 MG/DL (ref 70–99)
GLUCOSE UR STRIP-MCNC: NEGATIVE MG/DL
HCT VFR BLD AUTO: 29.7 % (ref 37–47)
HGB BLD-MCNC: 9.7 G/DL (ref 12–16)
HGB UR QL STRIP: ABNORMAL
HYALINE CASTS #/AREA URNS AUTO: ABNORMAL /HPF (ref 0–5)
KETONES UR STRIP-MCNC: NEGATIVE MG/DL
LEUKOCYTE ESTERASE UR QL STRIP: ABNORMAL
LYMPHOCYTES # BLD: 1.3 K/UL (ref 1–4.8)
LYMPHOCYTES NFR BLD: 18.1 %
MCH RBC QN AUTO: 27.8 PG (ref 27–31.3)
MCHC RBC AUTO-ENTMCNC: 32.7 % (ref 33–37)
MCV RBC AUTO: 85.1 FL (ref 79.4–94.8)
MONOCYTES # BLD: 1 K/UL (ref 0.2–0.8)
MONOCYTES NFR BLD: 13.7 %
NEUTROPHILS # BLD: 4.4 K/UL (ref 1.4–6.5)
NEUTS SEG NFR BLD: 63.6 %
NITRITE UR QL STRIP: NEGATIVE
PH UR STRIP: 6.5 [PH] (ref 5–9)
PLATELET # BLD AUTO: 184 K/UL (ref 130–400)
POTASSIUM SERPL-SCNC: 4.2 MEQ/L (ref 3.4–4.9)
PROT UR STRIP-MCNC: NEGATIVE MG/DL
RBC # BLD AUTO: 3.49 M/UL (ref 4.2–5.4)
RBC #/AREA URNS AUTO: ABNORMAL /HPF (ref 0–5)
SODIUM SERPL-SCNC: 134 MEQ/L (ref 135–144)
SP GR UR STRIP: 1.01 (ref 1–1.03)
UROBILINOGEN UR STRIP-ACNC: 0.2 E.U./DL
WBC # BLD AUTO: 7 K/UL (ref 4.8–10.8)
WBC #/AREA URNS AUTO: >100 /HPF (ref 0–5)

## 2024-05-31 PROCEDURE — 87086 URINE CULTURE/COLONY COUNT: CPT

## 2024-05-31 PROCEDURE — 51701 INSERT BLADDER CATHETER: CPT

## 2024-05-31 PROCEDURE — 6370000000 HC RX 637 (ALT 250 FOR IP)

## 2024-05-31 PROCEDURE — 80048 BASIC METABOLIC PNL TOTAL CA: CPT

## 2024-05-31 PROCEDURE — 97535 SELF CARE MNGMENT TRAINING: CPT

## 2024-05-31 PROCEDURE — 87077 CULTURE AEROBIC IDENTIFY: CPT

## 2024-05-31 PROCEDURE — 97116 GAIT TRAINING THERAPY: CPT

## 2024-05-31 PROCEDURE — 97530 THERAPEUTIC ACTIVITIES: CPT

## 2024-05-31 PROCEDURE — 36415 COLL VENOUS BLD VENIPUNCTURE: CPT

## 2024-05-31 PROCEDURE — 81001 URINALYSIS AUTO W/SCOPE: CPT

## 2024-05-31 PROCEDURE — 51798 US URINE CAPACITY MEASURE: CPT

## 2024-05-31 PROCEDURE — 6370000000 HC RX 637 (ALT 250 FOR IP): Performed by: PHYSICAL MEDICINE & REHABILITATION

## 2024-05-31 PROCEDURE — 87186 SC STD MICRODIL/AGAR DIL: CPT

## 2024-05-31 PROCEDURE — 6370000000 HC RX 637 (ALT 250 FOR IP): Performed by: NURSE PRACTITIONER

## 2024-05-31 PROCEDURE — 1180000000 HC REHAB R&B

## 2024-05-31 PROCEDURE — 97110 THERAPEUTIC EXERCISES: CPT

## 2024-05-31 PROCEDURE — 85025 COMPLETE CBC W/AUTO DIFF WBC: CPT

## 2024-05-31 PROCEDURE — 99233 SBSQ HOSP IP/OBS HIGH 50: CPT | Performed by: PHYSICAL MEDICINE & REHABILITATION

## 2024-05-31 RX ORDER — DOXAZOSIN 2 MG/1
2 TABLET ORAL NIGHTLY
Status: DISCONTINUED | OUTPATIENT
Start: 2024-05-31 | End: 2024-06-05

## 2024-05-31 RX ORDER — DOXYCYCLINE HYCLATE 100 MG/1
100 CAPSULE ORAL EVERY 12 HOURS SCHEDULED
Status: DISCONTINUED | OUTPATIENT
Start: 2024-05-31 | End: 2024-06-02

## 2024-05-31 RX ADMIN — OXYCODONE HYDROCHLORIDE 10 MG: 10 TABLET, FILM COATED, EXTENDED RELEASE ORAL at 02:02

## 2024-05-31 RX ADMIN — DOXYCYCLINE HYCLATE 100 MG: 100 CAPSULE ORAL at 21:30

## 2024-05-31 RX ADMIN — Medication 1000 UNITS: at 21:49

## 2024-05-31 RX ADMIN — DILTIAZEM HYDROCHLORIDE 60 MG: 60 TABLET ORAL at 21:48

## 2024-05-31 RX ADMIN — ACETAMINOPHEN 325MG 650 MG: 325 TABLET ORAL at 13:46

## 2024-05-31 RX ADMIN — DILTIAZEM HYDROCHLORIDE 60 MG: 60 TABLET ORAL at 13:46

## 2024-05-31 RX ADMIN — LOSARTAN POTASSIUM 75 MG: 50 TABLET, FILM COATED ORAL at 08:39

## 2024-05-31 RX ADMIN — OXYCODONE 10 MG: 5 TABLET ORAL at 22:58

## 2024-05-31 RX ADMIN — SENNOSIDES AND DOCUSATE SODIUM 1 TABLET: 50; 8.6 TABLET ORAL at 21:48

## 2024-05-31 RX ADMIN — OXYCODONE HYDROCHLORIDE 10 MG: 10 TABLET, FILM COATED, EXTENDED RELEASE ORAL at 13:46

## 2024-05-31 RX ADMIN — OXYCODONE HYDROCHLORIDE 10 MG: 10 TABLET, FILM COATED, EXTENDED RELEASE ORAL at 09:54

## 2024-05-31 RX ADMIN — HYDRALAZINE HYDROCHLORIDE 25 MG: 25 TABLET, FILM COATED ORAL at 23:00

## 2024-05-31 RX ADMIN — ASPIRIN 81 MG: 81 TABLET, COATED ORAL at 08:38

## 2024-05-31 RX ADMIN — HYDROXYZINE HYDROCHLORIDE 10 MG: 10 TABLET, FILM COATED ORAL at 13:46

## 2024-05-31 RX ADMIN — ASPIRIN 81 MG: 81 TABLET, COATED ORAL at 21:48

## 2024-05-31 RX ADMIN — PANTOPRAZOLE SODIUM 40 MG: 40 TABLET, DELAYED RELEASE ORAL at 05:25

## 2024-05-31 RX ADMIN — ATORVASTATIN CALCIUM 10 MG: 10 TABLET, FILM COATED ORAL at 21:48

## 2024-05-31 RX ADMIN — GABAPENTIN 300 MG: 300 CAPSULE ORAL at 21:30

## 2024-05-31 RX ADMIN — POLYETHYLENE GLYCOL 3350 17 G: 17 POWDER, FOR SOLUTION ORAL at 08:38

## 2024-05-31 RX ADMIN — OXYCODONE 10 MG: 5 TABLET ORAL at 06:33

## 2024-05-31 RX ADMIN — DILTIAZEM HYDROCHLORIDE 60 MG: 60 TABLET ORAL at 05:25

## 2024-05-31 RX ADMIN — OXYCODONE HYDROCHLORIDE 10 MG: 10 TABLET, FILM COATED, EXTENDED RELEASE ORAL at 21:49

## 2024-05-31 RX ADMIN — SENNOSIDES AND DOCUSATE SODIUM 1 TABLET: 50; 8.6 TABLET ORAL at 08:38

## 2024-05-31 RX ADMIN — HYDRALAZINE HYDROCHLORIDE 25 MG: 25 TABLET, FILM COATED ORAL at 06:45

## 2024-05-31 RX ADMIN — HYDROXYZINE HYDROCHLORIDE 10 MG: 10 TABLET, FILM COATED ORAL at 08:38

## 2024-05-31 RX ADMIN — DOCUSATE SODIUM 100 MG: 100 CAPSULE, LIQUID FILLED ORAL at 08:38

## 2024-05-31 RX ADMIN — LOSARTAN POTASSIUM 25 MG: 25 TABLET, FILM COATED ORAL at 17:05

## 2024-05-31 RX ADMIN — HYDROXYZINE HYDROCHLORIDE 10 MG: 10 TABLET, FILM COATED ORAL at 21:48

## 2024-05-31 RX ADMIN — Medication 1000 UNITS: at 08:39

## 2024-05-31 RX ADMIN — Medication 100 MG: at 08:38

## 2024-05-31 RX ADMIN — ACETAMINOPHEN 325MG 650 MG: 325 TABLET ORAL at 08:39

## 2024-05-31 RX ADMIN — DOXAZOSIN 2 MG: 2 TABLET ORAL at 21:49

## 2024-05-31 RX ADMIN — DOCUSATE SODIUM 100 MG: 100 CAPSULE, LIQUID FILLED ORAL at 21:48

## 2024-05-31 ASSESSMENT — PAIN DESCRIPTION - DESCRIPTORS
DESCRIPTORS: ACHING

## 2024-05-31 ASSESSMENT — PAIN SCALES - GENERAL
PAINLEVEL_OUTOF10: 8
PAINLEVEL_OUTOF10: 6

## 2024-05-31 ASSESSMENT — PAIN DESCRIPTION - ORIENTATION
ORIENTATION: RIGHT

## 2024-05-31 ASSESSMENT — PAIN DESCRIPTION - LOCATION
LOCATION: BACK
LOCATION: KNEE;LEG
LOCATION: BACK;KNEE
LOCATION: KNEE;LEG
LOCATION: KNEE

## 2024-05-31 NOTE — FLOWSHEET NOTE
Patient bladder scanned per [facility] after patient had difficulty urinating on toilet with little output. Patients bladder scan showed 624 ml. Patient straight cathed via sterile technique after one attempt per orders. Patient tolerated well and 400 ML clear yellow urine returned. Patient cleaned with peg-wipes afterwards and verbalized no further needs. Bed alarm engaged and call light within reach.

## 2024-05-31 NOTE — FLOWSHEET NOTE
Patient alert, oriented and cooperative. Complains of back pain and right knee pain, pain medications effective. BLE edema noted, right greater than left, TRISHA hose in use. Concern regarding urinary retention noted. Patient voiding, continent, no PVR noted. Patient denies any further needs or complaints. Call light within reach.

## 2024-05-31 NOTE — FLOWSHEET NOTE
Spoke with Dr Peterson regarding re-timing patient's oxycodone extended release and Dr Peterson approved for me to give the dose even tho it was too close to the last administration of oxycodone immediate release.

## 2024-06-01 LAB
ANION GAP SERPL CALCULATED.3IONS-SCNC: 13 MEQ/L (ref 9–15)
BASOPHILS # BLD: 0 K/UL (ref 0–0.2)
BASOPHILS NFR BLD: 0.5 %
BUN SERPL-MCNC: 15 MG/DL (ref 8–23)
CALCIUM SERPL-MCNC: 8.9 MG/DL (ref 8.5–9.9)
CHLORIDE SERPL-SCNC: 99 MEQ/L (ref 95–107)
CO2 SERPL-SCNC: 21 MEQ/L (ref 20–31)
CREAT SERPL-MCNC: 1.1 MG/DL (ref 0.5–0.9)
EOSINOPHIL # BLD: 0.3 K/UL (ref 0–0.7)
EOSINOPHIL NFR BLD: 4.2 %
ERYTHROCYTE [DISTWIDTH] IN BLOOD BY AUTOMATED COUNT: 14.6 % (ref 11.5–14.5)
GLUCOSE SERPL-MCNC: 107 MG/DL (ref 70–99)
HCT VFR BLD AUTO: 27.4 % (ref 37–47)
HGB BLD-MCNC: 8.8 G/DL (ref 12–16)
LYMPHOCYTES # BLD: 1.3 K/UL (ref 1–4.8)
LYMPHOCYTES NFR BLD: 22.7 %
MCH RBC QN AUTO: 27.3 PG (ref 27–31.3)
MCHC RBC AUTO-ENTMCNC: 32.1 % (ref 33–37)
MCV RBC AUTO: 85.1 FL (ref 79.4–94.8)
MONOCYTES # BLD: 0.8 K/UL (ref 0.2–0.8)
MONOCYTES NFR BLD: 13.2 %
NEUTROPHILS # BLD: 3.5 K/UL (ref 1.4–6.5)
NEUTS SEG NFR BLD: 59.1 %
PLATELET # BLD AUTO: 198 K/UL (ref 130–400)
POTASSIUM SERPL-SCNC: 4.1 MEQ/L (ref 3.4–4.9)
RBC # BLD AUTO: 3.22 M/UL (ref 4.2–5.4)
SODIUM SERPL-SCNC: 133 MEQ/L (ref 135–144)
WBC # BLD AUTO: 5.9 K/UL (ref 4.8–10.8)

## 2024-06-01 PROCEDURE — 1180000000 HC REHAB R&B

## 2024-06-01 PROCEDURE — 36415 COLL VENOUS BLD VENIPUNCTURE: CPT

## 2024-06-01 PROCEDURE — 97110 THERAPEUTIC EXERCISES: CPT

## 2024-06-01 PROCEDURE — 80048 BASIC METABOLIC PNL TOTAL CA: CPT

## 2024-06-01 PROCEDURE — 97530 THERAPEUTIC ACTIVITIES: CPT

## 2024-06-01 PROCEDURE — 6370000000 HC RX 637 (ALT 250 FOR IP): Performed by: NURSE PRACTITIONER

## 2024-06-01 PROCEDURE — 6370000000 HC RX 637 (ALT 250 FOR IP): Performed by: PHYSICAL MEDICINE & REHABILITATION

## 2024-06-01 PROCEDURE — 6370000000 HC RX 637 (ALT 250 FOR IP)

## 2024-06-01 PROCEDURE — 97535 SELF CARE MNGMENT TRAINING: CPT

## 2024-06-01 PROCEDURE — 97116 GAIT TRAINING THERAPY: CPT

## 2024-06-01 PROCEDURE — 85025 COMPLETE CBC W/AUTO DIFF WBC: CPT

## 2024-06-01 RX ORDER — CALCIUM CARBONATE 500 MG/1
500 TABLET, CHEWABLE ORAL 3 TIMES DAILY PRN
Status: DISCONTINUED | OUTPATIENT
Start: 2024-06-01 | End: 2024-06-06 | Stop reason: HOSPADM

## 2024-06-01 RX ORDER — NITROFURANTOIN 25; 75 MG/1; MG/1
100 CAPSULE ORAL EVERY 12 HOURS SCHEDULED
Status: DISCONTINUED | OUTPATIENT
Start: 2024-06-01 | End: 2024-06-06 | Stop reason: HOSPADM

## 2024-06-01 RX ORDER — FUROSEMIDE 20 MG/1
20 TABLET ORAL 2 TIMES DAILY
Status: DISCONTINUED | OUTPATIENT
Start: 2024-06-01 | End: 2024-06-03

## 2024-06-01 RX ADMIN — HYDRALAZINE HYDROCHLORIDE 25 MG: 25 TABLET, FILM COATED ORAL at 16:47

## 2024-06-01 RX ADMIN — DILTIAZEM HYDROCHLORIDE 60 MG: 60 TABLET ORAL at 20:42

## 2024-06-01 RX ADMIN — OXYCODONE HYDROCHLORIDE 10 MG: 10 TABLET, FILM COATED, EXTENDED RELEASE ORAL at 05:48

## 2024-06-01 RX ADMIN — POLYETHYLENE GLYCOL 3350 17 G: 17 POWDER, FOR SOLUTION ORAL at 08:38

## 2024-06-01 RX ADMIN — Medication 100 MG: at 08:37

## 2024-06-01 RX ADMIN — HYDROXYZINE HYDROCHLORIDE 10 MG: 10 TABLET, FILM COATED ORAL at 08:37

## 2024-06-01 RX ADMIN — FUROSEMIDE 20 MG: 20 TABLET ORAL at 17:37

## 2024-06-01 RX ADMIN — GABAPENTIN 300 MG: 300 CAPSULE ORAL at 20:31

## 2024-06-01 RX ADMIN — DILTIAZEM HYDROCHLORIDE 60 MG: 60 TABLET ORAL at 14:28

## 2024-06-01 RX ADMIN — DOCUSATE SODIUM 100 MG: 100 CAPSULE, LIQUID FILLED ORAL at 20:32

## 2024-06-01 RX ADMIN — CYCLOBENZAPRINE HYDROCHLORIDE 5 MG: 10 TABLET, FILM COATED ORAL at 12:20

## 2024-06-01 RX ADMIN — Medication 1000 UNITS: at 20:32

## 2024-06-01 RX ADMIN — DOXAZOSIN 2 MG: 2 TABLET ORAL at 20:32

## 2024-06-01 RX ADMIN — OXYCODONE 10 MG: 5 TABLET ORAL at 07:52

## 2024-06-01 RX ADMIN — DILTIAZEM HYDROCHLORIDE 60 MG: 60 TABLET ORAL at 05:48

## 2024-06-01 RX ADMIN — SENNOSIDES AND DOCUSATE SODIUM 1 TABLET: 50; 8.6 TABLET ORAL at 08:37

## 2024-06-01 RX ADMIN — LOSARTAN POTASSIUM 25 MG: 25 TABLET, FILM COATED ORAL at 17:37

## 2024-06-01 RX ADMIN — DOXYCYCLINE HYCLATE 100 MG: 100 CAPSULE ORAL at 08:41

## 2024-06-01 RX ADMIN — OXYCODONE HYDROCHLORIDE 10 MG: 10 TABLET, FILM COATED, EXTENDED RELEASE ORAL at 22:15

## 2024-06-01 RX ADMIN — OXYCODONE HYDROCHLORIDE 10 MG: 10 TABLET, FILM COATED, EXTENDED RELEASE ORAL at 14:29

## 2024-06-01 RX ADMIN — SENNOSIDES AND DOCUSATE SODIUM 1 TABLET: 50; 8.6 TABLET ORAL at 20:32

## 2024-06-01 RX ADMIN — ATORVASTATIN CALCIUM 10 MG: 10 TABLET, FILM COATED ORAL at 20:32

## 2024-06-01 RX ADMIN — ASPIRIN 81 MG: 81 TABLET, COATED ORAL at 20:32

## 2024-06-01 RX ADMIN — Medication 1000 UNITS: at 08:37

## 2024-06-01 RX ADMIN — DOXYCYCLINE HYCLATE 100 MG: 100 CAPSULE ORAL at 20:31

## 2024-06-01 RX ADMIN — ASPIRIN 81 MG: 81 TABLET, COATED ORAL at 08:37

## 2024-06-01 RX ADMIN — HYDROXYZINE HYDROCHLORIDE 10 MG: 10 TABLET, FILM COATED ORAL at 14:28

## 2024-06-01 RX ADMIN — FUROSEMIDE 20 MG: 20 TABLET ORAL at 11:16

## 2024-06-01 RX ADMIN — PANTOPRAZOLE SODIUM 40 MG: 40 TABLET, DELAYED RELEASE ORAL at 05:48

## 2024-06-01 RX ADMIN — LOSARTAN POTASSIUM 75 MG: 50 TABLET, FILM COATED ORAL at 08:37

## 2024-06-01 ASSESSMENT — PAIN DESCRIPTION - LOCATION
LOCATION: KNEE
LOCATION: KNEE;INCISION
LOCATION: KNEE

## 2024-06-01 ASSESSMENT — PAIN - FUNCTIONAL ASSESSMENT
PAIN_FUNCTIONAL_ASSESSMENT: PREVENTS OR INTERFERES SOME ACTIVE ACTIVITIES AND ADLS
PAIN_FUNCTIONAL_ASSESSMENT: PREVENTS OR INTERFERES SOME ACTIVE ACTIVITIES AND ADLS

## 2024-06-01 ASSESSMENT — PAIN DESCRIPTION - ORIENTATION
ORIENTATION: RIGHT

## 2024-06-01 ASSESSMENT — PAIN DESCRIPTION - DESCRIPTORS
DESCRIPTORS: SHARP
DESCRIPTORS: ACHING;BURNING
DESCRIPTORS: ACHING;CRAMPING;DISCOMFORT
DESCRIPTORS: ACHING;BURNING;JABBING

## 2024-06-01 ASSESSMENT — PAIN SCALES - GENERAL
PAINLEVEL_OUTOF10: 6
PAINLEVEL_OUTOF10: 2
PAINLEVEL_OUTOF10: 10
PAINLEVEL_OUTOF10: 7
PAINLEVEL_OUTOF10: 6

## 2024-06-01 NOTE — PLAN OF CARE
Problem: Safety - Adult  Goal: Free from fall injury  Outcome: Progressing     Problem: Discharge Planning  Goal: Discharge to home or other facility with appropriate resources  Outcome: Progressing     Problem: ABCDS Injury Assessment  Goal: Absence of physical injury  Outcome: Progressing     Problem: Pain  Goal: Verbalizes/displays adequate comfort level or baseline comfort level  Outcome: Progressing     Problem: Discharge Planning  Goal: Discharge to home or other facility with appropriate resources  Outcome: Progressing     Problem: ABCDS Injury Assessment  Goal: Absence of physical injury  Outcome: Progressing     Problem: Pain  Goal: Verbalizes/displays adequate comfort level or baseline comfort level  Outcome: Progressing

## 2024-06-02 LAB
ANION GAP SERPL CALCULATED.3IONS-SCNC: 11 MEQ/L (ref 9–15)
BACTERIA UR CULT: ABNORMAL
BACTERIA UR CULT: ABNORMAL
BACTERIA URNS QL MICRO: NEGATIVE /HPF
BASOPHILS # BLD: 0 K/UL (ref 0–0.2)
BASOPHILS NFR BLD: 0.6 %
BILIRUB UR QL STRIP: NEGATIVE
BUN SERPL-MCNC: 15 MG/DL (ref 8–23)
CALCIUM SERPL-MCNC: 8.4 MG/DL (ref 8.5–9.9)
CHLORIDE SERPL-SCNC: 100 MEQ/L (ref 95–107)
CLARITY UR: CLEAR
CO2 SERPL-SCNC: 22 MEQ/L (ref 20–31)
COLOR UR: YELLOW
CREAT SERPL-MCNC: 1.09 MG/DL (ref 0.5–0.9)
EOSINOPHIL # BLD: 0.2 K/UL (ref 0–0.7)
EOSINOPHIL NFR BLD: 5.1 %
EPI CELLS #/AREA URNS AUTO: ABNORMAL /HPF (ref 0–5)
ERYTHROCYTE [DISTWIDTH] IN BLOOD BY AUTOMATED COUNT: 14.6 % (ref 11.5–14.5)
GLUCOSE SERPL-MCNC: 96 MG/DL (ref 70–99)
GLUCOSE UR STRIP-MCNC: NEGATIVE MG/DL
HCT VFR BLD AUTO: 24.1 % (ref 37–47)
HGB BLD-MCNC: 7.8 G/DL (ref 12–16)
HGB UR QL STRIP: NEGATIVE
HYALINE CASTS #/AREA URNS AUTO: ABNORMAL /HPF (ref 0–5)
KETONES UR STRIP-MCNC: NEGATIVE MG/DL
LEUKOCYTE ESTERASE UR QL STRIP: ABNORMAL
LYMPHOCYTES # BLD: 1.1 K/UL (ref 1–4.8)
LYMPHOCYTES NFR BLD: 22.2 %
MCH RBC QN AUTO: 27.5 PG (ref 27–31.3)
MCHC RBC AUTO-ENTMCNC: 32.4 % (ref 33–37)
MCV RBC AUTO: 84.9 FL (ref 79.4–94.8)
MONOCYTES # BLD: 0.7 K/UL (ref 0.2–0.8)
MONOCYTES NFR BLD: 14.8 %
NEUTROPHILS # BLD: 2.7 K/UL (ref 1.4–6.5)
NEUTS SEG NFR BLD: 57.1 %
NITRITE UR QL STRIP: NEGATIVE
ORGANISM: ABNORMAL
PH UR STRIP: 5.5 [PH] (ref 5–9)
PLATELET # BLD AUTO: 188 K/UL (ref 130–400)
POTASSIUM SERPL-SCNC: 4.2 MEQ/L (ref 3.4–4.9)
PROT UR STRIP-MCNC: NEGATIVE MG/DL
RBC # BLD AUTO: 2.84 M/UL (ref 4.2–5.4)
RBC #/AREA URNS AUTO: ABNORMAL /HPF (ref 0–5)
SODIUM SERPL-SCNC: 133 MEQ/L (ref 135–144)
SP GR UR STRIP: 1.01 (ref 1–1.03)
URINE REFLEX TO CULTURE: ABNORMAL
UROBILINOGEN UR STRIP-ACNC: 0.2 E.U./DL
WBC # BLD AUTO: 4.7 K/UL (ref 4.8–10.8)
WBC #/AREA URNS AUTO: ABNORMAL /HPF (ref 0–5)

## 2024-06-02 PROCEDURE — 36415 COLL VENOUS BLD VENIPUNCTURE: CPT

## 2024-06-02 PROCEDURE — 97116 GAIT TRAINING THERAPY: CPT

## 2024-06-02 PROCEDURE — 6370000000 HC RX 637 (ALT 250 FOR IP): Performed by: PHYSICAL MEDICINE & REHABILITATION

## 2024-06-02 PROCEDURE — 80048 BASIC METABOLIC PNL TOTAL CA: CPT

## 2024-06-02 PROCEDURE — 85025 COMPLETE CBC W/AUTO DIFF WBC: CPT

## 2024-06-02 PROCEDURE — 1180000000 HC REHAB R&B

## 2024-06-02 PROCEDURE — 6370000000 HC RX 637 (ALT 250 FOR IP): Performed by: NURSE PRACTITIONER

## 2024-06-02 PROCEDURE — 97110 THERAPEUTIC EXERCISES: CPT

## 2024-06-02 PROCEDURE — 81001 URINALYSIS AUTO W/SCOPE: CPT

## 2024-06-02 PROCEDURE — 6370000000 HC RX 637 (ALT 250 FOR IP)

## 2024-06-02 RX ORDER — FUROSEMIDE 20 MG/1
20 TABLET ORAL ONCE
Status: COMPLETED | OUTPATIENT
Start: 2024-06-02 | End: 2024-06-02

## 2024-06-02 RX ADMIN — Medication 100 MG: at 09:48

## 2024-06-02 RX ADMIN — DOXAZOSIN 2 MG: 2 TABLET ORAL at 21:40

## 2024-06-02 RX ADMIN — GABAPENTIN 300 MG: 300 CAPSULE ORAL at 21:40

## 2024-06-02 RX ADMIN — OXYCODONE HYDROCHLORIDE 10 MG: 10 TABLET, FILM COATED, EXTENDED RELEASE ORAL at 13:53

## 2024-06-02 RX ADMIN — OXYCODONE 10 MG: 5 TABLET ORAL at 09:44

## 2024-06-02 RX ADMIN — POLYETHYLENE GLYCOL 3350 17 G: 17 POWDER, FOR SOLUTION ORAL at 09:49

## 2024-06-02 RX ADMIN — DILTIAZEM HYDROCHLORIDE 60 MG: 60 TABLET ORAL at 13:53

## 2024-06-02 RX ADMIN — SENNOSIDES AND DOCUSATE SODIUM 1 TABLET: 50; 8.6 TABLET ORAL at 21:40

## 2024-06-02 RX ADMIN — OXYCODONE HYDROCHLORIDE 10 MG: 10 TABLET, FILM COATED, EXTENDED RELEASE ORAL at 05:05

## 2024-06-02 RX ADMIN — HYDROXYZINE HYDROCHLORIDE 10 MG: 10 TABLET, FILM COATED ORAL at 14:03

## 2024-06-02 RX ADMIN — Medication 1000 UNITS: at 21:40

## 2024-06-02 RX ADMIN — Medication 1000 UNITS: at 09:49

## 2024-06-02 RX ADMIN — DOCUSATE SODIUM 100 MG: 100 CAPSULE, LIQUID FILLED ORAL at 09:50

## 2024-06-02 RX ADMIN — ASPIRIN 81 MG: 81 TABLET, COATED ORAL at 21:40

## 2024-06-02 RX ADMIN — DILTIAZEM HYDROCHLORIDE 60 MG: 60 TABLET ORAL at 21:39

## 2024-06-02 RX ADMIN — FUROSEMIDE 20 MG: 20 TABLET ORAL at 09:49

## 2024-06-02 RX ADMIN — NITROFURANTOIN MONOHYDRATE/MACROCRYSTALS 100 MG: 75; 25 CAPSULE ORAL at 21:40

## 2024-06-02 RX ADMIN — PANTOPRAZOLE SODIUM 40 MG: 40 TABLET, DELAYED RELEASE ORAL at 05:05

## 2024-06-02 RX ADMIN — DOCUSATE SODIUM 100 MG: 100 CAPSULE, LIQUID FILLED ORAL at 21:39

## 2024-06-02 RX ADMIN — ANTACID TABLETS 500 MG: 500 TABLET, CHEWABLE ORAL at 16:23

## 2024-06-02 RX ADMIN — NITROFURANTOIN MONOHYDRATE/MACROCRYSTALS 100 MG: 75; 25 CAPSULE ORAL at 09:44

## 2024-06-02 RX ADMIN — LOSARTAN POTASSIUM 25 MG: 25 TABLET, FILM COATED ORAL at 16:15

## 2024-06-02 RX ADMIN — FUROSEMIDE 20 MG: 20 TABLET ORAL at 16:14

## 2024-06-02 RX ADMIN — LOSARTAN POTASSIUM 75 MG: 50 TABLET, FILM COATED ORAL at 09:48

## 2024-06-02 RX ADMIN — DILTIAZEM HYDROCHLORIDE 60 MG: 60 TABLET ORAL at 05:05

## 2024-06-02 RX ADMIN — SENNOSIDES AND DOCUSATE SODIUM 1 TABLET: 50; 8.6 TABLET ORAL at 09:49

## 2024-06-02 RX ADMIN — OXYCODONE 10 MG: 5 TABLET ORAL at 17:46

## 2024-06-02 RX ADMIN — ATORVASTATIN CALCIUM 10 MG: 10 TABLET, FILM COATED ORAL at 21:40

## 2024-06-02 RX ADMIN — OXYCODONE HYDROCHLORIDE 10 MG: 10 TABLET, FILM COATED, EXTENDED RELEASE ORAL at 21:39

## 2024-06-02 RX ADMIN — FUROSEMIDE 20 MG: 20 TABLET ORAL at 17:43

## 2024-06-02 RX ADMIN — ASPIRIN 81 MG: 81 TABLET, COATED ORAL at 09:48

## 2024-06-02 ASSESSMENT — PAIN SCALES - GENERAL
PAINLEVEL_OUTOF10: 7
PAINLEVEL_OUTOF10: 6
PAINLEVEL_OUTOF10: 6
PAINLEVEL_OUTOF10: 5
PAINLEVEL_OUTOF10: 7

## 2024-06-02 ASSESSMENT — PAIN DESCRIPTION - DESCRIPTORS
DESCRIPTORS: DISCOMFORT
DESCRIPTORS: SHARP;ACHING
DESCRIPTORS: ACHING;DISCOMFORT
DESCRIPTORS: ACHING

## 2024-06-02 ASSESSMENT — PAIN DESCRIPTION - ORIENTATION
ORIENTATION: RIGHT

## 2024-06-02 ASSESSMENT — PAIN - FUNCTIONAL ASSESSMENT
PAIN_FUNCTIONAL_ASSESSMENT: PREVENTS OR INTERFERES WITH MANY ACTIVE NOT PASSIVE ACTIVITIES
PAIN_FUNCTIONAL_ASSESSMENT: PREVENTS OR INTERFERES SOME ACTIVE ACTIVITIES AND ADLS
PAIN_FUNCTIONAL_ASSESSMENT: PREVENTS OR INTERFERES SOME ACTIVE ACTIVITIES AND ADLS

## 2024-06-02 ASSESSMENT — PAIN DESCRIPTION - LOCATION
LOCATION: KNEE;LEG;GROIN
LOCATION: KNEE
LOCATION: KNEE

## 2024-06-02 NOTE — PLAN OF CARE
Problem: Safety - Adult  Goal: Free from fall injury  6/1/2024 2026 by Marian Vargas RN  Outcome: Progressing     Problem: Discharge Planning  Goal: Discharge to home or other facility with appropriate resources  6/1/2024 2026 by Marian Vargas RN  Outcome: Progressing     Problem: ABCDS Injury Assessment  Goal: Absence of physical injury  6/1/2024 2026 by Marian Vargas, RN  Outcome: Progressing     Problem: Pain  Goal: Verbalizes/displays adequate comfort level or baseline comfort level  6/1/2024 2026 by Marian Vargas, RN  Outcome: Progressing

## 2024-06-02 NOTE — FLOWSHEET NOTE
Dr. Leonard up and states to consult nephrology.    Patient assessment complete.  at bedside. C/o pain to right knee. Aquacel in place. Scheduled HS medications given per MAR. Bed locked and low with alarm engaged. Call light within reach. Electronically signed by Marian aVrgas RN on 6/1/2024 at 8:29 PM    Patient wants to repeat her UA. Per patient due to possible contamination from last sample that was taken 05/31/24 before she will consider the Macrobid that was ordered. Patient states she would also like nephrology's opinion on taking the ATB. New UA order obtained and sent.    Patient's BP consistently elevated. Requesting to speak with cardio due to her hesitancies on taking PRN Apresoline. Patient states she read it was not good to take if you had CKD. Will ask day shift RN to contact cardiology for patient. Electronically signed by Marian Vargas RN on 6/2/2024 at 5:10 AM

## 2024-06-03 ENCOUNTER — APPOINTMENT (OUTPATIENT)
Dept: ULTRASOUND IMAGING | Age: 69
DRG: 560 | End: 2024-06-03
Attending: PHYSICAL MEDICINE & REHABILITATION
Payer: MEDICARE

## 2024-06-03 LAB
ANION GAP SERPL CALCULATED.3IONS-SCNC: 13 MEQ/L (ref 9–15)
BASOPHILS # BLD: 0 K/UL (ref 0–0.2)
BASOPHILS NFR BLD: 0.9 %
BUN SERPL-MCNC: 16 MG/DL (ref 8–23)
CALCIUM SERPL-MCNC: 8.4 MG/DL (ref 8.5–9.9)
CHLORIDE SERPL-SCNC: 99 MEQ/L (ref 95–107)
CO2 SERPL-SCNC: 22 MEQ/L (ref 20–31)
CREAT SERPL-MCNC: 1.18 MG/DL (ref 0.5–0.9)
CREAT UR-MCNC: 66 MG/DL
EOSINOPHIL # BLD: 0.2 K/UL (ref 0–0.7)
EOSINOPHIL NFR BLD: 5.1 %
ERYTHROCYTE [DISTWIDTH] IN BLOOD BY AUTOMATED COUNT: 14.6 % (ref 11.5–14.5)
GLUCOSE SERPL-MCNC: 103 MG/DL (ref 70–99)
HCT VFR BLD AUTO: 24.6 % (ref 37–47)
HGB BLD-MCNC: 8 G/DL (ref 12–16)
LYMPHOCYTES # BLD: 0.9 K/UL (ref 1–4.8)
LYMPHOCYTES NFR BLD: 18.7 %
MCH RBC QN AUTO: 27.8 PG (ref 27–31.3)
MCHC RBC AUTO-ENTMCNC: 32.5 % (ref 33–37)
MCV RBC AUTO: 85.4 FL (ref 79.4–94.8)
MICROALBUMIN UR-MCNC: <1.2 MG/DL
MICROALBUMIN/CREAT UR-RTO: NORMAL MG/G (ref 0–30)
MONOCYTES # BLD: 0.6 K/UL (ref 0.2–0.8)
MONOCYTES NFR BLD: 13.6 %
NEUTROPHILS # BLD: 2.8 K/UL (ref 1.4–6.5)
NEUTS SEG NFR BLD: 61.5 %
PLATELET # BLD AUTO: 204 K/UL (ref 130–400)
POTASSIUM SERPL-SCNC: 4 MEQ/L (ref 3.4–4.9)
RBC # BLD AUTO: 2.88 M/UL (ref 4.2–5.4)
SODIUM SERPL-SCNC: 134 MEQ/L (ref 135–144)
WBC # BLD AUTO: 4.6 K/UL (ref 4.8–10.8)

## 2024-06-03 PROCEDURE — 97535 SELF CARE MNGMENT TRAINING: CPT

## 2024-06-03 PROCEDURE — 36415 COLL VENOUS BLD VENIPUNCTURE: CPT

## 2024-06-03 PROCEDURE — 97116 GAIT TRAINING THERAPY: CPT

## 2024-06-03 PROCEDURE — 6370000000 HC RX 637 (ALT 250 FOR IP)

## 2024-06-03 PROCEDURE — 82570 ASSAY OF URINE CREATININE: CPT

## 2024-06-03 PROCEDURE — 6370000000 HC RX 637 (ALT 250 FOR IP): Performed by: INTERNAL MEDICINE

## 2024-06-03 PROCEDURE — 1180000000 HC REHAB R&B

## 2024-06-03 PROCEDURE — 82043 UR ALBUMIN QUANTITATIVE: CPT

## 2024-06-03 PROCEDURE — 6370000000 HC RX 637 (ALT 250 FOR IP): Performed by: PHYSICAL MEDICINE & REHABILITATION

## 2024-06-03 PROCEDURE — 6370000000 HC RX 637 (ALT 250 FOR IP): Performed by: NURSE PRACTITIONER

## 2024-06-03 PROCEDURE — 97112 NEUROMUSCULAR REEDUCATION: CPT

## 2024-06-03 PROCEDURE — 85025 COMPLETE CBC W/AUTO DIFF WBC: CPT

## 2024-06-03 PROCEDURE — 80048 BASIC METABOLIC PNL TOTAL CA: CPT

## 2024-06-03 PROCEDURE — 93970 EXTREMITY STUDY: CPT

## 2024-06-03 PROCEDURE — 97110 THERAPEUTIC EXERCISES: CPT

## 2024-06-03 PROCEDURE — 99221 1ST HOSP IP/OBS SF/LOW 40: CPT | Performed by: INTERNAL MEDICINE

## 2024-06-03 PROCEDURE — 51798 US URINE CAPACITY MEASURE: CPT

## 2024-06-03 PROCEDURE — 99233 SBSQ HOSP IP/OBS HIGH 50: CPT | Performed by: PHYSICAL MEDICINE & REHABILITATION

## 2024-06-03 RX ORDER — FUROSEMIDE 40 MG/1
40 TABLET ORAL 2 TIMES DAILY
Status: COMPLETED | OUTPATIENT
Start: 2024-06-03 | End: 2024-06-04

## 2024-06-03 RX ORDER — FUROSEMIDE 10 MG/ML
20 INJECTION INTRAMUSCULAR; INTRAVENOUS 2 TIMES DAILY
Status: DISCONTINUED | OUTPATIENT
Start: 2024-06-03 | End: 2024-06-03

## 2024-06-03 RX ORDER — CHLORTHALIDONE 25 MG/1
25 TABLET ORAL DAILY
Status: DISCONTINUED | OUTPATIENT
Start: 2024-06-03 | End: 2024-06-06 | Stop reason: HOSPADM

## 2024-06-03 RX ORDER — UBIDECARENONE 100 MG
100 CAPSULE ORAL DAILY
Qty: 120 CAPSULE | Refills: 3 | Status: SHIPPED | OUTPATIENT
Start: 2024-06-04

## 2024-06-03 RX ORDER — VALSARTAN 80 MG/1
80 TABLET ORAL 3 TIMES DAILY
Status: DISCONTINUED | OUTPATIENT
Start: 2024-06-03 | End: 2024-06-05

## 2024-06-03 RX ORDER — VALSARTAN 80 MG/1
80 TABLET ORAL 2 TIMES DAILY
Status: DISCONTINUED | OUTPATIENT
Start: 2024-06-03 | End: 2024-06-03

## 2024-06-03 RX ORDER — DILTIAZEM HYDROCHLORIDE 60 MG/1
90 TABLET, FILM COATED ORAL EVERY 12 HOURS SCHEDULED
Status: DISCONTINUED | OUTPATIENT
Start: 2024-06-03 | End: 2024-06-06 | Stop reason: HOSPADM

## 2024-06-03 RX ORDER — CYCLOBENZAPRINE HCL 5 MG
5 TABLET ORAL 3 TIMES DAILY PRN
Qty: 60 TABLET | Refills: 1 | Status: SHIPPED | OUTPATIENT
Start: 2024-06-03 | End: 2024-07-13

## 2024-06-03 RX ORDER — NITROFURANTOIN 25; 75 MG/1; MG/1
100 CAPSULE ORAL EVERY 12 HOURS SCHEDULED
Qty: 11 CAPSULE | Refills: 0 | Status: SHIPPED | OUTPATIENT
Start: 2024-06-03 | End: 2024-06-09

## 2024-06-03 RX ADMIN — POLYETHYLENE GLYCOL 3350 17 G: 17 POWDER, FOR SOLUTION ORAL at 09:01

## 2024-06-03 RX ADMIN — VALSARTAN 80 MG: 80 TABLET, FILM COATED ORAL at 20:17

## 2024-06-03 RX ADMIN — SENNOSIDES AND DOCUSATE SODIUM 1 TABLET: 50; 8.6 TABLET ORAL at 09:00

## 2024-06-03 RX ADMIN — DOXAZOSIN 2 MG: 2 TABLET ORAL at 20:17

## 2024-06-03 RX ADMIN — Medication 1000 UNITS: at 09:01

## 2024-06-03 RX ADMIN — PANTOPRAZOLE SODIUM 40 MG: 40 TABLET, DELAYED RELEASE ORAL at 05:35

## 2024-06-03 RX ADMIN — VALSARTAN 80 MG: 80 TABLET, FILM COATED ORAL at 13:49

## 2024-06-03 RX ADMIN — CHLORTHALIDONE 25 MG: 25 TABLET ORAL at 17:32

## 2024-06-03 RX ADMIN — DOCUSATE SODIUM 100 MG: 100 CAPSULE, LIQUID FILLED ORAL at 09:01

## 2024-06-03 RX ADMIN — DILTIAZEM HYDROCHLORIDE 90 MG: 60 TABLET ORAL at 20:15

## 2024-06-03 RX ADMIN — FUROSEMIDE 40 MG: 40 TABLET ORAL at 17:32

## 2024-06-03 RX ADMIN — ASPIRIN 81 MG: 81 TABLET, COATED ORAL at 20:20

## 2024-06-03 RX ADMIN — NITROFURANTOIN MONOHYDRATE/MACROCRYSTALS 100 MG: 75; 25 CAPSULE ORAL at 09:00

## 2024-06-03 RX ADMIN — OXYCODONE HYDROCHLORIDE 10 MG: 10 TABLET, FILM COATED, EXTENDED RELEASE ORAL at 04:09

## 2024-06-03 RX ADMIN — Medication 1000 UNITS: at 20:20

## 2024-06-03 RX ADMIN — ASPIRIN 81 MG: 81 TABLET, COATED ORAL at 08:59

## 2024-06-03 RX ADMIN — DILTIAZEM HYDROCHLORIDE 60 MG: 60 TABLET ORAL at 04:09

## 2024-06-03 RX ADMIN — DOCUSATE SODIUM 100 MG: 100 CAPSULE, LIQUID FILLED ORAL at 20:19

## 2024-06-03 RX ADMIN — GABAPENTIN 300 MG: 300 CAPSULE ORAL at 20:19

## 2024-06-03 RX ADMIN — Medication 100 MG: at 09:00

## 2024-06-03 RX ADMIN — OXYCODONE 10 MG: 5 TABLET ORAL at 09:44

## 2024-06-03 RX ADMIN — NITROFURANTOIN MONOHYDRATE/MACROCRYSTALS 100 MG: 75; 25 CAPSULE ORAL at 20:18

## 2024-06-03 RX ADMIN — FUROSEMIDE 20 MG: 20 TABLET ORAL at 09:00

## 2024-06-03 RX ADMIN — ALUMINUM HYDROXIDE, MAGNESIUM HYDROXIDE, AND SIMETHICONE 15 ML: 1200; 120; 1200 SUSPENSION ORAL at 09:06

## 2024-06-03 RX ADMIN — SENNOSIDES AND DOCUSATE SODIUM 1 TABLET: 50; 8.6 TABLET ORAL at 20:17

## 2024-06-03 RX ADMIN — OXYCODONE HYDROCHLORIDE 10 MG: 10 TABLET, FILM COATED, EXTENDED RELEASE ORAL at 20:22

## 2024-06-03 RX ADMIN — ATORVASTATIN CALCIUM 10 MG: 10 TABLET, FILM COATED ORAL at 20:19

## 2024-06-03 RX ADMIN — OXYCODONE HYDROCHLORIDE 10 MG: 10 TABLET, FILM COATED, EXTENDED RELEASE ORAL at 13:49

## 2024-06-03 ASSESSMENT — PAIN DESCRIPTION - LOCATION
LOCATION: BACK;GROIN;LEG;KNEE
LOCATION: KNEE;LEG;GROIN
LOCATION: KNEE;LEG;GROIN

## 2024-06-03 ASSESSMENT — PAIN SCALES - GENERAL
PAINLEVEL_OUTOF10: 7
PAINLEVEL_OUTOF10: 8
PAINLEVEL_OUTOF10: 8
PAINLEVEL_OUTOF10: 7

## 2024-06-03 ASSESSMENT — PAIN DESCRIPTION - DESCRIPTORS
DESCRIPTORS: ACHING
DESCRIPTORS: SHARP
DESCRIPTORS: ACHING

## 2024-06-03 ASSESSMENT — ENCOUNTER SYMPTOMS
CHEST TIGHTNESS: 0
SHORTNESS OF BREATH: 0

## 2024-06-03 ASSESSMENT — PAIN DESCRIPTION - ORIENTATION
ORIENTATION: RIGHT
ORIENTATION: RIGHT;LOWER
ORIENTATION: RIGHT;LOWER

## 2024-06-03 NOTE — FLOWSHEET NOTE
Patient alert, oriented and cooperative. Complains of right knee and leg pain, pain medications moderately effective. Edema noted to bilateral legs. TRISHA hose on. Patient to radiology for ultrasound. No results at this time. Diuretic medication adjustments made today. Denies any further needs or complaints at this time. Call light within reach.

## 2024-06-03 NOTE — CONSULTS
[facility]                   [address] [place] [address]                              CONSULTATION      PATIENT NAME: YESSICA GUTIERREZ           : 1955  MED REC NO: [id number]                        ROOM: R233  ACCOUNT NO: [id number]                       ADMIT DATE: 2024  PROVIDER: Clay Pang DO    RENAL CONSULT    CONSULT DATE:  2024      HISTORY OF PRESENT ILLNESS:  68-year-old who is being seen for azotemia.  The patient has a history of rheumatoid arthritis.  She recently underwent a right total knee replacement at [facility] on 2024.  The patient had been using large amounts of nonsteroidal anti-inflammatory medication prescribed by Dr. Morley.  While she was in the hospital, the patient was told of change in renal function.  The patient attributed that to her use of arthritis medication.  On admission, her creatinine was 1.10 with a GFR of 54 mL/min, and she had a hemoglobin 8.8 g.  Her urinalysis was planned for blood, protein, glucose and ketones.  She denies any gross hematuria or dysuria.  No frequent urinary tract infections or kidney stones.    ALLERGIES:  TO MEDICATIONS, BACTRIM, LASIX, IMDUR, LISINOPRIL, NORVASC, RANEXA, AND KEFLEX.      PAST SURGICAL HISTORY:  Right knee replacement, hysterectomy, upper and lower endoscopies, caudal blocks, cardiac catheterization and cardioversion.    PAST SOCIAL HISTORY:  .  No drugs.  No alcohol.  No smoking.    MEDICATIONS:  At time of her admission to the hospital was Cozaar, Colace, MiraLAX, melatonin, sodium bicarb, diltiazem, Neurontin, amoxicillin, ***, Voltaren gel, Lipitor, Prilosec, Plaquenil.    PAST MEDICAL HISTORY:  Rheumatoid arthritis, DJD, hypertension, CKD 3A.    PHYSICAL EXAMINATION:  VITAL SIGNS:  5 feet 7 inches, 221 pounds.  Blood pressure is 158/60, heart rate 70, respirations 16, afebrile.  HEENT:  Normocephalic.  Pupils are reactive to light.  Extraocular 
(Non-Medical): No   Physical Activity: Inactive (5/14/2024)    Exercise Vital Sign     Days of Exercise per Week: 0 days     Minutes of Exercise per Session: 0 min   Stress: Stress Concern Present (11/21/2019)    [employer] - Occupational Stress Questionnaire     Feeling of Stress : Rather much   Social Connections: Moderately Integrated (11/21/2019)    Social Connection and Isolation Panel [NHANES]     Frequency of Communication with Friends and Family: More than three times a week     Frequency of Social Gatherings with Friends and Family: More than three times a week     Attends [employer] Services: More than 4 times per year     Active Member of Clubs or Organizations: No     Attends Club or Organization Meetings: Never     Marital Status:    Intimate Partner Violence: Not At Risk (11/21/2019)    Humiliation, Afraid, Rape, and Kick questionnaire     Fear of Current or Ex-Partner: No     Emotionally Abused: No     Physically Abused: No     Sexually Abused: No   Housing Stability: Low Risk  (5/29/2024)    Housing Stability Vital Sign     Unable to Pay for Housing in the Last Year: No     Number of Places Lived in the Last Year: 1     Unstable Housing in the Last Year: No       Family History   Problem Relation Age of Onset    Heart Disease Father     High Cholesterol Father     High Blood Pressure Father     Stroke Mother     High Blood Pressure Mother     High Blood Pressure Brother        Current Facility-Administered Medications   Medication Dose Route Frequency Provider Last Rate Last Admin    valsartan (DIOVAN) tablet 80 mg  80 mg Oral BID Clay Pang DO        dilTIAZem (CARDIZEM) tablet 90 mg  90 mg Oral 2 times per day Clay Pang DO        furosemide (LASIX) tablet 20 mg  20 mg Oral BID Hazel Rowan APRN - CNP   20 mg at 06/03/24 0900    nitrofurantoin (macrocrystal-monohydrate) (MACROBID) capsule 100 mg  100 mg Oral 2 times per day Eduardo

## 2024-06-03 NOTE — FLOWSHEET NOTE
Patient assessment complete. C/o 6/10 pain to right knee. Scheduled OxyContin given along with other HS scheduled medications. Ice applied. Patient SBA with FWW to restroom, returned to bed. Bed locked and low with alarm engaged. Call light within reach. Electronically signed by Marian Vargas RN on 6/2/2024 at 9:34 PM

## 2024-06-03 NOTE — PLAN OF CARE
Problem: Safety - Adult  Goal: Free from fall injury  6/2/2024 2153 by Marian Vargas, RN  Outcome: Progressing     Problem: Discharge Planning  Goal: Discharge to home or other facility with appropriate resources  6/2/2024 2153 by Marian Vargas, RN  Outcome: Progressing     Problem: ABCDS Injury Assessment  Goal: Absence of physical injury  6/2/2024 2153 by Marian Vargas, RN  Outcome: Progressing

## 2024-06-04 PROBLEM — R60.0 LOCALIZED EDEMA: Status: ACTIVE | Noted: 2024-06-04

## 2024-06-04 LAB
ANION GAP SERPL CALCULATED.3IONS-SCNC: 10 MEQ/L (ref 9–15)
BASOPHILS # BLD: 0 K/UL (ref 0–0.2)
BASOPHILS NFR BLD: 0.9 %
BUN SERPL-MCNC: 14 MG/DL (ref 8–23)
CALCIUM SERPL-MCNC: 8.4 MG/DL (ref 8.5–9.9)
CHLORIDE SERPL-SCNC: 96 MEQ/L (ref 95–107)
CO2 SERPL-SCNC: 25 MEQ/L (ref 20–31)
CREAT SERPL-MCNC: 1.09 MG/DL (ref 0.5–0.9)
EOSINOPHIL # BLD: 0.1 K/UL (ref 0–0.7)
EOSINOPHIL NFR BLD: 3.2 %
ERYTHROCYTE [DISTWIDTH] IN BLOOD BY AUTOMATED COUNT: 14.5 % (ref 11.5–14.5)
FERRITIN: 103 NG/ML (ref 13–150)
FOLATE: 14.3 NG/ML (ref 4.8–24.2)
GLUCOSE SERPL-MCNC: 102 MG/DL (ref 70–99)
HCT VFR BLD AUTO: 25.2 % (ref 37–47)
HGB BLD-MCNC: 8.1 G/DL (ref 12–16)
IRON % SATURATION: 8 % (ref 20–55)
IRON: 22 UG/DL (ref 37–145)
LYMPHOCYTES # BLD: 0.9 K/UL (ref 1–4.8)
LYMPHOCYTES NFR BLD: 19.5 %
MCH RBC QN AUTO: 27.3 PG (ref 27–31.3)
MCHC RBC AUTO-ENTMCNC: 32.1 % (ref 33–37)
MCV RBC AUTO: 84.8 FL (ref 79.4–94.8)
MONOCYTES # BLD: 0.7 K/UL (ref 0.2–0.8)
MONOCYTES NFR BLD: 15.6 %
NEUTROPHILS # BLD: 2.6 K/UL (ref 1.4–6.5)
NEUTS SEG NFR BLD: 60.3 %
PLATELET # BLD AUTO: 225 K/UL (ref 130–400)
POTASSIUM SERPL-SCNC: 4.1 MEQ/L (ref 3.4–4.9)
POTASSIUM SERPL-SCNC: 4.1 MEQ/L (ref 3.4–4.9)
RBC # BLD AUTO: 2.97 M/UL (ref 4.2–5.4)
SODIUM SERPL-SCNC: 131 MEQ/L (ref 135–144)
TOTAL IRON BINDING CAPACITY: 290 UG/DL (ref 250–450)
UNSATURATED IRON BINDING CAPACITY: 268 UG/DL (ref 112–347)
VITAMIN B-12: >2000 PG/ML (ref 232–1245)
WBC # BLD AUTO: 4.4 K/UL (ref 4.8–10.8)

## 2024-06-04 PROCEDURE — 97535 SELF CARE MNGMENT TRAINING: CPT

## 2024-06-04 PROCEDURE — 6370000000 HC RX 637 (ALT 250 FOR IP): Performed by: PHYSICAL MEDICINE & REHABILITATION

## 2024-06-04 PROCEDURE — 80048 BASIC METABOLIC PNL TOTAL CA: CPT

## 2024-06-04 PROCEDURE — 86200 CCP ANTIBODY: CPT

## 2024-06-04 PROCEDURE — 82746 ASSAY OF FOLIC ACID SERUM: CPT

## 2024-06-04 PROCEDURE — 82607 VITAMIN B-12: CPT

## 2024-06-04 PROCEDURE — 97530 THERAPEUTIC ACTIVITIES: CPT

## 2024-06-04 PROCEDURE — 6370000000 HC RX 637 (ALT 250 FOR IP)

## 2024-06-04 PROCEDURE — 97116 GAIT TRAINING THERAPY: CPT

## 2024-06-04 PROCEDURE — 99233 SBSQ HOSP IP/OBS HIGH 50: CPT | Performed by: INTERNAL MEDICINE

## 2024-06-04 PROCEDURE — 99232 SBSQ HOSP IP/OBS MODERATE 35: CPT | Performed by: PHYSICAL MEDICINE & REHABILITATION

## 2024-06-04 PROCEDURE — 36415 COLL VENOUS BLD VENIPUNCTURE: CPT

## 2024-06-04 PROCEDURE — 82728 ASSAY OF FERRITIN: CPT

## 2024-06-04 PROCEDURE — 6370000000 HC RX 637 (ALT 250 FOR IP): Performed by: NURSE PRACTITIONER

## 2024-06-04 PROCEDURE — 97110 THERAPEUTIC EXERCISES: CPT

## 2024-06-04 PROCEDURE — 6370000000 HC RX 637 (ALT 250 FOR IP): Performed by: INTERNAL MEDICINE

## 2024-06-04 PROCEDURE — 83540 ASSAY OF IRON: CPT

## 2024-06-04 PROCEDURE — 1180000000 HC REHAB R&B

## 2024-06-04 PROCEDURE — 83550 IRON BINDING TEST: CPT

## 2024-06-04 PROCEDURE — 85025 COMPLETE CBC W/AUTO DIFF WBC: CPT

## 2024-06-04 RX ORDER — FUROSEMIDE 20 MG/1
20 TABLET ORAL 2 TIMES DAILY
Status: DISCONTINUED | OUTPATIENT
Start: 2024-06-05 | End: 2024-06-06

## 2024-06-04 RX ADMIN — ASPIRIN 81 MG: 81 TABLET, COATED ORAL at 08:12

## 2024-06-04 RX ADMIN — OXYCODONE HYDROCHLORIDE 10 MG: 10 TABLET, FILM COATED, EXTENDED RELEASE ORAL at 13:59

## 2024-06-04 RX ADMIN — GABAPENTIN 300 MG: 300 CAPSULE ORAL at 20:44

## 2024-06-04 RX ADMIN — Medication 1000 UNITS: at 20:45

## 2024-06-04 RX ADMIN — NITROFURANTOIN MONOHYDRATE/MACROCRYSTALS 100 MG: 75; 25 CAPSULE ORAL at 08:13

## 2024-06-04 RX ADMIN — FUROSEMIDE 40 MG: 40 TABLET ORAL at 08:12

## 2024-06-04 RX ADMIN — DILTIAZEM HYDROCHLORIDE 90 MG: 60 TABLET ORAL at 08:13

## 2024-06-04 RX ADMIN — VALSARTAN 80 MG: 80 TABLET, FILM COATED ORAL at 08:13

## 2024-06-04 RX ADMIN — Medication 1000 UNITS: at 08:12

## 2024-06-04 RX ADMIN — FUROSEMIDE 40 MG: 40 TABLET ORAL at 17:03

## 2024-06-04 RX ADMIN — VALSARTAN 80 MG: 80 TABLET, FILM COATED ORAL at 13:59

## 2024-06-04 RX ADMIN — DOXAZOSIN 2 MG: 2 TABLET ORAL at 20:45

## 2024-06-04 RX ADMIN — OXYCODONE 10 MG: 5 TABLET ORAL at 03:30

## 2024-06-04 RX ADMIN — PANTOPRAZOLE SODIUM 40 MG: 40 TABLET, DELAYED RELEASE ORAL at 05:44

## 2024-06-04 RX ADMIN — ASPIRIN 81 MG: 81 TABLET, COATED ORAL at 20:45

## 2024-06-04 RX ADMIN — DOCUSATE SODIUM 100 MG: 100 CAPSULE, LIQUID FILLED ORAL at 08:13

## 2024-06-04 RX ADMIN — OXYCODONE HYDROCHLORIDE 10 MG: 10 TABLET, FILM COATED, EXTENDED RELEASE ORAL at 05:44

## 2024-06-04 RX ADMIN — POLYETHYLENE GLYCOL 3350 17 G: 17 POWDER, FOR SOLUTION ORAL at 08:13

## 2024-06-04 RX ADMIN — NITROFURANTOIN MONOHYDRATE/MACROCRYSTALS 100 MG: 75; 25 CAPSULE ORAL at 20:45

## 2024-06-04 RX ADMIN — DOCUSATE SODIUM 100 MG: 100 CAPSULE, LIQUID FILLED ORAL at 20:45

## 2024-06-04 RX ADMIN — VALSARTAN 80 MG: 80 TABLET, FILM COATED ORAL at 20:44

## 2024-06-04 RX ADMIN — SENNOSIDES AND DOCUSATE SODIUM 1 TABLET: 50; 8.6 TABLET ORAL at 20:45

## 2024-06-04 RX ADMIN — DILTIAZEM HYDROCHLORIDE 90 MG: 60 TABLET ORAL at 20:42

## 2024-06-04 RX ADMIN — CHLORTHALIDONE 25 MG: 25 TABLET ORAL at 08:13

## 2024-06-04 RX ADMIN — SENNOSIDES AND DOCUSATE SODIUM 1 TABLET: 50; 8.6 TABLET ORAL at 08:12

## 2024-06-04 RX ADMIN — OXYCODONE HYDROCHLORIDE 10 MG: 10 TABLET, FILM COATED, EXTENDED RELEASE ORAL at 20:45

## 2024-06-04 RX ADMIN — OXYCODONE 10 MG: 5 TABLET ORAL at 08:12

## 2024-06-04 RX ADMIN — HYDRALAZINE HYDROCHLORIDE 25 MG: 25 TABLET, FILM COATED ORAL at 07:05

## 2024-06-04 RX ADMIN — ATORVASTATIN CALCIUM 10 MG: 10 TABLET, FILM COATED ORAL at 20:45

## 2024-06-04 RX ADMIN — Medication 100 MG: at 08:13

## 2024-06-04 ASSESSMENT — PAIN SCALES - GENERAL
PAINLEVEL_OUTOF10: 6
PAINLEVEL_OUTOF10: 6
PAINLEVEL_OUTOF10: 7
PAINLEVEL_OUTOF10: 3

## 2024-06-04 ASSESSMENT — PAIN DESCRIPTION - DESCRIPTORS
DESCRIPTORS: ACHING

## 2024-06-04 ASSESSMENT — PAIN DESCRIPTION - ORIENTATION
ORIENTATION: RIGHT

## 2024-06-04 ASSESSMENT — PAIN DESCRIPTION - LOCATION
LOCATION: KNEE;LEG;GROIN
LOCATION: KNEE
LOCATION: KNEE;LEG
LOCATION: KNEE;LEG

## 2024-06-04 ASSESSMENT — ENCOUNTER SYMPTOMS
SHORTNESS OF BREATH: 0
CHEST TIGHTNESS: 0

## 2024-06-04 NOTE — PLAN OF CARE
Problem: Safety - Adult  Goal: Free from fall injury  6/4/2024 0457 by Saskia Gregory RN  Outcome: Progressing  6/3/2024 1627 by Sandra Phelps RN  Outcome: Progressing     Problem: Discharge Planning  Goal: Discharge to home or other facility with appropriate resources  6/4/2024 0457 by Saskia Gregory RN  Outcome: Progressing  6/3/2024 1627 by Sandra Phelps RN  Outcome: Progressing     Problem: ABCDS Injury Assessment  Goal: Absence of physical injury  6/4/2024 0457 by Saskia Gregory RN  Outcome: Progressing  6/3/2024 1627 by Sandra Phelsp RN  Outcome: Progressing     Problem: Pain  Goal: Verbalizes/displays adequate comfort level or baseline comfort level  6/4/2024 0457 by Saskia Gregory RN  Outcome: Progressing  6/3/2024 1627 by Sandra Phelps RN  Outcome: Progressing

## 2024-06-04 NOTE — FLOWSHEET NOTE
Patient alert, oriented and cooperative. Complains of right groin, knee and leg pain, pain medications effective. Aquacell dressing removed from right knee incision. Incision WDL, no signs or symptoms of infection noted, no drainage, well approximated. Denies any further needs or complaints at this time. Call light within reach.

## 2024-06-04 NOTE — PLAN OF CARE
Problem: Safety - Adult  Goal: Free from fall injury  6/4/2024 1231 by Sandra Phelps RN  Outcome: Progressing  6/4/2024 0457 by Saskia Gregory RN  Outcome: Progressing     Problem: Discharge Planning  Goal: Discharge to home or other facility with appropriate resources  6/4/2024 1231 by Sandra Phelps RN  Outcome: Progressing  6/4/2024 0457 by Saskia Gregory RN  Outcome: Progressing     Problem: ABCDS Injury Assessment  Goal: Absence of physical injury  6/4/2024 1231 by Sandra Phelps RN  Outcome: Progressing  6/4/2024 0457 by Saskia Gregory RN  Outcome: Progressing     Problem: Pain  Goal: Verbalizes/displays adequate comfort level or baseline comfort level  6/4/2024 1231 by Sandra Phelps RN  Outcome: Progressing  6/4/2024 0457 by Saskia Gregory RN  Outcome: Progressing

## 2024-06-05 LAB
ANION GAP SERPL CALCULATED.3IONS-SCNC: 14 MEQ/L (ref 9–15)
BASOPHILS # BLD: 0 K/UL (ref 0–0.2)
BASOPHILS NFR BLD: 0.8 %
BUN SERPL-MCNC: 15 MG/DL (ref 8–23)
CALCIUM SERPL-MCNC: 8.9 MG/DL (ref 8.5–9.9)
CHLORIDE SERPL-SCNC: 95 MEQ/L (ref 95–107)
CO2 SERPL-SCNC: 24 MEQ/L (ref 20–31)
CREAT SERPL-MCNC: 1.27 MG/DL (ref 0.5–0.9)
EOSINOPHIL # BLD: 0.2 K/UL (ref 0–0.7)
EOSINOPHIL NFR BLD: 4.9 %
ERYTHROCYTE [DISTWIDTH] IN BLOOD BY AUTOMATED COUNT: 14.3 % (ref 11.5–14.5)
GLUCOSE SERPL-MCNC: 100 MG/DL (ref 70–99)
HCT VFR BLD AUTO: 27.2 % (ref 37–47)
HGB BLD-MCNC: 8.9 G/DL (ref 12–16)
LYMPHOCYTES # BLD: 1 K/UL (ref 1–4.8)
LYMPHOCYTES NFR BLD: 20.4 %
MCH RBC QN AUTO: 27.7 PG (ref 27–31.3)
MCHC RBC AUTO-ENTMCNC: 32.7 % (ref 33–37)
MCV RBC AUTO: 84.7 FL (ref 79.4–94.8)
MONOCYTES # BLD: 0.6 K/UL (ref 0.2–0.8)
MONOCYTES NFR BLD: 13.6 %
NEUTROPHILS # BLD: 2.8 K/UL (ref 1.4–6.5)
NEUTS SEG NFR BLD: 60.1 %
PLATELET # BLD AUTO: 237 K/UL (ref 130–400)
POTASSIUM SERPL-SCNC: 3.8 MEQ/L (ref 3.4–4.9)
RBC # BLD AUTO: 3.21 M/UL (ref 4.2–5.4)
SODIUM SERPL-SCNC: 133 MEQ/L (ref 135–144)
WBC # BLD AUTO: 4.7 K/UL (ref 4.8–10.8)

## 2024-06-05 PROCEDURE — 99232 SBSQ HOSP IP/OBS MODERATE 35: CPT | Performed by: INTERNAL MEDICINE

## 2024-06-05 PROCEDURE — 97535 SELF CARE MNGMENT TRAINING: CPT

## 2024-06-05 PROCEDURE — 6370000000 HC RX 637 (ALT 250 FOR IP): Performed by: NURSE PRACTITIONER

## 2024-06-05 PROCEDURE — 6370000000 HC RX 637 (ALT 250 FOR IP): Performed by: INTERNAL MEDICINE

## 2024-06-05 PROCEDURE — 93005 ELECTROCARDIOGRAM TRACING: CPT

## 2024-06-05 PROCEDURE — 80048 BASIC METABOLIC PNL TOTAL CA: CPT

## 2024-06-05 PROCEDURE — 97116 GAIT TRAINING THERAPY: CPT

## 2024-06-05 PROCEDURE — 2580000003 HC RX 258: Performed by: INTERNAL MEDICINE

## 2024-06-05 PROCEDURE — 6360000002 HC RX W HCPCS: Performed by: PHYSICAL MEDICINE & REHABILITATION

## 2024-06-05 PROCEDURE — 36415 COLL VENOUS BLD VENIPUNCTURE: CPT

## 2024-06-05 PROCEDURE — 85025 COMPLETE CBC W/AUTO DIFF WBC: CPT

## 2024-06-05 PROCEDURE — 6370000000 HC RX 637 (ALT 250 FOR IP): Performed by: PHYSICAL MEDICINE & REHABILITATION

## 2024-06-05 PROCEDURE — 99232 SBSQ HOSP IP/OBS MODERATE 35: CPT | Performed by: PHYSICAL MEDICINE & REHABILITATION

## 2024-06-05 PROCEDURE — 6360000002 HC RX W HCPCS: Performed by: INTERNAL MEDICINE

## 2024-06-05 PROCEDURE — 97110 THERAPEUTIC EXERCISES: CPT

## 2024-06-05 PROCEDURE — 6370000000 HC RX 637 (ALT 250 FOR IP)

## 2024-06-05 PROCEDURE — 1180000000 HC REHAB R&B

## 2024-06-05 RX ORDER — VALSARTAN 80 MG/1
80 TABLET ORAL 2 TIMES DAILY
Status: DISCONTINUED | OUTPATIENT
Start: 2024-06-05 | End: 2024-06-06

## 2024-06-05 RX ORDER — SODIUM CHLORIDE 9 MG/ML
INJECTION, SOLUTION INTRAVENOUS CONTINUOUS
Status: DISPENSED | OUTPATIENT
Start: 2024-06-05 | End: 2024-06-05

## 2024-06-05 RX ORDER — ENOXAPARIN SODIUM 100 MG/ML
40 INJECTION SUBCUTANEOUS DAILY
Status: DISCONTINUED | OUTPATIENT
Start: 2024-06-05 | End: 2024-06-06 | Stop reason: HOSPADM

## 2024-06-05 RX ORDER — ASPIRIN 81 MG/1
81 TABLET ORAL 2 TIMES DAILY
Qty: 40 TABLET | Refills: 0 | Status: SHIPPED | OUTPATIENT
Start: 2024-06-05

## 2024-06-05 RX ADMIN — PANTOPRAZOLE SODIUM 40 MG: 40 TABLET, DELAYED RELEASE ORAL at 05:40

## 2024-06-05 RX ADMIN — SODIUM CHLORIDE: 9 INJECTION, SOLUTION INTRAVENOUS at 18:59

## 2024-06-05 RX ADMIN — SODIUM CHLORIDE 300 MG: 9 INJECTION, SOLUTION INTRAVENOUS at 16:29

## 2024-06-05 RX ADMIN — VALSARTAN 80 MG: 80 TABLET ORAL at 20:32

## 2024-06-05 RX ADMIN — DOCUSATE SODIUM 100 MG: 100 CAPSULE, LIQUID FILLED ORAL at 08:27

## 2024-06-05 RX ADMIN — NITROFURANTOIN MONOHYDRATE/MACROCRYSTALS 100 MG: 75; 25 CAPSULE ORAL at 20:33

## 2024-06-05 RX ADMIN — SENNOSIDES AND DOCUSATE SODIUM 1 TABLET: 50; 8.6 TABLET ORAL at 20:30

## 2024-06-05 RX ADMIN — NITROFURANTOIN MONOHYDRATE/MACROCRYSTALS 100 MG: 75; 25 CAPSULE ORAL at 08:29

## 2024-06-05 RX ADMIN — GABAPENTIN 300 MG: 300 CAPSULE ORAL at 20:33

## 2024-06-05 RX ADMIN — DILTIAZEM HYDROCHLORIDE 90 MG: 60 TABLET ORAL at 08:31

## 2024-06-05 RX ADMIN — OXYCODONE 10 MG: 5 TABLET ORAL at 08:26

## 2024-06-05 RX ADMIN — Medication 100 MG: at 08:28

## 2024-06-05 RX ADMIN — ENOXAPARIN SODIUM 40 MG: 100 INJECTION SUBCUTANEOUS at 12:02

## 2024-06-05 RX ADMIN — DILTIAZEM HYDROCHLORIDE 90 MG: 60 TABLET ORAL at 20:30

## 2024-06-05 RX ADMIN — ASPIRIN 81 MG: 81 TABLET, COATED ORAL at 20:32

## 2024-06-05 RX ADMIN — ATORVASTATIN CALCIUM 10 MG: 10 TABLET, FILM COATED ORAL at 20:30

## 2024-06-05 RX ADMIN — SENNOSIDES AND DOCUSATE SODIUM 1 TABLET: 50; 8.6 TABLET ORAL at 08:30

## 2024-06-05 RX ADMIN — ASPIRIN 81 MG: 81 TABLET, COATED ORAL at 08:29

## 2024-06-05 RX ADMIN — DOCUSATE SODIUM 100 MG: 100 CAPSULE, LIQUID FILLED ORAL at 20:32

## 2024-06-05 RX ADMIN — Medication 1000 UNITS: at 08:29

## 2024-06-05 RX ADMIN — OXYCODONE HYDROCHLORIDE 10 MG: 10 TABLET, FILM COATED, EXTENDED RELEASE ORAL at 05:41

## 2024-06-05 RX ADMIN — HYDRALAZINE HYDROCHLORIDE 25 MG: 25 TABLET, FILM COATED ORAL at 06:31

## 2024-06-05 RX ADMIN — OXYCODONE 10 MG: 5 TABLET ORAL at 13:46

## 2024-06-05 RX ADMIN — FUROSEMIDE 20 MG: 20 TABLET ORAL at 17:03

## 2024-06-05 RX ADMIN — OXYCODONE 10 MG: 5 TABLET ORAL at 20:36

## 2024-06-05 RX ADMIN — OXYCODONE HYDROCHLORIDE 10 MG: 10 TABLET, FILM COATED, EXTENDED RELEASE ORAL at 17:03

## 2024-06-05 RX ADMIN — OXYCODONE HYDROCHLORIDE 10 MG: 10 TABLET, FILM COATED, EXTENDED RELEASE ORAL at 23:44

## 2024-06-05 RX ADMIN — OXYCODONE 10 MG: 5 TABLET ORAL at 04:03

## 2024-06-05 RX ADMIN — POLYETHYLENE GLYCOL 3350 17 G: 17 POWDER, FOR SOLUTION ORAL at 08:28

## 2024-06-05 RX ADMIN — Medication 1000 UNITS: at 20:32

## 2024-06-05 RX ADMIN — FUROSEMIDE 20 MG: 20 TABLET ORAL at 08:34

## 2024-06-05 ASSESSMENT — PAIN DESCRIPTION - LOCATION
LOCATION: LEG
LOCATION: KNEE
LOCATION: KNEE;LEG
LOCATION: KNEE
LOCATION: KNEE

## 2024-06-05 ASSESSMENT — PAIN DESCRIPTION - ORIENTATION
ORIENTATION: RIGHT

## 2024-06-05 ASSESSMENT — PAIN SCALES - GENERAL
PAINLEVEL_OUTOF10: 2
PAINLEVEL_OUTOF10: 8
PAINLEVEL_OUTOF10: 8
PAINLEVEL_OUTOF10: 7
PAINLEVEL_OUTOF10: 7
PAINLEVEL_OUTOF10: 3
PAINLEVEL_OUTOF10: 7

## 2024-06-05 ASSESSMENT — PAIN DESCRIPTION - DESCRIPTORS
DESCRIPTORS: ACHING

## 2024-06-05 ASSESSMENT — ENCOUNTER SYMPTOMS
CHEST TIGHTNESS: 0
SHORTNESS OF BREATH: 0

## 2024-06-05 NOTE — DISCHARGE INSTRUCTIONS
Follow up with  @PCP@ in the next 7 days or sooner if needed.    Please return to ER or call 911 if you develop any significant signs or symptoms.    I may or may not have addressed all of your symptoms, medical issues,  Illnesses, or all of the abnormal blood work or imaging therefore please ask your PCP and other specialists to obtain [facility] record entirely to follow up on all of your symptoms, illnesses, abnormal labs, imaging and findings that I have and have not addressed during your hospitalization.     Discharging you from the hospital does not mean that your medical care ends here and now. You may still need to have additional work up, investigation, monitoring, surveillance, and treatment to be handled from this point on by in the out patient setting by  out patient providers including your PCP, Specialists and other healthcare providers.     For medication questions, contact your retail pharmacy and your PCP.    Your medical team at [facility] appreciates the opportunity to work with you to get well!    Yosef Healy MD

## 2024-06-06 VITALS
TEMPERATURE: 97.7 F | RESPIRATION RATE: 16 BRPM | DIASTOLIC BLOOD PRESSURE: 49 MMHG | HEART RATE: 85 BPM | HEIGHT: 67 IN | BODY MASS INDEX: 33.45 KG/M2 | WEIGHT: 213.1 LBS | OXYGEN SATURATION: 94 % | SYSTOLIC BLOOD PRESSURE: 155 MMHG

## 2024-06-06 LAB
ANION GAP SERPL CALCULATED.3IONS-SCNC: 13 MEQ/L (ref 9–15)
BASOPHILS # BLD: 0 K/UL (ref 0–0.2)
BASOPHILS NFR BLD: 0.4 %
BUN SERPL-MCNC: 15 MG/DL (ref 8–23)
CALCIUM SERPL-MCNC: 9 MG/DL (ref 8.5–9.9)
CCP AB SER IA-ACNC: 0.7 U/ML (ref 0–7)
CHLORIDE SERPL-SCNC: 95 MEQ/L (ref 95–107)
CO2 SERPL-SCNC: 23 MEQ/L (ref 20–31)
CREAT SERPL-MCNC: 1.12 MG/DL (ref 0.5–0.9)
EKG ATRIAL RATE: 87 BPM
EKG P AXIS: 46 DEGREES
EKG P-R INTERVAL: 198 MS
EKG Q-T INTERVAL: 420 MS
EKG QRS DURATION: 118 MS
EKG QTC CALCULATION (BAZETT): 505 MS
EKG R AXIS: -41 DEGREES
EKG T AXIS: 40 DEGREES
EKG VENTRICULAR RATE: 87 BPM
EOSINOPHIL # BLD: 0.2 K/UL (ref 0–0.7)
EOSINOPHIL NFR BLD: 4.8 %
ERYTHROCYTE [DISTWIDTH] IN BLOOD BY AUTOMATED COUNT: 14.5 % (ref 11.5–14.5)
GLUCOSE SERPL-MCNC: 100 MG/DL (ref 70–99)
HCT VFR BLD AUTO: 26.3 % (ref 37–47)
HGB BLD-MCNC: 8.9 G/DL (ref 12–16)
LYMPHOCYTES # BLD: 1 K/UL (ref 1–4.8)
LYMPHOCYTES NFR BLD: 21.1 %
MAGNESIUM SERPL-MCNC: 1.8 MG/DL (ref 1.7–2.4)
MCH RBC QN AUTO: 28.3 PG (ref 27–31.3)
MCHC RBC AUTO-ENTMCNC: 33.8 % (ref 33–37)
MCV RBC AUTO: 83.5 FL (ref 79.4–94.8)
MONOCYTES # BLD: 0.7 K/UL (ref 0.2–0.8)
MONOCYTES NFR BLD: 14.6 %
NEUTROPHILS # BLD: 2.7 K/UL (ref 1.4–6.5)
NEUTS SEG NFR BLD: 58.9 %
PLATELET # BLD AUTO: 244 K/UL (ref 130–400)
POTASSIUM SERPL-SCNC: 3.5 MEQ/L (ref 3.4–4.9)
POTASSIUM SERPL-SCNC: 3.5 MEQ/L (ref 3.4–4.9)
RBC # BLD AUTO: 3.15 M/UL (ref 4.2–5.4)
SODIUM SERPL-SCNC: 131 MEQ/L (ref 135–144)
WBC # BLD AUTO: 4.6 K/UL (ref 4.8–10.8)

## 2024-06-06 PROCEDURE — 6370000000 HC RX 637 (ALT 250 FOR IP): Performed by: NURSE PRACTITIONER

## 2024-06-06 PROCEDURE — 97116 GAIT TRAINING THERAPY: CPT

## 2024-06-06 PROCEDURE — 99233 SBSQ HOSP IP/OBS HIGH 50: CPT | Performed by: INTERNAL MEDICINE

## 2024-06-06 PROCEDURE — 85025 COMPLETE CBC W/AUTO DIFF WBC: CPT

## 2024-06-06 PROCEDURE — 97530 THERAPEUTIC ACTIVITIES: CPT

## 2024-06-06 PROCEDURE — 6370000000 HC RX 637 (ALT 250 FOR IP): Performed by: INTERNAL MEDICINE

## 2024-06-06 PROCEDURE — 99239 HOSP IP/OBS DSCHRG MGMT >30: CPT | Performed by: PHYSICAL MEDICINE & REHABILITATION

## 2024-06-06 PROCEDURE — 80048 BASIC METABOLIC PNL TOTAL CA: CPT

## 2024-06-06 PROCEDURE — 6370000000 HC RX 637 (ALT 250 FOR IP): Performed by: PHYSICAL MEDICINE & REHABILITATION

## 2024-06-06 PROCEDURE — 83735 ASSAY OF MAGNESIUM: CPT

## 2024-06-06 PROCEDURE — 36415 COLL VENOUS BLD VENIPUNCTURE: CPT

## 2024-06-06 PROCEDURE — 6370000000 HC RX 637 (ALT 250 FOR IP)

## 2024-06-06 PROCEDURE — 97535 SELF CARE MNGMENT TRAINING: CPT

## 2024-06-06 PROCEDURE — 6360000002 HC RX W HCPCS: Performed by: PHYSICAL MEDICINE & REHABILITATION

## 2024-06-06 PROCEDURE — 97110 THERAPEUTIC EXERCISES: CPT

## 2024-06-06 RX ORDER — VALSARTAN 80 MG/1
240 TABLET ORAL DAILY
Qty: 30 TABLET | Refills: 3 | Status: SHIPPED | OUTPATIENT
Start: 2024-06-07 | End: 2024-06-07 | Stop reason: DRUGHIGH

## 2024-06-06 RX ORDER — FUROSEMIDE 40 MG/1
40 TABLET ORAL ONCE
Status: COMPLETED | OUTPATIENT
Start: 2024-06-06 | End: 2024-06-06

## 2024-06-06 RX ORDER — VALSARTAN 80 MG/1
80 TABLET ORAL DAILY
Qty: 30 TABLET | Refills: 3 | Status: SHIPPED | OUTPATIENT
Start: 2024-06-06 | End: 2024-06-06 | Stop reason: HOSPADM

## 2024-06-06 RX ORDER — CHLORTHALIDONE 25 MG/1
25 TABLET ORAL DAILY
Qty: 30 TABLET | Refills: 3 | Status: SHIPPED | OUTPATIENT
Start: 2024-06-07

## 2024-06-06 RX ORDER — VALSARTAN 80 MG/1
240 TABLET ORAL DAILY
Status: DISCONTINUED | OUTPATIENT
Start: 2024-06-07 | End: 2024-06-06 | Stop reason: HOSPADM

## 2024-06-06 RX ORDER — VALSARTAN 80 MG/1
80 TABLET ORAL ONCE
Status: DISCONTINUED | OUTPATIENT
Start: 2024-06-06 | End: 2024-06-06 | Stop reason: HOSPADM

## 2024-06-06 RX ADMIN — PANTOPRAZOLE SODIUM 40 MG: 40 TABLET, DELAYED RELEASE ORAL at 05:30

## 2024-06-06 RX ADMIN — ENOXAPARIN SODIUM 40 MG: 100 INJECTION SUBCUTANEOUS at 09:36

## 2024-06-06 RX ADMIN — HYDRALAZINE HYDROCHLORIDE 25 MG: 25 TABLET, FILM COATED ORAL at 05:29

## 2024-06-06 RX ADMIN — POLYETHYLENE GLYCOL 3350 17 G: 17 POWDER, FOR SOLUTION ORAL at 09:26

## 2024-06-06 RX ADMIN — DILTIAZEM HYDROCHLORIDE 90 MG: 60 TABLET ORAL at 09:34

## 2024-06-06 RX ADMIN — FUROSEMIDE 40 MG: 40 TABLET ORAL at 09:35

## 2024-06-06 RX ADMIN — NITROFURANTOIN MONOHYDRATE/MACROCRYSTALS 100 MG: 75; 25 CAPSULE ORAL at 09:29

## 2024-06-06 RX ADMIN — SENNOSIDES AND DOCUSATE SODIUM 1 TABLET: 50; 8.6 TABLET ORAL at 09:29

## 2024-06-06 RX ADMIN — Medication 1000 UNITS: at 09:30

## 2024-06-06 RX ADMIN — OXYCODONE HYDROCHLORIDE 10 MG: 10 TABLET, FILM COATED, EXTENDED RELEASE ORAL at 05:29

## 2024-06-06 RX ADMIN — VALSARTAN 80 MG: 80 TABLET ORAL at 12:14

## 2024-06-06 RX ADMIN — OXYCODONE 10 MG: 5 TABLET ORAL at 09:27

## 2024-06-06 RX ADMIN — OXYCODONE HYDROCHLORIDE 10 MG: 10 TABLET, FILM COATED, EXTENDED RELEASE ORAL at 14:08

## 2024-06-06 RX ADMIN — DOCUSATE SODIUM 100 MG: 100 CAPSULE, LIQUID FILLED ORAL at 09:30

## 2024-06-06 RX ADMIN — Medication 100 MG: at 09:30

## 2024-06-06 RX ADMIN — ASPIRIN 81 MG: 81 TABLET, COATED ORAL at 09:31

## 2024-06-06 RX ADMIN — CHLORTHALIDONE 25 MG: 25 TABLET ORAL at 12:14

## 2024-06-06 RX ADMIN — CYANOCOBALAMIN 1000 MCG: 1000 INJECTION INTRAMUSCULAR; SUBCUTANEOUS at 09:32

## 2024-06-06 ASSESSMENT — PAIN DESCRIPTION - ORIENTATION
ORIENTATION: RIGHT

## 2024-06-06 ASSESSMENT — PAIN DESCRIPTION - DESCRIPTORS
DESCRIPTORS: ACHING

## 2024-06-06 ASSESSMENT — ENCOUNTER SYMPTOMS
SHORTNESS OF BREATH: 0
CHEST TIGHTNESS: 0

## 2024-06-06 ASSESSMENT — PAIN SCALES - GENERAL
PAINLEVEL_OUTOF10: 8
PAINLEVEL_OUTOF10: 4
PAINLEVEL_OUTOF10: 2

## 2024-06-06 ASSESSMENT — PAIN DESCRIPTION - LOCATION
LOCATION: KNEE
LOCATION: KNEE;LEG
LOCATION: KNEE

## 2024-06-06 NOTE — DISCHARGE INSTR - DIET
Good nutrition is important when healing from an illness, injury, or surgery.  Follow any nutrition recommendations given to you during your hospital stay.   If you were given an oral nutrition supplement while in the hospital, continue to take this supplement at home.  You can take it with meals, in-between meals, and/or before bedtime. These supplements can be purchased at most local grocery stores, pharmacies, and chain [employer]-stores.   If you have any questions about your diet or nutrition, call the hospital and ask for the dietitian.    Regular diet with no added salt.

## 2024-06-06 NOTE — CARE COORDINATION
Case Management Initial Assessment        NAME:  Rayna Hdez  ROOM: R233/R233-01  :  1955  DATE: 2024        Social Functional:  Social/Functional History  Lives With: Spouse (one dog - 1yo border collie--Fariba (has fenced in yard))  Type of Home: House  Home Layout: One level (basement for storage)  Home Access: Stairs to enter with rails  Entrance Stairs - Rails: Right (grab bar on right , no AD on left)  Bathroom Shower/Tub: Walk-in shower;Shower chair with back;Curtain  Bathroom Toilet: Handicap height  Bathroom Equipment: Grab bars in shower;Shower chair;Hand-held shower  Bathroom Accessibility: Walker accessible  Home Equipment: Walker - Rolling;Cane;Wheelchair - Manual;Rollator;Electric scooter (transport chair, three wheeled scooter (hybrid))  Receives Help From: Family  ADL Assistance: Independent  Homemaking Assistance: Independent  Homemaking Responsibilities: Yes (spouse needs an ankle fusion)  Meal Prep Responsibility:  (alternates each day with spouse)  Laundry Responsibility: Primary (first floor)  Cleaning Responsibility: Primary  Bill Paying/Finance Responsibility: No (spouse manages online)  Shopping Responsibility:  (both complete)  Health Care Management: Primary (uses pill organizer)  Ambulation Assistance: Independent (cane inside home, has transport wheelchair with multiple medical deficits and extensive surgical history)  Transfer Assistance: Independent  Active : Yes  Mode of Transportation: SUV  Education: associates degree in dental hygiene  Occupation: Retired  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 Patient questions answered appropriately. Explained discharge process. Patient stated understanding. Pt graduated with her Associates Degree in Dental Hygiene and retired from being a Dental Hygienist 
CM had pt fill out the survey. Pt stated she is ready to go home. Electronically signed by Kailyn Ramirez RN on 6/6/2024 at 12:38 PM   
LSW met with pt and discussed upcoming discharge, pt confirmed that she feels ready to discharge on 6/6. Family training scheduled for 6/5 at 9AM. Given freedom of choice, pt chose [facility] [address]. Orders were placed. No new DME needed. Electronically signed by ALEXIA Centeno, FELIPE on 6/4/2024 at 5:22 PM    
LSW met with pt and updated her with projected discharge date of 6/6 and discussed goals, pt in agreement. HHC vs OP was discussed and pt would like OP. She would like more time to choose where she will want to complete OP therapy and is debating between [facility]. Electronically signed by ALEXIA Centeno, FELIPE on 5/31/2024 at 5:40 PM    
(on therapy mat) (05/31/24 1123)  Roll Left  Assistance Level: Stand by assist (05/31/24 1123)  Roll Right  Assistance Level: Stand by assist (05/31/24 1123)  Sit to Supine  Assistance Level: Stand by assist (05/31/24 1123)  Skilled Clinical Factors: vc's for technique and sequencing, increased time and effort (05/31/24 1123)  Supine to Sit  Assistance Level: Stand by assist (05/31/24 1123)  Skilled Clinical Factors: good technique and facilation (05/31/24 1123)  Scooting  Assistance Level: Stand by assist (05/31/24 1123)  Skilled Clinical Factors: scooting towards EOM (05/31/24 1123)  Gait:   Ambulation  WB Status: WBAT (05/30/24 1145)  Ambulation  Surface: Level tile (05/30/24 1145)  Device: No Device (05/30/24 1145)  Assistance: Minimal assistance (05/30/24 1145)  Quality of Gait: step to pattern, decreased upright trunk, decreased RLE weight acceptance, decreased effectiveness of UE support (05/30/24 1145)  Distance: 10 feet; 50 feet with 2 turns (05/30/24 1145)  Ambulation  Surface: Level surface (05/31/24 1124)  Device: Rolling walker (05/31/24 1124)  Distance: 30' x 2, 15' x 2 (05/31/24 1124)  Activity: Within Unit (05/31/24 1124)  Activity Comments: +2 for WC follow (05/30/24 5738)  Additional Factors: Verbal cues;Hand placement cues;Increased time to complete (05/31/24 1124)  Assistance Level: Stand by assist (05/31/24 1124)  Gait Deviations: Slow arslan;Decreased step length bilateral;Unsteady gait;Path deviations;Decreased weight shift right (05/31/24 1124)  Skilled Clinical Factors: Pt with noted tremors with all on feet activity contributing to increased unsteadiness with gait requiring close SBA throughout. Improved gait pattern this date, pt progressing to step through pattern. Pt with increased pain requiring increased rest break post gait. (05/31/24 1124)  Stairs:  Stairs/Curb  Stairs?: No (05/30/24 1145)  W/C mobility:     LTG:  Long Term Goal 1: indep and efficient bed mobility  Long Term Goal 2: 
surgery        1. Safety:          - Intervention / Plan:    [x]  falls protocol     [x]  PT/OT    []  SP        - Results:         2. Potential DME needs:         - Intervention / Plan:  [x]  PT/OT     [x]  Assess equipment needs/access       - Results:         3.  Weakness:          - Intervention / Plan:  [x]  PT/OT      []  Other:         - Results:         4.  Discharge planning needs:          - Intervention / Plan:  [x]  Weekly team conference      [x]  family training        - Results:         5.            - Intervention / Plan:          - Results:         6.            - Intervention / Plan:         - Results:         7.            - Intervention / Plan:         - Results:           Discharge Plan   Estimated Length of Stay: 9 days    Tentative Discharge date: 6/6/24      Anticipated Discharge Destination:  Home      Team recommendations:    1. Follow up Therapy :    PT  RN    2. Home Health    Other:     Equipment needed at Discharge: none noted      Team Members Present at Conference:    Physician: Dr. Rosaline Peterson  RN: Sandra Phelps RN  Physical Therapist: Patricia Goldberg, PT  Occupational Therapist: Iman Norris, OTR  Speech Therapist: Jennifer Garcia, SLP  Other: Kailyn Ramirez, BLAIRE/CM      Electronically signed by Kailyn Ramirez RN on 6/3/2024 at 4:03 PM

## 2024-06-06 NOTE — DISCHARGE INSTR - COC
Continuity of Care Form    Patient Name: Rayna Hdez   :  1955  MRN:  [id number]    Admit date:  2024  Discharge date:  24    Code Status Order: Full Code   Advance Directives:     Admitting Physician:  Rosaline Beltran DO  PCP: Hailey Figueroa PA-C    Discharging Nurse: Summer Rodney RN  Discharging Hospital Unit/Room#: R233/R233-01  Discharging Unit Phone Number: [phone]    Emergency Contact:   Extended Emergency Contact Information  Primary Emergency Contact: David Hdez  Address: [address] [address] [place] [address] [facility]  Home Phone: [phone]  Mobile Phone: [phone]  Relation: Spouse  Secondary Emergency Contact: Dustin Hdez  Address: [address] [place] [address].           [place] [address] [facility]  Home Phone: [phone]  Mobile Phone: [phone]  Relation: Child  Preferred language: English   needed? No    Past Surgical History:  Past Surgical History:   Procedure Laterality Date    CARDIAC CATHETERIZATION  10/2016    CARDIOVERSION      2001 x3    CAUDAL BLOCK N/A 2022    performed by Dr. Beltran    CAUDAL BLOCK N/A 2024    by Dr. Beltran    CAUDAL BLOCK  2024    Performed by Dr. Beltran    CERVICAL FUSION  2011    Dr Evans with bone graft and plate    COLONOSCOPY  2021    CC    ENDOSCOPIC ULTRASOUND (LOWER) N/A 2019    EGD/ EUS performed by El Recio MD at [facility]    ESOPHAGOSCOPY      HAND FUSION  2005    HYSTERECTOMY (CERVIX STATUS UNKNOWN)      HYSTERECTOMY, TOTAL ABDOMINAL (CERVIX REMOVED)      JOINT REPLACEMENT Left     tka    LIVER BIOPSY      MENISCECTOMY  10/31/2011    left knee    OTHER SURGICAL HISTORY  10/26/2009    CAUDALS BY DR BELTRAN    VA COLON CA SCRN NOT HI RSK IND N/A 2017    COLONOSCOPY performed by Jason Garcia MD at [facility]    VA ESOPHAGOGASTRODUODENOSCOPY TRANSORAL DIAGNOSTIC N/A

## 2024-06-06 NOTE — DISCHARGE SUMMARY
assistance (05/30/24 5547)    Speech Therapy:            Diet/Swallow:        Dysphagia Outcome Severity Scale: Level 6: Within functional limits/Modified independence  Compensatory Swallowing Strategies : Alternate solids and liquids, Small bites/sips, Upright as possible for all oral intake  Therapeutic Interventions: Diet tolerance monitoring, Patient/Family education       Lab/X-ray studies reviewed, analyzed and discussed with patient and staff:   Recent Results (from the past 24 hour(s))   EKG 12 Lead    Collection Time: 06/05/24  5:39 PM   Result Value Ref Range    Ventricular Rate 87 BPM    Atrial Rate 87 BPM    P-R Interval 198 ms    QRS Duration 118 ms    Q-T Interval 420 ms    QTc Calculation (Bazett) 505 ms    P Axis 46 degrees    R Axis -41 degrees    T Axis 40 degrees   CBC with Auto Differential    Collection Time: 06/06/24  5:59 AM   Result Value Ref Range    WBC 4.6 (L) 4.8 - 10.8 K/uL    RBC 3.15 (L) 4.20 - 5.40 M/uL    Hemoglobin 8.9 (L) 12.0 - 16.0 g/dL    Hematocrit 26.3 (L) 37.0 - 47.0 %    MCV 83.5 79.4 - 94.8 fL    MCH 28.3 27.0 - 31.3 pg    MCHC 33.8 33.0 - 37.0 %    RDW 14.5 11.5 - 14.5 %    Platelets 244 130 - 400 K/uL    Neutrophils % 58.9 %    Lymphocytes % 21.1 %    Monocytes % 14.6 %    Eosinophils % 4.8 %    Basophils % 0.4 %    Neutrophils Absolute 2.7 1.4 - 6.5 K/uL    Lymphocytes Absolute 1.0 1.0 - 4.8 K/uL    Monocytes Absolute 0.7 0.2 - 0.8 K/uL    Eosinophils Absolute 0.2 0.0 - 0.7 K/uL    Basophils Absolute 0.0 0.0 - 0.2 K/uL   Basic Metabolic Panel w/ Reflex to MG    Collection Time: 06/06/24  5:59 AM   Result Value Ref Range    Sodium 131 (L) 135 - 144 mEq/L    Potassium reflex Magnesium 3.5 3.4 - 4.9 mEq/L    Chloride 95 95 - 107 mEq/L    CO2 23 20 - 31 mEq/L    Anion Gap 13 9 - 15 mEq/L    Glucose 100 (H) 70 - 99 mg/dL    BUN 15 8 - 23 mg/dL    Creatinine 1.12 (H) 0.50 - 0.90 mg/dL    Est, Glom Filt Rate 52.6 (L) >60    Calcium 9.0 8.5 - 9.9 mg/dL       XR KNEE RIGHT

## 2024-06-06 NOTE — PLAN OF CARE
Problem: Safety - Adult  Goal: Free from fall injury  Outcome: Progressing     Problem: Discharge Planning  Goal: Discharge to home or other facility with appropriate resources  Outcome: Progressing  Flowsheets (Taken 6/6/2024 1215)  Discharge to home or other facility with appropriate resources:   Identify barriers to discharge with patient and caregiver   Arrange for needed discharge resources and transportation as appropriate   Identify discharge learning needs (meds, wound care, etc)   Refer to discharge planning if patient needs post-hospital services based on physician order or complex needs related to functional status, cognitive ability or social support system     Problem: ABCDS Injury Assessment  Goal: Absence of physical injury  Outcome: Progressing     Problem: Pain  Goal: Verbalizes/displays adequate comfort level or baseline comfort level  Outcome: Progressing

## 2024-06-07 ENCOUNTER — TELEPHONE (OUTPATIENT)
Dept: FAMILY MEDICINE CLINIC | Age: 69
End: 2024-06-07

## 2024-06-07 RX ORDER — VALSARTAN 160 MG/1
246 TABLET ORAL DAILY
Qty: 50 TABLET | Refills: 0 | Status: SHIPPED | OUTPATIENT
Start: 2024-06-07 | End: 2024-06-20

## 2024-06-07 RX ORDER — VALSARTAN 160 MG/1
160 TABLET ORAL DAILY
COMMUNITY
End: 2024-06-07 | Stop reason: SDUPTHER

## 2024-06-07 NOTE — TELEPHONE ENCOUNTER
Care Transitions Initial Follow Up Call    Outreach made within 2 business days of discharge: Yes    Patient: Rayna Hdez Patient : 1955   MRN: [id number]  Reason for Admission: There are no discharge diagnoses documented for the most recent discharge.  Discharge Date: 24       Spoke with: PT    Discharge department/facility: [facility]    TCM Interactive Patient Contact:  Was patient able to fill all prescriptions: No: pharmacy was not able to fill at this time. Pharmacy told her to take her bp medication   Was patient instructed to bring all medications to the follow-up visit: Yes  Is patient taking all medications as directed in the discharge summary? Yes  Does patient understand their discharge instructions: Yes  Does patient have questions or concerns that need addressed prior to 7-14 day follow up office visit: yes - PT stated that 3 DR had Changed all her medications.     Scheduled appointment with PCP within 7-14 days    Follow Up  Future Appointments   Date Time Provider Department Center   2024  8:30 AM Preston Son PA-C [facility]   2024 10:45 AM Rosaline Peterson DO [facility]   2024  1:30 PM Hailey Figueroa PA-C [facility]   2025 10:45 AM Hailey Figueroa PA-C [facility]  Leeanna Zhou [facility]

## 2024-06-07 NOTE — TELEPHONE ENCOUNTER
10-Aug-2019 17:58 Please approve or deny this refill request. The order is pended.  Thank you.    LOV Visit date not found  Pt SEEN IN HOSP - needs new script for pharm       Next Visit Date:  Future Appointments   Date Time Provider Department Center   6/13/2024  8:30 AM Preston Son PA-C [facility]   6/20/2024 10:45 AM Rosaline Peterson DO [facility]   6/26/2024  1:30 PM Hailey Figueroa PA-C [facility]   1/16/2025 10:45 AM Hailey Figueroa PA-C [facility]

## 2024-06-12 ENCOUNTER — HOSPITAL ENCOUNTER (OUTPATIENT)
Dept: PHYSICAL THERAPY | Age: 69
Setting detail: THERAPIES SERIES
Discharge: HOME | End: 2024-06-12
Attending: PHYSICAL MEDICINE & REHABILITATION
Payer: MEDICARE

## 2024-06-12 PROCEDURE — 97110 THERAPEUTIC EXERCISES: CPT

## 2024-06-12 PROCEDURE — 97162 PT EVAL MOD COMPLEX 30 MIN: CPT

## 2024-06-12 ASSESSMENT — PAIN DESCRIPTION - ORIENTATION: ORIENTATION: RIGHT

## 2024-06-12 ASSESSMENT — PAIN DESCRIPTION - DESCRIPTORS: DESCRIPTORS: BURNING;ACHING;DULL

## 2024-06-12 ASSESSMENT — PAIN DESCRIPTION - LOCATION: LOCATION: KNEE

## 2024-06-12 ASSESSMENT — PAIN SCALES - GENERAL: PAINLEVEL_OUTOF10: 6

## 2024-06-12 ASSESSMENT — PAIN DESCRIPTION - PAIN TYPE: TYPE: SURGICAL PAIN

## 2024-06-12 NOTE — PLAN OF CARE
[facility]  [address] [place] [address]  Phone: [phone]    [x] Certification  [] Recertification [x]  Plan of Care  [] Progress Note [] Discharge      Referring Provider: Rosaline Peterson DO     From:  Samra Jones, PT  Patient: Rayna Hdez (68 y.o. female) : 1955 Date: 2024  Medical Diagnosis: Impaired mobility and activities of daily living [Z74.09, Z78.9]       Treatment Diagnosis: decreased R knee AROM, RLE strength, decreased balance, impaired gait, decreased flexibility, increased risk for falls, decreased functional activity tolerance, c/o pain and edema.    Plan of Care/Certification Expiration Date: 24   Progress Report Period from:  2024  to 2024    Visits to Date: 1 No Show: 0 Cancelled Appts: 0    OBJECTIVE:     Long Term Goals - Time Frame for Long Term Goals : 8 weeks  Goals Current/ Discharge status Status   Long Term Goal 1: The pt will ambulate >/=350 ft with LRD(SPC) mod indep to increase ease and safety with community ambulation LTG 1 Current Status:: Pt amb w/ WW w/ Laura   New   Long Term Goal 2: The pt will have decreased pain </= 1/10 in order to increase ease with ADL's    Pain Screening  Patient Currently in Pain: Yes  Pain Assessment: 0-10  Pain Level: 6  Best Pain Level: 3  Worst Pain Level: 8  Pain Type: Surgical pain  Pain Location: Knee  Pain Orientation: Right  Pain Descriptors: Burning, Aching, Dull     New   Long Term Goal 3: The pt will demo improved R Knee AROM in order to increase ease of ADL's       AROM LLE (degrees)  L Knee Flexion (0-145): 110  L Knee Extension (0): 0   AROM RLE (degrees)  R Knee Flexion (0-145): 94  R Knee Extension (0): -11          PROM RLE (degrees)  R Knee Flexion (0-145): 97 (painful)  R Knee Extension (0): -10 (painful)                   New   Long Term Goal 4: The pt will have an increase in LEFS score >/=10 points in order to increase functional

## 2024-06-12 NOTE — THERAPY EVALUATION
D/C to outpatient PT.  Pt has been using ice and medications to help reduce swelling and pain knee.  Pain can be worse w/ when not taking medications, walking, and climbing steps.  Pt reports history of falls, 2 falls in the last year.  Pt denies numbnss and tingling.  Pt has felt more pain radiating towards her ankle and hip in the last week.  Additional Pertinent Hx (if applicable):     Previous treatments prior to current episode?: Inpatient rehab, Surgery  Any changes to allergies, medications, or have you had any medical procedures/ER visits since your last visit?: No      Learning/Language: Learning  Does the patient/guardian have any barriers to learning?: No barriers  Will there be a co-learner?: No  What is the preferred language of the patient/guardian?: English  Is an  required?: No  How does the patient/guardian prefer to learn new concepts?: Listening, Reading, Demonstration, Pictures/Videos     Pain Screening    Pain Screening  Patient Currently in Pain: Yes  Pain Assessment: 0-10  Pain Level: 6  Best Pain Level: 3  Worst Pain Level: 8  Pain Type: Surgical pain  Pain Location: Knee  Pain Orientation: Right  Pain Descriptors: Burning, Aching, Dull    Functional Status    Social History:    Social History  Lives With: Spouse  Type of Home: House  Home Layout: One level  Home Access: Stairs to enter with rails  Entrance Stairs - Rails: Left (grab bar)  Entrance Stairs - Number of Steps: 2  Bathroom Shower/Tub: Tub/Shower unit  Bathroom Toilet: Standard  Bathroom Equipment: Shower chair, Toilet raiser  Bathroom Accessibility: Accessible  Home Equipment: Walker - Rolling, Rollator, Cane, Wheelchair - Manual (Pt uses SPC for amb in household and community prior to surgery)    Occupation/Interests:   Occupation: Retired  Leisure & Hobbies: Sowing, reading, gardening    Prior Level of Function:   75% Independent        Current Level of Function:   50%      Receives Help From: Family  ADL Assistance: 
<<----- Click to add NO significant Past Surgical History

## 2024-06-13 ENCOUNTER — OFFICE VISIT (OUTPATIENT)
Dept: ORTHOPEDIC SURGERY | Age: 69
End: 2024-06-13

## 2024-06-13 VITALS
BODY MASS INDEX: 33.43 KG/M2 | OXYGEN SATURATION: 99 % | TEMPERATURE: 97.3 F | HEART RATE: 77 BPM | HEIGHT: 67 IN | WEIGHT: 213 LBS

## 2024-06-13 DIAGNOSIS — Z96.651 STATUS POST TOTAL RIGHT KNEE REPLACEMENT: Primary | ICD-10-CM

## 2024-06-13 PROCEDURE — 99024 POSTOP FOLLOW-UP VISIT: CPT | Performed by: PHYSICIAN ASSISTANT

## 2024-06-13 RX ORDER — TOPIRAMATE 25 MG/1
25 TABLET ORAL DAILY
COMMUNITY
Start: 2024-06-01

## 2024-06-13 RX ORDER — FUROSEMIDE 20 MG/1
20 TABLET ORAL 2 TIMES DAILY
COMMUNITY
Start: 2024-05-27

## 2024-06-13 NOTE — PROGRESS NOTES
[facility]      Subjective:      Chief Complaint   Patient presents with    Post-Op Check     Pt presents here for 1st post-op, RTK she states she is doing well besides a little soreness in her knee but that's to be expected.       HPI: Rayna Hdez is a 68 y.o. female who is here 2 weeks after right total knee replacement Dr. Williamson.  Patient is doing well.  She is heading her range of motion landmarks.  She is working with therapy.  Pain is well-controlled.  She is taking her aspirin for DVT prophylactic.  No fevers chills or night sweats.    Past Medical History:   Diagnosis Date    Abnormal electromyogram (EMG) 08/19/2009    SENSORIMOTOR NEUROPATHY OF BLE, RIGHT WORSE THAN LEFT, SUSPICIOUS FOR RIGHT S1 RADIC    Angina pectoris ([facility])     Angina pectoris ([facility]) 07/2019    sees Dr. Freda ROMERO    Ankle pain     Atherosclerotic heart disease of native coronary artery with unspecified angina pectoris 04/03/2023    Bleeding ulcer     Colon polyp 04/03/2017    Dermatitis     Fibromyalgia     Foot pain     Gastro-esophageal reflux disease without esophagitis 02/16/2023    Hyperlipidemia     Hypertension     Infection of intervertebral disc (pyogenic), lumbar region ([facility]) 08/13/2018    Kidney disease     Low back pain     MRSA (methicillin resistant staph aureus) culture positive 2011    Nasal bleeding     Neck pain     Neuropathy     Obesity     Osteoarthritis     PSVT (paroxysmal supraventricular tachycardia) ([facility])     Shingles outbreak 2012    SI (sacroiliac) pain     Stage 3 chronic kidney disease, unspecified whether stage 3a or 3b CKD ([facility]) 04/13/2022    Stenosis     Vitamin D deficiency       Past Surgical History:   Procedure Laterality Date    CARDIAC CATHETERIZATION  10/2016    CARDIOVERSION      2001 x3    CAUDAL BLOCK N/A 12/13/2022    performed by Dr. Peterson    CAUDAL BLOCK N/A 02/06/2024    by Dr. Peterson    CAUDAL BLOCK  02/06/2024    Performed by Dr. Peterson

## 2024-06-14 ENCOUNTER — HOSPITAL ENCOUNTER (OUTPATIENT)
Dept: PHYSICAL THERAPY | Age: 69
Setting detail: THERAPIES SERIES
Discharge: HOME | End: 2024-06-14
Attending: PHYSICAL MEDICINE & REHABILITATION
Payer: MEDICARE

## 2024-06-14 PROCEDURE — 97110 THERAPEUTIC EXERCISES: CPT

## 2024-06-14 PROCEDURE — 97016 VASOPNEUMATIC DEVICE THERAPY: CPT

## 2024-06-14 ASSESSMENT — PAIN SCALES - GENERAL: PAINLEVEL_OUTOF10: 6

## 2024-06-14 ASSESSMENT — PAIN DESCRIPTION - LOCATION: LOCATION: KNEE

## 2024-06-14 ASSESSMENT — PAIN DESCRIPTION - PAIN TYPE: TYPE: SURGICAL PAIN

## 2024-06-14 ASSESSMENT — PAIN DESCRIPTION - ORIENTATION: ORIENTATION: RIGHT

## 2024-06-14 ASSESSMENT — PAIN DESCRIPTION - DESCRIPTORS: DESCRIPTORS: ACHING

## 2024-06-14 NOTE — PROGRESS NOTES
[place] [address] [address]  Phone: [phone]      Date: 2024  Patient: Rayna Hdez  : 1955   Confirmed: Yes  MRN: [id number]  Referring Provider: Rosaline Peterson DO    Medical Diagnosis: Impaired mobility and activities of daily living [Z74.09, Z78.9]       Treatment Diagnosis: decreased R knee AROM, RLE strength, decreased balance, impaired gait, decreased flexibility, increased risk for falls, decreased functional activity tolerance, c/o pain and edema.    Visit Information:  Insurance: Payor: MEDICARE / Plan: MEDICARE PART A AND B / Product Type: *No Product type* /   PT Visit Information  Onset Date: 24  Total # of Visits Approved: 99 (BMN)  Total # of Visits to Date: 2  Plan of Care/Certification Expiration Date: 24  No Show: 0  Progress Note Due Date: 24  Canceled Appointment: 0    Subjective Information:  Subjective: I forgot my hearing aides again. I took pain meds at 8am, and my pain is at about 6/10 with a bunch of activity between 8am and getting here.  HEP Compliance:  [x] Good [] Fair [] Poor [] Reports not doing due to:    Pain Screening  Patient Currently in Pain: Yes  Pain Level: 6  Pain Type: Surgical pain  Pain Location: Knee  Pain Orientation: Right  Pain Descriptors: Aching (deep achiness with occasional sharp pains.)    Treatment:  Exercises:  Exercises  Exercise 1: Ankle pumps  Exercise 2: Quad/Glut set  Exercise 3: SLR  Exercise 4: LAQ  Exercise 5: Seated knee flexion extension ROM 5\" x 10 each  Exercise 6: Heel slides w/ and w/out towel 5\" x 10  Exercise 7: Standing hip 3 way*  Exercise 8: WB training with improved Jimmy LE engagement with STS x 10  Exercise 9: Gait training w/ SPC*, Gait training with // bars and WW training for proximity alignment and decreased UE support  Exercise 10: Stair training w/ SPC*  Exercise 11: Nustep S11 x 5 mins for range  Exercise 12: PROM flex/ ext x 10 mins  Exercise 20: HEP: Ankle

## 2024-06-17 ENCOUNTER — HOSPITAL ENCOUNTER (OUTPATIENT)
Dept: PHYSICAL THERAPY | Age: 69
Setting detail: THERAPIES SERIES
Discharge: HOME | End: 2024-06-17
Attending: PHYSICAL MEDICINE & REHABILITATION
Payer: MEDICARE

## 2024-06-17 PROCEDURE — 97110 THERAPEUTIC EXERCISES: CPT

## 2024-06-17 PROCEDURE — 97116 GAIT TRAINING THERAPY: CPT

## 2024-06-17 RX ORDER — BACLOFEN 10 MG/1
TABLET ORAL
Qty: 90 TABLET | Refills: 0 | Status: SHIPPED | OUTPATIENT
Start: 2024-06-17 | End: 2024-06-20 | Stop reason: SDUPTHER

## 2024-06-17 ASSESSMENT — PAIN DESCRIPTION - ORIENTATION: ORIENTATION: RIGHT

## 2024-06-17 ASSESSMENT — PAIN DESCRIPTION - PAIN TYPE: TYPE: SURGICAL PAIN

## 2024-06-17 ASSESSMENT — PAIN DESCRIPTION - LOCATION: LOCATION: KNEE

## 2024-06-17 ASSESSMENT — PAIN SCALES - GENERAL: PAINLEVEL_OUTOF10: 7

## 2024-06-17 NOTE — PROGRESS NOTES
[place] [address] [place] [address]  Phone: [phone]      Date: 2024  Patient: Rayna Hdez  : 1955   Confirmed: Yes  MRN: [id number]  Referring Provider: Rosaline Peterson DO    Medical Diagnosis: Impaired mobility and activities of daily living [Z74.09, Z78.9]       Treatment Diagnosis: decreased R knee AROM, RLE strength, decreased balance, impaired gait, decreased flexibility, increased risk for falls, decreased functional activity tolerance, c/o pain and edema.    Visit Information:  Insurance: Payor: MEDICARE / Plan: MEDICARE PART A AND B / Product Type: *No Product type* /   PT Visit Information  Onset Date: 24  Total # of Visits Approved: 99 (BMN)  Total # of Visits to Date: 3  Plan of Care/Certification Expiration Date: 24  No Show: 0  Progress Note Due Date: 24  Canceled Appointment: 0  Progress Note Counter: 3/16    Subjective Information:  Subjective: I twisted my knee in the bathroom yesterday and It has been pretty painful I took pain meds and iced it it's didn't make a diffference.  HEP Compliance:  [x] Good [] Fair [] Poor [] Reports not doing due to:    Pain Screening  Patient Currently in Pain: Yes  Pain Level: 7  Pain Type: Surgical pain  Pain Location: Knee  Pain Orientation: Right    Treatment:  Exercises:  Exercises  Exercise 1: Ankle pumps  x 15  Exercise 2: Quad/Glut set  Exercise 3: SLR  Exercise 4: LAQ  Exercise 5: Seated knee flexion extension ROM 5\" x 15 each  Exercise 7: Standing hip 3 way hip abd/ext/ march x 10 each candelaria  Exercise 8: WB training with improved [place] LE engagement with STS x 7  Exercise 9: Gait training w/ SPC: throughout clinic, and focused on improved mechanics with AD x 50' x 4  Exercise 10: Stair training w/ SPC*  Exercise 11: Nustep S11 x 5 mins for range  Exercise 12: PROM flex/ ext x 4 mins  Exercise 20: HEP: Ankle pumps, Quad sets, glut set, quad set, SLR, LAQ, Seated knee flexion extension AROM

## 2024-06-19 ENCOUNTER — HOSPITAL ENCOUNTER (OUTPATIENT)
Dept: PHYSICAL THERAPY | Age: 69
Setting detail: THERAPIES SERIES
Discharge: HOME | End: 2024-06-19
Attending: PHYSICAL MEDICINE & REHABILITATION
Payer: MEDICARE

## 2024-06-19 PROCEDURE — 97016 VASOPNEUMATIC DEVICE THERAPY: CPT

## 2024-06-19 PROCEDURE — 97110 THERAPEUTIC EXERCISES: CPT

## 2024-06-19 ASSESSMENT — PAIN DESCRIPTION - LOCATION: LOCATION: KNEE

## 2024-06-19 ASSESSMENT — PAIN DESCRIPTION - ORIENTATION: ORIENTATION: RIGHT

## 2024-06-19 ASSESSMENT — PAIN DESCRIPTION - DESCRIPTORS: DESCRIPTORS: ACHING

## 2024-06-19 ASSESSMENT — PAIN DESCRIPTION - PAIN TYPE: TYPE: SURGICAL PAIN

## 2024-06-19 NOTE — PROGRESS NOTES
[place] [address] [place] [address]  Phone: [phone]      Date: 2024  Patient: Rayna Hdez  : 1955   Confirmed: Yes  MRN: [id number]  Referring Provider: Rosaline Peterson DO    Medical Diagnosis: Impaired mobility and activities of daily living [Z74.09, Z78.9]       Treatment Diagnosis: decreased R knee AROM, RLE strength, decreased balance, impaired gait, decreased flexibility, increased risk for falls, decreased functional activity tolerance, c/o pain and edema.    Visit Information:  Insurance: Payor: MEDICARE / Plan: MEDICARE PART A AND B / Product Type: *No Product type* /   PT Visit Information  Onset Date: 24  Total # of Visits Approved: 99 (BMN)  Total # of Visits to Date: 4  Plan of Care/Certification Expiration Date: 24  No Show: 0  Progress Note Due Date: 24  Canceled Appointment: 0  Progress Note Counter:     Subjective Information:  Subjective: Pt reports her \"knee has been coming along.\"  Notes sleep has been good and the R knee has bothered her when sleeping.  HEP Compliance:  [] Good [] Fair [] Poor [] Reports not doing due to:    Pain Screening  Patient Currently in Pain: Yes  Pain Assessment: 0-10  Pain Type: Surgical pain  Pain Location: Knee  Pain Orientation: Right  Pain Descriptors: Aching    Treatment:  Exercises:  Exercises  Exercise 3: SLR x10  Exercise 4: LAQ x 10- 5\"  Exercise 5: Seated knee flexion extension ROM 5\" x 3 min  Exercise 9: Gait training in // bars x 4-5laps each - fwd/lat/retro  Exercise 11: Nustep S10 x 5 mins for range  Exercise 12: PROM flex/ ext x 5 mins  Exercise 20: HEP: Cont Current       Manual:           Modalities:  Vasopneumatic Device (CPT 52459)  Patient Position: Supine  Vasopneumatic Specified Location: RLE  Pre-Girth Measurement: 49  Post-Girth Measurement: 48.5  Post treatment skin assessment: Intact  Limitations addressed: Pain modulation, Edema  Functional ability(s) targeted:

## 2024-06-20 ENCOUNTER — OFFICE VISIT (OUTPATIENT)
Dept: PHYSICAL MEDICINE AND REHAB | Age: 69
End: 2024-06-20
Payer: MEDICARE

## 2024-06-20 VITALS
HEIGHT: 67 IN | DIASTOLIC BLOOD PRESSURE: 55 MMHG | SYSTOLIC BLOOD PRESSURE: 135 MMHG | HEART RATE: 77 BPM | WEIGHT: 213 LBS | BODY MASS INDEX: 33.43 KG/M2

## 2024-06-20 DIAGNOSIS — G57.93 NEUROPATHY INVOLVING BOTH LOWER EXTREMITIES: ICD-10-CM

## 2024-06-20 DIAGNOSIS — E55.9 VITAMIN D DEFICIENCY: ICD-10-CM

## 2024-06-20 DIAGNOSIS — G60.8 HEREDITARY SENSORY NEUROPATHY: ICD-10-CM

## 2024-06-20 DIAGNOSIS — M05.79 RHEUMATOID ARTHRITIS INVOLVING MULTIPLE SITES WITH POSITIVE RHEUMATOID FACTOR (HCC): ICD-10-CM

## 2024-06-20 DIAGNOSIS — M15.9 PRIMARY OSTEOARTHRITIS INVOLVING MULTIPLE JOINTS: ICD-10-CM

## 2024-06-20 DIAGNOSIS — M48.062 SPINAL STENOSIS OF LUMBAR REGION WITH NEUROGENIC CLAUDICATION: Primary | ICD-10-CM

## 2024-06-20 DIAGNOSIS — M46.26 OSTEOMYELITIS OF LUMBAR VERTEBRA (HCC): ICD-10-CM

## 2024-06-20 PROCEDURE — 1111F DSCHRG MED/CURRENT MED MERGE: CPT | Performed by: PHYSICAL MEDICINE & REHABILITATION

## 2024-06-20 PROCEDURE — 1090F PRES/ABSN URINE INCON ASSESS: CPT | Performed by: PHYSICAL MEDICINE & REHABILITATION

## 2024-06-20 PROCEDURE — 3017F COLORECTAL CA SCREEN DOC REV: CPT | Performed by: PHYSICAL MEDICINE & REHABILITATION

## 2024-06-20 PROCEDURE — 1036F TOBACCO NON-USER: CPT | Performed by: PHYSICAL MEDICINE & REHABILITATION

## 2024-06-20 PROCEDURE — 3078F DIAST BP <80 MM HG: CPT | Performed by: PHYSICAL MEDICINE & REHABILITATION

## 2024-06-20 PROCEDURE — G8399 PT W/DXA RESULTS DOCUMENT: HCPCS | Performed by: PHYSICAL MEDICINE & REHABILITATION

## 2024-06-20 PROCEDURE — G8417 CALC BMI ABV UP PARAM F/U: HCPCS | Performed by: PHYSICAL MEDICINE & REHABILITATION

## 2024-06-20 PROCEDURE — G8427 DOCREV CUR MEDS BY ELIG CLIN: HCPCS | Performed by: PHYSICAL MEDICINE & REHABILITATION

## 2024-06-20 PROCEDURE — 1123F ACP DISCUSS/DSCN MKR DOCD: CPT | Performed by: PHYSICAL MEDICINE & REHABILITATION

## 2024-06-20 PROCEDURE — 99214 OFFICE O/P EST MOD 30 MIN: CPT | Performed by: PHYSICAL MEDICINE & REHABILITATION

## 2024-06-20 PROCEDURE — 3075F SYST BP GE 130 - 139MM HG: CPT | Performed by: PHYSICAL MEDICINE & REHABILITATION

## 2024-06-20 RX ORDER — GABAPENTIN 300 MG/1
CAPSULE ORAL
Qty: 180 CAPSULE | Refills: 0 | Status: SHIPPED | OUTPATIENT
Start: 2024-06-20 | End: 2025-01-01

## 2024-06-20 RX ORDER — BACLOFEN 10 MG/1
TABLET ORAL
Qty: 90 TABLET | Refills: 0 | Status: SHIPPED | OUTPATIENT
Start: 2024-06-20

## 2024-06-24 ENCOUNTER — APPOINTMENT (OUTPATIENT)
Dept: CARDIOLOGY | Facility: CLINIC | Age: 69
End: 2024-06-24
Payer: MEDICARE

## 2024-06-24 VITALS
WEIGHT: 203.8 LBS | DIASTOLIC BLOOD PRESSURE: 56 MMHG | SYSTOLIC BLOOD PRESSURE: 128 MMHG | HEIGHT: 68 IN | BODY MASS INDEX: 30.89 KG/M2 | HEART RATE: 70 BPM

## 2024-06-24 DIAGNOSIS — Z09 HOSPITAL DISCHARGE FOLLOW-UP: Primary | ICD-10-CM

## 2024-06-24 DIAGNOSIS — I10 PRIMARY HYPERTENSION: ICD-10-CM

## 2024-06-24 DIAGNOSIS — I20.9 ANGINA PECTORIS WITH NORMAL CORONARY ARTERIOGRAM (CMS-HCC): ICD-10-CM

## 2024-06-24 PROCEDURE — 1160F RVW MEDS BY RX/DR IN RCRD: CPT | Performed by: NURSE PRACTITIONER

## 2024-06-24 PROCEDURE — 1157F ADVNC CARE PLAN IN RCRD: CPT | Performed by: NURSE PRACTITIONER

## 2024-06-24 PROCEDURE — 3078F DIAST BP <80 MM HG: CPT | Performed by: NURSE PRACTITIONER

## 2024-06-24 PROCEDURE — 1159F MED LIST DOCD IN RCRD: CPT | Performed by: NURSE PRACTITIONER

## 2024-06-24 PROCEDURE — 3074F SYST BP LT 130 MM HG: CPT | Performed by: NURSE PRACTITIONER

## 2024-06-24 PROCEDURE — 1036F TOBACCO NON-USER: CPT | Performed by: NURSE PRACTITIONER

## 2024-06-24 PROCEDURE — 99214 OFFICE O/P EST MOD 30 MIN: CPT | Performed by: NURSE PRACTITIONER

## 2024-06-24 RX ORDER — CHLORTHALIDONE 25 MG/1
1 TABLET ORAL
COMMUNITY
Start: 2024-06-06

## 2024-06-24 RX ORDER — ASPIRIN 81 MG/1
81 TABLET ORAL DAILY
COMMUNITY

## 2024-06-24 RX ORDER — DILTIAZEM HYDROCHLORIDE 60 MG/1
60 TABLET, FILM COATED ORAL 2 TIMES DAILY
Qty: 180 TABLET | Refills: 3 | Status: SHIPPED | OUTPATIENT
Start: 2024-06-24 | End: 2025-06-24

## 2024-06-24 RX ORDER — NITROFURANTOIN 25; 75 MG/1; MG/1
100 CAPSULE ORAL 2 TIMES DAILY
COMMUNITY
Start: 2024-06-03

## 2024-06-24 RX ORDER — OXYCODONE AND ACETAMINOPHEN 10; 325 MG/1; MG/1
1 TABLET ORAL EVERY 6 HOURS PRN
COMMUNITY
Start: 2024-05-26

## 2024-06-24 RX ORDER — DIMENHYDRINATE 50 MG
1 TABLET ORAL
COMMUNITY
Start: 2024-06-05

## 2024-06-24 RX ORDER — CYCLOBENZAPRINE HCL 5 MG
1 TABLET ORAL 3 TIMES DAILY PRN
COMMUNITY
Start: 2024-06-03 | End: 2024-07-13

## 2024-06-24 NOTE — PROGRESS NOTES
Problem: At Risk for Falls  Goal: # Patient does not fall  Outcome: Outcome Not Met, Continue to Monitor  Goal: # Takes action to control fall-related risks  Outcome: Outcome Not Met, Continue to Monitor  Goal: # Verbalizes understanding of fall risk/precautions  Description: Document education using the patient education activity  Outcome: Outcome Not Met, Continue to Monitor     Problem: At Risk for Injury Due to Fall  Goal: # Patient does not fall  Outcome: Outcome Not Met, Continue to Monitor  Goal: # Takes action to control condition specific risks  Outcome: Outcome Not Met, Continue to Monitor  Goal: # Verbalizes understanding of fall-related injury personal risks  Description: Document education using the patient education activity  Outcome: Outcome Not Met, Continue to Monitor     Problem: Hemodialysis  Goal: Fistula/graft intact as evidenced by presence of bruit & thrill  Outcome: Outcome Not Met, Continue to Monitor  Goal: Vascular access device site free of signs & symptoms of infection  Outcome: Outcome Not Met, Continue to Monitor  Goal: Dialysis: Safe, effective, and comfortable hemodialysis treatment (Hemodialysis nurse only)  Outcome: Outcome Not Met, Continue to Monitor  Goal: Dialysis: Free of complications related to initiation/termination of dialysis (Hemodialysis nurse only)  Outcome: Outcome Not Met, Continue to Monitor  Goal: Verbalizes understanding of hemodialysis care  Description: Document on Patient Education Activity  Outcome: Outcome Not Met, Continue to Monitor     Problem: Activity Intolerance  Goal: # Functional status is maintained or returned to baseline  Outcome: Outcome Not Met, Continue to Monitor  Goal: # Tolerates activity for d/c setting with no clinical problems  Outcome: Outcome Not Met, Continue to Monitor      Jazmin Hallman is a 68 y.o. female that presents to the office today for hospital follow up.  She follows with her  primary cardiologist Dr. Mendoza and was added to my schedule today.      Per Dr. Mendoza office notes, she has a PMH of SVT, symptomatic PACs/PVCs, hypertension and hyperlipidemia. 2016 coronary angiography demonstrated normal coronary arteries. 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      I had last seen her 1/10/2024 at which time she had had just 1 episode of chest fullness which was felt likely esophageal based as it was associated with swallowing.  No clear-cut angina.  He is also noted to be hyponatremic at that time without any cardiac medicines contributing and she was referred on to medicine to assure narcotics were contributing as they can affect ADH on occasion.  Pressure was excessive and Cardizem increased to 60 3 times daily at that time.     She was hospitalized at [facility] 4/24/2024 through 4/25/2024.  She had presented with acute kidney injury, hyponatremia and hyperkalemia with metabolic acidosis all felt related to use of Bactrim.  She had presented with shortness of breath chest pain and chest fluttering.  Evaluation for PE was negative.  Dyspnea resolved while in the ER.  Sodium bicarbonate was instituted for hyperkalemia and acute kidney injury.  Felt deterioration was related to the use of Bactrim.  No cardiac issues described     5/2/2024 sodium 128 potassium 4.5 BUN of 22 creatinine 1.36 followed by nephrology TSH mildly elevated 5.7 with normal free T4           Hemoglobin 10.7 with normal ferritin level     Was seen 5/7/2024 by nephrology at [facility].  Goodman stable from a nephrology standpoint comments as the hyponatremia made as though it was a new finding actually it has been present for some time and hence referral to neuro-ophthalmology and medicine.  Has not been on  hydrochlorothiazide for quite some time.     She underwent right total knee replacement with Dr. Crouch at [facility] 5/28/2024.  She was noted to be hypertensive during her stay at rehab for which some of her cardiac medications were adjusted. She also states that she did not receive her Lasix for a while at rehab which resulted in significant lower extremity edema. She is currently back on her pre-operative medications.  She states that overall she feels well. She denies chest pain, chest pressure, chest tightness, shortness of breath, palpitations, lightheadedness or dizziness. She is currently ambulating with a walker and working with physical therapy.      Testing Reviewed  6/6/2024 labs; , K3.5, BUN 15, creatinine 1.12, WBCs 4.6, Hgb 8.9, HCT 26.3, platelets 244.    Assessment/Plan  Hypertension:  Stable.  Continue current medications.   Edema:  Post-op edema  noted in right leg/knee, minimal edema in left leg.  Angina: No reports of Angina. Continue Cardizem 60 mg 1 tablet twice day.   Continue current medications  Follow with Dr. Mendoza in office as scheduled or sooner if needed.       Patient Active Problem List   Diagnosis    Non-recurrent acute serous otitis media of both ears    Acquired pes planus of both feet    LUKAS positive    Anemia of chronic renal failure, stage 3a (Multi)    Angina, class I (CMS-HCC)    Hyponatremia    Primary hypertension    Angina pectoris with normal coronary arteriogram ([facility])    Edema    Preoperative cardiovascular examination       Social History     Tobacco Use    Smoking status: Never    Smokeless tobacco: Never       Past Medical History:   Diagnosis Date    Dizziness and giddiness     Lightheadedness    Epistaxis 04/27/2022    Right-sided epistaxis    Personal history of other diseases of the respiratory system 12/27/2021    History of nasal obstruction    Personal history of other specified conditions     History of palpitations    Personal history of other  specified conditions     History of weakness         Current Outpatient Medications:     acetaminophen (Tylenol) 500 mg tablet, Take 1 tablet (500 mg) by mouth every 8 hours if needed for mild pain (1 - 3)., Disp: , Rfl:     amoxicillin (Amoxil) 500 mg tablet, Take 1 tablet (500 mg) by mouth. Take 6 tablets 1 hour prior to procedure, and 2 tablets 6 hours following procedure for dental appts, Disp: , Rfl:     aspirin 81 mg EC tablet, Take 1 tablet (81 mg) by mouth once daily., Disp: , Rfl:     atorvastatin (Lipitor) 10 mg tablet, Take 1 tablet (10 mg) by mouth once daily., Disp: 100 tablet, Rfl: 1    baclofen (Lioresal) 10 mg tablet, Take 1 tablet (10 mg) by mouth once daily., Disp: , Rfl:     cholecalciferol (Vitamin D-3) 50 MCG (2000 UT) tablet, Take 1 tablet (2,000 Units) by mouth once daily., Disp: , Rfl:     Co Q-10 100 mg capsule, Take 1 capsule (100 mg) by mouth early in the morning.., Disp: , Rfl:     CYANOCOBALAMIN, VITAMIN B-12, INJ, Inject 1 mL as directed every 30 (thirty) days., Disp: , Rfl:     cyclobenzaprine (Flexeril) 5 mg tablet, Take 1 tablet (5 mg) by mouth 3 times a day as needed for muscle spasms., Disp: , Rfl:     diclofenac sodium 1 % kit, Apply topically 2 times a day as needed. Apply to affected area twice daily, Disp: , Rfl:     docusate sodium (Stool Softener) 100 mg capsule, Take 1 capsule (100 mg) by mouth 2 times a day., Disp: , Rfl:     furosemide (Lasix) 20 mg tablet, Take 1 tablet (20 mg) by mouth 2 times a day., Disp: , Rfl:     gabapentin (Neurontin) 300 mg capsule, Take 1 capsule (300 mg) by mouth 3 times a day. 2 capsules every night alternating with 1 capsules every night, Disp: , Rfl:     hydroxychloroquine (Plaquenil) 200 mg tablet, Take 1 tablet (200 mg) by mouth 2 times a day., Disp: , Rfl:     lidocaine (Xylocaine) 5 % ointment, Apply topically every 24 (twenty four) hours if needed for mild pain (1 - 3). Apply to affected area every 12 hours as needed for pain, Disp: ,  Rfl:     losartan (Cozaar) 25 mg tablet, Take 4 tablets (100 mg) by mouth once daily. TAKE 3 TABLETS BY MOUTH ONCE DAILY AFTER BREAKFAST AND 1 AT BEDTIME, Disp: , Rfl:     melatonin 5 mg tablet, Take 1 tablet (5 mg) by mouth as needed at bedtime for sleep., Disp: , Rfl:     nitroglycerin (Nitrostat) 0.4 mg SL tablet, Place 1 tablet (0.4 mg) under the tongue every 5 minutes if needed for chest pain. DO NOT EXCEED A TOTAL OF 3 DOSES IN 15 MINUTES, Disp: , Rfl:     nystatin (Mycostatin) cream, Apply topically 2 times a day as needed., Disp: , Rfl:     omeprazole (PriLOSEC) 40 mg DR capsule, Take 1 capsule (40 mg) by mouth once daily., Disp: , Rfl:     oxyCODONE-acetaminophen (Percocet)  mg tablet, Take 1 tablet by mouth every 6 hours if needed for moderate pain (4 - 6) or severe pain (7 - 10)., Disp: , Rfl:     oxyCODONE-acetaminophen (Percocet) 7.5-325 mg tablet, Take 1 tablet by mouth every 4 hours if needed., Disp: , Rfl:     polyethylene glycol (Glycolax, Miralax) 17 gram packet, Take 17 g by mouth once daily at bedtime., Disp: , Rfl:     chlorthalidone (Hygroton) 25 mg tablet, Take 1 tablet (25 mg) by mouth early in the morning.., Disp: , Rfl:     dilTIAZem (Cardizem) 60 mg immediate release tablet, Take 1 tablet (60 mg) by mouth 2 times a day., Disp: 180 tablet, Rfl: 3    fluticasone (Flonase) 50 mcg/actuation nasal spray, Administer 2 sprays into each nostril once daily. Shake gently. Before first use, prime pump. After use, clean tip and replace cap., Disp: 9.9 mL, Rfl: 0    levothyroxine (Synthroid, Levoxyl) 25 mcg tablet, Take 1 tablet (25 mcg) by mouth once daily., Disp: , Rfl:     nitrofurantoin, macrocrystal-monohydrate, (Macrobid) 100 mg capsule, Take 1 capsule (100 mg) by mouth 2 times a day., Disp: , Rfl:     Amlodipine, Bactrim [sulfamethoxazole-trimethoprim], Isosorbide, Lisinopril, Ranolazine, Keflex [cephalexin], and Latex    No family history on file.    Past Surgical History:   Procedure  "Laterality Date    OTHER SURGICAL HISTORY  03/03/2022    Total hysterectomy abdominal    OTHER SURGICAL HISTORY  03/03/2022    Knee replacement    OTHER SURGICAL HISTORY  03/03/2022    Colonoscopy    OTHER SURGICAL HISTORY  03/03/2022    Back surgery          Review of systems  Constitutional: No weight loss, fever, chills, weakness or fatigue  HEENT: No visual loss, blurred vision, double vision or yellow sclerae  Skin: No rash or itching  Cardiovascular: No chest pain, pressure or discomfort, No palpitations.  Respiratory: No shortness of breath, cough or sputum  Gastrointestinal: No nausea, vomiting or diarrhea. No bloody or dark tarry stools.  Neurological: No headache, lightheadedness, dizziness, syncope.   Musculoskeletal: No muscle, back pain, joint pain or stiffness.  Hematologic: No anemia, bleeding or bruising.    /56 (BP Location: Left arm, Patient Position: Sitting)   Pulse 70   Ht 1.727 m (5' 8\")   Wt 92.4 kg (203 lb 12.8 oz)   BMI 30.99 kg/m²     Patient Active Problem List   Diagnosis    Non-recurrent acute serous otitis media of both ears    Acquired pes planus of both feet    LUKAS positive    Anemia of chronic renal failure, stage 3a (Multi)    Angina, class I (CMS-HCC)    Hyponatremia    Primary hypertension    Angina pectoris with normal coronary arteriogram ([facility])    Edema    Preoperative cardiovascular examination         Physical Exam  Constitutional: Well developed, awake/alert x 3, no distress.  Head/Neck: No JVD, No bruits  Respiratory/Thorax: patent airways, CTAB, normal breath sounds with good expansion.  Cardiovascular: Regular rate and rhythm, no murmurs, normal S1 and S2,   Gastrointestinal: Non distended, soft, non-tender, no rebound tenderness or guarding.  Extremities: No cyanosis,.  Post-operative Right leg edema.    Neurological: Alert and oriented x 3. Moves extremities spontaneous with purpose.  Psychological: Appropriate mood and behavior  Skin: Warm and Dry. No " lesions or rashes.         Please excuse any errors in grammar or translation related to dictation, voice recognition software was used to prepare this document.

## 2024-06-26 ENCOUNTER — HOSPITAL ENCOUNTER (OUTPATIENT)
Dept: PHYSICAL THERAPY | Age: 69
Setting detail: THERAPIES SERIES
Discharge: HOME | End: 2024-06-26
Attending: PHYSICAL MEDICINE & REHABILITATION
Payer: MEDICARE

## 2024-06-26 ENCOUNTER — OFFICE VISIT (OUTPATIENT)
Dept: FAMILY MEDICINE CLINIC | Age: 69
End: 2024-06-26

## 2024-06-26 VITALS
WEIGHT: 204 LBS | HEIGHT: 67 IN | TEMPERATURE: 97.6 F | BODY MASS INDEX: 32.02 KG/M2 | OXYGEN SATURATION: 96 % | HEART RATE: 74 BPM | DIASTOLIC BLOOD PRESSURE: 60 MMHG | SYSTOLIC BLOOD PRESSURE: 118 MMHG

## 2024-06-26 DIAGNOSIS — Z96.651 S/P TKR (TOTAL KNEE REPLACEMENT), RIGHT: ICD-10-CM

## 2024-06-26 DIAGNOSIS — E87.1 HYPONATREMIA: ICD-10-CM

## 2024-06-26 DIAGNOSIS — N30.00 ACUTE CYSTITIS WITHOUT HEMATURIA: ICD-10-CM

## 2024-06-26 DIAGNOSIS — R94.6 ABNORMAL RESULTS OF THYROID FUNCTION STUDIES: ICD-10-CM

## 2024-06-26 DIAGNOSIS — D64.9 ANEMIA, UNSPECIFIED TYPE: ICD-10-CM

## 2024-06-26 DIAGNOSIS — Z09 HOSPITAL DISCHARGE FOLLOW-UP: Primary | ICD-10-CM

## 2024-06-26 DIAGNOSIS — N18.30 STAGE 3 CHRONIC KIDNEY DISEASE, UNSPECIFIED WHETHER STAGE 3A OR 3B CKD (HCC): ICD-10-CM

## 2024-06-26 PROCEDURE — 97116 GAIT TRAINING THERAPY: CPT

## 2024-06-26 PROCEDURE — 97110 THERAPEUTIC EXERCISES: CPT

## 2024-06-26 RX ORDER — AMOXICILLIN 500 MG/1
500 CAPSULE ORAL 3 TIMES DAILY
COMMUNITY

## 2024-06-26 RX ORDER — ACETAMINOPHEN 500 MG
500 TABLET ORAL EVERY 6 HOURS PRN
COMMUNITY

## 2024-06-26 SDOH — ECONOMIC STABILITY: FOOD INSECURITY: WITHIN THE PAST 12 MONTHS, YOU WORRIED THAT YOUR FOOD WOULD RUN OUT BEFORE YOU GOT MONEY TO BUY MORE.: NEVER TRUE

## 2024-06-26 SDOH — ECONOMIC STABILITY: FOOD INSECURITY: WITHIN THE PAST 12 MONTHS, THE FOOD YOU BOUGHT JUST DIDN'T LAST AND YOU DIDN'T HAVE MONEY TO GET MORE.: NEVER TRUE

## 2024-06-26 SDOH — ECONOMIC STABILITY: INCOME INSECURITY: HOW HARD IS IT FOR YOU TO PAY FOR THE VERY BASICS LIKE FOOD, HOUSING, MEDICAL CARE, AND HEATING?: NOT HARD AT ALL

## 2024-06-26 ASSESSMENT — PAIN SCALES - GENERAL: PAINLEVEL_OUTOF10: 6

## 2024-06-26 ASSESSMENT — PAIN DESCRIPTION - LOCATION: LOCATION: KNEE

## 2024-06-26 ASSESSMENT — PAIN DESCRIPTION - DESCRIPTORS: DESCRIPTORS: ACHING

## 2024-06-26 ASSESSMENT — PAIN DESCRIPTION - ORIENTATION: ORIENTATION: RIGHT

## 2024-06-26 ASSESSMENT — PAIN DESCRIPTION - PAIN TYPE: TYPE: SURGICAL PAIN

## 2024-06-26 NOTE — PROGRESS NOTES
[place] [address] [facility] [place] [address]  Phone: [phone]      Date: 2024  Patient: Rayna Hdez  : 1955   Confirmed: Yes  MRN: [id number]  Referring Provider: Rosaline Peterson DO    Medical Diagnosis: Impaired mobility and activities of daily living [Z74.09, Z78.9]       Treatment Diagnosis: decreased R knee AROM, RLE strength, decreased balance, impaired gait, decreased flexibility, increased risk for falls, decreased functional activity tolerance, c/o pain and edema.    Visit Information:  Insurance: Payor: MEDICARE / Plan: MEDICARE PART A AND B / Product Type: *No Product type* /   PT Visit Information  Onset Date: 24  Total # of Visits Approved: 99 (BMN)  Total # of Visits to Date: 5  Plan of Care/Certification Expiration Date: 24  No Show: 0  Progress Note Due Date: 24  Canceled Appointment: 0  Progress Note Counter:     Subjective Information:  Subjective: Pt reports she feels more pain today.  She states its been 5 hours since she took her pain medication.  She has been wearing her compression legging and has been icing has been helping  HEP Compliance:  [x] Good [] Fair [] Poor [] Reports not doing due to:    Pain Screening  Patient Currently in Pain: Yes  Pain Assessment: 0-10  Pain Level: 6 (when walking)  Pain Type: Surgical pain  Pain Location: Knee  Pain Orientation: Right  Pain Descriptors: Aching    Treatment:  Exercises:  Exercises  Exercise 3: STS from lowered table 5x2  Exercise 9: Gait training in // bars x 4-5laps each - fwd/lat/retro, 2-3 laps fwd/lat w/ hurdles  Exercise 10: Gait training w/ SPC x 150ft w/ SBA  Exercise 20: HEP: Cont Current       *Indicates exercise, modality, or manual techniques to be initiated when appropriate    Objective Measures:         LTG 1 Current Status:: : Pt amb w/ SPC w/ SBA-SUP     Assessment:   Body Structures, Functions, Activity Limitations Requiring Skilled Therapeutic Intervention:

## 2024-06-26 NOTE — PROGRESS NOTES
tablet  Commonly known as: PLAQUENIL     melatonin 5 MG Tabs tablet     nitroGLYCERIN 0.4 MG SL tablet  Commonly known as: NITROSTAT  PLEASE SEE ATTACHED FOR DETAILED DIRECTIONS     nystatin 247138 UNIT/GM cream  Commonly known as: MYCOSTATIN     omeprazole 40 MG delayed release capsule  Commonly known as: PRILOSEC     polyethylene glycol 17 g Pack packet  Commonly known as: MIRALAX     Vitamin D3 25 MCG (1000 UT) Caps           * This list has 3 medication(s) that are the same as other medications prescribed for you. Read the directions carefully, and ask your doctor or other care provider to review them with you.                   Medications marked \"taking\" at this time  Outpatient Medications Marked as Taking for the 6/26/24 encounter (Office Visit) with Hailey Figueroa PA-C   Medication Sig Dispense Refill    acetaminophen (TYLENOL) 500 MG tablet Take 1 tablet by mouth every 6 hours as needed for Pain      amoxicillin (AMOXIL) 500 MG capsule Take 1 capsule by mouth 3 times daily      baclofen (LIORESAL) 10 MG tablet TAKE 1 TABLET BY MOUTH NIGHTLY AS NEEDED FOR MUSCLE SPASM 90 tablet 0    gabapentin (NEURONTIN) 300 MG capsule TAKE 1 CAPSULE BY MOUTH IN THE MORNING AND 2 AT BEDTIME AS  TOLERATED 180 capsule 0    furosemide (LASIX) 20 MG tablet Take 1 tablet by mouth 2 times daily      aspirin 81 MG EC tablet Take 1 tablet by mouth in the morning and at bedtime 40 tablet 0    coenzyme Q10 100 MG CAPS capsule Take 1 capsule by mouth daily 120 capsule 3    cyclobenzaprine (FLEXERIL) 5 MG tablet Take 1 tablet by mouth 3 times daily as needed for Muscle spasms 60 tablet 1    melatonin 5 MG TABS tablet Take 1 tablet by mouth nightly      polyethylene glycol (MIRALAX) 17 g PACK packet Take 17 g by mouth daily      docusate sodium (COLACE) 100 MG capsule Take 1 capsule by mouth 2 times daily      Cyanocobalamin 1000 MCG/ML KIT 1 mL      Misc. Devices (BATH/SHOWER SEAT) MISC Dispense 1 Shower/Bath seat 1 each 0    
facility-administered medications for this visit.       Objective    Vitals:    06/26/24 1329   BP: 118/60   Site: Right Upper Arm   Position: Sitting   Cuff Size: Large Adult   Pulse: 74   Temp: 97.6 °F (36.4 °C)   TempSrc: Temporal   SpO2: 96%   Weight: 92.5 kg (204 lb)   Height: 1.702 m (5' 7.01\")     Physical Exam

## 2024-06-27 DIAGNOSIS — N30.00 ACUTE CYSTITIS WITHOUT HEMATURIA: ICD-10-CM

## 2024-06-27 LAB
BACTERIA URNS QL MICRO: ABNORMAL /HPF
BILIRUB UR QL STRIP: NEGATIVE
CLARITY UR: ABNORMAL
COLOR UR: YELLOW
EPI CELLS #/AREA URNS AUTO: ABNORMAL /HPF (ref 0–5)
GLUCOSE UR STRIP-MCNC: NEGATIVE MG/DL
HGB UR QL STRIP: ABNORMAL
HYALINE CASTS #/AREA URNS AUTO: ABNORMAL /HPF (ref 0–5)
KETONES UR STRIP-MCNC: NEGATIVE MG/DL
LEUKOCYTE ESTERASE UR QL STRIP: ABNORMAL
NITRITE UR QL STRIP: POSITIVE
PH UR STRIP: 6.5 [PH] (ref 5–9)
PROT UR STRIP-MCNC: 30 MG/DL
RBC #/AREA URNS AUTO: ABNORMAL /HPF (ref 0–5)
SP GR UR STRIP: 1.01 (ref 1–1.03)
UROBILINOGEN UR STRIP-ACNC: 0.2 E.U./DL
WBC #/AREA URNS AUTO: >100 /HPF (ref 0–5)

## 2024-06-28 ENCOUNTER — HOSPITAL ENCOUNTER (OUTPATIENT)
Dept: PHYSICAL THERAPY | Age: 69
Setting detail: THERAPIES SERIES
Discharge: HOME | End: 2024-06-28
Attending: PHYSICAL MEDICINE & REHABILITATION
Payer: MEDICARE

## 2024-06-28 DIAGNOSIS — D64.9 ANEMIA, UNSPECIFIED TYPE: ICD-10-CM

## 2024-06-28 DIAGNOSIS — R94.6 ABNORMAL RESULTS OF THYROID FUNCTION STUDIES: ICD-10-CM

## 2024-06-28 DIAGNOSIS — N18.30 STAGE 3 CHRONIC KIDNEY DISEASE, UNSPECIFIED WHETHER STAGE 3A OR 3B CKD (HCC): ICD-10-CM

## 2024-06-28 LAB
ALBUMIN SERPL-MCNC: 4.4 G/DL (ref 3.5–4.6)
ALP SERPL-CCNC: 159 U/L (ref 40–130)
ALT SERPL-CCNC: 14 U/L (ref 0–33)
ANION GAP SERPL CALCULATED.3IONS-SCNC: 15 MEQ/L (ref 9–15)
AST SERPL-CCNC: 22 U/L (ref 0–35)
BASOPHILS # BLD: 0.1 K/UL (ref 0–0.2)
BASOPHILS NFR BLD: 1.1 %
BILIRUB SERPL-MCNC: 0.5 MG/DL (ref 0.2–0.7)
BUN SERPL-MCNC: 20 MG/DL (ref 8–23)
CALCIUM SERPL-MCNC: 9.7 MG/DL (ref 8.5–9.9)
CHLORIDE SERPL-SCNC: 91 MEQ/L (ref 95–107)
CO2 SERPL-SCNC: 25 MEQ/L (ref 20–31)
CREAT SERPL-MCNC: 1.38 MG/DL (ref 0.5–0.9)
EOSINOPHIL # BLD: 0.2 K/UL (ref 0–0.7)
EOSINOPHIL NFR BLD: 3 %
ERYTHROCYTE [DISTWIDTH] IN BLOOD BY AUTOMATED COUNT: 14.9 % (ref 11.5–14.5)
FERRITIN: 173 NG/ML (ref 13–150)
GLOBULIN SER CALC-MCNC: 2.6 G/DL (ref 2.3–3.5)
GLUCOSE SERPL-MCNC: 101 MG/DL (ref 70–99)
HCT VFR BLD AUTO: 35 % (ref 37–47)
HGB BLD-MCNC: 11.4 G/DL (ref 12–16)
IRON % SATURATION: 19 % (ref 20–55)
IRON: 61 UG/DL (ref 37–145)
LYMPHOCYTES # BLD: 0.9 K/UL (ref 1–4.8)
LYMPHOCYTES NFR BLD: 16.3 %
MCH RBC QN AUTO: 27.7 PG (ref 27–31.3)
MCHC RBC AUTO-ENTMCNC: 32.6 % (ref 33–37)
MCV RBC AUTO: 85.2 FL (ref 79.4–94.8)
MONOCYTES # BLD: 0.6 K/UL (ref 0.2–0.8)
MONOCYTES NFR BLD: 10.4 %
NEUTROPHILS # BLD: 3.9 K/UL (ref 1.4–6.5)
NEUTS SEG NFR BLD: 69 %
PLATELET # BLD AUTO: 221 K/UL (ref 130–400)
POTASSIUM SERPL-SCNC: 4.5 MEQ/L (ref 3.4–4.9)
PROT SERPL-MCNC: 7 G/DL (ref 6.3–8)
RBC # BLD AUTO: 4.11 M/UL (ref 4.2–5.4)
SODIUM SERPL-SCNC: 131 MEQ/L (ref 135–144)
TOTAL IRON BINDING CAPACITY: 324 UG/DL (ref 250–450)
TSH REFLEX: 3.32 UIU/ML (ref 0.44–3.86)
UNSATURATED IRON BINDING CAPACITY: 263 UG/DL (ref 112–347)
WBC # BLD AUTO: 5.6 K/UL (ref 4.8–10.8)

## 2024-06-28 PROCEDURE — 97116 GAIT TRAINING THERAPY: CPT

## 2024-06-28 PROCEDURE — 97110 THERAPEUTIC EXERCISES: CPT

## 2024-06-28 ASSESSMENT — PAIN DESCRIPTION - PAIN TYPE: TYPE: CHRONIC PAIN

## 2024-06-28 ASSESSMENT — PAIN DESCRIPTION - ORIENTATION: ORIENTATION: LEFT;LOWER

## 2024-06-28 ASSESSMENT — PAIN DESCRIPTION - LOCATION: LOCATION: KNEE;BACK

## 2024-06-28 ASSESSMENT — PAIN SCALES - GENERAL: PAINLEVEL_OUTOF10: 4

## 2024-06-28 ASSESSMENT — PAIN DESCRIPTION - DESCRIPTORS: DESCRIPTORS: ACHING;STABBING

## 2024-06-28 NOTE — PROGRESS NOTES
[place] [id number] [facility] [place] [address]  Phone: [phone]      Date: 2024  Patient: Rayna Hdez  : 1955   Confirmed: Yes  MRN: [id number]  Referring Provider: Rosaline Peterson DO    Medical Diagnosis: Impaired mobility and activities of daily living [Z74.09, Z78.9]       Treatment Diagnosis: decreased R knee AROM, RLE strength, decreased balance, impaired gait, decreased flexibility, increased risk for falls, decreased functional activity tolerance, c/o pain and edema.    Visit Information:  Insurance: Payor: MEDICARE / Plan: MEDICARE PART A AND B / Product Type: *No Product type* /   PT Visit Information  Onset Date: 24  Total # of Visits Approved: 99 (BMN)  Total # of Visits to Date: 6  Plan of Care/Certification Expiration Date: 24  No Show: 0  Progress Note Due Date: 24  Canceled Appointment: 0  Progress Note Counter:     Subjective Information:  Subjective: Pt reports shes doing pretty good and says she has taken the riser seat off the toilet. Says she walked a lot and is sore with some back pain.  HEP Compliance:  [x] Good [] Fair [] Poor [] Reports not doing due to:    Pain Screening  Patient Currently in Pain: Yes  Pain Assessment: 0-10  Pain Level: 4 (4 when walking)  Pain Type: Chronic pain  Pain Location: Knee, Back  Pain Orientation: Left, Lower  Pain Descriptors: Aching, Stabbing    Treatment:  Exercises:  Exercises  Exercise 1: Knee flexion stretch at steps x10 x5 2-3 sec  Exercise 6: Step ups R knee 4\" step Fwd 2x10  Exercise 7: Standing hip 3 way hip abd x8 ea/ ext x10 ea  Exercise 9: Gait training in // bars x 4-5laps each - fwd/lat/retro, 2-3 laps fwd/lat w/ hurdles  Exercise 11: Nustep S10 A-8 x 5 mins for range           Objective Measures:      AROM RLE Flexion 105, Ext. -3       Assessment:   Body Structures, Functions, Activity Limitations Requiring Skilled Therapeutic Intervention: Decreased functional mobility , Decreased

## 2024-06-30 DIAGNOSIS — N30.00 ACUTE CYSTITIS WITHOUT HEMATURIA: Primary | ICD-10-CM

## 2024-06-30 LAB
BACTERIA UR CULT: ABNORMAL
BACTERIA UR CULT: ABNORMAL
ORGANISM: ABNORMAL

## 2024-06-30 RX ORDER — LEVOFLOXACIN 250 MG/1
250 TABLET, FILM COATED ORAL DAILY
Qty: 10 TABLET | Refills: 0 | Status: SHIPPED | OUTPATIENT
Start: 2024-06-30 | End: 2024-07-10

## 2024-07-01 ENCOUNTER — HOSPITAL ENCOUNTER (OUTPATIENT)
Dept: PHYSICAL THERAPY | Age: 69
Setting detail: THERAPIES SERIES
Discharge: HOME | End: 2024-07-01
Attending: PHYSICAL MEDICINE & REHABILITATION
Payer: MEDICARE

## 2024-07-01 PROCEDURE — 97110 THERAPEUTIC EXERCISES: CPT

## 2024-07-01 ASSESSMENT — PAIN DESCRIPTION - LOCATION: LOCATION: KNEE;BACK

## 2024-07-01 ASSESSMENT — PAIN SCALES - GENERAL: PAINLEVEL_OUTOF10: 3

## 2024-07-01 ASSESSMENT — PAIN DESCRIPTION - ORIENTATION: ORIENTATION: LEFT;LOWER

## 2024-07-01 ASSESSMENT — PAIN DESCRIPTION - DESCRIPTORS: DESCRIPTORS: ACHING

## 2024-07-01 ASSESSMENT — PAIN DESCRIPTION - PAIN TYPE: TYPE: CHRONIC PAIN

## 2024-07-01 NOTE — PROGRESS NOTES
[place] [address] [place] [address]  Phone: [phone]      Date: 2024  Patient: Rayna Hdez  : 1955   Confirmed: Yes  MRN: [id number]  Referring Provider: Rosaline Peterson DO    Medical Diagnosis: Impaired mobility and activities of daily living [Z74.09, Z78.9]       Treatment Diagnosis: decreased R knee AROM, RLE strength, decreased balance, impaired gait, decreased flexibility, increased risk for falls, decreased functional activity tolerance, c/o pain and edema.    Visit Information:  Insurance: Payor: MEDICARE / Plan: MEDICARE PART A AND B / Product Type: *No Product type* /   PT Visit Information  Onset Date: 24  Total # of Visits Approved: 99 (BMN)  Total # of Visits to Date: 7  Plan of Care/Certification Expiration Date: 24  No Show: 0  Progress Note Due Date: 24  Canceled Appointment: 0  Progress Note Counter:     Subjective Information:  Subjective: Pt reports she had back pain following her hip extensions from her last appointments.  Pt would like to transition with the cane.  HEP Compliance:  [x] Good [] Fair [] Poor [] Reports not doing due to:    Pain Screening  Patient Currently in Pain: Yes  Pain Level: 3  Pain Type: Chronic pain  Pain Location: Knee, Back  Pain Orientation: Left, Lower  Pain Descriptors: Aching    Treatment:  Exercises:  Exercises  Exercise 1: Knee flexion stretch at steps x10 x5 2-3 sec  Exercise 3: STS from lowered table 5x2  Exercise 4: LAQ: 20 x 5\"  Exercise 7: Standing hip 3 way hip abd x8 ea/ ext x10 ea  Exercise 10: Gait training w/ SPC 250ft x 2 w/ contact guard-stand by assist on uneven ground on sidewalks outside of facility  Exercise 13: SLR x 10 candelaria  Exercise 14: Attempted bridges, pt unable to complete due to difficulty completing movement.     *Indicates exercise, modality, or manual techniques to be initiated when appropriate    Objective Measures:         LTG 1 Current Status:: 24: Pt amb w/

## 2024-07-03 ENCOUNTER — PROCEDURE VISIT (OUTPATIENT)
Dept: PHYSICAL MEDICINE AND REHAB | Age: 69
End: 2024-07-03
Payer: MEDICARE

## 2024-07-03 DIAGNOSIS — G89.29 CHRONIC BILATERAL LOW BACK PAIN WITH BILATERAL SCIATICA: Primary | ICD-10-CM

## 2024-07-03 DIAGNOSIS — M54.41 CHRONIC BILATERAL LOW BACK PAIN WITH BILATERAL SCIATICA: Primary | ICD-10-CM

## 2024-07-03 DIAGNOSIS — M54.42 CHRONIC BILATERAL LOW BACK PAIN WITH BILATERAL SCIATICA: Primary | ICD-10-CM

## 2024-07-03 PROCEDURE — 96372 THER/PROPH/DIAG INJ SC/IM: CPT | Performed by: PHYSICAL MEDICINE & REHABILITATION

## 2024-07-03 PROCEDURE — 64445 NJX AA&/STRD SCIATIC NRV IMG: CPT | Performed by: PHYSICAL MEDICINE & REHABILITATION

## 2024-07-03 RX ORDER — LIDOCAINE HYDROCHLORIDE 20 MG/ML
5 INJECTION, SOLUTION INFILTRATION; PERINEURAL ONCE
Status: COMPLETED | OUTPATIENT
Start: 2024-07-03 | End: 2024-07-03

## 2024-07-03 RX ORDER — CYANOCOBALAMIN 1000 UG/ML
1000 INJECTION, SOLUTION INTRAMUSCULAR; SUBCUTANEOUS ONCE
Status: COMPLETED | OUTPATIENT
Start: 2024-07-03 | End: 2024-07-03

## 2024-07-03 RX ORDER — LIDOCAINE HYDROCHLORIDE 10 MG/ML
7 INJECTION, SOLUTION INFILTRATION; PERINEURAL ONCE
Status: COMPLETED | OUTPATIENT
Start: 2024-07-03 | End: 2024-07-03

## 2024-07-03 RX ADMIN — CYANOCOBALAMIN 1000 MCG: 1000 INJECTION, SOLUTION INTRAMUSCULAR; SUBCUTANEOUS at 11:18

## 2024-07-03 RX ADMIN — LIDOCAINE HYDROCHLORIDE 5 ML: 20 INJECTION, SOLUTION INFILTRATION; PERINEURAL at 11:21

## 2024-07-03 RX ADMIN — LIDOCAINE HYDROCHLORIDE 7 ML: 10 INJECTION, SOLUTION INFILTRATION; PERINEURAL at 11:19

## 2024-07-03 NOTE — PROGRESS NOTES
Patient here for Left Sciatic injection with U/S. Patient taken back to exam room, and placed on drape locking stool. Areas to be injected marked appropriately, and cleansed with alcohol. 7cc of 1% Lidocaine with 1cc B-12, and 5cc of 2% Lidocaine to be injected by provider.

## 2024-07-05 ENCOUNTER — HOSPITAL ENCOUNTER (OUTPATIENT)
Dept: PHYSICAL THERAPY | Age: 69
Setting detail: THERAPIES SERIES
Discharge: HOME | End: 2024-07-05
Attending: PHYSICAL MEDICINE & REHABILITATION
Payer: MEDICARE

## 2024-07-05 PROCEDURE — 97110 THERAPEUTIC EXERCISES: CPT

## 2024-07-05 PROCEDURE — 97116 GAIT TRAINING THERAPY: CPT

## 2024-07-05 ASSESSMENT — PAIN DESCRIPTION - DESCRIPTORS: DESCRIPTORS: SORE;ACHING

## 2024-07-05 ASSESSMENT — PAIN DESCRIPTION - ORIENTATION: ORIENTATION: LEFT;LOWER

## 2024-07-05 ASSESSMENT — PAIN SCALES - GENERAL: PAINLEVEL_OUTOF10: 5

## 2024-07-05 ASSESSMENT — PAIN DESCRIPTION - PAIN TYPE: TYPE: CHRONIC PAIN

## 2024-07-05 ASSESSMENT — PAIN DESCRIPTION - LOCATION: LOCATION: BACK;KNEE

## 2024-07-05 NOTE — PROGRESS NOTES
[place] [address] [facility] [place] [address]  Phone: [phone]      Date: 2024  Patient: Rayna Hdez  : 1955   Confirmed: Yes  MRN: [id number]  Referring Provider: Rosaline Peterson DO    Medical Diagnosis: Impaired mobility and activities of daily living [Z74.09, Z78.9]       Treatment Diagnosis: decreased R knee AROM, RLE strength, decreased balance, impaired gait, decreased flexibility, increased risk for falls, decreased functional activity tolerance, c/o pain and edema.    Visit Information:  Insurance: Payor: MEDICARE / Plan: MEDICARE PART A AND B / Product Type: *No Product type* /   PT Visit Information  Onset Date: 24  Total # of Visits Approved: 99 (BMN)  Total # of Visits to Date: 8  Plan of Care/Certification Expiration Date: 24  No Show: 0  Progress Note Due Date: 24  Canceled Appointment: 0  Progress Note Counter:     Subjective Information:  Subjective: the other day I was showering bumped my leg pretty good which required me to stand the entire time which helped but I have a good cut on my leg. I stood for a total of about 4 hours yesterday.  HEP Compliance:  [x] Good [] Fair [] Poor [] Reports not doing due to:    Pain Screening  Patient Currently in Pain: Yes  Pain Level: 5  Pain Type: Chronic pain  Pain Location: Back, Knee  Pain Orientation: Left, Lower  Pain Descriptors: Sore, Aching    Treatment:  Exercises:  Exercises  Exercise 1: Knee flexion stretch at steps x10 x5 2-3 sec  Exercise 3: STS from lowered table 5 x 3  Exercise 7: Standing hip Marches/abd/ext to tolerance each  Exercise 10: Gait training w/ SPC 250ft x 2 w/ contact guard-stand by assist on uneven ground on sidewalks outside of facility  Exercise 14: Attempted bridges, pt unable to complete due to difficulty completing movement.        Modalities:  Cryotherapy (CPT 58746)  Patient Position: Seated  Cryotherapy location: Right  Post treatment skin assessment:

## 2024-07-08 DIAGNOSIS — G89.18 POST-OP PAIN: ICD-10-CM

## 2024-07-08 DIAGNOSIS — G89.29 CHRONIC BILATERAL LOW BACK PAIN WITH BILATERAL SCIATICA: ICD-10-CM

## 2024-07-08 DIAGNOSIS — M25.561 RIGHT KNEE PAIN, UNSPECIFIED CHRONICITY: ICD-10-CM

## 2024-07-08 DIAGNOSIS — Z79.899 HIGH RISK MEDICATION USE: ICD-10-CM

## 2024-07-08 DIAGNOSIS — M15.9 PRIMARY OSTEOARTHRITIS INVOLVING MULTIPLE JOINTS: ICD-10-CM

## 2024-07-08 DIAGNOSIS — M05.79 RHEUMATOID ARTHRITIS INVOLVING MULTIPLE SITES WITH POSITIVE RHEUMATOID FACTOR (HCC): ICD-10-CM

## 2024-07-08 DIAGNOSIS — M54.41 CHRONIC BILATERAL LOW BACK PAIN WITH BILATERAL SCIATICA: ICD-10-CM

## 2024-07-08 DIAGNOSIS — M54.42 CHRONIC BILATERAL LOW BACK PAIN WITH BILATERAL SCIATICA: ICD-10-CM

## 2024-07-09 ENCOUNTER — HOSPITAL ENCOUNTER (OUTPATIENT)
Dept: PHYSICAL THERAPY | Age: 69
Setting detail: THERAPIES SERIES
Discharge: HOME | End: 2024-07-09
Attending: PHYSICAL MEDICINE & REHABILITATION
Payer: MEDICARE

## 2024-07-09 PROCEDURE — 97110 THERAPEUTIC EXERCISES: CPT

## 2024-07-09 RX ORDER — OXYCODONE AND ACETAMINOPHEN 10; 325 MG/1; MG/1
1 TABLET ORAL EVERY 6 HOURS PRN
Qty: 100 TABLET | Refills: 0 | Status: SHIPPED | OUTPATIENT
Start: 2024-07-09 | End: 2024-08-08

## 2024-07-09 ASSESSMENT — PAIN DESCRIPTION - LOCATION: LOCATION: KNEE

## 2024-07-09 ASSESSMENT — PAIN DESCRIPTION - DESCRIPTORS: DESCRIPTORS: ACHING

## 2024-07-09 ASSESSMENT — PAIN SCALES - GENERAL: PAINLEVEL_OUTOF10: 4

## 2024-07-09 ASSESSMENT — PAIN DESCRIPTION - ORIENTATION: ORIENTATION: LEFT

## 2024-07-09 ASSESSMENT — PAIN DESCRIPTION - PAIN TYPE: TYPE: SURGICAL PAIN

## 2024-07-09 NOTE — PROGRESS NOTES
[facility]  [address] [place] [address]  Phone: [phone]      Date: 2024  Patient: Rayna Hdez  : 1955   Confirmed: Yes  MRN: [id number]  Referring Provider: Rosaline Peterson DO    Medical Diagnosis: Impaired mobility and activities of daily living [Z74.09, Z78.9]       Treatment Diagnosis: decreased R knee AROM, RLE strength, decreased balance, impaired gait, decreased flexibility, increased risk for falls, decreased functional activity tolerance, c/o pain and edema.    Visit Information:  Insurance: Payor: MEDICARE / Plan: MEDICARE PART A AND B / Product Type: *No Product type* /   PT Visit Information  Onset Date: 24  Total # of Visits Approved: 99 (BMN)  Total # of Visits to Date: 9  Plan of Care/Certification Expiration Date: 24  No Show: 0  Progress Note Due Date: 24  Canceled Appointment: 0  Progress Note Counter:     Subjective Information:  Subjective: Pt believes she may be having an exacerbation w/ her RA.  Pt reports being able to stand more and do dishes on the .  Pt reports walking from parking lot to her [employer] w/ use of SPC and was able to complete w/out loss of balance but felt fatigued following.  HEP Compliance:  [] Good [x] Fair [] Poor [] Reports not doing due to:    Pain Screening  Patient Currently in Pain: Yes  Pain Assessment: 0-10  Pain Level: 4  Pain Type: Surgical pain  Pain Location: Knee  Pain Orientation: Left  Pain Descriptors: Aching    Treatment:  Exercises:  Exercises  Exercise 1: Knee flexion stretch at steps 20\" x 5  Exercise 2: Standing HS stretch at steps 20\" x 5  Exercise 3: STS from lowered table 5 x 4  Exercise 4: LAQ: 20 x 5\", progress intensity next time.  Exercise 6: Step ups R knee 8\" step Fwd 2x10  Exercise 7: Standing hip Marches/abd/ext to tolerance each  Exercise 13: SLR x 10 x 2  Exercise 20: HEP: Cont Current       *Indicates exercise, modality, or manual techniques to be initiated when

## 2024-07-11 ENCOUNTER — OFFICE VISIT (OUTPATIENT)
Dept: ORTHOPEDIC SURGERY | Age: 69
End: 2024-07-11

## 2024-07-11 ENCOUNTER — HOSPITAL ENCOUNTER (OUTPATIENT)
Dept: ORTHOPEDIC SURGERY | Age: 69
Discharge: HOME | End: 2024-07-13
Payer: MEDICARE

## 2024-07-11 VITALS
BODY MASS INDEX: 32.02 KG/M2 | WEIGHT: 204 LBS | TEMPERATURE: 97.1 F | OXYGEN SATURATION: 97 % | HEART RATE: 76 BPM | HEIGHT: 67 IN

## 2024-07-11 DIAGNOSIS — M25.561 RIGHT KNEE PAIN, UNSPECIFIED CHRONICITY: ICD-10-CM

## 2024-07-11 DIAGNOSIS — M25.561 RIGHT KNEE PAIN, UNSPECIFIED CHRONICITY: Primary | ICD-10-CM

## 2024-07-11 PROCEDURE — 73562 X-RAY EXAM OF KNEE 3: CPT

## 2024-07-11 PROCEDURE — 99024 POSTOP FOLLOW-UP VISIT: CPT | Performed by: PHYSICIAN ASSISTANT

## 2024-07-11 RX ORDER — CHLORTHALIDONE 25 MG/1
25 TABLET ORAL
COMMUNITY
Start: 2024-06-06

## 2024-07-11 RX ORDER — DILTIAZEM HYDROCHLORIDE 60 MG/1
60 TABLET, FILM COATED ORAL 2 TIMES DAILY
COMMUNITY
Start: 2024-06-24

## 2024-07-11 RX ORDER — GRANULES FOR ORAL 3 G/1
POWDER ORAL
COMMUNITY
Start: 2024-06-20

## 2024-07-11 RX ORDER — NITROFURANTOIN 25; 75 MG/1; MG/1
100 CAPSULE ORAL 2 TIMES DAILY
COMMUNITY
Start: 2024-06-03

## 2024-07-11 NOTE — PROGRESS NOTES
UNIT/GM cream APPLY CREAM TOPICALLY TWICE DAILY AS NEEDED TO RASH      diclofenac sodium (VOLTAREN) 1 % GEL Apply topically as needed for Pain      atorvastatin (LIPITOR) 10 MG tablet TAKE 1 TABLET BY MOUTH ONCE DAILY NIGHTLY 90 tablet 2    hydrocortisone 2.5 % cream Apply topically 2 times daily. (Patient taking differently: Apply topically 2 times daily.    PRN) 30 g 2    omeprazole (PRILOSEC) 40 MG delayed release capsule Take 1 capsule by mouth daily      hydroxychloroquine (PLAQUENIL) 200 MG tablet Take 1 tablet by mouth daily      nitroGLYCERIN (NITROSTAT) 0.4 MG SL tablet PLEASE SEE ATTACHED FOR DETAILED DIRECTIONS 25 tablet 3    Cholecalciferol (VITAMIN D3) 25 MCG (1000 UT) CAPS Take 1 tablet by mouth in the morning and at bedtime       No current facility-administered medications on file prior to visit.       Objective:   Pulse 76   Temp 97.1 °F (36.2 °C) (Temporal)   Ht 1.702 m (5' 7\")   Wt 92.5 kg (204 lb)   SpO2 97%   BMI 31.95 kg/m²       Radiographs and Laboratory Studies:   Previous diagnostic imaging studies were reviewed.   X-rays of the right knee show a well-placed prosthetic without a lucency or fracture.  The patella is well within the femoral groove.  The right knee has flexion to about 130 degrees.  She has about 5 degrees off full extension.  There is no laxity varus valgus stress.  No significant swelling or effusion.  Knee cap gliding perfectly within the femoral groove.    Laboratory Studies:   Lab Results   Component Value Date    WBC 5.6 06/28/2024    HGB 11.4 (L) 06/28/2024    HCT 35.0 (L) 06/28/2024    MCV 85.2 06/28/2024     06/28/2024     Lab Results   Component Value Date    SEDRATE 40 09/27/2018     Lab Results   Component Value Date    CRP <0.3 10/16/2020       Assessment and Plan:      Diagnosis Orders   1. Right knee pain, unspecified chronicity  XR KNEE RIGHT (3 VIEWS)        Very pleasant individual here 6 weeks after total knee replacement Dr. Williamson on the right

## 2024-07-12 ENCOUNTER — HOSPITAL ENCOUNTER (OUTPATIENT)
Dept: PHYSICAL THERAPY | Age: 69
Setting detail: THERAPIES SERIES
Discharge: HOME | End: 2024-07-12
Attending: PHYSICAL MEDICINE & REHABILITATION
Payer: MEDICARE

## 2024-07-12 PROCEDURE — 97110 THERAPEUTIC EXERCISES: CPT

## 2024-07-12 ASSESSMENT — PAIN SCALES - GENERAL: PAINLEVEL_OUTOF10: 3

## 2024-07-12 ASSESSMENT — PAIN DESCRIPTION - DESCRIPTORS: DESCRIPTORS: ACHING

## 2024-07-12 ASSESSMENT — PAIN DESCRIPTION - ORIENTATION: ORIENTATION: LEFT

## 2024-07-12 ASSESSMENT — PAIN DESCRIPTION - PAIN TYPE: TYPE: SURGICAL PAIN

## 2024-07-12 ASSESSMENT — PAIN DESCRIPTION - LOCATION: LOCATION: KNEE

## 2024-07-12 NOTE — PROGRESS NOTES
[facility]  [address] [place] [address]  Phone: [phone]    [] Certification  [] Recertification []  Plan of Care  [x] Progress Note [] Discharge      Referring Provider: Rosaline Peterson DO     From:  Sidney Shah DPT   Patient: Rayna Hdez (69 y.o. female) : 1955 Date: 2024  Medical Diagnosis: Impaired mobility and activities of daily living [Z74.09, Z78.9]       Treatment Diagnosis: decreased R knee AROM, RLE strength, decreased balance, impaired gait, decreased flexibility, increased risk for falls, decreased functional activity tolerance, c/o pain and edema.    Plan of Care/Certification Expiration Date: 24   Progress Report Period from:  2024  to 2024    Visits to Date: 10 No Show: 0 Cancelled Appts: 0    OBJECTIVE:       Long Term Goals - Time Frame for Long Term Goals : 8 weeks  Goals Current/ Discharge status Status   Long Term Goal 1: The pt will ambulate >/=350 ft with LRD(SPC) mod indep to increase ease and safety with community ambulation LTG 1 Current Status:: 24:  Pt reports ambualtion tolerance of 40-60 min in store shopping.  300ft with SPC for distance. as reported by pt.   In progress, Partially met   Long Term Goal 2: The pt will have decreased pain </= 1/10 in order to increase ease with ADL's LTG 2 Current Status:: 24:  Pt reports pain ranges  from 0/10 with medication to 6/10 @ worse with normal ADLs   In progress, Partially met   Long Term Goal 3: The pt will demo improved R Knee AROM in order to increase ease of ADL's LTG 3 Current Status:: 24: -4°-114°   In progress, Partially met   Long Term Goal 4: The pt will have an increase in LEFS score >/=10 points in order to increase functional activity tolerance LTG 4 Current Status:: 24: LEFS score of 38/80 (+16 point improvement)   In progress   Long Term Goal 5: The pt will demo improved 5x sit to stand w/ BUE use to </=15 seconds in order to increase

## 2024-07-12 NOTE — PROGRESS NOTES
[place] [address] [facility] [place] [address]  Phone: [phone]      Date: 2024  Patient: Rayna Hdez  : 1955   Confirmed: Yes  MRN: [id number]  Referring Provider: Rosaline Peterson DO    Medical Diagnosis: Impaired mobility and activities of daily living [Z74.09, Z78.9]       Treatment Diagnosis: decreased R knee AROM, RLE strength, decreased balance, impaired gait, decreased flexibility, increased risk for falls, decreased functional activity tolerance, c/o pain and edema.    Visit Information:  Insurance: Payor: MEDICARE / Plan: MEDICARE PART A AND B / Product Type: *No Product type* /   PT Visit Information  Onset Date: 24  Total # of Visits Approved: 99 (BMN)  Total # of Visits to Date: 10  Plan of Care/Certification Expiration Date: 24  No Show: 0  Progress Note Due Date: 24  Canceled Appointment: 0  Progress Note Counter: 10/16    Subjective Information:  Subjective: I've been trying to use my cane on my left side but I feel more comfortable and confident when I use it in my right. Pt reports pain ranges from 0/10 @ best to 6/10 with normal ADLs and 8/10 with recent therapy session.  HEP Compliance:  [x] Good [] Fair [] Poor [] Reports not doing due to:    Pain Screening  Patient Currently in Pain: Yes  Pain Assessment: 0-10  Pain Level: 3  Pain Type: Surgical pain  Pain Location: Knee  Pain Orientation: Left  Pain Descriptors: Aching    Treatment:  Exercises:  Exercises  Exercise 19: Functional Test and Measures  Exercise 20: HEP: Cont Current         Objective Measures:              LTG 1 Current Status:: 24:  Pt reports ambualtion tolerance of 40-60 min in store shopping.  300ft with SPC for distance. as reported by pt.  LTG 2 Current Status:: 24:  Pt reports pain ranges  from 0/10 with medication to 6/10 @ worse with normal ADLs  LTG 3 Current Status:: 24: -4°-114°  LTG 4 Current Status:: 24: LEFS score of 38/80 (+16 point

## 2024-07-15 ENCOUNTER — HOSPITAL ENCOUNTER (OUTPATIENT)
Dept: PHYSICAL THERAPY | Age: 69
Setting detail: THERAPIES SERIES
Discharge: HOME | End: 2024-07-15
Attending: PHYSICAL MEDICINE & REHABILITATION
Payer: MEDICARE

## 2024-07-15 PROCEDURE — 97110 THERAPEUTIC EXERCISES: CPT

## 2024-07-15 NOTE — PROGRESS NOTES
[place] [id number] [facility] [place] [address]  Phone: [phone]      Date: 7/15/2024  Patient: Rayna Hdez  : 1955   Confirmed: Yes  MRN: [id number]  Referring Provider: Rosaline Peterson DO    Medical Diagnosis: Impaired mobility and activities of daily living [Z74.09, Z78.9]       Treatment Diagnosis: decreased R knee AROM, RLE strength, decreased balance, impaired gait, decreased flexibility, increased risk for falls, decreased functional activity tolerance, c/o pain and edema.    Visit Information:  Insurance: Payor: MEDICARE / Plan: MEDICARE PART A AND B / Product Type: *No Product type* /   PT Visit Information  Onset Date: 24  Total # of Visits Approved: 99 (BMN)  Total # of Visits to Date:   Plan of Care/Certification Expiration Date: 24  No Show: 0  Progress Note Due Date: 24  Canceled Appointment: 0  Progress Note Counter:     Subjective Information:  Subjective: Pt walks in today for PT using her own SPC.  Pt reports doing lots of cleaning over the weekend.  HEP Compliance:  [x] Good [] Fair [] Poor [] Reports not doing due to:    Pain Screening  Patient Currently in Pain: Denies    Treatment:  Exercises:  Exercises  Exercise 1: Knee flexion stretch at steps 20\" x 5 BLE  Exercise 2: Standing HS stretch at steps 20\" x 5  Exercise 4: LAQ: 10 x 5\" BLE, w/ 2lb weight  Exercise 7: Standing hip Abd/ext/add w/no TB x 10 BLE (progress next visit)  Exercise 8: Standing Hip Marches w/ 2lb weight  Exercise 11: Nustep S9 L 3 x 6 mins for range and endurance  Exercise 12: Standing Knee Curls w/ 2 lbs weight 10x2 BLE  Exercise 20: HEP: Cont Current       *Indicates exercise, modality, or manual techniques to be initiated when appropriate    Objective Measures:           LTG 1 Current Status:: 7/15/24:  Pt is IND w/ SPC amb for household and community              Assessment:   Body Structures, Functions, Activity Limitations Requiring Skilled Therapeutic

## 2024-07-18 ENCOUNTER — HOSPITAL ENCOUNTER (OUTPATIENT)
Dept: PHYSICAL THERAPY | Age: 69
Setting detail: THERAPIES SERIES
Discharge: HOME | End: 2024-07-18
Attending: PHYSICAL MEDICINE & REHABILITATION
Payer: MEDICARE

## 2024-07-18 PROCEDURE — 97112 NEUROMUSCULAR REEDUCATION: CPT

## 2024-07-18 PROCEDURE — 97110 THERAPEUTIC EXERCISES: CPT

## 2024-07-18 ASSESSMENT — PAIN SCALES - GENERAL: PAINLEVEL_OUTOF10: 3

## 2024-07-18 NOTE — PROGRESS NOTES
[place] [address] [address]  Phone: [phone]      Date: 2024  Patient: Rayna Hdez  : 1955   Confirmed: Yes  MRN: [id number]  Referring Provider: Rosaline Peterson DO    Medical Diagnosis: Impaired mobility and activities of daily living [Z74.09, Z78.9]       Treatment Diagnosis: decreased R knee AROM, RLE strength, decreased balance, impaired gait, decreased flexibility, increased risk for falls, decreased functional activity tolerance, c/o pain and edema.    Visit Information:  Insurance: Payor: MEDICARE / Plan: MEDICARE PART A AND B / Product Type: *No Product type* /   PT Visit Information  Onset Date: 24  Total # of Visits Approved: 99 (BMN)  Total # of Visits to Date:   Plan of Care/Certification Expiration Date: 24  No Show: 1  Progress Note Due Date: 24  Canceled Appointment: 0  Progress Note Counter:     Subjective Information:  Subjective: Pt walks into PT w/ SPC.  When she stand up from chair today, she has posterior LOB but is able to catch herself with her own balance.  Pt has her most difficulty w/ standing balance exercise on blue foam and she has difficulty achieving 10\" for each attempt.  She also has difficulty w/ her EC in Romberg stance  HEP Compliance:  [] Good [x] Fair [] Poor [] Reports not doing due to:    Pain Screening  Patient Currently in Pain: Yes  Pain Level: 3    Treatment:  Exercises:  Exercises  Exercise 3: Balance training: Romerg stance EO 30\"x2, EC 10\"x5, On Blue foam EO 10\"x6  Exercise 4: LAQ: 10 x 5\" BLE, w/ 2lb weight  Exercise 7: Standing hip Abd/ext/add w/no TB x 10 BLE (progress next visit)  Exercise 8: Standing Hip Marches w/ 2lb weight  Exercise 11: Nustep S8 L 3 x 6 mins for range and endurance  Exercise 14: PT completes STS from raised table w/ no UE 10x2 while attempting to slowly lower self back.  Exercise 20: HEP: Cont Current       *Indicates exercise, modality, or manual techniques

## 2024-07-22 ENCOUNTER — HOSPITAL ENCOUNTER (OUTPATIENT)
Dept: PHYSICAL THERAPY | Age: 69
Setting detail: THERAPIES SERIES
Discharge: HOME | End: 2024-07-22
Attending: PHYSICAL MEDICINE & REHABILITATION
Payer: MEDICARE

## 2024-07-22 PROCEDURE — 97110 THERAPEUTIC EXERCISES: CPT

## 2024-07-22 NOTE — PROGRESS NOTES
[facility]  [address] [place] [address]  Phone: [phone]      Date: 2024  Patient: Rayna Hdez  : 1955   Confirmed: Yes  MRN: [id number]  Referring Provider: Rosaline Peterson DO    Medical Diagnosis: Impaired mobility and activities of daily living [Z74.09, Z78.9]       Treatment Diagnosis: decreased R knee AROM, RLE strength, decreased balance, impaired gait, decreased flexibility, increased risk for falls, decreased functional activity tolerance, c/o pain and edema.    Visit Information:  Insurance: Payor: MEDICARE / Plan: MEDICARE PART A AND B / Product Type: *No Product type* /   PT Visit Information  Onset Date: 24  Total # of Visits Approved: 99 (BMN)  Total # of Visits to Date: 13  Plan of Care/Certification Expiration Date: 24  No Show: 1  Progress Note Due Date: 24  Canceled Appointment: 0  Progress Note Counter:     Subjective Information:  Subjective: Pt reports having an extremely busy today in and out of the car. Pt arrives using SPC today, but states she did use the walker in uneven parking lots today.  HEP Compliance:  [x] Good [] Fair [] Poor [] Reports not doing due to:    Pain Screening  Patient Currently in Pain: Yes  Pain Assessment: 0-10    Treatment:  Exercises:  Exercises  Exercise 4: LAQ: 10 x 5\" BLE, w/ 2lb weight  Exercise 7: Standing hip Abd/ext/add w/no TB x 10 BLE (progress next visit)  Exercise 8: Standing Hip Marches w/ 2lb weight x10  Exercise 11: Nustep S8 L 4 x 6 mins for range and endurance (using GTB to hold R foot in pedal)  Exercise 14: PT completes STS from minimally raised table w/ no UE 10x2 while attempting to slowly lower self back.    *Indicates exercise, modality, or manual techniques to be initiated when appropriate    Objective Measures:       Assessment:   Body Structures, Functions, Activity Limitations Requiring Skilled Therapeutic Intervention: Decreased functional mobility , Decreased ADL

## 2024-07-25 ENCOUNTER — HOSPITAL ENCOUNTER (OUTPATIENT)
Dept: PHYSICAL THERAPY | Age: 69
Setting detail: THERAPIES SERIES
Discharge: HOME | End: 2024-07-25
Attending: PHYSICAL MEDICINE & REHABILITATION
Payer: MEDICARE

## 2024-07-25 PROCEDURE — 97112 NEUROMUSCULAR REEDUCATION: CPT

## 2024-07-25 PROCEDURE — 97110 THERAPEUTIC EXERCISES: CPT

## 2024-07-25 NOTE — PROGRESS NOTES
[facility]  [address] [place] [address]  Phone: [phone]      Date: 2024  Patient: Rayna Hdez  : 1955   Confirmed: Yes  MRN: [id number]  Referring Provider: Rosaline Peterson DO    Medical Diagnosis: Impaired mobility and activities of daily living [Z74.09, Z78.9]       Treatment Diagnosis: decreased R knee AROM, RLE strength, decreased balance, impaired gait, decreased flexibility, increased risk for falls, decreased functional activity tolerance, c/o pain and edema.    Visit Information:  Insurance: Payor: MEDICARE / Plan: MEDICARE PART A AND B / Product Type: *No Product type* /   PT Visit Information  Onset Date: 24  Total # of Visits Approved: 99 (BMN)  Total # of Visits to Date: 14  Plan of Care/Certification Expiration Date: 24  No Show: 1  Progress Note Due Date: 24  Canceled Appointment: 0  Progress Note Counter:     Subjective Information:  Subjective: Pt reports swelling has gone down quite abit since surgery. Pt reports feeling that she \"turned a corner on the bending.\" Reports contd difficulty with balance.  HEP Compliance:  [] Good [] Fair [] Poor [] Reports not doing due to:    Pain Screening  Patient Currently in Pain: Yes  Pain Assessment: 0-10  Pain Level: 4  Pain Type: Surgical pain  Pain Location: Knee  Pain Orientation: Right    Treatment:  Exercises:  Exercises  Exercise 3: altenating step taps at bottom step (8\") with intermittent HHA and light CGA for safety x10 B  Exercise 4: LAQ: 10 x 5\" BLE, w/ 2lb weight  Exercise 7: Standing hip Abd/ext/add w/YTB x10 (B UE assist and SBA required)  Exercise 8: step ups 4\" x10 F/L (attempted 6\" unable to perform safely)  Exercise 11: Nustep S 10 L 4 x 6 mins for range and endurance (using GTB to hold R foot in pedal)  Exercise 14: PT completes STS from minimally raised table w/ no UE x10 while attempting to slowly lower self back. (reps limited d/t fatigue at end of

## 2024-07-30 ENCOUNTER — HOSPITAL ENCOUNTER (OUTPATIENT)
Dept: PHYSICAL THERAPY | Age: 69
Setting detail: THERAPIES SERIES
Discharge: HOME | End: 2024-07-30
Attending: PHYSICAL MEDICINE & REHABILITATION
Payer: MEDICARE

## 2024-07-30 PROCEDURE — 97110 THERAPEUTIC EXERCISES: CPT

## 2024-07-30 ASSESSMENT — PAIN SCALES - GENERAL: PAINLEVEL_OUTOF10: 4

## 2024-07-30 ASSESSMENT — PAIN DESCRIPTION - ORIENTATION: ORIENTATION: RIGHT

## 2024-07-30 ASSESSMENT — PAIN DESCRIPTION - LOCATION: LOCATION: KNEE

## 2024-07-30 ASSESSMENT — PAIN DESCRIPTION - DESCRIPTORS: DESCRIPTORS: ACHING;SORE

## 2024-07-30 ASSESSMENT — PAIN DESCRIPTION - PAIN TYPE: TYPE: SURGICAL PAIN

## 2024-07-30 NOTE — PROGRESS NOTES
[place] [id number] [place] [address]  Phone: [phone]      Date: 2024  Patient: Rayna Hdez  : 1955   Confirmed: Yes  MRN: [id number]  Referring Provider: Rosaline Peterson DO    Medical Diagnosis: Impaired mobility and activities of daily living [Z74.09, Z78.9]       Treatment Diagnosis: decreased R knee AROM, RLE strength, decreased balance, impaired gait, decreased flexibility, increased risk for falls, decreased functional activity tolerance, c/o pain and edema.    Visit Information:  Insurance: Payor: MEDICARE / Plan: MEDICARE PART A AND B / Product Type: *No Product type* /   PT Visit Information  Onset Date: 24  Total # of Visits Approved: 99 (BMN)  Total # of Visits to Date: 15  Plan of Care/Certification Expiration Date: 24  No Show: 1  Progress Note Due Date: 24  Canceled Appointment: 0  Progress Note Counter: 15/16    Subjective Information:  Subjective: Swelling has went down pretty good.  HEP Compliance:  [x] Good [] Fair [] Poor [] Reports not doing due to:    Pain Screening  Patient Currently in Pain: Yes  Pain Level: 4  Pain Type: Surgical pain  Pain Location: Knee  Pain Orientation: Right  Pain Descriptors: Aching, Sore    Treatment:  Exercises:  Exercises  Exercise 3: altenating step taps at bottom step (8\") with intermittent HHA and light CGA for safety x10 B  Exercise 7: Standing hip Abd/ext/add  x 12 (B UE assist and SBA required)  Exercise 8: step ups 4\" x10 F/L trialed reciprocal pattern between 4\" and 8\" stair at tower with candelaria UE support 1 at tower, 1 with  Exercise 11: Nustep S 10 L 4 x 6 mins for range and endurance (using GTB to hold R foot in pedal)        *Indicates exercise, modality, or manual techniques to be initiated when appropriate    Objective Measures:      LTG 3 Current Status:: 24: AROM: 2° to 118° taken post exercises this date.  LTG 4 Current Status:: 24: 43/80       Assessment:   Body Structures,

## 2024-08-01 ENCOUNTER — HOSPITAL ENCOUNTER (OUTPATIENT)
Dept: PHYSICAL THERAPY | Age: 69
Setting detail: THERAPIES SERIES
Discharge: HOME | End: 2024-08-01
Attending: PHYSICAL MEDICINE & REHABILITATION

## 2024-08-01 NOTE — PROGRESS NOTES
Therapy                            Cancellation/No-show Note      Date: 2024  Patient: Rayna Hdez (69 y.o. female)  : 1955  MRN:  [id number]  Referring Physician: Rosaline Peterson DO    Medical Diagnosis: Impaired mobility and activities of daily living [Z74.09, Z78.9]      Visit Information:  Visits to Date 15   No Show/Cancelled Appts:       For today's appointment patient:  [x]  Cancelled  []  Rescheduled appointment  []  No-show   []  Called pt to remind of next appointment     Reason given by patient:  [x]  Patient ill  []  Conflicting appointment  []  No transportation    []  Conflict with work  []  No reason given  []  Other:      [x] Pt has future appointments scheduled, no follow up needed  [] Pt requests to be on hold.    Reason:   If > 2 weeks please discuss with therapist.  [] Therapist to call pt for follow up     Comments:       Signature: Electronically signed by Samra Jones PT on 24 at 9:37 AM EDT

## 2024-08-05 ENCOUNTER — HOSPITAL ENCOUNTER (OUTPATIENT)
Dept: PHYSICAL THERAPY | Age: 69
Setting detail: THERAPIES SERIES
Discharge: HOME | End: 2024-08-05
Attending: PHYSICAL MEDICINE & REHABILITATION
Payer: MEDICARE

## 2024-08-05 PROCEDURE — 97110 THERAPEUTIC EXERCISES: CPT

## 2024-08-05 ASSESSMENT — PAIN DESCRIPTION - DESCRIPTORS: DESCRIPTORS: ACHING;SORE

## 2024-08-05 ASSESSMENT — PAIN DESCRIPTION - LOCATION: LOCATION: KNEE

## 2024-08-05 ASSESSMENT — PAIN SCALES - GENERAL: PAINLEVEL_OUTOF10: 4

## 2024-08-05 ASSESSMENT — PAIN DESCRIPTION - PAIN TYPE: TYPE: SURGICAL PAIN

## 2024-08-05 ASSESSMENT — PAIN DESCRIPTION - ORIENTATION: ORIENTATION: RIGHT

## 2024-08-05 NOTE — PROGRESS NOTES
[facility]  [address] [place] [address]  Phone: [phone]    [] Certification  [] Recertification []  Plan of Care  [] Progress Note [x] Discharge      Referring Provider: Rosaline Peterson DO     From:  Samra Jones PT, DPT  Patient: Rayna Hdez (69 y.o. female) : 1955 Date: 2024  Medical Diagnosis: Impaired mobility and activities of daily living [Z74.09, Z78.9]       Treatment Diagnosis: decreased R knee AROM, RLE strength, decreased balance, impaired gait, decreased flexibility, increased risk for falls, decreased functional activity tolerance, c/o pain and edema.    Plan of Care/Certification Expiration Date: 24   Progress Report Period from:  2024  to 2024    Visits to Date: 16 No Show: 1 Cancelled Appts: 1    OBJECTIVE:     Long Term Goals - Time Frame for Long Term Goals : 8 weeks  Goals Current/ Discharge status Status   Long Term Goal 1: The pt will ambulate >/=350 ft with LRD(SPC) mod indep to increase ease and safety with community ambulation LTG 1 Current Status:: 24:  Pt is IND w/ SPC amb for household and community   Met   Long Term Goal 2: The pt will have decreased pain </= 1/10 in order to increase ease with ADL's LTG 2 Current Status:: 24 pain ranges 0-6/10; pt reports improving ADL's   Partially met   Long Term Goal 3: The pt will demo improved R Knee AROM in order to increase ease of ADL's LTG 3 Current Status:: 24: AROM: 2° to 118° taken post exercises this date.   Partially met   Long Term Goal 4: The pt will have an increase in LEFS score >/=15 points in order to increase functional activity tolerance LTG 4 Current Status:: 24: 43/80   Met   Long Term Goal 5: The pt will demo improved 5x sit to stand w/ BUE use to </=15 seconds in order to increase functional indep and decrease risk for falls LTG 5 Current Status:: 24: 5x sit to stand 15.3\" with use of UE's   Met   Long term goal 6: The pt will demo improved

## 2024-08-05 NOTE — PROGRESS NOTES
[place] [address] [facility] [place] [address]  Phone: [phone]      Date: 2024  Patient: Rayna Hdez  : 1955   Confirmed: Yes  MRN: [id number]  Referring Provider: Rosaline Peterson DO    Medical Diagnosis: Impaired mobility and activities of daily living [Z74.09, Z78.9]       Treatment Diagnosis: decreased R knee AROM, RLE strength, decreased balance, impaired gait, decreased flexibility, increased risk for falls, decreased functional activity tolerance, c/o pain and edema.    Visit Information:  Insurance: Payor: MEDICARE / Plan: MEDICARE PART A AND B / Product Type: *No Product type* /   PT Visit Information  Onset Date: 24  Total # of Visits Approved: 99 (BMN)  Total # of Visits to Date:   Plan of Care/Certification Expiration Date: 24  No Show: 1  Progress Note Due Date: 24  Canceled Appointment: 1  Progress Note Counter:     Subjective Information:  Subjective: Pt reports doing a lot last week and then was ill and still \"feels really sluggish.\"  HEP Compliance:  [x] Good [] Fair [] Poor [] Reports not doing due to:    Pain Screening  Patient Currently in Pain: Yes  Pain Assessment: 0-10  Pain Level: 4  Pain Type: Surgical pain  Pain Location: Knee  Pain Orientation: Right  Pain Descriptors: Aching, Sore    Treatment:  Exercises:  Exercises  Exercise 11: Nustep S 10 L 4 x 6 mins for range and endurance (using GTB to hold R foot in pedal)  Exercise 12: objective measures, verbally discussed continuing HEP, goals/progress       *Indicates exercise, modality, or manual techniques to be initiated when appropriate    Objective Measures:      LTG 1 Current Status:: 24:  Pt is IND w/ SPC amb for household and community  LTG 2 Current Status:: 24 pain ranges 0-6/10; pt reports improving ADL's  LTG 3 Current Status:: 24: AROM: 2° to 118° taken post exercises this date.  LTG 4 Current Status:: 24: 43/80  LTG 5 Current Status:: 24: 5x

## 2024-08-06 ENCOUNTER — PROCEDURE VISIT (OUTPATIENT)
Dept: PHYSICAL MEDICINE AND REHAB | Age: 69
End: 2024-08-06
Payer: MEDICARE

## 2024-08-06 DIAGNOSIS — Z79.899 HIGH RISK MEDICATION USE: ICD-10-CM

## 2024-08-06 DIAGNOSIS — M54.41 CHRONIC BILATERAL LOW BACK PAIN WITH BILATERAL SCIATICA: ICD-10-CM

## 2024-08-06 DIAGNOSIS — M15.9 PRIMARY OSTEOARTHRITIS INVOLVING MULTIPLE JOINTS: ICD-10-CM

## 2024-08-06 DIAGNOSIS — M25.561 RIGHT KNEE PAIN, UNSPECIFIED CHRONICITY: ICD-10-CM

## 2024-08-06 DIAGNOSIS — G89.29 CHRONIC BILATERAL LOW BACK PAIN WITH BILATERAL SCIATICA: ICD-10-CM

## 2024-08-06 DIAGNOSIS — M79.10 MYALGIA: Primary | ICD-10-CM

## 2024-08-06 DIAGNOSIS — M05.79 RHEUMATOID ARTHRITIS INVOLVING MULTIPLE SITES WITH POSITIVE RHEUMATOID FACTOR (HCC): ICD-10-CM

## 2024-08-06 DIAGNOSIS — M54.42 CHRONIC BILATERAL LOW BACK PAIN WITH BILATERAL SCIATICA: ICD-10-CM

## 2024-08-06 DIAGNOSIS — G89.18 POST-OP PAIN: ICD-10-CM

## 2024-08-06 PROCEDURE — 96372 THER/PROPH/DIAG INJ SC/IM: CPT | Performed by: PHYSICAL MEDICINE & REHABILITATION

## 2024-08-06 RX ORDER — LIDOCAINE HYDROCHLORIDE 20 MG/ML
5 INJECTION, SOLUTION INFILTRATION; PERINEURAL ONCE
Status: COMPLETED | OUTPATIENT
Start: 2024-08-06 | End: 2024-08-06

## 2024-08-06 RX ORDER — LIDOCAINE HYDROCHLORIDE 10 MG/ML
7 INJECTION, SOLUTION INFILTRATION; PERINEURAL ONCE
Status: COMPLETED | OUTPATIENT
Start: 2024-08-06 | End: 2024-08-06

## 2024-08-06 RX ORDER — CYANOCOBALAMIN 1000 UG/ML
1000 INJECTION, SOLUTION INTRAMUSCULAR; SUBCUTANEOUS ONCE
Status: COMPLETED | OUTPATIENT
Start: 2024-08-06 | End: 2024-08-06

## 2024-08-06 RX ADMIN — LIDOCAINE HYDROCHLORIDE 7 ML: 10 INJECTION, SOLUTION INFILTRATION; PERINEURAL at 11:24

## 2024-08-06 RX ADMIN — CYANOCOBALAMIN 1000 MCG: 1000 INJECTION, SOLUTION INTRAMUSCULAR; SUBCUTANEOUS at 11:26

## 2024-08-06 RX ADMIN — LIDOCAINE HYDROCHLORIDE 5 ML: 20 INJECTION, SOLUTION INFILTRATION; PERINEURAL at 11:25

## 2024-08-06 NOTE — PROGRESS NOTES
Patient here for Left sciatic injection with U/S. Patient taken back to exam room, and placed on drape locking stool. Areas to be injected marked appropriately, and cleansed with alcohol. 7cc of 1% Lidocaine, and 5cc of 2% Lidocaine and 1cc of b12 to be injected by provider.;

## 2024-08-07 RX ORDER — OXYCODONE AND ACETAMINOPHEN 10; 325 MG/1; MG/1
1 TABLET ORAL EVERY 6 HOURS PRN
Qty: 100 TABLET | Refills: 0 | Status: SHIPPED | OUTPATIENT
Start: 2024-08-07 | End: 2024-09-06

## 2024-08-08 ENCOUNTER — APPOINTMENT (OUTPATIENT)
Dept: PHYSICAL THERAPY | Age: 69
End: 2024-08-08
Attending: PHYSICAL MEDICINE & REHABILITATION
Payer: MEDICARE

## 2024-08-12 ENCOUNTER — APPOINTMENT (OUTPATIENT)
Dept: PHYSICAL THERAPY | Age: 69
End: 2024-08-12
Attending: PHYSICAL MEDICINE & REHABILITATION
Payer: MEDICARE

## 2024-08-13 DIAGNOSIS — G57.93 NEUROPATHY INVOLVING BOTH LOWER EXTREMITIES: ICD-10-CM

## 2024-08-13 DIAGNOSIS — M46.26 OSTEOMYELITIS OF LUMBAR VERTEBRA (HCC): ICD-10-CM

## 2024-08-13 DIAGNOSIS — M05.79 RHEUMATOID ARTHRITIS INVOLVING MULTIPLE SITES WITH POSITIVE RHEUMATOID FACTOR (HCC): ICD-10-CM

## 2024-08-13 DIAGNOSIS — M15.9 PRIMARY OSTEOARTHRITIS INVOLVING MULTIPLE JOINTS: ICD-10-CM

## 2024-08-13 RX ORDER — GABAPENTIN 300 MG/1
CAPSULE ORAL
Qty: 180 CAPSULE | Refills: 0 | Status: SHIPPED | OUTPATIENT
Start: 2024-08-13 | End: 2024-10-13

## 2024-08-15 ENCOUNTER — APPOINTMENT (OUTPATIENT)
Dept: PHYSICAL THERAPY | Age: 69
End: 2024-08-15
Attending: PHYSICAL MEDICINE & REHABILITATION
Payer: MEDICARE

## 2024-08-20 ENCOUNTER — APPOINTMENT (OUTPATIENT)
Dept: CARDIOLOGY | Facility: CLINIC | Age: 69
End: 2024-08-20
Payer: MEDICARE

## 2024-08-20 VITALS
BODY MASS INDEX: 28.7 KG/M2 | SYSTOLIC BLOOD PRESSURE: 126 MMHG | HEIGHT: 68 IN | WEIGHT: 189.4 LBS | HEART RATE: 74 BPM | DIASTOLIC BLOOD PRESSURE: 82 MMHG

## 2024-08-20 DIAGNOSIS — I87.2 VENOUS INSUFFICIENCY: ICD-10-CM

## 2024-08-20 DIAGNOSIS — I20.9 ANGINA PECTORIS WITH NORMAL CORONARY ARTERIOGRAM (CMS-HCC): ICD-10-CM

## 2024-08-20 DIAGNOSIS — R60.9 EDEMA, UNSPECIFIED TYPE: ICD-10-CM

## 2024-08-20 DIAGNOSIS — I10 PRIMARY HYPERTENSION: ICD-10-CM

## 2024-08-20 PROCEDURE — 99213 OFFICE O/P EST LOW 20 MIN: CPT | Performed by: INTERNAL MEDICINE

## 2024-08-20 PROCEDURE — 1157F ADVNC CARE PLAN IN RCRD: CPT | Performed by: INTERNAL MEDICINE

## 2024-08-20 PROCEDURE — 1159F MED LIST DOCD IN RCRD: CPT | Performed by: INTERNAL MEDICINE

## 2024-08-20 PROCEDURE — 3074F SYST BP LT 130 MM HG: CPT | Performed by: INTERNAL MEDICINE

## 2024-08-20 PROCEDURE — 3008F BODY MASS INDEX DOCD: CPT | Performed by: INTERNAL MEDICINE

## 2024-08-20 PROCEDURE — 3079F DIAST BP 80-89 MM HG: CPT | Performed by: INTERNAL MEDICINE

## 2024-08-20 PROCEDURE — G2211 COMPLEX E/M VISIT ADD ON: HCPCS | Performed by: INTERNAL MEDICINE

## 2024-08-20 RX ORDER — ATORVASTATIN CALCIUM 10 MG/1
10 TABLET, FILM COATED ORAL DAILY
Qty: 90 TABLET | Refills: 3 | Status: SHIPPED | OUTPATIENT
Start: 2024-08-20

## 2024-08-20 RX ORDER — ASPIRIN 81 MG/1
TABLET ORAL
Start: 2024-08-20

## 2024-08-20 NOTE — PROGRESS NOTES
Department of Anesthesiology  Preprocedure Note       Name:  Cora Hurt   Age:  64 y.o.  :  1947                                          MRN:  [id number]         Date:  2023      Surgeon: Sathish Saeed):  Brandon Jones MD    Procedure: Procedure(s):  REMOVAL OF DEEP HARDWARE LEFT  PATELLA/KNEE LEFT KNEE ARTHROSCOPY WITH  EXTENSIVE SYNOVECTOMY OF ADHESION/ MANIPULATION UNDER ANESTHESIA    Medications prior to admission:   Prior to Admission medications    Medication Sig Start Date End Date Taking?  Authorizing Provider   losartan (COZAAR) 50 MG tablet Take 1 tablet by mouth daily 22   Historical Provider, MD   amLODIPine (NORVASC) 5 MG tablet Take 1 tablet by mouth daily 20   Historical Provider, MD   alendronate (FOSAMAX) 70 MG tablet Take 1 tablet by mouth once a week 22   Historical Provider, MD   hydroCHLOROthiazide (HYDRODIURIL) 12.5 MG tablet Take 1 tablet by mouth daily 22   Historical Provider, MD   Aspirin 81 MG CAPS Take 81 mg by mouth daily 13   Historical Provider, MD   Magnesium 100 MG CAPS Take by mouth    Historical Provider, MD   Biotin 1 MG CAPS Take by mouth    Historical Provider, MD   levothyroxine (SYNTHROID) 125 MCG tablet Take 1 tablet by mouth Daily    Historical Provider, MD   NAPROXEN DR PO Take by mouth    Historical Provider, MD   omeprazole (PRILOSEC) 20 MG delayed release capsule Take 1 capsule by mouth daily    Historical Provider, MD   simvastatin (ZOCOR) 80 MG tablet Take 1 tablet by mouth nightly    Historical Provider, MD       Current medications:    Current Facility-Administered Medications   Medication Dose Route Frequency Provider Last Rate Last Admin    lidocaine PF 1 % injection 1 mL  1 mL IntraDERmal Once PRN MISSY Colon - CNP        sodium chloride flush 0.9 % injection 5-40 mL  5-40 mL IntraVENous 2 times per day MISSY Colon CNP        sodium chloride flush 0.9 % injection 5-40 mL  5-40 mL IntraVENous PRN Kaylin Hebert Patient:  Jazmin Hallman  YOB: 1955  MRN: [id number]       Impression/Plan:     Diagnoses and all orders for this visit:  Angina pectoris with normal coronary arteriogram (CMS-[facility])  -     No angina on the current medical regimen continue same  Venous insufficiency  -     Minimal edema  Edema, unspecified type  -    Related to venous insufficiency and well-controlled at this point  Primary hypertension  -     No adverse effect of current medical regimen      Chief Complaint/Active Symptoms:       Jazmin Hallman is a 69 y.o. female who presents with  SVT, symptomatic PACs/PVCs, hypertension and hyperlipidemia. 2016 coronary angiography demonstrated normal coronary arteries. 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     She was hospitalized at [facility] 4/24/2024 through 4/25/2024. She had presented with acute kidney injury, hyponatremia and hyperkalemia with metabolic acidosis all felt related to use of Bactrim. She had presented with shortness of breath chest pain and chest fluttering. Evaluation for PE was negative. Dyspnea resolved while in the ER. Sodium bicarbonate was instituted for hyperkalemia and acute kidney injury. Felt deterioration was related to the use of Bactrim. No cardiac issues described      She underwent right total knee replacement with Dr. Crouch at [facility] 5/28/2024.  She was noted to be hypertensive during her stay at rehab for which some of her cardiac medications were adjusted. She also states that she did not receive her Lasix for a while at rehab which resulted in significant lower extremity edema. She is currently back on her pre-operative medications.  She states that overall she feels well. She denies chest pain, chest pressure, chest tightness, shortness of breath, palpitations, lightheadedness or dizziness. She is currently ambulating with a walker and working with  physical therapy.     June 2024 laboratory studies TSH normal.  Hemoglobin 11.4 lower normal 12.  Creatinine 1.38 sodium 131 potassium 4.5    She denies angina, dyspnea, palpitation, lightheadedness or syncope.  She has had no symptoms of claudication or neurologic deterioration.  There have been no hospitalizations or emergency room visits since last office visit.    Minimal edema currently.  Working with pain management on taper off narcotics.  Watching sodium well.                 Review of Systems: Unremarkable except as noted above    Meds     Current Outpatient Medications   Medication Instructions    acetaminophen (TYLENOL) 500 mg, oral, Every 8 hours PRN    amoxicillin (AMOXIL) 500 mg, oral, Take 6 tablets 1 hour prior to procedure, and 2 tablets 6 hours following procedure for dental appts    aspirin 81 mg, oral, Daily    atorvastatin (LIPITOR) 10 mg, oral, Daily    baclofen (Lioresal) 10 mg tablet 1 tablet, oral, Daily    cholecalciferol (Vitamin D-3) 50 MCG (2000 UT) tablet 1 tablet, oral, Daily    CYANOCOBALAMIN, VITAMIN B-12, INJ 1 mL, injection, Every 30 days    diclofenac sodium 1 % kit topical (top), 2 times daily PRN, Apply to affected area twice daily     dilTIAZem (CARDIZEM) 60 mg, oral, 2 times daily    docusate sodium (STOOL SOFTENER) 100 mg, oral, 2 times daily    furosemide (LASIX) 20 mg, oral, 2 times daily    gabapentin (NEURONTIN) 300 mg, oral, 3 times daily, 2 capsules every night alternating with 1 capsules every night    hydroxychloroquine (PLAQUENIL) 200 mg, oral, 2 times daily    lidocaine (Xylocaine) 5 % ointment Topical, Every 24 hours PRN, Apply to affected area every 12 hours as needed for pain    losartan (Cozaar) 25 mg tablet Take 4 tablets (100 mg) by mouth once daily. TAKE 3 TABLETS BY MOUTH ONCE DAILY AFTER BREAKFAST AND 1 AT BEDTIME    melatonin 5 mg tablet Take 1 tablet (5 mg) by mouth as needed at bedtime for sleep.    nitroglycerin (NITROSTAT) 0.4 mg, sublingual, Every 5  min PRN, DO NOT EXCEED A TOTAL OF 3 DOSES IN 15 MINUTES    nystatin (Mycostatin) cream Topical, 2 times daily PRN    omeprazole (PRILOSEC) 40 mg, oral, Daily    oxyCODONE-acetaminophen (Percocet)  mg tablet 1 tablet, oral, Every 6 hours PRN    polyethylene glycol (GLYCOLAX, MIRALAX) 17 g, oral, Nightly        Allergies     Allergies   Allergen Reactions    Amlodipine Cough, Other, Swelling and Unknown     kidney issues    swelling    Bactrim [Sulfamethoxazole-Trimethoprim] Other    Isosorbide Cough, Other and Unknown    Lisinopril Cough, Unknown and Other     kidney issues    Ranolazine GI Upset, Headache, Nausea Only, Other and Unknown     headache    Keflex [Cephalexin] Rash    Latex Hives, Rash and Swelling     Swelling of hands with wearing latex gloves         Annotated Problems     Specialty Problems          Cardiology Problems    Angina, class I (CMS-HCC)     Last Assessment & Plan: Formatting of this note might be different from the original. Assessment: managed on Cardizem and Nitro PRN negative stress test Diagnosed with syndrome X small vessel disease         Angina pectoris with normal coronary arteriogram (CMS-HCC)      · 4/1/2016. LVEF 65-70%. LVEDP 9 mmHg. Angiographically normal coronary arteries          Primary hypertension    Preoperative cardiovascular examination        Problem List     Patient Active Problem List    Diagnosis Date Noted    Edema 05/08/2024    Preoperative cardiovascular examination 05/08/2024    Hyponatremia 01/10/2024    Primary hypertension 01/10/2024    Angina pectoris with normal coronary arteriogram (CMS-HCC) 01/10/2024    Non-recurrent acute serous otitis media of both ears 11/17/2023    Acquired pes planus of both feet 05/24/2023    Angina, class I (CMS-[facility]) 04/03/2023    Anemia of chronic renal failure, stage 3a (Multi) 03/28/2023    LUKAS positive 04/20/2019       Objective:     Vitals:    08/20/24 0947   BP: 126/82   BP Location: Left arm   Patient Position:  "Sitting   Pulse: 74   Weight: 85.9 kg (189 lb 6.4 oz)   Height: 1.727 m (5' 8\")      Wt Readings from Last 4 Encounters:   08/20/24 85.9 kg (189 lb 6.4 oz)   06/24/24 92.4 kg (203 lb 12.8 oz)   05/08/24 97.6 kg (215 lb 3.2 oz)   04/04/24 98 kg (216 lb)           LAB:     Lab Results   Component Value Date    WBC 6.3 03/20/2024    HGB 10.4 (L) 03/20/2024    HCT 33.4 (L) 03/20/2024     03/20/2024    ALT 13 02/16/2023    AST 20 02/16/2023     (L) 03/20/2024    K 4.5 03/20/2024     03/20/2024    CREATININE 1.34 (H) 03/20/2024    BUN 16 03/20/2024    CO2 25 03/20/2024    HGBA1C 6.0 (H) 05/09/2024       Diagnostic Studies:     BI mammo bilateral screening tomosynthesis    Result Date: 4/4/2024  Interpreted By:  Terry Brenner, STUDY: BI MAMMO BILATERAL SCREENING TOMOSYNTHESIS;  4/4/2024 11:30 am   ACCESSION NUMBER(S): [id number]   ORDERING CLINICIAN: GRETA BOGGS   INDICATION: Screening.   COMPARISON: Mammograms dated 15 December 2022 back through the oldest mammogram available, 6 December 2006   FINDINGS: 2D and tomosynthesis images were reviewed at 1 mm slice thickness.   Density:  There are areas of scattered fibroglandular tissue.   No suspect masses or calcifications are identified       No mammographic evidence of malignancy.   Based on the Tyrer-Cuzick model for breast cancer risk assessment, the patient's lifetime risk of breast cancer is 5.38%. Patients with over a 20% lifetime risk of developing breast cancer may benefit from additional screening with breast MRI or ultrasound. Please note that this estimate is based on responses provided on the patient questionnaire. For more information regarding high risk consultation, please call [phone].   BI-RADS CATEGORY:   BI-RADS Category:  1 Negative. Recommendation:  Routine Screening Mammogram in 1 Year. Recommended Date:  1 Year. Laterality:  Bilateral.   For any future breast imaging appointments, please call [phone]).   " MACRO: None   Signed by: Terry Brenner 4/4/2024 12:09 PM Dictation workstation:   [id number]        Radiology:     No orders to display       Physical Exam     General Appearance: alert and oriented to person, place and time, in no acute distress  Cardiovascular: normal rate, regular rhythm, normal S1 and S2, no murmurs, rubs, clicks, or gallops,  no JVD  Pulmonary/Chest: clear to auscultation bilaterally- no wheezes, rales or rhonchi, normal air movement, no respiratory distress  Abdomen: soft, non-tender, non-distended, normal bowel sounds, no masses   Extremities: no cyanosis, clubbing.  Trace edema  Skin: warm and dry, no rash or erythema  Eyes: EOMI  Neck: supple and non-tender without mass, no thyromegaly   Neurological: alert, oriented, normal speech, no focal findings or movement disorder noted  Vascular: Pulses 2+

## 2024-08-20 NOTE — PATIENT INSTRUCTIONS
DID YOU KNOW  We have a pharmacy here in the [facility].  They can fill all prescriptions, not just cardiac medications.  Prescriptions from other pharmacies can easily be transferred to the  pharmacy by the  pharmacist on site.   pharmacies offer FREE HOME DELIVERY on medications.   Typically prescriptions can be ready in 10 - 15 minutes. Pharmacy phone # [phone].      Decrease Aspirin 81 mg to 1 tablet Monday - Friday ONLY    -Please bring all medicines, vitamins, and herbal supplements with you in original bottles to every appointment!!!!    -Prescriptions will not be filled unless you are compliant with your follow up appointments or have a follow up appointment scheduled as per instruction of your physician. Refills should be requested at the time of your visit.

## 2024-08-21 PROBLEM — I20.9 ANGINA, CLASS I (CMS-HCC): Status: RESOLVED | Noted: 2023-04-03 | Resolved: 2024-08-21

## 2024-08-21 PROBLEM — Z01.810 PREOPERATIVE CARDIOVASCULAR EXAMINATION: Status: RESOLVED | Noted: 2024-05-08 | Resolved: 2024-08-21

## 2024-09-05 ENCOUNTER — PROCEDURE VISIT (OUTPATIENT)
Dept: PHYSICAL MEDICINE AND REHAB | Age: 69
End: 2024-09-05

## 2024-09-05 DIAGNOSIS — M54.42 CHRONIC BILATERAL LOW BACK PAIN WITH BILATERAL SCIATICA: Primary | ICD-10-CM

## 2024-09-05 DIAGNOSIS — G89.29 CHRONIC BILATERAL LOW BACK PAIN WITH BILATERAL SCIATICA: Primary | ICD-10-CM

## 2024-09-05 DIAGNOSIS — M54.41 CHRONIC BILATERAL LOW BACK PAIN WITH BILATERAL SCIATICA: Primary | ICD-10-CM

## 2024-09-05 RX ORDER — LIDOCAINE HYDROCHLORIDE 20 MG/ML
5 INJECTION, SOLUTION INFILTRATION; PERINEURAL ONCE
Status: COMPLETED | OUTPATIENT
Start: 2024-09-05 | End: 2024-09-05

## 2024-09-05 RX ORDER — CYANOCOBALAMIN 1000 UG/ML
1000 INJECTION, SOLUTION INTRAMUSCULAR; SUBCUTANEOUS ONCE
Status: COMPLETED | OUTPATIENT
Start: 2024-09-05 | End: 2024-09-05

## 2024-09-05 RX ORDER — LIDOCAINE HYDROCHLORIDE 10 MG/ML
7 INJECTION, SOLUTION INFILTRATION; PERINEURAL ONCE
Status: COMPLETED | OUTPATIENT
Start: 2024-09-05 | End: 2024-09-05

## 2024-09-05 RX ADMIN — LIDOCAINE HYDROCHLORIDE 7 ML: 10 INJECTION, SOLUTION INFILTRATION; PERINEURAL at 12:00

## 2024-09-05 RX ADMIN — LIDOCAINE HYDROCHLORIDE 5 ML: 20 INJECTION, SOLUTION INFILTRATION; PERINEURAL at 12:00

## 2024-09-05 RX ADMIN — CYANOCOBALAMIN 1000 MCG: 1000 INJECTION, SOLUTION INTRAMUSCULAR; SUBCUTANEOUS at 11:57

## 2024-09-09 ASSESSMENT — ENCOUNTER SYMPTOMS
ABDOMINAL PAIN: 1
BACK PAIN: 1
PHOTOPHOBIA: 0

## 2024-09-11 ENCOUNTER — OFFICE VISIT (OUTPATIENT)
Dept: PHYSICAL MEDICINE AND REHAB | Age: 69
End: 2024-09-11
Payer: MEDICARE

## 2024-09-11 VITALS
HEIGHT: 67 IN | HEART RATE: 65 BPM | DIASTOLIC BLOOD PRESSURE: 60 MMHG | SYSTOLIC BLOOD PRESSURE: 98 MMHG | BODY MASS INDEX: 29.03 KG/M2 | WEIGHT: 185 LBS

## 2024-09-11 DIAGNOSIS — M15.9 PRIMARY OSTEOARTHRITIS INVOLVING MULTIPLE JOINTS: ICD-10-CM

## 2024-09-11 DIAGNOSIS — M79.671 FOOT PAIN, BILATERAL: ICD-10-CM

## 2024-09-11 DIAGNOSIS — M05.79 RHEUMATOID ARTHRITIS INVOLVING MULTIPLE SITES WITH POSITIVE RHEUMATOID FACTOR (HCC): ICD-10-CM

## 2024-09-11 DIAGNOSIS — G89.29 CHRONIC RIGHT SHOULDER PAIN: ICD-10-CM

## 2024-09-11 DIAGNOSIS — Z79.899 HIGH RISK MEDICATION USE: ICD-10-CM

## 2024-09-11 DIAGNOSIS — G89.18 POST-OP PAIN: ICD-10-CM

## 2024-09-11 DIAGNOSIS — G89.29 CHRONIC BILATERAL LOW BACK PAIN WITH BILATERAL SCIATICA: ICD-10-CM

## 2024-09-11 DIAGNOSIS — M48.062 SPINAL STENOSIS OF LUMBAR REGION WITH NEUROGENIC CLAUDICATION: Primary | ICD-10-CM

## 2024-09-11 DIAGNOSIS — M79.672 FOOT PAIN, BILATERAL: ICD-10-CM

## 2024-09-11 DIAGNOSIS — M54.42 CHRONIC BILATERAL LOW BACK PAIN WITH BILATERAL SCIATICA: ICD-10-CM

## 2024-09-11 DIAGNOSIS — M54.41 CHRONIC BILATERAL LOW BACK PAIN WITH BILATERAL SCIATICA: ICD-10-CM

## 2024-09-11 DIAGNOSIS — M25.511 CHRONIC RIGHT SHOULDER PAIN: ICD-10-CM

## 2024-09-11 DIAGNOSIS — M25.561 RIGHT KNEE PAIN, UNSPECIFIED CHRONICITY: ICD-10-CM

## 2024-09-11 PROCEDURE — G8399 PT W/DXA RESULTS DOCUMENT: HCPCS | Performed by: PHYSICAL MEDICINE & REHABILITATION

## 2024-09-11 PROCEDURE — 3074F SYST BP LT 130 MM HG: CPT | Performed by: PHYSICAL MEDICINE & REHABILITATION

## 2024-09-11 PROCEDURE — 1090F PRES/ABSN URINE INCON ASSESS: CPT | Performed by: PHYSICAL MEDICINE & REHABILITATION

## 2024-09-11 PROCEDURE — 99214 OFFICE O/P EST MOD 30 MIN: CPT | Performed by: PHYSICAL MEDICINE & REHABILITATION

## 2024-09-11 PROCEDURE — 1036F TOBACCO NON-USER: CPT | Performed by: PHYSICAL MEDICINE & REHABILITATION

## 2024-09-11 PROCEDURE — 3078F DIAST BP <80 MM HG: CPT | Performed by: PHYSICAL MEDICINE & REHABILITATION

## 2024-09-11 PROCEDURE — 3017F COLORECTAL CA SCREEN DOC REV: CPT | Performed by: PHYSICAL MEDICINE & REHABILITATION

## 2024-09-11 PROCEDURE — G8427 DOCREV CUR MEDS BY ELIG CLIN: HCPCS | Performed by: PHYSICAL MEDICINE & REHABILITATION

## 2024-09-11 PROCEDURE — 1123F ACP DISCUSS/DSCN MKR DOCD: CPT | Performed by: PHYSICAL MEDICINE & REHABILITATION

## 2024-09-11 PROCEDURE — G8417 CALC BMI ABV UP PARAM F/U: HCPCS | Performed by: PHYSICAL MEDICINE & REHABILITATION

## 2024-09-11 RX ORDER — OXYCODONE AND ACETAMINOPHEN 7.5; 325 MG/1; MG/1
1 TABLET ORAL EVERY 6 HOURS PRN
Qty: 90 TABLET | Refills: 0 | Status: SHIPPED | OUTPATIENT
Start: 2024-09-11 | End: 2024-10-09

## 2024-09-11 RX ORDER — OXYCODONE AND ACETAMINOPHEN 10; 325 MG/1; MG/1
1 TABLET ORAL EVERY 6 HOURS PRN
Qty: 100 TABLET | Refills: 0 | Status: SHIPPED | OUTPATIENT
Start: 2024-09-11 | End: 2024-09-11 | Stop reason: ALTCHOICE

## 2024-09-11 RX ORDER — BACLOFEN 10 MG/1
TABLET ORAL
Qty: 90 TABLET | Refills: 0 | Status: SHIPPED | OUTPATIENT
Start: 2024-09-11

## 2024-09-11 RX ORDER — LIDOCAINE 50 MG/G
OINTMENT TOPICAL
Qty: 1 EACH | Refills: 3 | Status: SHIPPED | OUTPATIENT
Start: 2024-09-11

## 2024-10-07 DIAGNOSIS — G89.29 CHRONIC BILATERAL LOW BACK PAIN WITH BILATERAL SCIATICA: ICD-10-CM

## 2024-10-07 DIAGNOSIS — M54.41 CHRONIC BILATERAL LOW BACK PAIN WITH BILATERAL SCIATICA: ICD-10-CM

## 2024-10-07 DIAGNOSIS — Z79.899 HIGH RISK MEDICATION USE: ICD-10-CM

## 2024-10-07 DIAGNOSIS — M25.561 RIGHT KNEE PAIN, UNSPECIFIED CHRONICITY: ICD-10-CM

## 2024-10-07 DIAGNOSIS — M54.42 CHRONIC BILATERAL LOW BACK PAIN WITH BILATERAL SCIATICA: ICD-10-CM

## 2024-10-07 DIAGNOSIS — M05.79 RHEUMATOID ARTHRITIS INVOLVING MULTIPLE SITES WITH POSITIVE RHEUMATOID FACTOR (HCC): ICD-10-CM

## 2024-10-07 RX ORDER — OXYCODONE AND ACETAMINOPHEN 7.5; 325 MG/1; MG/1
1 TABLET ORAL EVERY 6 HOURS PRN
Qty: 90 TABLET | Refills: 0 | Status: SHIPPED | OUTPATIENT
Start: 2024-10-08 | End: 2024-11-05

## 2024-10-10 DIAGNOSIS — M05.79 RHEUMATOID ARTHRITIS INVOLVING MULTIPLE SITES WITH POSITIVE RHEUMATOID FACTOR (HCC): ICD-10-CM

## 2024-10-10 DIAGNOSIS — G57.93 NEUROPATHY INVOLVING BOTH LOWER EXTREMITIES: ICD-10-CM

## 2024-10-10 DIAGNOSIS — M15.0 PRIMARY OSTEOARTHRITIS INVOLVING MULTIPLE JOINTS: ICD-10-CM

## 2024-10-10 DIAGNOSIS — M46.26 OSTEOMYELITIS OF LUMBAR VERTEBRA: ICD-10-CM

## 2024-10-11 RX ORDER — GABAPENTIN 300 MG/1
CAPSULE ORAL
Qty: 180 CAPSULE | Refills: 0 | Status: SHIPPED | OUTPATIENT
Start: 2024-10-12 | End: 2024-12-11

## 2024-10-14 ENCOUNTER — PROCEDURE VISIT (OUTPATIENT)
Dept: PHYSICAL MEDICINE AND REHAB | Age: 69
End: 2024-10-14
Payer: MEDICARE

## 2024-10-14 DIAGNOSIS — G89.29 CHRONIC BILATERAL LOW BACK PAIN WITH BILATERAL SCIATICA: Primary | ICD-10-CM

## 2024-10-14 DIAGNOSIS — M54.41 CHRONIC BILATERAL LOW BACK PAIN WITH BILATERAL SCIATICA: Primary | ICD-10-CM

## 2024-10-14 DIAGNOSIS — M54.42 CHRONIC BILATERAL LOW BACK PAIN WITH BILATERAL SCIATICA: Primary | ICD-10-CM

## 2024-10-14 PROCEDURE — 64445 NJX AA&/STRD SCIATIC NRV IMG: CPT | Performed by: PHYSICAL MEDICINE & REHABILITATION

## 2024-10-14 PROCEDURE — 96372 THER/PROPH/DIAG INJ SC/IM: CPT | Performed by: PHYSICAL MEDICINE & REHABILITATION

## 2024-10-14 RX ORDER — LIDOCAINE HYDROCHLORIDE 10 MG/ML
7 INJECTION, SOLUTION INFILTRATION; PERINEURAL ONCE
Status: COMPLETED | OUTPATIENT
Start: 2024-10-14 | End: 2024-10-14

## 2024-10-14 RX ORDER — CYANOCOBALAMIN 1000 UG/ML
1000 INJECTION, SOLUTION INTRAMUSCULAR; SUBCUTANEOUS ONCE
Status: COMPLETED | OUTPATIENT
Start: 2024-10-14 | End: 2024-10-14

## 2024-10-14 RX ORDER — LIDOCAINE HYDROCHLORIDE 20 MG/ML
5 INJECTION, SOLUTION INFILTRATION; PERINEURAL ONCE
Status: COMPLETED | OUTPATIENT
Start: 2024-10-14 | End: 2024-10-14

## 2024-10-14 RX ADMIN — LIDOCAINE HYDROCHLORIDE 5 ML: 20 INJECTION, SOLUTION INFILTRATION; PERINEURAL at 14:25

## 2024-10-14 RX ADMIN — LIDOCAINE HYDROCHLORIDE 7 ML: 10 INJECTION, SOLUTION INFILTRATION; PERINEURAL at 14:24

## 2024-10-14 RX ADMIN — CYANOCOBALAMIN 1000 MCG: 1000 INJECTION, SOLUTION INTRAMUSCULAR; SUBCUTANEOUS at 14:24

## 2024-10-15 ENCOUNTER — HOSPITAL ENCOUNTER (OUTPATIENT)
Dept: ORTHOPEDIC SURGERY | Age: 69
Discharge: HOME | End: 2024-10-17
Payer: MEDICARE

## 2024-10-15 ENCOUNTER — OFFICE VISIT (OUTPATIENT)
Dept: ORTHOPEDIC SURGERY | Age: 69
End: 2024-10-15
Payer: MEDICARE

## 2024-10-15 VITALS
WEIGHT: 183 LBS | HEIGHT: 67 IN | HEART RATE: 96 BPM | TEMPERATURE: 96.3 F | OXYGEN SATURATION: 99 % | BODY MASS INDEX: 28.72 KG/M2

## 2024-10-15 DIAGNOSIS — Z96.651 STATUS POST TOTAL RIGHT KNEE REPLACEMENT: Primary | ICD-10-CM

## 2024-10-15 DIAGNOSIS — Z96.651 STATUS POST TOTAL RIGHT KNEE REPLACEMENT: ICD-10-CM

## 2024-10-15 PROCEDURE — 1090F PRES/ABSN URINE INCON ASSESS: CPT | Performed by: PHYSICIAN ASSISTANT

## 2024-10-15 PROCEDURE — 3017F COLORECTAL CA SCREEN DOC REV: CPT | Performed by: PHYSICIAN ASSISTANT

## 2024-10-15 PROCEDURE — 73562 X-RAY EXAM OF KNEE 3: CPT

## 2024-10-15 PROCEDURE — 1036F TOBACCO NON-USER: CPT | Performed by: PHYSICIAN ASSISTANT

## 2024-10-15 PROCEDURE — 1123F ACP DISCUSS/DSCN MKR DOCD: CPT | Performed by: PHYSICIAN ASSISTANT

## 2024-10-15 PROCEDURE — G8427 DOCREV CUR MEDS BY ELIG CLIN: HCPCS | Performed by: PHYSICIAN ASSISTANT

## 2024-10-15 PROCEDURE — 99213 OFFICE O/P EST LOW 20 MIN: CPT | Performed by: PHYSICIAN ASSISTANT

## 2024-10-15 PROCEDURE — G8484 FLU IMMUNIZE NO ADMIN: HCPCS | Performed by: PHYSICIAN ASSISTANT

## 2024-10-15 PROCEDURE — G8417 CALC BMI ABV UP PARAM F/U: HCPCS | Performed by: PHYSICIAN ASSISTANT

## 2024-10-15 PROCEDURE — G8399 PT W/DXA RESULTS DOCUMENT: HCPCS | Performed by: PHYSICIAN ASSISTANT

## 2024-10-15 RX ORDER — METHOTREXATE 2.5 MG/1
TABLET ORAL
COMMUNITY

## 2024-10-15 NOTE — PROGRESS NOTES
taking differently: Apply topically 2 times daily.    PRN) 30 g 2    omeprazole (PRILOSEC) 40 MG delayed release capsule Take 1 capsule by mouth daily      hydroxychloroquine (PLAQUENIL) 200 MG tablet Take 1 tablet by mouth daily      nitroGLYCERIN (NITROSTAT) 0.4 MG SL tablet PLEASE SEE ATTACHED FOR DETAILED DIRECTIONS 25 tablet 3    Cholecalciferol (VITAMIN D3) 25 MCG (1000 UT) CAPS Take 1 tablet by mouth in the morning and at bedtime      coenzyme Q10 100 MG CAPS capsule Take 1 capsule by mouth daily 120 capsule 3     No current facility-administered medications on file prior to visit.       Objective:   Pulse 96   Temp (!) 96.3 °F (35.7 °C) (Temporal)   Ht 1.702 m (5' 7\")   Wt 83 kg (183 lb)   SpO2 99%   BMI 28.66 kg/m²       Radiographs and Laboratory Studies:   Previous diagnostic imaging studies were reviewed.   Right knee is no range of motion will directions.  No effusion or ballottement.  No warmth to touch.  No erythema.  Incision is healed well.  No varus or valgus or AP laxity.    Laboratory Studies:   Lab Results   Component Value Date    WBC 5.6 06/28/2024    HGB 11.4 (L) 06/28/2024    HCT 35.0 (L) 06/28/2024    MCV 85.2 06/28/2024     06/28/2024     Lab Results   Component Value Date    SEDRATE 40 09/27/2018     Lab Results   Component Value Date    CRP <0.3 10/16/2020       Assessment and Plan:      Diagnosis Orders   1. Status post total right knee replacement  XR KNEE RIGHT (3 VIEWS)        Rayna is a very pleasant individual here who is about 5 to 6 months after a right total knee replacement.  She has no complaints today, occasional aching which could be weather related given the recent changes.  She is instructed to monitor this and if such continues she was started to call our office we will be happy to see her back, x-rays were benign today.  All in all she is very pleased with the results and we will see her back as needed at this point, she understands to call us for any

## 2024-11-04 DIAGNOSIS — Z79.899 HIGH RISK MEDICATION USE: ICD-10-CM

## 2024-11-04 DIAGNOSIS — G89.29 CHRONIC BILATERAL LOW BACK PAIN WITH BILATERAL SCIATICA: ICD-10-CM

## 2024-11-04 DIAGNOSIS — M25.561 RIGHT KNEE PAIN, UNSPECIFIED CHRONICITY: ICD-10-CM

## 2024-11-04 DIAGNOSIS — M54.42 CHRONIC BILATERAL LOW BACK PAIN WITH BILATERAL SCIATICA: ICD-10-CM

## 2024-11-04 DIAGNOSIS — M54.41 CHRONIC BILATERAL LOW BACK PAIN WITH BILATERAL SCIATICA: ICD-10-CM

## 2024-11-04 DIAGNOSIS — M05.79 RHEUMATOID ARTHRITIS INVOLVING MULTIPLE SITES WITH POSITIVE RHEUMATOID FACTOR (HCC): ICD-10-CM

## 2024-11-04 RX ORDER — OXYCODONE AND ACETAMINOPHEN 7.5; 325 MG/1; MG/1
1 TABLET ORAL EVERY 6 HOURS PRN
Qty: 90 TABLET | Refills: 0 | Status: SHIPPED | OUTPATIENT
Start: 2024-11-04 | End: 2024-12-02

## 2024-11-06 PROBLEM — I95.9 HYPOTENSION, UNSPECIFIED: Status: ACTIVE | Noted: 2024-03-20

## 2024-11-06 NOTE — PROGRESS NOTES
past that we will continue it and repeated in the future.  Patient has had good results in the past with medication with 60 to 80% pain relief lasting at least 2 to 3 weeks more often than a month of relief.  It has worked well to ease myofascial pain emotional suffering and improve function making it possible for him to participate in activities of daily living, self-care, interactions with friends and family.  Patient gave verbal consent to ordered injections.   See follow-up plans for planned injections.      Supplements:  Vitamin D with increased dosing during the rainy months,   Education was given on:   Dietary and Fitness--daily stretches and low carb diet-in chair Yoga when possible             Follow up with Primary Care Physician regarding their general medical needs.     Stressed the importance of following up with PCP and specialists for his/her chronic diseases, health, CV, and cancer screening and continued care. Will follow disease activity/progression and adjust therapeutic regimen to disease activity and severity.   Discussed medication dosage, usage, goals of therapy, and side effects.  Available test results were reviewed -Discussed findings, impression and plan with patient.    An additional 15 minutes were spent outside of the patient visit to review records.  Additional time spent with the patient to discuss their questions.  Additional time spent with the patient devoted to discussing treatment strategy, planning, and implementation.    Patient understands above plan; questions asked and answered. Patient agrees to plan as noted above.        At least 50% of the visit was involved in the discussion of the options for treatment. We discussed exercises, medication, interventional therapies and surgery. Healthy life style is essential with patient hard work to achieve the wellness. In addition; discussion with the patient and/or family about patient's functional status any of the diagnostic

## 2024-11-12 DIAGNOSIS — F41.9 ANXIETY: Primary | ICD-10-CM

## 2024-11-12 RX ORDER — LORAZEPAM 1 MG/1
TABLET ORAL
Qty: 1 TABLET | Refills: 0 | Status: SHIPPED | OUTPATIENT
Start: 2024-11-12 | End: 2024-11-12

## 2024-11-13 ENCOUNTER — PROCEDURE VISIT (OUTPATIENT)
Dept: PHYSICAL MEDICINE AND REHAB | Age: 69
End: 2024-11-13

## 2024-11-13 DIAGNOSIS — G89.29 CHRONIC BILATERAL LOW BACK PAIN WITH BILATERAL SCIATICA: Primary | ICD-10-CM

## 2024-11-13 DIAGNOSIS — M54.41 CHRONIC BILATERAL LOW BACK PAIN WITH BILATERAL SCIATICA: Primary | ICD-10-CM

## 2024-11-13 DIAGNOSIS — M54.42 CHRONIC BILATERAL LOW BACK PAIN WITH BILATERAL SCIATICA: Primary | ICD-10-CM

## 2024-11-13 RX ORDER — CYANOCOBALAMIN 1000 UG/ML
1000 INJECTION, SOLUTION INTRAMUSCULAR; SUBCUTANEOUS ONCE
Status: COMPLETED | OUTPATIENT
Start: 2024-11-13 | End: 2024-11-13

## 2024-11-13 RX ORDER — LIDOCAINE HYDROCHLORIDE 20 MG/ML
5 INJECTION, SOLUTION INFILTRATION; PERINEURAL ONCE
Status: COMPLETED | OUTPATIENT
Start: 2024-11-13 | End: 2024-11-13

## 2024-11-13 RX ORDER — LIDOCAINE HYDROCHLORIDE 10 MG/ML
7 INJECTION, SOLUTION INFILTRATION; PERINEURAL ONCE
Status: COMPLETED | OUTPATIENT
Start: 2024-11-13 | End: 2024-11-13

## 2024-11-13 RX ADMIN — LIDOCAINE HYDROCHLORIDE 7 ML: 10 INJECTION, SOLUTION INFILTRATION; PERINEURAL at 13:04

## 2024-11-13 RX ADMIN — LIDOCAINE HYDROCHLORIDE 5 ML: 20 INJECTION, SOLUTION INFILTRATION; PERINEURAL at 13:04

## 2024-11-13 RX ADMIN — CYANOCOBALAMIN 1000 MCG: 1000 INJECTION, SOLUTION INTRAMUSCULAR; SUBCUTANEOUS at 13:05

## 2024-11-27 ENCOUNTER — OFFICE VISIT (OUTPATIENT)
Dept: PHYSICAL MEDICINE AND REHAB | Age: 69
End: 2024-11-27

## 2024-11-27 VITALS
BODY MASS INDEX: 29.03 KG/M2 | DIASTOLIC BLOOD PRESSURE: 68 MMHG | WEIGHT: 185 LBS | HEART RATE: 60 BPM | HEIGHT: 67 IN | SYSTOLIC BLOOD PRESSURE: 118 MMHG

## 2024-11-27 DIAGNOSIS — M25.552 PAIN IN LEFT HIP: ICD-10-CM

## 2024-11-27 DIAGNOSIS — E55.9 VITAMIN D DEFICIENCY: ICD-10-CM

## 2024-11-27 DIAGNOSIS — M46.26 OSTEOMYELITIS OF LUMBAR VERTEBRA: ICD-10-CM

## 2024-11-27 DIAGNOSIS — M79.644 PAIN IN RIGHT FINGER(S): Primary | ICD-10-CM

## 2024-11-27 DIAGNOSIS — M25.561 RIGHT KNEE PAIN, UNSPECIFIED CHRONICITY: ICD-10-CM

## 2024-11-27 DIAGNOSIS — Z79.899 HIGH RISK MEDICATION USE: ICD-10-CM

## 2024-11-27 DIAGNOSIS — M54.42 CHRONIC BILATERAL LOW BACK PAIN WITH BILATERAL SCIATICA: ICD-10-CM

## 2024-11-27 DIAGNOSIS — M48.062 SPINAL STENOSIS OF LUMBAR REGION WITH NEUROGENIC CLAUDICATION: ICD-10-CM

## 2024-11-27 DIAGNOSIS — M19.011 GLENOHUMERAL ARTHRITIS, RIGHT: ICD-10-CM

## 2024-11-27 DIAGNOSIS — G89.29 CHRONIC BILATERAL LOW BACK PAIN WITH BILATERAL SCIATICA: ICD-10-CM

## 2024-11-27 DIAGNOSIS — M54.41 CHRONIC BILATERAL LOW BACK PAIN WITH BILATERAL SCIATICA: ICD-10-CM

## 2024-11-27 DIAGNOSIS — M05.79 RHEUMATOID ARTHRITIS INVOLVING MULTIPLE SITES WITH POSITIVE RHEUMATOID FACTOR (HCC): ICD-10-CM

## 2024-11-27 DIAGNOSIS — M15.0 PRIMARY OSTEOARTHRITIS INVOLVING MULTIPLE JOINTS: ICD-10-CM

## 2024-11-27 DIAGNOSIS — G57.93 NEUROPATHY INVOLVING BOTH LOWER EXTREMITIES: ICD-10-CM

## 2024-11-27 RX ORDER — OXYCODONE AND ACETAMINOPHEN 7.5; 325 MG/1; MG/1
1 TABLET ORAL EVERY 6 HOURS PRN
Qty: 90 TABLET | Refills: 0 | Status: SHIPPED | OUTPATIENT
Start: 2024-12-02 | End: 2024-12-30

## 2024-11-27 RX ORDER — GABAPENTIN 300 MG/1
CAPSULE ORAL
Qty: 180 CAPSULE | Refills: 0 | Status: SHIPPED | OUTPATIENT
Start: 2024-12-11 | End: 2025-01-26

## 2024-11-27 RX ORDER — BACLOFEN 10 MG/1
TABLET ORAL
Qty: 90 TABLET | Refills: 0 | Status: SHIPPED | OUTPATIENT
Start: 2024-11-27

## 2024-12-02 ENCOUNTER — PROCEDURE VISIT (OUTPATIENT)
Dept: PHYSICAL MEDICINE AND REHAB | Age: 69
End: 2024-12-02
Payer: MEDICARE

## 2024-12-02 DIAGNOSIS — M54.41 CHRONIC BILATERAL LOW BACK PAIN WITH BILATERAL SCIATICA: Primary | ICD-10-CM

## 2024-12-02 DIAGNOSIS — M54.42 CHRONIC BILATERAL LOW BACK PAIN WITH BILATERAL SCIATICA: Primary | ICD-10-CM

## 2024-12-02 DIAGNOSIS — G89.29 CHRONIC BILATERAL LOW BACK PAIN WITH BILATERAL SCIATICA: Primary | ICD-10-CM

## 2024-12-02 PROCEDURE — 96372 THER/PROPH/DIAG INJ SC/IM: CPT | Performed by: PHYSICAL MEDICINE & REHABILITATION

## 2024-12-02 PROCEDURE — 64445 NJX AA&/STRD SCIATIC NRV IMG: CPT | Performed by: PHYSICAL MEDICINE & REHABILITATION

## 2024-12-02 RX ORDER — CYANOCOBALAMIN 1000 UG/ML
1000 INJECTION, SOLUTION INTRAMUSCULAR; SUBCUTANEOUS ONCE
Status: COMPLETED | OUTPATIENT
Start: 2024-12-02 | End: 2024-12-02

## 2024-12-02 RX ORDER — LIDOCAINE HYDROCHLORIDE 10 MG/ML
6 INJECTION, SOLUTION INFILTRATION; PERINEURAL ONCE
Status: COMPLETED | OUTPATIENT
Start: 2024-12-02 | End: 2024-12-02

## 2024-12-02 RX ORDER — LIDOCAINE HYDROCHLORIDE 20 MG/ML
5 INJECTION, SOLUTION INFILTRATION; PERINEURAL ONCE
Status: COMPLETED | OUTPATIENT
Start: 2024-12-02 | End: 2024-12-02

## 2024-12-02 RX ADMIN — LIDOCAINE HYDROCHLORIDE 5 ML: 20 INJECTION, SOLUTION INFILTRATION; PERINEURAL at 14:34

## 2024-12-02 RX ADMIN — CYANOCOBALAMIN 1000 MCG: 1000 INJECTION, SOLUTION INTRAMUSCULAR; SUBCUTANEOUS at 14:32

## 2024-12-02 RX ADMIN — LIDOCAINE HYDROCHLORIDE 6 ML: 10 INJECTION, SOLUTION INFILTRATION; PERINEURAL at 14:35

## 2024-12-02 NOTE — PROGRESS NOTES
Patient here for Left Sciatic injection with U/S. Patient taken back to exam room, and placed on drape locking stool. Areas to be injected marked appropriately, and cleansed with alcohol. 6cc of 1% Lidocaine with 2cc Kenalog, and 5cc of 2% Lidocaine with 1cc B-12 to be injected by provider.

## 2024-12-11 NOTE — TELEPHONE ENCOUNTER
Ejection fraction 59%  Aortic valve mild regurgitation stable echocardiogram follow-up with cardiology if symptoms persist R/F Percocet

## 2024-12-27 DIAGNOSIS — M05.79 RHEUMATOID ARTHRITIS INVOLVING MULTIPLE SITES WITH POSITIVE RHEUMATOID FACTOR (HCC): ICD-10-CM

## 2024-12-27 DIAGNOSIS — Z79.899 HIGH RISK MEDICATION USE: ICD-10-CM

## 2024-12-27 DIAGNOSIS — M25.561 RIGHT KNEE PAIN, UNSPECIFIED CHRONICITY: ICD-10-CM

## 2024-12-27 DIAGNOSIS — M54.41 CHRONIC BILATERAL LOW BACK PAIN WITH BILATERAL SCIATICA: ICD-10-CM

## 2024-12-27 DIAGNOSIS — M54.42 CHRONIC BILATERAL LOW BACK PAIN WITH BILATERAL SCIATICA: ICD-10-CM

## 2024-12-27 DIAGNOSIS — G89.29 CHRONIC BILATERAL LOW BACK PAIN WITH BILATERAL SCIATICA: ICD-10-CM

## 2024-12-27 RX ORDER — OXYCODONE AND ACETAMINOPHEN 7.5; 325 MG/1; MG/1
1 TABLET ORAL EVERY 6 HOURS PRN
Qty: 90 TABLET | Refills: 0 | Status: SHIPPED | OUTPATIENT
Start: 2024-12-29 | End: 2025-01-26

## 2025-01-07 ENCOUNTER — PROCEDURE VISIT (OUTPATIENT)
Dept: PHYSICAL MEDICINE AND REHAB | Age: 70
End: 2025-01-07
Payer: MEDICARE

## 2025-01-07 DIAGNOSIS — M54.41 CHRONIC BILATERAL LOW BACK PAIN WITH BILATERAL SCIATICA: Primary | ICD-10-CM

## 2025-01-07 DIAGNOSIS — G89.29 CHRONIC BILATERAL LOW BACK PAIN WITH BILATERAL SCIATICA: Primary | ICD-10-CM

## 2025-01-07 DIAGNOSIS — M54.42 CHRONIC BILATERAL LOW BACK PAIN WITH BILATERAL SCIATICA: Primary | ICD-10-CM

## 2025-01-07 PROCEDURE — 96372 THER/PROPH/DIAG INJ SC/IM: CPT | Performed by: PHYSICAL MEDICINE & REHABILITATION

## 2025-01-07 PROCEDURE — 64445 NJX AA&/STRD SCIATIC NRV IMG: CPT | Performed by: PHYSICAL MEDICINE & REHABILITATION

## 2025-01-07 RX ORDER — CYANOCOBALAMIN 1000 UG/ML
1000 INJECTION, SOLUTION INTRAMUSCULAR; SUBCUTANEOUS ONCE
Status: COMPLETED | OUTPATIENT
Start: 2025-01-07 | End: 2025-01-07

## 2025-01-07 RX ORDER — LIDOCAINE HYDROCHLORIDE 20 MG/ML
5 INJECTION, SOLUTION INFILTRATION; PERINEURAL ONCE
Status: COMPLETED | OUTPATIENT
Start: 2025-01-07 | End: 2025-01-07

## 2025-01-07 RX ORDER — LIDOCAINE HYDROCHLORIDE 10 MG/ML
6 INJECTION, SOLUTION INFILTRATION; PERINEURAL ONCE
Status: COMPLETED | OUTPATIENT
Start: 2025-01-07 | End: 2025-01-07

## 2025-01-07 RX ADMIN — LIDOCAINE HYDROCHLORIDE 6 ML: 10 INJECTION, SOLUTION INFILTRATION; PERINEURAL at 12:47

## 2025-01-07 RX ADMIN — CYANOCOBALAMIN 1000 MCG: 1000 INJECTION, SOLUTION INTRAMUSCULAR; SUBCUTANEOUS at 12:46

## 2025-01-07 RX ADMIN — LIDOCAINE HYDROCHLORIDE 5 ML: 20 INJECTION, SOLUTION INFILTRATION; PERINEURAL at 13:06

## 2025-01-07 NOTE — PROGRESS NOTES
Patient here for Right Sciatic injection with U/S. Patient taken back to exam room, and placed on drape locking stool. Areas to be injected marked appropriately, and cleansed with alcohol. 6cc of 1% Lidocaine with 2cc Kenalog,and 5cc of 2% Lidocaine with 1cc B-12 to be injected by provider.

## 2025-01-16 ENCOUNTER — OFFICE VISIT (OUTPATIENT)
Age: 70
End: 2025-01-16
Payer: MEDICARE

## 2025-01-16 VITALS
SYSTOLIC BLOOD PRESSURE: 132 MMHG | BODY MASS INDEX: 29.82 KG/M2 | TEMPERATURE: 97.6 F | WEIGHT: 190 LBS | HEART RATE: 71 BPM | HEIGHT: 67 IN | DIASTOLIC BLOOD PRESSURE: 64 MMHG | OXYGEN SATURATION: 100 %

## 2025-01-16 DIAGNOSIS — Z12.31 SCREENING MAMMOGRAM, ENCOUNTER FOR: ICD-10-CM

## 2025-01-16 DIAGNOSIS — R94.6 ABNORMAL RESULTS OF THYROID FUNCTION STUDIES: ICD-10-CM

## 2025-01-16 DIAGNOSIS — I25.119 ATHEROSCLEROSIS OF NATIVE CORONARY ARTERY OF NATIVE HEART WITH ANGINA PECTORIS (HCC): ICD-10-CM

## 2025-01-16 DIAGNOSIS — R49.8 VOICE TREMOR: ICD-10-CM

## 2025-01-16 DIAGNOSIS — D63.1 ANEMIA OF CHRONIC RENAL FAILURE, STAGE 3A (HCC): ICD-10-CM

## 2025-01-16 DIAGNOSIS — R73.09 ABNORMAL GLUCOSE: ICD-10-CM

## 2025-01-16 DIAGNOSIS — N18.31 ANEMIA OF CHRONIC RENAL FAILURE, STAGE 3A (HCC): ICD-10-CM

## 2025-01-16 DIAGNOSIS — M05.79 RHEUMATOID ARTHRITIS INVOLVING MULTIPLE SITES WITH POSITIVE RHEUMATOID FACTOR (HCC): Primary | ICD-10-CM

## 2025-01-16 DIAGNOSIS — E78.2 MIXED HYPERLIPIDEMIA: ICD-10-CM

## 2025-01-16 PROCEDURE — 3075F SYST BP GE 130 - 139MM HG: CPT | Performed by: PHYSICIAN ASSISTANT

## 2025-01-16 PROCEDURE — 99213 OFFICE O/P EST LOW 20 MIN: CPT | Performed by: PHYSICIAN ASSISTANT

## 2025-01-16 PROCEDURE — 99214 OFFICE O/P EST MOD 30 MIN: CPT | Performed by: PHYSICIAN ASSISTANT

## 2025-01-16 PROCEDURE — 3017F COLORECTAL CA SCREEN DOC REV: CPT | Performed by: PHYSICIAN ASSISTANT

## 2025-01-16 PROCEDURE — G8417 CALC BMI ABV UP PARAM F/U: HCPCS | Performed by: PHYSICIAN ASSISTANT

## 2025-01-16 PROCEDURE — 1123F ACP DISCUSS/DSCN MKR DOCD: CPT | Performed by: PHYSICIAN ASSISTANT

## 2025-01-16 PROCEDURE — G8427 DOCREV CUR MEDS BY ELIG CLIN: HCPCS | Performed by: PHYSICIAN ASSISTANT

## 2025-01-16 PROCEDURE — 3078F DIAST BP <80 MM HG: CPT | Performed by: PHYSICIAN ASSISTANT

## 2025-01-16 PROCEDURE — 1036F TOBACCO NON-USER: CPT | Performed by: PHYSICIAN ASSISTANT

## 2025-01-16 PROCEDURE — G8399 PT W/DXA RESULTS DOCUMENT: HCPCS | Performed by: PHYSICIAN ASSISTANT

## 2025-01-16 PROCEDURE — 1090F PRES/ABSN URINE INCON ASSESS: CPT | Performed by: PHYSICIAN ASSISTANT

## 2025-01-16 PROCEDURE — 1159F MED LIST DOCD IN RCRD: CPT | Performed by: PHYSICIAN ASSISTANT

## 2025-01-16 RX ORDER — FOLIC ACID 1 MG/1
1000 TABLET ORAL DAILY
COMMUNITY

## 2025-01-16 RX ORDER — DILTIAZEM HCL 60 MG
60 TABLET ORAL 3 TIMES DAILY
Qty: 90 TABLET | Refills: 0 | Status: SHIPPED
Start: 2025-01-16

## 2025-01-16 SDOH — ECONOMIC STABILITY: FOOD INSECURITY: WITHIN THE PAST 12 MONTHS, YOU WORRIED THAT YOUR FOOD WOULD RUN OUT BEFORE YOU GOT MONEY TO BUY MORE.: NEVER TRUE

## 2025-01-16 SDOH — ECONOMIC STABILITY: FOOD INSECURITY: WITHIN THE PAST 12 MONTHS, THE FOOD YOU BOUGHT JUST DIDN'T LAST AND YOU DIDN'T HAVE MONEY TO GET MORE.: NEVER TRUE

## 2025-01-16 ASSESSMENT — PATIENT HEALTH QUESTIONNAIRE - PHQ9
SUM OF ALL RESPONSES TO PHQ QUESTIONS 1-9: 0
2. FEELING DOWN, DEPRESSED OR HOPELESS: NOT AT ALL
SUM OF ALL RESPONSES TO PHQ9 QUESTIONS 1 & 2: 0
SUM OF ALL RESPONSES TO PHQ QUESTIONS 1-9: 0
SUM OF ALL RESPONSES TO PHQ QUESTIONS 1-9: 0
1. LITTLE INTEREST OR PLEASURE IN DOING THINGS: NOT AT ALL
SUM OF ALL RESPONSES TO PHQ QUESTIONS 1-9: 0

## 2025-01-16 NOTE — PROGRESS NOTES
polyethylene glycol (MIRALAX) 17 g PACK packet Take 17 g by mouth daily      docusate sodium (COLACE) 100 MG capsule Take 1 capsule by mouth 2 times daily      Cyanocobalamin 1000 MCG/ML KIT 1 mL      Misc. Devices (BATH/SHOWER SEAT) MISC Dispense 1 Shower/Bath seat 1 each 0    Misc. Devices (CLASSICS ROLLING WALKER) MISC Dispense 1 rolling walker 1 each 0    diazePAM (VALIUM) 2 MG tablet TAKE 1 TABLET BY MOUTH ONE HOUR PRIOR TO PROCEDURE. MAY REPEAT 30 MINUTES PRIOR TO PROCEDURE      nystatin (MYCOSTATIN) 551547 UNIT/GM cream APPLY CREAM TOPICALLY TWICE DAILY AS NEEDED TO RASH      diclofenac sodium (VOLTAREN) 1 % GEL Apply topically as needed for Pain      atorvastatin (LIPITOR) 10 MG tablet TAKE 1 TABLET BY MOUTH ONCE DAILY NIGHTLY 90 tablet 2    hydrocortisone 2.5 % cream Apply topically 2 times daily. (Patient taking differently: Apply topically 2 times daily.    PRN) 30 g 2    omeprazole (PRILOSEC) 40 MG delayed release capsule Take 1 capsule by mouth daily      hydroxychloroquine (PLAQUENIL) 200 MG tablet Take 1 tablet by mouth daily      nitroGLYCERIN (NITROSTAT) 0.4 MG SL tablet PLEASE SEE ATTACHED FOR DETAILED DIRECTIONS 25 tablet 3    Cholecalciferol (VITAMIN D3) 25 MCG (1000 UT) CAPS Take 1 tablet by mouth in the morning and at bedtime       No current facility-administered medications for this visit.       Objective    Vitals:    01/16/25 1106   BP: 132/64   Site: Right Upper Arm   Position: Sitting   Cuff Size: Large Adult   Pulse: 71   Temp: 97.6 °F (36.4 °C)   TempSrc: Temporal   SpO2: 100%   Weight: 86.2 kg (190 lb)   Height: 1.702 m (5' 7.01\")     Physical Exam  Constitutional:       General: She is not in acute distress.     Appearance: Normal appearance. She is not ill-appearing.   HENT:      Head: Normocephalic and atraumatic.      Ears:      Comments: Decreased hearing  Eyes:      Extraocular Movements: Extraocular movements intact.      Conjunctiva/sclera: Conjunctivae normal.      Pupils:

## 2025-01-23 DIAGNOSIS — M54.42 CHRONIC BILATERAL LOW BACK PAIN WITH BILATERAL SCIATICA: ICD-10-CM

## 2025-01-23 DIAGNOSIS — Z79.899 HIGH RISK MEDICATION USE: ICD-10-CM

## 2025-01-23 DIAGNOSIS — M25.561 RIGHT KNEE PAIN, UNSPECIFIED CHRONICITY: ICD-10-CM

## 2025-01-23 DIAGNOSIS — M54.41 CHRONIC BILATERAL LOW BACK PAIN WITH BILATERAL SCIATICA: ICD-10-CM

## 2025-01-23 DIAGNOSIS — M05.79 RHEUMATOID ARTHRITIS INVOLVING MULTIPLE SITES WITH POSITIVE RHEUMATOID FACTOR (HCC): ICD-10-CM

## 2025-01-23 DIAGNOSIS — G89.29 CHRONIC BILATERAL LOW BACK PAIN WITH BILATERAL SCIATICA: ICD-10-CM

## 2025-01-23 RX ORDER — OXYCODONE AND ACETAMINOPHEN 7.5; 325 MG/1; MG/1
1 TABLET ORAL EVERY 6 HOURS PRN
Qty: 90 TABLET | Refills: 0 | Status: SHIPPED | OUTPATIENT
Start: 2025-01-25 | End: 2025-02-22

## 2025-01-30 NOTE — PROGRESS NOTES
Subjective  Rayna Hdez, 69 y.o. female presents today with:    Reason for Visit  Back Pain Flare up--wanats caudal block       Neck Pain        Leg Pain Bilateral---Left Sciatic injection on 12/2/24 with 60% reduction in pain lasting a month. Right Sciatic injection on 1/7/25 with 65% reduction in pain lasting still ongoing.        Hip Pain Right --flare up dt cold weather       Knee Pain Right        Foot Pain Right        Shoulder Pain Right -awaits surgery at [facility]-will need 4 per day Percocet post op-       Medication Refill Percocet, Gabapentin, Baclofen, Prednisone, Zinc, Topamax, Vit D. Pill count today, complaint.      She is still doing well with her current medications and shows no signs of abuse or overuse.  She is more functional on it.        Back Pain  This is a chronic problem. The current episode started more than 1 year ago. The problem occurs daily. The problem is unchanged. The pain is present in the lumbar spine, gluteal and sacro-iliac. The quality of the pain is described as aching. Radiates to: bilateral legs, left leg worse, left hip. The pain is at a severity of 4/10. The pain is moderate. The pain is Worse during the day. The symptoms are aggravated by bending, standing, position, sitting and twisting (Walking). Stiffness is present In the morning. Associated symptoms include abdominal pain, leg pain and pelvic pain. Pertinent negatives include no bladder incontinence, chest pain, dysuria, fever, headaches, numbness, paresis, perianal numbness or weakness. Risk factors include obesity, poor posture, menopause, lack of exercise, sedentary lifestyle and history of steroid use. She has tried analgesics, walking, home exercises, heat, muscle relaxant, NSAIDs, ice and bed rest (Sciatica block, trigger point injections, mobic, Gabapentin, Norco) for the symptoms. The treatment provided significant relief.   Neck Pain   This is a chronic problem. The problem occurs constantly. The problem

## 2025-02-10 ENCOUNTER — OFFICE VISIT (OUTPATIENT)
Dept: PHYSICAL MEDICINE AND REHAB | Age: 70
End: 2025-02-10
Payer: MEDICARE

## 2025-02-10 VITALS
SYSTOLIC BLOOD PRESSURE: 128 MMHG | BODY MASS INDEX: 29.82 KG/M2 | WEIGHT: 190 LBS | HEIGHT: 67 IN | DIASTOLIC BLOOD PRESSURE: 68 MMHG

## 2025-02-10 DIAGNOSIS — E78.2 MIXED HYPERLIPIDEMIA: ICD-10-CM

## 2025-02-10 DIAGNOSIS — G89.29 CHRONIC BILATERAL LOW BACK PAIN WITH BILATERAL SCIATICA: ICD-10-CM

## 2025-02-10 DIAGNOSIS — M05.79 RHEUMATOID ARTHRITIS INVOLVING MULTIPLE SITES WITH POSITIVE RHEUMATOID FACTOR (HCC): ICD-10-CM

## 2025-02-10 DIAGNOSIS — M46.26 OSTEOMYELITIS OF LUMBAR VERTEBRA: ICD-10-CM

## 2025-02-10 DIAGNOSIS — M54.42 CHRONIC BILATERAL LOW BACK PAIN WITH BILATERAL SCIATICA: ICD-10-CM

## 2025-02-10 DIAGNOSIS — G57.93 NEUROPATHY INVOLVING BOTH LOWER EXTREMITIES: ICD-10-CM

## 2025-02-10 DIAGNOSIS — M48.062 SPINAL STENOSIS OF LUMBAR REGION WITH NEUROGENIC CLAUDICATION: Primary | ICD-10-CM

## 2025-02-10 DIAGNOSIS — G89.18 POST-OP PAIN: ICD-10-CM

## 2025-02-10 DIAGNOSIS — M17.5 OTHER SECONDARY OSTEOARTHRITIS OF RIGHT KNEE: ICD-10-CM

## 2025-02-10 DIAGNOSIS — G60.8 OTHER HEREDITARY AND IDIOPATHIC NEUROPATHIES: ICD-10-CM

## 2025-02-10 DIAGNOSIS — Z78.9 IMPAIRED MOBILITY AND ADLS: ICD-10-CM

## 2025-02-10 DIAGNOSIS — M54.41 CHRONIC BILATERAL LOW BACK PAIN WITH BILATERAL SCIATICA: ICD-10-CM

## 2025-02-10 DIAGNOSIS — Z79.899 HIGH RISK MEDICATION USE: ICD-10-CM

## 2025-02-10 DIAGNOSIS — Z74.09 IMPAIRED MOBILITY AND ADLS: ICD-10-CM

## 2025-02-10 DIAGNOSIS — M25.561 RIGHT KNEE PAIN, UNSPECIFIED CHRONICITY: ICD-10-CM

## 2025-02-10 DIAGNOSIS — R94.6 ABNORMAL RESULTS OF THYROID FUNCTION STUDIES: ICD-10-CM

## 2025-02-10 DIAGNOSIS — R73.09 ABNORMAL GLUCOSE: ICD-10-CM

## 2025-02-10 DIAGNOSIS — M15.0 PRIMARY OSTEOARTHRITIS INVOLVING MULTIPLE JOINTS: ICD-10-CM

## 2025-02-10 LAB
CHOLEST SERPL-MCNC: 176 MG/DL (ref 0–199)
ESTIMATED AVERAGE GLUCOSE: 117 MG/DL
HBA1C MFR BLD: 5.7 % (ref 4–6)
HDLC SERPL-MCNC: 110 MG/DL (ref 40–59)
LDL CHOLESTEROL: 55 MG/DL (ref 0–129)
T4 FREE SERPL-MCNC: 1.28 NG/DL (ref 0.84–1.68)
TRIGLYCERIDE, FASTING: 57 MG/DL (ref 0–150)
TSH REFLEX: 4.47 UIU/ML (ref 0.44–3.86)

## 2025-02-10 PROCEDURE — 1036F TOBACCO NON-USER: CPT | Performed by: PHYSICAL MEDICINE & REHABILITATION

## 2025-02-10 PROCEDURE — 1159F MED LIST DOCD IN RCRD: CPT | Performed by: PHYSICAL MEDICINE & REHABILITATION

## 2025-02-10 PROCEDURE — 99215 OFFICE O/P EST HI 40 MIN: CPT | Performed by: PHYSICAL MEDICINE & REHABILITATION

## 2025-02-10 PROCEDURE — 3078F DIAST BP <80 MM HG: CPT | Performed by: PHYSICAL MEDICINE & REHABILITATION

## 2025-02-10 PROCEDURE — 1160F RVW MEDS BY RX/DR IN RCRD: CPT | Performed by: PHYSICAL MEDICINE & REHABILITATION

## 2025-02-10 PROCEDURE — G8417 CALC BMI ABV UP PARAM F/U: HCPCS | Performed by: PHYSICAL MEDICINE & REHABILITATION

## 2025-02-10 PROCEDURE — G8427 DOCREV CUR MEDS BY ELIG CLIN: HCPCS | Performed by: PHYSICAL MEDICINE & REHABILITATION

## 2025-02-10 PROCEDURE — 1125F AMNT PAIN NOTED PAIN PRSNT: CPT | Performed by: PHYSICAL MEDICINE & REHABILITATION

## 2025-02-10 PROCEDURE — G8399 PT W/DXA RESULTS DOCUMENT: HCPCS | Performed by: PHYSICAL MEDICINE & REHABILITATION

## 2025-02-10 PROCEDURE — 1090F PRES/ABSN URINE INCON ASSESS: CPT | Performed by: PHYSICAL MEDICINE & REHABILITATION

## 2025-02-10 PROCEDURE — 3074F SYST BP LT 130 MM HG: CPT | Performed by: PHYSICAL MEDICINE & REHABILITATION

## 2025-02-10 PROCEDURE — 3017F COLORECTAL CA SCREEN DOC REV: CPT | Performed by: PHYSICAL MEDICINE & REHABILITATION

## 2025-02-10 PROCEDURE — 1123F ACP DISCUSS/DSCN MKR DOCD: CPT | Performed by: PHYSICAL MEDICINE & REHABILITATION

## 2025-02-10 RX ORDER — OXYCODONE AND ACETAMINOPHEN 7.5; 325 MG/1; MG/1
1 TABLET ORAL EVERY 6 HOURS PRN
Qty: 90 TABLET | Refills: 0 | Status: SHIPPED | OUTPATIENT
Start: 2025-02-21 | End: 2025-02-10

## 2025-02-10 RX ORDER — GABAPENTIN 300 MG/1
CAPSULE ORAL
Qty: 180 CAPSULE | Refills: 0 | Status: SHIPPED | OUTPATIENT
Start: 2025-02-10 | End: 2025-03-28

## 2025-02-10 RX ORDER — OXYCODONE AND ACETAMINOPHEN 7.5; 325 MG/1; MG/1
1 TABLET ORAL EVERY 6 HOURS PRN
Qty: 120 TABLET | Refills: 0 | Status: SHIPPED | OUTPATIENT
Start: 2025-02-10 | End: 2025-03-12

## 2025-02-11 DIAGNOSIS — R94.6 ABNORMAL RESULTS OF THYROID FUNCTION STUDIES: Primary | ICD-10-CM

## 2025-02-18 ENCOUNTER — APPOINTMENT (OUTPATIENT)
Dept: CARDIOLOGY | Facility: CLINIC | Age: 70
End: 2025-02-18
Payer: MEDICARE

## 2025-02-18 VITALS
BODY MASS INDEX: 28.4 KG/M2 | SYSTOLIC BLOOD PRESSURE: 120 MMHG | HEART RATE: 74 BPM | DIASTOLIC BLOOD PRESSURE: 56 MMHG | HEIGHT: 68 IN | WEIGHT: 187.4 LBS

## 2025-02-18 DIAGNOSIS — I87.2 VENOUS INSUFFICIENCY: ICD-10-CM

## 2025-02-18 DIAGNOSIS — I10 PRIMARY HYPERTENSION: ICD-10-CM

## 2025-02-18 DIAGNOSIS — I47.10 SVT (SUPRAVENTRICULAR TACHYCARDIA) (CMS-HCC): ICD-10-CM

## 2025-02-18 DIAGNOSIS — R60.9 EDEMA, UNSPECIFIED TYPE: ICD-10-CM

## 2025-02-18 DIAGNOSIS — I20.9 ANGINA PECTORIS WITH NORMAL CORONARY ARTERIOGRAM (CMS-HCC): ICD-10-CM

## 2025-02-18 PROCEDURE — G2211 COMPLEX E/M VISIT ADD ON: HCPCS | Performed by: INTERNAL MEDICINE

## 2025-02-18 PROCEDURE — 3078F DIAST BP <80 MM HG: CPT | Performed by: INTERNAL MEDICINE

## 2025-02-18 PROCEDURE — 1159F MED LIST DOCD IN RCRD: CPT | Performed by: INTERNAL MEDICINE

## 2025-02-18 PROCEDURE — 1157F ADVNC CARE PLAN IN RCRD: CPT | Performed by: INTERNAL MEDICINE

## 2025-02-18 PROCEDURE — 3074F SYST BP LT 130 MM HG: CPT | Performed by: INTERNAL MEDICINE

## 2025-02-18 PROCEDURE — 3008F BODY MASS INDEX DOCD: CPT | Performed by: INTERNAL MEDICINE

## 2025-02-18 PROCEDURE — 99214 OFFICE O/P EST MOD 30 MIN: CPT | Performed by: INTERNAL MEDICINE

## 2025-02-18 RX ORDER — FOLIC ACID 1 MG/1
1 TABLET ORAL DAILY
COMMUNITY

## 2025-02-18 RX ORDER — METHOTREXATE 2.5 MG/1
2.5 TABLET ORAL WEEKLY
COMMUNITY
Start: 2025-01-14

## 2025-02-18 NOTE — PATIENT INSTRUCTIONS
Follow up 6 months    DID YOU KNOW  We have a pharmacy here in the [facility].  They can fill all prescriptions, not just cardiac medications.  Prescriptions from other pharmacies can easily be transferred to the  pharmacy by the  pharmacist on site.   pharmacies offer FREE HOME DELIVERY on medications to anywhere in [place]. They can sync your medications. Typically prescriptions can be ready in 10 - 15 minutes. If pharmacy is unable to fill your  prescription or if cost is more than your paying now the Pharmacist can easily transfer back to your Pharmacy of choice. Pharmacy phone # [phone].     Please bring all medicines, vitamins, and herbal supplements with you in original bottles to every appointment!!!!    Prescriptions will not be filled unless you are compliant with your follow up appointments or have a follow up appointment scheduled as per instruction of your physician. Refills should be requested at the time of your visit.

## 2025-02-18 NOTE — PROGRESS NOTES
Patient:  Jazmin Hallman  YOB: 1955  MRN: [id number]       Impression/Plan:     Diagnoses and all orders for this visit:  Angina pectoris with normal coronary arteriogram (CMS-HCC)  -    Only 1 episode of angina in the last 6 months during some emotional stress.  Physically with walking and doing activities around the house and out in the yard she is asymptomatic  -    Tolerated current medical regimen well with low-dose Cardizem and minimal edema  Venous insufficiency  Edema, unspecified type  -     Just trace edema at this time  Primary hypertension  -    Well-controlled  SVT (supraventricular tachycardia) (CMS-HCC)  -    No symptoms for several years      Chief Complaint/Active Symptoms:       Jazmin Hallman is a 69 y.o. female who presents with SVT, symptomatic PACs/PVCs, hypertension and hyperlipidemia. 2016 coronary angiography demonstrated normal coronary arteries. 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      She was hospitalized at [facility] 4/24/2024 through 4/25/2024. She had presented with acute kidney injury, hyponatremia and hyperkalemia with metabolic acidosis all felt related to use of Bactrim. She had presented with shortness of breath chest pain and chest fluttering. Evaluation for PE was negative. Dyspnea resolved while in the ER. Sodium bicarbonate was instituted for hyperkalemia and acute kidney injury. Felt deterioration was related to the use of Bactrim. No cardiac issues described       She underwent right total knee replacement with Dr. Crouch at [facility] 5/28/2024.  No cardiac complications    Was in this office 8/20/2024 at which time no symptoms of angina with minimal edema related to her venous insufficiency and well-controlled blood pressure.  No symptoms of SVT    She noted some stress over the holidays and said she took 1 nitroglycerin as the only time she is taken any  in the last 6 months.  She had no symptoms to suggest SVT.  She is eating and drinking well.  Mainly limited by her arthritis symptoms.               Review of Systems: Unremarkable except as noted above    Meds     Current Outpatient Medications   Medication Instructions    acetaminophen (TYLENOL) 500 mg, Every 8 hours PRN    amoxicillin (AMOXIL) 500 mg    aspirin 81 mg EC tablet Take 1 tablet Monday- Friday only    atorvastatin (LIPITOR) 10 mg, oral, Daily    baclofen (Lioresal) 10 mg tablet 1 tablet, Daily    cholecalciferol (Vitamin D-3) 50 MCG (2000 UT) tablet 1 tablet, Daily    CYANOCOBALAMIN, VITAMIN B-12, INJ 1 mL, Every 30 days    diclofenac sodium 1 % kit 2 times daily PRN    dilTIAZem (CARDIZEM) 60 mg, oral, 2 times daily    docusate sodium (STOOL SOFTENER) 100 mg, 2 times daily    folic acid (FOLVITE) 1 mg, oral, Daily    furosemide (LASIX) 20 mg, 2 times daily    gabapentin (NEURONTIN) 300 mg, 3 times daily    hydroxychloroquine (PLAQUENIL) 200 mg, 2 times daily    lidocaine (Xylocaine) 5 % ointment Every 24 hours PRN    losartan (Cozaar) 25 mg tablet TAKE 3 TABLETS BY MOUTH ONCE DAILY AFTER BREAKFAST AND 1 AT BEDTIME    melatonin 5 mg tablet Take 1 tablet (5 mg) by mouth as needed at bedtime for sleep.    methotrexate (TREXALL) 2.5 mg, Weekly    nitroglycerin (NITROSTAT) 0.4 mg, Every 5 min PRN    nystatin (Mycostatin) cream 2 times daily PRN    omeprazole (PRILOSEC) 40 mg, Daily    oxyCODONE-acetaminophen (Percocet)  mg tablet 1 tablet, Every 6 hours PRN    polyethylene glycol (GLYCOLAX, MIRALAX) 17 g, Nightly        Allergies     Allergies   Allergen Reactions    Amlodipine Cough, Other, Swelling and Unknown     kidney issues    swelling    Bactrim [Sulfamethoxazole-Trimethoprim] Other    Isosorbide Cough, Other and Unknown    Lisinopril Cough, Unknown and Other     kidney issues    Ranolazine GI Upset, Headache, Nausea Only, Other and Unknown     headache    Keflex [Cephalexin] Rash    Latex  "Hives, Rash and Swelling     Swelling of hands with wearing latex gloves         Annotated Problems     Specialty Problems          Cardiology Problems    Angina pectoris with normal coronary arteriogram (CMS-HCC)      · 4/1/2016. LVEF 65-70%. LVEDP 9 mmHg. Angiographically normal coronary arteries     2016 coronary angiography demonstrated normal coronary arteries. 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          Primary hypertension    Venous insufficiency        Problem List     Patient Active Problem List    Diagnosis Date Noted    Venous insufficiency 08/20/2024    Edema 05/08/2024    Hyponatremia 01/10/2024    Primary hypertension 01/10/2024    Angina pectoris with normal coronary arteriogram (CMS-HCC) 01/10/2024    Non-recurrent acute serous otitis media of both ears 11/17/2023    Acquired pes planus of both feet 05/24/2023    Anemia of chronic renal failure, stage 3a (Multi) 03/28/2023    LUKAS positive 04/20/2019       Objective:     Vitals:    02/18/25 0925   BP: 120/56   BP Location: Right arm   Patient Position: Sitting   Pulse: 74   Weight: 85 kg (187 lb 6.4 oz)   Height: 1.727 m (5' 8\")      Wt Readings from Last 4 Encounters:   02/18/25 85 kg (187 lb 6.4 oz)   08/20/24 85.9 kg (189 lb 6.4 oz)   06/24/24 92.4 kg (203 lb 12.8 oz)   05/08/24 97.6 kg (215 lb 3.2 oz)           LAB:     Lab Results   Component Value Date    WBC 6.3 03/20/2024    HGB 10.4 (L) 03/20/2024    HCT 33.4 (L) 03/20/2024     03/20/2024    ALT 13 02/16/2023    AST 20 02/16/2023     (L) 03/20/2024    K 4.5 03/20/2024     03/20/2024    CREATININE 1.34 (H) 03/20/2024    BUN 16 03/20/2024    CO2 25 03/20/2024    HGBA1C 5.7 02/10/2025       Diagnostic Studies:     BI mammo bilateral screening tomosynthesis    Result Date: 4/4/2024  Interpreted By:  Terry Brenner, STUDY: BI MAMMO BILATERAL SCREENING TOMOSYNTHESIS;  4/4/2024 11:30 am "   ACCESSION NUMBER(S): [id number]   ORDERING CLINICIAN: GRETA BOGGS   INDICATION: Screening.   COMPARISON: Mammograms dated 15 December 2022 back through the oldest mammogram available, 6 December 2006   FINDINGS: 2D and tomosynthesis images were reviewed at 1 mm slice thickness.   Density:  There are areas of scattered fibroglandular tissue.   No suspect masses or calcifications are identified       No mammographic evidence of malignancy.   Based on the Tyrer-Cuzick model for breast cancer risk assessment, the patient's lifetime risk of breast cancer is 5.38%. Patients with over a 20% lifetime risk of developing breast cancer may benefit from additional screening with breast MRI or ultrasound. Please note that this estimate is based on responses provided on the patient questionnaire. For more information regarding high risk consultation, please call [phone].   BI-RADS CATEGORY:   BI-RADS Category:  1 Negative. Recommendation:  Routine Screening Mammogram in 1 Year. Recommended Date:  1 Year. Laterality:  Bilateral.   For any future breast imaging appointments, please call [phone]).   MACRO: None   Signed by: Terry Brenner 4/4/2024 12:09 PM Dictation workstation:   [id number]        Radiology:     No orders to display       Physical Exam     General Appearance: alert and oriented to person, place and time, in no acute distress, coarse due to her known vocal cord paralysis.  Stable  Cardiovascular: normal rate, regular rhythm, normal S1 and S2, no murmurs, rubs, clicks, or gallops,  no JVD  Pulmonary/Chest: clear to auscultation bilaterally- no wheezes, rales or rhonchi, normal air movement, no respiratory distress  Abdomen: soft, non-tender, non-distended, normal bowel sounds, no masses   Extremities: no cyanosis, clubbing or edema, significant arthritic changes  Skin: warm and dry, no rash or erythema  Eyes: EOMI  Neck: supple and non-tender without mass, no thyromegaly   Neurological:  alert, oriented, normal speech, no focal findings or movement disorder noted  Vascular: Pulses 2+              Scribe Attestation  By signing my name below, I, Paulina Caro LPN , Scribe   attest that this documentation has been prepared under the direction and in the presence of Bo Mendoza MD.

## 2025-03-03 ENCOUNTER — PATIENT MESSAGE (OUTPATIENT)
Age: 70
End: 2025-03-03

## 2025-03-03 DIAGNOSIS — F41.9 ANXIETY: Primary | ICD-10-CM

## 2025-03-03 RX ORDER — LORAZEPAM 1 MG/1
TABLET ORAL
Qty: 1 TABLET | Refills: 0 | Status: SHIPPED | OUTPATIENT
Start: 2025-03-03 | End: 2025-03-03

## 2025-03-03 NOTE — TELEPHONE ENCOUNTER
Patient is requesting medication refill. Please approve or deny this request.    Rx requested:  Requested Prescriptions     Pending Prescriptions Disp Refills    LORazepam (ATIVAN) 1 MG tablet 1 tablet 0     Sig: Take 1 tablet by mouth every 8 hours as needed for Anxiety for up to 30 days. Take 1 tablet by mouth as a one-time dose 1/2-hour prior to procedure Max Daily Amount: 3 mg         Last Office Visit:   1/16/2025      Next Visit Date:  Future Appointments   Date Time Provider Department Center   3/18/2025 11:00 AM Rosaline Peterson DO [facility]   4/1/2025 10:15 AM Rosaline Peterson DO [facility]   5/1/2025 11:00 AM Rosaline Peterson DO [facility]   5/7/2025 11:00 AM Rosaline Peterson DO [facility]   7/16/2025 10:00 AM Hailey Figueroa PA-C [facility]

## 2025-03-18 ENCOUNTER — HOSPITAL ENCOUNTER (OUTPATIENT)
Dept: INTERVENTIONAL RADIOLOGY/VASCULAR | Age: 70
Discharge: HOME | End: 2025-03-20
Payer: MEDICARE

## 2025-03-18 VITALS
SYSTOLIC BLOOD PRESSURE: 149 MMHG | OXYGEN SATURATION: 100 % | HEART RATE: 66 BPM | RESPIRATION RATE: 16 BRPM | DIASTOLIC BLOOD PRESSURE: 58 MMHG

## 2025-03-18 DIAGNOSIS — M54.16 LUMBAR RADICULOPATHY: ICD-10-CM

## 2025-03-18 PROCEDURE — 62323 NJX INTERLAMINAR LMBR/SAC: CPT

## 2025-03-18 PROCEDURE — 62323 NJX INTERLAMINAR LMBR/SAC: CPT | Performed by: PHYSICAL MEDICINE & REHABILITATION

## 2025-03-18 PROCEDURE — 6360000004 HC RX CONTRAST MEDICATION: Performed by: PHYSICAL MEDICINE & REHABILITATION

## 2025-03-18 PROCEDURE — 6360000002 HC RX W HCPCS: Performed by: PHYSICAL MEDICINE & REHABILITATION

## 2025-03-18 PROCEDURE — 2709999900 IR NERVE BLOCK PROCEDURE

## 2025-03-18 RX ORDER — METHYLPREDNISOLONE ACETATE 40 MG/ML
INJECTION, SUSPENSION INTRA-ARTICULAR; INTRALESIONAL; INTRAMUSCULAR; SOFT TISSUE PRN
Status: COMPLETED | OUTPATIENT
Start: 2025-03-18 | End: 2025-03-18

## 2025-03-18 RX ORDER — LIDOCAINE HYDROCHLORIDE 20 MG/ML
INJECTION, SOLUTION INFILTRATION; PERINEURAL PRN
Status: COMPLETED | OUTPATIENT
Start: 2025-03-18 | End: 2025-03-18

## 2025-03-18 RX ORDER — IOPAMIDOL 408 MG/ML
INJECTION, SOLUTION INTRATHECAL PRN
Status: COMPLETED | OUTPATIENT
Start: 2025-03-18 | End: 2025-03-18

## 2025-03-18 RX ADMIN — LIDOCAINE HYDROCHLORIDE 18 ML: 20 INJECTION, SOLUTION INFILTRATION; PERINEURAL at 11:18

## 2025-03-18 RX ADMIN — METHYLPREDNISOLONE ACETATE 40 MG: 40 INJECTION, SUSPENSION INTRA-ARTICULAR; INTRALESIONAL; INTRAMUSCULAR; INTRASYNOVIAL; SOFT TISSUE at 11:20

## 2025-03-18 RX ADMIN — IOPAMIDOL 3 ML: 408 INJECTION, SOLUTION INTRATHECAL at 11:19

## 2025-03-18 ASSESSMENT — PAIN - FUNCTIONAL ASSESSMENT: PAIN_FUNCTIONAL_ASSESSMENT: NONE - DENIES PAIN

## 2025-03-18 ASSESSMENT — PAIN SCALES - GENERAL: PAINLEVEL_OUTOF10: 4

## 2025-03-18 ASSESSMENT — PAIN DESCRIPTION - LOCATION: LOCATION: BACK

## 2025-03-18 ASSESSMENT — PAIN DESCRIPTION - ORIENTATION: ORIENTATION: MID

## 2025-03-18 NOTE — OR NURSING
Patient was brought to Special Procedures suite via wheelchair.  Patient onto procedure table in prone position with 2 assistance.    Placed on vitals monitor. VSS.   Caudal site cleansed with Hibiclense and site marked by Dr. Peterson.   1118 Site prepped with betadine, draped and numbed with lidocaine.    1119 Needle placement verifed with fluoro guidance and contrast injection.    1120 All prep and medications given by Dr Peterson.    Patient tolerated well.    Patient will return to the CT holding room.

## 2025-03-18 NOTE — PROGRESS NOTES
Pt ambulated with cane, unsteady at times staff provided stand by assistance, back to CT holding. Pt A&Ox4, respirations even and unlabored, visible skin p/w/d. Pt changed independently into hospital gown. Pt interview complete, pt denies contrast allergy and states isn't on any blood thinners. Hx, allergies, medications reviewed. Confirmed pt does not have any diabetic testing device on. Consent obtained.  Pt used restroom prior to procedure. Pt awaiting to go to specials dept via wheelchair.Electronically signed by Reena Barrow RN on 3/18/2025 at 10:55 AM

## 2025-03-18 NOTE — FLOWSHEET NOTE
Pt arrived back to ct holding. Pt reports 4/10 pain to lower back, an improvement from prior. Pt able to move all extremities without complication. Pts VSS. Pt dressing into street clothes at this time. Discharge instructions provided and reviewed with patient. Pt has no questions or concerns at this time.  Pt taken via WC to discharge exit. Pts  to drive her home. Pt noted to have cane which she brought with her today. Electronically signed by Sarahy Mauricio RN on 3/18/2025 at 11:32 AM

## 2025-03-18 NOTE — DISCHARGE INSTRUCTIONS
Caudal Block Discharge Instructions    Activity:  Rest, avoid strenuous activity such as bending or lifting heavy objects for at least 24 hours.    Be aware that your legs may feel heavy for up to 4 hours.    May shower, bathe, or swim after 24 hours.      Diet:  Resume usual diet    Medication:  * Resume home medication unless otherwise instructed by your physician.  * May take mild pain reliever, as needed, such as Acetaminophen or Ibuprofen.    INSTRUCTIONS OR COMMENTS RELATIVE TO SPECIFIC EXAM PERFORMED:  - You may experience some soreness over the next 1 to 2 days.    - If any signs of infection (redness, swelling, pus) at the site, Go to ER for evaluation.   - Be aware that your legs may feel heavy for up to 4 hours.  - For large amount of bleeding or drainage, Go to ER for evaluation.  - If valium was taken prior to procedure, do not drive, drink alcohol, or make any important decisions for the next 24 hours.   - Do not shower, bathe, or swim for the next 24 hours.  - Follow-up with Dr. Beltran.    IF YOU DEVELOP ANY OF THE FOLLOWING SYMPTOMS OVER THE NEXT 48HRS, CONTACT PHYSICIAN LISTED BELOW:  Physician performing procedure: DR. BELTRAN - [phone] or [phone], Ask to be put in contact with DR. BELTRAN.  After hours contact ER.  * Extreme pain that increases after leaving the hospital  * Active bleeding (refer to above instructions)  * Chills, fever above 100 degrees Farenheit and fatigue.  * Weakness, dizziness, lightheadedness or fainting.    If you are unable to contact the above physician and you feel you have a major problem, please go to the Emergency Room for treatment.

## 2025-03-19 DIAGNOSIS — G89.18 POST-OP PAIN: ICD-10-CM

## 2025-03-19 DIAGNOSIS — M54.42 CHRONIC BILATERAL LOW BACK PAIN WITH BILATERAL SCIATICA: ICD-10-CM

## 2025-03-19 DIAGNOSIS — G89.29 CHRONIC BILATERAL LOW BACK PAIN WITH BILATERAL SCIATICA: ICD-10-CM

## 2025-03-19 DIAGNOSIS — G60.8 OTHER HEREDITARY AND IDIOPATHIC NEUROPATHIES: ICD-10-CM

## 2025-03-19 DIAGNOSIS — M15.0 PRIMARY OSTEOARTHRITIS INVOLVING MULTIPLE JOINTS: ICD-10-CM

## 2025-03-19 DIAGNOSIS — Z79.899 HIGH RISK MEDICATION USE: ICD-10-CM

## 2025-03-19 DIAGNOSIS — M54.41 CHRONIC BILATERAL LOW BACK PAIN WITH BILATERAL SCIATICA: ICD-10-CM

## 2025-03-19 DIAGNOSIS — M25.561 RIGHT KNEE PAIN, UNSPECIFIED CHRONICITY: ICD-10-CM

## 2025-03-19 DIAGNOSIS — M48.062 SPINAL STENOSIS OF LUMBAR REGION WITH NEUROGENIC CLAUDICATION: ICD-10-CM

## 2025-03-19 DIAGNOSIS — G57.93 NEUROPATHY INVOLVING BOTH LOWER EXTREMITIES: ICD-10-CM

## 2025-03-19 RX ORDER — OXYCODONE AND ACETAMINOPHEN 7.5; 325 MG/1; MG/1
1 TABLET ORAL EVERY 6 HOURS PRN
Qty: 120 TABLET | Refills: 0 | Status: SHIPPED | OUTPATIENT
Start: 2025-03-19 | End: 2025-04-18

## 2025-04-01 ENCOUNTER — PROCEDURE VISIT (OUTPATIENT)
Dept: PHYSICAL MEDICINE AND REHAB | Age: 70
End: 2025-04-01
Payer: MEDICARE

## 2025-04-01 DIAGNOSIS — M54.41 CHRONIC BILATERAL LOW BACK PAIN WITH BILATERAL SCIATICA: Primary | ICD-10-CM

## 2025-04-01 DIAGNOSIS — M54.42 CHRONIC BILATERAL LOW BACK PAIN WITH BILATERAL SCIATICA: Primary | ICD-10-CM

## 2025-04-01 DIAGNOSIS — G89.29 CHRONIC BILATERAL LOW BACK PAIN WITH BILATERAL SCIATICA: Primary | ICD-10-CM

## 2025-04-01 PROCEDURE — 64445 NJX AA&/STRD SCIATIC NRV IMG: CPT | Performed by: PHYSICAL MEDICINE & REHABILITATION

## 2025-04-01 PROCEDURE — 96372 THER/PROPH/DIAG INJ SC/IM: CPT | Performed by: PHYSICAL MEDICINE & REHABILITATION

## 2025-04-01 RX ORDER — LIDOCAINE HYDROCHLORIDE 20 MG/ML
5 INJECTION, SOLUTION INFILTRATION; PERINEURAL ONCE
Status: COMPLETED | OUTPATIENT
Start: 2025-04-01 | End: 2025-04-01

## 2025-04-01 RX ORDER — CYANOCOBALAMIN 1000 UG/ML
1000 INJECTION, SOLUTION INTRAMUSCULAR; SUBCUTANEOUS ONCE
Status: COMPLETED | OUTPATIENT
Start: 2025-04-01 | End: 2025-04-01

## 2025-04-01 RX ORDER — LIDOCAINE HYDROCHLORIDE 10 MG/ML
6 INJECTION, SOLUTION INFILTRATION; PERINEURAL ONCE
Status: COMPLETED | OUTPATIENT
Start: 2025-04-01 | End: 2025-04-01

## 2025-04-01 RX ADMIN — LIDOCAINE HYDROCHLORIDE 5 ML: 20 INJECTION, SOLUTION INFILTRATION; PERINEURAL at 11:36

## 2025-04-01 RX ADMIN — CYANOCOBALAMIN 1000 MCG: 1000 INJECTION, SOLUTION INTRAMUSCULAR; SUBCUTANEOUS at 11:31

## 2025-04-01 RX ADMIN — LIDOCAINE HYDROCHLORIDE 6 ML: 10 INJECTION, SOLUTION INFILTRATION; PERINEURAL at 11:32

## 2025-04-07 PROBLEM — M86.179 ACUTE OSTEOMYELITIS OF ANKLE OR FOOT (HCC): Status: ACTIVE | Noted: 2018-09-06

## 2025-04-07 PROBLEM — R26.2 DIFFICULTY WALKING: Status: ACTIVE | Noted: 2018-09-06

## 2025-04-07 NOTE — PROGRESS NOTES
oral lesions.      Dentition: Normal dentition.      Pharynx: No oropharyngeal exudate.   Eyes:      General: No scleral icterus.        Right eye: No discharge.         Left eye: No discharge.      Conjunctiva/sclera: Conjunctivae normal.      Right eye: No chemosis or exudate.     Left eye: No chemosis or exudate.  Neck:      Thyroid: No thyromegaly.      Vascular: No JVD.      Trachea: No tracheal deviation.   Pulmonary:      Effort: Pulmonary effort is normal. No tachypnea, bradypnea, accessory muscle usage or respiratory distress.      Breath sounds: No wheezing.   Chest:      Chest wall: No tenderness.   Musculoskeletal:         General: Tenderness present.      Right shoulder: Normal.      Left shoulder: Normal.      Right upper arm: Normal.      Left upper arm: Normal.      Right elbow: Normal.      Left elbow: Normal.      Right forearm: Normal.      Left forearm: Normal.      Right wrist: Normal.      Left wrist: Normal.      Right hand: Normal.      Left hand: Normal.      Cervical back: Normal range of motion and neck supple. No edema or rigidity.      Thoracic back: Normal.      Lumbar back: Tenderness and bony tenderness present. No swelling, edema, deformity or lacerations. Decreased range of motion.      Right hip: Normal.      Left hip: Normal.      Right upper leg: Normal.      Left upper leg: Normal.      Right knee: Normal.      Left knee: Normal.      Right lower leg: Normal.      Left lower leg: Normal.      Right ankle: Normal.      Right Achilles Tendon: Normal.      Left ankle: Normal.      Left Achilles Tendon: Normal.      Right foot: Normal.      Left foot: Normal.        Legs:       Comments: Tender areas are indicated by numbered spot         Skin:     General: Skin is warm and dry.      Coloration: Skin is not pale.      Findings: No abrasion, bruising, ecchymosis, erythema, laceration, petechiae or rash. Rash is not macular, pustular or urticarial.      Nails: There is no clubbing.

## 2025-04-24 DIAGNOSIS — M48.062 SPINAL STENOSIS OF LUMBAR REGION WITH NEUROGENIC CLAUDICATION: ICD-10-CM

## 2025-04-24 DIAGNOSIS — M54.42 CHRONIC BILATERAL LOW BACK PAIN WITH BILATERAL SCIATICA: ICD-10-CM

## 2025-04-24 DIAGNOSIS — G89.29 CHRONIC BILATERAL LOW BACK PAIN WITH BILATERAL SCIATICA: ICD-10-CM

## 2025-04-24 DIAGNOSIS — G89.18 POST-OP PAIN: ICD-10-CM

## 2025-04-24 DIAGNOSIS — G57.93 NEUROPATHY INVOLVING BOTH LOWER EXTREMITIES: ICD-10-CM

## 2025-04-24 DIAGNOSIS — M54.41 CHRONIC BILATERAL LOW BACK PAIN WITH BILATERAL SCIATICA: ICD-10-CM

## 2025-04-24 DIAGNOSIS — Z79.899 HIGH RISK MEDICATION USE: ICD-10-CM

## 2025-04-24 DIAGNOSIS — M15.0 PRIMARY OSTEOARTHRITIS INVOLVING MULTIPLE JOINTS: ICD-10-CM

## 2025-04-24 DIAGNOSIS — G60.8 OTHER HEREDITARY AND IDIOPATHIC NEUROPATHIES: ICD-10-CM

## 2025-04-24 DIAGNOSIS — M25.561 RIGHT KNEE PAIN, UNSPECIFIED CHRONICITY: ICD-10-CM

## 2025-04-30 RX ORDER — OXYCODONE AND ACETAMINOPHEN 7.5; 325 MG/1; MG/1
1 TABLET ORAL EVERY 6 HOURS PRN
Qty: 120 TABLET | Refills: 0
Start: 2025-04-30 | End: 2025-05-30

## 2025-05-01 ENCOUNTER — PROCEDURE VISIT (OUTPATIENT)
Dept: PHYSICAL MEDICINE AND REHAB | Age: 70
End: 2025-05-01

## 2025-05-01 DIAGNOSIS — M54.42 CHRONIC BILATERAL LOW BACK PAIN WITH BILATERAL SCIATICA: Primary | ICD-10-CM

## 2025-05-01 DIAGNOSIS — G89.29 CHRONIC BILATERAL LOW BACK PAIN WITH BILATERAL SCIATICA: Primary | ICD-10-CM

## 2025-05-01 DIAGNOSIS — M54.41 CHRONIC BILATERAL LOW BACK PAIN WITH BILATERAL SCIATICA: Primary | ICD-10-CM

## 2025-05-01 RX ORDER — LIDOCAINE HYDROCHLORIDE 20 MG/ML
5 INJECTION, SOLUTION INFILTRATION; PERINEURAL ONCE
Status: COMPLETED | OUTPATIENT
Start: 2025-05-01 | End: 2025-05-01

## 2025-05-01 RX ORDER — CYANOCOBALAMIN 1000 UG/ML
1000 INJECTION, SOLUTION INTRAMUSCULAR; SUBCUTANEOUS ONCE
Status: COMPLETED | OUTPATIENT
Start: 2025-05-01 | End: 2025-05-01

## 2025-05-01 RX ORDER — LIDOCAINE HYDROCHLORIDE 10 MG/ML
6 INJECTION, SOLUTION INFILTRATION; PERINEURAL ONCE
Status: COMPLETED | OUTPATIENT
Start: 2025-05-01 | End: 2025-05-01

## 2025-05-01 RX ADMIN — CYANOCOBALAMIN 1000 MCG: 1000 INJECTION, SOLUTION INTRAMUSCULAR; SUBCUTANEOUS at 11:58

## 2025-05-01 RX ADMIN — LIDOCAINE HYDROCHLORIDE 5 ML: 20 INJECTION, SOLUTION INFILTRATION; PERINEURAL at 11:58

## 2025-05-01 RX ADMIN — LIDOCAINE HYDROCHLORIDE 6 ML: 10 INJECTION, SOLUTION INFILTRATION; PERINEURAL at 11:58

## 2025-05-01 NOTE — PROGRESS NOTES
Patient here for Right sciatic injection with U/S. Patient taken back to exam room, and placed on drape locking stool. Areas to be injected marked appropriately, and cleansed with alcohol. 6cc of 1% Lidocaine, and 5cc of 2% Lidocaine and 2cc of kenalog and 1cc of b12 to be injected by provider.

## 2025-05-07 ENCOUNTER — OFFICE VISIT (OUTPATIENT)
Dept: PHYSICAL MEDICINE AND REHAB | Age: 70
End: 2025-05-07
Payer: MEDICARE

## 2025-05-07 ENCOUNTER — HOSPITAL ENCOUNTER (OUTPATIENT)
Dept: RADIOLOGY | Facility: CLINIC | Age: 70
Discharge: HOME | End: 2025-05-07
Payer: MEDICARE

## 2025-05-07 VITALS
WEIGHT: 190 LBS | BODY MASS INDEX: 29.82 KG/M2 | HEART RATE: 78 BPM | DIASTOLIC BLOOD PRESSURE: 73 MMHG | HEIGHT: 67 IN | SYSTOLIC BLOOD PRESSURE: 129 MMHG

## 2025-05-07 VITALS — HEIGHT: 67 IN | BODY MASS INDEX: 30.45 KG/M2 | WEIGHT: 194 LBS

## 2025-05-07 DIAGNOSIS — Z12.31 ENCOUNTER FOR SCREENING MAMMOGRAM FOR MALIGNANT NEOPLASM OF BREAST: ICD-10-CM

## 2025-05-07 DIAGNOSIS — G89.18 POST-OP PAIN: ICD-10-CM

## 2025-05-07 DIAGNOSIS — G89.29 CHRONIC RIGHT-SIDED LOW BACK PAIN WITH LEFT-SIDED SCIATICA: ICD-10-CM

## 2025-05-07 DIAGNOSIS — M25.511 CHRONIC RIGHT SHOULDER PAIN: ICD-10-CM

## 2025-05-07 DIAGNOSIS — N18.30 STAGE 3 CHRONIC KIDNEY DISEASE, UNSPECIFIED WHETHER STAGE 3A OR 3B CKD (HCC): ICD-10-CM

## 2025-05-07 DIAGNOSIS — M54.16 LUMBAR BACK PAIN WITH RADICULOPATHY AFFECTING RIGHT LOWER EXTREMITY: ICD-10-CM

## 2025-05-07 DIAGNOSIS — G57.93 NEUROPATHY INVOLVING BOTH LOWER EXTREMITIES: Primary | ICD-10-CM

## 2025-05-07 DIAGNOSIS — E55.9 VITAMIN D DEFICIENCY: ICD-10-CM

## 2025-05-07 DIAGNOSIS — M05.79 RHEUMATOID ARTHRITIS INVOLVING MULTIPLE SITES WITH POSITIVE RHEUMATOID FACTOR (HCC): ICD-10-CM

## 2025-05-07 DIAGNOSIS — G89.29 CHRONIC RIGHT SHOULDER PAIN: ICD-10-CM

## 2025-05-07 DIAGNOSIS — M54.42 CHRONIC RIGHT-SIDED LOW BACK PAIN WITH LEFT-SIDED SCIATICA: ICD-10-CM

## 2025-05-07 PROBLEM — R11.2 PONV (POSTOPERATIVE NAUSEA AND VOMITING): Status: ACTIVE | Noted: 2025-04-30

## 2025-05-07 PROBLEM — Z98.890 PONV (POSTOPERATIVE NAUSEA AND VOMITING): Status: ACTIVE | Noted: 2025-04-30

## 2025-05-07 LAB — MAMMOGRAPHY, EXTERNAL: NORMAL

## 2025-05-07 PROCEDURE — 1159F MED LIST DOCD IN RCRD: CPT | Performed by: PHYSICAL MEDICINE & REHABILITATION

## 2025-05-07 PROCEDURE — 77063 BREAST TOMOSYNTHESIS BI: CPT | Performed by: RADIOLOGY

## 2025-05-07 PROCEDURE — 1036F TOBACCO NON-USER: CPT | Performed by: PHYSICAL MEDICINE & REHABILITATION

## 2025-05-07 PROCEDURE — 3078F DIAST BP <80 MM HG: CPT | Performed by: PHYSICAL MEDICINE & REHABILITATION

## 2025-05-07 PROCEDURE — G8427 DOCREV CUR MEDS BY ELIG CLIN: HCPCS | Performed by: PHYSICAL MEDICINE & REHABILITATION

## 2025-05-07 PROCEDURE — 77063 BREAST TOMOSYNTHESIS BI: CPT

## 2025-05-07 PROCEDURE — 77067 SCR MAMMO BI INCL CAD: CPT | Performed by: RADIOLOGY

## 2025-05-07 PROCEDURE — 1123F ACP DISCUSS/DSCN MKR DOCD: CPT | Performed by: PHYSICAL MEDICINE & REHABILITATION

## 2025-05-07 PROCEDURE — 1160F RVW MEDS BY RX/DR IN RCRD: CPT | Performed by: PHYSICAL MEDICINE & REHABILITATION

## 2025-05-07 PROCEDURE — 99214 OFFICE O/P EST MOD 30 MIN: CPT | Performed by: PHYSICAL MEDICINE & REHABILITATION

## 2025-05-07 PROCEDURE — 3074F SYST BP LT 130 MM HG: CPT | Performed by: PHYSICAL MEDICINE & REHABILITATION

## 2025-05-07 PROCEDURE — 1090F PRES/ABSN URINE INCON ASSESS: CPT | Performed by: PHYSICAL MEDICINE & REHABILITATION

## 2025-05-07 PROCEDURE — 3017F COLORECTAL CA SCREEN DOC REV: CPT | Performed by: PHYSICAL MEDICINE & REHABILITATION

## 2025-05-07 PROCEDURE — 1125F AMNT PAIN NOTED PAIN PRSNT: CPT | Performed by: PHYSICAL MEDICINE & REHABILITATION

## 2025-05-07 PROCEDURE — G8399 PT W/DXA RESULTS DOCUMENT: HCPCS | Performed by: PHYSICAL MEDICINE & REHABILITATION

## 2025-05-07 PROCEDURE — G8417 CALC BMI ABV UP PARAM F/U: HCPCS | Performed by: PHYSICAL MEDICINE & REHABILITATION

## 2025-05-07 RX ORDER — BACLOFEN 10 MG/1
TABLET ORAL
Qty: 90 TABLET | Refills: 0 | Status: SHIPPED | OUTPATIENT
Start: 2025-05-07

## 2025-05-07 RX ORDER — OXYCODONE AND ACETAMINOPHEN 10; 325 MG/1; MG/1
1 TABLET ORAL EVERY 6 HOURS PRN
Qty: 120 TABLET | Refills: 0 | Status: SHIPPED | OUTPATIENT
Start: 2025-05-07 | End: 2025-06-06

## 2025-05-09 DIAGNOSIS — R94.6 ABNORMAL RESULTS OF THYROID FUNCTION STUDIES: ICD-10-CM

## 2025-05-09 LAB — TSH REFLEX: 3.43 UIU/ML (ref 0.44–3.86)

## 2025-05-11 ENCOUNTER — RESULTS FOLLOW-UP (OUTPATIENT)
Age: 70
End: 2025-05-11

## 2025-05-19 DIAGNOSIS — G89.29 CHRONIC RIGHT-SIDED LOW BACK PAIN WITH LEFT-SIDED SCIATICA: ICD-10-CM

## 2025-05-19 DIAGNOSIS — G57.93 NEUROPATHY INVOLVING BOTH LOWER EXTREMITIES: ICD-10-CM

## 2025-05-19 DIAGNOSIS — M25.511 CHRONIC RIGHT SHOULDER PAIN: ICD-10-CM

## 2025-05-19 DIAGNOSIS — G89.18 POST-OP PAIN: ICD-10-CM

## 2025-05-19 DIAGNOSIS — G89.29 CHRONIC RIGHT SHOULDER PAIN: ICD-10-CM

## 2025-05-19 DIAGNOSIS — M54.42 CHRONIC RIGHT-SIDED LOW BACK PAIN WITH LEFT-SIDED SCIATICA: ICD-10-CM

## 2025-05-19 DIAGNOSIS — M05.79 RHEUMATOID ARTHRITIS INVOLVING MULTIPLE SITES WITH POSITIVE RHEUMATOID FACTOR (HCC): ICD-10-CM

## 2025-05-20 RX ORDER — OXYCODONE AND ACETAMINOPHEN 10; 325 MG/1; MG/1
1 TABLET ORAL EVERY 6 HOURS PRN
Qty: 120 TABLET | Refills: 0 | Status: SHIPPED | OUTPATIENT
Start: 2025-05-20 | End: 2025-06-19

## 2025-05-22 DIAGNOSIS — M15.0 PRIMARY OSTEOARTHRITIS INVOLVING MULTIPLE JOINTS: ICD-10-CM

## 2025-05-22 DIAGNOSIS — G57.93 NEUROPATHY INVOLVING BOTH LOWER EXTREMITIES: ICD-10-CM

## 2025-05-22 DIAGNOSIS — M46.26 OSTEOMYELITIS OF LUMBAR VERTEBRA (HCC): ICD-10-CM

## 2025-05-22 DIAGNOSIS — M05.79 RHEUMATOID ARTHRITIS INVOLVING MULTIPLE SITES WITH POSITIVE RHEUMATOID FACTOR (HCC): ICD-10-CM

## 2025-05-22 RX ORDER — GABAPENTIN 300 MG/1
CAPSULE ORAL
Qty: 180 CAPSULE | Refills: 5 | Status: SHIPPED | OUTPATIENT
Start: 2025-05-22 | End: 2025-06-22

## 2025-05-29 ENCOUNTER — OFFICE VISIT (OUTPATIENT)
Age: 70
End: 2025-05-29
Payer: MEDICARE

## 2025-05-29 VITALS
HEIGHT: 67 IN | BODY MASS INDEX: 31.08 KG/M2 | DIASTOLIC BLOOD PRESSURE: 70 MMHG | HEART RATE: 78 BPM | TEMPERATURE: 97.8 F | WEIGHT: 198 LBS | SYSTOLIC BLOOD PRESSURE: 120 MMHG | OXYGEN SATURATION: 98 %

## 2025-05-29 DIAGNOSIS — R35.0 URINARY FREQUENCY: ICD-10-CM

## 2025-05-29 DIAGNOSIS — D50.0 IRON DEFICIENCY ANEMIA DUE TO CHRONIC BLOOD LOSS: ICD-10-CM

## 2025-05-29 DIAGNOSIS — N18.30 STAGE 3 CHRONIC KIDNEY DISEASE, UNSPECIFIED WHETHER STAGE 3A OR 3B CKD (HCC): ICD-10-CM

## 2025-05-29 DIAGNOSIS — E87.6 HYPOKALEMIA: ICD-10-CM

## 2025-05-29 DIAGNOSIS — M05.79 RHEUMATOID ARTHRITIS INVOLVING MULTIPLE SITES WITH POSITIVE RHEUMATOID FACTOR (HCC): Primary | ICD-10-CM

## 2025-05-29 DIAGNOSIS — F11.20 OPIOID DEPENDENCE WITH CURRENT USE (HCC): ICD-10-CM

## 2025-05-29 LAB
ANION GAP SERPL CALCULATED.3IONS-SCNC: 15 MEQ/L (ref 9–15)
BASOPHILS # BLD: 0 K/UL (ref 0–0.2)
BASOPHILS NFR BLD: 0.3 %
BUN SERPL-MCNC: 17 MG/DL (ref 8–23)
CALCIUM SERPL-MCNC: 9.1 MG/DL (ref 8.5–9.9)
CHLORIDE SERPL-SCNC: 93 MEQ/L (ref 95–107)
CO2 SERPL-SCNC: 20 MEQ/L (ref 20–31)
CREAT SERPL-MCNC: 1.17 MG/DL (ref 0.5–0.9)
EOSINOPHIL # BLD: 0.3 K/UL (ref 0–0.7)
EOSINOPHIL NFR BLD: 4.5 %
ERYTHROCYTE [DISTWIDTH] IN BLOOD BY AUTOMATED COUNT: 13.6 % (ref 11.5–14.5)
GLUCOSE SERPL-MCNC: 100 MG/DL (ref 70–99)
HCT VFR BLD AUTO: 32 % (ref 37–47)
HGB BLD-MCNC: 10.8 G/DL (ref 12–16)
LYMPHOCYTES # BLD: 1.1 K/UL (ref 1–4.8)
LYMPHOCYTES NFR BLD: 19.2 %
MCH RBC QN AUTO: 31.5 PG (ref 27–31.3)
MCHC RBC AUTO-ENTMCNC: 33.8 % (ref 33–37)
MCV RBC AUTO: 93.3 FL (ref 79.4–94.8)
MONOCYTES # BLD: 0.5 K/UL (ref 0.2–0.8)
MONOCYTES NFR BLD: 8.7 %
NEUTROPHILS # BLD: 4 K/UL (ref 1.4–6.5)
NEUTS SEG NFR BLD: 67 %
PLATELET # BLD AUTO: 235 K/UL (ref 130–400)
POTASSIUM SERPL-SCNC: 4 MEQ/L (ref 3.4–4.9)
RBC # BLD AUTO: 3.43 M/UL (ref 4.2–5.4)
SODIUM SERPL-SCNC: 128 MEQ/L (ref 135–144)
WBC # BLD AUTO: 6 K/UL (ref 4.8–10.8)

## 2025-05-29 PROCEDURE — 99213 OFFICE O/P EST LOW 20 MIN: CPT | Performed by: PHYSICIAN ASSISTANT

## 2025-05-29 PROCEDURE — G8399 PT W/DXA RESULTS DOCUMENT: HCPCS | Performed by: PHYSICIAN ASSISTANT

## 2025-05-29 PROCEDURE — G8427 DOCREV CUR MEDS BY ELIG CLIN: HCPCS | Performed by: PHYSICIAN ASSISTANT

## 2025-05-29 PROCEDURE — 1159F MED LIST DOCD IN RCRD: CPT | Performed by: PHYSICIAN ASSISTANT

## 2025-05-29 PROCEDURE — 99214 OFFICE O/P EST MOD 30 MIN: CPT | Performed by: PHYSICIAN ASSISTANT

## 2025-05-29 PROCEDURE — G8417 CALC BMI ABV UP PARAM F/U: HCPCS | Performed by: PHYSICIAN ASSISTANT

## 2025-05-29 PROCEDURE — 1123F ACP DISCUSS/DSCN MKR DOCD: CPT | Performed by: PHYSICIAN ASSISTANT

## 2025-05-29 PROCEDURE — 1036F TOBACCO NON-USER: CPT | Performed by: PHYSICIAN ASSISTANT

## 2025-05-29 PROCEDURE — 3074F SYST BP LT 130 MM HG: CPT | Performed by: PHYSICIAN ASSISTANT

## 2025-05-29 PROCEDURE — 3078F DIAST BP <80 MM HG: CPT | Performed by: PHYSICIAN ASSISTANT

## 2025-05-29 PROCEDURE — 1090F PRES/ABSN URINE INCON ASSESS: CPT | Performed by: PHYSICIAN ASSISTANT

## 2025-05-29 PROCEDURE — 3017F COLORECTAL CA SCREEN DOC REV: CPT | Performed by: PHYSICIAN ASSISTANT

## 2025-05-29 RX ORDER — LOSARTAN POTASSIUM 25 MG/1
25 TABLET ORAL DAILY
COMMUNITY
Start: 2025-03-20

## 2025-05-29 NOTE — PROGRESS NOTES
Subjective  Rayna Hdez, 69 y.o. female presents today with:  Chief Complaint   Patient presents with    Post-Op Check     Patient presents today for post-op check from  due to left shoulder surgery.        HPI  Status post reverse total shoulder arthroplasty left  5/23   Pain is being managed by Rosaline lyle MD pain management who follows her due to her RA along with Lory Boswell MD rheumatology  She will start PT today  Labs show anemia /hypokalemia 523 patient states they had a difficult time finding her veins labs do not appear to have been repeated prior to discharge  Denies palpitations are leg cramps  Complains that she does not have the system that she needs in her home by her  -Home health referral offerred patient declines  initially reported urinary frequency  sxs since discharge-later states her dog stepped on her belly contributing to her frequency-will obtain urinalysis for completeness  Has laryngeal spasm and vocal cord tremor treated with Botox complains of postnasal drainage since treatment  Review of Systems   All other systems reviewed and are negative.        Past Medical History:   Diagnosis Date    Abnormal electromyogram (EMG) 08/19/2009    SENSORIMOTOR NEUROPATHY OF BLE, RIGHT WORSE THAN LEFT, SUSPICIOUS FOR RIGHT S1 RADIC    Angina pectoris     Angina pectoris 07/2019    sees Dr. Freda ROMERO    Ankle pain     Atherosclerotic heart disease of native coronary artery with unspecified angina pectoris 04/03/2023    Bleeding ulcer     Colon polyp 04/03/2017    Dermatitis     Fibromyalgia     Foot pain     Gastro-esophageal reflux disease without esophagitis 02/16/2023    Hyperlipidemia     Hypertension     Infection of intervertebral disc (pyogenic), lumbar region (HCC) 08/13/2018    Kidney disease     Low back pain     MRSA (methicillin resistant staph aureus) culture positive 2011    Nasal bleeding     Neck pain     Neuropathy     Obesity     Osteoarthritis     PSVT

## 2025-05-30 DIAGNOSIS — R35.0 URINARY FREQUENCY: ICD-10-CM

## 2025-05-30 LAB
BILIRUB UR QL STRIP: NEGATIVE
CLARITY UR: CLEAR
COLOR UR: YELLOW
GLUCOSE UR STRIP-MCNC: NEGATIVE MG/DL
HGB UR QL STRIP: NEGATIVE
KETONES UR STRIP-MCNC: NEGATIVE MG/DL
LEUKOCYTE ESTERASE UR QL STRIP: NEGATIVE
NITRITE UR QL STRIP: NEGATIVE
PH UR STRIP: 6 [PH] (ref 5–9)
PROT UR STRIP-MCNC: NEGATIVE MG/DL
SP GR UR STRIP: 1.01 (ref 1–1.03)
UROBILINOGEN UR STRIP-ACNC: 0.2 E.U./DL

## 2025-05-31 ENCOUNTER — RESULTS FOLLOW-UP (OUTPATIENT)
Age: 70
End: 2025-05-31

## 2025-06-01 LAB — BACTERIA UR CULT: NORMAL

## 2025-06-16 DIAGNOSIS — G57.93 NEUROPATHY INVOLVING BOTH LOWER EXTREMITIES: ICD-10-CM

## 2025-06-16 DIAGNOSIS — G89.29 CHRONIC RIGHT SHOULDER PAIN: ICD-10-CM

## 2025-06-16 DIAGNOSIS — G89.18 POST-OP PAIN: ICD-10-CM

## 2025-06-16 DIAGNOSIS — M05.79 RHEUMATOID ARTHRITIS INVOLVING MULTIPLE SITES WITH POSITIVE RHEUMATOID FACTOR (HCC): ICD-10-CM

## 2025-06-16 DIAGNOSIS — M54.42 CHRONIC RIGHT-SIDED LOW BACK PAIN WITH LEFT-SIDED SCIATICA: ICD-10-CM

## 2025-06-16 DIAGNOSIS — M25.511 CHRONIC RIGHT SHOULDER PAIN: ICD-10-CM

## 2025-06-16 DIAGNOSIS — G89.29 CHRONIC RIGHT-SIDED LOW BACK PAIN WITH LEFT-SIDED SCIATICA: ICD-10-CM

## 2025-06-16 RX ORDER — OXYCODONE AND ACETAMINOPHEN 10; 325 MG/1; MG/1
1 TABLET ORAL EVERY 6 HOURS PRN
Qty: 120 TABLET | Refills: 0 | Status: SHIPPED | OUTPATIENT
Start: 2025-06-18 | End: 2025-07-18

## 2025-06-25 ENCOUNTER — PROCEDURE VISIT (OUTPATIENT)
Dept: PHYSICAL MEDICINE AND REHAB | Age: 70
End: 2025-06-25
Payer: MEDICARE

## 2025-06-25 DIAGNOSIS — G89.29 CHRONIC LEFT-SIDED LOW BACK PAIN WITH LEFT-SIDED SCIATICA: Primary | ICD-10-CM

## 2025-06-25 DIAGNOSIS — M54.42 CHRONIC LEFT-SIDED LOW BACK PAIN WITH LEFT-SIDED SCIATICA: Primary | ICD-10-CM

## 2025-06-25 PROCEDURE — 64445 NJX AA&/STRD SCIATIC NRV IMG: CPT | Performed by: PHYSICAL MEDICINE & REHABILITATION

## 2025-06-25 PROCEDURE — 96372 THER/PROPH/DIAG INJ SC/IM: CPT | Performed by: PHYSICAL MEDICINE & REHABILITATION

## 2025-06-25 RX ORDER — CYANOCOBALAMIN 1000 UG/ML
1000 INJECTION, SOLUTION INTRAMUSCULAR; SUBCUTANEOUS ONCE
Status: COMPLETED | OUTPATIENT
Start: 2025-06-25 | End: 2025-06-25

## 2025-06-25 RX ORDER — LIDOCAINE HYDROCHLORIDE 20 MG/ML
5 INJECTION, SOLUTION INFILTRATION; PERINEURAL ONCE
Status: COMPLETED | OUTPATIENT
Start: 2025-06-25 | End: 2025-06-25

## 2025-06-25 RX ORDER — LIDOCAINE HYDROCHLORIDE 10 MG/ML
7 INJECTION, SOLUTION INFILTRATION; PERINEURAL ONCE
Status: COMPLETED | OUTPATIENT
Start: 2025-06-25 | End: 2025-06-25

## 2025-06-25 RX ADMIN — LIDOCAINE HYDROCHLORIDE 7 ML: 10 INJECTION, SOLUTION INFILTRATION; PERINEURAL at 15:23

## 2025-06-25 RX ADMIN — CYANOCOBALAMIN 1000 MCG: 1000 INJECTION, SOLUTION INTRAMUSCULAR; SUBCUTANEOUS at 15:23

## 2025-06-25 RX ADMIN — LIDOCAINE HYDROCHLORIDE 5 ML: 20 INJECTION, SOLUTION INFILTRATION; PERINEURAL at 15:23

## 2025-06-30 PROBLEM — Z74.09 IMPAIRED MOBILITY AND ACTIVITIES OF DAILY LIVING: Status: RESOLVED | Noted: 2024-05-30 | Resolved: 2025-06-30

## 2025-06-30 PROBLEM — Z78.9 IMPAIRED MOBILITY AND ACTIVITIES OF DAILY LIVING: Status: RESOLVED | Noted: 2024-05-30 | Resolved: 2025-06-30

## 2025-06-30 PROBLEM — K59.09 CHRONIC CONSTIPATION: Status: ACTIVE | Noted: 2025-05-23

## 2025-06-30 PROBLEM — R60.0 LOWER EXTREMITY EDEMA: Chronic | Status: ACTIVE | Noted: 2025-05-23

## 2025-06-30 PROBLEM — R33.9 RETENTION OF URINE: Status: ACTIVE | Noted: 2025-05-23

## 2025-06-30 PROBLEM — Z96.612 S/P REVERSE TOTAL SHOULDER ARTHROPLASTY, LEFT: Status: ACTIVE | Noted: 2025-05-27

## 2025-06-30 PROBLEM — Z98.890 PONV (POSTOPERATIVE NAUSEA AND VOMITING): Status: RESOLVED | Noted: 2025-04-30 | Resolved: 2025-06-30

## 2025-06-30 PROBLEM — Z96.612 STATUS POST TOTAL SHOULDER REPLACEMENT, LEFT: Status: ACTIVE | Noted: 2025-05-23

## 2025-06-30 PROBLEM — R11.2 PONV (POSTOPERATIVE NAUSEA AND VOMITING): Status: RESOLVED | Noted: 2025-04-30 | Resolved: 2025-06-30

## 2025-06-30 NOTE — PROGRESS NOTES
Please bring in a copy of your advance directive at your next visit, or drop off a copy at any [facility] facility so that it can be added to your medical record.     Recommend gentle neck stretching exercises as well as neck strengthening exercises as discussed.   Subjective  Rayna Hdez, 70 y.o. female presents today with:    Reason for Visit  Back Pain Feeling better with the heat of summer       Neck Pain Flared up with cold damp start to the summer improving with activity stretching and the warmth of the mid summer.  Injections are also helpful       Leg Pain Bilateral. Left sciatic injection 6/25/25 with 75% reduction in pain lasting a month. Right sciatic injection 7/23/25 with 75% reduction in pain lasting still ongoing    Shoulder pain much better since having her right total shoulder replacement done at the [facility] we discussed transitioning back to Percocet 7.5 325 max of 3 a day and she is in agreement   Hip Pain Right       Knee Pain Right       Foot Pain Right       Medication Refill Percocet doing well on current dose good interactions with friends and family improved function however we would like to get her overall MME's down to baseline now that she is recovered from her recent surgery, Gabapentin, Baclofen, Prednisone, Zinc, Vit D. Pill count today, complaint      She is still doing well with her current medications and shows no signs of abuse or overuse.  She is more functional on it.        Back Pain  This is a chronic problem. The current episode started more than 1 year ago. The problem occurs daily. The problem is unchanged. The pain is present in the lumbar spine, gluteal and sacro-iliac. The quality of the pain is described as aching. Radiates to: bilateral legs, left leg worse, left hip. The pain is at a severity of 4/10. The pain is moderate. The pain is Worse during the day. The symptoms are aggravated by bending, standing, position, sitting and twisting (Walking). Stiffness is present In the morning. Associated symptoms include abdominal pain, leg pain and pelvic pain. Pertinent negatives include no bladder incontinence, chest pain, dysuria, fever, headaches, numbness, paresis, perianal numbness or weakness. Risk factors include

## 2025-07-17 DIAGNOSIS — G89.29 CHRONIC RIGHT SHOULDER PAIN: ICD-10-CM

## 2025-07-17 DIAGNOSIS — M05.79 RHEUMATOID ARTHRITIS INVOLVING MULTIPLE SITES WITH POSITIVE RHEUMATOID FACTOR (HCC): ICD-10-CM

## 2025-07-17 DIAGNOSIS — M54.42 CHRONIC RIGHT-SIDED LOW BACK PAIN WITH LEFT-SIDED SCIATICA: ICD-10-CM

## 2025-07-17 DIAGNOSIS — G89.18 POST-OP PAIN: ICD-10-CM

## 2025-07-17 DIAGNOSIS — G57.93 NEUROPATHY INVOLVING BOTH LOWER EXTREMITIES: ICD-10-CM

## 2025-07-17 DIAGNOSIS — M25.511 CHRONIC RIGHT SHOULDER PAIN: ICD-10-CM

## 2025-07-17 DIAGNOSIS — G89.29 CHRONIC RIGHT-SIDED LOW BACK PAIN WITH LEFT-SIDED SCIATICA: ICD-10-CM

## 2025-07-17 RX ORDER — OXYCODONE AND ACETAMINOPHEN 10; 325 MG/1; MG/1
1 TABLET ORAL EVERY 6 HOURS PRN
Qty: 120 TABLET | Refills: 0 | Status: SHIPPED | OUTPATIENT
Start: 2025-07-17 | End: 2025-08-16

## 2025-07-18 ENCOUNTER — OFFICE VISIT (OUTPATIENT)
Age: 70
End: 2025-07-18
Payer: MEDICARE

## 2025-07-18 VITALS
OXYGEN SATURATION: 97 % | DIASTOLIC BLOOD PRESSURE: 60 MMHG | BODY MASS INDEX: 31.39 KG/M2 | WEIGHT: 200 LBS | HEIGHT: 67 IN | SYSTOLIC BLOOD PRESSURE: 122 MMHG | HEART RATE: 66 BPM | TEMPERATURE: 97.6 F

## 2025-07-18 DIAGNOSIS — E87.1 HYPONATREMIA: ICD-10-CM

## 2025-07-18 DIAGNOSIS — R60.0 BILATERAL LEG EDEMA: Primary | ICD-10-CM

## 2025-07-18 DIAGNOSIS — N30.00 ACUTE CYSTITIS WITHOUT HEMATURIA: ICD-10-CM

## 2025-07-18 DIAGNOSIS — N18.30 STAGE 3 CHRONIC KIDNEY DISEASE, UNSPECIFIED WHETHER STAGE 3A OR 3B CKD (HCC): ICD-10-CM

## 2025-07-18 LAB
BILIRUB UR QL STRIP: NEGATIVE
BILIRUBIN, POC: NORMAL
BLOOD URINE, POC: NORMAL
CLARITY UR: CLEAR
CLARITY, POC: NORMAL
COLOR UR: YELLOW
COLOR, POC: YELLOW
GLUCOSE UR STRIP-MCNC: NEGATIVE MG/DL
GLUCOSE URINE, POC: NORMAL MG/DL
HGB UR QL STRIP: NEGATIVE
KETONES UR STRIP-MCNC: NEGATIVE MG/DL
KETONES, POC: NORMAL MG/DL
LEUKOCYTE EST, POC: NORMAL
LEUKOCYTE ESTERASE UR QL STRIP: NEGATIVE
NITRITE UR QL STRIP: NEGATIVE
NITRITE, POC: NORMAL
PH UR STRIP: 6.5 [PH] (ref 5–9)
PH, POC: 6
PROT UR STRIP-MCNC: NEGATIVE MG/DL
PROTEIN, POC: NORMAL MG/DL
SP GR UR STRIP: 1.01 (ref 1–1.03)
SPECIFIC GRAVITY, POC: 1.01
UROBILINOGEN UR STRIP-ACNC: 0.2 E.U./DL
UROBILINOGEN, POC: 0.2 MG/DL

## 2025-07-18 PROCEDURE — 3017F COLORECTAL CA SCREEN DOC REV: CPT | Performed by: PHYSICIAN ASSISTANT

## 2025-07-18 PROCEDURE — G8399 PT W/DXA RESULTS DOCUMENT: HCPCS | Performed by: PHYSICIAN ASSISTANT

## 2025-07-18 PROCEDURE — 1123F ACP DISCUSS/DSCN MKR DOCD: CPT | Performed by: PHYSICIAN ASSISTANT

## 2025-07-18 PROCEDURE — 1090F PRES/ABSN URINE INCON ASSESS: CPT | Performed by: PHYSICIAN ASSISTANT

## 2025-07-18 PROCEDURE — 99214 OFFICE O/P EST MOD 30 MIN: CPT | Performed by: PHYSICIAN ASSISTANT

## 2025-07-18 PROCEDURE — 81003 URINALYSIS AUTO W/O SCOPE: CPT | Performed by: PHYSICIAN ASSISTANT

## 2025-07-18 PROCEDURE — 1159F MED LIST DOCD IN RCRD: CPT | Performed by: PHYSICIAN ASSISTANT

## 2025-07-18 PROCEDURE — 1036F TOBACCO NON-USER: CPT | Performed by: PHYSICIAN ASSISTANT

## 2025-07-18 PROCEDURE — 99213 OFFICE O/P EST LOW 20 MIN: CPT | Performed by: PHYSICIAN ASSISTANT

## 2025-07-18 PROCEDURE — 3074F SYST BP LT 130 MM HG: CPT | Performed by: PHYSICIAN ASSISTANT

## 2025-07-18 PROCEDURE — G8417 CALC BMI ABV UP PARAM F/U: HCPCS | Performed by: PHYSICIAN ASSISTANT

## 2025-07-18 PROCEDURE — 3078F DIAST BP <80 MM HG: CPT | Performed by: PHYSICIAN ASSISTANT

## 2025-07-18 PROCEDURE — PBSHW POCT URINALYSIS DIPSTICK W/O MICROSCOPE (AUTO): Performed by: PHYSICIAN ASSISTANT

## 2025-07-18 PROCEDURE — G8427 DOCREV CUR MEDS BY ELIG CLIN: HCPCS | Performed by: PHYSICIAN ASSISTANT

## 2025-07-18 NOTE — PROGRESS NOTES
Subjective  Rayna Hdez, 70 y.o. female presents today with:  Chief Complaint   Patient presents with    Swelling     Patient presents today with swelling on bilateral legs x 3 weeks.        HPI  Co bilat le swelling  improves in the am worsens as the day progresses  Has not been using compresson stocking taking lasix 20mg bid d.t  ckd /hyponatremia reduced some time ago by nephrology from 80 mg daily in divided dose. Also followed by [facility]  Advised to limit fluids which she began doing recently with improvement in her levels - reviewed with pt -unsure of how much she is actually drinking  Co darkly colored urine - treated for uti thru [facility] completed a week ago      Review of Systems   All other systems reviewed and are negative.        Past Medical History:   Diagnosis Date    Abnormal electromyogram (EMG) 08/19/2009    SENSORIMOTOR NEUROPATHY OF BLE, RIGHT WORSE THAN LEFT, SUSPICIOUS FOR RIGHT S1 RADIC    Angina pectoris     Angina pectoris 07/2019    sees Dr. Freda ROMERO    Ankle pain     Atherosclerotic heart disease of native coronary artery with unspecified angina pectoris 04/03/2023    Bleeding ulcer     Chronic kidney disease 2019    Colon polyp 04/03/2017    Dermatitis     Fibromyalgia     Foot pain     Gastro-esophageal reflux disease without esophagitis 02/16/2023    Hearing loss 2015    Hyperlipidemia     Hypertension     Infection of intervertebral disc (pyogenic), lumbar region (HCC) 08/13/2018    Kidney disease     Low back pain     MRSA (methicillin resistant staph aureus) culture positive 2011    Nasal bleeding     Neck pain     Neuropathy     Obesity     Osteoarthritis     PSVT (paroxysmal supraventricular tachycardia)     Shingles outbreak 2012    SI (sacroiliac) pain     Stage 3 chronic kidney disease, unspecified whether stage 3a or 3b CKD ([facility]) 04/13/2022    Stenosis     Vitamin D deficiency      Past Surgical History:   Procedure Laterality Date    BACK SURGERY  May 2014    Lumbar

## 2025-07-19 LAB — BACTERIA UR CULT: NORMAL

## 2025-07-23 ENCOUNTER — PROCEDURE VISIT (OUTPATIENT)
Dept: PHYSICAL MEDICINE AND REHAB | Age: 70
End: 2025-07-23

## 2025-07-23 DIAGNOSIS — M54.42 CHRONIC RIGHT-SIDED LOW BACK PAIN WITH LEFT-SIDED SCIATICA: Primary | ICD-10-CM

## 2025-07-23 DIAGNOSIS — G89.29 CHRONIC RIGHT-SIDED LOW BACK PAIN WITH LEFT-SIDED SCIATICA: Primary | ICD-10-CM

## 2025-07-23 RX ORDER — LIDOCAINE HYDROCHLORIDE 10 MG/ML
7 INJECTION, SOLUTION INFILTRATION; PERINEURAL ONCE
Status: COMPLETED | OUTPATIENT
Start: 2025-07-23 | End: 2025-07-23

## 2025-07-23 RX ORDER — LIDOCAINE HYDROCHLORIDE 20 MG/ML
5 INJECTION, SOLUTION INFILTRATION; PERINEURAL ONCE
Status: COMPLETED | OUTPATIENT
Start: 2025-07-23 | End: 2025-07-23

## 2025-07-23 RX ORDER — CYANOCOBALAMIN 1000 UG/ML
1000 INJECTION, SOLUTION INTRAMUSCULAR; SUBCUTANEOUS ONCE
Status: COMPLETED | OUTPATIENT
Start: 2025-07-23 | End: 2025-07-23

## 2025-07-23 RX ADMIN — LIDOCAINE HYDROCHLORIDE 7 ML: 10 INJECTION, SOLUTION INFILTRATION; PERINEURAL at 11:47

## 2025-07-23 RX ADMIN — CYANOCOBALAMIN 1000 MCG: 1000 INJECTION, SOLUTION INTRAMUSCULAR; SUBCUTANEOUS at 11:47

## 2025-07-23 RX ADMIN — LIDOCAINE HYDROCHLORIDE 5 ML: 20 INJECTION, SOLUTION INFILTRATION; PERINEURAL at 11:47

## 2025-07-28 ENCOUNTER — OFFICE VISIT (OUTPATIENT)
Dept: PHYSICAL MEDICINE AND REHAB | Age: 70
End: 2025-07-28
Payer: MEDICARE

## 2025-07-28 VITALS
WEIGHT: 200 LBS | DIASTOLIC BLOOD PRESSURE: 60 MMHG | BODY MASS INDEX: 31.39 KG/M2 | SYSTOLIC BLOOD PRESSURE: 118 MMHG | HEART RATE: 69 BPM | HEIGHT: 67 IN

## 2025-07-28 DIAGNOSIS — Z78.9 IMPAIRED MOBILITY AND ADLS: ICD-10-CM

## 2025-07-28 DIAGNOSIS — G89.29 CHRONIC PAIN OF BOTH SHOULDERS: ICD-10-CM

## 2025-07-28 DIAGNOSIS — Z74.09 IMPAIRED MOBILITY AND ADLS: ICD-10-CM

## 2025-07-28 DIAGNOSIS — F11.20 OPIOID DEPENDENCE WITH CURRENT USE (HCC): ICD-10-CM

## 2025-07-28 DIAGNOSIS — M25.511 CHRONIC PAIN OF BOTH SHOULDERS: ICD-10-CM

## 2025-07-28 DIAGNOSIS — M05.79 RHEUMATOID ARTHRITIS INVOLVING MULTIPLE SITES WITH POSITIVE RHEUMATOID FACTOR (HCC): ICD-10-CM

## 2025-07-28 DIAGNOSIS — Z79.899 HIGH RISK MEDICATION USE: ICD-10-CM

## 2025-07-28 DIAGNOSIS — M25.512 CHRONIC PAIN OF BOTH SHOULDERS: ICD-10-CM

## 2025-07-28 DIAGNOSIS — G57.93 NEUROPATHY INVOLVING BOTH LOWER EXTREMITIES: Primary | ICD-10-CM

## 2025-07-28 PROCEDURE — 1125F AMNT PAIN NOTED PAIN PRSNT: CPT | Performed by: PHYSICAL MEDICINE & REHABILITATION

## 2025-07-28 PROCEDURE — 3074F SYST BP LT 130 MM HG: CPT | Performed by: PHYSICAL MEDICINE & REHABILITATION

## 2025-07-28 PROCEDURE — 1123F ACP DISCUSS/DSCN MKR DOCD: CPT | Performed by: PHYSICAL MEDICINE & REHABILITATION

## 2025-07-28 PROCEDURE — G8417 CALC BMI ABV UP PARAM F/U: HCPCS | Performed by: PHYSICAL MEDICINE & REHABILITATION

## 2025-07-28 PROCEDURE — 3017F COLORECTAL CA SCREEN DOC REV: CPT | Performed by: PHYSICAL MEDICINE & REHABILITATION

## 2025-07-28 PROCEDURE — 3078F DIAST BP <80 MM HG: CPT | Performed by: PHYSICAL MEDICINE & REHABILITATION

## 2025-07-28 PROCEDURE — 1036F TOBACCO NON-USER: CPT | Performed by: PHYSICAL MEDICINE & REHABILITATION

## 2025-07-28 PROCEDURE — 1159F MED LIST DOCD IN RCRD: CPT | Performed by: PHYSICAL MEDICINE & REHABILITATION

## 2025-07-28 PROCEDURE — G8427 DOCREV CUR MEDS BY ELIG CLIN: HCPCS | Performed by: PHYSICAL MEDICINE & REHABILITATION

## 2025-07-28 PROCEDURE — G8399 PT W/DXA RESULTS DOCUMENT: HCPCS | Performed by: PHYSICAL MEDICINE & REHABILITATION

## 2025-07-28 PROCEDURE — 99214 OFFICE O/P EST MOD 30 MIN: CPT | Performed by: PHYSICAL MEDICINE & REHABILITATION

## 2025-07-28 PROCEDURE — 1090F PRES/ABSN URINE INCON ASSESS: CPT | Performed by: PHYSICAL MEDICINE & REHABILITATION

## 2025-07-28 RX ORDER — BACLOFEN 10 MG/1
TABLET ORAL
Qty: 90 TABLET | Refills: 0 | Status: SHIPPED | OUTPATIENT
Start: 2025-07-28

## 2025-07-28 RX ORDER — OXYCODONE AND ACETAMINOPHEN 7.5; 325 MG/1; MG/1
1 TABLET ORAL EVERY 6 HOURS PRN
Qty: 90 TABLET | Refills: 0 | Status: SHIPPED | OUTPATIENT
Start: 2025-07-28 | End: 2025-08-27

## 2025-08-12 ENCOUNTER — TRANSCRIBE ORDERS (OUTPATIENT)
Dept: ADMINISTRATIVE | Age: 70
End: 2025-08-12

## 2025-08-12 DIAGNOSIS — N18.32 CHRONIC KIDNEY DISEASE (CKD) STAGE G3B/A1, MODERATELY DECREASED GLOMERULAR FILTRATION RATE (GFR) BETWEEN 30-44 ML/MIN/1.73 SQUARE METER AND ALBUMINURIA CREATININE RATIO LESS THAN 30 MG/G (HCC): Primary | ICD-10-CM

## 2025-08-18 ENCOUNTER — APPOINTMENT (OUTPATIENT)
Dept: CARDIOLOGY | Facility: CLINIC | Age: 70
End: 2025-08-18
Payer: MEDICARE

## 2025-08-18 VITALS
HEIGHT: 68 IN | WEIGHT: 197.6 LBS | DIASTOLIC BLOOD PRESSURE: 58 MMHG | BODY MASS INDEX: 29.95 KG/M2 | HEART RATE: 59 BPM | SYSTOLIC BLOOD PRESSURE: 120 MMHG | OXYGEN SATURATION: 97 %

## 2025-08-18 DIAGNOSIS — R60.9 EDEMA, UNSPECIFIED TYPE: ICD-10-CM

## 2025-08-18 DIAGNOSIS — I20.9 ANGINA PECTORIS WITH NORMAL CORONARY ARTERIOGRAM: ICD-10-CM

## 2025-08-18 DIAGNOSIS — I47.10 SVT (SUPRAVENTRICULAR TACHYCARDIA): ICD-10-CM

## 2025-08-18 PROCEDURE — 3008F BODY MASS INDEX DOCD: CPT | Performed by: INTERNAL MEDICINE

## 2025-08-18 PROCEDURE — G2211 COMPLEX E/M VISIT ADD ON: HCPCS | Performed by: INTERNAL MEDICINE

## 2025-08-18 PROCEDURE — 1036F TOBACCO NON-USER: CPT | Performed by: INTERNAL MEDICINE

## 2025-08-18 PROCEDURE — 1159F MED LIST DOCD IN RCRD: CPT | Performed by: INTERNAL MEDICINE

## 2025-08-18 PROCEDURE — 3074F SYST BP LT 130 MM HG: CPT | Performed by: INTERNAL MEDICINE

## 2025-08-18 PROCEDURE — 3078F DIAST BP <80 MM HG: CPT | Performed by: INTERNAL MEDICINE

## 2025-08-18 PROCEDURE — 99214 OFFICE O/P EST MOD 30 MIN: CPT | Performed by: INTERNAL MEDICINE

## 2025-08-18 RX ORDER — OXYCODONE AND ACETAMINOPHEN 7.5; 325 MG/1; MG/1
1 TABLET ORAL EVERY 6 HOURS PRN
COMMUNITY
Start: 2025-07-31

## 2025-08-18 RX ORDER — NITROGLYCERIN 0.4 MG/1
0.4 TABLET SUBLINGUAL EVERY 5 MIN PRN
Qty: 25 TABLET | Refills: 11 | Status: SHIPPED | OUTPATIENT
Start: 2025-08-18 | End: 2026-08-18

## 2025-08-18 RX ORDER — DILTIAZEM HYDROCHLORIDE 30 MG/1
30 TABLET, FILM COATED ORAL 2 TIMES DAILY
Qty: 180 TABLET | Refills: 3 | Status: SHIPPED | OUTPATIENT
Start: 2025-08-18 | End: 2026-08-18

## 2025-08-18 RX ORDER — ATORVASTATIN CALCIUM 10 MG/1
10 TABLET, FILM COATED ORAL DAILY
Qty: 90 TABLET | Refills: 3 | Status: SHIPPED | OUTPATIENT
Start: 2025-08-18

## 2025-08-22 ENCOUNTER — PROCEDURE VISIT (OUTPATIENT)
Dept: PHYSICAL MEDICINE AND REHAB | Age: 70
End: 2025-08-22

## 2025-08-22 DIAGNOSIS — G89.29 CHRONIC BILATERAL LOW BACK PAIN WITH BILATERAL SCIATICA: Primary | ICD-10-CM

## 2025-08-22 DIAGNOSIS — M54.41 CHRONIC BILATERAL LOW BACK PAIN WITH BILATERAL SCIATICA: Primary | ICD-10-CM

## 2025-08-22 DIAGNOSIS — M54.42 CHRONIC BILATERAL LOW BACK PAIN WITH BILATERAL SCIATICA: Primary | ICD-10-CM

## 2025-08-22 RX ORDER — LIDOCAINE HYDROCHLORIDE 20 MG/ML
5 INJECTION, SOLUTION INFILTRATION; PERINEURAL ONCE
Status: COMPLETED | OUTPATIENT
Start: 2025-08-22 | End: 2025-08-22

## 2025-08-22 RX ORDER — CYANOCOBALAMIN 1000 UG/ML
1000 INJECTION, SOLUTION INTRAMUSCULAR; SUBCUTANEOUS ONCE
Status: COMPLETED | OUTPATIENT
Start: 2025-08-22 | End: 2025-08-22

## 2025-08-22 RX ORDER — LIDOCAINE HYDROCHLORIDE 10 MG/ML
7 INJECTION, SOLUTION INFILTRATION; PERINEURAL ONCE
Status: COMPLETED | OUTPATIENT
Start: 2025-08-22 | End: 2025-08-22

## 2025-08-22 RX ADMIN — LIDOCAINE HYDROCHLORIDE 7 ML: 10 INJECTION, SOLUTION INFILTRATION; PERINEURAL at 10:38

## 2025-08-22 RX ADMIN — CYANOCOBALAMIN 1000 MCG: 1000 INJECTION, SOLUTION INTRAMUSCULAR; SUBCUTANEOUS at 10:37

## 2025-08-22 RX ADMIN — LIDOCAINE HYDROCHLORIDE 5 ML: 20 INJECTION, SOLUTION INFILTRATION; PERINEURAL at 10:38

## 2025-09-02 ENCOUNTER — HOSPITAL ENCOUNTER (OUTPATIENT)
Dept: ULTRASOUND IMAGING | Age: 70
Discharge: HOME | End: 2025-09-04
Attending: STUDENT IN AN ORGANIZED HEALTH CARE EDUCATION/TRAINING PROGRAM
Payer: MEDICARE

## 2025-09-02 DIAGNOSIS — N18.32 CHRONIC KIDNEY DISEASE (CKD) STAGE G3B/A1, MODERATELY DECREASED GLOMERULAR FILTRATION RATE (GFR) BETWEEN 30-44 ML/MIN/1.73 SQUARE METER AND ALBUMINURIA CREATININE RATIO LESS THAN 30 MG/G (HCC): ICD-10-CM

## 2025-09-02 PROCEDURE — 51798 US URINE CAPACITY MEASURE: CPT

## 2025-09-02 PROCEDURE — 76770 US EXAM ABDO BACK WALL COMP: CPT

## 2025-09-04 DIAGNOSIS — M25.512 CHRONIC PAIN OF BOTH SHOULDERS: ICD-10-CM

## 2025-09-04 DIAGNOSIS — G57.93 NEUROPATHY INVOLVING BOTH LOWER EXTREMITIES: ICD-10-CM

## 2025-09-04 DIAGNOSIS — M25.511 CHRONIC PAIN OF BOTH SHOULDERS: ICD-10-CM

## 2025-09-04 DIAGNOSIS — Z79.899 HIGH RISK MEDICATION USE: ICD-10-CM

## 2025-09-04 DIAGNOSIS — M05.79 RHEUMATOID ARTHRITIS INVOLVING MULTIPLE SITES WITH POSITIVE RHEUMATOID FACTOR (HCC): ICD-10-CM

## 2025-09-04 DIAGNOSIS — G89.29 CHRONIC PAIN OF BOTH SHOULDERS: ICD-10-CM

## 2025-09-04 DIAGNOSIS — F11.20 OPIOID DEPENDENCE WITH CURRENT USE (HCC): ICD-10-CM

## 2025-09-04 RX ORDER — OXYCODONE AND ACETAMINOPHEN 7.5; 325 MG/1; MG/1
1 TABLET ORAL EVERY 8 HOURS PRN
Qty: 90 TABLET | Refills: 0 | Status: SHIPPED | OUTPATIENT
Start: 2025-09-04 | End: 2025-10-04

## 2025-10-20 ENCOUNTER — APPOINTMENT (OUTPATIENT)
Dept: CARDIOLOGY | Facility: CLINIC | Age: 70
End: 2025-10-20
Payer: MEDICARE

## (undated) DEVICE — LABEL MED MINI W/ MARKER

## (undated) DEVICE — JELLY,LUBE,STERILE,FLIP TOP,TUBE,2-OZ: Brand: MEDLINE

## (undated) DEVICE — TUBE SET 96 MM 64 MM H2O PERISTALTIC STD AUX CHANNEL

## (undated) DEVICE — DRESSING HYDROFIBER AQUACEL AG ADVANTAGE 3.5X12 IN

## (undated) DEVICE — SUTURE VICRYL SZ 1 L36IN ABSRB UD L36MM CT-1 1/2 CIR J947H

## (undated) DEVICE — TOTAL KNEE: Brand: MEDLINE INDUSTRIES, INC.

## (undated) DEVICE — CONMED SCOPE SAVER BITE BLOCK, 20X27 MM: Brand: SCOPE SAVER

## (undated) DEVICE — BRUSH ENDO CLN L90.5IN SHTH DIA1.7MM BRIST DIA5-7MM 2-6MM

## (undated) DEVICE — SYRINGE MED 50ML LUERLOCK TIP

## (undated) DEVICE — ENDOSCOPIC TRAY TRNSPRT 20.5X16.5X4.1 IN RECYCL SUGAR PULP

## (undated) DEVICE — Device: Brand: STABLECUT®

## (undated) DEVICE — FORCEPS BX L240CM JAW DIA2.4MM ORNG L CAP W/ NDL DISP RAD

## (undated) DEVICE — Device: Brand: ENDO SMARTCAP

## (undated) DEVICE — GOWN,AURORA,NONREINFORCED,LARGE: Brand: MEDLINE

## (undated) DEVICE — SUTURE VICRYL SZ 2-0 L36IN ABSRB UD L36MM CT-1 1/2 CIR J945H

## (undated) DEVICE — SUTURE MONOCRYL STRATAFIX SPRL + SZ 3-0 L18IN ABSRB UD PS-2 SXMP1B107

## (undated) DEVICE — 4-PORT MANIFOLD: Brand: NEPTUNE 2

## (undated) DEVICE — SUTURE ETHIBOND EXCEL SZ 1 L30IN NONABSORBABLE GRN L48MM CTX X865H

## (undated) DEVICE — ENDO CARRY-ON PROCEDURE KIT: Brand: ENDO CARRY-ON PROCEDURE KIT

## (undated) DEVICE — NEEDLE SPNL 20GA L3 1 2IN YEL S STL POLYCARB HUB MTL STYL

## (undated) DEVICE — ELECTRODE ES L275IN 275IN BLDE TIP COAT PTFE TEF W EVAC

## (undated) DEVICE — GLOVE ORANGE PI 8 1/2   MSG9085

## (undated) DEVICE — SONY PRINTER PAPER

## (undated) DEVICE — ZIMMER® STERILE DISPOSABLE TOURNIQUET CUFF, DUAL PORT, SINGLE BLADDER, 34 IN. (86 CM)

## (undated) DEVICE — MAT FLR ABSRB ECODRI-SAFE

## (undated) DEVICE — GLOVE SURG SZ 9 THK91MIL LTX FREE SYN POLYISOPRENE ANTI

## (undated) DEVICE — ADAPTER FLSH PMP FLD MGMT GI IRRIG OFP 2 DISPOSABLE

## (undated) DEVICE — HOOD: Brand: T7PLUS

## (undated) DEVICE — ENDOSCOPIC ULTRASOUND FINE NEEDLE BIOPSY (FNB) DEVICE: Brand: ACQUIRE

## (undated) DEVICE — Device: Brand: BALLOON3